# Patient Record
Sex: FEMALE | Race: BLACK OR AFRICAN AMERICAN | NOT HISPANIC OR LATINO | Employment: FULL TIME | URBAN - METROPOLITAN AREA
[De-identification: names, ages, dates, MRNs, and addresses within clinical notes are randomized per-mention and may not be internally consistent; named-entity substitution may affect disease eponyms.]

---

## 2014-04-07 LAB — HEP C VIRUS AB: NON REACTIVE

## 2017-12-18 ENCOUNTER — HOSPITAL ENCOUNTER (EMERGENCY)
Facility: HOSPITAL | Age: 52
Discharge: HOME | End: 2017-12-18
Attending: EMERGENCY MEDICINE

## 2017-12-18 VITALS
HEIGHT: 62 IN | OXYGEN SATURATION: 99 % | SYSTOLIC BLOOD PRESSURE: 130 MMHG | HEART RATE: 89 BPM | TEMPERATURE: 99 F | DIASTOLIC BLOOD PRESSURE: 79 MMHG | BODY MASS INDEX: 28.52 KG/M2 | WEIGHT: 155 LBS | RESPIRATION RATE: 18 BRPM

## 2017-12-18 DIAGNOSIS — M25.511 ACUTE PAIN OF BOTH SHOULDERS: ICD-10-CM
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
## 2017-12-18 DIAGNOSIS — M25.512 ACUTE PAIN OF BOTH SHOULDERS: ICD-10-CM

## 2017-12-18 DIAGNOSIS — T14.8XXA MUSCLE STRAIN: ICD-10-CM

## 2017-12-18 DIAGNOSIS — S40.021A CONTUSION OF RIGHT UPPER ARM, INITIAL ENCOUNTER: ICD-10-CM

## 2017-12-18 DIAGNOSIS — G44.309 POST-TRAUMATIC HEADACHE, NOT INTRACTABLE, UNSPECIFIED CHRONICITY PATTERN: Primary | ICD-10-CM

## 2017-12-18 PROCEDURE — 99284 EMERGENCY DEPT VISIT MOD MDM: CPT

## 2017-12-18 RX ORDER — NAPROXEN 500 MG/1
500 TABLET ORAL 2 TIMES DAILY WITH MEALS
Qty: 10 TABLET | Refills: 0 | Status: SHIPPED | OUTPATIENT
Start: 2017-12-18 | End: 2020-03-16

## 2017-12-18 RX ORDER — METAXALONE 800 MG/1
800 TABLET ORAL 3 TIMES DAILY
Qty: 15 TABLET | Refills: 0 | Status: SHIPPED | OUTPATIENT
Start: 2017-12-18 | End: 2017-12-23

## 2017-12-18 NOTE — ED NOTES
Bed: 21  Expected date:   Expected time:   Means of arrival:   Comments:  Open at 10     Talisha Elias RN  12/18/17 0697
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
## 2019-10-01 LAB
CHOLESTEROL, TOTAL: 155
HDLC SERPL-MCNC: 43 MG/DL
LDLC SERPL CALC-MCNC: 89 MG/DL (ref 0–160)
TRIGL SERPL-MCNC: 114 MG/DL

## 2020-03-12 ENCOUNTER — HOSPITAL ENCOUNTER (EMERGENCY)
Facility: HOSPITAL | Age: 55
Discharge: HOME | End: 2020-03-13
Attending: EMERGENCY MEDICINE
Payer: COMMERCIAL

## 2020-03-12 DIAGNOSIS — R60.0 PEDAL EDEMA: Primary | ICD-10-CM

## 2020-03-12 DIAGNOSIS — N28.9 RENAL INSUFFICIENCY: ICD-10-CM

## 2020-03-12 DIAGNOSIS — R06.02 SHORTNESS OF BREATH: ICD-10-CM

## 2020-03-12 PROCEDURE — 99285 EMERGENCY DEPT VISIT HI MDM: CPT | Mod: 25

## 2020-03-12 PROCEDURE — 96374 THER/PROPH/DIAG INJ IV PUSH: CPT

## 2020-03-12 RX ORDER — FUROSEMIDE 10 MG/ML
60 INJECTION INTRAMUSCULAR; INTRAVENOUS
Status: COMPLETED | OUTPATIENT
Start: 2020-03-13 | End: 2020-03-13

## 2020-03-13 ENCOUNTER — TELEPHONE (OUTPATIENT)
Dept: NEPHROLOGY | Facility: CLINIC | Age: 55
End: 2020-03-13

## 2020-03-13 VITALS
TEMPERATURE: 98 F | WEIGHT: 156.5 LBS | SYSTOLIC BLOOD PRESSURE: 112 MMHG | HEIGHT: 62 IN | RESPIRATION RATE: 18 BRPM | HEART RATE: 84 BPM | OXYGEN SATURATION: 99 % | DIASTOLIC BLOOD PRESSURE: 63 MMHG | BODY MASS INDEX: 28.8 KG/M2

## 2020-03-13 LAB
ALBUMIN SERPL BCP-MCNC: 4.5 G/DL (ref 3.5–5.2)
ALP SERPL-CCNC: 99 U/L (ref 55–135)
ALT SERPL W/O P-5'-P-CCNC: 21 U/L (ref 10–44)
ANION GAP SERPL CALC-SCNC: 12 MMOL/L (ref 8–16)
APTT BLDCRRT: 26.8 SEC (ref 21–32)
AST SERPL-CCNC: 19 U/L (ref 10–40)
BASOPHILS # BLD AUTO: 0.05 K/UL (ref 0–0.2)
BASOPHILS NFR BLD: 0.6 % (ref 0–1.9)
BILIRUB SERPL-MCNC: 0.5 MG/DL (ref 0.1–1)
BNP SERPL-MCNC: 16 PG/ML (ref 0–99)
BUN SERPL-MCNC: 22 MG/DL (ref 6–20)
CALCIUM SERPL-MCNC: 10.9 MG/DL (ref 8.7–10.5)
CHLORIDE SERPL-SCNC: 106 MMOL/L (ref 95–110)
CO2 SERPL-SCNC: 22 MMOL/L (ref 23–29)
CREAT SERPL-MCNC: 1.4 MG/DL (ref 0.5–1.4)
DIFFERENTIAL METHOD: NORMAL
EOSINOPHIL # BLD AUTO: 0.1 K/UL (ref 0–0.5)
EOSINOPHIL NFR BLD: 1 % (ref 0–8)
ERYTHROCYTE [DISTWIDTH] IN BLOOD BY AUTOMATED COUNT: 12.9 % (ref 11.5–14.5)
EST. GFR  (AFRICAN AMERICAN): 49 ML/MIN/1.73 M^2
EST. GFR  (NON AFRICAN AMERICAN): 43 ML/MIN/1.73 M^2
GLUCOSE SERPL-MCNC: 111 MG/DL (ref 70–110)
HCT VFR BLD AUTO: 39.7 % (ref 37–48.5)
HGB BLD-MCNC: 13.2 G/DL (ref 12–16)
IMM GRANULOCYTES # BLD AUTO: 0.02 K/UL (ref 0–0.04)
IMM GRANULOCYTES NFR BLD AUTO: 0.3 % (ref 0–0.5)
INR PPP: 1 (ref 0.8–1.2)
LYMPHOCYTES # BLD AUTO: 2.6 K/UL (ref 1–4.8)
LYMPHOCYTES NFR BLD: 32.4 % (ref 18–48)
MCH RBC QN AUTO: 30.8 PG (ref 27–31)
MCHC RBC AUTO-ENTMCNC: 33.2 G/DL (ref 32–36)
MCV RBC AUTO: 93 FL (ref 82–98)
MONOCYTES # BLD AUTO: 0.4 K/UL (ref 0.3–1)
MONOCYTES NFR BLD: 4.8 % (ref 4–15)
NEUTROPHILS # BLD AUTO: 4.8 K/UL (ref 1.8–7.7)
NEUTROPHILS NFR BLD: 60.9 % (ref 38–73)
NRBC BLD-RTO: 0 /100 WBC
PLATELET # BLD AUTO: 313 K/UL (ref 150–350)
PMV BLD AUTO: 9.8 FL (ref 9.2–12.9)
POTASSIUM SERPL-SCNC: 4.1 MMOL/L (ref 3.5–5.1)
PROT SERPL-MCNC: 8.4 G/DL (ref 6–8.4)
PROTHROMBIN TIME: 10.3 SEC (ref 9–12.5)
RBC # BLD AUTO: 4.29 M/UL (ref 4–5.4)
SODIUM SERPL-SCNC: 140 MMOL/L (ref 136–145)
TROPONIN I SERPL DL<=0.01 NG/ML-MCNC: 0.02 NG/ML (ref 0–0.03)
WBC # BLD AUTO: 7.88 K/UL (ref 3.9–12.7)

## 2020-03-13 PROCEDURE — 85730 THROMBOPLASTIN TIME PARTIAL: CPT

## 2020-03-13 PROCEDURE — 84484 ASSAY OF TROPONIN QUANT: CPT

## 2020-03-13 PROCEDURE — 63600175 PHARM REV CODE 636 W HCPCS: Performed by: EMERGENCY MEDICINE

## 2020-03-13 PROCEDURE — 93005 ELECTROCARDIOGRAM TRACING: CPT

## 2020-03-13 PROCEDURE — 83880 ASSAY OF NATRIURETIC PEPTIDE: CPT

## 2020-03-13 PROCEDURE — 25000003 PHARM REV CODE 250: Performed by: EMERGENCY MEDICINE

## 2020-03-13 PROCEDURE — 85025 COMPLETE CBC W/AUTO DIFF WBC: CPT

## 2020-03-13 PROCEDURE — 80053 COMPREHEN METABOLIC PANEL: CPT

## 2020-03-13 PROCEDURE — 93010 ELECTROCARDIOGRAM REPORT: CPT | Mod: ,,, | Performed by: INTERNAL MEDICINE

## 2020-03-13 PROCEDURE — 93010 EKG 12-LEAD: ICD-10-PCS | Mod: ,,, | Performed by: INTERNAL MEDICINE

## 2020-03-13 PROCEDURE — 85610 PROTHROMBIN TIME: CPT

## 2020-03-13 RX ORDER — AMLODIPINE BESYLATE 10 MG/1
10 TABLET ORAL
COMMUNITY
Start: 2020-02-27 | End: 2020-03-16 | Stop reason: DRUGHIGH

## 2020-03-13 RX ORDER — ESCITALOPRAM OXALATE 10 MG/1
10 TABLET ORAL
COMMUNITY
Start: 2020-01-30 | End: 2020-07-21

## 2020-03-13 RX ORDER — FUROSEMIDE 20 MG/1
20 TABLET ORAL DAILY
Qty: 2 TABLET | Refills: 0 | Status: SHIPPED | OUTPATIENT
Start: 2020-03-13 | End: 2020-03-17

## 2020-03-13 RX ORDER — ALPRAZOLAM 0.5 MG/1
0.5 TABLET ORAL DAILY PRN
COMMUNITY
Start: 2020-01-30 | End: 2020-05-15 | Stop reason: SDUPTHER

## 2020-03-13 RX ORDER — METOPROLOL TARTRATE 25 MG/1
25 TABLET, FILM COATED ORAL
COMMUNITY
End: 2020-03-16

## 2020-03-13 RX ADMIN — FUROSEMIDE 60 MG: 10 INJECTION, SOLUTION INTRAMUSCULAR; INTRAVENOUS at 12:03

## 2020-03-13 RX ADMIN — NITROGLYCERIN 1 INCH: 20 OINTMENT TOPICAL at 12:03

## 2020-03-13 NOTE — DISCHARGE INSTRUCTIONS
In mild renal insufficiency with creatinine 1.4 as well as pedal edema.  Please follow up with Dr. Wade with General nephrology for re-evaluation.  Call in the morning for exam.  Elevate her extremities at rest.  Use Lasix as prescribed.  Follow up as directed.  Return as needed for any worsening symptoms, problems, questions or concerns.

## 2020-03-13 NOTE — ED PROVIDER NOTES
SCRIBE #1 NOTE: I, Anton Mills, am scribing for, and in the presence of, Jayce Rosenthal Jr., MD. I have scribed the entire note.       History     Chief Complaint   Patient presents with    Leg Swelling     swelling to BLE; SOB     Review of patient's allergies indicates:   Allergen Reactions    Codeine Anaphylaxis    Penicillins          History of Present Illness     HPI    3/12/2020, 11:48 PM  History obtained from the patient      History of Present Illness: Modesta Camacho is a 54 y.o. female patient with a history of hypertension who presents to the Emergency Department for evaluation of bilateral leg swelling which onset gradually yesterday. Symptoms are constant and moderate in severity. No mitigating or exacerbating factors reported. Associated sxs include SOB. Patient denies any fever, chills, n/v, CP, cough, and all other sxs at this time. Prior Tx includes none. No further complaints or concerns at this time. Patient reports recent changes to daily medications in mid February.       Arrival mode: Personal transportation     PCP: Primary Doctor No      Past Medical History:  Past Medical History:   Diagnosis Date    Anxiety     Hypertension        Past Surgical History:  Past Surgical History:   Procedure Laterality Date    gastric sleeve      UTERINE FIBROID EMBOLIZATION           Family History:  History reviewed. No pertinent family history.       Social History:   Social History     Tobacco Use    Smoking status: Never Smoker   Substance and Sexual Activity    Alcohol use: No    Drug use: No    Sexual activity: Unknown         Review of Systems     Review of Systems   Constitutional: Negative for chills and fever.   HENT: Negative for sore throat.    Respiratory: Positive for shortness of breath. Negative for cough.    Cardiovascular: Positive for leg swelling. Negative for chest pain.   Gastrointestinal: Negative for nausea and vomiting.   Genitourinary: Negative for dysuria.  "  Musculoskeletal: Negative for back pain.   Skin: Negative for rash.   Neurological: Negative for weakness.   Hematological: Does not bruise/bleed easily.   All other systems reviewed and are negative.       Physical Exam     Initial Vitals [03/12/20 2244]   BP Pulse Resp Temp SpO2   139/82 91 20 97.9 °F (36.6 °C) 98 %      MAP       --          Physical Exam  Nursing Notes and Vital Signs Reviewed.  Constitutional: Well-developed and well-nourished.   Head: Atraumatic. Normocephalic.  Eyes: PERRL. EOM intact. Conjunctivae are not pale. No scleral icterus.  ENT: Mucous membranes are moist. Oropharynx is clear and symmetric.    Neck: Supple. Full ROM. No lymphadenopathy.  Cardiovascular: Regular rate. Regular rhythm. No murmurs, rubs, or gallops. Distal pulses are 2+ and symmetric.  Pulmonary/Chest: No respiratory distress. Clear to auscultation bilaterally. No wheezing or rales.  Abdominal: Soft and non-distended.  There is no tenderness.  No rebound, guarding, or rigidity. Good bowel sounds.  Genitourinary: No CVA tenderness  Musculoskeletal: Moves all extremities. No obvious deformities. No calf tenderness.  Skin: Warm and dry.  Neurological:  Alert, awake, and appropriate.  Normal speech.  No acute focal neurological deficits are appreciated.  Psychiatric: Normal affect. Good eye contact. Appropriate in content.     ED Course   Procedures  ED Vital Signs:  Vitals:    03/12/20 2244 03/13/20 0000 03/13/20 0027 03/13/20 0059   BP: 139/82  115/64 (!) 105/59   Pulse: 91 86 81 85   Resp: 20  12    Temp: 97.9 °F (36.6 °C)      TempSrc: Oral      SpO2: 98%  100% 99%   Weight: 71 kg (156 lb 8.4 oz)      Height: 5' 2" (1.575 m)          Abnormal Lab Results:  Labs Reviewed   COMPREHENSIVE METABOLIC PANEL - Abnormal; Notable for the following components:       Result Value    CO2 22 (*)     Glucose 111 (*)     BUN, Bld 22 (*)     Calcium 10.9 (*)     eGFR if  49 (*)     eGFR if non  43 (*)  "    All other components within normal limits   CBC W/ AUTO DIFFERENTIAL   TROPONIN I   B-TYPE NATRIURETIC PEPTIDE   APTT   PROTIME-INR        All Lab Results:  Results for orders placed or performed during the hospital encounter of 03/12/20   CBC auto differential   Result Value Ref Range    WBC 7.88 3.90 - 12.70 K/uL    RBC 4.29 4.00 - 5.40 M/uL    Hemoglobin 13.2 12.0 - 16.0 g/dL    Hematocrit 39.7 37.0 - 48.5 %    Mean Corpuscular Volume 93 82 - 98 fL    Mean Corpuscular Hemoglobin 30.8 27.0 - 31.0 pg    Mean Corpuscular Hemoglobin Conc 33.2 32.0 - 36.0 g/dL    RDW 12.9 11.5 - 14.5 %    Platelets 313 150 - 350 K/uL    MPV 9.8 9.2 - 12.9 fL    Immature Granulocytes 0.3 0.0 - 0.5 %    Gran # (ANC) 4.8 1.8 - 7.7 K/uL    Immature Grans (Abs) 0.02 0.00 - 0.04 K/uL    Lymph # 2.6 1.0 - 4.8 K/uL    Mono # 0.4 0.3 - 1.0 K/uL    Eos # 0.1 0.0 - 0.5 K/uL    Baso # 0.05 0.00 - 0.20 K/uL    nRBC 0 0 /100 WBC    Gran% 60.9 38.0 - 73.0 %    Lymph% 32.4 18.0 - 48.0 %    Mono% 4.8 4.0 - 15.0 %    Eosinophil% 1.0 0.0 - 8.0 %    Basophil% 0.6 0.0 - 1.9 %    Differential Method Automated    Comprehensive metabolic panel   Result Value Ref Range    Sodium 140 136 - 145 mmol/L    Potassium 4.1 3.5 - 5.1 mmol/L    Chloride 106 95 - 110 mmol/L    CO2 22 (L) 23 - 29 mmol/L    Glucose 111 (H) 70 - 110 mg/dL    BUN, Bld 22 (H) 6 - 20 mg/dL    Creatinine 1.4 0.5 - 1.4 mg/dL    Calcium 10.9 (H) 8.7 - 10.5 mg/dL    Total Protein 8.4 6.0 - 8.4 g/dL    Albumin 4.5 3.5 - 5.2 g/dL    Total Bilirubin 0.5 0.1 - 1.0 mg/dL    Alkaline Phosphatase 99 55 - 135 U/L    AST 19 10 - 40 U/L    ALT 21 10 - 44 U/L    Anion Gap 12 8 - 16 mmol/L    eGFR if African American 49 (A) >60 mL/min/1.73 m^2    eGFR if non African American 43 (A) >60 mL/min/1.73 m^2   Troponin I   Result Value Ref Range    Troponin I 0.016 0.000 - 0.026 ng/mL   Brain natriuretic peptide   Result Value Ref Range    BNP 16 0 - 99 pg/mL   APTT   Result Value Ref Range    aPTT 26.8 21.0 -  32.0 sec   Protime-INR   Result Value Ref Range    Prothrombin Time 10.3 9.0 - 12.5 sec    INR 1.0 0.8 - 1.2         Imaging Results          X-Ray Chest AP Portable (In process)                1:50 AM: Per ED physician, pt's CXR results: no acute pathology.      The EKG was ordered, reviewed, and independently interpreted by the ED provider.  Interpretation time: 0015  Rate: 79 BPM  Rhythm: NSR  Interpretation: low voltage QRS. T wave abnormality, consider inferior ischemia or anterolateral ischemia. No STEMI.      The Emergency Provider reviewed the vital signs and test results, which are outlined above.     ED Discussion       1:49 AM: Reassessed pt at this time.  Pt states her condition has improved at this time. Discussed with pt all pertinent ED information and results. Discussed pt dx and plan of tx. Gave pt all f/u and return to the ED instructions. All questions and concerns were addressed at this time. Pt expresses understanding of information and instructions, and is comfortable with plan to discharge. Pt is stable for discharge.    I discussed with patient and/or family/caretaker that evaluation in the ED does not suggest any emergent or life threatening medical conditions requiring immediate intervention beyond what was provided in the ED, and I believe patient is safe for discharge.  Regardless, an unremarkable evaluation in the ED does not preclude the development or presence of a serious of life threatening condition. As such, patient was instructed to return immediately for any worsening or change in current symptoms.    1:54 AM  Patient is stable nontoxic.  Patient has mild renal insufficiency likely causing her edema as well as her prolonged stasis while at work.  Patient does have evidence of DVT however.  Will treat with Lasix and referred her Nephrology for re-evaluation.  Discussed this with the patient at length who verbalized understanding agreement with all seems reliable.   MDM         Medical Decision Making:   Clinical Tests:   Lab Tests: Ordered and Reviewed  Radiological Study: Ordered and Reviewed  Medical Tests: Ordered and Reviewed           ED Medication(s):  Medications   nitroGLYCERIN 2% TD oint ointment 1 inch (1 inch Topical (Top) Given 3/13/20 0010)   furosemide injection 60 mg (60 mg Intravenous Given 3/13/20 0026)       New Prescriptions    FUROSEMIDE (LASIX) 20 MG TABLET    Take 1 tablet (20 mg total) by mouth once daily. for 2 days               Scribe Attestation:   Scribe #1: I performed the above scribed service and the documentation accurately describes the services I performed. I attest to the accuracy of the note.     Attending:   Physician Attestation Statement for Scribe #1: I, Jayce Rosenthal Jr., MD, personally performed the services described in this documentation, as scribed by Anton Mills, in my presence, and it is both accurate and complete.           Clinical Impression       ICD-10-CM ICD-9-CM   1. Pedal edema R60.0 782.3   2. Shortness of breath R06.02 786.05   3. Renal insufficiency N28.9 593.9       Disposition:   Disposition: Discharged  Condition: Stable         Jayce Rosenthal Jr., MD  03/13/20 0154

## 2020-03-13 NOTE — TELEPHONE ENCOUNTER
Patient called in stating that she was told by you that she need to be seen today. There are no openings. Please advise on what to do.

## 2020-03-16 ENCOUNTER — OFFICE VISIT (OUTPATIENT)
Dept: FAMILY MEDICINE | Facility: CLINIC | Age: 55
End: 2020-03-16
Payer: COMMERCIAL

## 2020-03-16 ENCOUNTER — TELEPHONE (OUTPATIENT)
Dept: RHEUMATOLOGY | Facility: CLINIC | Age: 55
End: 2020-03-16

## 2020-03-16 VITALS
OXYGEN SATURATION: 96 % | SYSTOLIC BLOOD PRESSURE: 116 MMHG | TEMPERATURE: 98 F | BODY MASS INDEX: 28.51 KG/M2 | WEIGHT: 155.88 LBS | DIASTOLIC BLOOD PRESSURE: 76 MMHG | HEART RATE: 93 BPM

## 2020-03-16 DIAGNOSIS — N18.30 CKD (CHRONIC KIDNEY DISEASE) STAGE 3, GFR 30-59 ML/MIN: ICD-10-CM

## 2020-03-16 DIAGNOSIS — M15.9 OSTEOARTHRITIS OF MULTIPLE JOINTS, UNSPECIFIED OSTEOARTHRITIS TYPE: ICD-10-CM

## 2020-03-16 DIAGNOSIS — R06.09 DYSPNEA ON EXERTION: Primary | ICD-10-CM

## 2020-03-16 DIAGNOSIS — I10 ESSENTIAL HYPERTENSION: ICD-10-CM

## 2020-03-16 PROCEDURE — 3078F PR MOST RECENT DIASTOLIC BLOOD PRESSURE < 80 MM HG: ICD-10-PCS | Mod: CPTII,S$GLB,, | Performed by: PHYSICIAN ASSISTANT

## 2020-03-16 PROCEDURE — 99204 PR OFFICE/OUTPT VISIT, NEW, LEVL IV, 45-59 MIN: ICD-10-PCS | Mod: S$GLB,,, | Performed by: PHYSICIAN ASSISTANT

## 2020-03-16 PROCEDURE — 99204 OFFICE O/P NEW MOD 45 MIN: CPT | Mod: S$GLB,,, | Performed by: PHYSICIAN ASSISTANT

## 2020-03-16 PROCEDURE — 3078F DIAST BP <80 MM HG: CPT | Mod: CPTII,S$GLB,, | Performed by: PHYSICIAN ASSISTANT

## 2020-03-16 PROCEDURE — 99999 PR PBB SHADOW E&M-EST. PATIENT-LVL IV: CPT | Mod: PBBFAC,,, | Performed by: PHYSICIAN ASSISTANT

## 2020-03-16 PROCEDURE — 3074F PR MOST RECENT SYSTOLIC BLOOD PRESSURE < 130 MM HG: ICD-10-PCS | Mod: CPTII,S$GLB,, | Performed by: PHYSICIAN ASSISTANT

## 2020-03-16 PROCEDURE — 3008F BODY MASS INDEX DOCD: CPT | Mod: CPTII,S$GLB,, | Performed by: PHYSICIAN ASSISTANT

## 2020-03-16 PROCEDURE — 99999 PR PBB SHADOW E&M-EST. PATIENT-LVL IV: ICD-10-PCS | Mod: PBBFAC,,, | Performed by: PHYSICIAN ASSISTANT

## 2020-03-16 PROCEDURE — 3074F SYST BP LT 130 MM HG: CPT | Mod: CPTII,S$GLB,, | Performed by: PHYSICIAN ASSISTANT

## 2020-03-16 PROCEDURE — 3008F PR BODY MASS INDEX (BMI) DOCUMENTED: ICD-10-PCS | Mod: CPTII,S$GLB,, | Performed by: PHYSICIAN ASSISTANT

## 2020-03-16 RX ORDER — TRAMADOL HYDROCHLORIDE 50 MG/1
TABLET ORAL
COMMUNITY
Start: 2020-02-25 | End: 2020-07-21 | Stop reason: SDUPTHER

## 2020-03-16 RX ORDER — LISINOPRIL 20 MG/1
20 TABLET ORAL DAILY
Qty: 90 TABLET | Refills: 3 | Status: SHIPPED | OUTPATIENT
Start: 2020-03-16 | End: 2020-03-18 | Stop reason: ALTCHOICE

## 2020-03-16 NOTE — PATIENT INSTRUCTIONS
Start lisinopril 20mg daily.  Continue to monitor once daily and record.    Minimize salt in diet.    Thanks for seeing me,  Radha Whitaker PA-C

## 2020-03-16 NOTE — TELEPHONE ENCOUNTER
----- Message from Nelida Delacruz MA sent at 3/16/2020 10:10 AM CDT -----  Contact: patient  Patient need to be schedule for new patient appt       Patient has referral in [facility]       Patient contact [phone] (home)         Thank you

## 2020-03-16 NOTE — PROGRESS NOTES
Subjective:      Patient ID: Modesta Camacho is a 54 y.o. female.    Chief Complaint: Leg Swelling and Shortness of Breath  Patient is new to me and clinic.    HPI   Patient saw [facility] 3/12/2020 with pedal edema, shortness of breath, and renal insufficiency and treated with Lasix 20mg x 2 days.  Patient states she has had worsening dyspnea on exertion for the last month.    Taking 100 mg TID tramadol daily for left knee pain.  Dr. Bal, rheumatologist, has told patient that she cannot take Tylenol or Naproxen for osteoarthritis.    She has gone to Dr. Miranda, cardiology, in September 2019.  Her insurance has just changed to [facility], so her specialists and providers need to be transferred to [facility].    Review of Systems   Constitutional: Positive for appetite change and chills (for a month). Negative for fever.   HENT: Negative for ear pain, sinus pressure and sinus pain.    Eyes: Negative for pain.   Respiratory: Positive for shortness of breath (dyspnea on exertion). Negative for cough.    Cardiovascular: Positive for leg swelling. Negative for chest pain.   Gastrointestinal: Positive for abdominal pain, constipation (last BM Saturday-hard) and nausea. Negative for blood in stool, diarrhea and vomiting.   Endocrine: Negative for polydipsia and polyuria.   Genitourinary: Negative for dysuria, frequency and hematuria.   Musculoskeletal: Positive for arthralgias. Negative for myalgias.   Skin: Negative for rash.   Allergic/Immunologic: Positive for environmental allergies.   Neurological: Positive for headaches (sometimes). Negative for dizziness and light-headedness.   Hematological: Bruises/bleeds easily.   Psychiatric/Behavioral: Positive for sleep disturbance. Negative for suicidal ideas. The patient is nervous/anxious.        Objective:   /76 (BP Location: Left arm, Patient Position: Sitting)   Pulse 93   Temp 98.3 °F (36.8 °C)   Wt 70.7 kg (155 lb 13.8 oz)   SpO2 96%   BMI 28.51 kg/m²       Physical Exam   Constitutional: She is oriented to person, place, and time. Vital signs are normal. She appears well-developed and well-nourished. She is active and cooperative. No distress.   HENT:   Head: Normocephalic and atraumatic.   Right Ear: Hearing, tympanic membrane, external ear and ear canal normal.   Left Ear: Hearing, tympanic membrane, external ear and ear canal normal.   Mouth/Throat: Oropharynx is clear and moist. No oropharyngeal exudate.   Eyes: Conjunctivae and lids are normal.   Neck: Normal range of motion and phonation normal. Neck supple.   Cardiovascular: Normal rate, regular rhythm and normal heart sounds. Exam reveals no gallop and no friction rub.   No murmur heard.  Pulmonary/Chest: Effort normal and breath sounds normal. No stridor. No respiratory distress. She has no decreased breath sounds. She has no wheezes. She has no rhonchi. She has no rales.   Abdominal: Soft. Bowel sounds are normal. There is no tenderness.   Musculoskeletal: Normal range of motion.        Right ankle: She exhibits no swelling.        Left ankle: She exhibits no swelling.   Neurological: She is alert and oriented to person, place, and time.   Skin: Skin is warm, dry and intact. No rash noted.   Psychiatric: She has a normal mood and affect. Her speech is normal and behavior is normal. Judgment and thought content normal.   Vitals reviewed.     Assessment:      1. Dyspnea on exertion    2. Essential hypertension    3. CKD (chronic kidney disease) stage 3, GFR 30-59 ml/min    4. Osteoarthritis of multiple joints, unspecified osteoarthritis type       Plan:   1. Dyspnea on exertion  - Ambulatory referral/consult to Cardiology; Future    2. Essential hypertension  My plan was to decrease Amlodipine 5 mg daily because of her pedal edema.  Patient refused to take amlodipine at all, so prescribing lisinopril even though she is having worsening renal function.  Explained to patient that lisinopril might not be the  best choice, and she understands.  Discussed minimizing salt in diet as well to decrease edema.  - lisinopriL (PRINIVIL,ZESTRIL) 20 MG tablet; Take 1 tablet (20 mg total) by mouth once daily.  Dispense: 90 tablet; Refill: 3    3. CKD (chronic kidney disease) stage 3, GFR 30-59 ml/min  - Ambulatory referral/consult to Nephrology; Future    4. Osteoarthritis of multiple joints, unspecified osteoarthritis type  - Ambulatory referral/consult to Rheumatology; Future    Follow up in two months with new PCP.  Patient agreed with plan and expressed understanding.    Thank you for allowing me to serve you,

## 2020-03-17 ENCOUNTER — LAB VISIT (OUTPATIENT)
Dept: LAB | Facility: HOSPITAL | Age: 55
End: 2020-03-17
Attending: FAMILY MEDICINE
Payer: COMMERCIAL

## 2020-03-17 ENCOUNTER — OFFICE VISIT (OUTPATIENT)
Dept: FAMILY MEDICINE | Facility: CLINIC | Age: 55
End: 2020-03-17
Payer: COMMERCIAL

## 2020-03-17 VITALS
WEIGHT: 156.5 LBS | HEART RATE: 82 BPM | OXYGEN SATURATION: 99 % | SYSTOLIC BLOOD PRESSURE: 118 MMHG | BODY MASS INDEX: 28.63 KG/M2 | DIASTOLIC BLOOD PRESSURE: 74 MMHG | TEMPERATURE: 98 F

## 2020-03-17 DIAGNOSIS — E83.52 HYPERCALCEMIA: ICD-10-CM

## 2020-03-17 DIAGNOSIS — R60.0 LOCALIZED EDEMA: ICD-10-CM

## 2020-03-17 DIAGNOSIS — M17.0 PRIMARY OSTEOARTHRITIS OF BOTH KNEES: ICD-10-CM

## 2020-03-17 DIAGNOSIS — I10 ESSENTIAL HYPERTENSION: Primary | ICD-10-CM

## 2020-03-17 DIAGNOSIS — Z12.39 SCREENING FOR BREAST CANCER: ICD-10-CM

## 2020-03-17 DIAGNOSIS — I10 ESSENTIAL HYPERTENSION: ICD-10-CM

## 2020-03-17 LAB
ALBUMIN/CREAT UR: 3 UG/MG (ref 0–30)
ANION GAP SERPL CALC-SCNC: 11 MMOL/L (ref 8–16)
BUN SERPL-MCNC: 21 MG/DL (ref 6–20)
CALCIUM SERPL-MCNC: 10.7 MG/DL (ref 8.7–10.5)
CHLORIDE SERPL-SCNC: 102 MMOL/L (ref 95–110)
CO2 SERPL-SCNC: 27 MMOL/L (ref 23–29)
CREAT SERPL-MCNC: 1.5 MG/DL (ref 0.5–1.4)
CREAT UR-MCNC: 132 MG/DL (ref 15–325)
EST. GFR  (AFRICAN AMERICAN): 45.2 ML/MIN/1.73 M^2
EST. GFR  (NON AFRICAN AMERICAN): 39.2 ML/MIN/1.73 M^2
GLUCOSE SERPL-MCNC: 103 MG/DL (ref 70–110)
MICROALBUMIN UR DL<=1MG/L-MCNC: 4 UG/ML
POTASSIUM SERPL-SCNC: 4.2 MMOL/L (ref 3.5–5.1)
SODIUM SERPL-SCNC: 140 MMOL/L (ref 136–145)

## 2020-03-17 PROCEDURE — 99999 PR PBB SHADOW E&M-EST. PATIENT-LVL IV: CPT | Mod: PBBFAC,,, | Performed by: FAMILY MEDICINE

## 2020-03-17 PROCEDURE — 3008F PR BODY MASS INDEX (BMI) DOCUMENTED: ICD-10-PCS | Mod: CPTII,S$GLB,, | Performed by: FAMILY MEDICINE

## 2020-03-17 PROCEDURE — 99214 OFFICE O/P EST MOD 30 MIN: CPT | Mod: S$GLB,,, | Performed by: FAMILY MEDICINE

## 2020-03-17 PROCEDURE — 82043 UR ALBUMIN QUANTITATIVE: CPT

## 2020-03-17 PROCEDURE — 3078F DIAST BP <80 MM HG: CPT | Mod: CPTII,S$GLB,, | Performed by: FAMILY MEDICINE

## 2020-03-17 PROCEDURE — 3074F SYST BP LT 130 MM HG: CPT | Mod: CPTII,S$GLB,, | Performed by: FAMILY MEDICINE

## 2020-03-17 PROCEDURE — 36415 COLL VENOUS BLD VENIPUNCTURE: CPT | Mod: PO

## 2020-03-17 PROCEDURE — 3074F PR MOST RECENT SYSTOLIC BLOOD PRESSURE < 130 MM HG: ICD-10-PCS | Mod: CPTII,S$GLB,, | Performed by: FAMILY MEDICINE

## 2020-03-17 PROCEDURE — 99999 PR PBB SHADOW E&M-EST. PATIENT-LVL IV: ICD-10-PCS | Mod: PBBFAC,,, | Performed by: FAMILY MEDICINE

## 2020-03-17 PROCEDURE — 99214 PR OFFICE/OUTPT VISIT, EST, LEVL IV, 30-39 MIN: ICD-10-PCS | Mod: S$GLB,,, | Performed by: FAMILY MEDICINE

## 2020-03-17 PROCEDURE — 80048 BASIC METABOLIC PNL TOTAL CA: CPT

## 2020-03-17 PROCEDURE — 3008F BODY MASS INDEX DOCD: CPT | Mod: CPTII,S$GLB,, | Performed by: FAMILY MEDICINE

## 2020-03-17 PROCEDURE — 3078F PR MOST RECENT DIASTOLIC BLOOD PRESSURE < 80 MM HG: ICD-10-PCS | Mod: CPTII,S$GLB,, | Performed by: FAMILY MEDICINE

## 2020-03-17 NOTE — PROGRESS NOTES
Subjective:       Patient ID: Modesta Camacho is a 54 y.o. female.    Chief Complaint: Establish Care and Follow-up (ER)    HPI     The patient is coming here today to establish new primary Care physician.     Hypertension:  The patient is taking lisinopril 20 mg daily, she was taking Norvasc 10 mg, went to the emergency room secondary to edema on the lower extremities, she received IV Lasix, the patient stated the symptoms improved considerably after that.  She was told in the emergency room that her kidney function was abnormal.  Upon review of the lab work from the emergency room, the patient kidney function was in the stage III and also had presence of hypercalcemia.    Edema:  The patient is still complaining of swelling on the lower extremities.  The symptoms are improved, the patient was given prescription for Lasix once daily for 2 days.  The patient is asking if she can continue taking fluid pills daily.    Knee pain:  The patient had any replacement on the right lower extremity and complains of pain on the left knee, the symptoms are getting worse.  The patient was seen orthopedic specialist, she was prescribed meloxicam but because of the stomach problems she stop taking the medication and she is currently taking tramadol.  The patient stated that has difficulty to walk because of the pain.    The patient is not up-to-date on mammograms, she needs schedule her mammogram.      Past medical history, past social history was reviewed and discussed with the patient.    Review of Systems   Constitutional: Negative for activity change and appetite change.   HENT: Negative for congestion and ear discharge.    Eyes: Negative for discharge and itching.   Respiratory: Negative for choking and chest tightness.    Cardiovascular: Positive for leg swelling. Negative for chest pain.   Gastrointestinal: Negative for abdominal distention, abdominal pain and constipation.   Endocrine: Negative for cold intolerance and heat  intolerance.   Genitourinary: Negative for dysuria and flank pain.   Musculoskeletal: Positive for arthralgias (Bilateral knee pain, hip pain). Negative for back pain.   Skin: Negative for pallor and rash.   Allergic/Immunologic: Negative for environmental allergies and food allergies.   Neurological: Negative for dizziness and facial asymmetry.   Hematological: Negative for adenopathy. Does not bruise/bleed easily.   Psychiatric/Behavioral: Negative for agitation, confusion and decreased concentration.       Objective:      Physical Exam   Constitutional: She appears well-developed and well-nourished. No distress.   HENT:   Head: Normocephalic and atraumatic.   Right Ear: External ear normal.   Left Ear: External ear normal.   Mouth/Throat: Oropharynx is clear and moist.   Eyes: Conjunctivae are normal. Right eye exhibits no discharge. Left eye exhibits no discharge. No scleral icterus.   Neck: Neck supple. No tracheal deviation present.   Cardiovascular: Normal rate, regular rhythm and normal heart sounds.   No murmur heard.  Pulmonary/Chest: Effort normal and breath sounds normal. No respiratory distress. She has no wheezes.   Musculoskeletal: She exhibits tenderness (Left knee with decreased range of motion and tenderness to palpation). She exhibits no deformity.   Neurological: No cranial nerve deficit. Coordination normal.   Skin: No rash noted. She is not diaphoretic. No erythema. No pallor.   Psychiatric: She has a normal mood and affect. Her behavior is normal. Judgment and thought content normal.   Nursing note and vitals reviewed.      Assessment:       1. Essential hypertension    2. Localized edema    3. Primary osteoarthritis of both knees    4. Hypercalcemia    5. Screening for breast cancer        Plan:       Essential hypertension:  Controlled  -     Microalbumin/creatinine urine ratio; Future; Expected date: 03/17/2020  -     Basic metabolic panel; Future; Expected date: 03/17/2020    Localized  edema:  Improving    Primary osteoarthritis of both knees:  Worsening  -     X-Ray Knee 1 or 2 View Bilateral; Future; Expected date: 03/17/2020  -     Ambulatory referral/consult to Physical Medicine Rehab; Future; Expected date: 03/24/2020    Hypercalcemia:  New problem, work-up needed    Screening for breast cancer  -     Mammo Digital Screening Bilateral With CAD; Future; Expected date: 03/17/2020      Willing recheck kidneys function in calciums levels, also will order microalbumin, after blood work, we decide if the patient needs to hydrochlorothiazide also for swelling on the lower extremities.  The patient's BMI has been recorded in the chart. The patient has been provided educational materials regarding the benefits of attaining and maintaining a normal weight. We will continue to address and follow this issue during follow up visits.   Patient agreed with assessment and plan. Patient verbalized understanding.

## 2020-03-17 NOTE — PATIENT INSTRUCTIONS
Eating Heart-Healthy Food: Using the DASH Plan    Eating for your heart doesnt have to be hard or boring. You just need to know how to make healthier choices. The DASH eating plan has been developed to help you do just that. DASH stands for Dietary Approaches to Stop Hypertension. It is a plan that has been proven to be healthier for your heart and to lower your risk for high blood pressure. It can also help lower your risk for cancer, heart disease, osteoporosis, and diabetes.  Choosing from each food group  Choose foods from each of the food groups below each day. Try to get the recommended number of servings for each food group. The serving numbers are based on a diet of 2,000 calories a day. Talk to your doctor if youre unsure about your calorie needs. Along with getting the correct servings, the DASH plan also recommends a sodium intake less than 2,300 mg per day.        Grains  Servings: 6 to 8 a day  A serving is:  · 1 slice bread  · 1 ounce dry cereal  · Half a cup cooked rice, pasta or cereal  Best choices: Whole grains and any grains high in fiber. Vegetables  Servings: 4 to 5 a day  A serving is:  · 1 cup raw leafy vegetable  · Half a cup cut-up raw or cooked vegetable  · Half a cup vegetable juice  Best choices: Fresh or frozen vegetables prepared without added salt or fat.   Fruits  Servings: 4 to 5 a day  A serving is:  · 1 medium fruit  · One-quarter cup dried fruit  · Half a cup fresh, frozen, or canned fruit  · Half a cup of 100% fruit juices  Best choices: A variety of fresh fruits of different colors. Whole fruits are a better choice than fruit juices. Low-fat or fat-free dairy  Servings: 2 to 3 a day  A serving is:  · 1 cup milk  · 1 cup yogurt  · One and a half ounces cheese  Best choices: Skim or 1% milk, low-fat or fat-free yogurt or buttermilk, and low-fat cheeses.         Lean meats, poultry, fish  Servings: 6 or fewer a day  A serving is:  · 1 ounce cooked meats, poultry, or fish  · 1  egg  Best choices: Lean poultry and fish. Trim away visible fat. Broil, grill, roast, or boil instead of frying. Remove skin from poultry before eating. Limit how much red meat you eat.  Nuts, seeds, beans  Servings: 4 to 5 a week  A serving is:  · One-third cup nuts (one and a half ounces)  · 2 tablespoons nut butter or seeds  · Half a cup cooked dry beans or legumes  Best choices: Dry roasted nuts with no salt added, lentils, kidney beans, garbanzo beans, and whole brody beans.   Fats and oils  Servings: 2 to 3 a day  A serving is:  · 1 teaspoon vegetable oil  · 1 teaspoon soft margarine  · 1 tablespoon mayonnaise  · 2 tablespoons salad dressing  Best choices: Nut and vegetable oils (nontropical vegetable oils), such as olive and canola oil. Sweets  Servings: 5 a week or fewer  A serving is:  · 1 tablespoon sugar, maple syrup, or honey  · 1 tablespoon jam or jelly  · 1 half-ounce jelly beans (about 15)  · 1 cup lemonade  Best choices: Dried fruit can be a satisfying sweet. Choose low-fat sweets. And watch your serving sizes!      For more on the DASH eating plan, visit:  www.nhlbi.nih.gov/health/health-topics/topics/dash   Date Last Reviewed: 6/1/2016  © 6302-9779 [employer]. [address] [place] [address], [place] [address]. All rights reserved. This information is not intended as a substitute for professional medical care. Always follow your healthcare professional's instructions.

## 2020-03-18 DIAGNOSIS — N18.30 CHRONIC KIDNEY DISEASE, STAGE 3: Primary | ICD-10-CM

## 2020-03-18 RX ORDER — TELMISARTAN 40 MG/1
40 TABLET ORAL DAILY
Qty: 90 TABLET | Refills: 3 | Status: SHIPPED | OUTPATIENT
Start: 2020-03-18 | End: 2020-05-15

## 2020-03-19 ENCOUNTER — TELEPHONE (OUTPATIENT)
Dept: FAMILY MEDICINE | Facility: CLINIC | Age: 55
End: 2020-03-19

## 2020-03-19 RX ORDER — FUROSEMIDE 20 MG/1
20 TABLET ORAL DAILY PRN
Qty: 30 TABLET | Refills: 0 | Status: SHIPPED | OUTPATIENT
Start: 2020-03-19 | End: 2020-04-16

## 2020-03-19 NOTE — TELEPHONE ENCOUNTER
----- Message from Jeanne Reyna sent at 3/19/2020  7:51 AM CDT -----  Contact: Modesta pt  Type:  Test Results    Who Called:  Modesta  Name of Test (Lab/Mammo/Etc):  labs  Date of Test:  Last week  Ordering Provider:  Svetlana  Where the test was performed:  n/a  Best Call Back Number:  [phone]  Additional Information:  Pls call pt back regarding her results

## 2020-03-19 NOTE — TELEPHONE ENCOUNTER
Hey Doctor Svetlana Byers seen you on Tuesday and today 03/19/22 she is stating she has fluid collecting around my ankles and now my feet hurt she is a bit concern and in pain.

## 2020-03-19 NOTE — PROGRESS NOTES
Patient call, is having worsening symptoms of swelling on the lower extremities, Lasix was prescribed for the patient.  Will recheck kidney function in 2 weeks

## 2020-03-23 DIAGNOSIS — M25.562 PAIN IN BOTH KNEES, UNSPECIFIED CHRONICITY: Primary | ICD-10-CM

## 2020-03-23 DIAGNOSIS — M25.561 PAIN IN BOTH KNEES, UNSPECIFIED CHRONICITY: Primary | ICD-10-CM

## 2020-03-25 ENCOUNTER — TELEPHONE (OUTPATIENT)
Dept: PHYSICAL MEDICINE AND REHAB | Facility: CLINIC | Age: 55
End: 2020-03-25

## 2020-03-25 NOTE — TELEPHONE ENCOUNTER
----- Message from Mary Geronimo sent at 3/25/2020  3:43 PM CDT -----  Contact: Patient  Type: Needs Medical Advice    Who Called:  Patient  Best Call Back Number: [phone] (home)   Additional Information: the pt would like a nurse to call her back in regards to changing the appt dates from 03/30  to a later month please call her to advise asap

## 2020-03-27 DIAGNOSIS — Z12.11 COLON CANCER SCREENING: ICD-10-CM

## 2020-04-15 ENCOUNTER — PATIENT OUTREACH (OUTPATIENT)
Dept: ADMINISTRATIVE | Facility: HOSPITAL | Age: 55
End: 2020-04-15

## 2020-04-15 NOTE — PROGRESS NOTES
Chart review completed 04/15/2020.  Care Everywhere updates requested and reviewed.  Immunizations reconciled. Media reviewed.        updated with external Hep C and Lipid panel report.   No pap in [facility] or [facility].    Health Maintenance Due   Topic Date Due    HIV Screening  10/25/1980    Pneumococcal Vaccine (Highest Risk) (1 of 3 - PCV13) 10/25/1984    Pap Smear with HPV Cotest  10/25/1986    Mammogram  10/25/2005    Shingles Vaccine (1 of 2) 10/25/2015    Colonoscopy  10/25/2015

## 2020-04-15 NOTE — PROGRESS NOTES
Refill Authorization Note     is requesting a refill authorization.    Brief assessment and rationale for refill: REVIEW: Abnormal Labs/New Start          Medication Therapy Plan: SCr elevated-PCP stated that after reviewing labs from recent ED visit for bubba cordero, kidney function was in stage 3 and also had presence of hypercalcemia; PCP stated to recheck kidney function in 2 weeks from last prescribed furosemide; labs not ordered or scheduled; pt referred to nephrology-OV scheduled; new start; please review    Medication reconciliation completed: No   Pharmacist Review Requested: Yes                     Comments:   Refill Center Care Gap Closure protocols temporarily suspended.   Requested Prescriptions   Pending Prescriptions Disp Refills    furosemide (LASIX) 20 MG tablet [Pharmacy Med Name: FUROSEMIDE 20 MG TABLET] 30 tablet 0     Sig: TAKE 1 TABLET BY MOUTH EVERY DAY AS NEEDED       Cardiovascular:  Diuretics - Loop Failed - 4/11/2020  9:14 AM        Failed - Cr is 1.3 or below and within 180 days     Creatinine   Date Value Ref Range Status   03/17/2020 1.5 (H) 0.5 - 1.4 mg/dL Final   03/13/2020 1.4 0.5 - 1.4 mg/dL Final   06/27/2015 1.0 0.5 - 1.4 mg/dL Final              Failed - eGFR in normal range and within 180 days     eGFR if non    Date Value Ref Range Status   03/17/2020 39.2 (A) >60 mL/min/1.73 m^2 Final     Comment:     Calculation used to obtain the estimated glomerular filtration  rate (eGFR) is the CKD-EPI equation.      03/13/2020 43 (A) >60 mL/min/1.73 m^2 Final     Comment:     Calculation used to obtain the estimated glomerular filtration  rate (eGFR) is the CKD-EPI equation.      06/27/2015 >60 >60 mL/min/1.73 m^2 Final     Comment:     Calculation used to obtain the estimated glomerular filtration  rate (eGFR) is the CKD-EPI equation. Since race is unknown   in our information system, the eGFR values for   -American and Non--American patients are  given   for each creatinine result.       eGFR if    Date Value Ref Range Status   03/17/2020 45.2 (A) >60 mL/min/1.73 m^2 Final   03/13/2020 49 (A) >60 mL/min/1.73 m^2 Final   06/27/2015 >60 >60 mL/min/1.73 m^2 Final              Passed - Patient is at least 18 years old        Passed - Last BP in normal range within 360 days.     BP Readings from Last 3 Encounters:   03/17/20 118/74   03/16/20 116/76   03/13/20 112/63              Passed - Office visit in past 12 months or future 90 days.     Recent Outpatient Visits            4 weeks ago Essential hypertension    [place] [facility] Mary Mota MD    1 month ago Dyspnea on exertion    [facility] MINAL SahniC          Future Appointments              In 2 weeks [facility] [place]    In 2 weeks MD Sarahy Sauceda - Physical Med/Rehab, [place]    In 3 weeks Blaine Bustos MD [place] - Nephrology, [place]    In 1 month MD Sarahy Beltran - Cardiology, [place]    In 1 month [facility]    In 1 month Mary Mota MD [place] [facility], [place]    In 3 months Kylie Armenta DO [place] - Rheumatology, [place]                Passed - K in normal range and within 180 days     Potassium   Date Value Ref Range Status   03/17/2020 4.2 3.5 - 5.1 mmol/L Final   03/13/2020 4.1 3.5 - 5.1 mmol/L Final   06/27/2015 3.9 3.5 - 5.1 mmol/L Final              Passed - Na is between 130 and 148 and within 180 days     Sodium   Date Value Ref Range Status   03/17/2020 140 136 - 145 mmol/L Final   03/13/2020 140 136 - 145 mmol/L Final   06/27/2015 141 136 - 145 mmol/L Final               Appointments  past 12m or future 3m with PCP    Date Provider   Last Visit   3/17/2020 Mary Mota MD   Next Visit   6/5/2020 Mary Mota MD        Note composed:5:05 PM 04/15/2020

## 2020-04-16 RX ORDER — FUROSEMIDE 20 MG/1
TABLET ORAL
Qty: 30 TABLET | Refills: 0 | Status: SHIPPED | OUTPATIENT
Start: 2020-04-16 | End: 2020-05-04 | Stop reason: SDUPTHER

## 2020-04-16 NOTE — TELEPHONE ENCOUNTER
Please see the following assessment. This refill request still requires some action on your part. Lasix recently started in 3/20 for edema. Pt has FOV with Nephrology in 5/20 and FOV with you in 6/20. Last commented by you 3/19/20 that follow up labs were needed but none ordered/scheduled at this time. Pended 90 day supply. Defer to you. Thank you.

## 2020-04-21 DIAGNOSIS — Z01.84 ANTIBODY RESPONSE EXAMINATION: ICD-10-CM

## 2020-04-30 ENCOUNTER — PATIENT OUTREACH (OUTPATIENT)
Dept: ADMINISTRATIVE | Facility: OTHER | Age: 55
End: 2020-04-30

## 2020-05-06 ENCOUNTER — TELEPHONE (OUTPATIENT)
Dept: RADIOLOGY | Facility: HOSPITAL | Age: 55
End: 2020-05-06

## 2020-05-06 ENCOUNTER — PATIENT MESSAGE (OUTPATIENT)
Dept: RADIOLOGY | Facility: HOSPITAL | Age: 55
End: 2020-05-06

## 2020-05-06 ENCOUNTER — PATIENT MESSAGE (OUTPATIENT)
Dept: ADMINISTRATIVE | Facility: HOSPITAL | Age: 55
End: 2020-05-06

## 2020-05-06 RX ORDER — FUROSEMIDE 20 MG/1
20 TABLET ORAL DAILY PRN
Qty: 90 TABLET | Refills: 0 | Status: SHIPPED | OUTPATIENT
Start: 2020-05-06 | End: 2020-07-28

## 2020-05-06 NOTE — TELEPHONE ENCOUNTER
Refill Routing Note    Medication(s) are not appropriate for processing by [facility]:       Medication is a new start (<3 months)        Medication Therapy Plan: Lasix 20 mg recently started 3/19/20 < 90 days ago; outside of [facility] to approve refills if med recently started; Also, stated in BMP comments (3/18/20) that pt should hold fluid pill 2/2 to decreased renal function; Defer to you  Medication reconciliation completed: No      Automatic Epic Protocol Generated Data:    Requested Prescriptions   Pending Prescriptions Disp Refills    furosemide (LASIX) 20 MG tablet 30 tablet 0     Sig: Take 1 tablet (20 mg total) by mouth daily as needed.       Cardiovascular:  Diuretics - Loop Failed - 5/4/2020  2:30 PM        Failed - Cr is 1.3 or below and within 180 days     Creatinine   Date Value Ref Range Status   03/17/2020 1.5 (H) 0.5 - 1.4 mg/dL Final   03/13/2020 1.4 0.5 - 1.4 mg/dL Final   06/27/2015 1.0 0.5 - 1.4 mg/dL Final              Failed - eGFR in normal range and within 180 days     eGFR if non    Date Value Ref Range Status   03/17/2020 39.2 (A) >60 mL/min/1.73 m^2 Final     Comment:     Calculation used to obtain the estimated glomerular filtration  rate (eGFR) is the CKD-EPI equation.      03/13/2020 43 (A) >60 mL/min/1.73 m^2 Final     Comment:     Calculation used to obtain the estimated glomerular filtration  rate (eGFR) is the CKD-EPI equation.      06/27/2015 >60 >60 mL/min/1.73 m^2 Final     Comment:     Calculation used to obtain the estimated glomerular filtration  rate (eGFR) is the CKD-EPI equation. Since race is unknown   in our information system, the eGFR values for   -American and Non--American patients are given   for each creatinine result.       eGFR if    Date Value Ref Range Status   03/17/2020 45.2 (A) >60 mL/min/1.73 m^2 Final   03/13/2020 49 (A) >60 mL/min/1.73 m^2 Final   06/27/2015 >60 >60 mL/min/1.73 m^2 Final               Passed - Patient is at least 18 years old        Passed - Last BP in normal range within 360 days.     BP Readings from Last 3 Encounters:   03/17/20 118/74   03/16/20 116/76   03/13/20 112/63              Passed - Office visit in past 12 months or future 90 days.     Recent Outpatient Visits            1 month ago Essential hypertension    [facility] Mary Mota MD    1 month ago Dyspnea on exertion    [facility] Medicine MINAL SahniC          Future Appointments              In 1 week MD Sarahy Lara - Nephrology, [place]    In 2 weeks MD Sarahy Sauceda - Physical Med/Rehab, [place]    In 3 weeks [facility] [place]    In 1 month Mary Mota MD Sarahy - Family Medicine, [place]    In 1 month MD Sarahy Beltran - Cardiology, [place]    In 2 months Kylie Armenta DO [place] - Rheumatology, [place]                Passed - K in normal range and within 180 days     Potassium   Date Value Ref Range Status   03/17/2020 4.2 3.5 - 5.1 mmol/L Final   03/13/2020 4.1 3.5 - 5.1 mmol/L Final   06/27/2015 3.9 3.5 - 5.1 mmol/L Final              Passed - Na is between 130 and 148 and within 180 days     Sodium   Date Value Ref Range Status   03/17/2020 140 136 - 145 mmol/L Final   03/13/2020 140 136 - 145 mmol/L Final   06/27/2015 141 136 - 145 mmol/L Final                 Appointments  past 12m or future 3m with PCP    Date Provider   Last Visit   3/17/2020 Mary Mota MD   Next Visit   6/5/2020 Mray Mota MD   ED visits in past 90 days: [unfilled]     Note composed:10:23 AM 05/06/2020

## 2020-05-08 ENCOUNTER — TELEPHONE (OUTPATIENT)
Dept: FAMILY MEDICINE | Facility: CLINIC | Age: 55
End: 2020-05-08

## 2020-05-08 NOTE — TELEPHONE ENCOUNTER
Ultrasound appt rescheduled for 5/20/20--asking to reschedule nephrology appt.  Please call with appt after ultrasound

## 2020-05-08 NOTE — TELEPHONE ENCOUNTER
----- Message from Krista Meier sent at 5/8/2020 12:44 PM CDT -----  Contact: patient  Patient called to reschedule an ultrasound appointment specifically to 6.10.20  And wishes to speak with a nurse regarding this matter.       she  can be reached at [phone]    Thanks  KB

## 2020-05-15 ENCOUNTER — OFFICE VISIT (OUTPATIENT)
Dept: FAMILY MEDICINE | Facility: CLINIC | Age: 55
End: 2020-05-15
Payer: COMMERCIAL

## 2020-05-15 ENCOUNTER — TELEPHONE (OUTPATIENT)
Dept: FAMILY MEDICINE | Facility: CLINIC | Age: 55
End: 2020-05-15

## 2020-05-15 DIAGNOSIS — F41.9 ANXIETY: ICD-10-CM

## 2020-05-15 DIAGNOSIS — F33.1 MODERATE EPISODE OF RECURRENT MAJOR DEPRESSIVE DISORDER: ICD-10-CM

## 2020-05-15 DIAGNOSIS — I10 ESSENTIAL HYPERTENSION: ICD-10-CM

## 2020-05-15 DIAGNOSIS — F41.0 PANIC ATTACK: Primary | ICD-10-CM

## 2020-05-15 PROCEDURE — 99214 PR OFFICE/OUTPT VISIT, EST, LEVL IV, 30-39 MIN: ICD-10-PCS | Mod: 95,,, | Performed by: FAMILY MEDICINE

## 2020-05-15 PROCEDURE — 99214 OFFICE O/P EST MOD 30 MIN: CPT | Mod: 95,,, | Performed by: FAMILY MEDICINE

## 2020-05-15 RX ORDER — TELMISARTAN 80 MG/1
40 TABLET ORAL DAILY
COMMUNITY
End: 2020-06-02 | Stop reason: SDUPTHER

## 2020-05-15 RX ORDER — ALPRAZOLAM 0.5 MG/1
0.5 TABLET ORAL NIGHTLY PRN
Qty: 30 TABLET | Refills: 0 | Status: SHIPPED | OUTPATIENT
Start: 2020-05-15 | End: 2020-07-22 | Stop reason: SDUPTHER

## 2020-05-15 RX ORDER — TELMISARTAN 40 MG/1
40 TABLET ORAL DAILY
COMMUNITY
End: 2020-05-15

## 2020-05-15 NOTE — TELEPHONE ENCOUNTER
Pt states that her blood pressure this morning and her rheumatology appt was 180/98 and was advised to contact Dr. Mota about it. Pt states she has been feeling a little depressed about her health lately and is not able to sleep, but other than that she feels ok. Please advise.

## 2020-05-15 NOTE — PATIENT INSTRUCTIONS
Eating Heart-Healthy Food: Using the DASH Plan    Eating for your heart doesnt have to be hard or boring. You just need to know how to make healthier choices. The DASH eating plan has been developed to help you do just that. DASH stands for Dietary Approaches to Stop Hypertension. It is a plan that has been proven to be healthier for your heart and to lower your risk for high blood pressure. It can also help lower your risk for cancer, heart disease, osteoporosis, and diabetes.  Choosing from each food group  Choose foods from each of the food groups below each day. Try to get the recommended number of servings for each food group. The serving numbers are based on a diet of 2,000 calories a day. Talk to your doctor if youre unsure about your calorie needs. Along with getting the correct servings, the DASH plan also recommends a sodium intake less than 2,300 mg per day.        Grains  Servings: 6 to 8 a day  A serving is:  · 1 slice bread  · 1 ounce dry cereal  · Half a cup cooked rice, pasta or cereal  Best choices: Whole grains and any grains high in fiber. Vegetables  Servings: 4 to 5 a day  A serving is:  · 1 cup raw leafy vegetable  · Half a cup cut-up raw or cooked vegetable  · Half a cup vegetable juice  Best choices: Fresh or frozen vegetables prepared without added salt or fat.   Fruits  Servings: 4 to 5 a day  A serving is:  · 1 medium fruit  · One-quarter cup dried fruit  · Half a cup fresh, frozen, or canned fruit  · Half a cup of 100% fruit juices  Best choices: A variety of fresh fruits of different colors. Whole fruits are a better choice than fruit juices. Low-fat or fat-free dairy  Servings: 2 to 3 a day  A serving is:  · 1 cup milk  · 1 cup yogurt  · One and a half ounces cheese  Best choices: Skim or 1% milk, low-fat or fat-free yogurt or buttermilk, and low-fat cheeses.         Lean meats, poultry, fish  Servings: 6 or fewer a day  A serving is:  · 1 ounce cooked meats, poultry, or fish  · 1  egg  Best choices: Lean poultry and fish. Trim away visible fat. Broil, grill, roast, or boil instead of frying. Remove skin from poultry before eating. Limit how much red meat you eat.  Nuts, seeds, beans  Servings: 4 to 5 a week  A serving is:  · One-third cup nuts (one and a half ounces)  · 2 tablespoons nut butter or seeds  · Half a cup cooked dry beans or legumes  Best choices: Dry roasted nuts with no salt added, lentils, kidney beans, garbanzo beans, and whole brody beans.   Fats and oils  Servings: 2 to 3 a day  A serving is:  · 1 teaspoon vegetable oil  · 1 teaspoon soft margarine  · 1 tablespoon mayonnaise  · 2 tablespoons salad dressing  Best choices: Nut and vegetable oils (nontropical vegetable oils), such as olive and canola oil. Sweets  Servings: 5 a week or fewer  A serving is:  · 1 tablespoon sugar, maple syrup, or honey  · 1 tablespoon jam or jelly  · 1 half-ounce jelly beans (about 15)  · 1 cup lemonade  Best choices: Dried fruit can be a satisfying sweet. Choose low-fat sweets. And watch your serving sizes!      For more on the DASH eating plan, visit:  www.nhlbi.nih.gov/health/health-topics/topics/dash   Date Last Reviewed: 6/1/2016  © 1785-3646 [employer]. [address] [place] [address], [place] [address]. All rights reserved. This information is not intended as a substitute for professional medical care. Always follow your healthcare professional's instructions.

## 2020-05-15 NOTE — TELEPHONE ENCOUNTER
Can you given appointment to see me today at 3:00 p.m.., still available in the system.  Thank you

## 2020-05-15 NOTE — TELEPHONE ENCOUNTER
----- Message from Erika Burkett sent at 5/15/2020 11:06 AM CDT -----  Contact: pt  Pt stated that she went to rheum doctor today and they advised her to reach out due to her blood pressure being 180/98. Pt can be reached at [phone]      Thanks,  Erika Burkett

## 2020-05-18 ENCOUNTER — PATIENT OUTREACH (OUTPATIENT)
Dept: ADMINISTRATIVE | Facility: OTHER | Age: 55
End: 2020-05-18

## 2020-05-19 ENCOUNTER — TELEPHONE (OUTPATIENT)
Dept: PSYCHIATRY | Facility: CLINIC | Age: 55
End: 2020-05-19

## 2020-05-19 NOTE — TELEPHONE ENCOUNTER
Patient has been scheduled with Dr. Yepez per referral from Dr. Mota for 6/10/2020, appointment time/date/location provided patient verbalizes understanding with no further questions.

## 2020-05-20 ENCOUNTER — OFFICE VISIT (OUTPATIENT)
Dept: PHYSICAL MEDICINE AND REHAB | Facility: CLINIC | Age: 55
End: 2020-05-20
Payer: COMMERCIAL

## 2020-05-20 ENCOUNTER — HOSPITAL ENCOUNTER (OUTPATIENT)
Dept: RADIOLOGY | Facility: HOSPITAL | Age: 55
Discharge: HOME | End: 2020-05-20
Attending: FAMILY MEDICINE
Payer: COMMERCIAL

## 2020-05-20 ENCOUNTER — CLINICAL SUPPORT (OUTPATIENT)
Dept: FAMILY MEDICINE | Facility: CLINIC | Age: 55
End: 2020-05-20
Payer: COMMERCIAL

## 2020-05-20 ENCOUNTER — HOSPITAL ENCOUNTER (OUTPATIENT)
Dept: RADIOLOGY | Facility: HOSPITAL | Age: 55
Discharge: HOME | End: 2020-05-20
Attending: PHYSICAL MEDICINE & REHABILITATION
Payer: COMMERCIAL

## 2020-05-20 VITALS — DIASTOLIC BLOOD PRESSURE: 82 MMHG | SYSTOLIC BLOOD PRESSURE: 132 MMHG

## 2020-05-20 VITALS — BODY MASS INDEX: 28.71 KG/M2 | WEIGHT: 156 LBS | HEIGHT: 62 IN

## 2020-05-20 DIAGNOSIS — M16.12 PRIMARY OSTEOARTHRITIS OF LEFT HIP: ICD-10-CM

## 2020-05-20 DIAGNOSIS — N18.30 CHRONIC KIDNEY DISEASE, STAGE 3: ICD-10-CM

## 2020-05-20 DIAGNOSIS — Z01.30 BP CHECK: Primary | ICD-10-CM

## 2020-05-20 DIAGNOSIS — M17.12 PRIMARY OSTEOARTHRITIS OF LEFT KNEE: ICD-10-CM

## 2020-05-20 DIAGNOSIS — M16.12 PRIMARY OSTEOARTHRITIS OF LEFT HIP: Primary | ICD-10-CM

## 2020-05-20 PROCEDURE — 99244 OFF/OP CNSLTJ NEW/EST MOD 40: CPT | Mod: 25,S$GLB,, | Performed by: PHYSICAL MEDICINE & REHABILITATION

## 2020-05-20 PROCEDURE — 99999 PR PBB SHADOW E&M-EST. PATIENT-LVL III: ICD-10-PCS | Mod: PBBFAC,,, | Performed by: PHYSICAL MEDICINE & REHABILITATION

## 2020-05-20 PROCEDURE — 99999 PR PBB SHADOW E&M-EST. PATIENT-LVL II: ICD-10-PCS | Mod: PBBFAC,,,

## 2020-05-20 PROCEDURE — 99999 PR PBB SHADOW E&M-EST. PATIENT-LVL II: CPT | Mod: PBBFAC,,,

## 2020-05-20 PROCEDURE — 73502 X-RAY EXAM HIP UNI 2-3 VIEWS: CPT | Mod: 26,LT,, | Performed by: RADIOLOGY

## 2020-05-20 PROCEDURE — 73502 X-RAY EXAM HIP UNI 2-3 VIEWS: CPT | Mod: TC,PO,LT

## 2020-05-20 PROCEDURE — 99999 PR PBB SHADOW E&M-EST. PATIENT-LVL III: CPT | Mod: PBBFAC,,, | Performed by: PHYSICAL MEDICINE & REHABILITATION

## 2020-05-20 PROCEDURE — 99499 UNLISTED E&M SERVICE: CPT | Mod: S$GLB,,, | Performed by: FAMILY MEDICINE

## 2020-05-20 PROCEDURE — 20611 DRAIN/INJ JOINT/BURSA W/US: CPT | Mod: 50,S$GLB,, | Performed by: PHYSICAL MEDICINE & REHABILITATION

## 2020-05-20 PROCEDURE — 99244 PR OFFICE CONSULTATION,LEVEL IV: ICD-10-PCS | Mod: 25,S$GLB,, | Performed by: PHYSICAL MEDICINE & REHABILITATION

## 2020-05-20 PROCEDURE — 99499 NO LOS: ICD-10-PCS | Mod: S$GLB,,, | Performed by: FAMILY MEDICINE

## 2020-05-20 PROCEDURE — 76770 US KIDNEY: ICD-10-PCS | Mod: 26,,, | Performed by: RADIOLOGY

## 2020-05-20 PROCEDURE — 20611 LARGE JOINT ASPIRATION/INJECTION: L HIP JOINT: ICD-10-PCS | Mod: 50,S$GLB,, | Performed by: PHYSICAL MEDICINE & REHABILITATION

## 2020-05-20 PROCEDURE — 76770 US EXAM ABDO BACK WALL COMP: CPT | Mod: TC,PO

## 2020-05-20 PROCEDURE — 73502 XR HIP 2 VIEW LEFT: ICD-10-PCS | Mod: 26,LT,, | Performed by: RADIOLOGY

## 2020-05-20 PROCEDURE — 76770 US EXAM ABDO BACK WALL COMP: CPT | Mod: 26,,, | Performed by: RADIOLOGY

## 2020-05-20 RX ORDER — TRIAMCINOLONE ACETONIDE 40 MG/ML
40 INJECTION, SUSPENSION INTRA-ARTICULAR; INTRAMUSCULAR
Status: DISCONTINUED | OUTPATIENT
Start: 2020-05-20 | End: 2020-05-20 | Stop reason: HOSPADM

## 2020-05-20 RX ADMIN — TRIAMCINOLONE ACETONIDE 40 MG: 40 INJECTION, SUSPENSION INTRA-ARTICULAR; INTRAMUSCULAR at 08:05

## 2020-05-20 NOTE — PROGRESS NOTES
.  Modesta Camacho 54 y.o. female is here today for Blood Pressure check.   History of HTN yes.    Review of patient's allergies indicates:   Allergen Reactions    Codeine Anaphylaxis and Swelling     Takes Tramadol and has never had an issue with SOB/swelling      Penicillins      Creatinine   Date Value Ref Range Status   03/17/2020 1.5 (H) 0.5 - 1.4 mg/dL Final     Sodium   Date Value Ref Range Status   03/17/2020 140 136 - 145 mmol/L Final     Potassium   Date Value Ref Range Status   03/17/2020 4.2 3.5 - 5.1 mmol/L Final   ]  Patient verifies taking blood pressure medications on a regular basis at the same time of the day.     Current Outpatient Medications:     ALPRAZolam (XANAX) 0.5 MG tablet, Take 1 tablet (0.5 mg total) by mouth nightly as needed., Disp: 30 tablet, Rfl: 0    escitalopram oxalate (LEXAPRO) 10 MG tablet, Take 10 mg by mouth., Disp: , Rfl:     furosemide (LASIX) 20 MG tablet, Take 1 tablet (20 mg total) by mouth daily as needed., Disp: 90 tablet, Rfl: 0    metoprolol succinate (TOPROL-XL) 50 MG 24 hr tablet, Take 50 mg by mouth once daily., Disp: , Rfl:     pantoprazole (PROTONIX) 40 MG tablet, Take 40 mg by mouth once daily., Disp: , Rfl:     telmisartan (MICARDIS) 80 MG Tab, Take 40 mg by mouth once daily., Disp: , Rfl:     traMADoL (ULTRAM) 50 mg tablet, , Disp: , Rfl:   Does patient have record of home blood pressure readings no. Readings have been averaging N/A.   Last dose of blood pressure medication was taken at 7:30.  Patient is asymptomatic.   Complains of N/A.     132/82 ,   .    Dr. Mota notified.

## 2020-05-20 NOTE — PROGRESS NOTES
[facility]    Consulting Provider: Dr. Mary Mota    CHIEF COMPLAINT:   Chief Complaint   Patient presents with    Knee Pain      L Knee    Hip Pain     L Hip     HISTORY OF PRESENT ILLNESS: Modesta Camacho is a 54 y.o. female with a history of right TKA in 6/16 with Dr. Tinajero who presents to me as a new patient for evaluation of her left knee and left hip. 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 She has chronic L knee pain 2/2 severe osteoarthritis and plans for total knee replacement towards the end of this year. She elected for a corticosteroid injection at the time which provided her for good relief for a few months. She notes intermittent swelling and states the pain is disruptive of her sleep at night. Today she is more concerned by her L hip pain which started about 6-7 months without inciting trauma. She reports that this pain is deep within the joint and is worse with prolonged walking and sitting. Pain is disruptive to her sleep on a nightly basis. Denies low back pain or numbness/tingling complaints. She has not undergone previous evaluation of the hip nor undergone any PT/interventions for it.    Review of Systems   Constitutional: Negative for fever.   HENT: Negative for drooling.    Eyes: Negative for discharge.   Respiratory: Negative for choking.    Cardiovascular: Negative for chest pain.   Genitourinary: Negative for flank pain.   Skin: Negative for wound.   Allergic/Immunologic: Negative for immunocompromised state.   Neurological: Negative for tremors and syncope.   Psychiatric/Behavioral: Negative for behavioral problems.     Past Medical History:   Past Medical History:   Diagnosis Date    Anxiety     Hypertension     Osteoarthritis        Past Surgical History:   Past Surgical History:   Procedure Laterality Date    gastric sleeve      TOTAL KNEE ARTHROPLASTY Right 06/14/2016    TUBAL LIGATION      UTERINE FIBROID EMBOLIZATION         Family History:    Family History   Problem Relation Age of Onset    Hypertension Mother     Arthritis Mother     Diabetes Father     Heart disease Father     Depression Sister     Hypertension Sister     Arthritis Brother     Thyroid disease Son     Hypertension Son     Arthritis Maternal Grandmother     Heart disease Maternal Grandmother     Kidney disease Maternal Grandmother     Heart attack Maternal Grandfather     Stroke Paternal Grandmother     No Known Problems Paternal Grandfather     Eczema Son        Medications:   Current Outpatient Medications on File Prior to Visit   Medication Sig Dispense Refill    ALPRAZolam (XANAX) 0.5 MG tablet Take 1 tablet (0.5 mg total) by mouth nightly as needed. 30 tablet 0    escitalopram oxalate (LEXAPRO) 10 MG tablet Take 10 mg by mouth.      furosemide (LASIX) 20 MG tablet Take 1 tablet (20 mg total) by mouth daily as needed. 90 tablet 0    metoprolol succinate (TOPROL-XL) 50 MG 24 hr tablet Take 50 mg by mouth once daily.      pantoprazole (PROTONIX) 40 MG tablet Take 40 mg by mouth once daily.      telmisartan (MICARDIS) 80 MG Tab Take 40 mg by mouth once daily.      traMADoL (ULTRAM) 50 mg tablet       [DISCONTINUED] amLODIPine (NORVASC) 10 MG tablet Take 10 mg by mouth.       No current facility-administered medications on file prior to visit.        Allergies:   Review of patient's allergies indicates:   Allergen Reactions    Codeine Anaphylaxis and Swelling     Takes Tramadol and has never had an issue with SOB/swelling      Penicillins        Social History:   Social History     Socioeconomic History    Marital status:      Spouse name: Pop Land    Number of children: 3    Years of education: Not on file    Highest education level: Not on file   Occupational History    Occupation: patient access   Social Needs    Financial resource strain: Not on file    Food insecurity:     Worry: Not on file     Inability: Not on file    Transportation  "needs:     Medical: Not on file     Non-medical: Not on file   Tobacco Use    Smoking status: Never Smoker    Smokeless tobacco: Never Used   Substance and Sexual Activity    Alcohol use: Yes     Comment: occasional    Drug use: No    Sexual activity: Yes     Partners: Male   Lifestyle    Physical activity:     Days per week: Not on file     Minutes per session: Not on file    Stress: Rather much   Relationships    Social connections:     Talks on phone: Not on file     Gets together: Not on file     Attends Muslim service: Not on file     Active member of club or organization: Not on file     Attends meetings of clubs or organizations: Not on file     Relationship status: Not on file   Other Topics Concern    Not on file   Social History Narrative    Not on file     PHYSICAL EXAMINATION:   General    Vitals:    05/20/20 0810   Weight: 70.8 kg (156 lb)   Height: 5' 2" (1.575 m)     Constitutional: Oriented to person, place, and time. No apparent distress. Pleasant.  HENT:   Head: Normocephalic and atraumatic.   Eyes: Right eye exhibits no discharge. Left eye exhibits no discharge. No scleral icterus.   Pulmonary/Chest: Effort normal. No respiratory distress.   Abdominal: There is no guarding.   Neurological: Alert and oriented to person, place, and time.   Psychiatric: Behavior is normal.   Right Knee Exam     Range of Motion   Extension: 0   Flexion: 120       Left Knee Exam     Tenderness   The patient is experiencing tenderness in the medial joint line.    Range of Motion   Extension: 0   Flexion: 110     Tests   Dylan:  Medial - negative   Varus: negative Valgus: negative  Lachman:  Anterior - negative      Drawer:  Posterior - negative  Pivot shift: negative  Patellar apprehension: negative    Other   Erythema: absent  Scars: absent  Sensation: normal  Pulse: present  Swelling: none  Effusion: no effusion present    Comments:  Crepitus with ROM      Left Hip Exam     Tenderness   The patient is " experiencing tenderness in the greater trochanter.    Range of Motion   Flexion: 90   External rotation: 30   Internal rotation: 0     Tests   GALLO: negative    Other   Erythema: absent  Scars: absent  Sensation: normal  Pulse: present    Comments:  Positive FADIR  Positive Log Roll        INSPECTION: There is no swelling, ecchymoses, erythema or gross deformity of the left LE.  GAIT/DYNAMIC: Antalgic     Imaging:  L Knee Xrays from 8/19/2019 (external films that she brought on a disc today that we reviewed):   1. L Knee - bone on bone changes in the medial compartment with varus alignment. Patellar osteophytes as well as large osteophytes of the anterior and posterior femur. Mild narrowing of the PF joint with associated osteophytes.     Data Reviewed: X-ray    Supportive Actions: Independent visualization of images or test specimens    ASSESSMENT:   1. Primary osteoarthritis of left hip    2. Primary osteoarthritis of both knees      PLAN:     1. Time was spent reviewing the above diagnosis in depth with Modesta today, including acute management and rehabilitation.     2. For her left knee, she plans for replacement this year and would prefer to focus on the hip today, as this is the more painful joint currently. We did discuss the risks and benefits of various treatment options for the hip.    3. She elected for a L Hip intraarticular corticosteroid injection under ultrasound guidance today. See separate procedure note. She would like to hold off on PT for now in an effort to continue social distancing.    4. X-ray L Hip today    5. RTC in 3 months or earlier if pain persists or worsens.  We discussed that alternate nonsurgical options for left hip include injection of PRP or articular sensory branch radiofrequency ablation.  We discussed that neither of these would likely be covered by her insurance.  She will speak with her  about these options and we will assess her candidacy pending her hip  "x-rays.    This is a consult from Dr. Mary Mota. Please see the "Communications" section of Epic to see how the consulting physician received the report of today's findings and recommendations. If it's an [facility] physician, it will be forwarded to his/her "in basket".    The above note was completed, in part, with the aid of Dragon dictation software/hardware. Translation errors may be present.    "

## 2020-05-20 NOTE — Clinical Note
Pt. Stated she will need medication since she is taking two tablets instead of one and she will be out of medication in 15 days.

## 2020-05-20 NOTE — PROCEDURES
Large Joint Aspiration/Injection: L hip joint  Date/Time: 5/20/2020 8:00 AM  Performed by: Vaibhav Mata MD  Authorized by: Vaibhav Mata MD     Consent Done?:  Yes (Verbal)  Indications:  Pain  Site marked: the procedure site was marked    Timeout: prior to procedure the correct patient, procedure, and site was verified    Prep: patient was prepped and draped in usual sterile fashion    Local anesthesia used?: No      Details:  Needle Size:  22 G  Ultrasonic Guidance for needle placement?: Yes    Images are saved and documented.  Approach: Needle in plane, lateral to medial.  Location:  Hip  Site:  L hip joint  Medications:  40 mg triamcinolone acetonide 40 mg/mL  Patient tolerance:  Patient tolerated the procedure well with no immediate complications     Ultrasound guidance was used for correct needle placement, the images were saved will be uploaded to EMR.

## 2020-05-20 NOTE — LETTER
May 20, 2020      Mary Mota MD  [address] [facility] [address] [place] [address]           [facility]  [address] [facility] [address] [place] [address]  Phone: [phone]  Fax: [phone]          Patient: Modesta Camacho   MR Number: [id number]   YOB: 1965   Date of Visit: 5/20/2020       Dear Dr. Mary Mota:    Thank you for referring Modesta Camacho to me for evaluation. Attached you will find relevant portions of my assessment and plan of care.    If you have questions, please do not hesitate to call me. I look forward to following Modesta Camacho along with you.    Sincerely,    Vaibhav Mata MD    Enclosure  CC:  No Recipients    If you would like to receive this communication electronically, please contact [email or web address] or [phone] to request more information on [facility] Link access.    For providers and/or their staff who would like to refer a patient to [facility], please contact us through our one-stop-shop provider referral line, [facility], at [phone].    If you feel you have received this communication in error or would no longer like to receive these types of communications, please e-mail [email or web address]

## 2020-05-21 DIAGNOSIS — Z01.84 ANTIBODY RESPONSE EXAMINATION: ICD-10-CM

## 2020-05-21 NOTE — PROGRESS NOTES
Subjective:       Patient ID: Modesta Camacho is a 54 y.o. female.    Chief Complaint: Hypertension    HPI     The patient location is:  [place]  The chief complaint leading to consultation is:  Hypertension and depression    Visit type: audiovisual    Face to Face time with patient:  20 min  25  minutes of total time spent on the encounter, which includes face to face time and non-face to face time preparing to see the patient (eg, review of tests), Obtaining and/or reviewing separately obtained history, Documenting clinical information in the electronic or other health record, Independently interpreting results (not separately reported) and communicating results to the patient/family/caregiver, or Care coordination (not separately reported).         Each patient to whom he or she provides medical services by telemedicine is:  (1) informed of the relationship between the physician and patient and the respective role of any other health care provider with respect to management of the patient; and (2) notified that he or she may decline to receive medical services by telemedicine and may withdraw from such care at any time.    Notes:     Anxiety:  The patient stated the symptoms of anxiety are doing worse, she is not able to sleep at night, she has been having panic attacks, she was taking Xanax with moderate relief of the symptoms but she ran out of the medication.  She is currently taking Lexapro 10 mg at bedtime.  She denies any side effects of the medication.    Depression:  The patient complains of worsening depression, the patient is currently taking Lexapro, she denies any side effects of the medication.  The patient is not currently seeing a therapist.  She also takes tramadol daily for pain.    Hypertension:  The patient stated the blood pressure is been elevated, went to see the specialist and her blood pressure was in the 2nd stage, the patient stated that he has been taking her blood pressure  medications as directed.  She also been taking Lasix for swelling on the lower extremities.    Past medical history, past social history was reviewed and discussed with the patient.    Review of Systems   Constitutional: Positive for fatigue. Negative for activity change and appetite change.   HENT: Negative for congestion and ear discharge.    Eyes: Negative for discharge and itching.   Respiratory: Negative for choking and chest tightness.    Cardiovascular: Negative for chest pain and leg swelling.   Gastrointestinal: Negative for abdominal distention and abdominal pain.   Endocrine: Negative for cold intolerance and heat intolerance.   Genitourinary: Negative for dysuria and flank pain.   Musculoskeletal: Positive for arthralgias. Negative for back pain.   Skin: Negative for pallor and rash.   Allergic/Immunologic: Negative for environmental allergies and food allergies.   Neurological: Negative for dizziness and facial asymmetry.   Hematological: Negative for adenopathy. Does not bruise/bleed easily.   Psychiatric/Behavioral: Positive for dysphoric mood and sleep disturbance. Negative for agitation, confusion and decreased concentration. The patient is nervous/anxious.        Objective:      Physical Exam   Constitutional: She is oriented to person, place, and time. She appears well-developed and well-nourished. No distress.   HENT:   Head: Normocephalic and atraumatic.   Right Ear: External ear normal.   Left Ear: External ear normal.   Neurological: She is alert and oriented to person, place, and time.   Skin: She is not diaphoretic.   Psychiatric: She has a normal mood and affect. Her behavior is normal. Judgment and thought content normal.       Assessment:       1. Panic attack    2. Anxiety    3. Essential hypertension    4. Moderate episode of recurrent major depressive disorder        Plan:       Panic attack:  Worsening  -     Ambulatory referral/consult to Psychology; Future; Expected date:  05/22/2020  -     ALPRAZolam (XANAX) 0.5 MG tablet; Take 1 tablet (0.5 mg total) by mouth nightly as needed.  Dispense: 30 tablet; Refill: 0    Anxiety:  Worsening  -     Ambulatory referral/consult to Psychology; Future; Expected date: 05/22/2020  -     ALPRAZolam (XANAX) 0.5 MG tablet; Take 1 tablet (0.5 mg total) by mouth nightly as needed.  Dispense: 30 tablet; Refill: 0    Essential hypertension:  Uncontrolled    Moderate episode of recurrent major depressive disorder:  Worsening  -     Ambulatory referral/consult to Psychology; Future; Expected date: 05/22/2020    For anxiety and depression, will refer the patient to a psychologist for further assessment, or high blood pressure, will increase the dosage of the telmisartan to take 80 mg daily.  As the blood pressure is running high.  Healthy habits, avoid fried foods, red meat and processed starches.  Will continue with Lexapro, will give her a new prescription for Xanax.  Will continue with furosemide, will see the patient back in the clinic in 4 weeks.  The patient also will return for a blood pressure check in a week in the office.   Patient agreed with assessment and plan. Patient verbalized understanding.  [place] prescription monitoring program was checked and okay.

## 2020-05-24 ENCOUNTER — PATIENT OUTREACH (OUTPATIENT)
Dept: ADMINISTRATIVE | Facility: OTHER | Age: 55
End: 2020-05-24

## 2020-05-27 ENCOUNTER — TELEPHONE (OUTPATIENT)
Dept: NEPHROLOGY | Facility: CLINIC | Age: 55
End: 2020-05-27

## 2020-05-27 ENCOUNTER — PATIENT OUTREACH (OUTPATIENT)
Dept: ADMINISTRATIVE | Facility: HOSPITAL | Age: 55
End: 2020-05-27

## 2020-05-27 ENCOUNTER — OFFICE VISIT (OUTPATIENT)
Dept: NEPHROLOGY | Facility: CLINIC | Age: 55
End: 2020-05-27
Payer: COMMERCIAL

## 2020-05-27 DIAGNOSIS — I10 HYPERTENSION, UNSPECIFIED TYPE: ICD-10-CM

## 2020-05-27 DIAGNOSIS — N18.30 CKD (CHRONIC KIDNEY DISEASE) STAGE 3, GFR 30-59 ML/MIN: ICD-10-CM

## 2020-05-27 DIAGNOSIS — R73.9 HYPERGLYCEMIA: ICD-10-CM

## 2020-05-27 DIAGNOSIS — E55.9 VITAMIN D DEFICIENCY: ICD-10-CM

## 2020-05-27 DIAGNOSIS — N18.30 CKD (CHRONIC KIDNEY DISEASE) STAGE 3, GFR 30-59 ML/MIN: Primary | ICD-10-CM

## 2020-05-27 DIAGNOSIS — F41.9 ANXIETY: ICD-10-CM

## 2020-05-27 PROCEDURE — 99204 PR OFFICE/OUTPT VISIT, NEW, LEVL IV, 45-59 MIN: ICD-10-PCS | Mod: 95,,, | Performed by: INTERNAL MEDICINE

## 2020-05-27 PROCEDURE — 99204 OFFICE O/P NEW MOD 45 MIN: CPT | Mod: 95,,, | Performed by: INTERNAL MEDICINE

## 2020-05-27 NOTE — LETTER
May 27, 2020      Radha Whitaker PA-C  [address] [facility] [address] [place] [address]           [facility]  [address] [place] [address]  Phone: [phone]          Patient: Modesta Camacho   MR Number: [id number]   YOB: 1965   Date of Visit: 5/27/2020       Dear Radha Whitaker:    Thank you for referring Modesta Camacho to me for evaluation. Attached you will find relevant portions of my assessment and plan of care.    If you have questions, please do not hesitate to call me. I look forward to following Modesta Camacho along with you.    Sincerely,    Blaine Bustos MD    Enclosure  CC:  No Recipients    If you would like to receive this communication electronically, please contact [email or web address] or [phone] to request more information on [employer] Link access.    For providers and/or their staff who would like to refer a patient to [facility], please contact us through our one-stop-shop provider referral line, [facility], at [phone].    If you feel you have received this communication in error or would no longer like to receive these types of communications, please e-mail [email or web address]

## 2020-05-27 NOTE — PROGRESS NOTES
The patient location is: Home  The chief complaint leading to consultation is: GREYSON    Visit type: audio only    Face to Face time with patient: zero minutes  65 minutes of total time spent on the encounter, which includes face to face time and non-face to face time preparing to see the patient (eg, review of tests), Obtaining and/or reviewing separately obtained history, Documenting clinical information in the electronic or other health record, Independently interpreting results (not separately reported) and communicating results to the patient/family/caregiver, or Care coordination (not separately reported).   Each patient to whom he or she provides medical services by telemedicine is:  (1) informed of the relationship between the physician and patient and the respective role of any other health care provider with respect to management of the patient; and (2) notified that he or she may decline to receive medical services by telemedicine and may withdraw from such care at any time.    Notes: She is referred by her PCP for increased serum creatinine.     She is off Lasix and Mobic, but no chemistries since. Kidney disease runs in her family as does DM. No further LE edema since off Norvasc. Her bP is now controlled on current regimen. She denies foamy urine, no hematuria and no flank pain. She follows a moderately low sodium diet and is only now pushing water. No LE edema and no SOB. No current NSAID use and no herbal medications.         Plan:  Will repeat chemistries with urine studies. If kidney function does not improve will require further work up

## 2020-05-29 ENCOUNTER — LAB VISIT (OUTPATIENT)
Dept: LAB | Facility: HOSPITAL | Age: 55
End: 2020-05-29
Attending: INTERNAL MEDICINE
Payer: COMMERCIAL

## 2020-05-29 DIAGNOSIS — N18.30 CKD (CHRONIC KIDNEY DISEASE) STAGE 3, GFR 30-59 ML/MIN: ICD-10-CM

## 2020-05-29 DIAGNOSIS — E55.9 VITAMIN D DEFICIENCY: ICD-10-CM

## 2020-05-29 DIAGNOSIS — R73.9 HYPERGLYCEMIA: ICD-10-CM

## 2020-05-29 LAB
25(OH)D3+25(OH)D2 SERPL-MCNC: 26 NG/ML (ref 30–96)
ALBUMIN SERPL BCP-MCNC: 4 G/DL (ref 3.5–5.2)
ANION GAP SERPL CALC-SCNC: 7 MMOL/L (ref 8–16)
BASOPHILS # BLD AUTO: 0.04 K/UL (ref 0–0.2)
BASOPHILS NFR BLD: 0.5 % (ref 0–1.9)
BUN SERPL-MCNC: 17 MG/DL (ref 6–20)
CALCIUM SERPL-MCNC: 9.5 MG/DL (ref 8.7–10.5)
CHLORIDE SERPL-SCNC: 103 MMOL/L (ref 95–110)
CO2 SERPL-SCNC: 29 MMOL/L (ref 23–29)
CREAT SERPL-MCNC: 1.1 MG/DL (ref 0.5–1.4)
DIFFERENTIAL METHOD: ABNORMAL
EOSINOPHIL # BLD AUTO: 0.1 K/UL (ref 0–0.5)
EOSINOPHIL NFR BLD: 1.4 % (ref 0–8)
ERYTHROCYTE [DISTWIDTH] IN BLOOD BY AUTOMATED COUNT: 13.4 % (ref 11.5–14.5)
EST. GFR  (AFRICAN AMERICAN): >60 ML/MIN/1.73 M^2
EST. GFR  (NON AFRICAN AMERICAN): 57.1 ML/MIN/1.73 M^2
GLUCOSE SERPL-MCNC: 70 MG/DL (ref 70–110)
HCT VFR BLD AUTO: 41.6 % (ref 37–48.5)
HGB BLD-MCNC: 13 G/DL (ref 12–16)
IMM GRANULOCYTES # BLD AUTO: 0.01 K/UL (ref 0–0.04)
IMM GRANULOCYTES NFR BLD AUTO: 0.1 % (ref 0–0.5)
LYMPHOCYTES # BLD AUTO: 4.2 K/UL (ref 1–4.8)
LYMPHOCYTES NFR BLD: 57.2 % (ref 18–48)
MCH RBC QN AUTO: 30.2 PG (ref 27–31)
MCHC RBC AUTO-ENTMCNC: 31.3 G/DL (ref 32–36)
MCV RBC AUTO: 97 FL (ref 82–98)
MONOCYTES # BLD AUTO: 0.5 K/UL (ref 0.3–1)
MONOCYTES NFR BLD: 7 % (ref 4–15)
NEUTROPHILS # BLD AUTO: 2.5 K/UL (ref 1.8–7.7)
NEUTROPHILS NFR BLD: 33.8 % (ref 38–73)
NRBC BLD-RTO: 0 /100 WBC
PHOSPHATE SERPL-MCNC: 3.7 MG/DL (ref 2.7–4.5)
PLATELET # BLD AUTO: 317 K/UL (ref 150–350)
PMV BLD AUTO: 10.7 FL (ref 9.2–12.9)
POTASSIUM SERPL-SCNC: 4.4 MMOL/L (ref 3.5–5.1)
PTH-INTACT SERPL-MCNC: 118 PG/ML (ref 9–77)
RBC # BLD AUTO: 4.3 M/UL (ref 4–5.4)
SODIUM SERPL-SCNC: 139 MMOL/L (ref 136–145)
URATE SERPL-MCNC: 7.1 MG/DL (ref 2.4–5.7)
WBC # BLD AUTO: 7.29 K/UL (ref 3.9–12.7)

## 2020-05-29 PROCEDURE — 83970 ASSAY OF PARATHORMONE: CPT

## 2020-05-29 PROCEDURE — 84550 ASSAY OF BLOOD/URIC ACID: CPT

## 2020-05-29 PROCEDURE — 83036 HEMOGLOBIN GLYCOSYLATED A1C: CPT

## 2020-05-29 PROCEDURE — 84165 PROTEIN E-PHORESIS SERUM: CPT

## 2020-05-29 PROCEDURE — 36415 COLL VENOUS BLD VENIPUNCTURE: CPT | Mod: PO

## 2020-05-29 PROCEDURE — 85025 COMPLETE CBC W/AUTO DIFF WBC: CPT

## 2020-05-29 PROCEDURE — 80069 RENAL FUNCTION PANEL: CPT

## 2020-05-29 PROCEDURE — 84165 PROTEIN E-PHORESIS SERUM: CPT | Mod: 26,,, | Performed by: PATHOLOGY

## 2020-05-29 PROCEDURE — 84165 PATHOLOGIST INTERPRETATION SPE: ICD-10-PCS | Mod: 26,,, | Performed by: PATHOLOGY

## 2020-05-29 PROCEDURE — 82306 VITAMIN D 25 HYDROXY: CPT

## 2020-05-30 LAB
ESTIMATED AVG GLUCOSE: 105 MG/DL (ref 68–131)
HBA1C MFR BLD HPLC: 5.3 % (ref 4–5.6)

## 2020-06-01 LAB
ALBUMIN SERPL ELPH-MCNC: 4.34 G/DL (ref 3.35–5.55)
ALPHA1 GLOB SERPL ELPH-MCNC: 0.27 G/DL (ref 0.17–0.41)
ALPHA2 GLOB SERPL ELPH-MCNC: 0.67 G/DL (ref 0.43–0.99)
B-GLOBULIN SERPL ELPH-MCNC: 0.94 G/DL (ref 0.5–1.1)
GAMMA GLOB SERPL ELPH-MCNC: 1.28 G/DL (ref 0.67–1.58)
PATHOLOGIST INTERPRETATION SPE: NORMAL
PROT SERPL-MCNC: 7.5 G/DL (ref 6–8.4)

## 2020-06-02 DIAGNOSIS — I10 ESSENTIAL HYPERTENSION: ICD-10-CM

## 2020-06-02 RX ORDER — TELMISARTAN 80 MG/1
80 TABLET ORAL DAILY
Status: CANCELLED | OUTPATIENT
Start: 2020-06-02

## 2020-06-02 RX ORDER — TELMISARTAN 80 MG/1
80 TABLET ORAL DAILY
Qty: 90 TABLET | Refills: 0 | Status: SHIPPED | OUTPATIENT
Start: 2020-06-02 | End: 2020-08-25

## 2020-06-02 NOTE — PROGRESS NOTES
Refill Routing Note    Medication(s) are not appropriate for processing by [facility]:       Medication is a new start (<3 months)        Medication Therapy Plan: New start(5/15/20)  Medication reconciliation completed: No      Automatic Epic Protocol Generated Data:    Requested Prescriptions   Pending Prescriptions Disp Refills    telmisartan (MICARDIS) 80 MG Tab 90 tablet 0     Sig: Take 1 tablet (80 mg total) by mouth once daily.       Cardiovascular:  Angiotensin Receptor Blockers Passed - 6/2/2020  1:13 PM        Passed - Patient is at least 18 years old        Passed - Last BP in normal range within 360 days.     BP Readings from Last 3 Encounters:   05/20/20 132/82   03/17/20 118/74   03/16/20 116/76              Passed - Office visit in past 12 months or future 90 days.     Recent Outpatient Visits            6 days ago CKD (chronic kidney disease) stage 3, GFR 30-59 ml/min    [facility] Nephrology Blaine Bustos MD    1 week ago Primary osteoarthritis of left hip    [facility] Vaibhav Mata MD    2 weeks ago Panic attack    [facility] Medicine Mary Mota MD    2 months ago Essential hypertension    [facility] Mary Mota MD    2 months ago Dyspnea on exertion    [facility]          Future Appointments              In 1 month Mary Mota MD [facility] Medicine, [place]    In 1 month Kylie Armenta DO [facility] Rheumatology, [place]    In 2 months [facility]oper [facility]                Passed - Cr is 1.3 or below and within 360 days     Creatinine   Date Value Ref Range Status   05/29/2020 1.1 0.5 - 1.4 mg/dL Final   03/17/2020 1.5 (H) 0.5 - 1.4 mg/dL Final   03/13/2020 1.4 0.5 - 1.4 mg/dL Final              Passed - K in normal range and within 360 days     Potassium   Date Value Ref Range Status   05/29/2020 4.4 3.5 - 5.1 mmol/L Final    03/17/2020 4.2 3.5 - 5.1 mmol/L Final   03/13/2020 4.1 3.5 - 5.1 mmol/L Final              Passed - eGFR within 360 days     eGFR if non    Date Value Ref Range Status   05/29/2020 57.1 (A) >60 mL/min/1.73 m^2 Final     Comment:     Calculation used to obtain the estimated glomerular filtration  rate (eGFR) is the CKD-EPI equation.      03/17/2020 39.2 (A) >60 mL/min/1.73 m^2 Final     Comment:     Calculation used to obtain the estimated glomerular filtration  rate (eGFR) is the CKD-EPI equation.      03/13/2020 43 (A) >60 mL/min/1.73 m^2 Final     Comment:     Calculation used to obtain the estimated glomerular filtration  rate (eGFR) is the CKD-EPI equation.        eGFR if    Date Value Ref Range Status   05/29/2020 >60.0 >60 mL/min/1.73 m^2 Final   03/17/2020 45.2 (A) >60 mL/min/1.73 m^2 Final   03/13/2020 49 (A) >60 mL/min/1.73 m^2 Final              Powered by e|tab - 6/2/2020  1:14 PM        The requested medication is on the active medication list without a start date.           Appointments  past 12m or future 3m with PCP    Date Provider   Last Visit   5/15/2020 Mary Mota MD   Next Visit   7/8/2020 Mary Mota MD   ED visits in past 90 days: 1     Note composed:1:29 PM 06/02/2020

## 2020-06-02 NOTE — TELEPHONE ENCOUNTER
----- Message from Mirian Ricardo sent at 6/2/2020 12:12 PM CDT -----  Contact: self   [phone]    Pt needs a refill on  telmisartan (MICARDIS) 80 MG Tab  called into pharmacy at [facility] pharmacy in [place].    Pt send request for refill        Thank you!

## 2020-06-03 ENCOUNTER — TELEPHONE (OUTPATIENT)
Dept: NEPHROLOGY | Facility: CLINIC | Age: 55
End: 2020-06-03

## 2020-06-04 ENCOUNTER — TELEPHONE (OUTPATIENT)
Dept: FAMILY MEDICINE | Facility: CLINIC | Age: 55
End: 2020-06-04

## 2020-06-04 DIAGNOSIS — E55.9 VITAMIN D DEFICIENCY: ICD-10-CM

## 2020-06-04 DIAGNOSIS — N18.30 CHRONIC KIDNEY DISEASE, STAGE 3: Primary | ICD-10-CM

## 2020-06-04 NOTE — TELEPHONE ENCOUNTER
"Spoke with pt informed her on message pt states " I read the message seeing that I'm in stage 3 renal failure that stunned me. I need her to call me back we nee to talk about this some more. If Im in stage 3 that means stage 4 is next. I have more question I need to prepare my kids for this." Pt didn't want to hear what I had to say specifically asked to speak to the doctor. Please advise   "

## 2020-06-04 NOTE — TELEPHONE ENCOUNTER
----- Message from Hakeem Rubio sent at 6/3/2020  4:08 PM CDT -----  Contact: [phone] / self   Patient states she is returning a call from your office. Please advise.

## 2020-06-06 NOTE — TELEPHONE ENCOUNTER
[facility] staff: please read note recorded by JERRY Sims. By timeline it seems this was before the pt read my message to her.     (See telephone note dated same day where pt was directed to read my message by JERRY Chakraborty)    Pt to RTC 6 months in [place] with labs prior

## 2020-06-08 PROBLEM — F41.9 ANXIETY: Status: ACTIVE | Noted: 2020-06-08

## 2020-06-08 PROBLEM — I10 HTN (HYPERTENSION): Status: ACTIVE | Noted: 2020-06-08

## 2020-06-10 ENCOUNTER — PATIENT OUTREACH (OUTPATIENT)
Dept: ADMINISTRATIVE | Facility: HOSPITAL | Age: 55
End: 2020-06-10

## 2020-06-10 NOTE — PROGRESS NOTES
Non-compliant report chart audits. Chart review completed for HM test overdue (mammograms, Colonoscopies, pap smears, DM labs, and/or EYE EXAMs)    Care Everywhere and media, updates requested and reviewed.    Message sent to patient's portal.    Colon cancer screening.

## 2020-06-17 ENCOUNTER — TELEPHONE (OUTPATIENT)
Dept: NEPHROLOGY | Facility: CLINIC | Age: 55
End: 2020-06-17

## 2020-06-17 NOTE — TELEPHONE ENCOUNTER
[employer] is for disability--could you gather some more information please? We did not talk about my completing any forms for her. She does not have kidney disease that would place her at high risk of complications, as outlined in my message to her 6/1. Please let her know    Thank you

## 2020-06-20 DIAGNOSIS — Z01.84 ANTIBODY RESPONSE EXAMINATION: ICD-10-CM

## 2020-06-25 ENCOUNTER — TELEPHONE (OUTPATIENT)
Dept: FAMILY MEDICINE | Facility: CLINIC | Age: 55
End: 2020-06-25

## 2020-06-25 NOTE — TELEPHONE ENCOUNTER
----- Message from Fawn Burkett sent at 6/25/2020 11:54 AM CDT -----  Pt is returning a call to the office from yesterday , she can be reached at [phone] Pt is requesting call before 1245 , she is on her lunch break     Thanks

## 2020-06-26 ENCOUNTER — TELEPHONE (OUTPATIENT)
Dept: FAMILY MEDICINE | Facility: CLINIC | Age: 55
End: 2020-06-26

## 2020-06-26 NOTE — TELEPHONE ENCOUNTER
Spoke w/ pt this morning to advise her paperwork is in Dr. Mota's box for review and she will return on Monday. Voices understanding.

## 2020-07-07 ENCOUNTER — PATIENT OUTREACH (OUTPATIENT)
Dept: ADMINISTRATIVE | Facility: HOSPITAL | Age: 55
End: 2020-07-07

## 2020-07-19 ENCOUNTER — PATIENT OUTREACH (OUTPATIENT)
Dept: ADMINISTRATIVE | Facility: OTHER | Age: 55
End: 2020-07-19

## 2020-07-19 NOTE — PROGRESS NOTES
Requested updates within Care Everywhere.  Patient's chart was reviewed for overdue ELISEO topics.  Immunizations reconciled.    Mammogram tasked to patient.

## 2020-07-20 DIAGNOSIS — Z01.84 ANTIBODY RESPONSE EXAMINATION: ICD-10-CM

## 2020-07-21 ENCOUNTER — TELEPHONE (OUTPATIENT)
Dept: RHEUMATOLOGY | Facility: CLINIC | Age: 55
End: 2020-07-21

## 2020-07-21 ENCOUNTER — OFFICE VISIT (OUTPATIENT)
Dept: RHEUMATOLOGY | Facility: CLINIC | Age: 55
End: 2020-07-21
Payer: COMMERCIAL

## 2020-07-21 ENCOUNTER — HOSPITAL ENCOUNTER (OUTPATIENT)
Dept: RADIOLOGY | Facility: HOSPITAL | Age: 55
Discharge: HOME | End: 2020-07-21
Attending: INTERNAL MEDICINE
Payer: COMMERCIAL

## 2020-07-21 VITALS
BODY MASS INDEX: 28.11 KG/M2 | SYSTOLIC BLOOD PRESSURE: 154 MMHG | HEIGHT: 62 IN | HEART RATE: 74 BPM | DIASTOLIC BLOOD PRESSURE: 96 MMHG | WEIGHT: 152.75 LBS

## 2020-07-21 DIAGNOSIS — M75.51 BILATERAL SHOULDER BURSITIS: ICD-10-CM

## 2020-07-21 DIAGNOSIS — M75.52 BILATERAL SHOULDER BURSITIS: ICD-10-CM

## 2020-07-21 DIAGNOSIS — M15.9 OSTEOARTHRITIS OF MULTIPLE JOINTS, UNSPECIFIED OSTEOARTHRITIS TYPE: Primary | ICD-10-CM

## 2020-07-21 DIAGNOSIS — M13.0 POLYARTHRITIS: ICD-10-CM

## 2020-07-21 DIAGNOSIS — N17.9 AKI (ACUTE KIDNEY INJURY): ICD-10-CM

## 2020-07-21 PROCEDURE — 73030 X-RAY EXAM OF SHOULDER: CPT | Mod: TC,50,PO

## 2020-07-21 PROCEDURE — 99999 PR PBB SHADOW E&M-EST. PATIENT-LVL IV: CPT | Mod: PBBFAC,,, | Performed by: INTERNAL MEDICINE

## 2020-07-21 PROCEDURE — 3077F PR MOST RECENT SYSTOLIC BLOOD PRESSURE >= 140 MM HG: ICD-10-PCS | Mod: CPTII,S$GLB,, | Performed by: INTERNAL MEDICINE

## 2020-07-21 PROCEDURE — 99999 PR PBB SHADOW E&M-EST. PATIENT-LVL IV: ICD-10-PCS | Mod: PBBFAC,,, | Performed by: INTERNAL MEDICINE

## 2020-07-21 PROCEDURE — 3080F DIAST BP >= 90 MM HG: CPT | Mod: CPTII,S$GLB,, | Performed by: INTERNAL MEDICINE

## 2020-07-21 PROCEDURE — 99205 OFFICE O/P NEW HI 60 MIN: CPT | Mod: S$GLB,,, | Performed by: INTERNAL MEDICINE

## 2020-07-21 PROCEDURE — 3077F SYST BP >= 140 MM HG: CPT | Mod: CPTII,S$GLB,, | Performed by: INTERNAL MEDICINE

## 2020-07-21 PROCEDURE — 3080F PR MOST RECENT DIASTOLIC BLOOD PRESSURE >= 90 MM HG: ICD-10-PCS | Mod: CPTII,S$GLB,, | Performed by: INTERNAL MEDICINE

## 2020-07-21 PROCEDURE — 73030 XR SHOULDER COMPLETE 2 OR MORE VIEWS BILATERAL: ICD-10-PCS | Mod: 26,50,, | Performed by: RADIOLOGY

## 2020-07-21 PROCEDURE — 73030 X-RAY EXAM OF SHOULDER: CPT | Mod: 26,50,, | Performed by: RADIOLOGY

## 2020-07-21 PROCEDURE — 99205 PR OFFICE/OUTPT VISIT, NEW, LEVL V, 60-74 MIN: ICD-10-PCS | Mod: S$GLB,,, | Performed by: INTERNAL MEDICINE

## 2020-07-21 PROCEDURE — 3008F BODY MASS INDEX DOCD: CPT | Mod: CPTII,S$GLB,, | Performed by: INTERNAL MEDICINE

## 2020-07-21 PROCEDURE — 3008F PR BODY MASS INDEX (BMI) DOCUMENTED: ICD-10-PCS | Mod: CPTII,S$GLB,, | Performed by: INTERNAL MEDICINE

## 2020-07-21 RX ORDER — TRAMADOL HYDROCHLORIDE 50 MG/1
50 TABLET ORAL EVERY 8 HOURS PRN
Qty: 90 TABLET | Refills: 2 | Status: SHIPPED | OUTPATIENT
Start: 2020-07-21 | End: 2020-07-21 | Stop reason: SDUPTHER

## 2020-07-21 RX ORDER — DULOXETIN HYDROCHLORIDE 30 MG/1
30 CAPSULE, DELAYED RELEASE ORAL DAILY
Qty: 30 CAPSULE | Refills: 5 | Status: SHIPPED | OUTPATIENT
Start: 2020-07-21 | End: 2021-01-07 | Stop reason: ALTCHOICE

## 2020-07-21 RX ORDER — TRAMADOL HYDROCHLORIDE 50 MG/1
100 TABLET ORAL EVERY 8 HOURS PRN
Qty: 180 TABLET | Refills: 2 | Status: ON HOLD | OUTPATIENT
Start: 2020-07-21 | End: 2020-08-01 | Stop reason: HOSPADM

## 2020-07-21 ASSESSMENT — ROUTINE ASSESSMENT OF PATIENT INDEX DATA (RAPID3)
PATIENT GLOBAL ASSESSMENT SCORE: 6.5
FATIGUE SCORE: 3.3
PSYCHOLOGICAL DISTRESS SCORE: 6.6
TOTAL RAPID3 SCORE: 6.28
MDHAQ FUNCTION SCORE: 1.9
PAIN SCORE: 6

## 2020-07-21 NOTE — TELEPHONE ENCOUNTER
----- Message from Sara Avendaño sent at 7/21/2020 12:25 PM CDT -----  Type: Needs Medical Advice  Who Called:  new patient  Best Call Back Number: [phone]  Additional Information: patient had OV with Dr. Armenta today but as a NP but was unable to see provider due to wait time. Patient is unhappy with service. She would like to schedule with [facility] because it is closer to her home. Please give call back

## 2020-07-21 NOTE — LETTER
July 21, 2020      Radha Whitaker PA-C  [address] [facility] [address] [place] [address]           [facility]  [address] [place] [address]  Phone: [phone]  Fax: [phone]          Patient: Modesta Camacho   MR Number: [id number]   YOB: 1965   Date of Visit: 7/21/2020       Dear Radha Whitaker:    Thank you for referring Modesta Camacho to me for evaluation. Attached you will find relevant portions of my assessment and plan of care.    If you have questions, please do not hesitate to call me. I look forward to following Modesta Camacho along with you.    Sincerely,    Kylie Armenta, DO    Enclosure  CC:  No Recipients    If you would like to receive this communication electronically, please contact [email or web address] or [phone] to request more information on [employer] access.    For providers and/or their staff who would like to refer a patient to [facility], please contact us through our one-stop-shop provider referral line, [facility] , at [phone].    If you feel you have received this communication in error or would no longer like to receive these types of communications, please e-mail [email or web address]

## 2020-07-21 NOTE — LETTER
2020                                 NAME:Modesta Camacho  : 1965   MRN: [id number]     [facility]  [address] [place] [address]  Phone: [phone]  Fax: [phone]      [facility]  PAIN MANAGEMENT    PAIN MANAGEMENT CONTRACT      My doctor and I have decided that as part of my treatment for chronic pain, I will receive prescriptions for controlled substances. As a patient, I will agree to the following terms in order for my provider to effectively treat my pain and also comply with the rules set forth by [facility] Board of Medical Examiners and Drug Enforcement Agency.     1. A single physician shall be responsible for prescribing my pain medication.    2. I understand that in order for me to receive the best possible care, my prescribing doctor needs me to provide a copy of any of my previous copy of any previous medical records,including MRI, office notes, lab results, etc.    3. I will also provide a full list of ALL of my medications, current dose, and how often I take my medication. I must inform my doctor if I am taking any other medications, particularly sedating medications, such as Valium, Ativan, seizure medications, or psychiatric medications.    4. I will inform my physician of any current or prior history of abuse or misuse of prescription medication, illegal drugs, or alcohol.    5. I must not obtain controlled substances (including but not limited to, Xanax, Vicodin,Percocet, Tylenol #3,  ) from any other doctor without first telling my physician at this clinic.    6. I understand that my physician will assess the efficacy of my treatment with controlled substances and monitor my progress every twelve weeks, unless my physician, in his orher clinical judgment, determines otherwise.    7. It is my responsibility to keep my appointments. If I miss my scheduled appointment, I will be rescheduled to the first available time slot. I  "understand that pain medications may not be refilled until seen during a scheduled appointment.    8. I will take my medications only in the directed doses and manner and at the directed time. I will not deviate from my prescribed usage.    9. I will not combine these prescribed medications with alcohol or recreational medications,including, but not limited to, marijuana.    10. I will not drive or operate heavy machinery while on this medication.    11. I will not cut or chew long-acting pain medication.    12. I must inform my physician of any side effects that I may be experiencing.    13. If severe sedation (sleepiness) or any other medical emergency relating to my pain medication occurs, I will make contact with my doctors office or seek ER attention immediately.    14. If my doctor agrees to refill my pain medication by telephone, I will call the office at least 5 business days in advance to request a refill prescription.    15. Refill prescriptions will not be written in the evenings, weekends, or on holidays.    16. Each prescription is expected to last at least one month. Refills will not be given early if I"run out early", "lose a prescription", "spill" or "misplace" my medication. I will report stolen medications to the police.    17. No prescription refills will be given if I have not been seen by my physician in the clinic every 3 months.    18. It may be necessary for prescriptions to be picked up in person and proof of identification may be required.    19. I will have my pain medication filled at only one pharmacy.         20. A baseline medication screen may be completed on my first visit and or randomly at other routine clinic visits.    21. These medications may be harmful to an unborn child. I have been advised to use 2 forms of birth control (at least one barrier, such as condoms) while using these medications. I will inform my doctor immediately if I become pregnant while on this " medication.    22. If I test positive for medications that my doctor has not prescribed, and/or if I refuse a random medication test, my physician has the right to stop my controlled substance, end his/her relationship with me, and I may be terminated from the clinic.    23. If at any time I become violent or abusive, verbally or physically, my actions will be considered cause to terminate care from the clinic and discontinuation of pain medications.    I have read and understand the above information. I will, to the best of my ability, adhere to these policies and commitments. I further understand that noncompliance with these policies or demonstration of signs suspicious of medication overuse or abuse, may result in my being discharged as the patient.     I DO HEREBY AGREE AND UNDERSTAND ALL OF THE ABOVE. I HAVE RECEIVED A COPY OF THIS CONTROLLED SUBSTANCE TREATMENT ORIENTATION AND BEHAVIOR AGREEMENT.       Patient Signature:______________________________ Date/Time:________________    Patient Printed Name: Modesta Camacho     Physician Signature:____________________________ Date/Time:________________    Physician Printed Name: Kylie Armenta DO    Witness Signature:_____________________________ Date/Time:________________    Witness Printed Name

## 2020-07-21 NOTE — PROGRESS NOTES
Subjective:          Chief Complaint: Modesta Camacho is a 54 y.o. female who had concerns including Osteoarthritis.    HPI:  Patient is a 54-year-old female referred for osteoarthritis by her primary care team unfortunately she has CKD has been seen with nephrology as of May 2020 for an acute kidney injury with combination of Lasix and nonsteroidal anti-inflammatories this has improved with discontinuation of both.  She also discontinued her Lexapro this was actually it started during the time of her GREYSON the patient was too afraid this try that until her labs normalized      Patient was following with Rheumatology at [facility].  She did have a weakly positive CRISELDA 1:1 60-CRISELDA profile and no evidence of connective tissue disease to date.  Her last visit then was May of 2020.  Her Lexapro was titrated to help with both depression, and arthritis  Another recommendation was a consideration for Lyrica.    Patient was offered Cymbalta in the past unfortunately was not well covered with her previous insurance  She also uses tramadol 100 mg 3 times daily  She is pending a likely left hip arthroplasty I have reviewed these images myself she has severe has advanced at degenerative arthritis in the left hip.    Patient denies weight loss, rashes, dry eye, dry mouth, nasal or palatal ulcerations,  lymphadenopathy, Raynaud's, hx of DVT/miscarriages, psoriasis or family hx of psoriasis, rashes, serositis, anemia or other constitutional symptoms.  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       REVIEW OF SYSTEMS:    Review of Systems   Constitutional: Negative for fever, malaise/fatigue and weight loss.   HENT: Negative for sore throat.    Eyes: Negative for double vision, photophobia and redness.   Respiratory: Negative for cough, shortness of breath and wheezing.    Cardiovascular: Negative for chest pain, palpitations and orthopnea.   Gastrointestinal: Negative for abdominal pain, constipation and diarrhea.   Genitourinary:  Negative for dysuria, hematuria and urgency.   Musculoskeletal: Positive for joint pain. Negative for back pain and myalgias.   Skin: Negative for rash.   Neurological: Negative for dizziness, tingling, focal weakness and headaches.   Endo/Heme/Allergies: Does not bruise/bleed easily.   Psychiatric/Behavioral: Negative for depression, hallucinations and suicidal ideas. The patient is nervous/anxious.                Objective:            Past Medical History:   Diagnosis Date    Anxiety     Hypertension     Osteoarthritis      Family History   Problem Relation Age of Onset    Hypertension Mother     Arthritis Mother     Diabetes Father     Heart disease Father     Depression Sister     Hypertension Sister     Arthritis Brother     Thyroid disease Son     Hypertension Son     Arthritis Maternal Grandmother     Heart disease Maternal Grandmother     Kidney disease Maternal Grandmother     Heart attack Maternal Grandfather     Stroke Paternal Grandmother     No Known Problems Paternal Grandfather     Eczema Son      Social History     Tobacco Use    Smoking status: Never Smoker    Smokeless tobacco: Never Used   Substance Use Topics    Alcohol use: Yes     Comment: occasional    Drug use: No         Current Outpatient Medications on File Prior to Visit   Medication Sig Dispense Refill    metoprolol succinate (TOPROL-XL) 50 MG 24 hr tablet Take 50 mg by mouth once daily.      telmisartan (MICARDIS) 80 MG Tab Take 1 tablet (80 mg total) by mouth once daily. 90 tablet 0    traMADoL (ULTRAM) 50 mg tablet       ALPRAZolam (XANAX) 0.5 MG tablet Take 1 tablet (0.5 mg total) by mouth nightly as needed. (Patient not taking: Reported on 7/21/2020) 30 tablet 0    escitalopram oxalate (LEXAPRO) 10 MG tablet Take 10 mg by mouth.      furosemide (LASIX) 20 MG tablet Take 1 tablet (20 mg total) by mouth daily as needed. 90 tablet 0    pantoprazole (PROTONIX) 40 MG tablet Take 40 mg by mouth once daily.       [DISCONTINUED] amLODIPine (NORVASC) 10 MG tablet Take 10 mg by mouth.       No current facility-administered medications on file prior to visit.        Vitals:    07/21/20 1128   BP: (!) 154/96   Pulse: 74       Physical Exam:    Physical Exam  Constitutional:       Appearance: She is well-developed.   HENT:      Head: Normocephalic and atraumatic.   Eyes:      Pupils: Pupils are equal, round, and reactive to light.   Neck:      Musculoskeletal: Normal range of motion.   Cardiovascular:      Rate and Rhythm: Normal rate and regular rhythm.      Heart sounds: Normal heart sounds.   Pulmonary:      Effort: Pulmonary effort is normal.      Breath sounds: Normal breath sounds.   Musculoskeletal:      Right shoulder: She exhibits tenderness. She exhibits normal range of motion and no swelling.      Left shoulder: She exhibits tenderness. She exhibits normal range of motion and no swelling.      Right elbow: She exhibits normal range of motion and no swelling. No tenderness found.      Left elbow: She exhibits normal range of motion and no swelling. No tenderness found.      Right wrist: She exhibits normal range of motion, no tenderness and no swelling.      Left wrist: She exhibits normal range of motion, no tenderness and no swelling.      Left hip: She exhibits decreased range of motion and tenderness.      Right knee: She exhibits normal range of motion and no swelling. No tenderness found.      Left knee: She exhibits normal range of motion and no swelling. No tenderness found.      Right hand: She exhibits normal range of motion, no tenderness and no swelling.      Left hand: She exhibits normal range of motion, no tenderness and no swelling.      Right foot: Normal range of motion. No tenderness or swelling.      Left foot: Normal range of motion. No tenderness or swelling.      Comments: Patient has full range of motion in the shoulders she does have some tenderness with palpation on the right bicipital  groove anteriorly as some elicited pain with resistance at the biceps tendon as well as resisted abduction.  Negative for any crepitation both shoulders    Little to no motion in the right hip    No other synovitis swelling restriction in range of motion of the joints on a 28 joint exam   Skin:     General: Skin is warm and dry.   Neurological:      Mental Status: She is alert and oriented to person, place, and time.   Psychiatric:         Behavior: Behavior normal.               Assessment:       Encounter Diagnoses   Name Primary?    Osteoarthritis of multiple joints, unspecified osteoarthritis type     Polyarthritis Yes    Bilateral shoulder bursitis           Plan:        Osteoarthritis of multiple joints, unspecified osteoarthritis type  -     Ambulatory referral/consult to Rheumatology  -     DULoxetine (CYMBALTA) 30 MG capsule; Take 1 capsule (30 mg total) by mouth once daily.  Dispense: 30 capsule; Refill: 5  -     Sedimentation rate; Future; Expected date: 07/21/2020  -     C-Reactive Protein; Standing; Expected date: 07/21/2020  -     CRISELDA Screen w/Reflex; Future; Expected date: 07/21/2020  -     Rheumatoid factor; Future; Expected date: 07/21/2020  -     HLA B27 Antigen; Future; Expected date: 07/21/2020    Polyarthritis  -     Sedimentation rate; Future; Expected date: 07/21/2020  -     C-Reactive Protein; Standing; Expected date: 07/21/2020  -     CRISELDA Screen w/Reflex; Future; Expected date: 07/21/2020  -     Rheumatoid factor; Future; Expected date: 07/21/2020  -     HLA B27 Antigen; Future; Expected date: 07/21/2020    Bilateral shoulder bursitis  -     X-Ray Shoulder Complete Bilateral; Future; Expected date: 07/21/2020    GREYSON (acute kidney injury)    Other orders  -     Discontinue: traMADoL (ULTRAM) 50 mg tablet; Take 1 tablet (50 mg total) by mouth every 8 (eight) hours as needed for Pain.  Dispense: 90 tablet; Refill: 2  -     traMADoL (ULTRAM) 50 mg tablet; Take 2 tablets (100 mg total) by  mouth every 8 (eight) hours as needed for Pain.  Dispense: 180 tablet; Refill: 2     Patient is a very pleasant 54-year-old female she is transferring here due to coverage by her insurance not allowing her to follow-up with her regular rheumatologist.  There has been no evidence of an inflammatory or other autoimmune disease to date but she has significant osteoarthritis most pressing at this time is the left hip which she is considering total hip arthroplasty July 31 of this year  Discussed with the patient and likely get some labs although she has been seronegative repeatedly on to check these again adding an HLA B27 of reviewed her labs from her previous rheumatologist as well as  Notes       -ok for tramadol 100 mg 3 times daily   -pain contract signed today   -Labs    -xrays   -Cymbalta trial 30 mg daily she will check with her pharmacy to see if this cost is better covered on her current insurance if not we can consider Effexor 37.5 another consideration is even Savella   -if not tolerating then I would consider gabapentin or Lyrica for her      No follow-ups on file.          60min consultation with greater than 50% spent in counseling, chart review and coordination of care. All questions answered.  Thank you for allowing me to participate in the care of this very pleasant patient.

## 2020-07-21 NOTE — PATIENT INSTRUCTIONS
You are ok to take Tylenol 500-650mg up to 3 times daily as needed.     I want to check the price on Cymbalta.

## 2020-07-22 ENCOUNTER — LAB VISIT (OUTPATIENT)
Dept: LAB | Facility: HOSPITAL | Age: 55
End: 2020-07-22
Attending: INTERNAL MEDICINE
Payer: COMMERCIAL

## 2020-07-22 ENCOUNTER — OFFICE VISIT (OUTPATIENT)
Dept: FAMILY MEDICINE | Facility: CLINIC | Age: 55
End: 2020-07-22
Payer: COMMERCIAL

## 2020-07-22 VITALS
SYSTOLIC BLOOD PRESSURE: 144 MMHG | DIASTOLIC BLOOD PRESSURE: 80 MMHG | BODY MASS INDEX: 28.51 KG/M2 | WEIGHT: 155.88 LBS | TEMPERATURE: 98 F | OXYGEN SATURATION: 99 % | HEART RATE: 89 BPM

## 2020-07-22 DIAGNOSIS — M25.552 PAIN OF LEFT HIP JOINT: Primary | ICD-10-CM

## 2020-07-22 DIAGNOSIS — Z01.84 ANTIBODY RESPONSE EXAMINATION: ICD-10-CM

## 2020-07-22 DIAGNOSIS — M13.0 POLYARTHROPATHY OR POLYARTHRITIS OF MULTIPLE SITES: ICD-10-CM

## 2020-07-22 DIAGNOSIS — F41.0 PANIC ATTACK: ICD-10-CM

## 2020-07-22 DIAGNOSIS — M13.0 POLYARTHRITIS: ICD-10-CM

## 2020-07-22 DIAGNOSIS — Z01.818 PRE-OPERATIVE CLEARANCE: ICD-10-CM

## 2020-07-22 DIAGNOSIS — I10 ESSENTIAL HYPERTENSION: ICD-10-CM

## 2020-07-22 DIAGNOSIS — M15.9 OSTEOARTHRITIS OF MULTIPLE JOINTS, UNSPECIFIED OSTEOARTHRITIS TYPE: ICD-10-CM

## 2020-07-22 DIAGNOSIS — F41.9 ANXIETY: ICD-10-CM

## 2020-07-22 LAB — ERYTHROCYTE [SEDIMENTATION RATE] IN BLOOD BY WESTERGREN METHOD: 19 MM/HR (ref 0–20)

## 2020-07-22 PROCEDURE — 86235 NUCLEAR ANTIGEN ANTIBODY: CPT | Mod: 59

## 2020-07-22 PROCEDURE — 3077F PR MOST RECENT SYSTOLIC BLOOD PRESSURE >= 140 MM HG: ICD-10-PCS | Mod: CPTII,S$GLB,, | Performed by: FAMILY MEDICINE

## 2020-07-22 PROCEDURE — 99999 PR PBB SHADOW E&M-EST. PATIENT-LVL IV: ICD-10-PCS | Mod: PBBFAC,,, | Performed by: FAMILY MEDICINE

## 2020-07-22 PROCEDURE — 85651 RBC SED RATE NONAUTOMATED: CPT | Mod: PO

## 2020-07-22 PROCEDURE — 3079F DIAST BP 80-89 MM HG: CPT | Mod: CPTII,S$GLB,, | Performed by: FAMILY MEDICINE

## 2020-07-22 PROCEDURE — 86038 ANTINUCLEAR ANTIBODIES: CPT

## 2020-07-22 PROCEDURE — 99214 OFFICE O/P EST MOD 30 MIN: CPT | Mod: S$GLB,,, | Performed by: FAMILY MEDICINE

## 2020-07-22 PROCEDURE — 86140 C-REACTIVE PROTEIN: CPT

## 2020-07-22 PROCEDURE — 99999 PR PBB SHADOW E&M-EST. PATIENT-LVL IV: CPT | Mod: PBBFAC,,, | Performed by: FAMILY MEDICINE

## 2020-07-22 PROCEDURE — 99214 PR OFFICE/OUTPT VISIT, EST, LEVL IV, 30-39 MIN: ICD-10-PCS | Mod: S$GLB,,, | Performed by: FAMILY MEDICINE

## 2020-07-22 PROCEDURE — 86769 SARS-COV-2 COVID-19 ANTIBODY: CPT

## 2020-07-22 PROCEDURE — 3079F PR MOST RECENT DIASTOLIC BLOOD PRESSURE 80-89 MM HG: ICD-10-PCS | Mod: CPTII,S$GLB,, | Performed by: FAMILY MEDICINE

## 2020-07-22 PROCEDURE — 86039 ANTINUCLEAR ANTIBODIES (ANA): CPT

## 2020-07-22 PROCEDURE — 81374 HLA I TYPING 1 ANTIGEN LR: CPT | Mod: PO

## 2020-07-22 PROCEDURE — 86431 RHEUMATOID FACTOR QUANT: CPT

## 2020-07-22 PROCEDURE — 36415 COLL VENOUS BLD VENIPUNCTURE: CPT | Mod: PO

## 2020-07-22 PROCEDURE — 3008F BODY MASS INDEX DOCD: CPT | Mod: CPTII,S$GLB,, | Performed by: FAMILY MEDICINE

## 2020-07-22 PROCEDURE — 3008F PR BODY MASS INDEX (BMI) DOCUMENTED: ICD-10-PCS | Mod: CPTII,S$GLB,, | Performed by: FAMILY MEDICINE

## 2020-07-22 PROCEDURE — 3077F SYST BP >= 140 MM HG: CPT | Mod: CPTII,S$GLB,, | Performed by: FAMILY MEDICINE

## 2020-07-22 RX ORDER — ALPRAZOLAM 0.5 MG/1
0.5 TABLET ORAL NIGHTLY PRN
Qty: 30 TABLET | Refills: 0 | Status: SHIPPED | OUTPATIENT
Start: 2020-07-22 | End: 2021-02-11 | Stop reason: SDUPTHER

## 2020-07-22 NOTE — H&P
Subjective:       Patient ID: Modesta Camacho is a 54 y.o. female.    Chief Complaint: Pre-op Exam    HPI     Left hip:  The patient is complaining of pain on the left hip, the symptoms are getting worse.  The patient is scheduled to have surgery and she is here today for a surgical clearance.  The patient has is scheduled preoperative blood work and EKG, no results are available yet.    Hypertension:  The patient is been monitoring her blood pressure home, she did not bring the log of the blood pressure readings.  She is taking telmisartan metoprolol.    Anxiety:  The patient is taking Xanax for anxiety, stated that he is doing okay with the medication and she needs a new prescription, she only takes the medication as needed and she ran out the medicine.        Past medical history, past social history was reviewed and discussed with the patient.    Review of Systems   Constitutional: Negative for activity change and appetite change.   HENT: Negative for congestion and ear discharge.    Eyes: Negative for discharge and itching.   Respiratory: Negative for choking and chest tightness.    Cardiovascular: Negative for chest pain and leg swelling.   Gastrointestinal: Negative for abdominal distention, abdominal pain and constipation.   Endocrine: Negative for cold intolerance and heat intolerance.   Genitourinary: Negative for dysuria and flank pain.   Musculoskeletal: Positive for arthralgias. Negative for back pain.   Skin: Negative for pallor and rash.   Allergic/Immunologic: Negative for environmental allergies and food allergies.   Neurological: Negative for dizziness and facial asymmetry.   Hematological: Negative for adenopathy. Does not bruise/bleed easily.   Psychiatric/Behavioral: Positive for sleep disturbance. Negative for agitation and confusion. The patient is nervous/anxious.        Objective:      Physical Exam  Vitals signs and nursing note reviewed.   Constitutional:       General: She is not in acute  distress.     Appearance: She is well-developed. She is not diaphoretic.      Comments: Limited ambulation   HENT:      Head: Normocephalic and atraumatic.      Right Ear: External ear normal.      Left Ear: External ear normal.      Nose: Nose normal.   Eyes:      General:         Right eye: No discharge.         Left eye: No discharge.      Conjunctiva/sclera: Conjunctivae normal.      Pupils: Pupils are equal, round, and reactive to light.   Neck:      Musculoskeletal: Neck supple.   Cardiovascular:      Rate and Rhythm: Normal rate and regular rhythm.      Heart sounds: Normal heart sounds. No murmur.   Pulmonary:      Effort: Pulmonary effort is normal. No respiratory distress.      Breath sounds: Normal breath sounds. No wheezing.   Abdominal:      General: There is no distension.      Palpations: There is no mass.      Tenderness: There is no abdominal tenderness.   Musculoskeletal:         General: Tenderness (Left hip) present. No deformity.   Skin:     Coloration: Skin is not pale.      Findings: No erythema.   Neurological:      Cranial Nerves: No cranial nerve deficit.      Coordination: Coordination normal.      Deep Tendon Reflexes: Reflexes normal.   Psychiatric:         Behavior: Behavior normal.         Thought Content: Thought content normal.         Judgment: Judgment normal.         Assessment:       1. Pain of left hip joint    2. Pre-operative clearance    3. Essential hypertension    4. Panic attack    5. Anxiety        Plan:       Pain of left hip joint:  Worsening    Pre-operative clearance:  New problem, workup needed    Essential hypertension:  Uncontrolled    Panic attack:  Worsening  -     ALPRAZolam (XANAX) 0.5 MG tablet; Take 1 tablet (0.5 mg total) by mouth nightly as needed.  Dispense: 30 tablet; Refill: 0    Anxiety:  Worsening  -     ALPRAZolam (XANAX) 0.5 MG tablet; Take 1 tablet (0.5 mg total) by mouth nightly as needed.  Dispense: 30 tablet; Refill: 0      Will continue with  alprazolam to take as needed.  Will wait for the results of the blood work in the EKG so we can clear the patient for her surgery.  The patient was advised to take blood pressure medication as directed, healthy eating habits, avoid processed starches, processed food.  [place] prescription monitoring program was checked and okay.  The patient's BMI has been recorded in the chart. The patient has been provided educational materials regarding the benefits of attaining and maintaining a normal weight. We will continue to address and follow this issue during follow up visits.   Patient agreed with assessment and plan. Patient verbalized understanding.  Addendum:  Upon review of the last blood work in July 2020, the patient is clear for surgery.  The patient also was cleared from her cardiologist.

## 2020-07-22 NOTE — H&P
This note has been moved to another encounter. If you have any questions, please contact HIM Chart Correction at [phone].

## 2020-07-23 ENCOUNTER — LAB VISIT (OUTPATIENT)
Dept: LAB | Facility: HOSPITAL | Age: 55
End: 2020-07-23
Attending: FAMILY MEDICINE
Payer: COMMERCIAL

## 2020-07-23 DIAGNOSIS — Z12.11 COLON CANCER SCREENING: ICD-10-CM

## 2020-07-23 LAB
CRP SERPL-MCNC: 0.8 MG/L (ref 0–8.2)
RHEUMATOID FACT SERPL-ACNC: 13 IU/ML (ref 0–15)
SARS-COV-2 IGG SERPLBLD QL IA.RAPID: NEGATIVE

## 2020-07-23 PROCEDURE — 82274 ASSAY TEST FOR BLOOD FECAL: CPT

## 2020-07-24 DIAGNOSIS — Z01.818 PREOP TESTING: ICD-10-CM

## 2020-07-27 DIAGNOSIS — Z01.818 PREOP TESTING: Primary | ICD-10-CM

## 2020-07-27 DIAGNOSIS — Z03.818 ENCOUNTER FOR OBSERVATION FOR SUSPECTED EXPOSURE TO OTHER BIOLOGICAL AGENTS RULED OUT: ICD-10-CM

## 2020-07-28 ENCOUNTER — ANESTHESIA EVENT (OUTPATIENT)
Dept: SURGERY | Facility: HOSPITAL | Age: 55
End: 2020-07-28
Payer: COMMERCIAL

## 2020-07-28 ENCOUNTER — LAB VISIT (OUTPATIENT)
Dept: URGENT CARE | Facility: CLINIC | Age: 55
End: 2020-07-28
Payer: COMMERCIAL

## 2020-07-28 VITALS — HEART RATE: 88 BPM | TEMPERATURE: 98 F | OXYGEN SATURATION: 97 %

## 2020-07-28 DIAGNOSIS — Z03.818 ENCOUNTER FOR OBSERVATION FOR SUSPECTED EXPOSURE TO OTHER BIOLOGICAL AGENTS RULED OUT: ICD-10-CM

## 2020-07-28 LAB
ANA PATTERN 1: NORMAL
ANA SER QL IF: POSITIVE
ANA TITR SER IF: NORMAL {TITER}

## 2020-07-28 PROCEDURE — U0003 INFECTIOUS AGENT DETECTION BY NUCLEIC ACID (DNA OR RNA); SEVERE ACUTE RESPIRATORY SYNDROME CORONAVIRUS 2 (SARS-COV-2) (CORONAVIRUS DISEASE [COVID-19]), AMPLIFIED PROBE TECHNIQUE, MAKING USE OF HIGH THROUGHPUT TECHNOLOGIES AS DESCRIBED BY CMS-2020-01-R: HCPCS

## 2020-07-28 NOTE — PRE ADMISSION SCREENING
Pre op instructions reviewed with patient per phone:    To confirm, Your surgeon has instructed you:  Surgery is scheduled 07/31/20 at 0700.      Please report to [facility] 1st floor main lobby by 0530.  Pre admit office to call afternoon prior to surgery with final arrival time.  If surgery is on Monday, Pre admit office to call Friday afternoon with with final arrival time.    07/30/20 @ 0700 Go to [facility] registration, then to lab for T&S  Do not remove blood armband prior to surgery    Covid 19 testing is scheduled for 07/28/20 at 1140 @ [facility]  Please self quarantine after Covid testing, prior to surgery    INSTRUCTIONS IMPORTANT!!!  ¨ No smoking after 12 midnight, the night before surgery.  ¨ No solid food after 12 midnight, but you may have clear liquids up until 3 hours prior to surgery.  This includes: grape, cranberry, and apple juice (not orange, and no coffee.)   ¨ OK to brush teeth, but no gum, candy or mints!    ¨ Take only these medicines with a small swallow of water-morning of surgery.  Cymbalta         ____   Due to COVID 19 concerns, 1 visitor will be allowed in the pre operative area, and must adhere to social distancing guidelines.  One visitor/family member is currently allowed to visit in-patient rooms from 08:00 a.m to 6:00 p.m  ____   Family/caregivers will be updated re pt status via text/cell phone      ____  Do not wear makeup, including mascara.  ____  No powder, lotions or creams to surgical area.  ____  Please remove all jewelry, including piercings and leave at home.  ____  No money or valuables needed. Please leave at home.  ____  Please bring identification and insurance information to hospital.  ____  If going home the same day, arrange for a ride home. You will not be able to   drive if Anesthesia was used.  ____  Children, under 12 years old, must remain in the waiting room with an adult.  They are not allowed in patient areas.  ____  Wear loose fitting  clothing. Allow for dressings, bandages.  ____  Stop Aspirin, Ibuprofen, Motrin and Aleve at least 5-7 days before surgery, unless otherwise instructed by your doctor, or the nurse.   You MAY use Tylenol/acetaminophen until day of surgery.  ____  If you take diabetic medication, do not take am of surgery unless instructed by   Doctor.  ____ Stop taking any Fish Oil supplement or any Vitamins that contain Vitamin E at least 5 days prior to surgery.          Bathing Instructions-- The night before surgery and the morning prior to coming to the hospital:   -Do not shave the surgical area.   -Shower and wash your hair and body as usual with your regular soap and shampoo.   -Rinse your hair and body completely.   -Use one packet of hibiclens to wash the surgical site (using your hand) gently for 5 minutes.  Do not scrub you skin too hard.   -Do not use hibiclens on your head, face, or genitals.   -Do not wash with regular soap after you use the hibiclens.   -Rinse your body thoroughly.   -Dry with clean, soft towel.  Do not use lotion, cream, deodorant, or powders on   the surgical site.    Use antibacterial soap in place of hibiclens if your surgery is on the head, face or genitals.         Surgical Site Infection    Prevention of surgical site infections:     -Keep incisions clean and dry.   -Do not soak/submerge incisions in water until completely healed.   -Do not apply lotions, powders, creams, or deodorants to site.   -Always make sure hands are cleaned with antibacterial soap/ alcohol-based   prior to touching the surgical site.  (This includes doctors, nurses, staff, and yourself.)    Signs and symptoms:   -Redness and pain around the area where you had surgery   -Drainage of cloudy fluid from your surgical wound   -Fever over 100.4  I have read or had read and explained to me, and understand the above information.

## 2020-07-29 LAB — SARS-COV-2 RNA RESP QL NAA+PROBE: NOT DETECTED

## 2020-07-30 ENCOUNTER — LAB VISIT (OUTPATIENT)
Dept: LAB | Facility: HOSPITAL | Age: 55
End: 2020-07-30
Attending: ORTHOPAEDIC SURGERY
Payer: COMMERCIAL

## 2020-07-30 ENCOUNTER — TELEPHONE (OUTPATIENT)
Dept: URGENT CARE | Facility: CLINIC | Age: 55
End: 2020-07-30

## 2020-07-30 DIAGNOSIS — Z01.818 PREOP TESTING: ICD-10-CM

## 2020-07-30 LAB
ABO + RH BLD: NORMAL
BLD GP AB SCN CELLS X3 SERPL QL: NORMAL
HLA B27 INTERPRETATION: NORMAL
HLA-B27 RELATED AG QL: NEGATIVE
HLA-B27 RELATED AG QL: NORMAL

## 2020-07-30 PROCEDURE — 36415 COLL VENOUS BLD VENIPUNCTURE: CPT

## 2020-07-30 PROCEDURE — 86850 RBC ANTIBODY SCREEN: CPT

## 2020-07-31 ENCOUNTER — ANESTHESIA (OUTPATIENT)
Dept: SURGERY | Facility: HOSPITAL | Age: 55
End: 2020-07-31
Payer: COMMERCIAL

## 2020-07-31 ENCOUNTER — HOSPITAL ENCOUNTER (OUTPATIENT)
Facility: HOSPITAL | Age: 55
Discharge: HOME | End: 2020-08-01
Attending: ORTHOPAEDIC SURGERY | Admitting: ORTHOPAEDIC SURGERY
Payer: COMMERCIAL

## 2020-07-31 DIAGNOSIS — M87.052 AVASCULAR NECROSIS OF HIP, LEFT: Primary | ICD-10-CM

## 2020-07-31 LAB
ANTI SM ANTIBODY: 0.08 RATIO (ref 0–0.99)
ANTI SM/RNP ANTIBODY: 0.13 RATIO (ref 0–0.99)
ANTI-SM INTERPRETATION: NEGATIVE
ANTI-SM/RNP INTERPRETATION: NEGATIVE
ANTI-SSA ANTIBODY: 0.11 RATIO (ref 0–0.99)
ANTI-SSA INTERPRETATION: NEGATIVE
ANTI-SSB ANTIBODY: 0.07 RATIO (ref 0–0.99)
ANTI-SSB INTERPRETATION: NEGATIVE
DSDNA AB SER-ACNC: NORMAL [IU]/ML

## 2020-07-31 PROCEDURE — 71000033 HC RECOVERY, INTIAL HOUR: Performed by: ORTHOPAEDIC SURGERY

## 2020-07-31 PROCEDURE — 25000003 PHARM REV CODE 250: Performed by: ORTHOPAEDIC SURGERY

## 2020-07-31 PROCEDURE — 71000039 HC RECOVERY, EACH ADD'L HOUR: Performed by: ORTHOPAEDIC SURGERY

## 2020-07-31 PROCEDURE — 97165 OT EVAL LOW COMPLEX 30 MIN: CPT

## 2020-07-31 PROCEDURE — 36000710: Performed by: ORTHOPAEDIC SURGERY

## 2020-07-31 PROCEDURE — 25000003 PHARM REV CODE 250: Performed by: NURSE ANESTHETIST, CERTIFIED REGISTERED

## 2020-07-31 PROCEDURE — 63600175 PHARM REV CODE 636 W HCPCS: Performed by: ORTHOPAEDIC SURGERY

## 2020-07-31 PROCEDURE — 97535 SELF CARE MNGMENT TRAINING: CPT

## 2020-07-31 PROCEDURE — 97116 GAIT TRAINING THERAPY: CPT

## 2020-07-31 PROCEDURE — 63600175 PHARM REV CODE 636 W HCPCS: Performed by: NURSE ANESTHETIST, CERTIFIED REGISTERED

## 2020-07-31 PROCEDURE — 36000711: Performed by: ORTHOPAEDIC SURGERY

## 2020-07-31 PROCEDURE — 27201423 OPTIME MED/SURG SUP & DEVICES STERILE SUPPLY: Performed by: ORTHOPAEDIC SURGERY

## 2020-07-31 PROCEDURE — 97530 THERAPEUTIC ACTIVITIES: CPT

## 2020-07-31 PROCEDURE — 97161 PT EVAL LOW COMPLEX 20 MIN: CPT

## 2020-07-31 PROCEDURE — 37000008 HC ANESTHESIA 1ST 15 MINUTES: Performed by: ORTHOPAEDIC SURGERY

## 2020-07-31 PROCEDURE — C1713 ANCHOR/SCREW BN/BN,TIS/BN: HCPCS | Performed by: ORTHOPAEDIC SURGERY

## 2020-07-31 PROCEDURE — 63600175 PHARM REV CODE 636 W HCPCS: Performed by: ANESTHESIOLOGY

## 2020-07-31 PROCEDURE — C1776 JOINT DEVICE (IMPLANTABLE): HCPCS | Performed by: ORTHOPAEDIC SURGERY

## 2020-07-31 PROCEDURE — 37000009 HC ANESTHESIA EA ADD 15 MINS: Performed by: ORTHOPAEDIC SURGERY

## 2020-07-31 DEVICE — IMPLANTABLE DEVICE: Type: IMPLANTABLE DEVICE | Site: HIP | Status: FUNCTIONAL

## 2020-07-31 RX ORDER — VANCOMYCIN HYDROCHLORIDE 1 G/20ML
INJECTION, POWDER, LYOPHILIZED, FOR SOLUTION INTRAVENOUS
Status: DISCONTINUED | OUTPATIENT
Start: 2020-07-31 | End: 2020-07-31 | Stop reason: ALTCHOICE

## 2020-07-31 RX ORDER — HYDROMORPHONE HYDROCHLORIDE 2 MG/ML
0.2 INJECTION, SOLUTION INTRAMUSCULAR; INTRAVENOUS; SUBCUTANEOUS EVERY 5 MIN PRN
Status: COMPLETED | OUTPATIENT
Start: 2020-07-31 | End: 2020-07-31

## 2020-07-31 RX ORDER — TRANEXAMIC ACID 100 MG/ML
1000 INJECTION, SOLUTION INTRAVENOUS ONCE
Status: COMPLETED | OUTPATIENT
Start: 2020-07-31 | End: 2020-07-31

## 2020-07-31 RX ORDER — DULOXETIN HYDROCHLORIDE 30 MG/1
30 CAPSULE, DELAYED RELEASE ORAL DAILY
Status: DISCONTINUED | OUTPATIENT
Start: 2020-08-01 | End: 2020-08-01 | Stop reason: HOSPADM

## 2020-07-31 RX ORDER — BUPIVACAINE HYDROCHLORIDE AND EPINEPHRINE 2.5; 5 MG/ML; UG/ML
INJECTION, SOLUTION EPIDURAL; INFILTRATION; INTRACAUDAL; PERINEURAL
Status: DISCONTINUED | OUTPATIENT
Start: 2020-07-31 | End: 2020-07-31 | Stop reason: HOSPADM

## 2020-07-31 RX ORDER — SUCRALFATE 1 G/10ML
1 SUSPENSION ORAL EVERY 6 HOURS
Status: DISCONTINUED | OUTPATIENT
Start: 2020-07-31 | End: 2020-08-01 | Stop reason: HOSPADM

## 2020-07-31 RX ORDER — FENTANYL CITRATE 50 UG/ML
INJECTION, SOLUTION INTRAMUSCULAR; INTRAVENOUS
Status: DISCONTINUED | OUTPATIENT
Start: 2020-07-31 | End: 2020-07-31

## 2020-07-31 RX ORDER — CANDESARTAN 16 MG/1
32 TABLET ORAL DAILY
Status: DISCONTINUED | OUTPATIENT
Start: 2020-08-01 | End: 2020-08-01 | Stop reason: HOSPADM

## 2020-07-31 RX ORDER — NAPROXEN SODIUM 220 MG/1
81 TABLET, FILM COATED ORAL 2 TIMES DAILY
Status: DISCONTINUED | OUTPATIENT
Start: 2020-08-01 | End: 2020-08-01 | Stop reason: HOSPADM

## 2020-07-31 RX ORDER — ACETAMINOPHEN 10 MG/ML
1000 INJECTION, SOLUTION INTRAVENOUS ONCE
Status: COMPLETED | OUTPATIENT
Start: 2020-07-31 | End: 2020-07-31

## 2020-07-31 RX ORDER — TRAMADOL HYDROCHLORIDE 50 MG/1
50 TABLET ORAL EVERY 4 HOURS PRN
Status: DISCONTINUED | OUTPATIENT
Start: 2020-07-31 | End: 2020-08-01 | Stop reason: HOSPADM

## 2020-07-31 RX ORDER — CYCLOBENZAPRINE HCL 5 MG
5 TABLET ORAL 3 TIMES DAILY PRN
Status: DISCONTINUED | OUTPATIENT
Start: 2020-07-31 | End: 2020-08-01 | Stop reason: HOSPADM

## 2020-07-31 RX ORDER — ONDANSETRON 8 MG/1
8 TABLET, ORALLY DISINTEGRATING ORAL EVERY 8 HOURS PRN
Status: DISCONTINUED | OUTPATIENT
Start: 2020-07-31 | End: 2020-08-01 | Stop reason: HOSPADM

## 2020-07-31 RX ORDER — TRANEXAMIC ACID 100 MG/ML
1000 INJECTION, SOLUTION INTRAVENOUS
Status: DISCONTINUED | OUTPATIENT
Start: 2020-07-31 | End: 2020-07-31 | Stop reason: HOSPADM

## 2020-07-31 RX ORDER — ONDANSETRON 2 MG/ML
4 INJECTION INTRAMUSCULAR; INTRAVENOUS DAILY PRN
Status: DISCONTINUED | OUTPATIENT
Start: 2020-07-31 | End: 2020-07-31 | Stop reason: HOSPADM

## 2020-07-31 RX ORDER — METOPROLOL SUCCINATE 50 MG/1
50 TABLET, EXTENDED RELEASE ORAL NIGHTLY
Status: DISCONTINUED | OUTPATIENT
Start: 2020-07-31 | End: 2020-08-01 | Stop reason: HOSPADM

## 2020-07-31 RX ORDER — NEOSTIGMINE METHYLSULFATE 1 MG/ML
INJECTION, SOLUTION INTRAVENOUS
Status: DISCONTINUED | OUTPATIENT
Start: 2020-07-31 | End: 2020-07-31

## 2020-07-31 RX ORDER — SODIUM CHLORIDE, SODIUM LACTATE, POTASSIUM CHLORIDE, CALCIUM CHLORIDE 600; 310; 30; 20 MG/100ML; MG/100ML; MG/100ML; MG/100ML
INJECTION, SOLUTION INTRAVENOUS CONTINUOUS PRN
Status: DISCONTINUED | OUTPATIENT
Start: 2020-07-31 | End: 2020-07-31

## 2020-07-31 RX ORDER — MAG HYDROX/ALUMINUM HYD/SIMETH 200-200-20
30 SUSPENSION, ORAL (FINAL DOSE FORM) ORAL
Status: DISCONTINUED | OUTPATIENT
Start: 2020-07-31 | End: 2020-08-01 | Stop reason: HOSPADM

## 2020-07-31 RX ORDER — PANTOPRAZOLE SODIUM 40 MG/1
40 TABLET, DELAYED RELEASE ORAL DAILY
Status: DISCONTINUED | OUTPATIENT
Start: 2020-08-01 | End: 2020-08-01 | Stop reason: HOSPADM

## 2020-07-31 RX ORDER — CEFAZOLIN SODIUM 1 G/50ML
1 SOLUTION INTRAVENOUS
Status: COMPLETED | OUTPATIENT
Start: 2020-07-31 | End: 2020-08-01

## 2020-07-31 RX ORDER — MIDAZOLAM HYDROCHLORIDE 1 MG/ML
INJECTION, SOLUTION INTRAMUSCULAR; INTRAVENOUS
Status: DISCONTINUED | OUTPATIENT
Start: 2020-07-31 | End: 2020-07-31

## 2020-07-31 RX ORDER — MUPIROCIN 20 MG/G
1 OINTMENT TOPICAL 2 TIMES DAILY
Status: DISCONTINUED | OUTPATIENT
Start: 2020-07-31 | End: 2020-07-31 | Stop reason: SDUPTHER

## 2020-07-31 RX ORDER — ROCURONIUM BROMIDE 10 MG/ML
INJECTION, SOLUTION INTRAVENOUS
Status: DISCONTINUED | OUTPATIENT
Start: 2020-07-31 | End: 2020-07-31

## 2020-07-31 RX ORDER — HYDROMORPHONE HYDROCHLORIDE 2 MG/ML
0.5 INJECTION, SOLUTION INTRAMUSCULAR; INTRAVENOUS; SUBCUTANEOUS EVERY 10 MIN PRN
Status: DISCONTINUED | OUTPATIENT
Start: 2020-07-31 | End: 2020-07-31 | Stop reason: HOSPADM

## 2020-07-31 RX ORDER — SODIUM CHLORIDE, SODIUM LACTATE, POTASSIUM CHLORIDE, CALCIUM CHLORIDE 600; 310; 30; 20 MG/100ML; MG/100ML; MG/100ML; MG/100ML
INJECTION, SOLUTION INTRAVENOUS CONTINUOUS
Status: ACTIVE | OUTPATIENT
Start: 2020-07-31 | End: 2020-07-31

## 2020-07-31 RX ORDER — AMOXICILLIN 250 MG
1 CAPSULE ORAL 2 TIMES DAILY
Status: DISCONTINUED | OUTPATIENT
Start: 2020-07-31 | End: 2020-08-01 | Stop reason: HOSPADM

## 2020-07-31 RX ORDER — CEFAZOLIN SODIUM 1 G/50ML
1 SOLUTION INTRAVENOUS
Status: DISCONTINUED | OUTPATIENT
Start: 2020-07-31 | End: 2020-07-31

## 2020-07-31 RX ORDER — SUCCINYLCHOLINE CHLORIDE 20 MG/ML
INJECTION INTRAMUSCULAR; INTRAVENOUS
Status: DISCONTINUED | OUTPATIENT
Start: 2020-07-31 | End: 2020-07-31

## 2020-07-31 RX ORDER — GLYCOPYRROLATE 0.2 MG/ML
INJECTION INTRAMUSCULAR; INTRAVENOUS
Status: DISCONTINUED | OUTPATIENT
Start: 2020-07-31 | End: 2020-07-31

## 2020-07-31 RX ORDER — ACETAMINOPHEN 500 MG
1000 TABLET ORAL EVERY 8 HOURS
Status: DISCONTINUED | OUTPATIENT
Start: 2020-07-31 | End: 2020-08-01 | Stop reason: HOSPADM

## 2020-07-31 RX ORDER — PROPOFOL 10 MG/ML
VIAL (ML) INTRAVENOUS
Status: DISCONTINUED | OUTPATIENT
Start: 2020-07-31 | End: 2020-07-31

## 2020-07-31 RX ORDER — PREGABALIN 75 MG/1
75 CAPSULE ORAL 2 TIMES DAILY
Status: DISCONTINUED | OUTPATIENT
Start: 2020-07-31 | End: 2020-08-01 | Stop reason: HOSPADM

## 2020-07-31 RX ORDER — POLYETHYLENE GLYCOL 3350 17 G/17G
17 POWDER, FOR SOLUTION ORAL DAILY
Status: DISCONTINUED | OUTPATIENT
Start: 2020-07-31 | End: 2020-08-01 | Stop reason: HOSPADM

## 2020-07-31 RX ORDER — KETOROLAC TROMETHAMINE 30 MG/ML
15 INJECTION, SOLUTION INTRAMUSCULAR; INTRAVENOUS EVERY 8 HOURS PRN
Status: DISCONTINUED | OUTPATIENT
Start: 2020-07-31 | End: 2020-07-31 | Stop reason: HOSPADM

## 2020-07-31 RX ORDER — ALPRAZOLAM 0.5 MG/1
0.5 TABLET ORAL NIGHTLY PRN
Status: DISCONTINUED | OUTPATIENT
Start: 2020-07-31 | End: 2020-08-01 | Stop reason: HOSPADM

## 2020-07-31 RX ORDER — TELMISARTAN 20 MG/1
80 TABLET ORAL DAILY
Status: DISCONTINUED | OUTPATIENT
Start: 2020-08-01 | End: 2020-07-31 | Stop reason: CLARIF

## 2020-07-31 RX ORDER — CELECOXIB 100 MG/1
200 CAPSULE ORAL 2 TIMES DAILY
Status: DISCONTINUED | OUTPATIENT
Start: 2020-07-31 | End: 2020-08-01 | Stop reason: HOSPADM

## 2020-07-31 RX ORDER — DEXAMETHASONE SODIUM PHOSPHATE 4 MG/ML
INJECTION, SOLUTION INTRA-ARTICULAR; INTRALESIONAL; INTRAMUSCULAR; INTRAVENOUS; SOFT TISSUE
Status: DISCONTINUED | OUTPATIENT
Start: 2020-07-31 | End: 2020-07-31

## 2020-07-31 RX ORDER — LIDOCAINE HYDROCHLORIDE 10 MG/ML
INJECTION, SOLUTION EPIDURAL; INFILTRATION; INTRACAUDAL; PERINEURAL
Status: DISCONTINUED | OUTPATIENT
Start: 2020-07-31 | End: 2020-07-31

## 2020-07-31 RX ORDER — HYDROMORPHONE HYDROCHLORIDE 1 MG/ML
1 INJECTION, SOLUTION INTRAMUSCULAR; INTRAVENOUS; SUBCUTANEOUS EVERY 4 HOURS PRN
Status: DISCONTINUED | OUTPATIENT
Start: 2020-07-31 | End: 2020-08-01 | Stop reason: HOSPADM

## 2020-07-31 RX ORDER — ONDANSETRON 2 MG/ML
INJECTION INTRAMUSCULAR; INTRAVENOUS
Status: DISCONTINUED | OUTPATIENT
Start: 2020-07-31 | End: 2020-07-31

## 2020-07-31 RX ORDER — HYDROMORPHONE HYDROCHLORIDE 2 MG/ML
INJECTION, SOLUTION INTRAMUSCULAR; INTRAVENOUS; SUBCUTANEOUS
Status: DISCONTINUED | OUTPATIENT
Start: 2020-07-31 | End: 2020-07-31

## 2020-07-31 RX ORDER — SODIUM CHLORIDE 0.9 % (FLUSH) 0.9 %
10 SYRINGE (ML) INJECTION
Status: DISCONTINUED | OUTPATIENT
Start: 2020-07-31 | End: 2020-07-31 | Stop reason: HOSPADM

## 2020-07-31 RX ADMIN — HYDROMORPHONE HYDROCHLORIDE 0.5 MG: 2 INJECTION, SOLUTION INTRAMUSCULAR; INTRAVENOUS; SUBCUTANEOUS at 11:07

## 2020-07-31 RX ADMIN — HYDROMORPHONE HYDROCHLORIDE 0.2 MG: 2 INJECTION, SOLUTION INTRAMUSCULAR; INTRAVENOUS; SUBCUTANEOUS at 10:07

## 2020-07-31 RX ADMIN — SUCRALFATE 1 G: 1 SUSPENSION ORAL at 06:07

## 2020-07-31 RX ADMIN — TRANEXAMIC ACID 1000 MG: 100 INJECTION, SOLUTION INTRAVENOUS at 07:07

## 2020-07-31 RX ADMIN — LIDOCAINE HYDROCHLORIDE 50 MG: 10 INJECTION, SOLUTION EPIDURAL; INFILTRATION; INTRACAUDAL; PERINEURAL at 07:07

## 2020-07-31 RX ADMIN — ROBINUL 0.6 MG: 0.2 INJECTION INTRAMUSCULAR; INTRAVENOUS at 10:07

## 2020-07-31 RX ADMIN — PREGABALIN 75 MG: 75 CAPSULE ORAL at 09:07

## 2020-07-31 RX ADMIN — STANDARDIZED SENNA CONCENTRATE AND DOCUSATE SODIUM 1 TABLET: 8.6; 5 TABLET ORAL at 09:07

## 2020-07-31 RX ADMIN — HYDROMORPHONE HYDROCHLORIDE 0.2 MG: 2 INJECTION, SOLUTION INTRAMUSCULAR; INTRAVENOUS; SUBCUTANEOUS at 11:07

## 2020-07-31 RX ADMIN — CEFAZOLIN SODIUM 1 G: 1 SOLUTION INTRAVENOUS at 03:07

## 2020-07-31 RX ADMIN — CEFAZOLIN SODIUM 2 G: 1 SOLUTION INTRAVENOUS at 07:07

## 2020-07-31 RX ADMIN — SUCCINYLCHOLINE CHLORIDE 100 MG: 20 INJECTION, SOLUTION INTRAMUSCULAR; INTRAVENOUS at 07:07

## 2020-07-31 RX ADMIN — ACETAMINOPHEN 1000 MG: 10 INJECTION, SOLUTION INTRAVENOUS at 10:07

## 2020-07-31 RX ADMIN — ROCURONIUM BROMIDE 30 MG: 10 INJECTION, SOLUTION INTRAVENOUS at 07:07

## 2020-07-31 RX ADMIN — DEXAMETHASONE SODIUM PHOSPHATE 4 MG: 4 INJECTION, SOLUTION INTRA-ARTICULAR; INTRALESIONAL; INTRAMUSCULAR; INTRAVENOUS; SOFT TISSUE at 08:07

## 2020-07-31 RX ADMIN — SODIUM CHLORIDE, SODIUM LACTATE, POTASSIUM CHLORIDE, AND CALCIUM CHLORIDE: 600; 310; 30; 20 INJECTION, SOLUTION INTRAVENOUS at 07:07

## 2020-07-31 RX ADMIN — POLYETHYLENE GLYCOL 3350 17 G: 17 POWDER, FOR SOLUTION ORAL at 03:07

## 2020-07-31 RX ADMIN — MIDAZOLAM 2 MG: 1 INJECTION INTRAMUSCULAR; INTRAVENOUS at 07:07

## 2020-07-31 RX ADMIN — ACETAMINOPHEN 1000 MG: 500 TABLET ORAL at 10:07

## 2020-07-31 RX ADMIN — ALUMINUM HYDROXIDE, MAGNESIUM HYDROXIDE, AND SIMETHICONE 30 ML: 200; 200; 20 SUSPENSION ORAL at 11:07

## 2020-07-31 RX ADMIN — TRAMADOL HYDROCHLORIDE 50 MG: 50 TABLET, FILM COATED ORAL at 09:07

## 2020-07-31 RX ADMIN — NEOSTIGMINE METHYLSULFATE 4 MG: 1 INJECTION INTRAVENOUS at 10:07

## 2020-07-31 RX ADMIN — TRANEXAMIC ACID 1000 MG: 100 INJECTION, SOLUTION INTRAVENOUS at 10:07

## 2020-07-31 RX ADMIN — TRAMADOL HYDROCHLORIDE 50 MG: 50 TABLET, FILM COATED ORAL at 03:07

## 2020-07-31 RX ADMIN — ONDANSETRON 4 MG: 2 INJECTION, SOLUTION INTRAMUSCULAR; INTRAVENOUS at 08:07

## 2020-07-31 RX ADMIN — CELECOXIB 200 MG: 100 CAPSULE ORAL at 09:07

## 2020-07-31 RX ADMIN — ROCURONIUM BROMIDE 5 MG: 10 INJECTION, SOLUTION INTRAVENOUS at 07:07

## 2020-07-31 RX ADMIN — SUCRALFATE 1 G: 1 SUSPENSION ORAL at 11:07

## 2020-07-31 RX ADMIN — PROPOFOL 130 MG: 10 INJECTION, EMULSION INTRAVENOUS at 07:07

## 2020-07-31 RX ADMIN — METOPROLOL SUCCINATE 50 MG: 50 TABLET, EXTENDED RELEASE ORAL at 09:07

## 2020-07-31 RX ADMIN — FENTANYL CITRATE 100 MCG: 50 INJECTION, SOLUTION INTRAMUSCULAR; INTRAVENOUS at 07:07

## 2020-07-31 RX ADMIN — CEFAZOLIN SODIUM 1 G: 1 SOLUTION INTRAVENOUS at 11:07

## 2020-07-31 RX ADMIN — CYCLOBENZAPRINE HYDROCHLORIDE 5 MG: 5 TABLET, FILM COATED ORAL at 06:07

## 2020-07-31 RX ADMIN — KETOROLAC TROMETHAMINE 15 MG: 30 INJECTION, SOLUTION INTRAMUSCULAR; INTRAVENOUS at 10:07

## 2020-07-31 RX ADMIN — HYDROMORPHONE HYDROCHLORIDE 0.2 MG: 2 INJECTION INTRAMUSCULAR; INTRAVENOUS; SUBCUTANEOUS at 07:07

## 2020-07-31 NOTE — ANESTHESIA RELEASE NOTE
"Anesthesia Release from PACU Note    Patient: Modesta Camacho    Procedure(s) Performed: Procedure(s) (LRB):  ARTHROPLASTY, HIP, ANTERIOR APPROACH (Left)    Anesthesia type: general    Post pain: Adequate analgesia    Post assessment: no apparent anesthetic complications, tolerated procedure well and no evidence of recall       Last Vitals:   Visit Vitals  BP (!) 190/98 (BP Location: Right arm, Patient Position: Sitting)   Pulse 72   Temp 36.7 °C (98.1 °F) (Temporal)   Resp 20   Ht 5' 2" (1.575 m)   Wt 69.9 kg (154 lb 1.6 oz)   SpO2 99%   Breastfeeding No   BMI 28.19 kg/m²       Post vital signs: stable    Level of consciousness: responds to stimulation    Nausea/Vomiting: no nausea/no vomiting    Complications: none    Airway Patency: patent    Respiratory: unassisted    Cardiovascular: stable and blood pressure at baseline    Hydration: euvolemic  "

## 2020-07-31 NOTE — TRANSFER OF CARE
"Anesthesia Transfer of Care Note    Patient: Modesta Camacho    Procedure(s) Performed: Procedure(s) (LRB):  ARTHROPLASTY, HIP, ANTERIOR APPROACH (Left)    Patient location: PACU    Anesthesia Type: general    Transport from OR: Transported from OR on room air with adequate spontaneous ventilation    Post pain: adequate analgesia    Post assessment: no apparent anesthetic complications    Post vital signs: stable    Level of consciousness: responds to stimulation    Nausea/Vomiting: no nausea/vomiting    Complications: none    Transfer of care protocol was followed      Last vitals:   Visit Vitals  BP (!) 190/98 (BP Location: Right arm, Patient Position: Sitting)   Pulse 72   Temp 36.7 °C (98.1 °F) (Temporal)   Resp 20   Ht 5' 2" (1.575 m)   Wt 69.9 kg (154 lb 1.6 oz)   SpO2 99%   Breastfeeding No   BMI 28.19 kg/m²     "

## 2020-07-31 NOTE — ANESTHESIA PROCEDURE NOTES
Intubation  Performed by: Dale Torres CRNA  Authorized by: David Osborne II, MD     Intubation:     Induction:  Inhalational - ETT/trach    Intubated:  Postinduction    Mask Ventilation:  Easy mask    Attempts:  1    Attempted By:  CRNA    Method of Intubation:  Direct    Blade:  Avendaño 2    Laryngeal View Grade: Grade I - full view of chords      Difficult Airway Encountered?: No      Complications:  None    Airway Device:  Oral endotracheal tube    Airway Device Size:  7.0    Style/Cuff Inflation:  Cuffed    Inflation Amount (mL):  7    Tube secured:  21    Secured at:  The lips    Placement Verified By:  Capnometry    Complicating Factors:  None    Findings Post-Intubation:  BS equal bilateral

## 2020-07-31 NOTE — PLAN OF CARE
Pt resting on stretcher s/p DAA Lt Hip Arthroplasty performed under general anesthesia by Dr. Gandara. Respirations even and unlabored on room air and tolerating well. VSS. Will cont to monitor. See flow sheet for detailed assessment.

## 2020-07-31 NOTE — OP NOTE
Operative Report  Surgery: 7/31/2020    Surgeon: Xavi Gandara M.D.    Assistants:  None    Preoperative Diagnosis:  Left hip avascular necrosis    Postoperative Diagnosis:  same    Procedure:  Left total hip arthroplasty, direct anterior approach    EBL:  300cc    Drains: none    Implants:  DJO FMP 50mm cup, 15mm screw, vit E neutral liner, size 5 taperfill lateral offset stem, 32mm ceramic head with -4 neck    Indications:  Patient has end-stage avascular necrosis that has failed conservative, nonopeartive management.  Risks, benefits, and alternatives total hip arthroplasty were discussed in great detail.  Specific risks including pain, bleeding, infection, nerve/vessel damage, stiffness, thigh numbness, loosening, leg length difference, dislocation, and need for additional procedures were all discussed in great detail. They expressed understanding and elected to proceed.  Informed consent was obtained and I marked the operative limb prior to transfer to the OR.      Procedure in detail:  After induction of anesthesia the patient was positioned on the Dover table with traction boots applied to the bilateral lower extremities.  Dedicated imaging of each hip and the pelvis were obtained for templating purposes.  The operative thigh was prepped and draped in a standard fashion starting with a chlorhexidine and alcohol pre-scrub, chloraprep was then used on the skin.  All skin was covered with drape or ioban.  Appropriate time-out was performed, confirming correct patient, side, site and procedure to be performed.   IV antibiotics were administered prior to start.  IV TXA was administered.     I began by performing a standard direct anterior approach to the hip.  Skin was incised distal and lateral to the ASIS directed toward Gerdy's tubercle.  Subcutaneous tissue and fascia over the TFL was incised in line with the incision.  TFL was swept posteriorly.  The floor of the sheath was opened, crossing ascending vessels  were ligated and cut, and precapsular fat was excised.  Standard anterior capsulotomy and capsulectomy was performed.  Retractors were placed around the neck.  Neck cut was made and head was extracted uneventfully.  Retractors were placed around the acetabulum.  Labrum and pulvinar was excised.  Acetabulum was reamed up to 1mm under the final implanted cup size.  Cup was impacted with appropriate anteversion and abduction, confirmed fluoroscopically.  Screw(s) were placed in the posterior superior quadrant and final liner was placed and impacted.  Retractors were then placed around the femur and the femur was delivered with external rotation, extension, and adduction.  Release was performed to allow adequate delivery of the femur.  The femur was quite tight distally, and I used flexible reamers to open the canal distally before preparing the femur kel  Standard fashion up to the final implant size, which was trialed and noted to have appropriate fit, fill, and rotational stability.  Hip was reduced, trialed and found to be stable and restore lengths acceptably.  Hip was dislocated and final stem and head impacted.  Stem was stable in the femur.  Hip was verified to be stable and leg lengths restored acceptably, confirmed fluoroscopically.      Wound was irrigated. Good hemostasis was obtained.   Exparel cocktail was injected into the periosteum, capsule and surrounding musculature.  1 gram of vancomycin was placed in the wound. Drain was not placed. Fascia was closed and subcutaneous tissues closed with interrupted sutures.  Subcuticular quill stitch was used in the skin.  Dermabond and sterile dressings applied over this.  Patient tolerated the procedure well.   All counts were correct at the end of the case.  No complications were noted.  I was present an performed all portions of the procedure.    Postoperative Plan:  Weightbearing:  As tolerated  Precautions:  Anterior hip precautions  DVT prophylaxis: ASA 81mg  PO BID  Antibiotics: perioperative only  Follow-up: 2 weeks.      Xavi Gandara M.D.  Orthopaedic Surgery  [facility]  [phone]

## 2020-07-31 NOTE — PLAN OF CARE
Received to room 508 from PACU via bed w/ trapeze. S/P left hip arthroplasty. DSG CDI. Ambulated with PT, tolerated well. Pain managed with oral medication. Tolerating clear liquid diet . Will monitor

## 2020-07-31 NOTE — PT/OT/SLP EVAL
Physical Therapy Evaluation    Patient Name:  Modesta Camacho   MRN:  [id number]    Recommendations:     Discharge Recommendations:  outpatient PT   Discharge Equipment Recommendations: none   Barriers to discharge: None    Assessment:     Modesta Camacho is a 54 y.o. female admitted with a medical diagnosis of Avascular necrosis of hip, left.  She presents with the following impairments/functional limitations:  weakness, impaired endurance, impaired functional mobilty, gait instability, impaired balance, decreased ROM, decreased lower extremity function, pain .    Rehab Prognosis: Good; patient would benefit from acute skilled PT services to address these deficits and reach maximum level of function.    Recent Surgery: Procedure(s) (LRB):  ARTHROPLASTY, HIP, ANTERIOR APPROACH (Left) Day of Surgery    Plan:     During this hospitalization, patient to be seen   to address the identified rehab impairments via gait training, therapeutic activities, therapeutic exercises and progress toward the following goals:    · Plan of Care Expires:  08/07/20    Subjective     Chief Complaint: L HIP PAIN  Patient/Family Comments/goals: INC MOBILITY   Pain/Comfort:  · Pain Rating 1: 4/10  · Location - Side 1: Left  · Location 1: hip  · Pain Addressed 1: Reposition  · Pain Rating Post-Intervention 1: 4/10    Patients cultural, spiritual, Uatsdin conflicts given the current situation:      Living Environment:  PT LIVES AT HOME WITH  AND HAS NO STEPS TO ENTER ONE STORY HOME   Prior to admission, patients level of function was IND AND DRIVES.  Equipment used at home: walker, rolling, bedside commode.  DME owned (not currently used): rolling walker and bedside commode.  Upon discharge, patient will have assistance from  .    Objective:     Communicated with NURSE SCOTT AND Epic CHART REVIEW  prior to session.  Patient found supine with peripheral IV, SCD  upon PT entry to room.    General Precautions: Standard, fall    Orthopedic Precautions:LLE anterior precautions, LLE weight bearing as tolerated(NO ACTIVE HIP FLEX TE)   Braces: N/A     Exams:  · RLE ROM: WNL  · RLE Strength: WNL  · LLE ROM: LIMITED  · LLE Strength: NT    Functional Mobility:  PT MET IN RM SUP.SIT EOB WITH SBA. PT SCOOTED TO EOB AND STOOD WITH RW AND SBA. PT GT TRAINED X 300' WITH STEP TO >THRU GT WITH SBA. PT RETURNED TO RM SEATED EOB WITH SBA. PT STOOD FROM BED WITH SBA FOR GT TO BATHROOM FOR TOILETING. PT SEATED ON COMMODE AND LEFT SEATED WITH ALL NEEDS MET AND OT PRESENT. PT EDUCATED ON ROLE OF P.T. AND HIP PRECAUTIONS. PT LEFT WITH ALL NEEDS MET AND CALL BELL IN REACH.     AM-PAC 6 CLICK MOBILITY  Total Score:21     Patient left ON COMMODE with call button in reach and OT present.    GOALS:   Multidisciplinary Problems     Physical Therapy Goals        Problem: Physical Therapy Goal    Goal Priority Disciplines Outcome Goal Variances Interventions   Physical Therapy Goal     PT, PT/OT      Description: PT WILL BE SEEN FOR P.T. FOR A MIN OF 5 OUT OF 7 DAYS A WEEK  LT20  1. PT WILL COMPLETE BED MOBILITY IND  2. PT WILL T/F TO CHAIR WITH RW MOD I  3. PT WILL GT TRAIN X 500' WITH RW MOD I  4. PT WILL COMPLETE TE X 20 REPS WITH NO AROM HIP FLEX                   History:     Past Medical History:   Diagnosis Date    Anxiety     Hypertension     Osteoarthritis        Past Surgical History:   Procedure Laterality Date    gastric sleeve      JOINT REPLACEMENT Right 2016    knee    TUBAL LIGATION      UTERINE FIBROID EMBOLIZATION         Time Tracking:     PT Received On: 20  PT Start Time: 1420     PT Stop Time: 1450  PT Total Time (min): 30 min     Billable Minutes: Evaluation 15 and Gait Training 15      Dawn Zamorano, PT  2020

## 2020-07-31 NOTE — ANESTHESIA PREPROCEDURE EVALUATION
07/30/2020  Modesta Camacho is a 54 y.o., female.    Anesthesia Evaluation    I have reviewed the Patient Summary Reports.    I have reviewed the Nursing Notes. I have reviewed the NPO Status.   I have reviewed the Medications.     Review of Systems  Anesthesia Hx:  No problems with previous Anesthesia  Denies Family Hx of Anesthesia complications.    Social:  Non-Smoker    Hematology/Oncology:  Hematology Normal   Oncology Normal     EENT/Dental:EENT/Dental Normal   Cardiovascular:  Cardiovascular Normal Hypertension     Pulmonary:  Pulmonary Normal    Renal/:  Renal/ Normal     Hepatic/GI:  Hepatic/GI Normal    Musculoskeletal:   Arthritis  Osteoarthritis   Neurological:  Neurology Normal    Endocrine:  Endocrine Normal    Dermatological:  Skin Normal    Psych:   anxiety        Patient Active Problem List   Diagnosis    HTN (hypertension)    Anxiety     No current facility-administered medications on file prior to encounter.      Current Outpatient Medications on File Prior to Encounter   Medication Sig Dispense Refill    metoprolol succinate (TOPROL-XL) 50 MG 24 hr tablet Take 50 mg by mouth nightly.       telmisartan (MICARDIS) 80 MG Tab Take 1 tablet (80 mg total) by mouth once daily. 90 tablet 0    pantoprazole (PROTONIX) 40 MG tablet Take 40 mg by mouth daily as needed.       [DISCONTINUED] amLODIPine (NORVASC) 10 MG tablet Take 10 mg by mouth.       Past Surgical History:   Procedure Laterality Date    gastric sleeve      JOINT REPLACEMENT Right 06/14/2016    knee    TUBAL LIGATION      UTERINE FIBROID EMBOLIZATION           Physical Exam  General:  Well nourished    Airway/Jaw/Neck:  Airway Findings: Mouth Opening: Normal Tongue: Normal  General Airway Assessment: Adult, Average  Mallampati: II  TM Distance: 4 - 6 cm  Jaw/Neck Findings:  Neck ROM: Normal ROM      Dental:  Dental  Findings: In tact   Chest/Lungs:  Chest/Lungs Findings: Clear to auscultation, Normal Respiratory Rate     Heart/Vascular:  Heart Findings: Rate: Normal  Rhythm: Regular Rhythm  Sounds: Normal        Mental Status:  Mental Status Findings:  Cooperative, Alert and Oriented         Anesthesia Plan  Type of Anesthesia, risks & benefits discussed:  Anesthesia Type:  general  Patient's Preference:   Intra-op Monitoring Plan: standard ASA monitors  Intra-op Monitoring Plan Comments:   Post Op Pain Control Plan: multimodal analgesia and per primary service following discharge from PACU  Post Op Pain Control Plan Comments:   Induction:   IV  Beta Blocker:  Patient is on a Beta-Blocker and has received one dose within the past 24 hours (No further documentation required).       Informed Consent: Patient understands risks and agrees with Anesthesia plan.  Questions answered. Anesthesia consent signed with patient.  ASA Score: 2     Day of Surgery Review of History & Physical:  There are no significant changes.          Ready For Surgery From Anesthesia Perspective.       Chemistry        Component Value Date/Time     05/29/2020 1155    K 4.4 05/29/2020 1155     05/29/2020 1155    CO2 29 05/29/2020 1155    BUN 17 05/29/2020 1155    CREATININE 1.1 05/29/2020 1155    GLU 70 05/29/2020 1155        Component Value Date/Time    CALCIUM 9.5 05/29/2020 1155    ALKPHOS 99 03/13/2020 0057    AST 19 03/13/2020 0057    ALT 21 03/13/2020 0057    BILITOT 0.5 03/13/2020 0057    ESTGFRAFRICA >60.0 05/29/2020 1155    EGFRNONAA 57.1 (A) 05/29/2020 1155        Lab Results   Component Value Date    WBC 7.29 05/29/2020    HGB 13.0 05/29/2020    HCT 41.6 05/29/2020    MCV 97 05/29/2020     05/29/2020       Normal sinus rhythm   Low voltage QRS   T wave abnormality, consider inferior ischemia   T wave abnormality, consider anterolateral ischemia   Abnormal ECG   When compared with ECG of 27-JUN-2015 15:07,   Inverted T waves have  replaced nonspecific T wave abnormality in Anterior   leads   Confirmed by JORGE MARY MD ([id number]) on 3/15/2020 10:36:45 AM

## 2020-07-31 NOTE — PT/OT/SLP EVAL
Occupational Therapy   Evaluation    Name: Modesta Camacho  MRN: [id number]  Admitting Diagnosis:  Avascular necrosis of hip, left Day of Surgery    Recommendations:     Discharge Recommendations: home health OT  Discharge Equipment Recommendations:  none  Barriers to discharge:       Assessment:     Modesta Camacho is a 54 y.o. female with a medical diagnosis of Avascular necrosis of hip, left.  She presents with debility and impaired le dressing, decrease functional t/f's and b ue strengthing. Performance deficits affecting function: weakness, gait instability, impaired endurance, impaired balance, impaired self care skills, impaired functional mobilty.      Rehab Prognosis: Good; patient would benefit from acute skilled OT services to address these deficits and reach maximum level of function.       Plan:     Patient to be seen 3 x/week to address the above listed problems via self-care/home management, therapeutic activities, therapeutic exercises  · Plan of Care Expires: 08/07/20  · Plan of Care Reviewed with: patient    Subjective     Chief Complaint: debiity and impaired le dressing, decrease functional t/f's and b ue strengthing.  Occupational Profile:  Living Environment: lives with spouse 1 story no steps   Previous level of function: (I) WITH ADL'S AND FUNCTIONAL MOBILITY, PT DRIVES AND WORKS  Roles and Routines: occupational therapy  Equipment Used at Home:  bedside commode, walker, rolling, grab bar  Assistance upon Discharge:     Pain/Comfort:  · Pain Rating 1: 5/10  · Location - Side 1: Left  · Location - Orientation 1: generalized  · Location 1: hip    Patients cultural, spiritual, Amish conflicts given the current situation:      Objective:     Communicated with: NURSE AND Epic CHART REVIEW prior to session.  Patient found HOB elevated with   upon OT entry to room.    General Precautions: Standard, fall   Orthopedic Precautions:LLE weight bearing as tolerated, LLE anterior precautions   Braces:  N/A     Occupational Performance:    Bed Mobility:    · Patient completed Rolling/Turning to Left with  stand by assistance  · Patient completed Rolling/Turning to Right with stand by assistance  · Patient completed Scooting/Bridging with stand by assistance  · Patient completed Supine to Sit with stand by assistance  · Patient completed Sit to Supine with stand by assistance    Functional Mobility/Transfers:  · Patient completed Sit <> Stand Transfer with stand by assistance  with  rolling walker   · Patient completed Bed <> Chair Transfer using Step Transfer technique with stand by assistance with rolling walker  · Patient completed Toilet Transfer Step Transfer technique with contact guard assistance with  rolling walker and grab bars  · Functional Mobility: PT AMBULATED 250 FEET WITH SBA WITH VERBAL CUES WITH HAND PLACEMENR AND INSTRUCTION ON ROLLING WALKER    Activities of Daily Living:  · Upper Body Dressing: contact guard assistance [facility]  · Lower Body Dressing: total assistance NEED AE     Cognitive/Visual Perceptual:  Cognitive/Psychosocial Skills:     -       Oriented to: Person, Place, Time and Situation   -       Follows Commands/attention:Follows multistep  commands  -       Communication: clear/fluent  -       Memory: No Deficits noted  -       Safety awareness/insight to disability: impaired   Visual/Perceptual:      -Intact .    Physical Exam:  Upper Extremity Range of Motion:     -       Right Upper Extremity: WFL  -       Left Upper Extremity: WFL  Upper Extremity Strength:    -       Right Upper Extremity: WFL  -       Left Upper Extremity: WFL   Strength:    -       Right Upper Extremity: WFL  -       Left Upper Extremity: WFL    AMPAC 6 Click ADL:  AMPAC Total Score: 19    Treatment & Education:  Education:  PT EDUCATED ON PROPER BED MOBILITY AND TRANSFERS. PT CGA WITH UE DRESSING AND TOTAL A WITHOUT AE . PT AMBULATED 250 FEET  WITH SBA AND ROLLING WALKER. PT REQUESTED  TOILETING.  PT REQ CGA WITH TOILET T/F'S.. PT REQ SBA WITH HAND HYGIENE. PT RETURNED TO BED WITH SBA VIA USING R LE TO ASSIST L LE. SBA WITH SCOOTING SELF HIGHER IN BED.   Patient left HOB elevated with all lines intact, call button in reach, bed alarm on, NURSE Anuja notified and SPOUSE present    GOALS:   Multidisciplinary Problems     Occupational Therapy Goals        Problem: Occupational Therapy Goal    Goal Priority Disciplines Outcome Interventions   Occupational Therapy Goal     OT, PT/OT     Description: OT goals to be met by 8-7-20    Pt will tolerate 1 set x 20 reps b ue rom exercise with min reisitance  Pt will req mod (i) with toilet t/f's  Pt will req mod (I) with le dressing                   History:     Past Medical History:   Diagnosis Date    Anxiety     Hypertension     Osteoarthritis        Past Surgical History:   Procedure Laterality Date    gastric sleeve      JOINT REPLACEMENT Right 06/14/2016    knee    TUBAL LIGATION      UTERINE FIBROID EMBOLIZATION         Time Tracking:     OT Date of Treatment: 07/31/20  OT Start Time: 1422  OT Stop Time: 1500  OT Total Time (min): 38 min    Billable Minutes:Evaluation 8 MINUTES  Self Care/Home Management 15 MINUTES  Therapeutic Activity 15 MINUTES    Faye Stone OT  7/31/2020

## 2020-08-01 VITALS
BODY MASS INDEX: 28.36 KG/M2 | DIASTOLIC BLOOD PRESSURE: 70 MMHG | SYSTOLIC BLOOD PRESSURE: 135 MMHG | WEIGHT: 154.13 LBS | RESPIRATION RATE: 18 BRPM | OXYGEN SATURATION: 98 % | HEIGHT: 62 IN | TEMPERATURE: 98 F | HEART RATE: 81 BPM

## 2020-08-01 LAB
ANION GAP SERPL CALC-SCNC: 10 MMOL/L (ref 8–16)
BASOPHILS # BLD AUTO: 0.01 K/UL (ref 0–0.2)
BASOPHILS NFR BLD: 0.2 % (ref 0–1.9)
BUN SERPL-MCNC: 14 MG/DL (ref 6–20)
CALCIUM SERPL-MCNC: 9 MG/DL (ref 8.7–10.5)
CHLORIDE SERPL-SCNC: 105 MMOL/L (ref 95–110)
CO2 SERPL-SCNC: 22 MMOL/L (ref 23–29)
CREAT SERPL-MCNC: 0.9 MG/DL (ref 0.5–1.4)
DIFFERENTIAL METHOD: ABNORMAL
EOSINOPHIL # BLD AUTO: 0 K/UL (ref 0–0.5)
EOSINOPHIL NFR BLD: 0.2 % (ref 0–8)
ERYTHROCYTE [DISTWIDTH] IN BLOOD BY AUTOMATED COUNT: 12.4 % (ref 11.5–14.5)
EST. GFR  (AFRICAN AMERICAN): >60 ML/MIN/1.73 M^2
EST. GFR  (NON AFRICAN AMERICAN): >60 ML/MIN/1.73 M^2
GLUCOSE SERPL-MCNC: 97 MG/DL (ref 70–110)
HCT VFR BLD AUTO: 30.1 % (ref 37–48.5)
HGB BLD-MCNC: 9.5 G/DL (ref 12–16)
IMM GRANULOCYTES # BLD AUTO: 0.02 K/UL (ref 0–0.04)
IMM GRANULOCYTES NFR BLD AUTO: 0.3 % (ref 0–0.5)
LYMPHOCYTES # BLD AUTO: 2.3 K/UL (ref 1–4.8)
LYMPHOCYTES NFR BLD: 35 % (ref 18–48)
MCH RBC QN AUTO: 29.8 PG (ref 27–31)
MCHC RBC AUTO-ENTMCNC: 31.6 G/DL (ref 32–36)
MCV RBC AUTO: 94 FL (ref 82–98)
MONOCYTES # BLD AUTO: 0.4 K/UL (ref 0.3–1)
MONOCYTES NFR BLD: 6.7 % (ref 4–15)
NEUTROPHILS # BLD AUTO: 3.7 K/UL (ref 1.8–7.7)
NEUTROPHILS NFR BLD: 57.6 % (ref 38–73)
NRBC BLD-RTO: 0 /100 WBC
PLATELET # BLD AUTO: 218 K/UL (ref 150–350)
PMV BLD AUTO: 10.1 FL (ref 9.2–12.9)
POTASSIUM SERPL-SCNC: 4.4 MMOL/L (ref 3.5–5.1)
RBC # BLD AUTO: 3.19 M/UL (ref 4–5.4)
SODIUM SERPL-SCNC: 137 MMOL/L (ref 136–145)
WBC # BLD AUTO: 6.43 K/UL (ref 3.9–12.7)

## 2020-08-01 PROCEDURE — 85025 COMPLETE CBC W/AUTO DIFF WBC: CPT

## 2020-08-01 PROCEDURE — 97116 GAIT TRAINING THERAPY: CPT | Mod: CQ

## 2020-08-01 PROCEDURE — 80048 BASIC METABOLIC PNL TOTAL CA: CPT

## 2020-08-01 PROCEDURE — 36415 COLL VENOUS BLD VENIPUNCTURE: CPT

## 2020-08-01 PROCEDURE — 25000003 PHARM REV CODE 250: Performed by: ORTHOPAEDIC SURGERY

## 2020-08-01 PROCEDURE — 94760 N-INVAS EAR/PLS OXIMETRY 1: CPT

## 2020-08-01 RX ORDER — CELECOXIB 200 MG/1
200 CAPSULE ORAL 2 TIMES DAILY
Qty: 28 CAPSULE | Refills: 0 | Status: SHIPPED | OUTPATIENT
Start: 2020-08-01 | End: 2020-08-15

## 2020-08-01 RX ORDER — TRAMADOL HYDROCHLORIDE 50 MG/1
50 TABLET ORAL EVERY 4 HOURS PRN
Qty: 40 TABLET | Refills: 0 | Status: SHIPPED | OUTPATIENT
Start: 2020-08-01 | End: 2021-01-22 | Stop reason: SDUPTHER

## 2020-08-01 RX ORDER — AMOXICILLIN 250 MG
1 CAPSULE ORAL 2 TIMES DAILY
Qty: 28 TABLET | Refills: 0 | Status: SHIPPED | OUTPATIENT
Start: 2020-08-01 | End: 2020-08-15

## 2020-08-01 RX ORDER — NAPROXEN SODIUM 220 MG/1
81 TABLET, FILM COATED ORAL 2 TIMES DAILY
Qty: 60 TABLET | Refills: 0 | Status: SHIPPED | OUTPATIENT
Start: 2020-08-01 | End: 2020-10-29

## 2020-08-01 RX ORDER — ONDANSETRON 4 MG/1
4 TABLET, ORALLY DISINTEGRATING ORAL EVERY 8 HOURS PRN
Qty: 20 TABLET | Refills: 0 | Status: SHIPPED | OUTPATIENT
Start: 2020-08-01 | End: 2023-05-25

## 2020-08-01 RX ADMIN — CYCLOBENZAPRINE HYDROCHLORIDE 5 MG: 5 TABLET, FILM COATED ORAL at 05:08

## 2020-08-01 RX ADMIN — ALUMINUM HYDROXIDE, MAGNESIUM HYDROXIDE, AND SIMETHICONE 30 ML: 200; 200; 20 SUSPENSION ORAL at 05:08

## 2020-08-01 RX ADMIN — CANDESARTAN CILEXETIL 32 MG: 16 TABLET ORAL at 08:08

## 2020-08-01 RX ADMIN — DULOXETINE 30 MG: 30 CAPSULE, DELAYED RELEASE ORAL at 08:08

## 2020-08-01 RX ADMIN — ACETAMINOPHEN 1000 MG: 500 TABLET ORAL at 05:08

## 2020-08-01 RX ADMIN — ASPIRIN 81 MG CHEWABLE TABLET 81 MG: 81 TABLET CHEWABLE at 08:08

## 2020-08-01 RX ADMIN — CELECOXIB 200 MG: 100 CAPSULE ORAL at 08:08

## 2020-08-01 RX ADMIN — SUCRALFATE 1 G: 1 SUSPENSION ORAL at 05:08

## 2020-08-01 RX ADMIN — TRAMADOL HYDROCHLORIDE 50 MG: 50 TABLET, FILM COATED ORAL at 08:08

## 2020-08-01 RX ADMIN — STANDARDIZED SENNA CONCENTRATE AND DOCUSATE SODIUM 1 TABLET: 8.6; 5 TABLET ORAL at 08:08

## 2020-08-01 RX ADMIN — POLYETHYLENE GLYCOL 3350 17 G: 17 POWDER, FOR SOLUTION ORAL at 08:08

## 2020-08-01 RX ADMIN — PANTOPRAZOLE SODIUM 40 MG: 40 TABLET, DELAYED RELEASE ORAL at 08:08

## 2020-08-01 RX ADMIN — TRAMADOL HYDROCHLORIDE 50 MG: 50 TABLET, FILM COATED ORAL at 01:08

## 2020-08-01 NOTE — PLAN OF CARE
Patient is in stable condition, no acute distress, receiving antibiotics, pain adequately controlled, left hip dressing CDI, weight bearing as tolerated to LLE,  and will continue to monitor. 24 hr chart check performed.

## 2020-08-01 NOTE — PLAN OF CARE
Patient received on shift laying in bed awake, x4, in no acute distress or discomfort. Vitals present within stable limits. Medications tolerated well. Dressing to LLE remains clean and intact; pending initial MD dressing change. PT done on shift and tolerated well. Safety precautions maintained; call light within reach. Will cont. To monitor progress.

## 2020-08-01 NOTE — PT/OT/SLP PROGRESS
Physical Therapy  Treatment    Modesta Camacho   MRN: [id number]   Admitting Diagnosis: Avascular necrosis of hip, left       PT Start Time: 0910     PT Stop Time: 0925    PT Total Time (min): 15 min       Billable Minutes:  Gait Training 15    Treatment Type: Treatment  PT/PTA: PTA     PTA Visit Number: 1       General Precautions: Standard, fall  Orthopedic Precautions: LLE anterior precautions, LLE weight bearing as tolerated(NO ACTIVE HIP FLEX TE)   Braces:           Subjective:  Communicated with NSG prior to session.      Pain/Comfort  Pain Rating 1: 0/10  Pain Rating Post-Intervention 1: 0/10    Objective:        Functional Mobility:  Bed Mobility:        Transfers:       Gait:        Stairs:          Balance:   Static Sit:   Dynamic Sit:   Static Stand:   Dynamic stand:        Therapeutic Activities and Exercises:  AMBULATING IN ROOM UPON ARRIVAL.  AMBULATED IN LAMAR WITH SBA WHILE MANAGING IV POLE WITHOUT ASSISTANCE AND WITH CONVERSATION. RETURNED TO ROOM     AM-PAC 6 CLICK MOBILITY  How much help from another person does this patient currently need?   1 = Unable, Total/Dependent Assistance  2 = A lot, Maximum/Moderate Assistance  3 = A little, Minimum/Contact Guard/Supervision  4 = None, Modified [place]/Independent         AM-PAC Raw Score CMS G-Code Modifier Level of Impairment Assistance   6 % Total / Unable   7 - 9 CM 80 - 100% Maximal Assist   10 - 14 CL 60 - 80% Moderate Assist   15 - 19 CK 40 - 60% Moderate Assist   20 - 22 CJ 20 - 40% Minimal Assist   23 CI 1-20% SBA / CGA   24 CH 0% Independent/ Mod I     Patient left SITTING EOB with all lines intact, call button in reach and bed alarm off.    Assessment:  Modesta Camacho is a 54 y.o. female with a medical diagnosis of Avascular necrosis of hip, left and presents with .    Rehab identified problem list/impairments:      Rehab potential is good.    Activity tolerance: Good    Discharge recommendations: Discharge Facility/Level of Care  Needs: outpatient PT     Barriers to discharge:      Equipment recommendations: Equipment Needed After Discharge: walker, rolling     GOALS:   Multidisciplinary Problems     Physical Therapy Goals        Problem: Physical Therapy Goal    Goal Priority Disciplines Outcome Goal Variances Interventions   Physical Therapy Goal     PT, PT/OT      Description: PT WILL BE SEEN FOR P.T. FOR A MIN OF 5 OUT OF 7 DAYS A WEEK  LT20  1. PT WILL COMPLETE BED MOBILITY IND  2. PT WILL T/F TO CHAIR WITH RW MOD I  3. PT WILL GT TRAIN X 500' WITH RW MOD I  4. PT WILL COMPLETE TE X 20 REPS WITH NO AROM HIP FLEX                   PLAN:    Patient to be seen    to address the above listed problems via gait training, therapeutic activities, therapeutic exercises  Plan of Care expires: 20  Plan of Care reviewed with: patient, spouse         David Abreu, PTA  2020

## 2020-08-05 LAB — HEMOCCULT STL QL IA: NEGATIVE

## 2020-08-24 DIAGNOSIS — I10 ESSENTIAL HYPERTENSION: ICD-10-CM

## 2020-08-24 NOTE — TELEPHONE ENCOUNTER
No new care gaps identified.  Powered by [employer]. Reference number: [id number]. 8/24/2020 7:56:31 AM CECELIAT

## 2020-08-25 RX ORDER — TELMISARTAN 80 MG/1
TABLET ORAL
Qty: 90 TABLET | Refills: 0 | Status: SHIPPED | OUTPATIENT
Start: 2020-08-25 | End: 2020-11-16

## 2020-08-25 NOTE — PROGRESS NOTES
Refill Routing Note   Medication(s) are not appropriate for processing by [facility]:       - Patient has been hospitalized since the last PCP visit  - Medication is a new start (<3 months)        Medication Therapy Plan: CDMR; MEDICATION IS A NEW START; HOSPITALIZATION(7/31/20-Avascular necrosis of hip, left), DEFER TO YOU  Medication reconciliation completed: No      Automatic Epic Generated Protocol Data:        Requested Prescriptions   Pending Prescriptions Disp Refills    telmisartan (MICARDIS) 80 MG Tab [Pharmacy Med Name: TELMISARTAN 80 MG TABLET] 90 tablet 0     Sig: TAKE 1 TABLET BY MOUTH EVERY DAY       Cardiovascular:  Angiotensin Receptor Blockers Passed - 8/24/2020  7:56 AM        Passed - Patient is at least 18 years old        Passed - Last BP in normal range within 360 days.     BP Readings from Last 3 Encounters:   08/01/20 135/70   07/22/20 (!) 144/80   07/21/20 (!) 154/96              Passed - Office visit in past 12 months or future 90 days.     Recent Outpatient Visits            1 month ago Pain of left hip joint    [place] - Family Medicine Mary Mota MD    1 month ago Osteoarthritis of multiple joints, unspecified osteoarthritis type    [place] - Rheumatology Kylie Armenta DO    3 months ago CKD (chronic kidney disease) stage 3, GFR 30-59 ml/min    [place] - Nephrology Blaine Bustos MD    3 months ago Primary osteoarthritis of left hip    [place] - Physical Med/Rehab Vaibhav Mata MD    3 months ago Panic attack    [facility] Family Medicine Mary Mota MD          Future Appointments              In 1 week [facility]    In 1 month [facility] [place] [facility]    In 2 months LABORATORY, [place] [facility]    In 2 months SPECIMEN, [place] [facility]    In 3 months Blaine Bustos MD [place]  [facility]                Passed - Cr is 1.3 or below and within 360 days     Creatinine   Date Value Ref Range Status   08/01/2020 0.9 0.5 - 1.4 mg/dL Final   05/29/2020 1.1 0.5 - 1.4 mg/dL Final   03/17/2020 1.5 (H) 0.5 - 1.4 mg/dL Final              Passed - K in normal range and within 360 days     Potassium   Date Value Ref Range Status   08/01/2020 4.4 3.5 - 5.1 mmol/L Final   05/29/2020 4.4 3.5 - 5.1 mmol/L Final   03/17/2020 4.2 3.5 - 5.1 mmol/L Final              Passed - eGFR within 360 days     eGFR if non    Date Value Ref Range Status   08/01/2020 >60 >60 mL/min/1.73 m^2 Final     Comment:     Calculation used to obtain the estimated glomerular filtration  rate (eGFR) is the CKD-EPI equation.      05/29/2020 57.1 (A) >60 mL/min/1.73 m^2 Final     Comment:     Calculation used to obtain the estimated glomerular filtration  rate (eGFR) is the CKD-EPI equation.      03/17/2020 39.2 (A) >60 mL/min/1.73 m^2 Final     Comment:     Calculation used to obtain the estimated glomerular filtration  rate (eGFR) is the CKD-EPI equation.        eGFR if    Date Value Ref Range Status   08/01/2020 >60 >60 mL/min/1.73 m^2 Final   05/29/2020 >60.0 >60 mL/min/1.73 m^2 Final   03/17/2020 45.2 (A) >60 mL/min/1.73 m^2 Final                    Appointments  past 12m or future 3m with PCP    Date Provider   Last Visit   7/22/2020 Mary Mota MD   Next Visit   Visit date not found Mary Mota MD   ED visits in past 90 days: 0     Note composed:11:02 AM 08/25/2020

## 2020-08-26 ENCOUNTER — TELEPHONE (OUTPATIENT)
Dept: FAMILY MEDICINE | Facility: CLINIC | Age: 55
End: 2020-08-26

## 2020-08-26 NOTE — TELEPHONE ENCOUNTER
Refill confirmation was sent to pharmacy, and [employer] message communicated to patient.     This medication was filled on 8/25

## 2020-08-26 NOTE — TELEPHONE ENCOUNTER
----- Message from Renetta Jenkins sent at 8/26/2020  7:56 AM CDT -----  Regarding: Refill  Pt is calling to speak with staff regarding a refill of telmisartan (MICARDIS) 80 MG Tab.  Pt says the Pharmacy says her prescription was denied and she doesn't know why.  In addition, she says she is nearly out and does not want to run out.    She is requesting a returned call to [phone].    Thank you.

## 2020-09-02 ENCOUNTER — PATIENT OUTREACH (OUTPATIENT)
Dept: ADMINISTRATIVE | Facility: OTHER | Age: 55
End: 2020-09-02

## 2020-09-02 ENCOUNTER — TELEPHONE (OUTPATIENT)
Dept: RHEUMATOLOGY | Facility: CLINIC | Age: 55
End: 2020-09-02

## 2020-09-08 ENCOUNTER — TELEPHONE (OUTPATIENT)
Dept: RHEUMATOLOGY | Facility: CLINIC | Age: 55
End: 2020-09-08

## 2020-09-10 NOTE — PROGRESS NOTES
Subjective:       Patient ID: Modesta Camacho is a 54 y.o. female.    Chief Complaint: Osteoarthritis      Modesta is a 55 y/o new patient to our dept, self referred.  She was following with Rheumatology at [facility] in the past then saw Dr. Armenta in  in July this year. She wanted to establish care with us as it's close to her home..  She has a h/o weakly positive CRISELDA 1:80 w neg profile without evidence of CTD.  She has generalized OA.  She recently had L KRZYSZTOF for severe deg arthritis done by Dr. Gandara. Healing well. Prev R TKA.  And planning a tKA on the left very soon.        Lab work up in the past included: sed rate crp,  RF, HLA B27 which were all normal.  She uses tramadol for chronic pain takes tid usually, unable to use nsaids due to GREYSON earlier this year on mobic.     Right now she takes cymbalta 30mg day which does help her pain.  Didn't like tumeric.  Her main complaint today is L knee.  No hand/wrist pain or swelling.  No fevers, rashes, cp, sob, numbness, tingling weakness. No raynuads.      Past Medical History:   Diagnosis Date    Anxiety     Hypertension     Osteoarthritis      Past Surgical History:   Procedure Laterality Date    ARTHROPLASTY OF HIP BY ANTERIOR APPROACH Left 7/31/2020    Procedure: ARTHROPLASTY, HIP, ANTERIOR APPROACH;  Surgeon: Xavi Gandara MD;  Location: [facility];  Service: Orthopedics;  Laterality: Left;    gastric sleeve      JOINT REPLACEMENT Right 06/14/2016    knee    TUBAL LIGATION      UTERINE FIBROID EMBOLIZATION       Family History   Problem Relation Age of Onset    Hypertension Mother     Arthritis Mother     Diabetes Father     Heart disease Father     Depression Sister     Hypertension Sister     Arthritis Brother     Thyroid disease Son     Hypertension Son     Arthritis Maternal Grandmother     Heart disease Maternal Grandmother     Kidney disease Maternal Grandmother     Heart attack Maternal Grandfather     Stroke Paternal  Grandmother     No Known Problems Paternal Grandfather     Eczema Son      Social History     Socioeconomic History    Marital status:      Spouse name: Pop [place]    Number of children: 3    Years of education: Not on file    Highest education level: Not on file   Occupational History    Occupation: patient access   Social Needs    Financial resource strain: Not on file    Food insecurity     Worry: Not on file     Inability: Not on file    Transportation needs     Medical: Not on file     Non-medical: Not on file   Tobacco Use    Smoking status: Never Smoker    Smokeless tobacco: Never Used   Substance and Sexual Activity    Alcohol use: Yes     Comment: occasional  No alcohol 72h prior to sx    Drug use: No    Sexual activity: Yes     Partners: Male   Lifestyle    Physical activity     Days per week: Not on file     Minutes per session: Not on file    Stress: Rather much   Relationships    Social connections     Talks on phone: Not on file     Gets together: Not on file     Attends Jain service: Not on file     Active member of club or organization: Not on file     Attends meetings of clubs or organizations: Not on file     Relationship status: Not on file   Other Topics Concern    Not on file   Social History Narrative    Not on file     Review of patient's allergies indicates:   Allergen Reactions    Codeine Hives and Rash     Takes Tramadol and has never had an issue with SOB/swelling  Also tolerated hydromorphone 7/2020      Penicillins Hives     Pt tolerated cefazolin 7/2020     Review of Systems   Constitutional: Negative for chills, fatigue and fever.   HENT: Negative for mouth sores, rhinorrhea and sore throat.    Eyes: Negative for pain and redness.   Respiratory: Negative for cough and shortness of breath.    Cardiovascular: Negative for chest pain.   Gastrointestinal: Negative for abdominal pain, constipation, diarrhea, nausea and vomiting.   Genitourinary: Negative  "for dysuria and hematuria.   Musculoskeletal: Positive for arthralgias. Negative for joint swelling and myalgias.   Skin: Negative for rash.   Neurological: Negative for weakness, numbness and headaches.   Psychiatric/Behavioral: The patient is not nervous/anxious.          Objective:   Ht 5' 2" (1.575 m)   Wt 67.9 kg (149 lb 11.1 oz)   BMI 27.38 kg/m²   Immunization History   Administered Date(s) Administered    Influenza - Quadrivalent - PF *Preferred* (6 months and older) 12/02/2019    Influenza - Trivalent - PF (ADULT) 02/16/2017    MMR 10/10/2017, 11/14/2017    Tdap 10/10/2017              Physical Exam   Constitutional: She is oriented to person, place, and time and well-developed, well-nourished, and in no distress.   HENT:   Head: Normocephalic and atraumatic.   Eyes: Pupils are equal, round, and reactive to light. Right eye exhibits no discharge.   Neck: Normal range of motion.   Cardiovascular: Normal rate, regular rhythm and normal heart sounds.  Exam reveals no friction rub.    Pulmonary/Chest: Effort normal and breath sounds normal. No respiratory distress.   Abdominal: Soft. She exhibits no distension. There is no abdominal tenderness.   Lymphadenopathy:     She has no cervical adenopathy.   Neurological: She is alert and oriented to person, place, and time.   Skin: No rash noted. No erythema.     Psychiatric: Mood normal.   Musculoskeletal: Normal range of motion. No deformity or edema.      Comments: kristen wrists, mcps, pips no synvoitis, no tenderness, no warmth, good rom  kristen elbows shoulders no swelling or tenderness,full rom  R knee replaced   L knee OA enlargment  L hip replaced              No results found for this or any previous visit (from the past 336 hour(s)).     No results found for: TBGOLDPLUS   No results found for: HAV, HEPAIGM, HEPBIGM, HEPBCAB, HBEAG, HEPCAB     Assessment:       1. Osteoarthritis of multiple joints, unspecified osteoarthritis type    2. GREYSON (acute kidney " injury)    3. Chronic pain of left knee    4. Positive CRISELDA (antinuclear antibody)          Impression:   Weak +CRISELDA neg profile 1:80 no evidence of CTD    Generalized OA:  L knee, shoulders, stable on cymbalta 30mg/d--planning L TKA soon     GREYSON on nsaids: NO NSAID    S/p L hip replacement 7.31.20 w Dr. Xavi Gandara  S/p R knee replacement: doing well  Plan:     cymbalta 30mg/day cont can increase if needed in future  Gabapentin vs lyrica if needed in future, but not needed yet   rec voltaren gel for knee, other problem joints  Avoid nsaid  Ok with tramadol 2-3 x day prn as she can't use nsaid   Cont f/u with ortho     rtc in 6 mos or sooner if needed, I can send in her refills on cymbalta in future.

## 2020-09-11 ENCOUNTER — OFFICE VISIT (OUTPATIENT)
Dept: RHEUMATOLOGY | Facility: CLINIC | Age: 55
End: 2020-09-11
Payer: COMMERCIAL

## 2020-09-11 VITALS — HEIGHT: 62 IN | BODY MASS INDEX: 27.55 KG/M2 | WEIGHT: 149.69 LBS

## 2020-09-11 DIAGNOSIS — G89.29 CHRONIC PAIN OF LEFT KNEE: ICD-10-CM

## 2020-09-11 DIAGNOSIS — M25.562 CHRONIC PAIN OF LEFT KNEE: ICD-10-CM

## 2020-09-11 DIAGNOSIS — R76.8 POSITIVE ANA (ANTINUCLEAR ANTIBODY): ICD-10-CM

## 2020-09-11 DIAGNOSIS — M15.9 OSTEOARTHRITIS OF MULTIPLE JOINTS, UNSPECIFIED OSTEOARTHRITIS TYPE: Primary | ICD-10-CM

## 2020-09-11 DIAGNOSIS — N17.9 AKI (ACUTE KIDNEY INJURY): ICD-10-CM

## 2020-09-11 PROCEDURE — 3008F BODY MASS INDEX DOCD: CPT | Mod: CPTII,S$GLB,, | Performed by: PHYSICIAN ASSISTANT

## 2020-09-11 PROCEDURE — 3008F PR BODY MASS INDEX (BMI) DOCUMENTED: ICD-10-PCS | Mod: CPTII,S$GLB,, | Performed by: PHYSICIAN ASSISTANT

## 2020-09-11 PROCEDURE — 99999 PR PBB SHADOW E&M-EST. PATIENT-LVL III: ICD-10-PCS | Mod: PBBFAC,,, | Performed by: PHYSICIAN ASSISTANT

## 2020-09-11 PROCEDURE — 99214 PR OFFICE/OUTPT VISIT, EST, LEVL IV, 30-39 MIN: ICD-10-PCS | Mod: S$GLB,,, | Performed by: PHYSICIAN ASSISTANT

## 2020-09-11 PROCEDURE — 99214 OFFICE O/P EST MOD 30 MIN: CPT | Mod: S$GLB,,, | Performed by: PHYSICIAN ASSISTANT

## 2020-09-11 PROCEDURE — 99999 PR PBB SHADOW E&M-EST. PATIENT-LVL III: CPT | Mod: PBBFAC,,, | Performed by: PHYSICIAN ASSISTANT

## 2020-09-11 NOTE — PATIENT INSTRUCTIONS
Voltaren gel over the counter topical nsaid     Cont with cymbalta, consider increasing if we want to twice a day or just 60mg/day     Tramadol ok     Avoid aleve, ibuprofen, motrin, mobic

## 2020-09-18 ENCOUNTER — TELEPHONE (OUTPATIENT)
Dept: FAMILY MEDICINE | Facility: CLINIC | Age: 55
End: 2020-09-18

## 2020-09-18 NOTE — TELEPHONE ENCOUNTER
Outreach to pt for hypertension management. Spoke to pt if she checks her bp daily, she does. Pt says bp has been elevated lately and would like to schedule an appt with pcp. Pt is not available to see Dr. Mota with appt times given.

## 2020-09-29 ENCOUNTER — PATIENT MESSAGE (OUTPATIENT)
Dept: OTHER | Facility: OTHER | Age: 55
End: 2020-09-29

## 2020-10-05 ENCOUNTER — PATIENT MESSAGE (OUTPATIENT)
Dept: ADMINISTRATIVE | Facility: HOSPITAL | Age: 55
End: 2020-10-05

## 2020-10-15 ENCOUNTER — PATIENT OUTREACH (OUTPATIENT)
Dept: ADMINISTRATIVE | Facility: OTHER | Age: 55
End: 2020-10-15

## 2020-10-15 NOTE — PROGRESS NOTES
LINKS immunization registry updated  Care Everywhere updated  Health Maintenance updated  DIS/Chart reviewed for overdue Proactive [facility] ([facility]) health maintenance testing (CRS, Breast Ca, Diabetic Eye Exam)   Orders entered:N/A  Portal message sent to patient by PCP staff regarding overdue mammogram on 10/5/20

## 2020-10-29 ENCOUNTER — LAB VISIT (OUTPATIENT)
Dept: INTERNAL MEDICINE | Facility: CLINIC | Age: 55
End: 2020-10-29
Payer: COMMERCIAL

## 2020-10-29 ENCOUNTER — OFFICE VISIT (OUTPATIENT)
Dept: INTERNAL MEDICINE | Facility: CLINIC | Age: 55
End: 2020-10-29
Payer: COMMERCIAL

## 2020-10-29 VITALS
HEART RATE: 80 BPM | OXYGEN SATURATION: 98 % | TEMPERATURE: 97 F | BODY MASS INDEX: 28.39 KG/M2 | WEIGHT: 155.19 LBS | SYSTOLIC BLOOD PRESSURE: 140 MMHG | DIASTOLIC BLOOD PRESSURE: 90 MMHG

## 2020-10-29 DIAGNOSIS — R09.81 SINUS CONGESTION: Primary | ICD-10-CM

## 2020-10-29 DIAGNOSIS — R09.81 SINUS CONGESTION: ICD-10-CM

## 2020-10-29 DIAGNOSIS — H92.03 OTALGIA OF BOTH EARS: ICD-10-CM

## 2020-10-29 DIAGNOSIS — J30.1 SEASONAL ALLERGIC RHINITIS DUE TO POLLEN: ICD-10-CM

## 2020-10-29 DIAGNOSIS — I10 ESSENTIAL HYPERTENSION: ICD-10-CM

## 2020-10-29 PROCEDURE — 3080F DIAST BP >= 90 MM HG: CPT | Mod: CPTII,S$GLB,, | Performed by: FAMILY MEDICINE

## 2020-10-29 PROCEDURE — 3077F SYST BP >= 140 MM HG: CPT | Mod: CPTII,S$GLB,, | Performed by: FAMILY MEDICINE

## 2020-10-29 PROCEDURE — 99214 OFFICE O/P EST MOD 30 MIN: CPT | Mod: S$GLB,,, | Performed by: FAMILY MEDICINE

## 2020-10-29 PROCEDURE — 99214 PR OFFICE/OUTPT VISIT, EST, LEVL IV, 30-39 MIN: ICD-10-PCS | Mod: S$GLB,,, | Performed by: FAMILY MEDICINE

## 2020-10-29 PROCEDURE — 3008F PR BODY MASS INDEX (BMI) DOCUMENTED: ICD-10-PCS | Mod: CPTII,S$GLB,, | Performed by: FAMILY MEDICINE

## 2020-10-29 PROCEDURE — 3008F BODY MASS INDEX DOCD: CPT | Mod: CPTII,S$GLB,, | Performed by: FAMILY MEDICINE

## 2020-10-29 PROCEDURE — 99999 PR PBB SHADOW E&M-EST. PATIENT-LVL III: ICD-10-PCS | Mod: PBBFAC,,, | Performed by: FAMILY MEDICINE

## 2020-10-29 PROCEDURE — U0003 INFECTIOUS AGENT DETECTION BY NUCLEIC ACID (DNA OR RNA); SEVERE ACUTE RESPIRATORY SYNDROME CORONAVIRUS 2 (SARS-COV-2) (CORONAVIRUS DISEASE [COVID-19]), AMPLIFIED PROBE TECHNIQUE, MAKING USE OF HIGH THROUGHPUT TECHNOLOGIES AS DESCRIBED BY CMS-2020-01-R: HCPCS

## 2020-10-29 PROCEDURE — 3080F PR MOST RECENT DIASTOLIC BLOOD PRESSURE >= 90 MM HG: ICD-10-PCS | Mod: CPTII,S$GLB,, | Performed by: FAMILY MEDICINE

## 2020-10-29 PROCEDURE — 99999 PR PBB SHADOW E&M-EST. PATIENT-LVL III: CPT | Mod: PBBFAC,,, | Performed by: FAMILY MEDICINE

## 2020-10-29 PROCEDURE — 3077F PR MOST RECENT SYSTOLIC BLOOD PRESSURE >= 140 MM HG: ICD-10-PCS | Mod: CPTII,S$GLB,, | Performed by: FAMILY MEDICINE

## 2020-10-29 RX ORDER — FLUTICASONE PROPIONATE 50 MCG
1 SPRAY, SUSPENSION (ML) NASAL DAILY
Qty: 16 G | Refills: 0 | Status: SHIPPED | OUTPATIENT
Start: 2020-10-29 | End: 2020-12-13

## 2020-10-29 NOTE — PROGRESS NOTES
Subjective:       Patient ID: Modesta Camacho is a 55 y.o. female.    Chief Complaint: Otalgia (bilateral) and Sinus Problem    55-year-old  female patient new to my clinic with Patient Active Problem List:     Essential hypertension     Anxiety     Avascular necrosis of hip, left  Here reports that she has been having sinus congestion, postnasal drip and bilateral earache with fluid draining in the ears for the past few days, denies any fever with chills nausea vomiting, cough loss of taste or smell.  Has been taking her blood pressure medication regularly and reports that she has been followed by PCP.  Denies any chest pain or difficulty breathing or palpitations  Was sick few days ago and would like to get checked for COVID  Started taking Benadryl over-the-counter with some relief    Review of Systems   Constitutional: Negative for chills, fatigue and fever.   HENT: Positive for congestion, ear discharge, ear pain and sinus pressure. Negative for sneezing and sore throat.    Eyes: Negative for visual disturbance.   Respiratory: Negative for cough and shortness of breath.    Cardiovascular: Negative for chest pain and leg swelling.   Gastrointestinal: Negative for abdominal pain, nausea and vomiting.   Musculoskeletal: Negative for myalgias.   Skin: Negative for rash.   Neurological: Positive for headaches. Negative for light-headedness.   Psychiatric/Behavioral: Negative for sleep disturbance.         BP (!) 140/90 (BP Location: Right arm, Patient Position: Sitting, BP Method: Medium (Manual))   Pulse 80   Temp 97.3 °F (36.3 °C) (Temporal)   Wt 70.4 kg (155 lb 3.3 oz)   SpO2 98%   BMI 28.39 kg/m²   Objective:      Physical Exam  Constitutional:       Appearance: She is well-developed.   HENT:      Head: Normocephalic and atraumatic.      Right Ear: Tympanic membrane normal. There is no impacted cerumen.      Left Ear: Tympanic membrane normal. There is no impacted cerumen.      Nose:  Congestion present.      Mouth/Throat:      Mouth: Mucous membranes are moist.      Pharynx: No oropharyngeal exudate or posterior oropharyngeal erythema.   Cardiovascular:      Rate and Rhythm: Normal rate and regular rhythm.      Heart sounds: Normal heart sounds. No murmur.   Pulmonary:      Effort: Pulmonary effort is normal.      Breath sounds: Normal breath sounds. No wheezing.   Abdominal:      General: Bowel sounds are normal.      Palpations: Abdomen is soft.      Tenderness: There is no abdominal tenderness.   Lymphadenopathy:      Cervical: No cervical adenopathy.   Skin:     General: Skin is warm and dry.      Findings: No rash.   Neurological:      Mental Status: She is alert and oriented to person, place, and time.   Psychiatric:         Mood and Affect: Mood normal.           Assessment/Plan:   1. Sinus congestion  - fluticasone propionate (FLONASE) 50 mcg/actuation nasal spray; 1 spray (50 mcg total) by Each Nostril route once daily.  Dispense: 16 g; Refill: 0  - COVID-19 Routine Screening; Future  2. Seasonal allergic rhinitis due to pollen  3. Otalgia of both ears    Could be secondary to sinus congestion/allergy rhinitis  Flonase prescribed today for symptomatic relief and advised to take over-the-counter Zyrtec/Claritin and drink adequate fluids  Avoid decongestants secondary to elevated blood pressure  COVID testing will be done today    4. Essential hypertension  Blood pressure mildly elevated currently taking metoprolol 50 mg in the evening and Micardis 80 mg in the morning  Encouraged to monitor blood pressure trends and restrict salt intake and follow-up with PCP

## 2020-10-30 LAB — SARS-COV-2 RNA RESP QL NAA+PROBE: NOT DETECTED

## 2020-11-01 ENCOUNTER — PATIENT MESSAGE (OUTPATIENT)
Dept: ADMINISTRATIVE | Facility: HOSPITAL | Age: 55
End: 2020-11-01

## 2020-11-13 ENCOUNTER — PATIENT OUTREACH (OUTPATIENT)
Dept: ADMINISTRATIVE | Facility: HOSPITAL | Age: 55
End: 2020-11-13

## 2020-11-16 DIAGNOSIS — I10 ESSENTIAL HYPERTENSION: ICD-10-CM

## 2020-11-16 NOTE — TELEPHONE ENCOUNTER
No new care gaps identified.  Powered by [employer]. Reference number: [id number]. 11/16/2020 10:31:12 AM   CST

## 2020-11-17 ENCOUNTER — LAB VISIT (OUTPATIENT)
Dept: LAB | Facility: HOSPITAL | Age: 55
End: 2020-11-17
Attending: INTERNAL MEDICINE
Payer: COMMERCIAL

## 2020-11-17 DIAGNOSIS — N18.30 CHRONIC KIDNEY DISEASE, STAGE 3: ICD-10-CM

## 2020-11-17 LAB
BILIRUB UR QL STRIP: NEGATIVE
CLARITY UR REFRACT.AUTO: ABNORMAL
COLOR UR AUTO: YELLOW
GLUCOSE UR QL STRIP: NEGATIVE
HGB UR QL STRIP: NEGATIVE
KETONES UR QL STRIP: NEGATIVE
LEUKOCYTE ESTERASE UR QL STRIP: NEGATIVE
MICROSCOPIC COMMENT: NORMAL
NITRITE UR QL STRIP: NEGATIVE
PH UR STRIP: 5 [PH] (ref 5–8)
PROT UR QL STRIP: NEGATIVE
SP GR UR STRIP: 1.02 (ref 1–1.03)
URN SPEC COLLECT METH UR: ABNORMAL

## 2020-11-17 PROCEDURE — 81001 URINALYSIS AUTO W/SCOPE: CPT

## 2020-11-17 PROCEDURE — 84156 ASSAY OF PROTEIN URINE: CPT

## 2020-11-17 RX ORDER — TELMISARTAN 80 MG/1
TABLET ORAL
Qty: 90 TABLET | Refills: 0 | Status: SHIPPED | OUTPATIENT
Start: 2020-11-17 | End: 2021-01-07 | Stop reason: SDUPTHER

## 2020-11-17 NOTE — PATIENT INSTRUCTIONS
Eating Heart-Healthy Food: Using the DASH Plan    Eating for your heart doesnt have to be hard or boring. You just need to know how to make healthier choices. The DASH eating plan has been developed to help you do just that. DASH stands for Dietary Approaches to Stop Hypertension. It is a plan that has been proven to be healthier for your heart and to lower your risk for high blood pressure. It can also help lower your risk for cancer, heart disease, osteoporosis, and diabetes.  Choosing from each food group  Choose foods from each of the food groups below each day. Try to get the recommended number of servings for each food group. The serving numbers are based on a diet of 2,000 calories a day. Talk to your doctor if youre unsure about your calorie needs. Along with getting the correct servings, the DASH plan also recommends a sodium intake less than 2,300 mg per day.        Grains  Servings: 6 to 8 a day  A serving is:  · 1 slice bread  · 1 ounce dry cereal  · Half a cup cooked rice, pasta or cereal  Best choices: Whole grains and any grains high in fiber. Vegetables  Servings: 4 to 5 a day  A serving is:  · 1 cup raw leafy vegetable  · Half a cup cut-up raw or cooked vegetable  · Half a cup vegetable juice  Best choices: Fresh or frozen vegetables prepared without added salt or fat.   Fruits  Servings: 4 to 5 a day  A serving is:  · 1 medium fruit  · One-quarter cup dried fruit  · Half a cup fresh, frozen, or canned fruit  · Half a cup of 100% fruit juices  Best choices: A variety of fresh fruits of different colors. Whole fruits are a better choice than fruit juices. Low-fat or fat-free dairy  Servings: 2 to 3 a day  A serving is:  · 1 cup milk  · 1 cup yogurt  · One and a half ounces cheese  Best choices: Skim or 1% milk, low-fat or fat-free yogurt or buttermilk, and low-fat cheeses.         Lean meats, poultry, fish  Servings: 6 or fewer a day  A serving is:  · 1 ounce cooked meats, poultry, or fish  · 1  egg  Best choices: Lean poultry and fish. Trim away visible fat. Broil, grill, roast, or boil instead of frying. Remove skin from poultry before eating. Limit how much red meat you eat.  Nuts, seeds, beans  Servings: 4 to 5 a week  A serving is:  · One-third cup nuts (one and a half ounces)  · 2 tablespoons nut butter or seeds  · Half a cup cooked dry beans or legumes  Best choices: Dry roasted nuts with no salt added, lentils, kidney beans, garbanzo beans, and whole brody beans.   Fats and oils  Servings: 2 to 3 a day  A serving is:  · 1 teaspoon vegetable oil  · 1 teaspoon soft margarine  · 1 tablespoon mayonnaise  · 2 tablespoons salad dressing  Best choices: Nut and vegetable oils (nontropical vegetable oils), such as olive and canola oil. Sweets  Servings: 5 a week or fewer  A serving is:  · 1 tablespoon sugar, maple syrup, or honey  · 1 tablespoon jam or jelly  · 1 half-ounce jelly beans (about 15)  · 1 cup lemonade  Best choices: Dried fruit can be a satisfying sweet. Choose low-fat sweets. And watch your serving sizes!      For more on the DASH eating plan, visit:  www.nhlbi.nih.gov/health/health-topics/topics/dash   Date Last Reviewed: 6/1/2016  © 3838-5437 [employer]. [address] [place] [address], [place] [address]. All rights reserved. This information is not intended as a substitute for professional medical care. Always follow your healthcare professional's instructions.         Methotrexate Pregnancy And Lactation Text: This medication is Pregnancy Category X and is known to cause fetal harm. This medication is excreted in breast milk.

## 2020-11-17 NOTE — PROGRESS NOTES
Refill Routing Note   Medication(s) are not appropriate for processing by [facility] for the following reason(s):     - Required vitals are abnormal    [facility] actions taken in this encounter: Defer       Medication Therapy Plan: bp elevated at lov; please advise; defer   Medication reconciliation completed: No   Automatic Epic Generated Protocol Data:        Requested Prescriptions   Pending Prescriptions Disp Refills    telmisartan (MICARDIS) 80 MG Tab [Pharmacy Med Name: TELMISARTAN 80 MG TABLET] 90 tablet 0     Sig: TAKE 1 TABLET BY MOUTH EVERY DAY       Cardiovascular:  Angiotensin Receptor Blockers Failed - 11/16/2020 10:32 PM        Failed - Last BP in normal range within 360 days.     BP Readings from Last 3 Encounters:   10/29/20 (!) 140/90   08/01/20 135/70   07/22/20 (!) 144/80              Passed - Patient is at least 18 years old        Passed - Office visit in past 12 months or future 90 days     Recent Outpatient Visits            2 weeks ago Sinus congestion    [facility] Internal Medicine Zoie Whitaker MD    2 months ago Osteoarthritis of multiple joints, unspecified osteoarthritis type    [facility] Rheumatology Erika Earl PA-C    3 months ago Pain of left hip joint    [facility] Mary Mota MD    3 months ago Osteoarthritis of multiple joints, unspecified osteoarthritis type    [facility] Rheumatology Kylie Armenta DO    5 months ago CKD (chronic kidney disease) stage 3, GFR 30-59 ml/min    [facility] - Nephrology Blaine Bustos MD          Future Appointments              Tomorrow SPECIMEN, [facility]    Tomorrow LABORATORY, [facility]    In 1 week MD [facility] [place]    In 1 week [facility] [place]    In 4 weeks MD [facility] - Nephrology, [place]    In 3 months [facility] -  Rheumatology, [facility]                Passed - Cr is 1.4 or below and within 360 days     Creatinine   Date Value Ref Range Status   08/01/2020 0.9 0.5 - 1.4 mg/dL Final   05/29/2020 1.1 0.5 - 1.4 mg/dL Final   03/17/2020 1.5 (H) 0.5 - 1.4 mg/dL Final              Passed - K in normal range and within 360 days     Potassium   Date Value Ref Range Status   08/01/2020 4.4 3.5 - 5.1 mmol/L Final   05/29/2020 4.4 3.5 - 5.1 mmol/L Final   03/17/2020 4.2 3.5 - 5.1 mmol/L Final              Passed - eGFR within 360 days     eGFR if non    Date Value Ref Range Status   08/01/2020 >60 >60 mL/min/1.73 m^2 Final     Comment:     Calculation used to obtain the estimated glomerular filtration  rate (eGFR) is the CKD-EPI equation.      05/29/2020 57.1 (A) >60 mL/min/1.73 m^2 Final     Comment:     Calculation used to obtain the estimated glomerular filtration  rate (eGFR) is the CKD-EPI equation.      03/17/2020 39.2 (A) >60 mL/min/1.73 m^2 Final     Comment:     Calculation used to obtain the estimated glomerular filtration  rate (eGFR) is the CKD-EPI equation.        eGFR if    Date Value Ref Range Status   08/01/2020 >60 >60 mL/min/1.73 m^2 Final   05/29/2020 >60.0 >60 mL/min/1.73 m^2 Final   03/17/2020 45.2 (A) >60 mL/min/1.73 m^2 Final                    Appointments  past 12m or future 3m with PCP    Date Provider   Last Visit   7/22/2020 Mary Mota MD   Next Visit   Visit date not found Mary Mota MD   ED visits in past 90 days: 0        Note composed:10:33 PM 11/16/2020

## 2020-11-18 ENCOUNTER — PATIENT MESSAGE (OUTPATIENT)
Dept: NEPHROLOGY | Facility: CLINIC | Age: 55
End: 2020-11-18

## 2020-11-18 LAB
CREAT UR-MCNC: 150 MG/DL (ref 15–325)
PROT UR-MCNC: 15 MG/DL (ref 0–15)
PROT/CREAT UR: 0.1 MG/G{CREAT} (ref 0–0.2)

## 2020-11-19 RX ORDER — ERGOCALCIFEROL 1.25 MG/1
50000 CAPSULE ORAL
Qty: 8 CAPSULE | Refills: 0 | Status: SHIPPED | OUTPATIENT
Start: 2020-11-19 | End: 2021-02-04

## 2020-11-21 ENCOUNTER — PATIENT MESSAGE (OUTPATIENT)
Dept: FAMILY MEDICINE | Facility: CLINIC | Age: 55
End: 2020-11-21

## 2020-11-23 ENCOUNTER — PATIENT MESSAGE (OUTPATIENT)
Dept: NEPHROLOGY | Facility: CLINIC | Age: 55
End: 2020-11-23

## 2020-11-24 ENCOUNTER — PATIENT OUTREACH (OUTPATIENT)
Dept: ADMINISTRATIVE | Facility: OTHER | Age: 55
End: 2020-11-24

## 2020-11-24 ENCOUNTER — TELEPHONE (OUTPATIENT)
Dept: FAMILY MEDICINE | Facility: CLINIC | Age: 55
End: 2020-11-24

## 2020-11-24 NOTE — TELEPHONE ENCOUNTER
----- Message from Adia Westfall sent at 11/24/2020  7:47 AM CST -----  Type: Needs Medical Advice  Who Called:  Modesta  Symptoms (please be specific):  High blood pressure, anxiety and headaches  How long has patient had these symptoms:  2 weeks  Pharmacy name and phone #:    [facility]/pharmacy #[id number] - [place], [place] - [address] [address]  [address] [address] [address]  Phone: [phone] Fax: [phone]      Best Call Back Number:   Additional Information: patient said she sent 2 [facility] messages regarding her blood pressure and has not had a response.  thank you!     Type:  RX Refill Request    Who Called:  Modesta  Refill or New Rx:  refill  RX Name and Strength:  ALPRAZolam (XANAX) 0.5 MG tablet  How is the patient currently taking it? (ex. 1XDay):  one nightly as needed  Is this a 30 day or 90 day RX:  30  Preferred Pharmacy with phone number:    [facility]/pharmacy #[id number] - [place], [place] - [address] [address]  [address] [address] [address]  Phone: [phone] Fax: [phone]      Local or Mail Order:  local  Ordering Provider:  Dr Mota    Additional Information:  She said she is very stressed and not sleeping well. Thank you!

## 2020-11-24 NOTE — TELEPHONE ENCOUNTER
----- Message from Adia Westfall sent at 11/24/2020  7:47 AM CST -----  Type: Needs Medical Advice  Who Called:  Modesta  Symptoms (please be specific):  High blood pressure, anxiety and headaches  How long has patient had these symptoms:  2 weeks  Pharmacy name and phone #:    [facility]/pharmacy #[id number] - [place], [place] - [address] [address]  [address] [address] [address]  Phone: [phone] Fax: [phone]      Best Call Back Number:   Additional Information: patient said she sent 2 [facility] messages regarding her blood pressure and has not had a response.  thank you!     Type:  RX Refill Request    Who Called:  Modesta  Refill or New Rx:  refill  RX Name and Strength:  ALPRAZolam (XANAX) 0.5 MG tablet  How is the patient currently taking it? (ex. 1XDay):  one nightly as needed  Is this a 30 day or 90 day RX:  30  Preferred Pharmacy with phone number:    [facility]/pharmacy #[id number] - [place], [place] - [address] [address]  [address] [address] [address]  Phone: [phone] Fax: [phone]      Local or Mail Order:  local  Ordering Provider:  Dr Mota    Additional Information:  She said she is very stressed and not sleeping well. Thank you!

## 2020-11-24 NOTE — PROGRESS NOTES
Health Maintenance Due   Topic Date Due    HIV Screening  10/25/1980    Cervical Cancer Screening  10/25/1986    Shingles Vaccine (1 of 2) 10/25/2015     Updates were requested from care everywhere.  Chart was reviewed for overdue [facility] ([facility]) topics (CRS, Breast Cancer Screening, Eye exam)  Health Maintenance has been updated.  LINKS immunization registry triggered.  LINKS not responding.

## 2020-11-24 NOTE — TELEPHONE ENCOUNTER
Called patient, she stated she just needed her medication refilled. Last office visit 7/22/20. Sent my chart message of Dr. Mandel next available appointment.

## 2020-11-24 NOTE — TELEPHONE ENCOUNTER
Called patient, she stated he needs refill for medications. Sent My chart message to schedule appointment.Last office visit 07/22/20.

## 2020-11-24 NOTE — TELEPHONE ENCOUNTER
----- Message from Adia Westfall sent at 11/24/2020  7:47 AM CST -----  Type: Needs Medical Advice  Who Called:  Modesta  Symptoms (please be specific):  High blood pressure, anxiety and headaches  How long has patient had these symptoms:  2 weeks  Pharmacy name and phone #:    [facility]/pharmacy #[id number] - [place] - [address] [address]  [address] [address] [address]  Phone: [phone] Fax: [phone]      Best Call Back Number:   Additional Information: patient said she sent 2 [facility] messages regarding her blood pressure and has not had a response.  thank you!     Type:  RX Refill Request    Who Called:  Modesta  Refill or New Rx:  refill  RX Name and Strength:  ALPRAZolam (XANAX) 0.5 MG tablet  How is the patient currently taking it? (ex. 1XDay):  one nightly as needed  Is this a 30 day or 90 day RX:  30  Preferred Pharmacy with phone number:    [facility]/pharmacy #[id number] - [place], [place] - [address] [address]  [address] [address] [address]  Phone: [phone] Fax: [phone]      Local or Mail Order:  local  Ordering Provider:  Dr Mota    Additional Information:  She said she is very stressed and not sleeping well. Thank you!

## 2020-11-27 ENCOUNTER — OFFICE VISIT (OUTPATIENT)
Dept: OBSTETRICS AND GYNECOLOGY | Facility: CLINIC | Age: 55
End: 2020-11-27
Payer: COMMERCIAL

## 2020-11-27 ENCOUNTER — HOSPITAL ENCOUNTER (OUTPATIENT)
Dept: RADIOLOGY | Facility: HOSPITAL | Age: 55
Discharge: HOME | End: 2020-11-27
Attending: FAMILY MEDICINE
Payer: COMMERCIAL

## 2020-11-27 VITALS
WEIGHT: 157.44 LBS | DIASTOLIC BLOOD PRESSURE: 76 MMHG | SYSTOLIC BLOOD PRESSURE: 122 MMHG | BODY MASS INDEX: 28.79 KG/M2

## 2020-11-27 DIAGNOSIS — Z12.31 SCREENING MAMMOGRAM, ENCOUNTER FOR: ICD-10-CM

## 2020-11-27 DIAGNOSIS — Z12.4 PAP SMEAR FOR CERVICAL CANCER SCREENING: Primary | ICD-10-CM

## 2020-11-27 DIAGNOSIS — N95.2 VAGINAL ATROPHY: ICD-10-CM

## 2020-11-27 PROCEDURE — 3078F PR MOST RECENT DIASTOLIC BLOOD PRESSURE < 80 MM HG: ICD-10-PCS | Mod: CPTII,S$GLB,, | Performed by: OBSTETRICS & GYNECOLOGY

## 2020-11-27 PROCEDURE — 88175 CYTOPATH C/V AUTO FLUID REDO: CPT

## 2020-11-27 PROCEDURE — 1126F PR PAIN SEVERITY QUANTIFIED, NO PAIN PRESENT: ICD-10-PCS | Mod: S$GLB,,, | Performed by: OBSTETRICS & GYNECOLOGY

## 2020-11-27 PROCEDURE — 3078F DIAST BP <80 MM HG: CPT | Mod: CPTII,S$GLB,, | Performed by: OBSTETRICS & GYNECOLOGY

## 2020-11-27 PROCEDURE — 99386 PREV VISIT NEW AGE 40-64: CPT | Mod: S$GLB,,, | Performed by: OBSTETRICS & GYNECOLOGY

## 2020-11-27 PROCEDURE — 3074F PR MOST RECENT SYSTOLIC BLOOD PRESSURE < 130 MM HG: ICD-10-PCS | Mod: CPTII,S$GLB,, | Performed by: OBSTETRICS & GYNECOLOGY

## 2020-11-27 PROCEDURE — 77063 MAMMO DIGITAL SCREENING BILAT WITH TOMO: ICD-10-PCS | Mod: 26,,, | Performed by: RADIOLOGY

## 2020-11-27 PROCEDURE — 77067 SCR MAMMO BI INCL CAD: CPT | Mod: TC,PN

## 2020-11-27 PROCEDURE — 77067 MAMMO DIGITAL SCREENING BILAT WITH TOMO: ICD-10-PCS | Mod: 26,,, | Performed by: RADIOLOGY

## 2020-11-27 PROCEDURE — 1126F AMNT PAIN NOTED NONE PRSNT: CPT | Mod: S$GLB,,, | Performed by: OBSTETRICS & GYNECOLOGY

## 2020-11-27 PROCEDURE — 77067 SCR MAMMO BI INCL CAD: CPT | Mod: 26,,, | Performed by: RADIOLOGY

## 2020-11-27 PROCEDURE — 77063 BREAST TOMOSYNTHESIS BI: CPT | Mod: 26,,, | Performed by: RADIOLOGY

## 2020-11-27 PROCEDURE — 3074F SYST BP LT 130 MM HG: CPT | Mod: CPTII,S$GLB,, | Performed by: OBSTETRICS & GYNECOLOGY

## 2020-11-27 PROCEDURE — 3008F BODY MASS INDEX DOCD: CPT | Mod: CPTII,S$GLB,, | Performed by: OBSTETRICS & GYNECOLOGY

## 2020-11-27 PROCEDURE — 3008F PR BODY MASS INDEX (BMI) DOCUMENTED: ICD-10-PCS | Mod: CPTII,S$GLB,, | Performed by: OBSTETRICS & GYNECOLOGY

## 2020-11-27 PROCEDURE — 99999 PR PBB SHADOW E&M-EST. PATIENT-LVL III: CPT | Mod: PBBFAC,,, | Performed by: OBSTETRICS & GYNECOLOGY

## 2020-11-27 PROCEDURE — 99386 PR PREVENTIVE VISIT,NEW,40-64: ICD-10-PCS | Mod: S$GLB,,, | Performed by: OBSTETRICS & GYNECOLOGY

## 2020-11-27 PROCEDURE — 99999 PR PBB SHADOW E&M-EST. PATIENT-LVL III: ICD-10-PCS | Mod: PBBFAC,,, | Performed by: OBSTETRICS & GYNECOLOGY

## 2020-11-27 RX ORDER — ESTRADIOL 0.1 MG/G
2 CREAM VAGINAL
Qty: 42.5 G | Refills: 5 | Status: SHIPPED | OUTPATIENT
Start: 2020-11-30 | End: 2021-02-04

## 2020-11-27 NOTE — PROGRESS NOTES
Chief Complaint   Patient presents with    New GYN       History and Physical:  No LMP recorded. Patient is postmenopausal.       Modesta Camacho is a 55 y.o. -American Female who presents today for her routine annual GYN exam. The patient has no Gynecology complaints today. Long h.o vaginal dryness, worse since menopause.       Allergies:   Review of patient's allergies indicates:   Allergen Reactions    Codeine Hives and Rash     Takes Tramadol and has never had an issue with SOB/swelling  Also tolerated hydromorphone 7/2020      Penicillins Hives     Pt tolerated cefazolin 7/2020       Past Medical History:   Diagnosis Date    Anxiety     Hypertension     Osteoarthritis        Past Surgical History:   Procedure Laterality Date    ARTHROPLASTY OF HIP BY ANTERIOR APPROACH Left 7/31/2020    Procedure: ARTHROPLASTY, HIP, ANTERIOR APPROACH;  Surgeon: Xavi Gandara MD;  Location: [facility];  Service: Orthopedics;  Laterality: Left;    gastric sleeve      JOINT REPLACEMENT Right 06/14/2016    knee    TUBAL LIGATION      UTERINE FIBROID EMBOLIZATION         MEDS:   Current Outpatient Medications on File Prior to Visit   Medication Sig Dispense Refill    ALPRAZolam (XANAX) 0.5 MG tablet Take 1 tablet (0.5 mg total) by mouth nightly as needed. 30 tablet 0    DULoxetine (CYMBALTA) 30 MG capsule Take 1 capsule (30 mg total) by mouth once daily. 30 capsule 5    ergocalciferol (ERGOCALCIFEROL) 50,000 unit Cap Take 1 capsule (50,000 Units total) by mouth every 7 days. 8 capsule 0    fluticasone propionate (FLONASE) 50 mcg/actuation nasal spray 1 spray (50 mcg total) by Each Nostril route once daily. 16 g 0    metoprolol succinate (TOPROL-XL) 50 MG 24 hr tablet Take 50 mg by mouth nightly.       telmisartan (MICARDIS) 80 MG Tab TAKE 1 TABLET BY MOUTH EVERY DAY 90 tablet 0    ondansetron (ZOFRAN-ODT) 4 MG TbDL Take 1 tablet (4 mg total) by mouth every 8 (eight) hours as needed (nausea). (Patient not  taking: Reported on 10/29/2020) 20 tablet 0    traMADoL (ULTRAM) 50 mg tablet Take 1 tablet (50 mg total) by mouth every 4 (four) hours as needed for Pain. (Patient not taking: Reported on 2020) 40 tablet 0    [DISCONTINUED] amLODIPine (NORVASC) 10 MG tablet Take 10 mg by mouth.       No current facility-administered medications on file prior to visit.        OB History        3    Para        Term                AB        Living   3       SAB        TAB        Ectopic        Multiple        Live Births                     Social History     Socioeconomic History    Marital status:      Spouse name: Pop Land    Number of children: 3    Years of education: Not on file    Highest education level: Not on file   Occupational History    Occupation: patient access   Social Needs    Financial resource strain: Not on file    Food insecurity     Worry: Not on file     Inability: Not on file    Transportation needs     Medical: Not on file     Non-medical: Not on file   Tobacco Use    Smoking status: Never Smoker    Smokeless tobacco: Never Used   Substance and Sexual Activity    Alcohol use: Yes     Comment: occasional  No alcohol 72h prior to sx    Drug use: No    Sexual activity: Yes     Partners: Male     Birth control/protection: See Surgical Hx   Lifestyle    Physical activity     Days per week: Not on file     Minutes per session: Not on file    Stress: Rather much   Relationships    Social connections     Talks on phone: Not on file     Gets together: Not on file     Attends Taoism service: Not on file     Active member of club or organization: Not on file     Attends meetings of clubs or organizations: Not on file     Relationship status: Not on file   Other Topics Concern    Not on file   Social History Narrative    Not on file       Family History   Problem Relation Age of Onset    Hypertension Mother     Arthritis Mother     Diabetes Father     Heart disease  Father     Depression Sister     Hypertension Sister     Arthritis Brother     Thyroid disease Son     Hypertension Son     Arthritis Maternal Grandmother     Heart disease Maternal Grandmother     Kidney disease Maternal Grandmother     Heart attack Maternal Grandfather     Stroke Paternal Grandmother     No Known Problems Paternal Grandfather     Eczema Son          Past medical and surgical history reviewed.   I have reviewed the patient's medical history in detail and updated the computerized patient record.        Review of System:   General: no chills, fever, night sweats, weight gain or weight loss  Psychological: no depression or suicidal ideation  Breasts: no new or changing breast lumps, nipple discharge or masses.  Respiratory: no cough, shortness of breath, or wheezing  Cardiovascular: no chest pain or dyspnea on exertion  Gastrointestinal: no abdominal pain, change in bowel habits, or black or bloody stools  Genito-Urinary: no incontinence, urinary frequency/urgency or vulvar/vaginal symptoms, pelvic pain or abnormal vaginal bleeding.  Musculoskeletal: no gait disturbance or muscular weakness      Physical Exam:   /76   Wt 71.4 kg (157 lb 6.5 oz)   BMI 28.79 kg/m²   Constitutional: She appears alert and responsive. She appears well-developed, well-groomed, and well-nourished. No distress. OverWeight   HENT:   Head: Normocephalic and atraumatic.   Eyes: Conjunctivae and EOM are normal. No scleral icterus.   Neck: Symmetrical. Normal range of motion. Neck supple. No tracheal deviation present. THYROID: without masses or tenderness.  Cardiovascular: Normal rate, no rhythm abnormality noted. Extremities without swelling or edema, warm.    Pulmonary/Chest: Normal respiratory Effort. No distress or retractions. She exhibits no tenderness.  Breasts: are symmetrical.   Right breast exhibits no inverted nipple, no mass, no nipple discharge, no skin change and no tenderness.   Left breast  exhibits no inverted nipple, no mass, no nipple discharge, no skin change and no tenderness.  Abdominal: Soft. She exhibits no distension, hernias or masses. There is no tenderness. No enlargement of liver edge or spleen.  There is no rebound and no guarding.   Genitourinary:    External rectal exam shows no thrombosed external hemorrhoids, no lesions.     Pelvic exam was performed with patient supine.   No labial fusion, and symmetrical.    There is no rash, lesion or injury on the right labia.   There is no rash, lesion or injury on the left labia.   No bleeding and no signs of injury around the vaginal introitus, urethral meatus is normal size and without prolapse or lesions, urethra well supported. The cervix is visualized with no discharge, lesions or friability.   No vaginal discharge found.   No significant Cystocele, Enterocele or rectocele, and cervix and uterus well supported.   Bimanual exam:   The urethra is normal to palpation and there are no palpable vaginal wall masses.   Uterus is not deviated, not enlarged, not fixed, normal shape and not tender.   Cervix exhibits no motion tenderness.    Right adnexum displays no mass or nodularity and no tenderness.   Left adnexum displays no mass or nodularity and no tenderness.  Musculoskeletal: Normal range of motion.   Lymphadenopathy: No inguinal adenopathy present.   Neurological: She is alert and oriented to person, place, and time. Coordination normal.   Skin: Skin is warm and dry. She is not diaphoretic. No rashes, lesions or ulcers.   Psychiatric: She has a normal mood and affect, oriented to person, place, and time.      Assessment:   Normal annual GYN exam  1. Pap smear for cervical cancer screening  Liquid-Based Pap Smear, Screening   vaginal dryness    Plan:   PAP  Estrace / hyaloGYN as needed for dryness- counseled   Mammogram  Follow up in 1 year.  Patient informed will be contacted with results within 2 weeks. Encouraged to please call back or  email if she has not heard from us by then.

## 2020-12-04 LAB
FINAL PATHOLOGIC DIAGNOSIS: NORMAL
Lab: NORMAL

## 2020-12-11 ENCOUNTER — PATIENT MESSAGE (OUTPATIENT)
Dept: OTHER | Facility: OTHER | Age: 55
End: 2020-12-11

## 2020-12-22 ENCOUNTER — PATIENT OUTREACH (OUTPATIENT)
Dept: ADMINISTRATIVE | Facility: HOSPITAL | Age: 55
End: 2020-12-22

## 2020-12-22 NOTE — PROGRESS NOTES
Care Everywhere updates requested and reviewed.  Immunizations reconciled. Media reports reviewed.  Duplicate HM overrides and  orders removed.  Overdue HM topic chart audit and/or requested.  Overdue lab testing linked to upcoming lab appointments if applies.    Links down 2020

## 2021-01-02 ENCOUNTER — PATIENT MESSAGE (OUTPATIENT)
Dept: FAMILY MEDICINE | Facility: CLINIC | Age: 56
End: 2021-01-02

## 2021-01-07 ENCOUNTER — PATIENT MESSAGE (OUTPATIENT)
Dept: ADMINISTRATIVE | Facility: OTHER | Age: 56
End: 2021-01-07

## 2021-01-07 ENCOUNTER — IMMUNIZATION (OUTPATIENT)
Dept: INTERNAL MEDICINE | Facility: CLINIC | Age: 56
End: 2021-01-07
Payer: COMMERCIAL

## 2021-01-07 ENCOUNTER — OFFICE VISIT (OUTPATIENT)
Dept: FAMILY MEDICINE | Facility: CLINIC | Age: 56
End: 2021-01-07
Payer: COMMERCIAL

## 2021-01-07 VITALS
HEART RATE: 77 BPM | OXYGEN SATURATION: 100 % | SYSTOLIC BLOOD PRESSURE: 136 MMHG | WEIGHT: 163.56 LBS | DIASTOLIC BLOOD PRESSURE: 82 MMHG | BODY MASS INDEX: 30.1 KG/M2 | HEIGHT: 62 IN

## 2021-01-07 DIAGNOSIS — Z23 NEED FOR VACCINATION: ICD-10-CM

## 2021-01-07 DIAGNOSIS — G47.09 OTHER INSOMNIA: ICD-10-CM

## 2021-01-07 DIAGNOSIS — F33.1 MODERATE EPISODE OF RECURRENT MAJOR DEPRESSIVE DISORDER: ICD-10-CM

## 2021-01-07 DIAGNOSIS — I10 ESSENTIAL HYPERTENSION: Primary | ICD-10-CM

## 2021-01-07 DIAGNOSIS — F41.9 ANXIETY: ICD-10-CM

## 2021-01-07 DIAGNOSIS — M15.9 OSTEOARTHRITIS OF MULTIPLE JOINTS, UNSPECIFIED OSTEOARTHRITIS TYPE: ICD-10-CM

## 2021-01-07 PROCEDURE — 3079F DIAST BP 80-89 MM HG: CPT | Mod: CPTII,S$GLB,, | Performed by: FAMILY MEDICINE

## 2021-01-07 PROCEDURE — 99999 PR PBB SHADOW E&M-EST. PATIENT-LVL III: CPT | Mod: PBBFAC,,, | Performed by: FAMILY MEDICINE

## 2021-01-07 PROCEDURE — 3075F PR MOST RECENT SYSTOLIC BLOOD PRESS GE 130-139MM HG: ICD-10-PCS | Mod: CPTII,S$GLB,, | Performed by: FAMILY MEDICINE

## 2021-01-07 PROCEDURE — 99214 PR OFFICE/OUTPT VISIT, EST, LEVL IV, 30-39 MIN: ICD-10-PCS | Mod: S$GLB,,, | Performed by: FAMILY MEDICINE

## 2021-01-07 PROCEDURE — 1125F AMNT PAIN NOTED PAIN PRSNT: CPT | Mod: S$GLB,,, | Performed by: FAMILY MEDICINE

## 2021-01-07 PROCEDURE — 3079F PR MOST RECENT DIASTOLIC BLOOD PRESSURE 80-89 MM HG: ICD-10-PCS | Mod: CPTII,S$GLB,, | Performed by: FAMILY MEDICINE

## 2021-01-07 PROCEDURE — 1125F PR PAIN SEVERITY QUANTIFIED, PAIN PRESENT: ICD-10-PCS | Mod: S$GLB,,, | Performed by: FAMILY MEDICINE

## 2021-01-07 PROCEDURE — 99999 PR PBB SHADOW E&M-EST. PATIENT-LVL III: ICD-10-PCS | Mod: PBBFAC,,, | Performed by: FAMILY MEDICINE

## 2021-01-07 PROCEDURE — 3008F BODY MASS INDEX DOCD: CPT | Mod: CPTII,S$GLB,, | Performed by: FAMILY MEDICINE

## 2021-01-07 PROCEDURE — 3075F SYST BP GE 130 - 139MM HG: CPT | Mod: CPTII,S$GLB,, | Performed by: FAMILY MEDICINE

## 2021-01-07 PROCEDURE — 91300 COVID-19, MRNA, LNP-S, PF, 30 MCG/0.3 ML DOSE VACCINE: CPT | Mod: PBBFAC | Performed by: FAMILY MEDICINE

## 2021-01-07 PROCEDURE — 3008F PR BODY MASS INDEX (BMI) DOCUMENTED: ICD-10-PCS | Mod: CPTII,S$GLB,, | Performed by: FAMILY MEDICINE

## 2021-01-07 PROCEDURE — 99214 OFFICE O/P EST MOD 30 MIN: CPT | Mod: S$GLB,,, | Performed by: FAMILY MEDICINE

## 2021-01-07 RX ORDER — ESZOPICLONE 2 MG/1
2 TABLET, FILM COATED ORAL NIGHTLY PRN
Qty: 30 TABLET | Refills: 0 | Status: SHIPPED | OUTPATIENT
Start: 2021-01-07 | End: 2021-02-04

## 2021-01-07 RX ORDER — DULOXETIN HYDROCHLORIDE 60 MG/1
60 CAPSULE, DELAYED RELEASE ORAL DAILY
COMMUNITY
End: 2021-03-02

## 2021-01-07 RX ORDER — METOPROLOL SUCCINATE 50 MG/1
75 TABLET, EXTENDED RELEASE ORAL NIGHTLY
Qty: 135 TABLET | Refills: 3 | Status: SHIPPED | OUTPATIENT
Start: 2021-01-07 | End: 2021-02-22

## 2021-01-07 RX ORDER — TELMISARTAN 80 MG/1
80 TABLET ORAL DAILY
Qty: 90 TABLET | Refills: 3 | Status: SHIPPED | OUTPATIENT
Start: 2021-01-07 | End: 2021-03-22 | Stop reason: SDUPTHER

## 2021-01-13 ENCOUNTER — TELEPHONE (OUTPATIENT)
Dept: NEPHROLOGY | Facility: CLINIC | Age: 56
End: 2021-01-13

## 2021-01-13 DIAGNOSIS — N18.30 STAGE 3 CHRONIC KIDNEY DISEASE, UNSPECIFIED WHETHER STAGE 3A OR 3B CKD: Primary | ICD-10-CM

## 2021-01-14 ENCOUNTER — PATIENT OUTREACH (OUTPATIENT)
Dept: OTHER | Facility: OTHER | Age: 56
End: 2021-01-14

## 2021-01-25 RX ORDER — TRAMADOL HYDROCHLORIDE 50 MG/1
50 TABLET ORAL EVERY 12 HOURS PRN
Qty: 60 TABLET | Refills: 1 | Status: SHIPPED | OUTPATIENT
Start: 2021-01-25 | End: 2021-04-16 | Stop reason: SDUPTHER

## 2021-01-26 ENCOUNTER — TELEPHONE (OUTPATIENT)
Dept: RHEUMATOLOGY | Facility: CLINIC | Age: 56
End: 2021-01-26

## 2021-01-26 ENCOUNTER — TELEPHONE (OUTPATIENT)
Dept: RADIOLOGY | Facility: HOSPITAL | Age: 56
End: 2021-01-26

## 2021-01-28 ENCOUNTER — IMMUNIZATION (OUTPATIENT)
Dept: INTERNAL MEDICINE | Facility: CLINIC | Age: 56
End: 2021-01-28
Payer: COMMERCIAL

## 2021-01-28 DIAGNOSIS — Z23 NEED FOR VACCINATION: Primary | ICD-10-CM

## 2021-01-28 PROCEDURE — 91300 COVID-19, MRNA, LNP-S, PF, 30 MCG/0.3 ML DOSE VACCINE: CPT | Mod: PBBFAC | Performed by: FAMILY MEDICINE

## 2021-01-28 PROCEDURE — 0002A COVID-19, MRNA, LNP-S, PF, 30 MCG/0.3 ML DOSE VACCINE: CPT | Mod: PBBFAC | Performed by: FAMILY MEDICINE

## 2021-02-04 ENCOUNTER — HOSPITAL ENCOUNTER (EMERGENCY)
Facility: HOSPITAL | Age: 56
Discharge: HOME | End: 2021-02-04
Attending: EMERGENCY MEDICINE
Payer: COMMERCIAL

## 2021-02-04 ENCOUNTER — OFFICE VISIT (OUTPATIENT)
Dept: URGENT CARE | Facility: CLINIC | Age: 56
End: 2021-02-04
Payer: COMMERCIAL

## 2021-02-04 VITALS
SYSTOLIC BLOOD PRESSURE: 182 MMHG | HEART RATE: 86 BPM | WEIGHT: 160.69 LBS | DIASTOLIC BLOOD PRESSURE: 102 MMHG | TEMPERATURE: 97 F | OXYGEN SATURATION: 100 % | BODY MASS INDEX: 29.57 KG/M2 | HEIGHT: 62 IN | RESPIRATION RATE: 16 BRPM

## 2021-02-04 VITALS
OXYGEN SATURATION: 99 % | HEIGHT: 62 IN | BODY MASS INDEX: 29.53 KG/M2 | WEIGHT: 160.5 LBS | HEART RATE: 70 BPM | SYSTOLIC BLOOD PRESSURE: 137 MMHG | RESPIRATION RATE: 12 BRPM | TEMPERATURE: 99 F | DIASTOLIC BLOOD PRESSURE: 88 MMHG

## 2021-02-04 DIAGNOSIS — R51.9 ACUTE NONINTRACTABLE HEADACHE, UNSPECIFIED HEADACHE TYPE: ICD-10-CM

## 2021-02-04 DIAGNOSIS — R06.02 SOB (SHORTNESS OF BREATH): ICD-10-CM

## 2021-02-04 DIAGNOSIS — R07.89 CHEST DISCOMFORT: ICD-10-CM

## 2021-02-04 DIAGNOSIS — R07.9 CHEST PAIN: Primary | ICD-10-CM

## 2021-02-04 DIAGNOSIS — I10 ESSENTIAL HYPERTENSION: Primary | ICD-10-CM

## 2021-02-04 LAB
ALBUMIN SERPL BCP-MCNC: 4.2 G/DL (ref 3.5–5.2)
ALP SERPL-CCNC: 91 U/L (ref 55–135)
ALT SERPL W/O P-5'-P-CCNC: 24 U/L (ref 10–44)
ANION GAP SERPL CALC-SCNC: 11 MMOL/L (ref 8–16)
AST SERPL-CCNC: 30 U/L (ref 10–40)
BASOPHILS # BLD AUTO: 0.04 K/UL (ref 0–0.2)
BASOPHILS NFR BLD: 0.9 % (ref 0–1.9)
BILIRUB SERPL-MCNC: 1.3 MG/DL (ref 0.1–1)
BNP SERPL-MCNC: 34 PG/ML (ref 0–99)
BUN SERPL-MCNC: 14 MG/DL (ref 6–20)
CALCIUM SERPL-MCNC: 9.5 MG/DL (ref 8.7–10.5)
CHLORIDE SERPL-SCNC: 105 MMOL/L (ref 95–110)
CO2 SERPL-SCNC: 24 MMOL/L (ref 23–29)
CREAT SERPL-MCNC: 0.9 MG/DL (ref 0.5–1.4)
CTP QC/QA: YES
DIFFERENTIAL METHOD: ABNORMAL
EOSINOPHIL # BLD AUTO: 0.2 K/UL (ref 0–0.5)
EOSINOPHIL NFR BLD: 5.1 % (ref 0–8)
ERYTHROCYTE [DISTWIDTH] IN BLOOD BY AUTOMATED COUNT: 12.8 % (ref 11.5–14.5)
EST. GFR  (AFRICAN AMERICAN): >60 ML/MIN/1.73 M^2
EST. GFR  (NON AFRICAN AMERICAN): >60 ML/MIN/1.73 M^2
GLUCOSE SERPL-MCNC: 82 MG/DL (ref 70–110)
HCT VFR BLD AUTO: 40.9 % (ref 37–48.5)
HCV AB SERPL QL IA: NEGATIVE
HGB BLD-MCNC: 13.4 G/DL (ref 12–16)
HIV 1+2 AB+HIV1 P24 AG SERPL QL IA: NEGATIVE
IMM GRANULOCYTES # BLD AUTO: 0.01 K/UL (ref 0–0.04)
IMM GRANULOCYTES NFR BLD AUTO: 0.2 % (ref 0–0.5)
LYMPHOCYTES # BLD AUTO: 2.6 K/UL (ref 1–4.8)
LYMPHOCYTES NFR BLD: 56.2 % (ref 18–48)
MCH RBC QN AUTO: 31.4 PG (ref 27–31)
MCHC RBC AUTO-ENTMCNC: 32.8 G/DL (ref 32–36)
MCV RBC AUTO: 96 FL (ref 82–98)
MONOCYTES # BLD AUTO: 0.3 K/UL (ref 0.3–1)
MONOCYTES NFR BLD: 6 % (ref 4–15)
NEUTROPHILS # BLD AUTO: 1.5 K/UL (ref 1.8–7.7)
NEUTROPHILS NFR BLD: 31.6 % (ref 38–73)
NRBC BLD-RTO: 0 /100 WBC
PLATELET # BLD AUTO: 236 K/UL (ref 150–350)
PMV BLD AUTO: 9.6 FL (ref 9.2–12.9)
POTASSIUM SERPL-SCNC: 5 MMOL/L (ref 3.5–5.1)
PROT SERPL-MCNC: 8 G/DL (ref 6–8.4)
RBC # BLD AUTO: 4.27 M/UL (ref 4–5.4)
SARS-COV-2 RDRP RESP QL NAA+PROBE: NEGATIVE
SODIUM SERPL-SCNC: 140 MMOL/L (ref 136–145)
TROPONIN I SERPL DL<=0.01 NG/ML-MCNC: 0.01 NG/ML (ref 0–0.03)
TROPONIN I SERPL DL<=0.01 NG/ML-MCNC: <0.006 NG/ML (ref 0–0.03)
WBC # BLD AUTO: 4.7 K/UL (ref 3.9–12.7)

## 2021-02-04 PROCEDURE — 80053 COMPREHEN METABOLIC PANEL: CPT

## 2021-02-04 PROCEDURE — 93010 EKG 12-LEAD: ICD-10-PCS | Mod: S$GLB,,, | Performed by: INTERNAL MEDICINE

## 2021-02-04 PROCEDURE — 93005 ELECTROCARDIOGRAM TRACING: CPT | Mod: S$GLB,,, | Performed by: PHYSICIAN ASSISTANT

## 2021-02-04 PROCEDURE — 3080F DIAST BP >= 90 MM HG: CPT | Mod: CPTII,S$GLB,, | Performed by: PHYSICIAN ASSISTANT

## 2021-02-04 PROCEDURE — 93010 ELECTROCARDIOGRAM REPORT: CPT | Mod: ,,, | Performed by: INTERNAL MEDICINE

## 2021-02-04 PROCEDURE — 99214 PR OFFICE/OUTPT VISIT, EST, LEVL IV, 30-39 MIN: ICD-10-PCS | Mod: S$GLB,,, | Performed by: PHYSICIAN ASSISTANT

## 2021-02-04 PROCEDURE — 3080F PR MOST RECENT DIASTOLIC BLOOD PRESSURE >= 90 MM HG: ICD-10-PCS | Mod: CPTII,S$GLB,, | Performed by: PHYSICIAN ASSISTANT

## 2021-02-04 PROCEDURE — 93005 EKG 12-LEAD: ICD-10-PCS | Mod: S$GLB,,, | Performed by: PHYSICIAN ASSISTANT

## 2021-02-04 PROCEDURE — 99999 PR PBB SHADOW E&M-EST. PATIENT-LVL IV: ICD-10-PCS | Mod: PBBFAC,,, | Performed by: PHYSICIAN ASSISTANT

## 2021-02-04 PROCEDURE — 3008F BODY MASS INDEX DOCD: CPT | Mod: CPTII,S$GLB,, | Performed by: PHYSICIAN ASSISTANT

## 2021-02-04 PROCEDURE — 86703 HIV-1/HIV-2 1 RESULT ANTBDY: CPT

## 2021-02-04 PROCEDURE — 25000003 PHARM REV CODE 250: Performed by: EMERGENCY MEDICINE

## 2021-02-04 PROCEDURE — 83880 ASSAY OF NATRIURETIC PEPTIDE: CPT

## 2021-02-04 PROCEDURE — 93005 ELECTROCARDIOGRAM TRACING: CPT

## 2021-02-04 PROCEDURE — 99214 OFFICE O/P EST MOD 30 MIN: CPT | Mod: S$GLB,,, | Performed by: PHYSICIAN ASSISTANT

## 2021-02-04 PROCEDURE — U0002: ICD-10-PCS | Mod: QW,S$GLB,, | Performed by: PHYSICIAN ASSISTANT

## 2021-02-04 PROCEDURE — 3008F PR BODY MASS INDEX (BMI) DOCUMENTED: ICD-10-PCS | Mod: CPTII,S$GLB,, | Performed by: PHYSICIAN ASSISTANT

## 2021-02-04 PROCEDURE — 93010 ELECTROCARDIOGRAM REPORT: CPT | Mod: S$GLB,,, | Performed by: INTERNAL MEDICINE

## 2021-02-04 PROCEDURE — 1125F PR PAIN SEVERITY QUANTIFIED, PAIN PRESENT: ICD-10-PCS | Mod: S$GLB,,, | Performed by: PHYSICIAN ASSISTANT

## 2021-02-04 PROCEDURE — 84484 ASSAY OF TROPONIN QUANT: CPT | Mod: 91

## 2021-02-04 PROCEDURE — 36415 COLL VENOUS BLD VENIPUNCTURE: CPT

## 2021-02-04 PROCEDURE — 99285 EMERGENCY DEPT VISIT HI MDM: CPT | Mod: 25

## 2021-02-04 PROCEDURE — 93010 EKG 12-LEAD: ICD-10-PCS | Mod: ,,, | Performed by: INTERNAL MEDICINE

## 2021-02-04 PROCEDURE — 1125F AMNT PAIN NOTED PAIN PRSNT: CPT | Mod: S$GLB,,, | Performed by: PHYSICIAN ASSISTANT

## 2021-02-04 PROCEDURE — U0002 COVID-19 LAB TEST NON-CDC: HCPCS | Mod: QW,S$GLB,, | Performed by: PHYSICIAN ASSISTANT

## 2021-02-04 PROCEDURE — 99999 PR PBB SHADOW E&M-EST. PATIENT-LVL IV: CPT | Mod: PBBFAC,,, | Performed by: PHYSICIAN ASSISTANT

## 2021-02-04 PROCEDURE — 3077F PR MOST RECENT SYSTOLIC BLOOD PRESSURE >= 140 MM HG: ICD-10-PCS | Mod: CPTII,S$GLB,, | Performed by: PHYSICIAN ASSISTANT

## 2021-02-04 PROCEDURE — 86803 HEPATITIS C AB TEST: CPT

## 2021-02-04 PROCEDURE — 85025 COMPLETE CBC W/AUTO DIFF WBC: CPT

## 2021-02-04 PROCEDURE — 3077F SYST BP >= 140 MM HG: CPT | Mod: CPTII,S$GLB,, | Performed by: PHYSICIAN ASSISTANT

## 2021-02-04 RX ORDER — ASPIRIN 325 MG
325 TABLET ORAL
Status: COMPLETED | OUTPATIENT
Start: 2021-02-04 | End: 2021-02-04

## 2021-02-04 RX ORDER — METOPROLOL TARTRATE 25 MG/1
25 TABLET, FILM COATED ORAL
Status: COMPLETED | OUTPATIENT
Start: 2021-02-04 | End: 2021-02-04

## 2021-02-04 RX ADMIN — NITROGLYCERIN 1 INCH: 20 OINTMENT TOPICAL at 11:02

## 2021-02-04 RX ADMIN — METOPROLOL TARTRATE 25 MG: 25 TABLET, FILM COATED ORAL at 11:02

## 2021-02-04 RX ADMIN — ASPIRIN 325 MG ORAL TABLET 325 MG: 325 PILL ORAL at 11:02

## 2021-02-05 ENCOUNTER — OFFICE VISIT (OUTPATIENT)
Dept: CARDIOLOGY | Facility: CLINIC | Age: 56
End: 2021-02-05
Payer: COMMERCIAL

## 2021-02-05 ENCOUNTER — DOCUMENTATION ONLY (OUTPATIENT)
Dept: CARDIOLOGY | Facility: CLINIC | Age: 56
End: 2021-02-05

## 2021-02-05 VITALS
HEART RATE: 80 BPM | DIASTOLIC BLOOD PRESSURE: 102 MMHG | OXYGEN SATURATION: 97 % | WEIGHT: 162.25 LBS | BODY MASS INDEX: 29.86 KG/M2 | SYSTOLIC BLOOD PRESSURE: 150 MMHG | HEIGHT: 62 IN

## 2021-02-05 DIAGNOSIS — R07.9 CHEST PAIN SYNDROME: ICD-10-CM

## 2021-02-05 DIAGNOSIS — N18.2 STAGE 2 CHRONIC KIDNEY DISEASE: ICD-10-CM

## 2021-02-05 DIAGNOSIS — I10 ESSENTIAL HYPERTENSION: Primary | ICD-10-CM

## 2021-02-05 DIAGNOSIS — F41.9 ANXIETY: ICD-10-CM

## 2021-02-05 DIAGNOSIS — R94.31 ABNORMAL EKG: ICD-10-CM

## 2021-02-05 PROCEDURE — 1126F AMNT PAIN NOTED NONE PRSNT: CPT | Mod: S$GLB,,, | Performed by: INTERNAL MEDICINE

## 2021-02-05 PROCEDURE — 3077F PR MOST RECENT SYSTOLIC BLOOD PRESSURE >= 140 MM HG: ICD-10-PCS | Mod: CPTII,S$GLB,, | Performed by: INTERNAL MEDICINE

## 2021-02-05 PROCEDURE — 3080F DIAST BP >= 90 MM HG: CPT | Mod: CPTII,S$GLB,, | Performed by: INTERNAL MEDICINE

## 2021-02-05 PROCEDURE — 3080F PR MOST RECENT DIASTOLIC BLOOD PRESSURE >= 90 MM HG: ICD-10-PCS | Mod: CPTII,S$GLB,, | Performed by: INTERNAL MEDICINE

## 2021-02-05 PROCEDURE — 3077F SYST BP >= 140 MM HG: CPT | Mod: CPTII,S$GLB,, | Performed by: INTERNAL MEDICINE

## 2021-02-05 PROCEDURE — 99204 OFFICE O/P NEW MOD 45 MIN: CPT | Mod: S$GLB,,, | Performed by: INTERNAL MEDICINE

## 2021-02-05 PROCEDURE — 1126F PR PAIN SEVERITY QUANTIFIED, NO PAIN PRESENT: ICD-10-PCS | Mod: S$GLB,,, | Performed by: INTERNAL MEDICINE

## 2021-02-05 PROCEDURE — 99999 PR PBB SHADOW E&M-EST. PATIENT-LVL III: ICD-10-PCS | Mod: PBBFAC,,, | Performed by: INTERNAL MEDICINE

## 2021-02-05 PROCEDURE — 99999 PR PBB SHADOW E&M-EST. PATIENT-LVL III: CPT | Mod: PBBFAC,,, | Performed by: INTERNAL MEDICINE

## 2021-02-05 PROCEDURE — 3008F PR BODY MASS INDEX (BMI) DOCUMENTED: ICD-10-PCS | Mod: CPTII,S$GLB,, | Performed by: INTERNAL MEDICINE

## 2021-02-05 PROCEDURE — 3008F BODY MASS INDEX DOCD: CPT | Mod: CPTII,S$GLB,, | Performed by: INTERNAL MEDICINE

## 2021-02-05 PROCEDURE — 99204 PR OFFICE/OUTPT VISIT, NEW, LEVL IV, 45-59 MIN: ICD-10-PCS | Mod: S$GLB,,, | Performed by: INTERNAL MEDICINE

## 2021-02-05 RX ORDER — CLONIDINE HYDROCHLORIDE 0.1 MG/1
0.1 TABLET ORAL DAILY
Qty: 30 TABLET | Refills: 1 | Status: SHIPPED | OUTPATIENT
Start: 2021-02-05 | End: 2021-05-29

## 2021-02-05 RX ORDER — AMLODIPINE BESYLATE 5 MG/1
5 TABLET ORAL DAILY
Qty: 30 TABLET | Refills: 6 | Status: SHIPPED | OUTPATIENT
Start: 2021-02-05 | End: 2021-08-01

## 2021-02-10 ENCOUNTER — TELEPHONE (OUTPATIENT)
Dept: CARDIOLOGY | Facility: CLINIC | Age: 56
End: 2021-02-10

## 2021-02-11 ENCOUNTER — HOSPITAL ENCOUNTER (OUTPATIENT)
Dept: RADIOLOGY | Facility: HOSPITAL | Age: 56
Discharge: HOME | End: 2021-02-11
Attending: FAMILY MEDICINE
Payer: COMMERCIAL

## 2021-02-11 ENCOUNTER — OFFICE VISIT (OUTPATIENT)
Dept: INTERNAL MEDICINE | Facility: CLINIC | Age: 56
End: 2021-02-11
Payer: COMMERCIAL

## 2021-02-11 VITALS
BODY MASS INDEX: 29.94 KG/M2 | HEART RATE: 70 BPM | WEIGHT: 162.69 LBS | RESPIRATION RATE: 16 BRPM | DIASTOLIC BLOOD PRESSURE: 92 MMHG | SYSTOLIC BLOOD PRESSURE: 144 MMHG | HEIGHT: 62 IN | TEMPERATURE: 98 F | OXYGEN SATURATION: 98 %

## 2021-02-11 DIAGNOSIS — M25.562 LEFT ANTERIOR KNEE PAIN: ICD-10-CM

## 2021-02-11 DIAGNOSIS — E55.9 VITAMIN D DEFICIENCY: ICD-10-CM

## 2021-02-11 DIAGNOSIS — M25.551 RIGHT HIP PAIN: ICD-10-CM

## 2021-02-11 DIAGNOSIS — F41.9 ANXIETY: ICD-10-CM

## 2021-02-11 DIAGNOSIS — F41.1 GAD (GENERALIZED ANXIETY DISORDER): Primary | ICD-10-CM

## 2021-02-11 DIAGNOSIS — Z96.651 STATUS POST TOTAL RIGHT KNEE REPLACEMENT: ICD-10-CM

## 2021-02-11 DIAGNOSIS — I10 ESSENTIAL HYPERTENSION: ICD-10-CM

## 2021-02-11 DIAGNOSIS — Z98.84 HISTORY OF GASTRIC RESTRICTIVE SURGERY: ICD-10-CM

## 2021-02-11 PROBLEM — M87.052 AVASCULAR NECROSIS OF HIP, LEFT: Status: RESOLVED | Noted: 2020-07-31 | Resolved: 2021-02-11

## 2021-02-11 PROBLEM — N18.2 STAGE 2 CHRONIC KIDNEY DISEASE: Status: RESOLVED | Noted: 2021-02-05 | Resolved: 2021-02-11

## 2021-02-11 PROCEDURE — 3077F PR MOST RECENT SYSTOLIC BLOOD PRESSURE >= 140 MM HG: ICD-10-PCS | Mod: CPTII,S$GLB,, | Performed by: FAMILY MEDICINE

## 2021-02-11 PROCEDURE — 73562 XR KNEE ORTHO LEFT: ICD-10-PCS | Mod: 26,LT,, | Performed by: RADIOLOGY

## 2021-02-11 PROCEDURE — 73562 X-RAY EXAM OF KNEE 3: CPT | Mod: 26,LT,, | Performed by: RADIOLOGY

## 2021-02-11 PROCEDURE — 73502 X-RAY EXAM HIP UNI 2-3 VIEWS: CPT | Mod: TC,RT

## 2021-02-11 PROCEDURE — 73560 X-RAY EXAM OF KNEE 1 OR 2: CPT | Mod: 26,RT,, | Performed by: RADIOLOGY

## 2021-02-11 PROCEDURE — 1125F AMNT PAIN NOTED PAIN PRSNT: CPT | Mod: S$GLB,,, | Performed by: FAMILY MEDICINE

## 2021-02-11 PROCEDURE — 3077F SYST BP >= 140 MM HG: CPT | Mod: CPTII,S$GLB,, | Performed by: FAMILY MEDICINE

## 2021-02-11 PROCEDURE — 73502 XR HIP 2 VIEW RIGHT: ICD-10-PCS | Mod: 26,RT,, | Performed by: RADIOLOGY

## 2021-02-11 PROCEDURE — 73560 X-RAY EXAM OF KNEE 1 OR 2: CPT | Mod: TC,RT

## 2021-02-11 PROCEDURE — 3008F BODY MASS INDEX DOCD: CPT | Mod: CPTII,S$GLB,, | Performed by: FAMILY MEDICINE

## 2021-02-11 PROCEDURE — 3008F PR BODY MASS INDEX (BMI) DOCUMENTED: ICD-10-PCS | Mod: CPTII,S$GLB,, | Performed by: FAMILY MEDICINE

## 2021-02-11 PROCEDURE — 99214 PR OFFICE/OUTPT VISIT, EST, LEVL IV, 30-39 MIN: ICD-10-PCS | Mod: S$GLB,,, | Performed by: FAMILY MEDICINE

## 2021-02-11 PROCEDURE — 3080F DIAST BP >= 90 MM HG: CPT | Mod: CPTII,S$GLB,, | Performed by: FAMILY MEDICINE

## 2021-02-11 PROCEDURE — 99999 PR PBB SHADOW E&M-EST. PATIENT-LVL V: CPT | Mod: PBBFAC,,, | Performed by: FAMILY MEDICINE

## 2021-02-11 PROCEDURE — 99999 PR PBB SHADOW E&M-EST. PATIENT-LVL V: ICD-10-PCS | Mod: PBBFAC,,, | Performed by: FAMILY MEDICINE

## 2021-02-11 PROCEDURE — 73560 XR KNEE ORTHO LEFT: ICD-10-PCS | Mod: 26,RT,, | Performed by: RADIOLOGY

## 2021-02-11 PROCEDURE — 99214 OFFICE O/P EST MOD 30 MIN: CPT | Mod: S$GLB,,, | Performed by: FAMILY MEDICINE

## 2021-02-11 PROCEDURE — 3080F PR MOST RECENT DIASTOLIC BLOOD PRESSURE >= 90 MM HG: ICD-10-PCS | Mod: CPTII,S$GLB,, | Performed by: FAMILY MEDICINE

## 2021-02-11 PROCEDURE — 73502 X-RAY EXAM HIP UNI 2-3 VIEWS: CPT | Mod: 26,RT,, | Performed by: RADIOLOGY

## 2021-02-11 PROCEDURE — 1125F PR PAIN SEVERITY QUANTIFIED, PAIN PRESENT: ICD-10-PCS | Mod: S$GLB,,, | Performed by: FAMILY MEDICINE

## 2021-02-11 RX ORDER — ALPRAZOLAM 0.5 MG/1
0.5 TABLET ORAL NIGHTLY PRN
Qty: 30 TABLET | Refills: 0 | Status: SHIPPED | OUTPATIENT
Start: 2021-02-11 | End: 2021-03-02 | Stop reason: SDUPTHER

## 2021-02-13 ENCOUNTER — PATIENT MESSAGE (OUTPATIENT)
Dept: CARDIOLOGY | Facility: CLINIC | Age: 56
End: 2021-02-13

## 2021-02-22 ENCOUNTER — HOSPITAL ENCOUNTER (OUTPATIENT)
Dept: RADIOLOGY | Facility: HOSPITAL | Age: 56
Discharge: HOME | End: 2021-02-22
Attending: INTERNAL MEDICINE
Payer: COMMERCIAL

## 2021-02-22 ENCOUNTER — HOSPITAL ENCOUNTER (OUTPATIENT)
Dept: CARDIOLOGY | Facility: HOSPITAL | Age: 56
Discharge: HOME | End: 2021-02-22
Attending: INTERNAL MEDICINE
Payer: COMMERCIAL

## 2021-02-22 ENCOUNTER — TELEPHONE (OUTPATIENT)
Dept: CARDIOLOGY | Facility: CLINIC | Age: 56
End: 2021-02-22

## 2021-02-22 VITALS — BODY MASS INDEX: 29.81 KG/M2 | WEIGHT: 162 LBS | HEIGHT: 62 IN

## 2021-02-22 DIAGNOSIS — R07.9 CHEST PAIN SYNDROME: ICD-10-CM

## 2021-02-22 DIAGNOSIS — R94.31 ABNORMAL EKG: ICD-10-CM

## 2021-02-22 LAB
AORTIC ROOT ANNULUS: 2.67 CM
AV INDEX (PROSTH): 0.68
AV MEAN GRADIENT: 4 MMHG
AV PEAK GRADIENT: 7 MMHG
AV VALVE AREA: 2.08 CM2
AV VELOCITY RATIO: 0.72
BSA FOR ECHO PROCEDURE: 1.79 M2
CV ECHO LV RWT: 0.5 CM
DOP CALC AO PEAK VEL: 1.33 M/S
DOP CALC AO VTI: 31.05 CM
DOP CALC LVOT AREA: 3 CM2
DOP CALC LVOT DIAMETER: 1.97 CM
DOP CALC LVOT PEAK VEL: 0.96 M/S
DOP CALC LVOT STROKE VOLUME: 64.49 CM3
DOP CALC RVOT PEAK VEL: 0.73 M/S
DOP CALC RVOT VTI: 20.24 CM
DOP CALCLVOT PEAK VEL VTI: 21.17 CM
E WAVE DECELERATION TIME: 174.28 MSEC
E/A RATIO: 1.14
E/E' RATIO: 7.25 M/S
ECHO LV POSTERIOR WALL: 1.04 CM (ref 0.6–1.1)
FRACTIONAL SHORTENING: 38 % (ref 28–44)
INTERVENTRICULAR SEPTUM: 1.07 CM (ref 0.6–1.1)
IVRT: 88.49 MSEC
LA MAJOR: 3.98 CM
LA MINOR: 3.98 CM
LA WIDTH: 2.82 CM
LEFT ATRIUM SIZE: 3.13 CM
LEFT ATRIUM VOLUME INDEX: 17.1 ML/M2
LEFT ATRIUM VOLUME: 29.86 CM3
LEFT INTERNAL DIMENSION IN SYSTOLE: 2.59 CM (ref 2.1–4)
LEFT VENTRICLE DIASTOLIC VOLUME INDEX: 44.43 ML/M2
LEFT VENTRICLE DIASTOLIC VOLUME: 77.76 ML
LEFT VENTRICLE MASS INDEX: 84 G/M2
LEFT VENTRICLE SYSTOLIC VOLUME INDEX: 14 ML/M2
LEFT VENTRICLE SYSTOLIC VOLUME: 24.43 ML
LEFT VENTRICULAR INTERNAL DIMENSION IN DIASTOLE: 4.18 CM (ref 3.5–6)
LEFT VENTRICULAR MASS: 146.89 G
LV LATERAL E/E' RATIO: 6.69 M/S
LV SEPTAL E/E' RATIO: 7.91 M/S
MV A" WAVE DURATION": 13.13 MSEC
MV PEAK A VEL: 0.76 M/S
MV PEAK E VEL: 0.87 M/S
PISA TR MAX VEL: 3.07 M/S
PULM VEIN S/D RATIO: 1.76
PV MEAN GRADIENT: 1 MMHG
PV PEAK D VEL: 0.49 M/S
PV PEAK S VEL: 0.86 M/S
PV PEAK VELOCITY: 1.1 CM/S
RA MAJOR: 3.94 CM
RA PRESSURE: 3 MMHG
RA WIDTH: 2.52 CM
RIGHT VENTRICULAR END-DIASTOLIC DIMENSION: 2.13 CM
SINUS: 2.18 CM
STJ: 2.09 CM
TDI LATERAL: 0.13 M/S
TDI SEPTAL: 0.11 M/S
TDI: 0.12 M/S
TR MAX PG: 38 MMHG
TV REST PULMONARY ARTERY PRESSURE: 41 MMHG

## 2021-02-22 PROCEDURE — A9502 TC99M TETROFOSMIN: HCPCS

## 2021-02-22 PROCEDURE — 93306 ECHO (CUPID ONLY): ICD-10-PCS | Mod: 26,,, | Performed by: INTERNAL MEDICINE

## 2021-02-22 PROCEDURE — 78452 HT MUSCLE IMAGE SPECT MULT: CPT

## 2021-02-22 PROCEDURE — 93016 CV STRESS TEST SUPVJ ONLY: CPT | Mod: ,,, | Performed by: INTERNAL MEDICINE

## 2021-02-22 PROCEDURE — 78452 STRESS TEST WITH MYOCARDIAL PERFUSION (CUPID ONLY): ICD-10-PCS | Mod: 26,,, | Performed by: INTERNAL MEDICINE

## 2021-02-22 PROCEDURE — 93306 TTE W/DOPPLER COMPLETE: CPT | Mod: 26,,, | Performed by: INTERNAL MEDICINE

## 2021-02-22 PROCEDURE — 93306 TTE W/DOPPLER COMPLETE: CPT

## 2021-02-22 PROCEDURE — 63600175 PHARM REV CODE 636 W HCPCS: Performed by: INTERNAL MEDICINE

## 2021-02-22 PROCEDURE — 93017 CV STRESS TEST TRACING ONLY: CPT

## 2021-02-22 PROCEDURE — 93018 STRESS TEST WITH MYOCARDIAL PERFUSION (CUPID ONLY): ICD-10-PCS | Mod: ,,, | Performed by: INTERNAL MEDICINE

## 2021-02-22 PROCEDURE — 93018 CV STRESS TEST I&R ONLY: CPT | Mod: ,,, | Performed by: INTERNAL MEDICINE

## 2021-02-22 PROCEDURE — 78452 HT MUSCLE IMAGE SPECT MULT: CPT | Mod: 26,,, | Performed by: INTERNAL MEDICINE

## 2021-02-22 PROCEDURE — 93016 STRESS TEST WITH MYOCARDIAL PERFUSION (CUPID ONLY): ICD-10-PCS | Mod: ,,, | Performed by: INTERNAL MEDICINE

## 2021-02-22 RX ORDER — REGADENOSON 0.08 MG/ML
0.4 INJECTION, SOLUTION INTRAVENOUS ONCE
Status: COMPLETED | OUTPATIENT
Start: 2021-02-22 | End: 2021-02-22

## 2021-02-22 RX ADMIN — REGADENOSON 0.4 MG: 0.08 INJECTION, SOLUTION INTRAVENOUS at 12:02

## 2021-02-23 LAB
CV STRESS BASE HR: 68 BPM
DIASTOLIC BLOOD PRESSURE: 74 MMHG
NUC REST EJECTION FRACTION: 70
NUC STRESS EJECTION FRACTION: 70 %
OHS CV CPX 85 PERCENT MAX PREDICTED HEART RATE MALE: 134
OHS CV CPX MAX PREDICTED HEART RATE: 158
OHS CV CPX PATIENT IS FEMALE: 1
OHS CV CPX PATIENT IS MALE: 0
OHS CV CPX PEAK DIASTOLIC BLOOD PRESSURE: 88 MMHG
OHS CV CPX PEAK HEAR RATE: 118 BPM
OHS CV CPX PEAK RATE PRESSURE PRODUCT: NORMAL
OHS CV CPX PEAK SYSTOLIC BLOOD PRESSURE: 139 MMHG
OHS CV CPX PERCENT MAX PREDICTED HEART RATE ACHIEVED: 75
OHS CV CPX RATE PRESSURE PRODUCT PRESENTING: 8500
STRESS ECHO POST EXERCISE DUR MIN: 1 MINUTES
STRESS ECHO POST EXERCISE DUR SEC: 6 SECONDS
SYSTOLIC BLOOD PRESSURE: 125 MMHG

## 2021-02-24 ENCOUNTER — TELEPHONE (OUTPATIENT)
Dept: CARDIOLOGY | Facility: CLINIC | Age: 56
End: 2021-02-24

## 2021-02-24 ENCOUNTER — OFFICE VISIT (OUTPATIENT)
Dept: CARDIOLOGY | Facility: CLINIC | Age: 56
End: 2021-02-24
Payer: COMMERCIAL

## 2021-02-24 VITALS
WEIGHT: 163.56 LBS | SYSTOLIC BLOOD PRESSURE: 123 MMHG | BODY MASS INDEX: 29.92 KG/M2 | DIASTOLIC BLOOD PRESSURE: 75 MMHG | OXYGEN SATURATION: 100 % | HEART RATE: 65 BPM

## 2021-02-24 DIAGNOSIS — I27.20 PULMONARY HTN: ICD-10-CM

## 2021-02-24 DIAGNOSIS — F41.9 ANXIETY: ICD-10-CM

## 2021-02-24 DIAGNOSIS — R06.83 SNORING: ICD-10-CM

## 2021-02-24 DIAGNOSIS — R07.9 CHEST PAIN SYNDROME: ICD-10-CM

## 2021-02-24 DIAGNOSIS — Z96.651 STATUS POST TOTAL RIGHT KNEE REPLACEMENT: ICD-10-CM

## 2021-02-24 DIAGNOSIS — Z98.84 HISTORY OF GASTRIC RESTRICTIVE SURGERY: ICD-10-CM

## 2021-02-24 DIAGNOSIS — R94.31 ABNORMAL EKG: ICD-10-CM

## 2021-02-24 DIAGNOSIS — I10 ESSENTIAL HYPERTENSION: Primary | ICD-10-CM

## 2021-02-24 PROCEDURE — 3008F PR BODY MASS INDEX (BMI) DOCUMENTED: ICD-10-PCS | Mod: CPTII,S$GLB,, | Performed by: INTERNAL MEDICINE

## 2021-02-24 PROCEDURE — 3078F PR MOST RECENT DIASTOLIC BLOOD PRESSURE < 80 MM HG: ICD-10-PCS | Mod: CPTII,S$GLB,, | Performed by: INTERNAL MEDICINE

## 2021-02-24 PROCEDURE — 99214 PR OFFICE/OUTPT VISIT, EST, LEVL IV, 30-39 MIN: ICD-10-PCS | Mod: S$GLB,,, | Performed by: INTERNAL MEDICINE

## 2021-02-24 PROCEDURE — 3074F PR MOST RECENT SYSTOLIC BLOOD PRESSURE < 130 MM HG: ICD-10-PCS | Mod: CPTII,S$GLB,, | Performed by: INTERNAL MEDICINE

## 2021-02-24 PROCEDURE — 3074F SYST BP LT 130 MM HG: CPT | Mod: CPTII,S$GLB,, | Performed by: INTERNAL MEDICINE

## 2021-02-24 PROCEDURE — 99999 PR PBB SHADOW E&M-EST. PATIENT-LVL IV: ICD-10-PCS | Mod: PBBFAC,,, | Performed by: INTERNAL MEDICINE

## 2021-02-24 PROCEDURE — 99214 OFFICE O/P EST MOD 30 MIN: CPT | Mod: S$GLB,,, | Performed by: INTERNAL MEDICINE

## 2021-02-24 PROCEDURE — 99999 PR PBB SHADOW E&M-EST. PATIENT-LVL IV: CPT | Mod: PBBFAC,,, | Performed by: INTERNAL MEDICINE

## 2021-02-24 PROCEDURE — 3008F BODY MASS INDEX DOCD: CPT | Mod: CPTII,S$GLB,, | Performed by: INTERNAL MEDICINE

## 2021-02-24 PROCEDURE — 3078F DIAST BP <80 MM HG: CPT | Mod: CPTII,S$GLB,, | Performed by: INTERNAL MEDICINE

## 2021-03-02 ENCOUNTER — OFFICE VISIT (OUTPATIENT)
Dept: PSYCHIATRY | Facility: CLINIC | Age: 56
End: 2021-03-02
Payer: COMMERCIAL

## 2021-03-02 VITALS
SYSTOLIC BLOOD PRESSURE: 150 MMHG | HEIGHT: 62 IN | BODY MASS INDEX: 30.34 KG/M2 | HEART RATE: 69 BPM | WEIGHT: 164.88 LBS | DIASTOLIC BLOOD PRESSURE: 96 MMHG

## 2021-03-02 DIAGNOSIS — F32.5 MAJOR DEPRESSIVE DISORDER IN REMISSION, UNSPECIFIED WHETHER RECURRENT: ICD-10-CM

## 2021-03-02 DIAGNOSIS — F41.1 GAD (GENERALIZED ANXIETY DISORDER): Primary | ICD-10-CM

## 2021-03-02 PROCEDURE — 3008F BODY MASS INDEX DOCD: CPT | Mod: CPTII,S$GLB,, | Performed by: PSYCHOLOGIST

## 2021-03-02 PROCEDURE — 90792 PR PSYCHIATRIC DIAGNOSTIC EVALUATION W/MEDICAL SERVICES: ICD-10-PCS | Mod: S$GLB,,, | Performed by: PSYCHOLOGIST

## 2021-03-02 PROCEDURE — 99999 PR PBB SHADOW E&M-EST. PATIENT-LVL III: ICD-10-PCS | Mod: PBBFAC,,, | Performed by: PSYCHOLOGIST

## 2021-03-02 PROCEDURE — 3008F PR BODY MASS INDEX (BMI) DOCUMENTED: ICD-10-PCS | Mod: CPTII,S$GLB,, | Performed by: PSYCHOLOGIST

## 2021-03-02 PROCEDURE — 90792 PSYCH DIAG EVAL W/MED SRVCS: CPT | Mod: S$GLB,,, | Performed by: PSYCHOLOGIST

## 2021-03-02 PROCEDURE — 99999 PR PBB SHADOW E&M-EST. PATIENT-LVL III: CPT | Mod: PBBFAC,,, | Performed by: PSYCHOLOGIST

## 2021-03-02 RX ORDER — ALPRAZOLAM 0.5 MG/1
0.5 TABLET ORAL NIGHTLY PRN
Qty: 30 TABLET | Refills: 0 | Status: SHIPPED | OUTPATIENT
Start: 2021-03-02 | End: 2022-02-01 | Stop reason: SDUPTHER

## 2021-03-02 RX ORDER — DULOXETIN HYDROCHLORIDE 60 MG/1
60 CAPSULE, DELAYED RELEASE ORAL DAILY
Qty: 30 CAPSULE | Refills: 1 | Status: SHIPPED | OUTPATIENT
Start: 2021-03-02 | End: 2021-04-16

## 2021-03-04 ENCOUNTER — PATIENT OUTREACH (OUTPATIENT)
Dept: OTHER | Facility: OTHER | Age: 56
End: 2021-03-04

## 2021-03-10 ENCOUNTER — TELEPHONE (OUTPATIENT)
Dept: PSYCHIATRY | Facility: CLINIC | Age: 56
End: 2021-03-10

## 2021-03-22 DIAGNOSIS — I10 ESSENTIAL HYPERTENSION: ICD-10-CM

## 2021-03-22 RX ORDER — TELMISARTAN 80 MG/1
80 TABLET ORAL DAILY
Qty: 90 TABLET | Refills: 3 | Status: SHIPPED | OUTPATIENT
Start: 2021-03-22 | End: 2021-05-12 | Stop reason: SDUPTHER

## 2021-04-01 ENCOUNTER — PATIENT OUTREACH (OUTPATIENT)
Dept: OTHER | Facility: OTHER | Age: 56
End: 2021-04-01

## 2021-04-07 ENCOUNTER — OFFICE VISIT (OUTPATIENT)
Dept: PSYCHIATRY | Facility: CLINIC | Age: 56
End: 2021-04-07
Payer: COMMERCIAL

## 2021-04-07 ENCOUNTER — PATIENT MESSAGE (OUTPATIENT)
Dept: PULMONOLOGY | Facility: CLINIC | Age: 56
End: 2021-04-07

## 2021-04-07 DIAGNOSIS — R69 DIAGNOSIS DEFERRED: Primary | ICD-10-CM

## 2021-04-07 PROCEDURE — 90834 PSYTX W PT 45 MINUTES: CPT | Mod: S$GLB,,, | Performed by: SOCIAL WORKER

## 2021-04-07 PROCEDURE — 90834 PR PSYCHOTHERAPY W/PATIENT, 45 MIN: ICD-10-PCS | Mod: S$GLB,,, | Performed by: SOCIAL WORKER

## 2021-04-08 ENCOUNTER — PATIENT MESSAGE (OUTPATIENT)
Dept: PSYCHIATRY | Facility: CLINIC | Age: 56
End: 2021-04-08

## 2021-04-13 ENCOUNTER — TELEPHONE (OUTPATIENT)
Dept: RHEUMATOLOGY | Facility: CLINIC | Age: 56
End: 2021-04-13

## 2021-04-15 ENCOUNTER — TELEPHONE (OUTPATIENT)
Dept: RHEUMATOLOGY | Facility: CLINIC | Age: 56
End: 2021-04-15

## 2021-04-16 ENCOUNTER — OFFICE VISIT (OUTPATIENT)
Dept: PSYCHIATRY | Facility: CLINIC | Age: 56
End: 2021-04-16
Payer: COMMERCIAL

## 2021-04-16 ENCOUNTER — PATIENT MESSAGE (OUTPATIENT)
Dept: PSYCHIATRY | Facility: CLINIC | Age: 56
End: 2021-04-16

## 2021-04-16 ENCOUNTER — OFFICE VISIT (OUTPATIENT)
Dept: RHEUMATOLOGY | Facility: CLINIC | Age: 56
End: 2021-04-16
Payer: COMMERCIAL

## 2021-04-16 VITALS
BODY MASS INDEX: 28.71 KG/M2 | HEART RATE: 63 BPM | WEIGHT: 156.94 LBS | SYSTOLIC BLOOD PRESSURE: 141 MMHG | DIASTOLIC BLOOD PRESSURE: 95 MMHG

## 2021-04-16 DIAGNOSIS — R69 DIAGNOSIS DEFERRED: Primary | ICD-10-CM

## 2021-04-16 DIAGNOSIS — R76.8 POSITIVE ANA (ANTINUCLEAR ANTIBODY): ICD-10-CM

## 2021-04-16 DIAGNOSIS — G89.29 CHRONIC PAIN OF LEFT KNEE: ICD-10-CM

## 2021-04-16 DIAGNOSIS — F41.1 GAD (GENERALIZED ANXIETY DISORDER): ICD-10-CM

## 2021-04-16 DIAGNOSIS — M15.9 OSTEOARTHRITIS OF MULTIPLE JOINTS, UNSPECIFIED OSTEOARTHRITIS TYPE: ICD-10-CM

## 2021-04-16 DIAGNOSIS — M25.562 CHRONIC PAIN OF LEFT KNEE: ICD-10-CM

## 2021-04-16 DIAGNOSIS — N17.9 AKI (ACUTE KIDNEY INJURY): Primary | ICD-10-CM

## 2021-04-16 PROCEDURE — 1125F AMNT PAIN NOTED PAIN PRSNT: CPT | Mod: S$GLB,,, | Performed by: PHYSICIAN ASSISTANT

## 2021-04-16 PROCEDURE — 3008F PR BODY MASS INDEX (BMI) DOCUMENTED: ICD-10-PCS | Mod: CPTII,S$GLB,, | Performed by: PHYSICIAN ASSISTANT

## 2021-04-16 PROCEDURE — 99999 PR PBB SHADOW E&M-EST. PATIENT-LVL III: ICD-10-PCS | Mod: PBBFAC,,, | Performed by: PHYSICIAN ASSISTANT

## 2021-04-16 PROCEDURE — 99214 PR OFFICE/OUTPT VISIT, EST, LEVL IV, 30-39 MIN: ICD-10-PCS | Mod: S$GLB,,, | Performed by: PHYSICIAN ASSISTANT

## 2021-04-16 PROCEDURE — 90834 PSYTX W PT 45 MINUTES: CPT | Mod: 95,,, | Performed by: SOCIAL WORKER

## 2021-04-16 PROCEDURE — 1125F PR PAIN SEVERITY QUANTIFIED, PAIN PRESENT: ICD-10-PCS | Mod: S$GLB,,, | Performed by: PHYSICIAN ASSISTANT

## 2021-04-16 PROCEDURE — 90834 PR PSYCHOTHERAPY W/PATIENT, 45 MIN: ICD-10-PCS | Mod: 95,,, | Performed by: SOCIAL WORKER

## 2021-04-16 PROCEDURE — 99214 OFFICE O/P EST MOD 30 MIN: CPT | Mod: S$GLB,,, | Performed by: PHYSICIAN ASSISTANT

## 2021-04-16 PROCEDURE — 99999 PR PBB SHADOW E&M-EST. PATIENT-LVL III: CPT | Mod: PBBFAC,,, | Performed by: PHYSICIAN ASSISTANT

## 2021-04-16 PROCEDURE — 3008F BODY MASS INDEX DOCD: CPT | Mod: CPTII,S$GLB,, | Performed by: PHYSICIAN ASSISTANT

## 2021-04-16 RX ORDER — TRAMADOL HYDROCHLORIDE 50 MG/1
50 TABLET ORAL EVERY 12 HOURS PRN
Qty: 60 TABLET | Refills: 1 | Status: SHIPPED | OUTPATIENT
Start: 2021-04-16 | End: 2021-05-12 | Stop reason: SDUPTHER

## 2021-04-16 RX ORDER — DULOXETIN HYDROCHLORIDE 60 MG/1
60 CAPSULE, DELAYED RELEASE ORAL 2 TIMES DAILY
Qty: 60 CAPSULE | Refills: 2 | Status: SHIPPED | OUTPATIENT
Start: 2021-04-16 | End: 2022-11-04 | Stop reason: SDUPTHER

## 2021-04-20 ENCOUNTER — TELEPHONE (OUTPATIENT)
Dept: INTERNAL MEDICINE | Facility: CLINIC | Age: 56
End: 2021-04-20

## 2021-04-20 ENCOUNTER — TELEPHONE (OUTPATIENT)
Dept: CARDIOLOGY | Facility: CLINIC | Age: 56
End: 2021-04-20

## 2021-04-22 ENCOUNTER — OFFICE VISIT (OUTPATIENT)
Dept: PSYCHIATRY | Facility: CLINIC | Age: 56
End: 2021-04-22
Payer: COMMERCIAL

## 2021-04-22 VITALS
SYSTOLIC BLOOD PRESSURE: 127 MMHG | DIASTOLIC BLOOD PRESSURE: 89 MMHG | HEART RATE: 77 BPM | WEIGHT: 156.5 LBS | BODY MASS INDEX: 28.63 KG/M2

## 2021-04-22 DIAGNOSIS — F41.1 GENERALIZED ANXIETY DISORDER: Primary | ICD-10-CM

## 2021-04-22 DIAGNOSIS — F32.5 MAJOR DEPRESSIVE DISORDER IN REMISSION, UNSPECIFIED WHETHER RECURRENT: ICD-10-CM

## 2021-04-22 PROCEDURE — 99214 OFFICE O/P EST MOD 30 MIN: CPT | Mod: S$GLB,,, | Performed by: PSYCHOLOGIST

## 2021-04-22 PROCEDURE — 99214 PR OFFICE/OUTPT VISIT, EST, LEVL IV, 30-39 MIN: ICD-10-PCS | Mod: S$GLB,,, | Performed by: PSYCHOLOGIST

## 2021-04-27 ENCOUNTER — LAB VISIT (OUTPATIENT)
Dept: LAB | Facility: HOSPITAL | Age: 56
End: 2021-04-27
Attending: FAMILY MEDICINE
Payer: COMMERCIAL

## 2021-04-27 ENCOUNTER — LAB VISIT (OUTPATIENT)
Dept: LAB | Facility: HOSPITAL | Age: 56
End: 2021-04-27
Payer: COMMERCIAL

## 2021-04-27 ENCOUNTER — OFFICE VISIT (OUTPATIENT)
Dept: INTERNAL MEDICINE | Facility: CLINIC | Age: 56
End: 2021-04-27
Payer: COMMERCIAL

## 2021-04-27 VITALS
TEMPERATURE: 98 F | HEART RATE: 70 BPM | OXYGEN SATURATION: 96 % | SYSTOLIC BLOOD PRESSURE: 130 MMHG | DIASTOLIC BLOOD PRESSURE: 76 MMHG | BODY MASS INDEX: 28.79 KG/M2 | WEIGHT: 157.44 LBS

## 2021-04-27 DIAGNOSIS — G89.29 CHRONIC PAIN OF LEFT KNEE: Primary | ICD-10-CM

## 2021-04-27 DIAGNOSIS — Z96.651 STATUS POST TOTAL RIGHT KNEE REPLACEMENT: ICD-10-CM

## 2021-04-27 DIAGNOSIS — I10 ESSENTIAL HYPERTENSION: ICD-10-CM

## 2021-04-27 DIAGNOSIS — F41.1 GAD (GENERALIZED ANXIETY DISORDER): ICD-10-CM

## 2021-04-27 DIAGNOSIS — F32.9 MAJOR DEPRESSIVE DISORDER, REMISSION STATUS UNSPECIFIED, UNSPECIFIED WHETHER RECURRENT: ICD-10-CM

## 2021-04-27 DIAGNOSIS — M25.562 CHRONIC PAIN OF LEFT KNEE: Primary | ICD-10-CM

## 2021-04-27 DIAGNOSIS — Z01.818 PRE-OPERATIVE CLEARANCE: ICD-10-CM

## 2021-04-27 PROBLEM — R07.9 CHEST PAIN SYNDROME: Status: RESOLVED | Noted: 2021-02-05 | Resolved: 2021-04-27

## 2021-04-27 LAB
APTT BLDCRRT: 25.1 SEC (ref 21–32)
BASOPHILS # BLD AUTO: 0.03 K/UL (ref 0–0.2)
BASOPHILS NFR BLD: 0.6 % (ref 0–1.9)
BILIRUB UR QL STRIP: NEGATIVE
CLARITY UR: CLEAR
COLOR UR: YELLOW
DIFFERENTIAL METHOD: ABNORMAL
EOSINOPHIL # BLD AUTO: 0.2 K/UL (ref 0–0.5)
EOSINOPHIL NFR BLD: 3 % (ref 0–8)
ERYTHROCYTE [DISTWIDTH] IN BLOOD BY AUTOMATED COUNT: 13.2 % (ref 11.5–14.5)
GLUCOSE UR QL STRIP: NEGATIVE
HCT VFR BLD AUTO: 42.9 % (ref 37–48.5)
HGB BLD-MCNC: 14.1 G/DL (ref 12–16)
HGB UR QL STRIP: NEGATIVE
IMM GRANULOCYTES # BLD AUTO: 0.01 K/UL (ref 0–0.04)
IMM GRANULOCYTES NFR BLD AUTO: 0.2 % (ref 0–0.5)
INR PPP: 1 (ref 0.8–1.2)
KETONES UR QL STRIP: NEGATIVE
LEUKOCYTE ESTERASE UR QL STRIP: NEGATIVE
LYMPHOCYTES # BLD AUTO: 2.1 K/UL (ref 1–4.8)
LYMPHOCYTES NFR BLD: 41.5 % (ref 18–48)
MCH RBC QN AUTO: 31.3 PG (ref 27–31)
MCHC RBC AUTO-ENTMCNC: 32.9 G/DL (ref 32–36)
MCV RBC AUTO: 95 FL (ref 82–98)
MONOCYTES # BLD AUTO: 0.3 K/UL (ref 0.3–1)
MONOCYTES NFR BLD: 6.5 % (ref 4–15)
NEUTROPHILS # BLD AUTO: 2.5 K/UL (ref 1.8–7.7)
NEUTROPHILS NFR BLD: 48.2 % (ref 38–73)
NITRITE UR QL STRIP: NEGATIVE
NRBC BLD-RTO: 0 /100 WBC
PH UR STRIP: 6 [PH] (ref 5–8)
PLATELET # BLD AUTO: 229 K/UL (ref 150–450)
PMV BLD AUTO: 10.7 FL (ref 9.2–12.9)
PROT UR QL STRIP: NEGATIVE
PROTHROMBIN TIME: 10.3 SEC (ref 9–12.5)
RBC # BLD AUTO: 4.51 M/UL (ref 4–5.4)
SP GR UR STRIP: 1.02 (ref 1–1.03)
URN SPEC COLLECT METH UR: NORMAL
WBC # BLD AUTO: 5.08 K/UL (ref 3.9–12.7)

## 2021-04-27 PROCEDURE — 99214 PR OFFICE/OUTPT VISIT, EST, LEVL IV, 30-39 MIN: ICD-10-PCS | Mod: S$GLB,,, | Performed by: FAMILY MEDICINE

## 2021-04-27 PROCEDURE — 85730 THROMBOPLASTIN TIME PARTIAL: CPT | Performed by: FAMILY MEDICINE

## 2021-04-27 PROCEDURE — 85610 PROTHROMBIN TIME: CPT | Performed by: FAMILY MEDICINE

## 2021-04-27 PROCEDURE — 99999 PR PBB SHADOW E&M-EST. PATIENT-LVL IV: CPT | Mod: PBBFAC,,, | Performed by: FAMILY MEDICINE

## 2021-04-27 PROCEDURE — 3008F BODY MASS INDEX DOCD: CPT | Mod: CPTII,S$GLB,, | Performed by: FAMILY MEDICINE

## 2021-04-27 PROCEDURE — 3008F PR BODY MASS INDEX (BMI) DOCUMENTED: ICD-10-PCS | Mod: CPTII,S$GLB,, | Performed by: FAMILY MEDICINE

## 2021-04-27 PROCEDURE — 36415 COLL VENOUS BLD VENIPUNCTURE: CPT | Performed by: FAMILY MEDICINE

## 2021-04-27 PROCEDURE — 99214 OFFICE O/P EST MOD 30 MIN: CPT | Mod: S$GLB,,, | Performed by: FAMILY MEDICINE

## 2021-04-27 PROCEDURE — 1126F AMNT PAIN NOTED NONE PRSNT: CPT | Mod: S$GLB,,, | Performed by: FAMILY MEDICINE

## 2021-04-27 PROCEDURE — 99999 PR PBB SHADOW E&M-EST. PATIENT-LVL IV: ICD-10-PCS | Mod: PBBFAC,,, | Performed by: FAMILY MEDICINE

## 2021-04-27 PROCEDURE — 81003 URINALYSIS AUTO W/O SCOPE: CPT | Performed by: FAMILY MEDICINE

## 2021-04-27 PROCEDURE — 80048 BASIC METABOLIC PNL TOTAL CA: CPT | Performed by: FAMILY MEDICINE

## 2021-04-27 PROCEDURE — 85025 COMPLETE CBC W/AUTO DIFF WBC: CPT | Performed by: FAMILY MEDICINE

## 2021-04-27 PROCEDURE — 1126F PR PAIN SEVERITY QUANTIFIED, NO PAIN PRESENT: ICD-10-PCS | Mod: S$GLB,,, | Performed by: FAMILY MEDICINE

## 2021-04-28 LAB
ANION GAP SERPL CALC-SCNC: 8 MMOL/L (ref 8–16)
BUN SERPL-MCNC: 18 MG/DL (ref 6–20)
CALCIUM SERPL-MCNC: 9.7 MG/DL (ref 8.7–10.5)
CHLORIDE SERPL-SCNC: 108 MMOL/L (ref 95–110)
CO2 SERPL-SCNC: 25 MMOL/L (ref 23–29)
CREAT SERPL-MCNC: 1.2 MG/DL (ref 0.5–1.4)
EST. GFR  (AFRICAN AMERICAN): 58.8 ML/MIN/1.73 M^2
EST. GFR  (NON AFRICAN AMERICAN): 51 ML/MIN/1.73 M^2
GLUCOSE SERPL-MCNC: 78 MG/DL (ref 70–110)
POTASSIUM SERPL-SCNC: 4.5 MMOL/L (ref 3.5–5.1)
SODIUM SERPL-SCNC: 141 MMOL/L (ref 136–145)

## 2021-04-29 ENCOUNTER — TELEPHONE (OUTPATIENT)
Dept: INTERNAL MEDICINE | Facility: CLINIC | Age: 56
End: 2021-04-29

## 2021-05-03 ENCOUNTER — TELEPHONE (OUTPATIENT)
Dept: INTERNAL MEDICINE | Facility: CLINIC | Age: 56
End: 2021-05-03

## 2021-05-03 ENCOUNTER — ANESTHESIA EVENT (OUTPATIENT)
Dept: SURGERY | Facility: HOSPITAL | Age: 56
End: 2021-05-03
Payer: COMMERCIAL

## 2021-05-04 ENCOUNTER — LAB VISIT (OUTPATIENT)
Dept: OTOLARYNGOLOGY | Facility: CLINIC | Age: 56
End: 2021-05-04
Payer: COMMERCIAL

## 2021-05-04 DIAGNOSIS — Z01.818 PREOP TESTING: ICD-10-CM

## 2021-05-04 PROCEDURE — U0003 INFECTIOUS AGENT DETECTION BY NUCLEIC ACID (DNA OR RNA); SEVERE ACUTE RESPIRATORY SYNDROME CORONAVIRUS 2 (SARS-COV-2) (CORONAVIRUS DISEASE [COVID-19]), AMPLIFIED PROBE TECHNIQUE, MAKING USE OF HIGH THROUGHPUT TECHNOLOGIES AS DESCRIBED BY CMS-2020-01-R: HCPCS | Performed by: ORTHOPAEDIC SURGERY

## 2021-05-04 PROCEDURE — U0005 INFEC AGEN DETEC AMPLI PROBE: HCPCS | Performed by: ORTHOPAEDIC SURGERY

## 2021-05-05 LAB — SARS-COV-2 RNA RESP QL NAA+PROBE: NOT DETECTED

## 2021-05-06 ENCOUNTER — HOSPITAL ENCOUNTER (OUTPATIENT)
Facility: HOSPITAL | Age: 56
Discharge: HOME HEALTH | End: 2021-05-07
Attending: ORTHOPAEDIC SURGERY | Admitting: ORTHOPAEDIC SURGERY
Payer: COMMERCIAL

## 2021-05-06 ENCOUNTER — ANESTHESIA (OUTPATIENT)
Dept: SURGERY | Facility: HOSPITAL | Age: 56
End: 2021-05-06
Payer: COMMERCIAL

## 2021-05-06 DIAGNOSIS — M17.12 PRIMARY OSTEOARTHRITIS OF LEFT KNEE: Primary | ICD-10-CM

## 2021-05-06 LAB
ABO + RH BLD: NORMAL
BLD GP AB SCN CELLS X3 SERPL QL: NORMAL

## 2021-05-06 PROCEDURE — C1713 ANCHOR/SCREW BN/BN,TIS/BN: HCPCS | Performed by: ORTHOPAEDIC SURGERY

## 2021-05-06 PROCEDURE — 37000008 HC ANESTHESIA 1ST 15 MINUTES: Performed by: ORTHOPAEDIC SURGERY

## 2021-05-06 PROCEDURE — 63600175 PHARM REV CODE 636 W HCPCS: Performed by: ANESTHESIOLOGY

## 2021-05-06 PROCEDURE — 63600175 PHARM REV CODE 636 W HCPCS: Performed by: ORTHOPAEDIC SURGERY

## 2021-05-06 PROCEDURE — 63600175 PHARM REV CODE 636 W HCPCS: Performed by: NURSE ANESTHETIST, CERTIFIED REGISTERED

## 2021-05-06 PROCEDURE — 71000033 HC RECOVERY, INTIAL HOUR: Performed by: ORTHOPAEDIC SURGERY

## 2021-05-06 PROCEDURE — 25000003 PHARM REV CODE 250: Performed by: ANESTHESIOLOGY

## 2021-05-06 PROCEDURE — C1776 JOINT DEVICE (IMPLANTABLE): HCPCS | Performed by: ORTHOPAEDIC SURGERY

## 2021-05-06 PROCEDURE — 37000009 HC ANESTHESIA EA ADD 15 MINS: Performed by: ORTHOPAEDIC SURGERY

## 2021-05-06 PROCEDURE — 25000003 PHARM REV CODE 250: Performed by: ORTHOPAEDIC SURGERY

## 2021-05-06 PROCEDURE — S0020 INJECTION, BUPIVICAINE HYDRO: HCPCS | Performed by: ANESTHESIOLOGY

## 2021-05-06 PROCEDURE — C9290 INJ, BUPIVACAINE LIPOSOME: HCPCS | Performed by: ORTHOPAEDIC SURGERY

## 2021-05-06 PROCEDURE — 86900 BLOOD TYPING SEROLOGIC ABO: CPT | Performed by: ORTHOPAEDIC SURGERY

## 2021-05-06 PROCEDURE — 71000039 HC RECOVERY, EACH ADD'L HOUR: Performed by: ORTHOPAEDIC SURGERY

## 2021-05-06 PROCEDURE — 63600175 PHARM REV CODE 636 W HCPCS: Performed by: STUDENT IN AN ORGANIZED HEALTH CARE EDUCATION/TRAINING PROGRAM

## 2021-05-06 PROCEDURE — 36000711: Performed by: ORTHOPAEDIC SURGERY

## 2021-05-06 PROCEDURE — 25000003 PHARM REV CODE 250: Performed by: STUDENT IN AN ORGANIZED HEALTH CARE EDUCATION/TRAINING PROGRAM

## 2021-05-06 PROCEDURE — 36000710: Performed by: ORTHOPAEDIC SURGERY

## 2021-05-06 PROCEDURE — 27201423 OPTIME MED/SURG SUP & DEVICES STERILE SUPPLY: Performed by: ORTHOPAEDIC SURGERY

## 2021-05-06 DEVICE — PATELLA POLY DOMED SZ E 8X29MM: Type: IMPLANTABLE DEVICE | Site: KNEE | Status: FUNCTIONAL

## 2021-05-06 DEVICE — IMPLANTABLE DEVICE: Type: IMPLANTABLE DEVICE | Site: KNEE | Status: FUNCTIONAL

## 2021-05-06 RX ORDER — DULOXETIN HYDROCHLORIDE 30 MG/1
60 CAPSULE, DELAYED RELEASE ORAL 2 TIMES DAILY
Status: DISCONTINUED | OUTPATIENT
Start: 2021-05-06 | End: 2021-05-07 | Stop reason: HOSPADM

## 2021-05-06 RX ORDER — OXYCODONE AND ACETAMINOPHEN 5; 325 MG/1; MG/1
1 TABLET ORAL EVERY 4 HOURS PRN
Status: DISCONTINUED | OUTPATIENT
Start: 2021-05-06 | End: 2021-05-07 | Stop reason: HOSPADM

## 2021-05-06 RX ORDER — OXYCODONE HYDROCHLORIDE 5 MG/1
10 TABLET ORAL EVERY 4 HOURS PRN
Status: DISCONTINUED | OUTPATIENT
Start: 2021-05-06 | End: 2021-05-07 | Stop reason: HOSPADM

## 2021-05-06 RX ORDER — ALPRAZOLAM 0.5 MG/1
0.5 TABLET ORAL NIGHTLY PRN
Status: DISCONTINUED | OUTPATIENT
Start: 2021-05-06 | End: 2021-05-07 | Stop reason: HOSPADM

## 2021-05-06 RX ORDER — PROPOFOL 10 MG/ML
VIAL (ML) INTRAVENOUS CONTINUOUS PRN
Status: DISCONTINUED | OUTPATIENT
Start: 2021-05-06 | End: 2021-05-06

## 2021-05-06 RX ORDER — METOPROLOL SUCCINATE 50 MG/1
100 TABLET, EXTENDED RELEASE ORAL NIGHTLY
Status: DISCONTINUED | OUTPATIENT
Start: 2021-05-06 | End: 2021-05-07 | Stop reason: HOSPADM

## 2021-05-06 RX ORDER — FENTANYL CITRATE 50 UG/ML
INJECTION, SOLUTION INTRAMUSCULAR; INTRAVENOUS
Status: DISCONTINUED | OUTPATIENT
Start: 2021-05-06 | End: 2021-05-06

## 2021-05-06 RX ORDER — TRANEXAMIC ACID 100 MG/ML
1000 INJECTION, SOLUTION INTRAVENOUS ONCE
Status: COMPLETED | OUTPATIENT
Start: 2021-05-06 | End: 2021-05-06

## 2021-05-06 RX ORDER — NEOMYCIN AND POLYMYXIN B SULFATES 40; 200000 MG/ML; [USP'U]/ML
SOLUTION IRRIGATION
Status: DISCONTINUED | OUTPATIENT
Start: 2021-05-06 | End: 2021-05-06 | Stop reason: HOSPADM

## 2021-05-06 RX ORDER — NAPROXEN SODIUM 220 MG/1
81 TABLET, FILM COATED ORAL 2 TIMES DAILY
Qty: 60 TABLET | Refills: 0 | OUTPATIENT
Start: 2021-05-07 | End: 2021-05-07

## 2021-05-06 RX ORDER — BUPIVACAINE HYDROCHLORIDE 2.5 MG/ML
INJECTION, SOLUTION EPIDURAL; INFILTRATION; INTRACAUDAL
Status: DISCONTINUED | OUTPATIENT
Start: 2021-05-06 | End: 2021-05-06 | Stop reason: HOSPADM

## 2021-05-06 RX ORDER — OXYCODONE AND ACETAMINOPHEN 5; 325 MG/1; MG/1
1 TABLET ORAL EVERY 4 HOURS PRN
Qty: 42 TABLET | Refills: 0 | Status: SHIPPED | OUTPATIENT
Start: 2021-05-06 | End: 2021-05-07 | Stop reason: HOSPADM

## 2021-05-06 RX ORDER — LIDOCAINE HYDROCHLORIDE 10 MG/ML
INJECTION, SOLUTION EPIDURAL; INFILTRATION; INTRACAUDAL; PERINEURAL
Status: COMPLETED | OUTPATIENT
Start: 2021-05-06 | End: 2021-05-06

## 2021-05-06 RX ORDER — MIDAZOLAM HYDROCHLORIDE 1 MG/ML
INJECTION, SOLUTION INTRAMUSCULAR; INTRAVENOUS
Status: DISCONTINUED | OUTPATIENT
Start: 2021-05-06 | End: 2021-05-06

## 2021-05-06 RX ORDER — DEXAMETHASONE SODIUM PHOSPHATE 4 MG/ML
INJECTION, SOLUTION INTRA-ARTICULAR; INTRALESIONAL; INTRAMUSCULAR; INTRAVENOUS; SOFT TISSUE
Status: DISCONTINUED | OUTPATIENT
Start: 2021-05-06 | End: 2021-05-06

## 2021-05-06 RX ORDER — BUPIVACAINE HYDROCHLORIDE 7.5 MG/ML
INJECTION, SOLUTION EPIDURAL; RETROBULBAR
Status: COMPLETED | OUTPATIENT
Start: 2021-05-06 | End: 2021-05-06

## 2021-05-06 RX ORDER — NAPROXEN SODIUM 220 MG/1
81 TABLET, FILM COATED ORAL 2 TIMES DAILY
Status: DISCONTINUED | OUTPATIENT
Start: 2021-05-07 | End: 2021-05-07 | Stop reason: HOSPADM

## 2021-05-06 RX ORDER — AMOXICILLIN 250 MG
1 CAPSULE ORAL 2 TIMES DAILY
Qty: 20 TABLET | Refills: 0 | OUTPATIENT
Start: 2021-05-07 | End: 2021-05-07

## 2021-05-06 RX ORDER — HYDROMORPHONE HYDROCHLORIDE 2 MG/ML
0.2 INJECTION, SOLUTION INTRAMUSCULAR; INTRAVENOUS; SUBCUTANEOUS EVERY 5 MIN PRN
Status: DISCONTINUED | OUTPATIENT
Start: 2021-05-06 | End: 2021-05-06 | Stop reason: HOSPADM

## 2021-05-06 RX ORDER — SODIUM CHLORIDE 0.9 % (FLUSH) 0.9 %
10 SYRINGE (ML) INJECTION
Status: DISCONTINUED | OUTPATIENT
Start: 2021-05-06 | End: 2021-05-07 | Stop reason: HOSPADM

## 2021-05-06 RX ORDER — FAMOTIDINE 20 MG/1
20 TABLET, FILM COATED ORAL 2 TIMES DAILY
Status: DISCONTINUED | OUTPATIENT
Start: 2021-05-06 | End: 2021-05-07 | Stop reason: HOSPADM

## 2021-05-06 RX ORDER — MUPIROCIN 20 MG/G
1 OINTMENT TOPICAL 2 TIMES DAILY
Status: DISCONTINUED | OUTPATIENT
Start: 2021-05-06 | End: 2021-05-07 | Stop reason: HOSPADM

## 2021-05-06 RX ORDER — ONDANSETRON 8 MG/1
8 TABLET, ORALLY DISINTEGRATING ORAL EVERY 8 HOURS PRN
Status: DISCONTINUED | OUTPATIENT
Start: 2021-05-06 | End: 2021-05-07 | Stop reason: HOSPADM

## 2021-05-06 RX ORDER — OXYCODONE AND ACETAMINOPHEN 5; 325 MG/1; MG/1
1 TABLET ORAL EVERY 4 HOURS PRN
Status: DISCONTINUED | OUTPATIENT
Start: 2021-05-06 | End: 2021-05-06

## 2021-05-06 RX ORDER — AMOXICILLIN 250 MG
1 CAPSULE ORAL 2 TIMES DAILY
Status: DISCONTINUED | OUTPATIENT
Start: 2021-05-06 | End: 2021-05-07 | Stop reason: HOSPADM

## 2021-05-06 RX ORDER — CLONIDINE HYDROCHLORIDE 0.1 MG/1
0.1 TABLET ORAL DAILY
Status: DISCONTINUED | OUTPATIENT
Start: 2021-05-07 | End: 2021-05-07 | Stop reason: HOSPADM

## 2021-05-06 RX ORDER — CEFAZOLIN SODIUM 2 G/50ML
2 SOLUTION INTRAVENOUS
Status: COMPLETED | OUTPATIENT
Start: 2021-05-06 | End: 2021-05-07

## 2021-05-06 RX ORDER — VANCOMYCIN HYDROCHLORIDE 1 G/20ML
INJECTION, POWDER, LYOPHILIZED, FOR SOLUTION INTRAVENOUS
Status: DISCONTINUED | OUTPATIENT
Start: 2021-05-06 | End: 2021-05-06 | Stop reason: HOSPADM

## 2021-05-06 RX ORDER — SODIUM CHLORIDE, SODIUM LACTATE, POTASSIUM CHLORIDE, CALCIUM CHLORIDE 600; 310; 30; 20 MG/100ML; MG/100ML; MG/100ML; MG/100ML
INJECTION, SOLUTION INTRAVENOUS CONTINUOUS
Status: ACTIVE | OUTPATIENT
Start: 2021-05-06 | End: 2021-05-06

## 2021-05-06 RX ORDER — ONDANSETRON 2 MG/ML
4 INJECTION INTRAMUSCULAR; INTRAVENOUS DAILY PRN
Status: DISCONTINUED | OUTPATIENT
Start: 2021-05-06 | End: 2021-05-06 | Stop reason: HOSPADM

## 2021-05-06 RX ORDER — ONDANSETRON 2 MG/ML
INJECTION INTRAMUSCULAR; INTRAVENOUS
Status: DISCONTINUED | OUTPATIENT
Start: 2021-05-06 | End: 2021-05-06

## 2021-05-06 RX ADMIN — SODIUM CHLORIDE, SODIUM LACTATE, POTASSIUM CHLORIDE, AND CALCIUM CHLORIDE: 600; 310; 30; 20 INJECTION, SOLUTION INTRAVENOUS at 01:05

## 2021-05-06 RX ADMIN — OXYCODONE 10 MG: 5 TABLET ORAL at 08:05

## 2021-05-06 RX ADMIN — HYDROMORPHONE HYDROCHLORIDE 0.2 MG: 2 INJECTION INTRAMUSCULAR; INTRAVENOUS; SUBCUTANEOUS at 05:05

## 2021-05-06 RX ADMIN — MUPIROCIN 1 G: 20 OINTMENT TOPICAL at 08:05

## 2021-05-06 RX ADMIN — HYDROMORPHONE HYDROCHLORIDE 0.2 MG: 2 INJECTION INTRAMUSCULAR; INTRAVENOUS; SUBCUTANEOUS at 06:05

## 2021-05-06 RX ADMIN — ONDANSETRON 4 MG: 2 INJECTION, SOLUTION INTRAMUSCULAR; INTRAVENOUS at 04:05

## 2021-05-06 RX ADMIN — MIDAZOLAM HYDROCHLORIDE 2 MG: 1 INJECTION, SOLUTION INTRAMUSCULAR; INTRAVENOUS at 01:05

## 2021-05-06 RX ADMIN — FENTANYL CITRATE 15 MCG: 50 INJECTION, SOLUTION INTRAMUSCULAR; INTRAVENOUS at 01:05

## 2021-05-06 RX ADMIN — DEXAMETHASONE SODIUM PHOSPHATE 4 MG: 4 INJECTION, SOLUTION INTRAMUSCULAR; INTRAVENOUS at 01:05

## 2021-05-06 RX ADMIN — BUPIVACAINE HYDROCHLORIDE 1.5 ML: 7.5 INJECTION, SOLUTION EPIDURAL; RETROBULBAR at 01:05

## 2021-05-06 RX ADMIN — CEFAZOLIN 2 G: 1 INJECTION, POWDER, FOR SOLUTION INTRAMUSCULAR; INTRAVENOUS at 02:05

## 2021-05-06 RX ADMIN — DULOXETINE HYDROCHLORIDE 60 MG: 30 CAPSULE, DELAYED RELEASE ORAL at 08:05

## 2021-05-06 RX ADMIN — SODIUM CHLORIDE, SODIUM LACTATE, POTASSIUM CHLORIDE, AND CALCIUM CHLORIDE: 600; 310; 30; 20 INJECTION, SOLUTION INTRAVENOUS at 03:05

## 2021-05-06 RX ADMIN — CEFAZOLIN SODIUM 2 G: 2 SOLUTION INTRAVENOUS at 10:05

## 2021-05-06 RX ADMIN — OXYCODONE HYDROCHLORIDE AND ACETAMINOPHEN 1 TABLET: 5; 325 TABLET ORAL at 11:05

## 2021-05-06 RX ADMIN — FAMOTIDINE 20 MG: 20 TABLET ORAL at 08:05

## 2021-05-06 RX ADMIN — TRANEXAMIC ACID 1000 MG: 100 INJECTION, SOLUTION INTRAVENOUS at 05:05

## 2021-05-06 RX ADMIN — STANDARDIZED SENNA CONCENTRATE AND DOCUSATE SODIUM 1 TABLET: 8.6; 5 TABLET ORAL at 08:05

## 2021-05-06 RX ADMIN — LIDOCAINE HYDROCHLORIDE 20 MG: 10 INJECTION, SOLUTION EPIDURAL; INFILTRATION; INTRACAUDAL; PERINEURAL at 01:05

## 2021-05-06 RX ADMIN — TRANEXAMIC ACID 1000 MG: 100 INJECTION, SOLUTION INTRAVENOUS at 02:05

## 2021-05-06 RX ADMIN — PROPOFOL 125 MCG/KG/MIN: 10 INJECTION, EMULSION INTRAVENOUS at 01:05

## 2021-05-07 ENCOUNTER — TELEPHONE (OUTPATIENT)
Dept: INTERNAL MEDICINE | Facility: CLINIC | Age: 56
End: 2021-05-07

## 2021-05-07 VITALS
OXYGEN SATURATION: 97 % | DIASTOLIC BLOOD PRESSURE: 85 MMHG | HEIGHT: 62 IN | SYSTOLIC BLOOD PRESSURE: 165 MMHG | BODY MASS INDEX: 29.01 KG/M2 | TEMPERATURE: 98 F | WEIGHT: 157.63 LBS | HEART RATE: 83 BPM | RESPIRATION RATE: 18 BRPM

## 2021-05-07 LAB
ANION GAP SERPL CALC-SCNC: 11 MMOL/L (ref 8–16)
BUN SERPL-MCNC: 12 MG/DL (ref 6–20)
CALCIUM SERPL-MCNC: 9.3 MG/DL (ref 8.7–10.5)
CHLORIDE SERPL-SCNC: 104 MMOL/L (ref 95–110)
CO2 SERPL-SCNC: 22 MMOL/L (ref 23–29)
CREAT SERPL-MCNC: 1 MG/DL (ref 0.5–1.4)
EST. GFR  (AFRICAN AMERICAN): >60 ML/MIN/1.73 M^2
EST. GFR  (NON AFRICAN AMERICAN): >60 ML/MIN/1.73 M^2
GLUCOSE SERPL-MCNC: 115 MG/DL (ref 70–110)
POTASSIUM SERPL-SCNC: 4.5 MMOL/L (ref 3.5–5.1)
SODIUM SERPL-SCNC: 137 MMOL/L (ref 136–145)

## 2021-05-07 PROCEDURE — 36415 COLL VENOUS BLD VENIPUNCTURE: CPT | Performed by: ORTHOPAEDIC SURGERY

## 2021-05-07 PROCEDURE — 97161 PT EVAL LOW COMPLEX 20 MIN: CPT

## 2021-05-07 PROCEDURE — 97110 THERAPEUTIC EXERCISES: CPT

## 2021-05-07 PROCEDURE — 94760 N-INVAS EAR/PLS OXIMETRY 1: CPT

## 2021-05-07 PROCEDURE — 80048 BASIC METABOLIC PNL TOTAL CA: CPT | Performed by: ORTHOPAEDIC SURGERY

## 2021-05-07 PROCEDURE — 63600175 PHARM REV CODE 636 W HCPCS: Performed by: ORTHOPAEDIC SURGERY

## 2021-05-07 PROCEDURE — 99900038 HC OT GENERIC THERAPY SCREENING (STAT)

## 2021-05-07 PROCEDURE — 25000003 PHARM REV CODE 250: Performed by: ORTHOPAEDIC SURGERY

## 2021-05-07 RX ORDER — METHOCARBAMOL 500 MG/1
500 TABLET, FILM COATED ORAL ONCE
Status: DISCONTINUED | OUTPATIENT
Start: 2021-05-07 | End: 2021-05-07 | Stop reason: HOSPADM

## 2021-05-07 RX ORDER — NAPROXEN SODIUM 220 MG/1
81 TABLET, FILM COATED ORAL 2 TIMES DAILY
Qty: 62 TABLET | Refills: 0 | Status: SHIPPED | OUTPATIENT
Start: 2021-05-07 | End: 2021-11-09 | Stop reason: SDUPTHER

## 2021-05-07 RX ORDER — OXYCODONE AND ACETAMINOPHEN 10; 325 MG/1; MG/1
1 TABLET ORAL EVERY 4 HOURS PRN
Qty: 41 TABLET | Refills: 0 | Status: SHIPPED | OUTPATIENT
Start: 2021-05-07 | End: 2021-05-12 | Stop reason: SINTOL

## 2021-05-07 RX ORDER — MORPHINE SULFATE 4 MG/ML
4 INJECTION, SOLUTION INTRAMUSCULAR; INTRAVENOUS EVERY 4 HOURS PRN
Status: DISCONTINUED | OUTPATIENT
Start: 2021-05-07 | End: 2021-05-07 | Stop reason: HOSPADM

## 2021-05-07 RX ORDER — AMOXICILLIN 250 MG
1 CAPSULE ORAL 2 TIMES DAILY
Qty: 28 TABLET | Refills: 0 | Status: SHIPPED | OUTPATIENT
Start: 2021-05-07 | End: 2021-05-21

## 2021-05-07 RX ADMIN — CEFAZOLIN SODIUM 2 G: 2 SOLUTION INTRAVENOUS at 05:05

## 2021-05-07 RX ADMIN — OXYCODONE 10 MG: 5 TABLET ORAL at 01:05

## 2021-05-07 RX ADMIN — MORPHINE SULFATE 4 MG: 4 INJECTION, SOLUTION INTRAMUSCULAR; INTRAVENOUS at 07:05

## 2021-05-10 ENCOUNTER — TELEPHONE (OUTPATIENT)
Dept: RHEUMATOLOGY | Facility: CLINIC | Age: 56
End: 2021-05-10

## 2021-05-12 ENCOUNTER — OFFICE VISIT (OUTPATIENT)
Dept: INTERNAL MEDICINE | Facility: CLINIC | Age: 56
End: 2021-05-12
Payer: COMMERCIAL

## 2021-05-12 VITALS
SYSTOLIC BLOOD PRESSURE: 150 MMHG | BODY MASS INDEX: 29.01 KG/M2 | HEIGHT: 62 IN | OXYGEN SATURATION: 98 % | WEIGHT: 157.63 LBS | TEMPERATURE: 97 F | DIASTOLIC BLOOD PRESSURE: 88 MMHG | HEART RATE: 95 BPM

## 2021-05-12 DIAGNOSIS — I10 ESSENTIAL HYPERTENSION: ICD-10-CM

## 2021-05-12 DIAGNOSIS — Z09 HOSPITAL DISCHARGE FOLLOW-UP: Primary | ICD-10-CM

## 2021-05-12 DIAGNOSIS — Z96.651 STATUS POST TOTAL RIGHT KNEE REPLACEMENT: ICD-10-CM

## 2021-05-12 PROCEDURE — 99214 OFFICE O/P EST MOD 30 MIN: CPT | Mod: S$GLB,,, | Performed by: NURSE PRACTITIONER

## 2021-05-12 PROCEDURE — 99214 PR OFFICE/OUTPT VISIT, EST, LEVL IV, 30-39 MIN: ICD-10-PCS | Mod: S$GLB,,, | Performed by: NURSE PRACTITIONER

## 2021-05-12 PROCEDURE — 1125F PR PAIN SEVERITY QUANTIFIED, PAIN PRESENT: ICD-10-PCS | Mod: S$GLB,,, | Performed by: NURSE PRACTITIONER

## 2021-05-12 PROCEDURE — 1125F AMNT PAIN NOTED PAIN PRSNT: CPT | Mod: S$GLB,,, | Performed by: NURSE PRACTITIONER

## 2021-05-12 PROCEDURE — 99999 PR PBB SHADOW E&M-EST. PATIENT-LVL IV: ICD-10-PCS | Mod: PBBFAC,,, | Performed by: NURSE PRACTITIONER

## 2021-05-12 PROCEDURE — 3008F BODY MASS INDEX DOCD: CPT | Mod: CPTII,S$GLB,, | Performed by: NURSE PRACTITIONER

## 2021-05-12 PROCEDURE — 99999 PR PBB SHADOW E&M-EST. PATIENT-LVL IV: CPT | Mod: PBBFAC,,, | Performed by: NURSE PRACTITIONER

## 2021-05-12 PROCEDURE — 3008F PR BODY MASS INDEX (BMI) DOCUMENTED: ICD-10-PCS | Mod: CPTII,S$GLB,, | Performed by: NURSE PRACTITIONER

## 2021-05-12 RX ORDER — MELOXICAM 7.5 MG
7.5 TABLET ORAL 2 TIMES DAILY
COMMUNITY
Start: 2021-05-07 | End: 2021-05-12

## 2021-05-12 RX ORDER — TRAMADOL HYDROCHLORIDE 50 MG/1
50 TABLET ORAL EVERY 8 HOURS PRN
Qty: 21 TABLET | Refills: 0 | Status: SHIPPED | OUTPATIENT
Start: 2021-05-12 | End: 2021-06-14 | Stop reason: SDUPTHER

## 2021-05-12 RX ORDER — TELMISARTAN 80 MG/1
80 TABLET ORAL DAILY
Qty: 90 TABLET | Refills: 3 | Status: SHIPPED | OUTPATIENT
Start: 2021-05-12 | End: 2022-05-23 | Stop reason: SDUPTHER

## 2021-05-12 RX ORDER — GABAPENTIN 100 MG/1
CAPSULE ORAL
COMMUNITY
Start: 2021-05-07 | End: 2022-01-05 | Stop reason: SINTOL

## 2021-05-12 RX ORDER — METHOCARBAMOL 500 MG/1
500 TABLET, FILM COATED ORAL 2 TIMES DAILY
COMMUNITY
Start: 2021-05-07 | End: 2021-11-09 | Stop reason: SDUPTHER

## 2021-05-14 ENCOUNTER — TELEPHONE (OUTPATIENT)
Dept: NEPHROLOGY | Facility: CLINIC | Age: 56
End: 2021-05-14

## 2021-05-14 ENCOUNTER — TELEPHONE (OUTPATIENT)
Dept: PEDIATRICS | Facility: CLINIC | Age: 56
End: 2021-05-14

## 2021-05-17 ENCOUNTER — TELEPHONE (OUTPATIENT)
Dept: RHEUMATOLOGY | Facility: CLINIC | Age: 56
End: 2021-05-17

## 2021-05-31 ENCOUNTER — OFFICE VISIT (OUTPATIENT)
Dept: PSYCHIATRY | Facility: CLINIC | Age: 56
End: 2021-05-31
Payer: COMMERCIAL

## 2021-05-31 DIAGNOSIS — F32.9 MAJOR DEPRESSIVE DISORDER, REMISSION STATUS UNSPECIFIED, UNSPECIFIED WHETHER RECURRENT: ICD-10-CM

## 2021-05-31 DIAGNOSIS — F41.1 GENERALIZED ANXIETY DISORDER: Primary | ICD-10-CM

## 2021-05-31 PROCEDURE — 99214 PR OFFICE/OUTPT VISIT, EST, LEVL IV, 30-39 MIN: ICD-10-PCS | Mod: 95,,, | Performed by: PSYCHOLOGIST

## 2021-05-31 PROCEDURE — 99214 OFFICE O/P EST MOD 30 MIN: CPT | Mod: 95,,, | Performed by: PSYCHOLOGIST

## 2021-05-31 RX ORDER — BUSPIRONE HYDROCHLORIDE 10 MG/1
10 TABLET ORAL 2 TIMES DAILY
Qty: 60 TABLET | Refills: 2 | Status: SHIPPED | OUTPATIENT
Start: 2021-05-31 | End: 2022-01-05

## 2021-06-01 ENCOUNTER — TELEPHONE (OUTPATIENT)
Dept: PULMONOLOGY | Facility: CLINIC | Age: 56
End: 2021-06-01

## 2021-06-02 ENCOUNTER — PATIENT MESSAGE (OUTPATIENT)
Dept: ADMINISTRATIVE | Facility: HOSPITAL | Age: 56
End: 2021-06-02

## 2021-06-07 DIAGNOSIS — Z96.652 S/P TKR (TOTAL KNEE REPLACEMENT), LEFT: Primary | ICD-10-CM

## 2021-06-09 ENCOUNTER — TELEPHONE (OUTPATIENT)
Dept: PSYCHIATRY | Facility: CLINIC | Age: 56
End: 2021-06-09

## 2021-06-10 ENCOUNTER — TELEPHONE (OUTPATIENT)
Dept: PSYCHIATRY | Facility: CLINIC | Age: 56
End: 2021-06-10

## 2021-06-11 ENCOUNTER — CLINICAL SUPPORT (OUTPATIENT)
Dept: REHABILITATION | Facility: HOSPITAL | Age: 56
End: 2021-06-11
Attending: ORTHOPAEDIC SURGERY
Payer: COMMERCIAL

## 2021-06-11 DIAGNOSIS — Z96.652 S/P TKR (TOTAL KNEE REPLACEMENT), LEFT: ICD-10-CM

## 2021-06-11 DIAGNOSIS — R29.898 LEFT LEG WEAKNESS: ICD-10-CM

## 2021-06-11 PROCEDURE — 97140 MANUAL THERAPY 1/> REGIONS: CPT

## 2021-06-11 PROCEDURE — 97110 THERAPEUTIC EXERCISES: CPT

## 2021-06-11 PROCEDURE — 97161 PT EVAL LOW COMPLEX 20 MIN: CPT

## 2021-06-14 DIAGNOSIS — Z96.651 STATUS POST TOTAL RIGHT KNEE REPLACEMENT: ICD-10-CM

## 2021-06-14 RX ORDER — TRAMADOL HYDROCHLORIDE 50 MG/1
50 TABLET ORAL EVERY 12 HOURS PRN
Qty: 60 TABLET | Refills: 1 | Status: SHIPPED | OUTPATIENT
Start: 2021-06-14 | End: 2021-08-17 | Stop reason: SDUPTHER

## 2021-06-18 ENCOUNTER — TELEPHONE (OUTPATIENT)
Dept: REHABILITATION | Facility: HOSPITAL | Age: 56
End: 2021-06-18

## 2021-06-18 ENCOUNTER — CLINICAL SUPPORT (OUTPATIENT)
Dept: REHABILITATION | Facility: HOSPITAL | Age: 56
End: 2021-06-18
Payer: COMMERCIAL

## 2021-06-18 DIAGNOSIS — R29.898 LEFT LEG WEAKNESS: ICD-10-CM

## 2021-06-18 DIAGNOSIS — Z96.652 S/P TKR (TOTAL KNEE REPLACEMENT), LEFT: Primary | ICD-10-CM

## 2021-06-18 PROCEDURE — 97110 THERAPEUTIC EXERCISES: CPT

## 2021-06-18 PROCEDURE — 97140 MANUAL THERAPY 1/> REGIONS: CPT

## 2021-06-25 ENCOUNTER — CLINICAL SUPPORT (OUTPATIENT)
Dept: REHABILITATION | Facility: HOSPITAL | Age: 56
End: 2021-06-25
Payer: COMMERCIAL

## 2021-06-25 DIAGNOSIS — R29.898 LEFT LEG WEAKNESS: ICD-10-CM

## 2021-06-25 DIAGNOSIS — Z96.652 S/P TKR (TOTAL KNEE REPLACEMENT), LEFT: Primary | ICD-10-CM

## 2021-06-25 PROCEDURE — 97110 THERAPEUTIC EXERCISES: CPT

## 2021-07-01 ENCOUNTER — CLINICAL SUPPORT (OUTPATIENT)
Dept: REHABILITATION | Facility: HOSPITAL | Age: 56
End: 2021-07-01
Payer: COMMERCIAL

## 2021-07-01 DIAGNOSIS — R29.898 LEFT LEG WEAKNESS: ICD-10-CM

## 2021-07-01 DIAGNOSIS — Z96.652 S/P TKR (TOTAL KNEE REPLACEMENT), LEFT: Primary | ICD-10-CM

## 2021-07-01 PROCEDURE — 97110 THERAPEUTIC EXERCISES: CPT

## 2021-07-06 ENCOUNTER — TELEPHONE (OUTPATIENT)
Dept: NEPHROLOGY | Facility: CLINIC | Age: 56
End: 2021-07-06

## 2021-07-13 ENCOUNTER — CLINICAL SUPPORT (OUTPATIENT)
Dept: REHABILITATION | Facility: HOSPITAL | Age: 56
End: 2021-07-13
Payer: COMMERCIAL

## 2021-07-13 DIAGNOSIS — R29.898 LEFT LEG WEAKNESS: ICD-10-CM

## 2021-07-13 PROCEDURE — 97110 THERAPEUTIC EXERCISES: CPT | Mod: CQ

## 2021-07-13 PROCEDURE — 97140 MANUAL THERAPY 1/> REGIONS: CPT | Mod: CQ

## 2021-07-16 ENCOUNTER — TELEPHONE (OUTPATIENT)
Dept: REHABILITATION | Facility: HOSPITAL | Age: 56
End: 2021-07-16

## 2021-07-16 ENCOUNTER — DOCUMENTATION ONLY (OUTPATIENT)
Dept: REHABILITATION | Facility: HOSPITAL | Age: 56
End: 2021-07-16

## 2021-07-20 ENCOUNTER — CLINICAL SUPPORT (OUTPATIENT)
Dept: REHABILITATION | Facility: HOSPITAL | Age: 56
End: 2021-07-20
Payer: COMMERCIAL

## 2021-07-20 DIAGNOSIS — R29.898 LEFT LEG WEAKNESS: Primary | ICD-10-CM

## 2021-07-20 DIAGNOSIS — Z96.652 S/P TKR (TOTAL KNEE REPLACEMENT), LEFT: ICD-10-CM

## 2021-07-20 PROCEDURE — 97110 THERAPEUTIC EXERCISES: CPT

## 2021-07-20 PROCEDURE — 97140 MANUAL THERAPY 1/> REGIONS: CPT

## 2021-07-30 ENCOUNTER — CLINICAL SUPPORT (OUTPATIENT)
Dept: REHABILITATION | Facility: HOSPITAL | Age: 56
End: 2021-07-30
Payer: COMMERCIAL

## 2021-07-30 DIAGNOSIS — R29.898 LEFT LEG WEAKNESS: ICD-10-CM

## 2021-07-30 PROCEDURE — 97110 THERAPEUTIC EXERCISES: CPT | Mod: CQ

## 2021-07-30 PROCEDURE — 97140 MANUAL THERAPY 1/> REGIONS: CPT | Mod: CQ

## 2021-08-02 ENCOUNTER — CLINICAL SUPPORT (OUTPATIENT)
Dept: REHABILITATION | Facility: HOSPITAL | Age: 56
End: 2021-08-02
Payer: COMMERCIAL

## 2021-08-02 DIAGNOSIS — R29.898 LEFT LEG WEAKNESS: ICD-10-CM

## 2021-08-02 PROCEDURE — 97140 MANUAL THERAPY 1/> REGIONS: CPT | Mod: CQ

## 2021-08-02 PROCEDURE — 97110 THERAPEUTIC EXERCISES: CPT | Mod: CQ

## 2021-08-11 ENCOUNTER — CLINICAL SUPPORT (OUTPATIENT)
Dept: REHABILITATION | Facility: HOSPITAL | Age: 56
End: 2021-08-11
Payer: COMMERCIAL

## 2021-08-11 DIAGNOSIS — R29.898 LEFT LEG WEAKNESS: ICD-10-CM

## 2021-08-11 PROCEDURE — 97110 THERAPEUTIC EXERCISES: CPT

## 2021-08-11 PROCEDURE — 97140 MANUAL THERAPY 1/> REGIONS: CPT

## 2021-08-17 DIAGNOSIS — Z96.651 STATUS POST TOTAL RIGHT KNEE REPLACEMENT: ICD-10-CM

## 2021-08-17 RX ORDER — TRAMADOL HYDROCHLORIDE 50 MG/1
50 TABLET ORAL EVERY 12 HOURS PRN
Qty: 60 TABLET | Refills: 1 | Status: SHIPPED | OUTPATIENT
Start: 2021-08-17 | End: 2021-10-15 | Stop reason: SDUPTHER

## 2021-08-25 DIAGNOSIS — I10 ESSENTIAL HYPERTENSION: Primary | ICD-10-CM

## 2021-08-25 DIAGNOSIS — R94.31 ABNORMAL EKG: ICD-10-CM

## 2021-08-25 DIAGNOSIS — R07.9 CHEST PAIN SYNDROME: ICD-10-CM

## 2021-09-29 ENCOUNTER — TELEPHONE (OUTPATIENT)
Dept: PSYCHIATRY | Facility: CLINIC | Age: 56
End: 2021-09-29

## 2021-10-04 ENCOUNTER — PATIENT OUTREACH (OUTPATIENT)
Dept: ADMINISTRATIVE | Facility: OTHER | Age: 56
End: 2021-10-04

## 2021-10-04 DIAGNOSIS — Z12.31 ENCOUNTER FOR SCREENING MAMMOGRAM FOR MALIGNANT NEOPLASM OF BREAST: Primary | ICD-10-CM

## 2021-10-04 DIAGNOSIS — Z12.11 ENCOUNTER FOR FIT (FECAL IMMUNOCHEMICAL TEST) SCREENING: ICD-10-CM

## 2021-10-07 ENCOUNTER — HOSPITAL ENCOUNTER (OUTPATIENT)
Dept: CARDIOLOGY | Facility: HOSPITAL | Age: 56
Discharge: HOME | End: 2021-10-07
Attending: INTERNAL MEDICINE
Payer: COMMERCIAL

## 2021-10-07 DIAGNOSIS — R94.31 ABNORMAL EKG: ICD-10-CM

## 2021-10-07 DIAGNOSIS — I10 ESSENTIAL HYPERTENSION: ICD-10-CM

## 2021-10-07 DIAGNOSIS — R07.9 CHEST PAIN SYNDROME: ICD-10-CM

## 2021-10-07 PROCEDURE — 93005 ELECTROCARDIOGRAM TRACING: CPT

## 2021-10-07 PROCEDURE — 93010 ELECTROCARDIOGRAM REPORT: CPT | Mod: ,,, | Performed by: INTERNAL MEDICINE

## 2021-10-07 PROCEDURE — 93010 EKG 12-LEAD: ICD-10-PCS | Mod: ,,, | Performed by: INTERNAL MEDICINE

## 2021-10-08 ENCOUNTER — TELEPHONE (OUTPATIENT)
Dept: PAIN MEDICINE | Facility: CLINIC | Age: 56
End: 2021-10-08

## 2021-10-08 ENCOUNTER — OFFICE VISIT (OUTPATIENT)
Dept: CARDIOLOGY | Facility: CLINIC | Age: 56
End: 2021-10-08
Payer: COMMERCIAL

## 2021-10-08 VITALS
SYSTOLIC BLOOD PRESSURE: 140 MMHG | WEIGHT: 155.63 LBS | OXYGEN SATURATION: 99 % | DIASTOLIC BLOOD PRESSURE: 89 MMHG | BODY MASS INDEX: 28.47 KG/M2 | HEART RATE: 77 BPM

## 2021-10-08 DIAGNOSIS — R06.83 SNORING: ICD-10-CM

## 2021-10-08 DIAGNOSIS — R29.898 LEFT LEG WEAKNESS: ICD-10-CM

## 2021-10-08 DIAGNOSIS — M50.30 DDD (DEGENERATIVE DISC DISEASE), CERVICAL: ICD-10-CM

## 2021-10-08 DIAGNOSIS — F41.9 ANXIETY: ICD-10-CM

## 2021-10-08 DIAGNOSIS — E79.0 HYPERURICEMIA: ICD-10-CM

## 2021-10-08 DIAGNOSIS — Z96.651 STATUS POST TOTAL RIGHT KNEE REPLACEMENT: ICD-10-CM

## 2021-10-08 DIAGNOSIS — Z98.84 HISTORY OF GASTRIC RESTRICTIVE SURGERY: ICD-10-CM

## 2021-10-08 DIAGNOSIS — R94.31 ABNORMAL EKG: ICD-10-CM

## 2021-10-08 DIAGNOSIS — I10 ESSENTIAL HYPERTENSION: Primary | ICD-10-CM

## 2021-10-08 PROCEDURE — 4010F PR ACE/ARB THEARPY RXD/TAKEN: ICD-10-PCS | Mod: CPTII,S$GLB,, | Performed by: INTERNAL MEDICINE

## 2021-10-08 PROCEDURE — 1160F PR REVIEW ALL MEDS BY PRESCRIBER/CLIN PHARMACIST DOCUMENTED: ICD-10-PCS | Mod: CPTII,S$GLB,, | Performed by: INTERNAL MEDICINE

## 2021-10-08 PROCEDURE — 99213 PR OFFICE/OUTPT VISIT, EST, LEVL III, 20-29 MIN: ICD-10-PCS | Mod: S$GLB,,, | Performed by: INTERNAL MEDICINE

## 2021-10-08 PROCEDURE — 4010F ACE/ARB THERAPY RXD/TAKEN: CPT | Mod: CPTII,S$GLB,, | Performed by: INTERNAL MEDICINE

## 2021-10-08 PROCEDURE — 3077F PR MOST RECENT SYSTOLIC BLOOD PRESSURE >= 140 MM HG: ICD-10-PCS | Mod: CPTII,S$GLB,, | Performed by: INTERNAL MEDICINE

## 2021-10-08 PROCEDURE — 99213 OFFICE O/P EST LOW 20 MIN: CPT | Mod: S$GLB,,, | Performed by: INTERNAL MEDICINE

## 2021-10-08 PROCEDURE — 3008F BODY MASS INDEX DOCD: CPT | Mod: CPTII,S$GLB,, | Performed by: INTERNAL MEDICINE

## 2021-10-08 PROCEDURE — 1160F RVW MEDS BY RX/DR IN RCRD: CPT | Mod: CPTII,S$GLB,, | Performed by: INTERNAL MEDICINE

## 2021-10-08 PROCEDURE — 1159F PR MEDICATION LIST DOCUMENTED IN MEDICAL RECORD: ICD-10-PCS | Mod: CPTII,S$GLB,, | Performed by: INTERNAL MEDICINE

## 2021-10-08 PROCEDURE — 3079F DIAST BP 80-89 MM HG: CPT | Mod: CPTII,S$GLB,, | Performed by: INTERNAL MEDICINE

## 2021-10-08 PROCEDURE — 99999 PR PBB SHADOW E&M-EST. PATIENT-LVL III: CPT | Mod: PBBFAC,,, | Performed by: INTERNAL MEDICINE

## 2021-10-08 PROCEDURE — 3008F PR BODY MASS INDEX (BMI) DOCUMENTED: ICD-10-PCS | Mod: CPTII,S$GLB,, | Performed by: INTERNAL MEDICINE

## 2021-10-08 PROCEDURE — 3077F SYST BP >= 140 MM HG: CPT | Mod: CPTII,S$GLB,, | Performed by: INTERNAL MEDICINE

## 2021-10-08 PROCEDURE — 99999 PR PBB SHADOW E&M-EST. PATIENT-LVL III: ICD-10-PCS | Mod: PBBFAC,,, | Performed by: INTERNAL MEDICINE

## 2021-10-08 PROCEDURE — 1159F MED LIST DOCD IN RCRD: CPT | Mod: CPTII,S$GLB,, | Performed by: INTERNAL MEDICINE

## 2021-10-08 PROCEDURE — 3079F PR MOST RECENT DIASTOLIC BLOOD PRESSURE 80-89 MM HG: ICD-10-PCS | Mod: CPTII,S$GLB,, | Performed by: INTERNAL MEDICINE

## 2021-10-08 RX ORDER — HYDROCHLOROTHIAZIDE 25 MG/1
25 TABLET ORAL DAILY
Qty: 30 TABLET | Refills: 11 | Status: SHIPPED | OUTPATIENT
Start: 2021-10-08 | End: 2022-01-18 | Stop reason: SINTOL

## 2021-10-09 ENCOUNTER — IMMUNIZATION (OUTPATIENT)
Dept: INTERNAL MEDICINE | Facility: CLINIC | Age: 56
End: 2021-10-09
Payer: COMMERCIAL

## 2021-10-09 PROCEDURE — 90471 FLU VACCINE (QUAD) GREATER THAN OR EQUAL TO 3YO PRESERVATIVE FREE IM: ICD-10-PCS | Mod: S$GLB,,, | Performed by: FAMILY MEDICINE

## 2021-10-09 PROCEDURE — 90471 IMMUNIZATION ADMIN: CPT | Mod: S$GLB,,, | Performed by: FAMILY MEDICINE

## 2021-10-09 PROCEDURE — 90686 IIV4 VACC NO PRSV 0.5 ML IM: CPT | Mod: S$GLB,,, | Performed by: FAMILY MEDICINE

## 2021-10-09 PROCEDURE — 90686 FLU VACCINE (QUAD) GREATER THAN OR EQUAL TO 3YO PRESERVATIVE FREE IM: ICD-10-PCS | Mod: S$GLB,,, | Performed by: FAMILY MEDICINE

## 2021-10-14 ENCOUNTER — TELEPHONE (OUTPATIENT)
Dept: RHEUMATOLOGY | Facility: CLINIC | Age: 56
End: 2021-10-14

## 2021-10-15 ENCOUNTER — OFFICE VISIT (OUTPATIENT)
Dept: RHEUMATOLOGY | Facility: CLINIC | Age: 56
End: 2021-10-15
Payer: COMMERCIAL

## 2021-10-15 ENCOUNTER — TELEPHONE (OUTPATIENT)
Dept: RADIOLOGY | Facility: HOSPITAL | Age: 56
End: 2021-10-15

## 2021-10-15 VITALS
DIASTOLIC BLOOD PRESSURE: 91 MMHG | BODY MASS INDEX: 28.43 KG/M2 | WEIGHT: 155.44 LBS | HEART RATE: 71 BPM | SYSTOLIC BLOOD PRESSURE: 144 MMHG

## 2021-10-15 DIAGNOSIS — Z96.651 STATUS POST TOTAL RIGHT KNEE REPLACEMENT: ICD-10-CM

## 2021-10-15 DIAGNOSIS — G89.18 POST-OPERATIVE PAIN: ICD-10-CM

## 2021-10-15 DIAGNOSIS — M15.9 OSTEOARTHRITIS OF MULTIPLE JOINTS, UNSPECIFIED OSTEOARTHRITIS TYPE: ICD-10-CM

## 2021-10-15 DIAGNOSIS — R76.8 POSITIVE ANA (ANTINUCLEAR ANTIBODY): Primary | ICD-10-CM

## 2021-10-15 DIAGNOSIS — R52 GENERALIZED PAIN: ICD-10-CM

## 2021-10-15 PROCEDURE — 1159F MED LIST DOCD IN RCRD: CPT | Mod: CPTII,S$GLB,, | Performed by: PHYSICIAN ASSISTANT

## 2021-10-15 PROCEDURE — 3080F PR MOST RECENT DIASTOLIC BLOOD PRESSURE >= 90 MM HG: ICD-10-PCS | Mod: CPTII,S$GLB,, | Performed by: PHYSICIAN ASSISTANT

## 2021-10-15 PROCEDURE — 99999 PR PBB SHADOW E&M-EST. PATIENT-LVL III: ICD-10-PCS | Mod: PBBFAC,,, | Performed by: PHYSICIAN ASSISTANT

## 2021-10-15 PROCEDURE — 3077F SYST BP >= 140 MM HG: CPT | Mod: CPTII,S$GLB,, | Performed by: PHYSICIAN ASSISTANT

## 2021-10-15 PROCEDURE — 3008F BODY MASS INDEX DOCD: CPT | Mod: CPTII,S$GLB,, | Performed by: PHYSICIAN ASSISTANT

## 2021-10-15 PROCEDURE — 3077F PR MOST RECENT SYSTOLIC BLOOD PRESSURE >= 140 MM HG: ICD-10-PCS | Mod: CPTII,S$GLB,, | Performed by: PHYSICIAN ASSISTANT

## 2021-10-15 PROCEDURE — 99214 OFFICE O/P EST MOD 30 MIN: CPT | Mod: S$GLB,,, | Performed by: PHYSICIAN ASSISTANT

## 2021-10-15 PROCEDURE — 3080F DIAST BP >= 90 MM HG: CPT | Mod: CPTII,S$GLB,, | Performed by: PHYSICIAN ASSISTANT

## 2021-10-15 PROCEDURE — 3008F PR BODY MASS INDEX (BMI) DOCUMENTED: ICD-10-PCS | Mod: CPTII,S$GLB,, | Performed by: PHYSICIAN ASSISTANT

## 2021-10-15 PROCEDURE — 4010F PR ACE/ARB THEARPY RXD/TAKEN: ICD-10-PCS | Mod: CPTII,S$GLB,, | Performed by: PHYSICIAN ASSISTANT

## 2021-10-15 PROCEDURE — 99999 PR PBB SHADOW E&M-EST. PATIENT-LVL III: CPT | Mod: PBBFAC,,, | Performed by: PHYSICIAN ASSISTANT

## 2021-10-15 PROCEDURE — 99214 PR OFFICE/OUTPT VISIT, EST, LEVL IV, 30-39 MIN: ICD-10-PCS | Mod: S$GLB,,, | Performed by: PHYSICIAN ASSISTANT

## 2021-10-15 PROCEDURE — 1159F PR MEDICATION LIST DOCUMENTED IN MEDICAL RECORD: ICD-10-PCS | Mod: CPTII,S$GLB,, | Performed by: PHYSICIAN ASSISTANT

## 2021-10-15 PROCEDURE — 4010F ACE/ARB THERAPY RXD/TAKEN: CPT | Mod: CPTII,S$GLB,, | Performed by: PHYSICIAN ASSISTANT

## 2021-10-15 RX ORDER — TRAMADOL HYDROCHLORIDE 50 MG/1
50 TABLET ORAL EVERY 12 HOURS PRN
Qty: 60 TABLET | Refills: 2 | Status: SHIPPED | OUTPATIENT
Start: 2021-10-15 | End: 2022-01-18 | Stop reason: SDUPTHER

## 2021-10-15 RX ORDER — DOCUSATE SODIUM 100 MG/1
100 CAPSULE, LIQUID FILLED ORAL 2 TIMES DAILY PRN
COMMUNITY
Start: 2021-05-07

## 2021-10-19 ENCOUNTER — TELEPHONE (OUTPATIENT)
Dept: SLEEP MEDICINE | Facility: CLINIC | Age: 56
End: 2021-10-19

## 2021-10-19 DIAGNOSIS — R06.83 SNORING: Primary | ICD-10-CM

## 2021-10-19 DIAGNOSIS — I27.20 PULMONARY HTN: ICD-10-CM

## 2021-10-21 ENCOUNTER — TELEPHONE (OUTPATIENT)
Dept: RHEUMATOLOGY | Facility: CLINIC | Age: 56
End: 2021-10-21

## 2021-10-21 NOTE — TELEPHONE ENCOUNTER
----- Message from Kathy Perez sent at 10/21/2021  8:10 AM CDT -----  Regarding: Questions  Contact: [phone]  Documents Needed    Caller: Modesta   Type of Documents:Pt calling to see if the documents completed and sent to [employer]   Contact #[phone]

## 2021-10-22 ENCOUNTER — PATIENT MESSAGE (OUTPATIENT)
Dept: SLEEP MEDICINE | Facility: HOSPITAL | Age: 56
End: 2021-10-22
Payer: COMMERCIAL

## 2021-11-07 ENCOUNTER — IMMUNIZATION (OUTPATIENT)
Dept: PRIMARY CARE CLINIC | Facility: CLINIC | Age: 56
End: 2021-11-07
Payer: COMMERCIAL

## 2021-11-07 ENCOUNTER — PATIENT OUTREACH (OUTPATIENT)
Dept: ADMINISTRATIVE | Facility: OTHER | Age: 56
End: 2021-11-07
Payer: COMMERCIAL

## 2021-11-07 DIAGNOSIS — Z23 NEED FOR VACCINATION: Primary | ICD-10-CM

## 2021-11-07 PROCEDURE — 0004A COVID-19, MRNA, LNP-S, PF, 30 MCG/0.3 ML DOSE VACCINE: CPT | Mod: CV19,PBBFAC | Performed by: FAMILY MEDICINE

## 2021-11-08 ENCOUNTER — TELEPHONE (OUTPATIENT)
Dept: PAIN MEDICINE | Facility: CLINIC | Age: 56
End: 2021-11-08
Payer: COMMERCIAL

## 2021-11-09 ENCOUNTER — OFFICE VISIT (OUTPATIENT)
Dept: PAIN MEDICINE | Facility: CLINIC | Age: 56
End: 2021-11-09
Payer: COMMERCIAL

## 2021-11-09 ENCOUNTER — PATIENT MESSAGE (OUTPATIENT)
Dept: PAIN MEDICINE | Facility: CLINIC | Age: 56
End: 2021-11-09

## 2021-11-09 ENCOUNTER — TELEPHONE (OUTPATIENT)
Dept: CARDIOLOGY | Facility: CLINIC | Age: 56
End: 2021-11-09
Payer: COMMERCIAL

## 2021-11-09 VITALS
DIASTOLIC BLOOD PRESSURE: 83 MMHG | HEART RATE: 102 BPM | BODY MASS INDEX: 29.21 KG/M2 | SYSTOLIC BLOOD PRESSURE: 151 MMHG | WEIGHT: 158.75 LBS | RESPIRATION RATE: 16 BRPM | HEIGHT: 62 IN

## 2021-11-09 DIAGNOSIS — M54.16 LUMBAR RADICULOPATHY: ICD-10-CM

## 2021-11-09 DIAGNOSIS — M70.60 GREATER TROCHANTERIC BURSITIS, UNSPECIFIED LATERALITY: ICD-10-CM

## 2021-11-09 DIAGNOSIS — M47.816 LUMBAR SPONDYLOSIS: Primary | ICD-10-CM

## 2021-11-09 DIAGNOSIS — M54.9 DORSALGIA, UNSPECIFIED: ICD-10-CM

## 2021-11-09 DIAGNOSIS — M53.3 SACROILIAC JOINT PAIN: ICD-10-CM

## 2021-11-09 PROCEDURE — 3008F PR BODY MASS INDEX (BMI) DOCUMENTED: ICD-10-PCS | Mod: CPTII,S$GLB,, | Performed by: ANESTHESIOLOGY

## 2021-11-09 PROCEDURE — 3077F SYST BP >= 140 MM HG: CPT | Mod: CPTII,S$GLB,, | Performed by: ANESTHESIOLOGY

## 2021-11-09 PROCEDURE — 3079F DIAST BP 80-89 MM HG: CPT | Mod: CPTII,S$GLB,, | Performed by: ANESTHESIOLOGY

## 2021-11-09 PROCEDURE — 3008F BODY MASS INDEX DOCD: CPT | Mod: CPTII,S$GLB,, | Performed by: ANESTHESIOLOGY

## 2021-11-09 PROCEDURE — 4010F PR ACE/ARB THEARPY RXD/TAKEN: ICD-10-PCS | Mod: CPTII,S$GLB,, | Performed by: ANESTHESIOLOGY

## 2021-11-09 PROCEDURE — 1159F MED LIST DOCD IN RCRD: CPT | Mod: CPTII,S$GLB,, | Performed by: ANESTHESIOLOGY

## 2021-11-09 PROCEDURE — 1160F RVW MEDS BY RX/DR IN RCRD: CPT | Mod: CPTII,S$GLB,, | Performed by: ANESTHESIOLOGY

## 2021-11-09 PROCEDURE — 3079F PR MOST RECENT DIASTOLIC BLOOD PRESSURE 80-89 MM HG: ICD-10-PCS | Mod: CPTII,S$GLB,, | Performed by: ANESTHESIOLOGY

## 2021-11-09 PROCEDURE — 99204 OFFICE O/P NEW MOD 45 MIN: CPT | Mod: S$GLB,,, | Performed by: ANESTHESIOLOGY

## 2021-11-09 PROCEDURE — 99204 PR OFFICE/OUTPT VISIT, NEW, LEVL IV, 45-59 MIN: ICD-10-PCS | Mod: S$GLB,,, | Performed by: ANESTHESIOLOGY

## 2021-11-09 PROCEDURE — 4010F ACE/ARB THERAPY RXD/TAKEN: CPT | Mod: CPTII,S$GLB,, | Performed by: ANESTHESIOLOGY

## 2021-11-09 PROCEDURE — 99999 PR PBB SHADOW E&M-EST. PATIENT-LVL III: ICD-10-PCS | Mod: PBBFAC,,, | Performed by: ANESTHESIOLOGY

## 2021-11-09 PROCEDURE — 1160F PR REVIEW ALL MEDS BY PRESCRIBER/CLIN PHARMACIST DOCUMENTED: ICD-10-PCS | Mod: CPTII,S$GLB,, | Performed by: ANESTHESIOLOGY

## 2021-11-09 PROCEDURE — 99999 PR PBB SHADOW E&M-EST. PATIENT-LVL III: CPT | Mod: PBBFAC,,, | Performed by: ANESTHESIOLOGY

## 2021-11-09 PROCEDURE — 1159F PR MEDICATION LIST DOCUMENTED IN MEDICAL RECORD: ICD-10-PCS | Mod: CPTII,S$GLB,, | Performed by: ANESTHESIOLOGY

## 2021-11-09 PROCEDURE — 3077F PR MOST RECENT SYSTOLIC BLOOD PRESSURE >= 140 MM HG: ICD-10-PCS | Mod: CPTII,S$GLB,, | Performed by: ANESTHESIOLOGY

## 2021-11-09 RX ORDER — TIZANIDINE 4 MG/1
4 TABLET ORAL 2 TIMES DAILY PRN
Qty: 60 TABLET | Refills: 0 | Status: SHIPPED | OUTPATIENT
Start: 2021-11-09 | End: 2022-02-17

## 2021-11-15 ENCOUNTER — TELEPHONE (OUTPATIENT)
Dept: RADIOLOGY | Facility: HOSPITAL | Age: 56
End: 2021-11-15
Payer: COMMERCIAL

## 2021-11-16 ENCOUNTER — TELEPHONE (OUTPATIENT)
Dept: PAIN MEDICINE | Facility: CLINIC | Age: 56
End: 2021-11-16
Payer: COMMERCIAL

## 2021-11-16 ENCOUNTER — HOSPITAL ENCOUNTER (OUTPATIENT)
Dept: RADIOLOGY | Facility: HOSPITAL | Age: 56
Discharge: HOME | End: 2021-11-16
Attending: PHYSICAL MEDICINE & REHABILITATION
Payer: COMMERCIAL

## 2021-11-16 DIAGNOSIS — M54.50 LOW BACK PAIN: ICD-10-CM

## 2021-11-16 PROCEDURE — 72148 MRI LUMBAR SPINE W/O DYE: CPT | Mod: 26,,, | Performed by: RADIOLOGY

## 2021-11-16 PROCEDURE — 72148 MRI LUMBAR SPINE WITHOUT CONTRAST: ICD-10-PCS | Mod: 26,,, | Performed by: RADIOLOGY

## 2021-11-16 PROCEDURE — 72148 MRI LUMBAR SPINE W/O DYE: CPT | Mod: TC,PO

## 2021-11-19 DIAGNOSIS — M25.551 RIGHT HIP PAIN: Primary | ICD-10-CM

## 2021-11-23 ENCOUNTER — TELEPHONE (OUTPATIENT)
Dept: PAIN MEDICINE | Facility: CLINIC | Age: 56
End: 2021-11-23
Payer: COMMERCIAL

## 2021-11-24 ENCOUNTER — HOSPITAL ENCOUNTER (OUTPATIENT)
Dept: RADIOLOGY | Facility: HOSPITAL | Age: 56
Discharge: HOME | End: 2021-11-24
Attending: ORTHOPAEDIC SURGERY
Payer: COMMERCIAL

## 2021-11-24 DIAGNOSIS — M25.551 RIGHT HIP PAIN: ICD-10-CM

## 2021-11-24 PROCEDURE — 73502 XR HIP WITH PELVIS WHEN PERFORMED, 2 OR 3  VIEWS RIGHT: ICD-10-PCS | Mod: 26,RT,, | Performed by: RADIOLOGY

## 2021-11-24 PROCEDURE — 73502 X-RAY EXAM HIP UNI 2-3 VIEWS: CPT | Mod: 26,RT,, | Performed by: RADIOLOGY

## 2021-11-24 PROCEDURE — 73502 X-RAY EXAM HIP UNI 2-3 VIEWS: CPT | Mod: TC,RT

## 2021-12-01 ENCOUNTER — OFFICE VISIT (OUTPATIENT)
Dept: ORTHOPEDICS | Facility: CLINIC | Age: 56
End: 2021-12-01
Payer: COMMERCIAL

## 2021-12-01 VITALS — BODY MASS INDEX: 29.08 KG/M2 | HEIGHT: 62 IN | WEIGHT: 158 LBS

## 2021-12-01 DIAGNOSIS — M16.11 PRIMARY OSTEOARTHRITIS OF RIGHT HIP: Primary | ICD-10-CM

## 2021-12-01 DIAGNOSIS — Z96.652 HISTORY OF TOTAL LEFT KNEE REPLACEMENT: ICD-10-CM

## 2021-12-01 PROCEDURE — 99999 PR PBB SHADOW E&M-EST. PATIENT-LVL IV: ICD-10-PCS | Mod: PBBFAC,,, | Performed by: ORTHOPAEDIC SURGERY

## 2021-12-01 PROCEDURE — 4010F PR ACE/ARB THEARPY RXD/TAKEN: ICD-10-PCS | Mod: CPTII,S$GLB,, | Performed by: ORTHOPAEDIC SURGERY

## 2021-12-01 PROCEDURE — 4010F ACE/ARB THERAPY RXD/TAKEN: CPT | Mod: CPTII,S$GLB,, | Performed by: ORTHOPAEDIC SURGERY

## 2021-12-01 PROCEDURE — 99203 OFFICE O/P NEW LOW 30 MIN: CPT | Mod: S$GLB,,, | Performed by: ORTHOPAEDIC SURGERY

## 2021-12-01 PROCEDURE — 99999 PR PBB SHADOW E&M-EST. PATIENT-LVL IV: CPT | Mod: PBBFAC,,, | Performed by: ORTHOPAEDIC SURGERY

## 2021-12-01 PROCEDURE — 99203 PR OFFICE/OUTPT VISIT, NEW, LEVL III, 30-44 MIN: ICD-10-PCS | Mod: S$GLB,,, | Performed by: ORTHOPAEDIC SURGERY

## 2021-12-06 ENCOUNTER — CLINICAL SUPPORT (OUTPATIENT)
Dept: REHABILITATION | Facility: HOSPITAL | Age: 56
End: 2021-12-06
Attending: ORTHOPAEDIC SURGERY
Payer: COMMERCIAL

## 2021-12-06 DIAGNOSIS — M25.552 HIP PAIN, BILATERAL: ICD-10-CM

## 2021-12-06 DIAGNOSIS — M25.551 HIP PAIN, BILATERAL: ICD-10-CM

## 2021-12-06 DIAGNOSIS — M25.672 DECREASED RANGE OF MOTION OF LEFT ANKLE: ICD-10-CM

## 2021-12-06 DIAGNOSIS — M25.562 CHRONIC PAIN OF LEFT KNEE: ICD-10-CM

## 2021-12-06 DIAGNOSIS — G89.29 CHRONIC PAIN OF LEFT KNEE: ICD-10-CM

## 2021-12-06 DIAGNOSIS — M25.662 DECREASED ROM OF LEFT KNEE: ICD-10-CM

## 2021-12-06 DIAGNOSIS — M16.11 PRIMARY OSTEOARTHRITIS OF RIGHT HIP: ICD-10-CM

## 2021-12-06 DIAGNOSIS — Z96.652 HISTORY OF TOTAL LEFT KNEE REPLACEMENT: ICD-10-CM

## 2021-12-06 PROBLEM — M25.673 DECREASED ROM OF ANKLE: Status: ACTIVE | Noted: 2021-12-06

## 2021-12-06 PROBLEM — R29.898 LEFT LEG WEAKNESS: Status: RESOLVED | Noted: 2021-06-11 | Resolved: 2021-12-06

## 2021-12-06 PROCEDURE — 97110 THERAPEUTIC EXERCISES: CPT

## 2021-12-06 PROCEDURE — 97161 PT EVAL LOW COMPLEX 20 MIN: CPT

## 2021-12-08 ENCOUNTER — CLINICAL SUPPORT (OUTPATIENT)
Dept: REHABILITATION | Facility: HOSPITAL | Age: 56
End: 2021-12-08
Payer: COMMERCIAL

## 2021-12-08 DIAGNOSIS — M25.552 HIP PAIN, BILATERAL: ICD-10-CM

## 2021-12-08 DIAGNOSIS — M25.662 DECREASED ROM OF LEFT KNEE: ICD-10-CM

## 2021-12-08 DIAGNOSIS — M25.551 HIP PAIN, BILATERAL: ICD-10-CM

## 2021-12-08 DIAGNOSIS — G89.29 CHRONIC PAIN OF LEFT KNEE: ICD-10-CM

## 2021-12-08 DIAGNOSIS — M25.672 DECREASED RANGE OF MOTION OF LEFT ANKLE: ICD-10-CM

## 2021-12-08 DIAGNOSIS — M25.562 CHRONIC PAIN OF LEFT KNEE: ICD-10-CM

## 2021-12-08 PROCEDURE — 97110 THERAPEUTIC EXERCISES: CPT

## 2021-12-09 ENCOUNTER — PATIENT MESSAGE (OUTPATIENT)
Dept: ORTHOPEDICS | Facility: CLINIC | Age: 56
End: 2021-12-09
Payer: COMMERCIAL

## 2021-12-16 ENCOUNTER — CLINICAL SUPPORT (OUTPATIENT)
Dept: REHABILITATION | Facility: HOSPITAL | Age: 56
End: 2021-12-16
Payer: COMMERCIAL

## 2021-12-16 DIAGNOSIS — G89.29 CHRONIC PAIN OF LEFT KNEE: ICD-10-CM

## 2021-12-16 DIAGNOSIS — M25.672 DECREASED RANGE OF MOTION OF LEFT ANKLE: ICD-10-CM

## 2021-12-16 DIAGNOSIS — M25.662 DECREASED ROM OF LEFT KNEE: ICD-10-CM

## 2021-12-16 DIAGNOSIS — M25.562 CHRONIC PAIN OF LEFT KNEE: ICD-10-CM

## 2021-12-16 DIAGNOSIS — M25.552 HIP PAIN, BILATERAL: ICD-10-CM

## 2021-12-16 DIAGNOSIS — M25.551 HIP PAIN, BILATERAL: ICD-10-CM

## 2021-12-16 PROCEDURE — 97110 THERAPEUTIC EXERCISES: CPT

## 2021-12-20 ENCOUNTER — CLINICAL SUPPORT (OUTPATIENT)
Dept: REHABILITATION | Facility: HOSPITAL | Age: 56
End: 2021-12-20
Payer: COMMERCIAL

## 2021-12-20 DIAGNOSIS — M25.662 DECREASED ROM OF LEFT KNEE: ICD-10-CM

## 2021-12-20 DIAGNOSIS — G89.29 CHRONIC PAIN OF LEFT KNEE: ICD-10-CM

## 2021-12-20 DIAGNOSIS — M25.552 HIP PAIN, BILATERAL: ICD-10-CM

## 2021-12-20 DIAGNOSIS — M25.562 CHRONIC PAIN OF LEFT KNEE: ICD-10-CM

## 2021-12-20 DIAGNOSIS — M25.551 HIP PAIN, BILATERAL: ICD-10-CM

## 2021-12-20 DIAGNOSIS — M25.672 DECREASED RANGE OF MOTION OF LEFT ANKLE: ICD-10-CM

## 2021-12-20 PROCEDURE — 97110 THERAPEUTIC EXERCISES: CPT

## 2021-12-21 ENCOUNTER — OFFICE VISIT (OUTPATIENT)
Dept: SPORTS MEDICINE | Facility: CLINIC | Age: 56
End: 2021-12-21
Payer: COMMERCIAL

## 2021-12-21 VITALS — BODY MASS INDEX: 28.48 KG/M2 | RESPIRATION RATE: 20 BRPM | WEIGHT: 154.75 LBS | HEIGHT: 62 IN

## 2021-12-21 DIAGNOSIS — M16.31 OSTEOARTHRITIS RESULTING FROM RIGHT HIP DYSPLASIA: Primary | ICD-10-CM

## 2021-12-21 PROCEDURE — 4010F ACE/ARB THERAPY RXD/TAKEN: CPT | Mod: CPTII,S$GLB,, | Performed by: STUDENT IN AN ORGANIZED HEALTH CARE EDUCATION/TRAINING PROGRAM

## 2021-12-21 PROCEDURE — 99499 NO LOS: ICD-10-PCS | Mod: S$GLB,,, | Performed by: STUDENT IN AN ORGANIZED HEALTH CARE EDUCATION/TRAINING PROGRAM

## 2021-12-21 PROCEDURE — 99999 PR PBB SHADOW E&M-EST. PATIENT-LVL III: CPT | Mod: PBBFAC,,, | Performed by: STUDENT IN AN ORGANIZED HEALTH CARE EDUCATION/TRAINING PROGRAM

## 2021-12-21 PROCEDURE — 4010F PR ACE/ARB THEARPY RXD/TAKEN: ICD-10-PCS | Mod: CPTII,S$GLB,, | Performed by: STUDENT IN AN ORGANIZED HEALTH CARE EDUCATION/TRAINING PROGRAM

## 2021-12-21 PROCEDURE — 20611 LARGE JOINT ASPIRATION/INJECTION: R HIP JOINT: ICD-10-PCS | Mod: RT,S$GLB,, | Performed by: STUDENT IN AN ORGANIZED HEALTH CARE EDUCATION/TRAINING PROGRAM

## 2021-12-21 PROCEDURE — 99999 PR PBB SHADOW E&M-EST. PATIENT-LVL III: ICD-10-PCS | Mod: PBBFAC,,, | Performed by: STUDENT IN AN ORGANIZED HEALTH CARE EDUCATION/TRAINING PROGRAM

## 2021-12-21 PROCEDURE — 99499 UNLISTED E&M SERVICE: CPT | Mod: S$GLB,,, | Performed by: STUDENT IN AN ORGANIZED HEALTH CARE EDUCATION/TRAINING PROGRAM

## 2021-12-21 PROCEDURE — 20611 DRAIN/INJ JOINT/BURSA W/US: CPT | Mod: RT,S$GLB,, | Performed by: STUDENT IN AN ORGANIZED HEALTH CARE EDUCATION/TRAINING PROGRAM

## 2021-12-21 RX ORDER — BUPIVACAINE HYDROCHLORIDE 5 MG/ML
2 INJECTION, SOLUTION PERINEURAL
Status: DISCONTINUED | OUTPATIENT
Start: 2021-12-21 | End: 2021-12-21 | Stop reason: HOSPADM

## 2021-12-21 RX ORDER — TRIAMCINOLONE ACETONIDE 40 MG/ML
40 INJECTION, SUSPENSION INTRA-ARTICULAR; INTRAMUSCULAR
Status: DISCONTINUED | OUTPATIENT
Start: 2021-12-21 | End: 2021-12-21 | Stop reason: HOSPADM

## 2021-12-21 RX ORDER — LIDOCAINE HYDROCHLORIDE 10 MG/ML
1 INJECTION INFILTRATION; PERINEURAL
Status: DISCONTINUED | OUTPATIENT
Start: 2021-12-21 | End: 2021-12-21 | Stop reason: HOSPADM

## 2021-12-21 RX ADMIN — BUPIVACAINE HYDROCHLORIDE 2 ML: 5 INJECTION, SOLUTION PERINEURAL at 09:12

## 2021-12-21 RX ADMIN — TRIAMCINOLONE ACETONIDE 40 MG: 40 INJECTION, SUSPENSION INTRA-ARTICULAR; INTRAMUSCULAR at 09:12

## 2021-12-21 RX ADMIN — LIDOCAINE HYDROCHLORIDE 1 ML: 10 INJECTION INFILTRATION; PERINEURAL at 09:12

## 2021-12-23 ENCOUNTER — PROCEDURE VISIT (OUTPATIENT)
Dept: SLEEP MEDICINE | Facility: CLINIC | Age: 56
End: 2021-12-23
Payer: COMMERCIAL

## 2021-12-23 DIAGNOSIS — R06.83 SNORING: ICD-10-CM

## 2021-12-23 DIAGNOSIS — I27.20 PULMONARY HTN: ICD-10-CM

## 2021-12-26 PROCEDURE — 95806 PR SLEEP STUDY, UNATTENDED, SIMUL RECORD HR/O2 SAT/RESP FLOW/RESP EFFT: ICD-10-PCS | Mod: 26,S$GLB,, | Performed by: INTERNAL MEDICINE

## 2021-12-26 PROCEDURE — 95806 SLEEP STUDY UNATT&RESP EFFT: CPT | Mod: 26,S$GLB,, | Performed by: INTERNAL MEDICINE

## 2021-12-27 ENCOUNTER — PATIENT MESSAGE (OUTPATIENT)
Dept: ORTHOPEDICS | Facility: CLINIC | Age: 56
End: 2021-12-27
Payer: COMMERCIAL

## 2021-12-29 ENCOUNTER — LAB VISIT (OUTPATIENT)
Dept: LAB | Facility: HOSPITAL | Age: 56
End: 2021-12-29
Attending: INTERNAL MEDICINE
Payer: COMMERCIAL

## 2021-12-29 ENCOUNTER — PATIENT MESSAGE (OUTPATIENT)
Dept: ADMINISTRATIVE | Facility: OTHER | Age: 56
End: 2021-12-29
Payer: COMMERCIAL

## 2021-12-29 ENCOUNTER — OFFICE VISIT (OUTPATIENT)
Dept: CARDIOLOGY | Facility: CLINIC | Age: 56
End: 2021-12-29
Payer: COMMERCIAL

## 2021-12-29 VITALS
DIASTOLIC BLOOD PRESSURE: 64 MMHG | OXYGEN SATURATION: 97 % | BODY MASS INDEX: 28.63 KG/M2 | HEART RATE: 61 BPM | WEIGHT: 156.5 LBS | SYSTOLIC BLOOD PRESSURE: 106 MMHG

## 2021-12-29 DIAGNOSIS — Z96.651 STATUS POST TOTAL RIGHT KNEE REPLACEMENT: ICD-10-CM

## 2021-12-29 DIAGNOSIS — I10 ESSENTIAL HYPERTENSION: ICD-10-CM

## 2021-12-29 DIAGNOSIS — R94.31 ABNORMAL EKG: ICD-10-CM

## 2021-12-29 DIAGNOSIS — I10 ESSENTIAL HYPERTENSION: Primary | ICD-10-CM

## 2021-12-29 DIAGNOSIS — E79.0 HYPERURICEMIA: ICD-10-CM

## 2021-12-29 LAB
ANION GAP SERPL CALC-SCNC: 8 MMOL/L (ref 8–16)
BUN SERPL-MCNC: 28 MG/DL (ref 6–20)
CALCIUM SERPL-MCNC: 10 MG/DL (ref 8.7–10.5)
CHLORIDE SERPL-SCNC: 106 MMOL/L (ref 95–110)
CO2 SERPL-SCNC: 26 MMOL/L (ref 23–29)
CREAT SERPL-MCNC: 1 MG/DL (ref 0.5–1.4)
EST. GFR  (AFRICAN AMERICAN): >60 ML/MIN/1.73 M^2
EST. GFR  (NON AFRICAN AMERICAN): >60 ML/MIN/1.73 M^2
GLUCOSE SERPL-MCNC: 90 MG/DL (ref 70–110)
MAGNESIUM SERPL-MCNC: 2.3 MG/DL (ref 1.6–2.6)
POTASSIUM SERPL-SCNC: 4.2 MMOL/L (ref 3.5–5.1)
SODIUM SERPL-SCNC: 140 MMOL/L (ref 136–145)

## 2021-12-29 PROCEDURE — 83735 ASSAY OF MAGNESIUM: CPT | Performed by: INTERNAL MEDICINE

## 2021-12-29 PROCEDURE — 3074F SYST BP LT 130 MM HG: CPT | Mod: CPTII,S$GLB,, | Performed by: INTERNAL MEDICINE

## 2021-12-29 PROCEDURE — 4010F PR ACE/ARB THEARPY RXD/TAKEN: ICD-10-PCS | Mod: CPTII,S$GLB,, | Performed by: INTERNAL MEDICINE

## 2021-12-29 PROCEDURE — 99213 OFFICE O/P EST LOW 20 MIN: CPT | Mod: S$GLB,,, | Performed by: INTERNAL MEDICINE

## 2021-12-29 PROCEDURE — 3074F PR MOST RECENT SYSTOLIC BLOOD PRESSURE < 130 MM HG: ICD-10-PCS | Mod: CPTII,S$GLB,, | Performed by: INTERNAL MEDICINE

## 2021-12-29 PROCEDURE — 80048 BASIC METABOLIC PNL TOTAL CA: CPT | Performed by: INTERNAL MEDICINE

## 2021-12-29 PROCEDURE — 99999 PR PBB SHADOW E&M-EST. PATIENT-LVL IV: ICD-10-PCS | Mod: PBBFAC,,, | Performed by: INTERNAL MEDICINE

## 2021-12-29 PROCEDURE — 1159F MED LIST DOCD IN RCRD: CPT | Mod: CPTII,S$GLB,, | Performed by: INTERNAL MEDICINE

## 2021-12-29 PROCEDURE — 1160F PR REVIEW ALL MEDS BY PRESCRIBER/CLIN PHARMACIST DOCUMENTED: ICD-10-PCS | Mod: CPTII,S$GLB,, | Performed by: INTERNAL MEDICINE

## 2021-12-29 PROCEDURE — 36415 COLL VENOUS BLD VENIPUNCTURE: CPT | Performed by: INTERNAL MEDICINE

## 2021-12-29 PROCEDURE — 3008F BODY MASS INDEX DOCD: CPT | Mod: CPTII,S$GLB,, | Performed by: INTERNAL MEDICINE

## 2021-12-29 PROCEDURE — 99999 PR PBB SHADOW E&M-EST. PATIENT-LVL IV: CPT | Mod: PBBFAC,,, | Performed by: INTERNAL MEDICINE

## 2021-12-29 PROCEDURE — 99213 PR OFFICE/OUTPT VISIT, EST, LEVL III, 20-29 MIN: ICD-10-PCS | Mod: S$GLB,,, | Performed by: INTERNAL MEDICINE

## 2021-12-29 PROCEDURE — 3078F DIAST BP <80 MM HG: CPT | Mod: CPTII,S$GLB,, | Performed by: INTERNAL MEDICINE

## 2021-12-29 PROCEDURE — 1159F PR MEDICATION LIST DOCUMENTED IN MEDICAL RECORD: ICD-10-PCS | Mod: CPTII,S$GLB,, | Performed by: INTERNAL MEDICINE

## 2021-12-29 PROCEDURE — 3008F PR BODY MASS INDEX (BMI) DOCUMENTED: ICD-10-PCS | Mod: CPTII,S$GLB,, | Performed by: INTERNAL MEDICINE

## 2021-12-29 PROCEDURE — 4010F ACE/ARB THERAPY RXD/TAKEN: CPT | Mod: CPTII,S$GLB,, | Performed by: INTERNAL MEDICINE

## 2021-12-29 PROCEDURE — 3078F PR MOST RECENT DIASTOLIC BLOOD PRESSURE < 80 MM HG: ICD-10-PCS | Mod: CPTII,S$GLB,, | Performed by: INTERNAL MEDICINE

## 2021-12-29 PROCEDURE — 1160F RVW MEDS BY RX/DR IN RCRD: CPT | Mod: CPTII,S$GLB,, | Performed by: INTERNAL MEDICINE

## 2021-12-30 ENCOUNTER — TELEPHONE (OUTPATIENT)
Dept: CARDIOLOGY | Facility: CLINIC | Age: 56
End: 2021-12-30
Payer: COMMERCIAL

## 2021-12-30 ENCOUNTER — CLINICAL SUPPORT (OUTPATIENT)
Dept: REHABILITATION | Facility: HOSPITAL | Age: 56
End: 2021-12-30
Payer: COMMERCIAL

## 2021-12-30 DIAGNOSIS — M25.552 HIP PAIN, BILATERAL: ICD-10-CM

## 2021-12-30 DIAGNOSIS — G89.29 CHRONIC PAIN OF LEFT KNEE: ICD-10-CM

## 2021-12-30 DIAGNOSIS — M25.551 HIP PAIN, BILATERAL: ICD-10-CM

## 2021-12-30 DIAGNOSIS — M25.662 DECREASED ROM OF LEFT KNEE: ICD-10-CM

## 2021-12-30 DIAGNOSIS — M25.672 DECREASED RANGE OF MOTION OF LEFT ANKLE: ICD-10-CM

## 2021-12-30 DIAGNOSIS — M25.562 CHRONIC PAIN OF LEFT KNEE: ICD-10-CM

## 2021-12-30 PROCEDURE — 97110 THERAPEUTIC EXERCISES: CPT

## 2021-12-30 NOTE — PROGRESS NOTES
[facility]   Physical Therapy Treatment Note     Name: Modesta Camacho  Clinic Number: [id number]    Therapy Diagnosis:   No diagnosis found.  Physician: Tanvir Rodriguez MD    Visit Date: 12/30/2021    Physician Orders: PT Eval and Treat   Medical Diagnosis from Referral: Primary OA of the left hip; hx of left TKA  Evaluation Date: 12/6/2021  Authorization Period Expiration: 12/31/2021  Plan of Care Expiration: 3/5/2022  Visit # / Visits authorized: 1/1; 3/20    PTA Visit #: 0/5     Time In: 6:30  Time Out: 7:15  Total Billable Time: 45 minutes     Precautions: Standard    SUBJECTIVE     Pt reports: She is having pain  She was compliant with home exercise program.  Response to previous treatment: Performance of HEP  Functional change: N/A    Pain: 2/10 after tramadol  Location: right hip, left knee and ankle    OBJECTIVE     Objective Measures updated at progress report unless specified.     Treatment     Modesta received the treatments listed below:      therapeutic exercises to develop strength for 45 minutes including:  [x] NuStep 5 min  [x] Shuttle 6 bands 2 min x 2  [x] Heel slides assisted with strap 2x10  [x] Bridges 2x15  [x] SL Clamshells 2x15  [x] Prone lying with right foot off mat for stretch  [] Prone quad stretch  [] Supine hip fallout  [x] Standing hip extension over mat  [] Quad set  [x] Passive right knee extension stretch  [x]  Prone hip extension (active) 2x10    manual therapy techniques: Joint mobilizations and Manual traction were applied to the: right hip for 0 minutes, including:  [x] Right hip distratction  [] Right hip glides  [] STM    Patient Education and Home Exercises     Home Exercises Provided and Patient Education Provided     Education provided:   - Home exercise review    Written Home Exercises Provided: Patient instructed to cont prior HEP. Exercises were reviewed and Modesta was able to demonstrate them prior to the end of the session.  Modesta demonstrated  good  understanding of the education provided. See EMR under Patient Instructions for exercises provided during therapy sessions    ASSESSMENT     The patient has left knee and right hip pain.  She is motivated and performs well.  She was instructed in home stretch to increase right knee extension and (B) LE and hip strengthening.  Passive mob to left hip followed with STM with roller posterolateral hip.    Modesta is progressing well towards her goals.   Pt prognosis is Good.     Pt will continue to benefit from skilled outpatient physical therapy to address the deficits listed in the problem list box on initial evaluation, provide pt/family education and to maximize pt's level of independence in the home and community environment.     Pt's spiritual, cultural and educational needs considered and pt agreeable to plan of care and goals.     Anticipated barriers to physical therapy: none    Goals: Short Term Goals: In 4 weeks:  1.I with HEP  2.Patient to demo increased AROM /PROM from -25 to -20 degrees left knee extension  3.Patient to have decreased pain to 3/10 or less at all times  4.Patient to demo increased AROM /PROM left hip to 90 degrees    Long Term Goals: In 10 weeks  1. Patient to perform daily activities including walking and standing without painful limitation.  2. Patient to demonstrate increased knee AROM/PROM to 110 degrees flexion and -15 degrees knee extension, left.  3. Patient to demonstrate increased LE strength to 5/5.  4. Patient to have decreased pain to less than 3/10 at all times.  5.Patient to demo increased AROM /PROM (B) hips to 95 degrees    PLAN     Plan of care Certification: 12/6/2021 to 3/5/2022.     Outpatient Physical Therapy 2 times weekly for 10 weeks to include the following interventions: Electrical Stimulation to the right hip or left knee, Gait Training, Manual Therapy, Moist Heat/ Ice, Neuromuscular Re-ed, Patient Education, Therapeutic Activites, Therapeutic Exercise and DN.      Titus Rosario, PT

## 2022-01-02 ENCOUNTER — PATIENT OUTREACH (OUTPATIENT)
Dept: ADMINISTRATIVE | Facility: OTHER | Age: 57
End: 2022-01-02
Payer: COMMERCIAL

## 2022-01-03 ENCOUNTER — CLINICAL SUPPORT (OUTPATIENT)
Dept: REHABILITATION | Facility: HOSPITAL | Age: 57
End: 2022-01-03
Payer: COMMERCIAL

## 2022-01-03 DIAGNOSIS — G89.29 CHRONIC PAIN OF LEFT KNEE: ICD-10-CM

## 2022-01-03 DIAGNOSIS — M25.551 HIP PAIN, BILATERAL: ICD-10-CM

## 2022-01-03 DIAGNOSIS — M25.662 DECREASED ROM OF LEFT KNEE: ICD-10-CM

## 2022-01-03 DIAGNOSIS — M25.672 DECREASED RANGE OF MOTION OF LEFT ANKLE: ICD-10-CM

## 2022-01-03 DIAGNOSIS — M25.552 HIP PAIN, BILATERAL: ICD-10-CM

## 2022-01-03 DIAGNOSIS — M25.562 CHRONIC PAIN OF LEFT KNEE: ICD-10-CM

## 2022-01-03 PROCEDURE — 97110 THERAPEUTIC EXERCISES: CPT

## 2022-01-03 NOTE — PROGRESS NOTES
Health Maintenance Due   Topic Date Due    Colorectal Cancer Screening  08/05/2021    Mammogram  11/27/2021    Shingles Vaccine (2 of 2) 12/19/2021     Updates were requested from care everywhere.  Chart was reviewed for overdue [facility] ([facility]) topics (CRS, Breast Cancer Screening, Eye exam)  Health Maintenance has been updated.  LINKS immunization registry triggered.  Immunizations were reconciled.

## 2022-01-03 NOTE — PROGRESS NOTES
[facility]   Physical Therapy Treatment Note     Name: Modesta Camacho  Clinic Number: [id number]    Therapy Diagnosis:   No diagnosis found.  Physician: Tanvir Rodriguez MD    Visit Date: 1/3/2022    Physician Orders: PT Eval and Treat   Medical Diagnosis from Referral: Primary OA of the left hip; hx of left TKA  Evaluation Date: 12/6/2021  Authorization Period Expiration: 12/31/2021  Plan of Care Expiration: 3/5/2022  Visit # / Visits authorized: 1/1; 3/20    PTA Visit #: 0/5     Time In: 6:40  Time Out: 7:20  Total Billable Time: 40 minutes     Precautions: Standard    SUBJECTIVE     Pt reports: having hip pain which she thinks may be due to cold weather  She was compliant with home exercise program.  Response to previous treatment: Performance of HEP  Functional change: N/A    Pain: 5/10  Location: right hip, left knee and ankle    OBJECTIVE     Objective Measures updated at progress report unless specified.     Treatment     Modesta received the treatments listed below:      therapeutic exercises to develop strength for 45 minutes including:  [x] NuStep 5 min  [x] Shuttle 6 bands 2 min x 2  [x] Heel slides assisted with strap 2x10  [x] Bridges 2x15  [x] SL Clamshells 2x15  [x] Prone lying with right foot off mat for stretch  [] Prone quad stretch  [x] Supine hip fallout  [x] Standing hip extension over mat  [] Quad set  [x]  Prone hip extension (active) 2x10  [x]  Matrix hip adduction 20# 2x10[]     manual therapy techniques: Joint mobilizations and Manual traction were applied to the: right hip for 0 minutes, including:  [] Right hip distratction  [] Right hip glides  [] STM    Patient Education and Home Exercises     Home Exercises Provided and Patient Education Provided     Education provided:   - Home exercise review    Written Home Exercises Provided: Patient instructed to cont prior HEP. Exercises were reviewed and Modesta was able to demonstrate them prior to the end of the  session.  Modesta demonstrated good  understanding of the education provided. See EMR under Patient Instructions for exercises provided during therapy sessions    ASSESSMENT     The patient complains of right hip pain increase which she attributes to the weather.  She cannot extend the left knee in stance and has weakness in (B) impairing gait .  She is performing passive stretches to improve motion  Modesta is progressing well towards her goals.   Pt prognosis is Good.     Pt will continue to benefit from skilled outpatient physical therapy to address the deficits listed in the problem list box on initial evaluation, provide pt/family education and to maximize pt's level of independence in the home and community environment.     Pt's spiritual, cultural and educational needs considered and pt agreeable to plan of care and goals.     Anticipated barriers to physical therapy: none    Goals: Short Term Goals: In 4 weeks:  1.I with HEP  2.Patient to demo increased AROM /PROM from -25 to -20 degrees left knee extension  3.Patient to have decreased pain to 3/10 or less at all times  4.Patient to demo increased AROM /PROM left hip to 90 degrees    Long Term Goals: In 10 weeks  1. Patient to perform daily activities including walking and standing without painful limitation.  2. Patient to demonstrate increased knee AROM/PROM to 110 degrees flexion and -15 degrees knee extension, left.  3. Patient to demonstrate increased LE strength to 5/5.  4. Patient to have decreased pain to less than 3/10 at all times.  5.Patient to demo increased AROM /PROM (B) hips to 95 degrees    PLAN     Plan of care Certification: 12/6/2021 to 3/5/2022.     Outpatient Physical Therapy 2 times weekly for 10 weeks to include the following interventions: Electrical Stimulation to the right hip or left knee, Gait Training, Manual Therapy, Moist Heat/ Ice, Neuromuscular Re-ed, Patient Education, Therapeutic Activites, Therapeutic Exercise and BRIE Qureshi  Abraham, PT

## 2022-01-04 ENCOUNTER — TELEPHONE (OUTPATIENT)
Dept: PAIN MEDICINE | Facility: CLINIC | Age: 57
End: 2022-01-04
Payer: COMMERCIAL

## 2022-01-05 ENCOUNTER — PATIENT MESSAGE (OUTPATIENT)
Dept: PAIN MEDICINE | Facility: CLINIC | Age: 57
End: 2022-01-05

## 2022-01-05 ENCOUNTER — OFFICE VISIT (OUTPATIENT)
Dept: PAIN MEDICINE | Facility: CLINIC | Age: 57
End: 2022-01-05
Payer: COMMERCIAL

## 2022-01-05 VITALS — WEIGHT: 156.5 LBS | BODY MASS INDEX: 28.8 KG/M2 | RESPIRATION RATE: 17 BRPM | HEIGHT: 62 IN

## 2022-01-05 DIAGNOSIS — M51.36 DDD (DEGENERATIVE DISC DISEASE), LUMBAR: ICD-10-CM

## 2022-01-05 DIAGNOSIS — M16.11 PRIMARY OSTEOARTHRITIS OF RIGHT HIP: ICD-10-CM

## 2022-01-05 DIAGNOSIS — M43.16 SPONDYLOLISTHESIS AT L4-L5 LEVEL: ICD-10-CM

## 2022-01-05 DIAGNOSIS — M54.16 RIGHT LUMBAR RADICULOPATHY: Primary | ICD-10-CM

## 2022-01-05 DIAGNOSIS — Z96.642 HISTORY OF LEFT HIP REPLACEMENT: ICD-10-CM

## 2022-01-05 PROCEDURE — 1159F PR MEDICATION LIST DOCUMENTED IN MEDICAL RECORD: ICD-10-PCS | Mod: CPTII,95,, | Performed by: PHYSICIAN ASSISTANT

## 2022-01-05 PROCEDURE — 3008F PR BODY MASS INDEX (BMI) DOCUMENTED: ICD-10-PCS | Mod: CPTII,95,, | Performed by: PHYSICIAN ASSISTANT

## 2022-01-05 PROCEDURE — 99214 OFFICE O/P EST MOD 30 MIN: CPT | Mod: 95,,, | Performed by: PHYSICIAN ASSISTANT

## 2022-01-05 PROCEDURE — 3008F BODY MASS INDEX DOCD: CPT | Mod: CPTII,95,, | Performed by: PHYSICIAN ASSISTANT

## 2022-01-05 PROCEDURE — 1159F MED LIST DOCD IN RCRD: CPT | Mod: CPTII,95,, | Performed by: PHYSICIAN ASSISTANT

## 2022-01-05 PROCEDURE — 99214 PR OFFICE/OUTPT VISIT, EST, LEVL IV, 30-39 MIN: ICD-10-PCS | Mod: 95,,, | Performed by: PHYSICIAN ASSISTANT

## 2022-01-05 PROCEDURE — 1160F RVW MEDS BY RX/DR IN RCRD: CPT | Mod: CPTII,95,, | Performed by: PHYSICIAN ASSISTANT

## 2022-01-05 PROCEDURE — 1160F PR REVIEW ALL MEDS BY PRESCRIBER/CLIN PHARMACIST DOCUMENTED: ICD-10-PCS | Mod: CPTII,95,, | Performed by: PHYSICIAN ASSISTANT

## 2022-01-05 RX ORDER — TOPIRAMATE 25 MG/1
25 TABLET ORAL 2 TIMES DAILY
Qty: 60 TABLET | Refills: 0 | Status: SHIPPED | OUTPATIENT
Start: 2022-01-05 | End: 2022-01-18 | Stop reason: SINTOL

## 2022-01-05 NOTE — PROGRESS NOTES
The patient location is: home  The chief complaint leading to consultation is: low back pain, leg pain    Visit type: audiovisual    Face to Face time with patient: 10-15 minutes  20 minutes of total time spent on the encounter, which includes face to face time and non-face to face time preparing to see the patient (eg, review of tests), Obtaining and/or reviewing separately obtained history, Documenting clinical information in the electronic or other health record, Independently interpreting results (not separately reported) and communicating results to the patient/family/caregiver, or Care coordination (not separately reported).     Each patient to whom he or she provides medical services by telemedicine is:  (1) informed of the relationship between the physician and patient and the respective role of any other health care provider with respect to management of the patient; and (2) notified that he or she may decline to receive medical services by telemedicine and may withdraw from such care at any time.        Chief Pain Complaint:  Low back pain + RLE radiculopathy     Interval History (1/5/2022):  Modesta Camacho presents today for follow-up visit.  She is here today to review lumbar MRI.  She continues to have RLE pain.  She saw Dr. Rodriguez on 12/21/2021, who referred her to have an ultrasound-guided right hip injection by Dr. Acevedo.  She recently had this procedure with short-term pain relief, but it did not help the sciatica pain into the foot.  She was also referred to Dr. Clifford (Orthopedics) for evaluation for right hip replacement.  To note, patient reports history of left hip replacement and bilateral knee replacement in the past.  She has been doing physical therapy twice a week, but she does not feel this has been overly helpful.      Initial HPI (11/09/2021):  Modesta Camacho is a 56 y.o. female  who is presenting with a chief complaint of low back pain. The patient began experiencing this  problem insidiously, and the pain has been gradually worsening over the past 6 month(s). The pain is described as throbbing, shooting, burning and electrical and is located in the right lumbar spine. Pain is intermittent and lasts hours. The pain radiates to right leg L4 distribution. The patient rates her pain a 7 out of ten and interferes with activities of daily living a 8 out of ten. Pain is exacerbated by flexion of the lumbar spine, and is improved by rest. Patient reports no prior trauma, prior hip surgery Left Hip replacement 6/2020 [facility]. Right Knee Replacement 5/2021.     - pertinent negatives: No fever, No chills, No weight loss, No bladder dysfunction, No bowel dysfunction, No saddle anesthesia  - pertinent positives: right leg weakness    - medications, other therapies tried (physical therapy, injections):     >> NSAIDs, Tylenol, Tramadol, gabapentin, zanaflex and robaxin    >> Has previously undergone Physical Therapy    >> Injections:    - ultrasound-guided right hip injection by Dr. Acevedo on 12/21/2021 with short-term pain relief      Imaging / Labs / Studies (reviewed on 1/5/2022):    11/16/2021 MRI Lumbar Spine Without Contrast  FINDINGS:  Image quality is degraded by motion, lowering exam sensitivity and specificity.  Interpretation is offered within these confines.    Alignment: There is mild grade 1 anterolisthesis of L4 on L5.  There is slight leftward convexity of the lumbar spine.    Vertebrae: Diffuse loss normal T1 hyperintense marrow signal may be related to chronic anemia or other causes of red marrow hyperplasia, correlate clinically.  No evidence of fracture.    Discs: Normal height and signal.    Cord: Normal.  Conus terminates at T12-L1    Degenerative findings:    T12-L1:  No spinal canal or neuroforaminal stenosis.    L1-L2:  No spinal canal or neuroforaminal stenosis.    L2-L3: Mild generalized disc bulge. No spinal canal or neuroforaminal stenosis.    L3-L4: Mild  generalized disc bulge.  Mild bilateral facet arthropathy. No spinal canal or neuroforaminal stenosis.    L4-L5: Severe bilateral facet arthropathy with some uncovering of the intervertebral disc and thickening of the ligamentum flavum.  Moderate bilateral neural foraminal narrowing.  No spinal canal stenosis.    L5-S1: Moderate bilateral facet arthropathy. No spinal canal or neuroforaminal stenosis..    Paraspinal muscles & soft tissues: Unremarkable.    Impression  1. Grade 1 anterolisthesis of L4 on L5 secondary to facet arthropathy with associated moderate bilateral neural foraminal narrowing at this level.  2. Other multilevel degenerative findings as above      7/31/20 X-Ray Hip 2 or 3 views Left  COMPARISON:  Prior radiograph from May 20, 2020.  FINDINGS:  Interval total left hip arthroplasty with soft tissue air in the area.  There is normal alignment of the joint.  Densely calcified uterine leiomyomata are unchanged.  There also calcified phleboliths within the pelvis.  Impression  Normal alignment of the total left hip arthroplasty that has been placed in the interval.      11/24/2021 X-Ray Hip 2 or 3 views Right (with Pelvis when performed)  COMPARISON:  02/11/2021  FINDINGS:  There are multiple calcified fibroid seen projecting over the uterus.  There also multiple calcified pelvic phleboliths.  There is a single surgical clips seen to the right of the midline.  A left total hip arthroplasty is noted.  There is moderate to severe superolateral joint space narrowing involving the right hip with osteophyte formation noted.  Minimal subchondral cystic changes seen on either side of the right hip joint.      5/01/15 X-Ray Cervical Spine AP And Lateral  Findings:  The reversal of the lordotic curvature of the cervical spine secondary to moderate degenerative disk disease at C4-5 and C5-6.  Chronic anterior wedging of the C4 and C5 vertebral bodies.  Associated endplate sclerosis and uncovertebral  "joint  hypertrophy. The posterior elements are intact.  IMPRESSION:  No radiographic evidence of fracture or subluxation.  Moderate degenerative disk disease at C4-5 and C5-6.        Review of Systems:  CONSTITUTIONAL: patient denies any fever, chills, or weight loss  SKIN: patient denies any rash or itching  RESPIRATORY: patient denies having any shortness of breath  GASTROINTESTINAL: patient denies having any diarrhea, constipation, or bowel incontinence  GENITOURINARY: patient denies having any abnormal bladder function    MUSCULOSKELETAL:  - patient complains of the above noted pain/s (see chief pain complaint)    NEUROLOGICAL:   - pain as above  - strength in Lower extremities is intact, BILATERALLY  - sensation in Lower extremities is intact, BILATERALLY  - patient denies any loss of bowel or bladder control      PSYCHIATRIC: patient denies any change in mood    Other:  All other systems reviewed and are negative      Physical Exam:  Telemedicine Exam  Vitals:    01/05/22 0747   Resp: 17   Weight: 71 kg (156 lb 8.4 oz)  Comment: Patient reported   Height: 5' 2" (1.575 m)  Comment: Patient reported     Body mass index is 28.63 kg/m².   (reviewed on 1/5/2022)     GENERAL: Well appearing, in no acute distress, alert and oriented x3.  Cooperative.  PSYCH:  Mood and affect appropriate.  SKIN: Skin color & texture with no obvious abnormalities.    HEAD/FACE:  Normocephalic, atraumatic.    PULM:  No difficulty breathing. No nasal flaring. No obvious wheezing.  NECK: ROM fairly preserved.  Supple.   BACK: Palpation over the lumbar paraspinous muscles causes pain. Some pain with flexion. Some pain with extension.  Limited ROM secondary to pain reproduction. Patient performed Facet loading. Patient performed SLR + on right. Pain with ROM of right hip.   EXTREMITIES: No obvious deformities. Moving all extremities well, appears to have symmetric strength throughout.  MUSCULOSKELETAL: No obvious atrophy abnormalities are " noted.   NEURO: No obvious neurologic deficit.   GAIT: sitting.     Physical Exam: last in clinic visit:  General: Alert and oriented, in no apparent distress.  Gait: normal gait.  Skin: No rashes, No discoloration, No obvious lesions  HEENT: Normocephalic, atraumatic. Pupils equal and round.  Cardiovascular: Regular rate and rhythm , no significant peripheral edema present  Respiratory: Without audible wheezing, without use of accessory muscles of respiration.    Musculoskeletal:    Cervical Spine    - Pain on flexion of cervical spine Absent  - Spurling's Test:  Absent    - Pain on extension of cervical spine Absent  - TTP over the cervical facet joints Absent  - Cervical facet loading Absent      Lumbar Spine    - Pain on flexion of lumbar spine Absent  - Straight Leg Raise:  Absent    - Pain on extension of lumbar spine Absent  - TTP over the lumbar facet joints Absent  - Lumbar facet loading Absent    -Pain on palpation over the SI joint  Present  - GALLO: Present  -TTP over right GTB       Neuro:    Strength:  UE R/L: D: 5/5; B: 5/5; T: 5/5; WF: 5/5; WE: 5/5; IO: 5/5;  LE R/L: HF: 5/5, HE: 5/5, KF: 5/5; KE: 5/5; FE: 5/5; FF: 5/5    Extremity Reflexes: Brisk and symmetric throughout.      Extremity Sensory: Sensation to pinprick and temperature symmetric. Proprioception intact.      Psych:  Mood and affect is appropriate      Assessment:    Modesta Camacho is a 56 y.o. year old female who is presenting with       ICD-10-CM ICD-9-CM    1. Right lumbar radiculopathy  M54.16 724.4 IR Epidural Transforaminal Inj 1st Vert Lumbar Uni      IR Epidural Transfor Inj Ea Add Lumb Uni      Case Request-RAD/Other Procedure Area: Right L4/5 +/- L3/4 vs. L5/S1 TF CELE   2. DDD (degenerative disc disease), lumbar  M51.36 722.52 topiramate (TOPAMAX) 25 MG tablet   3. Spondylolisthesis at L4-L5 level  M43.16 756.12    4. Primary osteoarthritis of right hip  M16.11 715.15    5. History of left hip replacement  Z96.642 V43.64         Plan:  1. Interventional:   - S/p ultrasound-guided right hip injection by Dr. Acevedo on 12/21/2021 with short-term pain relief.  - Schedule Right L4/5 +/- L3/4 vs. L5/S1 TF CELE - since conservative measures are not providing adequate pain relief.   - Consider Right SI and Right GTB after.    2. Pharmacologic:   - Start Topamax 25mg BID.  Patient informed not to stop medication if having inadequate pain relief; consider increase if not helping after 2-3 weeks. Will plan to increase to 50mg BID at that time if appropriate. D/c gabapentin - couldn't tolerate.  - Continue Tramadol 50 mg Po BID PRN (60 tabs) - from Rheumatology.   - Continue Cymbalta 60 mg PO BID.     3. Rehabilitative: Encouraged ambulation. Continue physical therapy to help with strengthening, although patient reports not helping with pain. Patient informed that we will fill out [facility] paperwork for physical therapy and procedures, but we will not fill out paperwork for disability.  Our goal is to improve pain to continue job responsibility.     4. Diagnostic: Lumbar MRI reviewed with patient.    5. Follow up: 4 weeks post-procedure  - will send online ticket scheduling on [employer]     - This condition does not require this patient to take time off of work, and the primary goal of our Pain Management services is to improve the patient's functional capacity.   - I discussed the risks, benefits, and alternatives to potential treatment options. All questions and concerns were fully addressed today in clinic.           Disclaimer:  This note was prepared using voice recognition system and is likely to have sound alike errors that may have been overlooked even after proof reading.  Please call me with any questions.

## 2022-01-06 ENCOUNTER — TELEPHONE (OUTPATIENT)
Dept: PAIN MEDICINE | Facility: CLINIC | Age: 57
End: 2022-01-06
Payer: COMMERCIAL

## 2022-01-06 NOTE — TELEPHONE ENCOUNTER
Left message regarding scheduling procedure. Per our previous agreement called patient between 1:30 and 2:30. Patient did not answer. Offered her to call me when she is ready.

## 2022-01-07 ENCOUNTER — TELEPHONE (OUTPATIENT)
Dept: PAIN MEDICINE | Facility: CLINIC | Age: 57
End: 2022-01-07
Payer: COMMERCIAL

## 2022-01-07 ENCOUNTER — CLINICAL SUPPORT (OUTPATIENT)
Dept: REHABILITATION | Facility: HOSPITAL | Age: 57
End: 2022-01-07
Payer: COMMERCIAL

## 2022-01-07 DIAGNOSIS — M25.672 DECREASED RANGE OF MOTION OF LEFT ANKLE: ICD-10-CM

## 2022-01-07 DIAGNOSIS — M25.552 HIP PAIN, BILATERAL: ICD-10-CM

## 2022-01-07 DIAGNOSIS — M25.662 DECREASED ROM OF LEFT KNEE: ICD-10-CM

## 2022-01-07 DIAGNOSIS — G89.29 CHRONIC PAIN OF LEFT KNEE: ICD-10-CM

## 2022-01-07 DIAGNOSIS — M25.562 CHRONIC PAIN OF LEFT KNEE: ICD-10-CM

## 2022-01-07 DIAGNOSIS — M25.551 HIP PAIN, BILATERAL: ICD-10-CM

## 2022-01-07 PROCEDURE — 97110 THERAPEUTIC EXERCISES: CPT

## 2022-01-10 NOTE — PROGRESS NOTES
[facility]   Physical Therapy Treatment Note     Name: Modesta Camacho  Clinic Number: [id number]    Therapy Diagnosis:   Encounter Diagnoses   Name Primary?    Hip pain, bilateral     Chronic pain of left knee     Decreased ROM of left knee     Decreased range of motion of left ankle      Physician: Tanvir Rodriguez MD    Visit Date: 1/7/2022    Physician Orders: PT Eval and Treat   Medical Diagnosis from Referral: Primary OA of the left hip; hx of left TKA  Evaluation Date: 12/6/2021  Authorization Period Expiration: 12/31/2021  Plan of Care Expiration: 3/5/2022  Visit # / Visits authorized: 1/1; 3/20; 2/20    PTA Visit #: 0/5     Time In: 6:35  Time Out: 7:15  Total Billable Time: 40 minutes     Precautions: Standard    SUBJECTIVE     Pt reports: having hip pain   She was compliant with home exercise program.  Response to previous treatment: Performance of HEP  Functional change: N/A    Pain: 5/10  Location: right hip, left knee and ankle    OBJECTIVE     Objective Measures updated at progress report unless specified.     Treatment     Modesta received the treatments listed below:      therapeutic exercises to develop strength for 40 minutes including:  [x] NuStep 5 min  [x] Shuttle 6 bands 2 min x 2  [x] Heel slides assisted with strap 2x10  [x] Bridges 2x15  [x] SL Clamshells 2x15  [x] Prone lying with right foot off mat for stretch  [] Prone quad stretch  [x] Supine hip fallout  [x] Standing hip extension over mat  [] Quad set  [x]  Prone hip extension (active) 2x10  [x]  Matrix hip adduction 20# 2x10[]     manual therapy techniques: Joint mobilizations and Manual traction were applied to the: right hip for 0 minutes, including:  [] Right hip distratction  [] Right hip glides  [] STM    Patient Education and Home Exercises     Home Exercises Provided and Patient Education Provided     Education provided:   - Home exercise review    Written Home Exercises Provided: Patient  instructed to cont prior HEP. Exercises were reviewed and Modesta was able to demonstrate them prior to the end of the session.  Modesta demonstrated good  understanding of the education provided. See EMR under Patient Instructions for exercises provided during therapy sessions    ASSESSMENT     The patient has right hip pain and feels better after stretching but pain returns.  She has left knee flexion restriction and cannot fully straighten the knee  Modesta is progressing well towards her goals.   Pt prognosis is Good.     Pt will continue to benefit from skilled outpatient physical therapy to address the deficits listed in the problem list box on initial evaluation, provide pt/family education and to maximize pt's level of independence in the home and community environment.     Pt's spiritual, cultural and educational needs considered and pt agreeable to plan of care and goals.     Anticipated barriers to physical therapy: none    Goals: Short Term Goals: In 4 weeks:  1.I with HEP  2.Patient to demo increased AROM /PROM from -25 to -20 degrees left knee extension  3.Patient to have decreased pain to 3/10 or less at all times  4.Patient to demo increased AROM /PROM left hip to 90 degrees    Long Term Goals: In 10 weeks  1. Patient to perform daily activities including walking and standing without painful limitation.  2. Patient to demonstrate increased knee AROM/PROM to 110 degrees flexion and -15 degrees knee extension, left.  3. Patient to demonstrate increased LE strength to 5/5.  4. Patient to have decreased pain to less than 3/10 at all times.  5.Patient to demo increased AROM /PROM (B) hips to 95 degrees    PLAN     Plan of care Certification: 12/6/2021 to 3/5/2022.     Outpatient Physical Therapy 2 times weekly for 10 weeks to include the following interventions: Electrical Stimulation to the right hip or left knee, Gait Training, Manual Therapy, Moist Heat/ Ice, Neuromuscular Re-ed, Patient Education, Therapeutic  Activites, Therapeutic Exercise and DN.     Titus Rosario, PT

## 2022-01-12 ENCOUNTER — PATIENT MESSAGE (OUTPATIENT)
Dept: ORTHOPEDICS | Facility: CLINIC | Age: 57
End: 2022-01-12
Payer: COMMERCIAL

## 2022-01-13 ENCOUNTER — CLINICAL SUPPORT (OUTPATIENT)
Dept: REHABILITATION | Facility: HOSPITAL | Age: 57
End: 2022-01-13
Payer: COMMERCIAL

## 2022-01-13 ENCOUNTER — PATIENT MESSAGE (OUTPATIENT)
Dept: PAIN MEDICINE | Facility: CLINIC | Age: 57
End: 2022-01-13
Payer: COMMERCIAL

## 2022-01-13 DIAGNOSIS — M25.562 CHRONIC PAIN OF LEFT KNEE: ICD-10-CM

## 2022-01-13 DIAGNOSIS — M25.551 HIP PAIN, BILATERAL: ICD-10-CM

## 2022-01-13 DIAGNOSIS — M25.552 HIP PAIN, BILATERAL: ICD-10-CM

## 2022-01-13 DIAGNOSIS — M25.662 DECREASED ROM OF LEFT KNEE: ICD-10-CM

## 2022-01-13 DIAGNOSIS — M25.672 DECREASED RANGE OF MOTION OF LEFT ANKLE: ICD-10-CM

## 2022-01-13 DIAGNOSIS — G89.29 CHRONIC PAIN OF LEFT KNEE: ICD-10-CM

## 2022-01-13 PROCEDURE — 97110 THERAPEUTIC EXERCISES: CPT

## 2022-01-13 NOTE — PROGRESS NOTES
[facility]   Physical Therapy Treatment Note     Name: Modesta Camacho  Clinic Number: [id number]    Therapy Diagnosis:   No diagnosis found.  Physician: Tanvir Rodriguez MD    Visit Date: 1/13/2022    Physician Orders: PT Eval and Treat   Medical Diagnosis from Referral: Primary OA of the left hip; hx of left TKA  Evaluation Date: 12/6/2021  Authorization Period Expiration: 12/31/2021  Plan of Care Expiration: 3/5/2022  Visit # / Visits authorized: 1/1; 3/20; 3/20    PTA Visit #: 0/5     Time In: 6:30  Time Out: 7:00  Total Billable Time: 30 minutes     Precautions: Standard    SUBJECTIVE     Pt reports: left knee swelling and pain.  States the only thing new was Topamax.  She reports difficulty sleeping.  She was compliant with home exercise program.  Response to previous treatment: Performance of HEP  Functional change: N/A    Pain: 6/10  Location: right hip, left knee and ankle    OBJECTIVE     Objective Measures updated at progress report unless specified.     Treatment     Modesta received the treatments listed below:      therapeutic exercises to develop strength for 40 minutes including:  [x] NuStep 5 min  [x] Shuttle 6 bands 2 min x 2  [x] Heel slides assisted with strap 2x10  [] Bridges 2x15  [x] SL Clamshells 2x15  [x] Prone lying with right foot off mat for stretch  [] Prone quad stretch  [x] Supine hip fallout  [x] Standing hip extension over mat  [] Quad set  []  Prone hip extension (active) 2x10  [x]  Matrix hip adduction 20# 2x10[]     manual therapy techniques: Joint mobilizations and Manual traction were applied to the: right hip for 0 minutes, including:  [] Right hip distratction  [] Right hip glides  [] STM    Patient Education and Home Exercises     Home Exercises Provided and Patient Education Provided     Education provided:   - Home exercise review    Written Home Exercises Provided: Patient instructed to cont prior HEP. Exercises were reviewed and Modesta was able to  demonstrate them prior to the end of the session.  Modesta demonstrated good  understanding of the education provided. See EMR under Patient Instructions for exercises provided during therapy sessions    ASSESSMENT     The patient has increased pain in left knee and body pain with chills and difficulty sleeping.  The patient became tearful and not feeling well.  Session ended  Modesta is progressing well towards her goals.   Pt prognosis is Good.     Pt will continue to benefit from skilled outpatient physical therapy to address the deficits listed in the problem list box on initial evaluation, provide pt/family education and to maximize pt's level of independence in the home and community environment.     Pt's spiritual, cultural and educational needs considered and pt agreeable to plan of care and goals.     Anticipated barriers to physical therapy: none    Goals: Short Term Goals: In 4 weeks:  1.I with HEP  2.Patient to demo increased AROM /PROM from -25 to -20 degrees left knee extension  3.Patient to have decreased pain to 3/10 or less at all times  4.Patient to demo increased AROM /PROM left hip to 90 degrees    Long Term Goals: In 10 weeks  1. Patient to perform daily activities including walking and standing without painful limitation.  2. Patient to demonstrate increased knee AROM/PROM to 110 degrees flexion and -15 degrees knee extension, left.  3. Patient to demonstrate increased LE strength to 5/5.  4. Patient to have decreased pain to less than 3/10 at all times.  5.Patient to demo increased AROM /PROM (B) hips to 95 degrees    PLAN     Plan of care Certification: 12/6/2021 to 3/5/2022.     Outpatient Physical Therapy 2 times weekly for 10 weeks to include the following interventions: Electrical Stimulation to the right hip or left knee, Gait Training, Manual Therapy, Moist Heat/ Ice, Neuromuscular Re-ed, Patient Education, Therapeutic Activites, Therapeutic Exercise and DN.     Titus Rosario, PT

## 2022-01-14 ENCOUNTER — LAB VISIT (OUTPATIENT)
Dept: LAB | Facility: HOSPITAL | Age: 57
End: 2022-01-14
Attending: INTERNAL MEDICINE
Payer: COMMERCIAL

## 2022-01-14 DIAGNOSIS — M53.3 SACROILIAC JOINT PAIN: ICD-10-CM

## 2022-01-14 DIAGNOSIS — N18.30 STAGE 3 CHRONIC KIDNEY DISEASE, UNSPECIFIED WHETHER STAGE 3A OR 3B CKD: ICD-10-CM

## 2022-01-14 DIAGNOSIS — M54.16 RIGHT LUMBAR RADICULOPATHY: Primary | ICD-10-CM

## 2022-01-14 DIAGNOSIS — M47.816 LUMBAR SPONDYLOSIS: ICD-10-CM

## 2022-01-14 LAB
BACTERIA #/AREA URNS HPF: NORMAL /HPF
BILIRUB UR QL STRIP: NEGATIVE
CLARITY UR: CLEAR
COLOR UR: YELLOW
CREAT UR-MCNC: 207.6 MG/DL (ref 15–325)
GLUCOSE UR QL STRIP: NEGATIVE
HGB UR QL STRIP: NEGATIVE
KETONES UR QL STRIP: NEGATIVE
LEUKOCYTE ESTERASE UR QL STRIP: NEGATIVE
MICROSCOPIC COMMENT: NORMAL
NITRITE UR QL STRIP: NEGATIVE
PH UR STRIP: 6 [PH] (ref 5–8)
PROT UR QL STRIP: NEGATIVE
PROT UR-MCNC: 9 MG/DL (ref 0–15)
PROT/CREAT UR: 0.04 MG/G{CREAT} (ref 0–0.2)
RBC #/AREA URNS HPF: 0 /HPF (ref 0–4)
SP GR UR STRIP: >=1.03 (ref 1–1.03)
URN SPEC COLLECT METH UR: ABNORMAL
UROBILINOGEN UR STRIP-ACNC: 1 EU/DL
WBC #/AREA URNS HPF: 0 /HPF (ref 0–5)
YEAST URNS QL MICRO: NORMAL

## 2022-01-14 PROCEDURE — 82570 ASSAY OF URINE CREATININE: CPT | Performed by: INTERNAL MEDICINE

## 2022-01-14 PROCEDURE — 81000 URINALYSIS NONAUTO W/SCOPE: CPT | Performed by: INTERNAL MEDICINE

## 2022-01-14 RX ORDER — TIZANIDINE 4 MG/1
4 TABLET ORAL 2 TIMES DAILY PRN
Qty: 60 TABLET | Refills: 2 | Status: SHIPPED | OUTPATIENT
Start: 2022-01-14 | End: 2022-01-18

## 2022-01-14 RX ORDER — TIZANIDINE 4 MG/1
4 TABLET ORAL 2 TIMES DAILY PRN
COMMUNITY
End: 2022-01-14 | Stop reason: SDUPTHER

## 2022-01-14 NOTE — TELEPHONE ENCOUNTER
----- Message from Ingrid Bess sent at 1/14/2022  7:09 AM CST -----  Contact: Pt  Type:  RX Refill Request    Who Called: Pt    RX Name and Strength:tizanidine 4mg     How is the patient currently taking it? (ex. 1XDay): 2xday    Is this a 30 day or 90 day RX: 30 day    Preferred Pharmacy with phone number: [facility] in [place]    Local or Mail Order: local    Ordering Provider:     Would the patient rather a call back or a response via [facility]? Call back    Best Call Back Number: [phone]    Additional Information: Pt asked for a refill because this medication worked and asked for a call back once called in or if medication can't be called in

## 2022-01-18 ENCOUNTER — OFFICE VISIT (OUTPATIENT)
Dept: INTERNAL MEDICINE | Facility: CLINIC | Age: 57
End: 2022-01-18
Payer: COMMERCIAL

## 2022-01-18 DIAGNOSIS — R68.83 CHILLS: ICD-10-CM

## 2022-01-18 DIAGNOSIS — Z96.651 STATUS POST TOTAL RIGHT KNEE REPLACEMENT: ICD-10-CM

## 2022-01-18 DIAGNOSIS — R39.9 UTI SYMPTOMS: Primary | ICD-10-CM

## 2022-01-18 PROCEDURE — 1159F MED LIST DOCD IN RCRD: CPT | Mod: CPTII,95,, | Performed by: NURSE PRACTITIONER

## 2022-01-18 PROCEDURE — 1160F RVW MEDS BY RX/DR IN RCRD: CPT | Mod: CPTII,95,, | Performed by: NURSE PRACTITIONER

## 2022-01-18 PROCEDURE — 1159F PR MEDICATION LIST DOCUMENTED IN MEDICAL RECORD: ICD-10-PCS | Mod: CPTII,95,, | Performed by: NURSE PRACTITIONER

## 2022-01-18 PROCEDURE — 99213 OFFICE O/P EST LOW 20 MIN: CPT | Mod: 95,,, | Performed by: NURSE PRACTITIONER

## 2022-01-18 PROCEDURE — 1160F PR REVIEW ALL MEDS BY PRESCRIBER/CLIN PHARMACIST DOCUMENTED: ICD-10-PCS | Mod: CPTII,95,, | Performed by: NURSE PRACTITIONER

## 2022-01-18 PROCEDURE — 99213 PR OFFICE/OUTPT VISIT, EST, LEVL III, 20-29 MIN: ICD-10-PCS | Mod: 95,,, | Performed by: NURSE PRACTITIONER

## 2022-01-18 RX ORDER — TRAMADOL HYDROCHLORIDE 50 MG/1
50 TABLET ORAL EVERY 12 HOURS PRN
Qty: 30 TABLET | Refills: 0 | Status: SHIPPED | OUTPATIENT
Start: 2022-01-18 | End: 2022-09-23 | Stop reason: SDUPTHER

## 2022-01-18 NOTE — PROGRESS NOTES
Subjective:       Patient ID: Modesta Camacho is a 56 y.o. female.    Chief Complaint: Urinary Tract Infection    The patient location is: [place]  The chief complaint leading to consultation is:  UTI symptoms     Visit type: audiovisual    Face to Face time with patient:  minutes of total time spent on the encounter, which includes face to face time and non-face to face time preparing to see the patient (eg, review of tests), Obtaining and/or reviewing separately obtained history, Documenting clinical information in the electronic or other health record, Independently interpreting results (not separately reported) and communicating results to the patient/family/caregiver, or Care coordination (not separately reported).         Each patient to whom he or she provides medical services by telemedicine is:  (1) informed of the relationship between the physician and patient and the respective role of any other health care provider with respect to management of the patient; and (2) notified that he or she may decline to receive medical services by telemedicine and may withdraw from such care at any time.    Notes:     Patient presents with concerns of UTI.  Dysuria and vaginal discomfort.  Urgency.   Reports some vaginal discharge.  Not sure of color.      Noticed after starting the Topamax.       Urinary Tract Infection   This is a new problem. There has been no fever. Associated symptoms include a discharge, frequency and urgency. Pertinent negatives include no hematuria, vomiting or constipation.     Review of Systems   Constitutional: Positive for activity change. Negative for unexpected weight change.   HENT: Negative for hearing loss, rhinorrhea and trouble swallowing.    Eyes: Negative for discharge and visual disturbance.   Respiratory: Negative for chest tightness and wheezing.    Cardiovascular: Negative for chest pain and palpitations.   Gastrointestinal: Negative for blood in stool, constipation, diarrhea and  vomiting.   Endocrine: Negative for polydipsia and polyuria.   Genitourinary: Positive for dysuria, frequency and urgency. Negative for difficulty urinating, hematuria and menstrual problem.   Musculoskeletal: Positive for arthralgias and joint swelling. Negative for neck pain.   Neurological: Positive for weakness. Negative for headaches.   Psychiatric/Behavioral: Positive for dysphoric mood. Negative for confusion.         Objective:      Physical Exam  Constitutional:       Appearance: Normal appearance.   Pulmonary:      Effort: No respiratory distress.   Neurological:      General: No focal deficit present.      Mental Status: She is alert.   Psychiatric:         Mood and Affect: Mood normal.         Assessment:       Problem List Items Addressed This Visit     Status post total right knee replacement    Relevant Medications    traMADoL (ULTRAM) 50 mg tablet      Other Visit Diagnoses     UTI symptoms    -  Primary    Relevant Orders    Urine culture    Chills        Relevant Orders    POCT COVID-19 Rapid Screening          Plan:           UTI symptoms  -     Urine culture    Chills  -     POCT COVID-19 Rapid Screening    Status post total right knee replacement  -     traMADoL (ULTRAM) 50 mg tablet; Take 1 tablet (50 mg total) by mouth every 12 (twelve) hours as needed for Pain.  Dispense: 30 tablet; Refill: 0        Recent u/a is normal.     Recommend hydrating with water.      Recommend urine culture and Covid screening.      If culture is negative, she will need visit with GYN or in office visit.

## 2022-01-19 ENCOUNTER — LAB VISIT (OUTPATIENT)
Dept: PRIMARY CARE CLINIC | Facility: CLINIC | Age: 57
End: 2022-01-19

## 2022-01-19 DIAGNOSIS — Z20.822 ENCOUNTER FOR LABORATORY TESTING FOR COVID-19 VIRUS: Primary | ICD-10-CM

## 2022-01-19 LAB
CTP QC/QA: YES
SARS-COV-2 AG RESP QL IA.RAPID: NEGATIVE

## 2022-01-19 PROCEDURE — 87811 SARS CORONAVIRUS 2 ANTIGEN POCT, MANUAL READ: ICD-10-PCS | Mod: S$GLB,,, | Performed by: PREVENTIVE MEDICINE

## 2022-01-19 PROCEDURE — 87811 SARS-COV-2 COVID19 W/OPTIC: CPT | Mod: S$GLB,,, | Performed by: PREVENTIVE MEDICINE

## 2022-02-01 ENCOUNTER — OFFICE VISIT (OUTPATIENT)
Dept: PSYCHIATRY | Facility: CLINIC | Age: 57
End: 2022-02-01
Payer: COMMERCIAL

## 2022-02-01 DIAGNOSIS — F33.1 MODERATE EPISODE OF RECURRENT MAJOR DEPRESSIVE DISORDER: ICD-10-CM

## 2022-02-01 DIAGNOSIS — F41.1 GAD (GENERALIZED ANXIETY DISORDER): Primary | ICD-10-CM

## 2022-02-01 PROCEDURE — 99214 OFFICE O/P EST MOD 30 MIN: CPT | Mod: 95,,, | Performed by: PSYCHOLOGIST

## 2022-02-01 PROCEDURE — 99214 PR OFFICE/OUTPT VISIT, EST, LEVL IV, 30-39 MIN: ICD-10-PCS | Mod: 95,,, | Performed by: PSYCHOLOGIST

## 2022-02-01 RX ORDER — ALPRAZOLAM 0.5 MG/1
0.5 TABLET ORAL NIGHTLY PRN
Qty: 30 TABLET | Refills: 1 | Status: SHIPPED | OUTPATIENT
Start: 2022-02-01 | End: 2024-03-14

## 2022-02-01 NOTE — PATIENT INSTRUCTIONS
"[facility] OF [facility]IAT[facility]   PATIENT INFORMATION    We appreciate the opportunity to participate in your medical care and hope the following protocols will make it easier for you to receive quality treatment in our department.    PUNCTUALITY: Your appointment is scheduled for a fixed amount of time, reserved especially for you.  To get the benefit of your appointment, please arrive at least 15 minutes early to allow time for traffic, parking and registration.  Should you arrive more than 15 minutes late to your appointment, you will be rescheduled in order to assure your clinician has adequate time to assess you and provide helpful care.      APPOINTMENTS: Appointments are made by the nursing/front office staff or through the patient portal. Providers do not have access  to schedule appointments. Walk in appointments are not available. FOR EMERGENCIES, PLEASE GO THE CLOSEST EMERGENCY ROOM.    CANCELLATION/MISSED APPOINTMENTS:   In order to receive quality care, all appointments must be kept.  If you are unable to keep an appointment, please reschedule at least 3 days prior if possible. Late cancellations (within 24 hours of the appointment) and repeated no-show appointments may result in dismissal from the clinic. After two no show/late cancellation visits, you will receive a notice letter, alerting you to keep visits to prevent department dismissal. If another visit is missed after receipt of the notice, you will be discharged from the clinic. This policy is in effect to allow for other individuals on a long waiting list to be seen as soon as possible. Unlike other branches of medicine where several individuals can be scheduled in a 30 minute time slot, only one individual can be scheduled in any time slot in Psychiatry.     MESSAGES: For simple questions/concerns, you may contact your individual providers electronically through the "[facility]" portal or by calling [phone] " with messages relayed via office staff. If relevant, include pharmacy name and phone number, date of last visit and next scheduled visit, phone number where you can be reached throughout the day, and whether leaving a voicemail or message on an answering machine is acceptable. Messages will be returned by the Medical Assistant or Office Staff after your provider has reviewed the message.  Please allow 24 hours for a returned message before leaving another message. Messages will be checked each workday (Monday through Friday) during office hours (8:00 a.m. and 5:00 p.m.) and returned at most within one business day.  You may leave a non-urgent message after hours. Note that psychotherapy and medication management are not appropriate by telephone or the patient portal.    PRESCRIPTION REFILLS:  Please communicate with your prescriber about any refills you need during your appointment. You may also request refills through the [facility] portal (preferred) or by calling the clinic. Prescriptions will be filled during office hours.      Please do not wait until you are completely out of medication to request refills. Same day refills are not always possible. Patients may experience symptoms of withdrawal if they run out of medications. The patient assumes all responsibility when there is an issue with non-compliance with follow-up appointments and medications.   Some medications are controlled and regulated by the FDA and [place]. Some of these medications can not be refilled before 30 days and require a face to face appointment.     PAPERWORK REQUESTS: If you have any forms or letters that need to be completed by your doctor, please present these at the beginning of the appointment to ensure that information needed to complete them is obtained during the office visit. Paperwork will be returned within 7-10 business days. Staff will call you to  the paperwork when completed.    SPECIAL EVALUATIONS: Please note that  "our department is treatment-focused. As such, we focus on treatment-oriented evaluations and do not perform specialty or "forensic" evaluations. Examples are listed below.     Disability: We do not do disability evaluations.  Please contact Social Security Administration for evaluations and determinations. You will then sign releases allowing for records from your treatment providers to be forwarded to Social Security Administration to use in their evaluation.   Gun Permit: We do not offer Sound Judgment Evaluations or assessments leading to gun ownership, nor do we fill out or file paperwork relevant to owning, concealing or purchasing a firearm.   Emotional Support      Animals (ELAINE): We do not provide documentation, including letters, to aid in the acclamation that an Emotional Support Animal is required. Note that ESAs are not trained to perform tasks or recognize particular signs or symptoms. Rather, they are distinguished by the close, emotional, and supportive bond between the animal and the owner.       SAMPLES: We do not provide samples of any medications. If you have financial difficulties and are on a limited income, you may qualify for Patient Assistance Programs from various pharmaceutical companies. This will require that you complete paperwork with your financial information, but this does not guarantee that the company will approve the application. Alternative medication options can be discussed.    REFERRALS/COORDINATION: You will be referred to other providers if we feel unable to adequately diagnose or treat your particular condition, or if collaboration with another provider would allow for better management of your condition.    This document is for information purposes only. Please refer to the full disclaimer and copyright statement available at http://www.[facility].health.[place].gov.au regarding the information from this website before making use of such information.  See website " www.[facility].health.wa.gov.au for more handouts and resources.    What is Sleep Hygiene?  Sleep hygiene is the term used to describe good sleep habits. Considerable research has gone into developing a set of guidelines and tips which are designed to enhance good sleeping, and there is much evidence to suggest that these strategies can provide long-term solutions to sleep difficulties. There are many medications which are used to treat insomnia,  but these tend to be only effective in the short-term. Ongoing use of sleeping pills may lead to dependence and interfere with developing good sleep habits independent of medication,  thereby prolonging sleep difficulties. Talk to your health professional about what is right for you, but we recommend good sleep hygiene as an important part of treating insomnia,  either with other strategies such as medication or cognitive therapy or alone.    Sleep Hygiene Tips  1) Get regular. One of the best ways to train your body to sleep well is to go to bed and get up at more or less the same time every day, even on weekends and days off! This regular rhythm will make you feel better and will give your body something to work from.  2) Sleep when sleepy. Only try to sleep when you actually feel tired or sleepy, rather than spending too much time awake in bed.  3) Get up & try again. If you havent been able to get to sleep after about 20 minutes or more, get up and do something calming or boring until you feel sleepy, then return to bed and try again. Sit quietly on the couch with the lights off (bright light will tell your brain that it is time to wake up), or read something boring like the phone book. Avoid doing anything that is too stimulating or interesting, as this will wake you up even more.  4) Avoid caffeine & nicotine. It is best to avoid consuming any caffeine (in coffee, tea, cola drinks, chocolate, and some medications) or nicotine (cigarettes) for at least 4-6 hours before  going to bed. These substances act as stimulants and interfere with the ability to fall asleep   5) Avoid alcohol. It is also best to avoid alcohol for at least 4-6 hours before going to bed. Many people believe that alcohol is relaxing and helps them to get to sleep at first, but it actually interrupts the quality of sleep.  6) Bed is for sleeping. Try not to use your bed for anything other than sleeping and sex, so that your body comes to associate bed with sleep. If you use bed as a place to watch TV, eat, read, work on your laptop, pay bills, and other things, your body will not learn this connection.  7) No naps. It is best to avoid taking naps during the day, to make sure that you are tired at bedtime. If you cant make it through the day without a nap, make sure it is for less than an hour and before 3pm.  8) Sleep rituals. You can develop your own rituals of things to remind your body that it is time to sleep - some people find it useful to do relaxing stretches or breathing exercises for 15 minutes before bed each night, or sit calmly with a cup of caffeine-free tea.  9) Bathtime. Having a hot bath 1-2 hours before bedtime can be useful, as it will raise your body temperature, causing you to feel sleepy as your body temperature drops again. Research shows that sleepiness is associated with a drop in body temperature.  10) No clock-watching. Many people who struggle with sleep tend to watch the clock too much. Frequently checking the clock during the night can wake you up (especially if you turn  on the light to read the time) and reinforces negative thoughts such as Oh no, look how late it is, Ill never get to sleep or its so early, I have only slept for 5 hours, this is  terrible.  11) Use a sleep diary. This worksheet can be a useful way of making sure you have the right facts about your sleep, rather than making assumptions. Because a diary involves watching  the clock (see point 10) it is a good  idea to only use it for two weeks to get an idea of what is going and then perhaps two months down the track to see how you are progressing.  12) Exercise. Regular exercise is a good idea to help with good sleep, but try not to do strenuous exercise in the 4 hours before bedtime. Morning walks are a great way to start the day feeling refreshed!  13) Eat right. A healthy, balanced diet will help you to sleep well, but timing is important. Some people find that a very empty stomach at bedtime is distracting, so it can be useful  to have a light snack, but a heavy meal soon before bed can also interrupt sleep. Some people recommend a warm glass of milk, which contains tryptophan, which acts as a natural  sleep inducer.  14) The right space. It is very important that your bed and bedroom are quiet and comfortable for sleeping. A cooler room with enough blankets to stay warm is best, and make sure you have curtains or an eyemask to block out early morning light and earplugs if there is noise outside your room.  15) Keep daytime routine the same. Even if you have a bad night sleep and are tired it is important that you try to keep your daytime activities the same as you had planned. That is,  dont avoid activities because you feel tired. This can reinforce the insomnia.    Call In if problems  Call Report Side Effects   Encouraged to follow up with primary care / Gen Med MD for continued monitoring of general health and wellness  Call 911 Or go to ER if Acute Concerns (especially if any thoughts of harm to self or other)  · EAP for therapy  · If paperwork needed, bring to next visit for completion during the visit         Krista Sr, PhD, MP  Advanced Practice Medical Psychologist  [facility]  [id number] [place] [address].  NENO Hernandez [id number] [phone]   [phone] fax
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
## 2022-02-14 ENCOUNTER — PATIENT MESSAGE (OUTPATIENT)
Dept: ORTHOPEDICS | Facility: CLINIC | Age: 57
End: 2022-02-14
Payer: COMMERCIAL

## 2022-02-15 ENCOUNTER — TELEPHONE (OUTPATIENT)
Dept: ORTHOPEDICS | Facility: CLINIC | Age: 57
End: 2022-02-15
Payer: COMMERCIAL

## 2022-02-15 ENCOUNTER — PATIENT MESSAGE (OUTPATIENT)
Dept: ORTHOPEDICS | Facility: CLINIC | Age: 57
End: 2022-02-15
Payer: COMMERCIAL

## 2022-02-15 NOTE — TELEPHONE ENCOUNTER
Left 3 messages since last week attempting to r/s 2/16 due to Dr. Rodriguez being out. Also stated that Dr. Rodriguez referred her to other ortho providers. Left call-back number and My 1%t message sent.

## 2022-02-18 ENCOUNTER — TELEPHONE (OUTPATIENT)
Dept: PSYCHIATRY | Facility: CLINIC | Age: 57
End: 2022-02-18
Payer: COMMERCIAL

## 2022-02-21 ENCOUNTER — PATIENT OUTREACH (OUTPATIENT)
Dept: ADMINISTRATIVE | Facility: OTHER | Age: 57
End: 2022-02-21
Payer: COMMERCIAL

## 2022-02-22 ENCOUNTER — TELEPHONE (OUTPATIENT)
Dept: INTERNAL MEDICINE | Facility: CLINIC | Age: 57
End: 2022-02-22

## 2022-03-11 DIAGNOSIS — Z96.652 HISTORY OF TOTAL LEFT KNEE REPLACEMENT: Primary | ICD-10-CM

## 2022-03-21 ENCOUNTER — TELEPHONE (OUTPATIENT)
Dept: PAIN MEDICINE | Facility: CLINIC | Age: 57
End: 2022-03-21

## 2022-03-23 ENCOUNTER — TELEPHONE (OUTPATIENT)
Dept: PAIN MEDICINE | Facility: CLINIC | Age: 57
End: 2022-03-23

## 2022-03-24 ENCOUNTER — TELEPHONE (OUTPATIENT)
Dept: PAIN MEDICINE | Facility: CLINIC | Age: 57
End: 2022-03-24

## 2022-03-28 ENCOUNTER — TELEPHONE (OUTPATIENT)
Dept: PAIN MEDICINE | Facility: CLINIC | Age: 57
End: 2022-03-28

## 2022-04-26 ENCOUNTER — PATIENT MESSAGE (OUTPATIENT)
Dept: ADMINISTRATIVE | Facility: HOSPITAL | Age: 57
End: 2022-04-26
Payer: COMMERCIAL

## 2022-05-16 DIAGNOSIS — I10 ESSENTIAL HYPERTENSION: ICD-10-CM

## 2022-05-16 NOTE — TELEPHONE ENCOUNTER
lv 22    ----- Message from Demi Josiah sent at 2022 12:28 PM CDT -----  Type:  RX Refill Request    Who Called:  Pt  Refill or New Rx: Refill  RX Name and Strength: metoprolol succinate (TOPROL-XL) 50 MG 24 hr tablet ()      How is the patient currently taking it? (ex. 1XDay): 2XDay  Is this a 30 day or 90 day RX: 180 tablet  Preferred Pharmacy with phone number: [id number] [place] [address]   Phone: [phone]  Fax:  [phone]    Local or Mail Order: Local  Ordering Provider: Yandy Lin, AlonzoD  Would the patient rather a call back or a response via [facility]? Call  Best Call Back Number: [phone]  Additional Information: Please assist, thank you!

## 2022-05-18 RX ORDER — METOPROLOL SUCCINATE 50 MG/1
100 TABLET, EXTENDED RELEASE ORAL NIGHTLY
Qty: 180 TABLET | Refills: 0
Start: 2022-05-18 | End: 2022-05-23 | Stop reason: SDUPTHER

## 2022-05-19 ENCOUNTER — PATIENT OUTREACH (OUTPATIENT)
Dept: ADMINISTRATIVE | Facility: OTHER | Age: 57
End: 2022-05-19
Payer: COMMERCIAL

## 2022-05-23 ENCOUNTER — TELEPHONE (OUTPATIENT)
Dept: INTERNAL MEDICINE | Facility: CLINIC | Age: 57
End: 2022-05-23
Payer: COMMERCIAL

## 2022-05-23 DIAGNOSIS — I10 ESSENTIAL HYPERTENSION: ICD-10-CM

## 2022-05-23 RX ORDER — TELMISARTAN 80 MG/1
80 TABLET ORAL DAILY
Qty: 90 TABLET | Refills: 1 | Status: SHIPPED | OUTPATIENT
Start: 2022-05-23 | End: 2022-12-04 | Stop reason: SDUPTHER

## 2022-05-23 RX ORDER — TELMISARTAN 80 MG/1
80 TABLET ORAL DAILY
Qty: 90 TABLET | Refills: 3 | Status: CANCELLED | OUTPATIENT
Start: 2022-05-23

## 2022-05-23 RX ORDER — METOPROLOL SUCCINATE 50 MG/1
100 TABLET, EXTENDED RELEASE ORAL NIGHTLY
Qty: 180 TABLET | Refills: 0 | Status: CANCELLED
Start: 2022-05-23

## 2022-05-23 RX ORDER — METOPROLOL SUCCINATE 50 MG/1
100 TABLET, EXTENDED RELEASE ORAL NIGHTLY
Qty: 180 TABLET | Refills: 1 | Status: SHIPPED | OUTPATIENT
Start: 2022-05-23 | End: 2023-08-26 | Stop reason: SDUPTHER

## 2022-05-23 NOTE — TELEPHONE ENCOUNTER
S/w pt, states pharmacy does not have script. Would like metoprolol and telmisartan sent to [facility] in [place].

## 2022-05-23 NOTE — TELEPHONE ENCOUNTER
Attempted to contact pt to clarify, left vm.     Please resend Metoprolol 90 day supply (no receipt confirmation listed) -- also lists request of 90 day supply of telmisartan. Please advise.     WM leona dunne. LV 1/2022    ----- Message from Tony Chen sent at 5/23/2022  7:18 AM CDT -----  Contact: Patient  The pt called and said that her pharmacy hasn't received the prescription for metoprolol succinate (TOPROL-XL) 50 MG 24 hr tablet 180 tablet     The pt said that she is not feeling well without her medication    Please resend it    She also needs a refill of telmisartan (MICARDIS) 80 MG Tab 90 tablet     The pt can be reached at [phone]

## 2022-05-23 NOTE — TELEPHONE ENCOUNTER
----- Message from Janet Garcia sent at 5/23/2022  2:55 PM CDT -----  Regarding: Medication  Contact: Patient  Patient would like a call back concerning her metoprolol succinate (TOPROL-XL) 50 MG 24 hr tablet, at Ph .[phone] (home)  she is having heart palpatation.

## 2022-05-26 ENCOUNTER — NURSE TRIAGE (OUTPATIENT)
Dept: ADMINISTRATIVE | Facility: CLINIC | Age: 57
End: 2022-05-26
Payer: COMMERCIAL

## 2022-05-26 DIAGNOSIS — R07.9 CHEST PAIN, UNSPECIFIED TYPE: ICD-10-CM

## 2022-05-26 DIAGNOSIS — R94.31 ABNORMAL EKG: Primary | ICD-10-CM

## 2022-05-26 NOTE — TELEPHONE ENCOUNTER
Patient reports she's been having elevated blood pressures over the last month, bp reading this am was 160's/90's.  She states she just took her last clonidine, and is now out of medication, she just switched jobs and does not have insurance.  She also reports that she's been having intermittent chest pain throughout the night, episodes last more than 5 minutes, and she also had some left sided tingling this am, but it subsided after she took the clonidine.  I advised she call 911 to bring her to the ED, she refused and stated she just wanted to see her cardiologist.  I informed the patient that I would message Dr. Rosario/staff to contact the patient when they arrive in clinic this am.  Reason for Disposition   [1] Chest pain lasts > 5 minutes AND [2] history of heart disease  (i.e., heart attack, bypass surgery, angina, angioplasty, CHF)    Additional Information   Negative: Difficult to awaken or acting confused (e.g., disoriented, slurred speech)   Negative: SEVERE difficulty breathing (e.g., struggling for each breath, speaks in single words)   Negative: [1] Weakness of the face, arm or leg on one side of the body AND [2] new-onset   Negative: [1] Numbness (i.e., loss of sensation) of the face, arm or leg on one side of the body AND [2] new-onset    Protocols used: BLOOD PRESSURE - HIGH-A-

## 2022-05-31 ENCOUNTER — PATIENT MESSAGE (OUTPATIENT)
Dept: PSYCHIATRY | Facility: CLINIC | Age: 57
End: 2022-05-31
Payer: COMMERCIAL

## 2022-06-27 ENCOUNTER — OFFICE VISIT (OUTPATIENT)
Dept: ORTHOPEDICS | Facility: CLINIC | Age: 57
End: 2022-06-27
Payer: COMMERCIAL

## 2022-06-27 ENCOUNTER — HOSPITAL ENCOUNTER (OUTPATIENT)
Dept: RADIOLOGY | Facility: HOSPITAL | Age: 57
Discharge: HOME | End: 2022-06-27
Attending: ORTHOPAEDIC SURGERY
Payer: COMMERCIAL

## 2022-06-27 ENCOUNTER — TELEPHONE (OUTPATIENT)
Dept: CARDIOLOGY | Facility: CLINIC | Age: 57
End: 2022-06-27
Payer: COMMERCIAL

## 2022-06-27 VITALS — HEIGHT: 62 IN | WEIGHT: 156.5 LBS | BODY MASS INDEX: 28.8 KG/M2

## 2022-06-27 DIAGNOSIS — Z96.652 HISTORY OF TOTAL LEFT KNEE REPLACEMENT: ICD-10-CM

## 2022-06-27 DIAGNOSIS — M16.11 ARTHRITIS OF RIGHT HIP: ICD-10-CM

## 2022-06-27 DIAGNOSIS — I10 ESSENTIAL HYPERTENSION: ICD-10-CM

## 2022-06-27 DIAGNOSIS — Z96.642 HX OF TOTAL HIP ARTHROPLASTY, LEFT: Primary | ICD-10-CM

## 2022-06-27 DIAGNOSIS — T84.022A INSTABILITY OF INTERNAL RIGHT KNEE PROSTHESIS, INITIAL ENCOUNTER: Primary | ICD-10-CM

## 2022-06-27 DIAGNOSIS — M54.9 BACK PAIN, UNSPECIFIED BACK LOCATION, UNSPECIFIED BACK PAIN LATERALITY, UNSPECIFIED CHRONICITY: Primary | ICD-10-CM

## 2022-06-27 DIAGNOSIS — R94.31 ABNORMAL EKG: Primary | ICD-10-CM

## 2022-06-27 DIAGNOSIS — M47.816 FACET ARTHROPATHY, LUMBAR: ICD-10-CM

## 2022-06-27 DIAGNOSIS — Z96.642 HX OF TOTAL HIP ARTHROPLASTY, LEFT: ICD-10-CM

## 2022-06-27 DIAGNOSIS — Z01.818 PRE-OP EXAM: ICD-10-CM

## 2022-06-27 DIAGNOSIS — M54.9 BACK PAIN, UNSPECIFIED BACK LOCATION, UNSPECIFIED BACK PAIN LATERALITY, UNSPECIFIED CHRONICITY: ICD-10-CM

## 2022-06-27 DIAGNOSIS — M43.16 SPONDYLOLISTHESIS, LUMBAR REGION: ICD-10-CM

## 2022-06-27 DIAGNOSIS — M16.11 PRIMARY OSTEOARTHRITIS OF RIGHT HIP: ICD-10-CM

## 2022-06-27 PROCEDURE — 72100 XR LUMBAR SPINE AP AND LATERAL: ICD-10-PCS | Mod: 26,,, | Performed by: RADIOLOGY

## 2022-06-27 PROCEDURE — 73564 XR KNEE ORTHO BILAT WITH FLEXION: ICD-10-PCS | Mod: 26,,, | Performed by: RADIOLOGY

## 2022-06-27 PROCEDURE — 73521 X-RAY EXAM HIPS BI 2 VIEWS: CPT | Mod: 26,,, | Performed by: RADIOLOGY

## 2022-06-27 PROCEDURE — 73521 X-RAY EXAM HIPS BI 2 VIEWS: CPT | Mod: TC

## 2022-06-27 PROCEDURE — 72100 X-RAY EXAM L-S SPINE 2/3 VWS: CPT | Mod: 26,,, | Performed by: RADIOLOGY

## 2022-06-27 PROCEDURE — 73521 XR HIPS BILATERAL 2 VIEW INCL AP PELVIS: ICD-10-PCS | Mod: 26,,, | Performed by: RADIOLOGY

## 2022-06-27 PROCEDURE — 73564 X-RAY EXAM KNEE 4 OR MORE: CPT | Mod: 26,,, | Performed by: RADIOLOGY

## 2022-06-27 PROCEDURE — 99999 PR PBB SHADOW E&M-EST. PATIENT-LVL IV: CPT | Mod: PBBFAC,,, | Performed by: ORTHOPAEDIC SURGERY

## 2022-06-27 PROCEDURE — 3008F BODY MASS INDEX DOCD: CPT | Mod: CPTII,S$GLB,, | Performed by: ORTHOPAEDIC SURGERY

## 2022-06-27 PROCEDURE — 99214 OFFICE O/P EST MOD 30 MIN: CPT | Mod: S$GLB,,, | Performed by: ORTHOPAEDIC SURGERY

## 2022-06-27 PROCEDURE — 3008F PR BODY MASS INDEX (BMI) DOCUMENTED: ICD-10-PCS | Mod: CPTII,S$GLB,, | Performed by: ORTHOPAEDIC SURGERY

## 2022-06-27 PROCEDURE — 4010F PR ACE/ARB THEARPY RXD/TAKEN: ICD-10-PCS | Mod: CPTII,S$GLB,, | Performed by: ORTHOPAEDIC SURGERY

## 2022-06-27 PROCEDURE — 4010F ACE/ARB THERAPY RXD/TAKEN: CPT | Mod: CPTII,S$GLB,, | Performed by: ORTHOPAEDIC SURGERY

## 2022-06-27 PROCEDURE — 99214 PR OFFICE/OUTPT VISIT, EST, LEVL IV, 30-39 MIN: ICD-10-PCS | Mod: S$GLB,,, | Performed by: ORTHOPAEDIC SURGERY

## 2022-06-27 PROCEDURE — 1159F MED LIST DOCD IN RCRD: CPT | Mod: CPTII,S$GLB,, | Performed by: ORTHOPAEDIC SURGERY

## 2022-06-27 PROCEDURE — 72100 X-RAY EXAM L-S SPINE 2/3 VWS: CPT | Mod: TC

## 2022-06-27 PROCEDURE — 99999 PR PBB SHADOW E&M-EST. PATIENT-LVL IV: ICD-10-PCS | Mod: PBBFAC,,, | Performed by: ORTHOPAEDIC SURGERY

## 2022-06-27 PROCEDURE — 1159F PR MEDICATION LIST DOCUMENTED IN MEDICAL RECORD: ICD-10-PCS | Mod: CPTII,S$GLB,, | Performed by: ORTHOPAEDIC SURGERY

## 2022-06-27 PROCEDURE — 73564 X-RAY EXAM KNEE 4 OR MORE: CPT | Mod: TC,50

## 2022-06-27 NOTE — PATIENT INSTRUCTIONS
X-RAY X-RAYS 06/27/2022 LEFT TOTAL KNEE REPLACEMENT IN EXCELLENT ALIGNMENT AS WELL AS THE RIGHT TOTAL KNEE  YOUR EXAM SHOWS THE RIGHT KNEE EXTREMELY LOOSE MEDIAL COLLATERAL LIGAMENT AS WELL AS IN FLEXION AT 45° WITH ANTERIOR DRAW  X-RAYS OF HER HIP SHOWING SEVERE ARTHRITIS OF THE RIGHT HIP WITH BONE ON BONE HOWEVER THE LEFT TOTAL HIP REPLACEMENT STILL IN EXCELLENT ALIGNMENT  I WOULD LIKE FOR YOU TO BRING THE OPERATIVE REPORT ON YOUR RIGHT KNEE FROM THE [facility] WHICH WAS PERFORMED BY DR. DELATORRE IN ORDER TO SEE THE BRAND NAME WHICH MOST LIKELY IT IS [employer] HOWEVER WE NEED TO SEE IF THEY HAVE THE PLASTIC INSERTS AVAILABLE FOR IT AND HOW THICK THEY ARE BEFORE PROCEEDING WITH ANY REVISION.  IF THEY DO THEN MOST LIKELY WOULD NEED A PLASTIC EXCHANGE YET  YOU WOULD NEED EVENTUALLY THE RIGHT HIP REPLACED  AS FAR AS THE LEFT KNEE YOU ONLY HAVE AROUND 2-3 DEGREES OF CONTRACTURE HOWEVER THAT MAKES YOU KNEE MUCH MORE STABLE TO DO STAIRS.  YOU CAN ARE YOU IN THE FUTURE IF YOU WANT MAYBE CHANGING THE PLASTIC ON THIS 1 TO A LITTLE BIT OF A SMALLER PLASTIC WHICH WILL GIVE YOU A LITTLE BIT MORE STRAIGHTENING  MY CONCERN RIGHT NOW THAT THE RIGHT TOTAL KNEE IS LIGAMENTOUSLY QUITE LOOSE AND THAT NEED TO BE FIXED  I WILL GIVE YOU A BRACE AND THE MEANTIME TO WEAR ON THE RIGHT KNEE  I WILL SEE YOU AS SOON AS HE CAN GET ME THE OPERATIVE REPORT

## 2022-06-27 NOTE — TELEPHONE ENCOUNTER
LVM for patient and informed her that EKG order has been placed----- Message from Whitney Lebron sent at 6/27/2022  9:51 AM CDT -----  Pt have an appt 6/29/2022 and would like to know if she need an EKG. Please put in an order and call the pt to confirm. Call back number is .[phone]. Thx. EL      
no

## 2022-06-27 NOTE — PROGRESS NOTES
Subjective:     Patient ID: Modesta Camacho is a 56 y.o. female.    Chief Complaint: Pain of the Left Knee and Pain of the Right Hip  06/27/2022  HPI:  Left TKA 05/07/2021 by Dr. Gandara, left KRZYSZTOF a by Dr. Gandara  Right TKA by Dr. Lan 2016  Patient is complaining that the left knee is tight unable to fully extend compared to the right side.  She is not having any pain with the left hip.  She has very mild discomfort in the left knee.  As far as the right hip is concerned she is having severe pain in the groin and she knows she has arthritis.  She stated the right TKA done by Dr. Lan is not painful but it gives out with difficulty doing stairs.  Overall pain is 4/10.  She still working at the [employer].  She ambulates without any assistive devices.  She feels her left knee is not doing well and cannot straighten it out as much as she does in the right knee.  For some reason she was upset with Dr. Atkinson.  No fever no chills no shortness of breath or difficulty with chewing or swallowing.  She does have low back pain and she sees Dr. Weaver  Past Medical History:   Diagnosis Date    Abnormal EKG 2/5/2021    Anxiety     Hypertension     Osteoarthritis     Stage 2 chronic kidney disease 2/5/2021     Past Surgical History:   Procedure Laterality Date    ARTHROPLASTY OF HIP BY ANTERIOR APPROACH Left 7/31/2020    Procedure: ARTHROPLASTY, HIP, ANTERIOR APPROACH;  Surgeon: Xavi Gandara MD;  Location: [facility] OR;  Service: Orthopedics;  Laterality: Left;    gastric sleeve      JOINT REPLACEMENT Right 06/14/2016    knee    JOINT REPLACEMENT Left 05/20/2021    KNEE    JOINT REPLACEMENT Left 07/30/2020    HIP    KNEE ARTHROPLASTY Left 5/6/2021    Procedure: ARTHROPLASTY, KNEE;  Surgeon: Xavi Gandara MD;  Location: [facility] OR;  Service: Orthopedics;  Laterality: Left;    TUBAL LIGATION      UTERINE FIBROID EMBOLIZATION       Family History   Problem Relation Age of Onset    Hypertension Mother      Arthritis Mother     Diabetes Father     Heart disease Father     Depression Sister     Hypertension Sister     Arthritis Brother     Thyroid disease Son     Hypertension Son     Arthritis Maternal Grandmother     Heart disease Maternal Grandmother     Kidney disease Maternal Grandmother     Heart attack Maternal Grandfather     Stroke Paternal Grandmother     No Known Problems Paternal Grandfather     Eczema Son     Breast cancer Sister 47     Social History     Socioeconomic History    Marital status:      Spouse name: Pop Land    Number of children: 3   Occupational History    Occupation: patient access   Tobacco Use    Smoking status: Never Smoker    Smokeless tobacco: Never Used   Substance and Sexual Activity    Alcohol use: Yes     Comment: occasional: hold 72hrs. prior to surgery    Drug use: No    Sexual activity: Yes     Partners: Male     Birth control/protection: See Surgical Hx     Medication List with Changes/Refills   Current Medications    ALPRAZOLAM (XANAX) 0.5 MG TABLET    Take 1 tablet (0.5 mg total) by mouth nightly as needed for Anxiety.    AMLODIPINE (NORVASC) 5 MG TABLET    TAKE 1 TABLET BY MOUTH EVERY DAY    CLONIDINE (CATAPRES) 0.1 MG TABLET    TAKE 1 TABLET BY MOUTH ONCE DAILY USE FOR SBP GREATER THAN 160 MMHG.    DOCUSATE SODIUM (COLACE) 100 MG CAPSULE    Take 100 mg by mouth 2 (two) times daily as needed.    DULOXETINE (CYMBALTA) 60 MG CAPSULE    Take 1 capsule (60 mg total) by mouth 2 (two) times daily.    FLUTICASONE PROPIONATE (FLONASE) 50 MCG/ACTUATION NASAL SPRAY    USE 1 SPRAY IN EACH NOSTRIL ONCE DAILY    METOPROLOL SUCCINATE (TOPROL-XL) 50 MG 24 HR TABLET    Take 2 tablets (100 mg total) by mouth nightly.    ONDANSETRON (ZOFRAN-ODT) 4 MG TBDL    Take 1 tablet (4 mg total) by mouth every 8 (eight) hours as needed (nausea).    TELMISARTAN (MICARDIS) 80 MG TAB    Take 1 tablet (80 mg total) by mouth once daily.    TIZANIDINE (ZANAFLEX) 4 MG TABLET     Take 1 tablet by mouth twice daily as needed    TRAMADOL (ULTRAM) 50 MG TABLET    Take 1 tablet (50 mg total) by mouth every 12 (twelve) hours as needed for Pain.     Review of patient's allergies indicates:   Allergen Reactions    Codeine Hives and Rash     Takes Tramadol and has never had an issue with SOB/swelling  Also tolerated hydromorphone 7/2020      Penicillin     Penicillins Hives     Pt tolerated cefazolin 7/2020     Review of Systems   Constitutional: Negative for decreased appetite.   HENT: Negative for tinnitus.    Eyes: Negative for double vision.   Cardiovascular: Negative for chest pain.   Respiratory: Negative for wheezing.    Hematologic/Lymphatic: Negative for bleeding problem.   Skin: Negative for dry skin.   Musculoskeletal: Positive for arthritis, back pain, joint pain and stiffness. Negative for gout and neck pain.   Gastrointestinal: Negative for abdominal pain.   Genitourinary: Negative for bladder incontinence.   Neurological: Negative for numbness, paresthesias and sensory change.   Psychiatric/Behavioral: Negative for altered mental status.       Objective:   Body mass index is 28.63 kg/m².  There were no vitals filed for this visit.       General    Constitutional: She is oriented to person, place, and time. She appears well-developed.   HENT:   Head: Atraumatic.   Eyes: EOM are normal.   Pulmonary/Chest: Effort normal.   Neurological: She is alert and oriented to person, place, and time.   Psychiatric: Judgment normal.           Ambulating without any assistive devices  Right hip pain to internal external rotation in the groin.  She has around 15° internal rotation around 25° external rotation with around may be 5° of contractures  The left total hip journal external rotation without pain in the groin.  Excellent range of motion  Pelvis is level  The sitting position  Hip flexors, abductors adductors quads and hamstrings ankle extensors and flexors were 5/5  Left TKA has around 3°  of contracture and she flexes to 120°.  Slightly tight.  No defect in the patella or quadriceps tendon.  No erythema, not warm to touch.  Collaterals stable to varus and valgus stressing in extension and in flexion at 90°  Right knee hyperextends around 7°.  Surgical scar from TKA healed well.  Valgus stressing the medial joint opens up at least 7 mm with a large sharp.  At 45° there is around 2+ Lachman as well as 90° 1+ Lachman.  There is quite a bit of clicking but there is no swelling no effusion.  Calves are soft he had  Ankle motion intact  Skin is warm to touch    Relevant imaging results reviewed and interpreted by me, discussed with the patient and / or family today     X-ray bilateral knees 06/27/2022 showing left TKA with very thick poly insert and the patella was not resurfaced with excellent alignment/posterior sacrificing knee system..  Right TKA/looks like Biomet with slightly oversized base plate on the tibia but overall alignment is intact and looks like it is ingrowth prosthesis with patella not resurfaced and posterior cruciate sparing knee  X-ray 11/24/2021 and 06/27/2022 left KRZYSZTOF in excellent alignment.  Right hip complete loss of joint space with marginal osteophyte and cystic changes consistent with severe arthritis of the right hip    X-ray 06/27/2022 of the lumbar spine showing spondylolisthesis L4 on 5 grade 1 and facet arthropathy.  No fracture seen  Assessment:     Encounter Diagnoses   Name Primary?    History of total left knee replacement     Hx of total hip arthroplasty, left     Arthritis of right hip     Primary osteoarthritis of right hip     Instability of internal right knee prosthesis, initial encounter Yes        Plan:   Instability of internal right knee prosthesis, initial encounter    History of total left knee replacement  -     Ambulatory referral/consult to Orthopedics    Hx of total hip arthroplasty, left    Arthritis of right hip    Primary osteoarthritis of right  hip  -     Ambulatory referral/consult to Orthopedics         Patient Instructions   X-RAY X-RAYS 06/27/2022 LEFT TOTAL KNEE REPLACEMENT IN EXCELLENT ALIGNMENT AS WELL AS THE RIGHT TOTAL KNEE  YOUR EXAM SHOWS THE RIGHT KNEE EXTREMELY LOOSE MEDIAL COLLATERAL LIGAMENT AS WELL AS IN FLEXION AT 45° WITH ANTERIOR DRAW  X-RAYS OF HER HIP SHOWING SEVERE ARTHRITIS OF THE RIGHT HIP WITH BONE ON BONE HOWEVER THE LEFT TOTAL HIP REPLACEMENT STILL IN EXCELLENT ALIGNMENT  I WOULD LIKE FOR YOU TO BRING THE OPERATIVE REPORT ON YOUR RIGHT KNEE FROM THE [facility] WHICH WAS PERFORMED BY DR. DELATORRE IN ORDER TO SEE THE BRAND NAME WHICH MOST LIKELY IT IS BIOMET HOWEVER WE NEED TO SEE IF THEY HAVE THE PLASTIC INSERTS AVAILABLE FOR IT AND HOW THICK THEY ARE BEFORE PROCEEDING WITH ANY REVISION.  IF THEY DO THEN MOST LIKELY WOULD NEED A PLASTIC EXCHANGE YET  YOU WOULD NEED EVENTUALLY THE RIGHT HIP REPLACED  AS FAR AS THE LEFT KNEE YOU ONLY HAVE AROUND 2-3 DEGREES OF CONTRACTURE HOWEVER THAT MAKES YOU KNEE MUCH MORE STABLE TO DO STAIRS.  YOU CAN ARE YOU IN THE FUTURE IF YOU WANT MAYBE CHANGING THE PLASTIC ON THIS 1 TO A LITTLE BIT OF A SMALLER PLASTIC WHICH WILL GIVE YOU A LITTLE BIT MORE STRAIGHTENING  MY CONCERN RIGHT NOW THAT THE RIGHT TOTAL KNEE IS LIGAMENTOUSLY QUITE LOOSE AND THAT NEED TO BE FIXED  I WILL GIVE YOU A BRACE AND THE MEANTIME TO WEAR ON THE RIGHT KNEE  I WILL SEE YOU AS SOON AS HE CAN GET ME THE OPERATIVE REPORT      Hyper extension in the right TKA with instability to varus valgus stressing as well to anterior drawer and that making the left TKA which is stiff a little bit uncomfortable to ambulate.  I did tell her you can make the left TKA looser by changing the poly insert into smaller 1 but that might keep her and get her into instability during stairs.  The right knee is quite ligamentously unstable and might need to make it tighter by going up on the poly insert to make it not as loose and allows doing  stairs in a better situation and like there is you wound feel the instability and the difference as much.  Total knee replacements are not perfect they are 80% successful in decreasing pain increasing function but not perfect.  I think the right knee is the problem rather than the left which has mild tightness.  We can tighten the right knee if we know the brand and the sizes and we can discuss with the company if they have multiple in higher sizes to make it tighter knee.  In the future you would need also the right hip replaced    Disclaimer: This note was prepared using a voice recognition system and is likely to have sound alike errors within the text.

## 2022-06-28 ENCOUNTER — TELEPHONE (OUTPATIENT)
Dept: PAIN MEDICINE | Facility: CLINIC | Age: 57
End: 2022-06-28
Payer: COMMERCIAL

## 2022-06-29 ENCOUNTER — OFFICE VISIT (OUTPATIENT)
Dept: CARDIOLOGY | Facility: CLINIC | Age: 57
End: 2022-06-29
Payer: COMMERCIAL

## 2022-06-29 ENCOUNTER — HOSPITAL ENCOUNTER (OUTPATIENT)
Dept: CARDIOLOGY | Facility: HOSPITAL | Age: 57
Discharge: HOME | End: 2022-06-29
Attending: INTERNAL MEDICINE
Payer: COMMERCIAL

## 2022-06-29 ENCOUNTER — TELEPHONE (OUTPATIENT)
Dept: INTERNAL MEDICINE | Facility: CLINIC | Age: 57
End: 2022-06-29
Payer: COMMERCIAL

## 2022-06-29 VITALS
RESPIRATION RATE: 16 BRPM | BODY MASS INDEX: 29.82 KG/M2 | HEIGHT: 62 IN | DIASTOLIC BLOOD PRESSURE: 96 MMHG | HEART RATE: 115 BPM | OXYGEN SATURATION: 97 % | SYSTOLIC BLOOD PRESSURE: 164 MMHG | WEIGHT: 162.06 LBS

## 2022-06-29 DIAGNOSIS — E79.0 HYPERURICEMIA: ICD-10-CM

## 2022-06-29 DIAGNOSIS — R94.31 ABNORMAL EKG: ICD-10-CM

## 2022-06-29 DIAGNOSIS — M50.30 DDD (DEGENERATIVE DISC DISEASE), CERVICAL: ICD-10-CM

## 2022-06-29 DIAGNOSIS — F41.9 ANXIETY: ICD-10-CM

## 2022-06-29 DIAGNOSIS — I10 ESSENTIAL HYPERTENSION: Primary | ICD-10-CM

## 2022-06-29 DIAGNOSIS — I27.20 PULMONARY HTN: ICD-10-CM

## 2022-06-29 DIAGNOSIS — Z01.818 PRE-OP EXAM: ICD-10-CM

## 2022-06-29 DIAGNOSIS — I10 ESSENTIAL HYPERTENSION: ICD-10-CM

## 2022-06-29 PROCEDURE — 3077F PR MOST RECENT SYSTOLIC BLOOD PRESSURE >= 140 MM HG: ICD-10-PCS | Mod: CPTII,S$GLB,, | Performed by: INTERNAL MEDICINE

## 2022-06-29 PROCEDURE — 93005 ELECTROCARDIOGRAM TRACING: CPT

## 2022-06-29 PROCEDURE — 3080F DIAST BP >= 90 MM HG: CPT | Mod: CPTII,S$GLB,, | Performed by: INTERNAL MEDICINE

## 2022-06-29 PROCEDURE — 99214 PR OFFICE/OUTPT VISIT, EST, LEVL IV, 30-39 MIN: ICD-10-PCS | Mod: S$GLB,,, | Performed by: INTERNAL MEDICINE

## 2022-06-29 PROCEDURE — 4010F PR ACE/ARB THEARPY RXD/TAKEN: ICD-10-PCS | Mod: CPTII,S$GLB,, | Performed by: INTERNAL MEDICINE

## 2022-06-29 PROCEDURE — 93010 ELECTROCARDIOGRAM REPORT: CPT | Mod: ,,, | Performed by: INTERNAL MEDICINE

## 2022-06-29 PROCEDURE — 3080F PR MOST RECENT DIASTOLIC BLOOD PRESSURE >= 90 MM HG: ICD-10-PCS | Mod: CPTII,S$GLB,, | Performed by: INTERNAL MEDICINE

## 2022-06-29 PROCEDURE — 1159F MED LIST DOCD IN RCRD: CPT | Mod: CPTII,S$GLB,, | Performed by: INTERNAL MEDICINE

## 2022-06-29 PROCEDURE — 3008F PR BODY MASS INDEX (BMI) DOCUMENTED: ICD-10-PCS | Mod: CPTII,S$GLB,, | Performed by: INTERNAL MEDICINE

## 2022-06-29 PROCEDURE — 1160F PR REVIEW ALL MEDS BY PRESCRIBER/CLIN PHARMACIST DOCUMENTED: ICD-10-PCS | Mod: CPTII,S$GLB,, | Performed by: INTERNAL MEDICINE

## 2022-06-29 PROCEDURE — 93010 EKG 12-LEAD: ICD-10-PCS | Mod: ,,, | Performed by: INTERNAL MEDICINE

## 2022-06-29 PROCEDURE — 3008F BODY MASS INDEX DOCD: CPT | Mod: CPTII,S$GLB,, | Performed by: INTERNAL MEDICINE

## 2022-06-29 PROCEDURE — 4010F ACE/ARB THERAPY RXD/TAKEN: CPT | Mod: CPTII,S$GLB,, | Performed by: INTERNAL MEDICINE

## 2022-06-29 PROCEDURE — 3077F SYST BP >= 140 MM HG: CPT | Mod: CPTII,S$GLB,, | Performed by: INTERNAL MEDICINE

## 2022-06-29 PROCEDURE — 99214 OFFICE O/P EST MOD 30 MIN: CPT | Mod: S$GLB,,, | Performed by: INTERNAL MEDICINE

## 2022-06-29 PROCEDURE — 1160F RVW MEDS BY RX/DR IN RCRD: CPT | Mod: CPTII,S$GLB,, | Performed by: INTERNAL MEDICINE

## 2022-06-29 PROCEDURE — 1159F PR MEDICATION LIST DOCUMENTED IN MEDICAL RECORD: ICD-10-PCS | Mod: CPTII,S$GLB,, | Performed by: INTERNAL MEDICINE

## 2022-06-29 PROCEDURE — 99999 PR PBB SHADOW E&M-EST. PATIENT-LVL IV: CPT | Mod: PBBFAC,,, | Performed by: INTERNAL MEDICINE

## 2022-06-29 PROCEDURE — 99999 PR PBB SHADOW E&M-EST. PATIENT-LVL IV: ICD-10-PCS | Mod: PBBFAC,,, | Performed by: INTERNAL MEDICINE

## 2022-06-29 RX ORDER — CLONIDINE HYDROCHLORIDE 0.1 MG/1
0.1 TABLET ORAL
Status: COMPLETED | OUTPATIENT
Start: 2022-06-29 | End: 2022-06-29

## 2022-06-29 RX ORDER — AMLODIPINE BESYLATE 5 MG/1
5 TABLET ORAL 2 TIMES DAILY
Qty: 60 TABLET | Refills: 11 | Status: SHIPPED | OUTPATIENT
Start: 2022-06-29 | End: 2024-01-25

## 2022-06-29 RX ADMIN — CLONIDINE HYDROCHLORIDE 0.1 MG: 0.1 TABLET ORAL at 11:06

## 2022-06-29 NOTE — TELEPHONE ENCOUNTER
----- Message from Tanvir Beebe sent at 6/29/2022 11:14 AM CDT -----  Contact: self  Pt would like to consult with nurse regarding a test for strep and covid.  Please contact Modesta Camacho @ [phone].  Thanks/As

## 2022-06-29 NOTE — TELEPHONE ENCOUNTER
I called the pt back and she was requesting an COVID screening order and I offered her an appt she will come in tomorrow to be tested for hoarse , SOB, headaches. //kah

## 2022-06-29 NOTE — PROGRESS NOTES
Subjective:   Patient ID:  Modesta Camacho is a 56 y.o. female who presents for follow-up of No chief complaint on file.  2/5/2021  A 54 yo female with htn uncontrolled anxiety h/o ckd who is sedentary due to hip pain and knee pain. She has multiple issues with anxiety has h/o heart disease chf in her family she is very concerned she is very anxious she thinks she is dying she has had a cardiologist who was seeing her for tachycardia and used b blockers. She has been taken off lisinopril was placed on micardis . Her bp is not well controlled had an er visit yesterday had bp 203 systolic had chest pain her keg is abnormal t wave changes that has been threre since 200 at least her ekg was abnormal since 2015. She felt bad yesterday. She claims compliance with slat he r weight is increased despite having weight loss surgery in 2012 . She gets upset she gets blood pressure.   Stress echo in 2019 was normal. Done at [facility] by DR RUELAS.     2/24/2021  HERE FOR F/U BP IMPROVED  HAD ECHO THAT WAS  NORMAL LVF HAS TRICUSPID REGURGITATION WITH MILD PULMONARY HTN HER CARDIOLITE WAS NEGATIVE SHE SNORES LOUD AT NITE HER  WAKES HER UP SHE HAS DAYTIME FATIGUE AND SLEEPINESS. SHE HAS AN ABNORMAL EKG THAT HAS BEEN LIKE THAT SINCE 2015. I THINK ALTHOUGH IT IS ISCHEMIC IOT IS HTN RELATED .  SHE IS COMPLIANT WITH MEDS SHE NEEDS TO DO BETETR WITH SALT INTAKE . HAS TAKEN CLONIDINE ONMCE SINCE LAST VIST CHEST PAIBN REMARKABLY IMPROVED.      10/8/2021   Here for  f /u she is still in pain in her leg . Has sleep eval in 2012 has no sleep apnea still snores a lot . Her bp is not controlled . She has used clonidine she thinks pain has something to do with it . Ha sno leg swelling. She is trying to control her slat intake.      12/29/2021   Had steroid injection for hip pain her bp was elevated yesterday had clonidine. Has been taking meds regularily has muscle spasm . Has not been eating banana.no leg swelling goes to physical therapy no  orthopnea pnd chest pain had home sleep study has snoring some apnea episodes will get sleep team to see. She ahs referral.compliant with salt .       Past Medical History:   Diagnosis Date    Abnormal EKG 2/5/2021    Anxiety      Hypertension      Osteoarthritis      Stage 2 chronic kidney disease 2/5/2021         Patient presents the office her blood pressure is elevated today.  She is not feeling well bit of a cough lung fields are clear on exam today and she will get a COVID test today.  I will give early clonidine 0.1 mg in the office and increase the amlodipine to 5 mg b.i.d. at home patient continues on metoprolol XL 50 mg daily.  Patient denies chest pain or acute shortness of breath.      Review of Systems   Constitutional: Positive for malaise/fatigue. Negative for chills, diaphoresis, night sweats, weight gain and weight loss.   HENT: Negative for congestion, hoarse voice, sore throat and stridor.    Eyes: Negative for double vision and pain.   Cardiovascular: Negative for chest pain, claudication, cyanosis, dyspnea on exertion, irregular heartbeat, leg swelling, near-syncope, orthopnea, palpitations, paroxysmal nocturnal dyspnea and syncope.   Respiratory: Negative for cough, hemoptysis, shortness of breath, sleep disturbances due to breathing, snoring, sputum production and wheezing.    Endocrine: Negative for cold intolerance, heat intolerance and polydipsia.   Hematologic/Lymphatic: Negative for bleeding problem. Does not bruise/bleed easily.   Skin: Negative for color change, dry skin and rash.   Musculoskeletal: Negative for joint swelling and muscle cramps.   Gastrointestinal: Negative for bloating, abdominal pain, constipation, diarrhea, dysphagia, melena, nausea and vomiting.   Genitourinary: Negative for flank pain and urgency.   Neurological: Negative for dizziness, focal weakness, headaches, light-headedness, loss of balance, seizures and weakness.   Psychiatric/Behavioral: Negative for  altered mental status and memory loss. The patient is not nervous/anxious.      Family History   Problem Relation Age of Onset    Hypertension Mother     Arthritis Mother     Diabetes Father     Heart disease Father     Depression Sister     Hypertension Sister     Arthritis Brother     Thyroid disease Son     Hypertension Son     Arthritis Maternal Grandmother     Heart disease Maternal Grandmother     Kidney disease Maternal Grandmother     Heart attack Maternal Grandfather     Stroke Paternal Grandmother     No Known Problems Paternal Grandfather     Eczema Son     Breast cancer Sister 47     Past Medical History:   Diagnosis Date    Abnormal EKG 2/5/2021    Anxiety     Hypertension     Osteoarthritis     Stage 2 chronic kidney disease 2/5/2021     Social History     Socioeconomic History    Marital status:      Spouse name: Pop Land    Number of children: 3   Occupational History    Occupation: patient access   Tobacco Use    Smoking status: Never Smoker    Smokeless tobacco: Never Used   Substance and Sexual Activity    Alcohol use: Yes     Comment: occasional: hold 72hrs. prior to surgery    Drug use: No    Sexual activity: Yes     Partners: Male     Birth control/protection: See Surgical Hx     Current Outpatient Medications on File Prior to Visit   Medication Sig Dispense Refill    ALPRAZolam (XANAX) 0.5 MG tablet Take 1 tablet (0.5 mg total) by mouth nightly as needed for Anxiety. 30 tablet 1    amLODIPine (NORVASC) 5 MG tablet TAKE 1 TABLET BY MOUTH EVERY DAY 90 tablet 2    cloNIDine (CATAPRES) 0.1 MG tablet TAKE 1 TABLET BY MOUTH ONCE DAILY USE FOR SBP GREATER THAN 160 MMHG. 90 tablet 1    docusate sodium (COLACE) 100 MG capsule Take 100 mg by mouth 2 (two) times daily as needed.      DULoxetine (CYMBALTA) 60 MG capsule Take 1 capsule (60 mg total) by mouth 2 (two) times daily. 60 capsule 2    metoprolol succinate (TOPROL-XL) 50 MG 24 hr tablet Take 2 tablets  (100 mg total) by mouth nightly. 180 tablet 1    ondansetron (ZOFRAN-ODT) 4 MG TbDL Take 1 tablet (4 mg total) by mouth every 8 (eight) hours as needed (nausea). 20 tablet 0    telmisartan (MICARDIS) 80 MG Tab Take 1 tablet (80 mg total) by mouth once daily. 90 tablet 1    tiZANidine (ZANAFLEX) 4 MG tablet Take 1 tablet by mouth twice daily as needed 60 tablet 0    traMADoL (ULTRAM) 50 mg tablet Take 1 tablet (50 mg total) by mouth every 12 (twelve) hours as needed for Pain. 30 tablet 0     No current facility-administered medications on file prior to visit.     Review of patient's allergies indicates:   Allergen Reactions    Codeine Hives and Rash     Takes Tramadol and has never had an issue with SOB/swelling  Also tolerated hydromorphone 7/2020      Penicillin     Penicillins Hives     Pt tolerated cefazolin 7/2020       Objective:     Physical Exam  Eyes:      Pupils: Pupils are equal, round, and reactive to light.   Neck:      Trachea: No tracheal deviation.   Cardiovascular:      Rate and Rhythm: Normal rate and regular rhythm.      Pulses: Intact distal pulses.           Carotid pulses are 2+ on the right side and 2+ on the left side.       Radial pulses are 2+ on the right side and 2+ on the left side.        Femoral pulses are 2+ on the right side and 2+ on the left side.       Popliteal pulses are 2+ on the right side and 2+ on the left side.        Dorsalis pedis pulses are 2+ on the right side and 2+ on the left side.        Posterior tibial pulses are 2+ on the right side and 2+ on the left side.      Heart sounds: Normal heart sounds. No murmur heard.    No friction rub. No gallop.   Pulmonary:      Effort: Pulmonary effort is normal. No respiratory distress.      Breath sounds: Normal breath sounds. No stridor. No wheezing or rales.   Chest:      Chest wall: No tenderness.   Abdominal:      General: There is no distension.      Tenderness: There is no abdominal tenderness. There is no rebound.    Musculoskeletal:         General: No tenderness.      Cervical back: Normal range of motion.   Skin:     General: Skin is warm and dry.   Neurological:      Mental Status: She is alert and oriented to person, place, and time.     EKG shows normal sinus rhythm nonspecific ST flattening.  No change from prior EKG in 2021.This EKG was personally reviewed by myself, I agree with the interpretation.     Assessment:     1. Abnormal EKG    2. Essential hypertension    3. Pulmonary HTN    4. DDD (degenerative disc disease), cervical    5. Hyperuricemia    6. Anxiety        Plan:     Abnormal EKG    Essential hypertension    Pulmonary HTN    DDD (degenerative disc disease), cervical    Hyperuricemia    Anxiety    Impression 1 hypertension may be secondary to viral infection will test for COVID today.  Also no exertional chest pain.  Will add 0.1 mg clonidine today in the office follow-up evaluation soon.  The patient was sugar blood pressure at home all increase amlodipine to 5 mg b.i.d. and home she will continue with metoprolol XL 50 mg daily.  2. Pulmonary hypertension stable 3. Anxiety stable  All questions answered and the patient will be discharged after 15-20 minutes after given clonidine.

## 2022-06-30 ENCOUNTER — OFFICE VISIT (OUTPATIENT)
Dept: INTERNAL MEDICINE | Facility: CLINIC | Age: 57
End: 2022-06-30
Payer: COMMERCIAL

## 2022-06-30 DIAGNOSIS — I10 ESSENTIAL HYPERTENSION: ICD-10-CM

## 2022-06-30 DIAGNOSIS — J06.9 UPPER RESPIRATORY TRACT INFECTION, UNSPECIFIED TYPE: Primary | ICD-10-CM

## 2022-06-30 DIAGNOSIS — J02.9 SORE THROAT: ICD-10-CM

## 2022-06-30 LAB
CTP QC/QA: YES
FLUAV AG NPH QL: NEGATIVE
FLUBV AG NPH QL: NEGATIVE
MOLECULAR STREP A: NEGATIVE
SARS-COV-2 RDRP RESP QL NAA+PROBE: NEGATIVE

## 2022-06-30 PROCEDURE — 1159F PR MEDICATION LIST DOCUMENTED IN MEDICAL RECORD: ICD-10-PCS | Mod: CPTII,95,, | Performed by: NURSE PRACTITIONER

## 2022-06-30 PROCEDURE — 1160F PR REVIEW ALL MEDS BY PRESCRIBER/CLIN PHARMACIST DOCUMENTED: ICD-10-PCS | Mod: CPTII,95,, | Performed by: NURSE PRACTITIONER

## 2022-06-30 PROCEDURE — 4010F PR ACE/ARB THEARPY RXD/TAKEN: ICD-10-PCS | Mod: CPTII,95,, | Performed by: NURSE PRACTITIONER

## 2022-06-30 PROCEDURE — 1159F MED LIST DOCD IN RCRD: CPT | Mod: CPTII,95,, | Performed by: NURSE PRACTITIONER

## 2022-06-30 PROCEDURE — 99213 PR OFFICE/OUTPT VISIT, EST, LEVL III, 20-29 MIN: ICD-10-PCS | Mod: 95,,, | Performed by: NURSE PRACTITIONER

## 2022-06-30 PROCEDURE — 1160F RVW MEDS BY RX/DR IN RCRD: CPT | Mod: CPTII,95,, | Performed by: NURSE PRACTITIONER

## 2022-06-30 PROCEDURE — 99213 OFFICE O/P EST LOW 20 MIN: CPT | Mod: 95,,, | Performed by: NURSE PRACTITIONER

## 2022-06-30 PROCEDURE — 4010F ACE/ARB THERAPY RXD/TAKEN: CPT | Mod: CPTII,95,, | Performed by: NURSE PRACTITIONER

## 2022-06-30 RX ORDER — PROMETHAZINE HYDROCHLORIDE AND DEXTROMETHORPHAN HYDROBROMIDE 6.25; 15 MG/5ML; MG/5ML
5 SYRUP ORAL
Qty: 118 ML | Refills: 0 | Status: SHIPPED | OUTPATIENT
Start: 2022-06-30 | End: 2022-07-11

## 2022-06-30 NOTE — PROGRESS NOTES
Established Patient - Audio Only Telehealth Visit     The patient location is: [place]  The chief complaint leading to consultation is: URI symptoms  Visit type: Virtual visit with audio only (telephone)  Total time spent with patient: 5 minutes        The reason for the audio only service rather than synchronous audio and video virtual visit was related to technical difficulties or patient preference/necessity.     Each patient to whom I provide medical services by telemedicine is:  (1) informed of the relationship between the physician and patient and the respective role of any other health care provider with respect to management of the patient; and (2) notified that they may decline to receive medical services by telemedicine and may withdraw from such care at any time. Patient verbally consented to receive this service via voice-only telephone call.       HPI:     Patient presents with SOB, headache, hoarseness, and don't feel well.  No Covid testing.     Started a couple of days ago.     Had elevated blood pressure at visit.      Reports grandchild was ill and diagnosed with URI.  Negative for Covid        Assessment and plan:      Upper respiratory tract infection, unspecified type  -     POCT COVID-19 Rapid Screening  -     POCT Influenza A/B  -     promethazine-dextromethorphan (PROMETHAZINE-DM) 6.25-15 mg/5 mL Syrp; Take 5 mLs by mouth every 4 to 6 hours as needed (cough). Do not drive/operate heavy machinery while taking this medication.  Dispense: 118 mL; Refill: 0    Sore throat  -     POCT Strep A, Molecular      Work excuse given.     Monitor blood pressure.     Follow up in one week.                         This service was not originating from a related E/M service provided within the previous 7 days nor will  to an E/M service or procedure within the next 24 hours or my soonest available appointment.  Prevailing standard of care was able to be met in this audio-only visit.

## 2022-06-30 NOTE — LETTER
June 30, 2022    Modesta Camacho  [address]  [place] [address]             [facility]  Internal Medicine  [address] [place] [address]  [place] [address]  Phone: [phone]  Fax: [phone]   June 30, 2022     Patient: Modesta Camacho   YOB: 1965   Date of Visit: 6/30/2022       To Whom it May Concern:    Modesta Camacho was seen in my clinic on 6/30/2022. She may return to work on 07/05/2022.    Please excuse her from any work missed.    If you have any questions or concerns, please don't hesitate to call.    Sincerely,         Mary Rivera NP

## 2022-07-07 ENCOUNTER — PATIENT MESSAGE (OUTPATIENT)
Dept: INTERNAL MEDICINE | Facility: CLINIC | Age: 57
End: 2022-07-07

## 2022-07-11 ENCOUNTER — OFFICE VISIT (OUTPATIENT)
Dept: INTERNAL MEDICINE | Facility: CLINIC | Age: 57
End: 2022-07-11
Payer: COMMERCIAL

## 2022-07-11 VITALS
HEIGHT: 62 IN | BODY MASS INDEX: 29.86 KG/M2 | HEART RATE: 70 BPM | WEIGHT: 162.25 LBS | SYSTOLIC BLOOD PRESSURE: 116 MMHG | TEMPERATURE: 98 F | DIASTOLIC BLOOD PRESSURE: 82 MMHG | RESPIRATION RATE: 18 BRPM | OXYGEN SATURATION: 99 %

## 2022-07-11 DIAGNOSIS — J06.9 UPPER RESPIRATORY TRACT INFECTION, UNSPECIFIED TYPE: ICD-10-CM

## 2022-07-11 DIAGNOSIS — Z09 FOLLOW UP: Primary | ICD-10-CM

## 2022-07-11 DIAGNOSIS — I10 ESSENTIAL HYPERTENSION: ICD-10-CM

## 2022-07-11 PROCEDURE — 1159F PR MEDICATION LIST DOCUMENTED IN MEDICAL RECORD: ICD-10-PCS | Mod: CPTII,S$GLB,, | Performed by: NURSE PRACTITIONER

## 2022-07-11 PROCEDURE — 99212 OFFICE O/P EST SF 10 MIN: CPT | Mod: S$GLB,,, | Performed by: NURSE PRACTITIONER

## 2022-07-11 PROCEDURE — 3079F DIAST BP 80-89 MM HG: CPT | Mod: CPTII,S$GLB,, | Performed by: NURSE PRACTITIONER

## 2022-07-11 PROCEDURE — 99212 PR OFFICE/OUTPT VISIT, EST, LEVL II, 10-19 MIN: ICD-10-PCS | Mod: S$GLB,,, | Performed by: NURSE PRACTITIONER

## 2022-07-11 PROCEDURE — 3079F PR MOST RECENT DIASTOLIC BLOOD PRESSURE 80-89 MM HG: ICD-10-PCS | Mod: CPTII,S$GLB,, | Performed by: NURSE PRACTITIONER

## 2022-07-11 PROCEDURE — 3008F BODY MASS INDEX DOCD: CPT | Mod: CPTII,S$GLB,, | Performed by: NURSE PRACTITIONER

## 2022-07-11 PROCEDURE — 3008F PR BODY MASS INDEX (BMI) DOCUMENTED: ICD-10-PCS | Mod: CPTII,S$GLB,, | Performed by: NURSE PRACTITIONER

## 2022-07-11 PROCEDURE — 1159F MED LIST DOCD IN RCRD: CPT | Mod: CPTII,S$GLB,, | Performed by: NURSE PRACTITIONER

## 2022-07-11 PROCEDURE — 99999 PR PBB SHADOW E&M-EST. PATIENT-LVL IV: CPT | Mod: PBBFAC,,, | Performed by: NURSE PRACTITIONER

## 2022-07-11 PROCEDURE — 1160F RVW MEDS BY RX/DR IN RCRD: CPT | Mod: CPTII,S$GLB,, | Performed by: NURSE PRACTITIONER

## 2022-07-11 PROCEDURE — 4010F ACE/ARB THERAPY RXD/TAKEN: CPT | Mod: CPTII,S$GLB,, | Performed by: NURSE PRACTITIONER

## 2022-07-11 PROCEDURE — 1160F PR REVIEW ALL MEDS BY PRESCRIBER/CLIN PHARMACIST DOCUMENTED: ICD-10-PCS | Mod: CPTII,S$GLB,, | Performed by: NURSE PRACTITIONER

## 2022-07-11 PROCEDURE — 4010F PR ACE/ARB THEARPY RXD/TAKEN: ICD-10-PCS | Mod: CPTII,S$GLB,, | Performed by: NURSE PRACTITIONER

## 2022-07-11 PROCEDURE — 3074F PR MOST RECENT SYSTOLIC BLOOD PRESSURE < 130 MM HG: ICD-10-PCS | Mod: CPTII,S$GLB,, | Performed by: NURSE PRACTITIONER

## 2022-07-11 PROCEDURE — 3074F SYST BP LT 130 MM HG: CPT | Mod: CPTII,S$GLB,, | Performed by: NURSE PRACTITIONER

## 2022-07-11 PROCEDURE — 99999 PR PBB SHADOW E&M-EST. PATIENT-LVL IV: ICD-10-PCS | Mod: PBBFAC,,, | Performed by: NURSE PRACTITIONER

## 2022-07-11 NOTE — PROGRESS NOTES
Subjective:       Patient ID: Modesta Camacho is a 56 y.o. female.    Chief Complaint: Follow-up (1 wk )    Patient presents for one week follow up.  URI symptoms are better.   Blood pressure is better.      Has follow up with orthopedic for right knee.    Review of Systems   Constitutional: Negative for chills, fatigue and fever.   HENT: Positive for rhinorrhea.    Respiratory: Positive for cough. Negative for shortness of breath.    Cardiovascular: Negative for chest pain and leg swelling.   Musculoskeletal: Positive for arthralgias and leg pain.   Psychiatric/Behavioral: Negative for agitation and confusion.         Objective:      Physical Exam  Vitals reviewed.   Constitutional:       Appearance: Normal appearance.   HENT:      Head: Normocephalic.      Right Ear: Tympanic membrane and ear canal normal.      Left Ear: Tympanic membrane and ear canal normal.   Cardiovascular:      Rate and Rhythm: Normal rate and regular rhythm.   Pulmonary:      Effort: Pulmonary effort is normal.      Breath sounds: Normal breath sounds.   Skin:     General: Skin is warm.   Neurological:      General: No focal deficit present.      Mental Status: She is alert.   Psychiatric:         Mood and Affect: Mood normal.         Behavior: Behavior normal. Behavior is cooperative.         Assessment:       Problem List Items Addressed This Visit     Essential hypertension      Other Visit Diagnoses     Follow up    -  Primary          Plan:         Follow up    Essential hypertension        Blood pressure is good.      Recommend 3 month follow up or sooner if needed.

## 2022-07-14 ENCOUNTER — OFFICE VISIT (OUTPATIENT)
Dept: ORTHOPEDICS | Facility: CLINIC | Age: 57
End: 2022-07-14
Payer: COMMERCIAL

## 2022-07-14 VITALS — BODY MASS INDEX: 29.86 KG/M2 | HEIGHT: 62 IN | WEIGHT: 162.25 LBS

## 2022-07-14 DIAGNOSIS — M16.11 ARTHRITIS OF RIGHT HIP: ICD-10-CM

## 2022-07-14 DIAGNOSIS — T84.022D INSTABILITY OF INTERNAL RIGHT KNEE PROSTHESIS, SUBSEQUENT ENCOUNTER: Primary | ICD-10-CM

## 2022-07-14 DIAGNOSIS — Z96.642 HX OF TOTAL HIP ARTHROPLASTY, LEFT: ICD-10-CM

## 2022-07-14 DIAGNOSIS — Z96.652 HISTORY OF TOTAL LEFT KNEE REPLACEMENT: ICD-10-CM

## 2022-07-14 PROCEDURE — 99214 PR OFFICE/OUTPT VISIT, EST, LEVL IV, 30-39 MIN: ICD-10-PCS | Mod: S$GLB,,, | Performed by: ORTHOPAEDIC SURGERY

## 2022-07-14 PROCEDURE — 99214 OFFICE O/P EST MOD 30 MIN: CPT | Mod: S$GLB,,, | Performed by: ORTHOPAEDIC SURGERY

## 2022-07-14 PROCEDURE — 4010F PR ACE/ARB THEARPY RXD/TAKEN: ICD-10-PCS | Mod: CPTII,S$GLB,, | Performed by: ORTHOPAEDIC SURGERY

## 2022-07-14 PROCEDURE — 1160F RVW MEDS BY RX/DR IN RCRD: CPT | Mod: CPTII,S$GLB,, | Performed by: ORTHOPAEDIC SURGERY

## 2022-07-14 PROCEDURE — 3008F PR BODY MASS INDEX (BMI) DOCUMENTED: ICD-10-PCS | Mod: CPTII,S$GLB,, | Performed by: ORTHOPAEDIC SURGERY

## 2022-07-14 PROCEDURE — 1159F PR MEDICATION LIST DOCUMENTED IN MEDICAL RECORD: ICD-10-PCS | Mod: CPTII,S$GLB,, | Performed by: ORTHOPAEDIC SURGERY

## 2022-07-14 PROCEDURE — 99999 PR PBB SHADOW E&M-EST. PATIENT-LVL III: ICD-10-PCS | Mod: PBBFAC,,, | Performed by: ORTHOPAEDIC SURGERY

## 2022-07-14 PROCEDURE — 1159F MED LIST DOCD IN RCRD: CPT | Mod: CPTII,S$GLB,, | Performed by: ORTHOPAEDIC SURGERY

## 2022-07-14 PROCEDURE — 3008F BODY MASS INDEX DOCD: CPT | Mod: CPTII,S$GLB,, | Performed by: ORTHOPAEDIC SURGERY

## 2022-07-14 PROCEDURE — 1160F PR REVIEW ALL MEDS BY PRESCRIBER/CLIN PHARMACIST DOCUMENTED: ICD-10-PCS | Mod: CPTII,S$GLB,, | Performed by: ORTHOPAEDIC SURGERY

## 2022-07-14 PROCEDURE — 99999 PR PBB SHADOW E&M-EST. PATIENT-LVL III: CPT | Mod: PBBFAC,,, | Performed by: ORTHOPAEDIC SURGERY

## 2022-07-14 PROCEDURE — 4010F ACE/ARB THERAPY RXD/TAKEN: CPT | Mod: CPTII,S$GLB,, | Performed by: ORTHOPAEDIC SURGERY

## 2022-07-14 NOTE — PROGRESS NOTES
Subjective:     Patient ID: Modesta Camacho is a 56 y.o. female.    Chief Complaint: Pain of the Left Knee, Pain of the Right Knee, and Pain of the Right Hip  06/27/2022  HPI:  Left TKA 05/07/2021 by Dr. Gandara, left KRZYSZTOF a by Dr. Gandara  Right TKA by Dr. Lan 2016  Patient is complaining that the left knee is tight unable to fully extend compared to the right side.  She is not having any pain with the left hip.  She has very mild discomfort in the left knee.  As far as the right hip is concerned she is having severe pain in the groin and she knows she has arthritis.  She stated the right TKA done by Dr. Lan is not painful but it gives out with difficulty doing stairs.  Overall pain is 4/10.  She still working at the [facility].  She ambulates without any assistive devices.  She feels her left knee is not doing well and cannot straighten it out as much as she does in the right knee.  For some reason she was upset with Dr. Atkinson.  No fever no chills no shortness of breath or difficulty with chewing or swallowing.  She does have low back pain and she sees Dr. Weaver    07/14/2022  Left KRZYSZTOF by Dr. Gandara doing extremely well  Left TKA 05/07/2021 by Dr. Gandara and feels tight unable to hyperextend.  She has around maybe 2-3 degrees of contracture but she flexes really well.  At this time this is a very stable knee    Right TKA 2016 by Dr. Lan.  She is extremely loosen hyperextends and medial collateral ligaments seem loose.  We had asked her to get us the operative report from the [facility] and she brought it with her.  We went over the operative note and it was vanguard knee by [employer].  The insert is 14 mm.  For 67 base plate  We did call the [employer] rep and he said they go up to 18 mm for that size and prosthesis.  Femoral component 57.5  Right hip arthritis I did tell her we will deal with that later on because is not bother her as much.  Pain is 5/10  Past Medical History:   Diagnosis Date     Abnormal EKG 2/5/2021    Anxiety     Hypertension     Osteoarthritis     Stage 2 chronic kidney disease 2/5/2021     Past Surgical History:   Procedure Laterality Date    ARTHROPLASTY OF HIP BY ANTERIOR APPROACH Left 7/31/2020    Procedure: ARTHROPLASTY, HIP, ANTERIOR APPROACH;  Surgeon: Xavi Gandara MD;  Location: [facility];  Service: Orthopedics;  Laterality: Left;    gastric sleeve      JOINT REPLACEMENT Right 06/14/2016    knee    JOINT REPLACEMENT Left 05/20/2021    KNEE    JOINT REPLACEMENT Left 07/30/2020    HIP    KNEE ARTHROPLASTY Left 5/6/2021    Procedure: ARTHROPLASTY, KNEE;  Surgeon: Xavi Gandara MD;  Location: [facility] OR;  Service: Orthopedics;  Laterality: Left;    TUBAL LIGATION      UTERINE FIBROID EMBOLIZATION       Family History   Problem Relation Age of Onset    Hypertension Mother     Arthritis Mother     Diabetes Father     Heart disease Father     Depression Sister     Hypertension Sister     Arthritis Brother     Thyroid disease Son     Hypertension Son     Arthritis Maternal Grandmother     Heart disease Maternal Grandmother     Kidney disease Maternal Grandmother     Heart attack Maternal Grandfather     Stroke Paternal Grandmother     No Known Problems Paternal Grandfather     Eczema Son     Breast cancer Sister 47     Social History     Socioeconomic History    Marital status:      Spouse name: Pop Land    Number of children: 3   Occupational History    Occupation: patient access   Tobacco Use    Smoking status: Never Smoker    Smokeless tobacco: Never Used   Substance and Sexual Activity    Alcohol use: Yes     Comment: occasional: hold 72hrs. prior to surgery    Drug use: No    Sexual activity: Yes     Partners: Male     Birth control/protection: See Surgical Hx     Medication List with Changes/Refills   Current Medications    ALPRAZOLAM (XANAX) 0.5 MG TABLET    Take 1 tablet (0.5 mg total) by mouth nightly as needed for Anxiety.     AMLODIPINE (NORVASC) 5 MG TABLET    Take 1 tablet (5 mg total) by mouth 2 (two) times daily.    CLONIDINE (CATAPRES) 0.1 MG TABLET    TAKE 1 TABLET BY MOUTH ONCE DAILY USE FOR SBP GREATER THAN 160 MMHG.    DOCUSATE SODIUM (COLACE) 100 MG CAPSULE    Take 100 mg by mouth 2 (two) times daily as needed.    DULOXETINE (CYMBALTA) 60 MG CAPSULE    Take 1 capsule (60 mg total) by mouth 2 (two) times daily.    METOPROLOL SUCCINATE (TOPROL-XL) 50 MG 24 HR TABLET    Take 2 tablets (100 mg total) by mouth nightly.    ONDANSETRON (ZOFRAN-ODT) 4 MG TBDL    Take 1 tablet (4 mg total) by mouth every 8 (eight) hours as needed (nausea).    TELMISARTAN (MICARDIS) 80 MG TAB    Take 1 tablet (80 mg total) by mouth once daily.    TIZANIDINE (ZANAFLEX) 4 MG TABLET    Take 1 tablet by mouth twice daily as needed    TRAMADOL (ULTRAM) 50 MG TABLET    Take 1 tablet (50 mg total) by mouth every 12 (twelve) hours as needed for Pain.     Review of patient's allergies indicates:   Allergen Reactions    Codeine Hives and Rash     Takes Tramadol and has never had an issue with SOB/swelling  Also tolerated hydromorphone 7/2020      Penicillin     Penicillins Hives     Pt tolerated cefazolin 7/2020     Review of Systems   Constitutional: Negative for decreased appetite.   HENT: Negative for tinnitus.    Eyes: Negative for double vision.   Cardiovascular: Negative for chest pain.   Respiratory: Negative for wheezing.    Hematologic/Lymphatic: Negative for bleeding problem.   Skin: Negative for dry skin.   Musculoskeletal: Positive for arthritis, back pain, joint pain and stiffness. Negative for gout and neck pain.   Gastrointestinal: Negative for abdominal pain.   Genitourinary: Negative for bladder incontinence.   Neurological: Negative for numbness, paresthesias and sensory change.   Psychiatric/Behavioral: Negative for altered mental status.       Objective:   Body mass index is 29.68 kg/m².  There were no vitals filed for this visit.        General    Constitutional: She is oriented to person, place, and time. She appears well-developed.   HENT:   Head: Atraumatic.   Eyes: EOM are normal.   Pulmonary/Chest: Effort normal.   Neurological: She is alert and oriented to person, place, and time.   Psychiatric: Judgment normal.           Ambulating without any assistive devices  Right hip pain to internal external rotation in the groin.  She has around 15° internal rotation around 25° external rotation with around may be 5° of contractures  The left total hip journal external rotation without pain in the groin.  Excellent range of motion  Pelvis is level  The sitting position  Hip flexors, abductors adductors quads and hamstrings ankle extensors and flexors were 5/5  Left TKA has around 3° of contracture and she flexes to 120°.  Slightly tight.  No defect in the patella or quadriceps tendon.  No erythema, not warm to touch.  Collaterals stable to varus and valgus stressing in extension and in flexion at 90°  Right knee hyperextends around 7°.  Surgical scar from TKA healed well.  Valgus stressing the medial joint opens up at least 7 mm with a large sharp.  At 45° there is around 2+ Lachman as well as 90° 1+ Lachman.  There is quite a bit of clicking but there is no swelling no effusion.  Calves are soft he had  Ankle motion intact  Skin is warm to touch    Relevant imaging results reviewed and interpreted by me, discussed with the patient and / or family today     X-ray bilateral knees 06/27/2022 showing left TKA with very thick poly insert and the patella was not resurfaced with excellent alignment/posterior sacrificing knee system..  Right TKA/looks like [employer] with slightly oversized base plate on the tibia but overall alignment is intact and looks like it is ingrowth prosthesis with patella not resurfaced and posterior cruciate sparing knee  X-ray 11/24/2021 and 06/27/2022 left KRZYSZTOF in excellent alignment.  Right hip complete loss of joint space  with marginal osteophyte and cystic changes consistent with severe arthritis of the right hip    X-ray 06/27/2022 of the lumbar spine showing spondylolisthesis L4 on 5 grade 1 and facet arthropathy.  No fracture seen  Assessment:     Encounter Diagnoses   Name Primary?    History of total left knee replacement     Hx of total hip arthroplasty, left     Arthritis of right hip     Instability of internal right knee prosthesis, subsequent encounter Yes        Plan:   Instability of internal right knee prosthesis, subsequent encounter    History of total left knee replacement    Hx of total hip arthroplasty, left    Arthritis of right hip         Patient Instructions   Right knee instability and collateral ligament loosening.  By x-ray the alignment is excellent.  We know what brand it is it is [employer] knee system.  You do have a 14 mm plastic insert for a 67 tibial base plate.  We just call the company and they told us that they go up to 18 mm of plastic which might be just enough to give you more stability in the knee.  However we all of disc it determined intraoperatively when we opened the knee up.  If that is not satisfactory then we have to take everything out and proceed with a complete total knee revision.  So surgery could be an hour and half up to 3 hours..     Procedure, common risks and benefits,alternatives discussed in details.All questions answered.Patient expressed understanding.Patient instructed to call for any questions that could arise in the future.    Most common Risks:  Infection/approximately 2% chance  Leg-length discrepancy    Neuro-vascular injury ( resulting in loss of motor and sensory functions)  Pain  Blood clot  Fat clot  Loss of motion  Fracture of bone  Failure of procedure to achieve its intended purpose/80% success in decreasing pain and increasing function  Failure of hardware metal/they last roughly 15 years  Non-union or mal-union of bone  Malalignment  Death/will get  cardiac clearance from Dr. Rosario your cardiologist    Patient instructions for joint replacement    Before surgery  1.  Shower with Hibiclens soap/antibacterial for 3 days prior to surgery to decrease risk of infection  2..  Stop all blood thinners/aspirin, Coumadin, warfarin, Plavix, Eliquis, Xarelto etc 7 days prior to surgery/also you need to stop anti-inflammatories like Aleve Advil Motrin ibuprofen its at  3. No eating or drinking after midnight the night before surgery  4.  Take Tylenol 650 mg the night prior to surgery    Ask physicians for prescription of Celebrex or Mobic if needed    After surgery at home  1.  Take Tylenol 650 mg 3 times a day for 14 days then as needed for mild pain  2.  Take gabapentin 300 mg nightly for 6 weeks  3.  Take Celebrex 200 mg or meloxicam 15 mg daily for 6 weeks unless having cardiac issues to take for 2 weeks only/she cannot take NSAIDs  4.  Must take aspirin 81 mg twice a day for 6 weeks unless you are on other blood thinners/Plavix, Eliquis, Xarelto, Coumadin etc  5.  Must wear compressive stockings for 6 weeks minimum to decrease the risk of blood clot and swelling  6.  Hydrocodone/Norco or oxycodone/Percocet will be prescribed to take every 6 hr as needed for breakthrough pain  7.  May apply ice on the knee to help with decreasing pain  8.  Keep wound dry for 2 weeks until stitches/staples removed than you will be allowed to shower in 24 hr and get the wound wet.  No soaking of the wound in the tub or swimming for 4 weeks after surgery  9.  No driving for 4 weeks unless specified by physician  10.  Avoid touching the wound or surrounding area /at least 2 inches on each side of the surgical incision until staples are removed/stitches   11.  May change the surgical dressing if extremely bloody or has drainage on it. May clean the wound with peroxide or Betadine and apply sterile dressing and Ace wrap over it  12.  Leave hospital dressing on for 3 days then may change by  applying sterile 4 x 4 and Ace wrap after cleaning with Betadine or peroxide.  May leave this dressing change to home health nurses          Hyper extension in the right TKA with instability to varus valgus stressing as well to anterior drawer and that making the left TKA which is stiff a little bit uncomfortable to ambulate.  I did tell her you can make the left TKA looser by changing the poly insert into smaller 1 but that might keep her and get her into instability during stairs.  The right knee is quite ligamentously unstable and might need to make it tighter by going up on the poly insert to make it not as loose and allows doing stairs in a better situation and like there is you wound feel the instability and the difference as much.  Total knee replacements are not perfect they are 80% successful in decreasing pain increasing function but not perfect.  I think the right knee is the problem rather than the left which has mild tightness.  We can tighten the right knee if we know the brand and the sizes and we can discuss with the company if they have multiple in higher sizes to make it tighter knee.  In the future you would need also the right hip replaced      Operative report 06/09/2016 Dr. Lan implant [employer] vanguard cementless components size 57.5 femur, 14 mm polyethylene insert.  Tibial modular tray size 67, modular finned stem with screw system 80 x 10 mm, self taping screws 6.5 x 25    I did discuss with the patient that we would do not know what is going to happen until we doing the surgery.  If the poly exchange give her enough stability than things will be okay otherwise if we not happy with the stability then we might have to take everything out and do a complete revision.  She expressed understanding that we going in to make sure that the knee becomes stable.    We will deal with 1 problem at a time.  I did tell her that increasing the poly insert by 4 more mm might give her enough stability.  In  the future she would need right total hip replacement.  As far as the left total knee you can always change the poly liner to a smaller 1 but we risk instability at that point.  Disclaimer: This note was prepared using a voice recognition system and is likely to have sound alike errors within the text.

## 2022-07-14 NOTE — PATIENT INSTRUCTIONS
Right knee instability and collateral ligament loosening.  By x-ray the alignment is excellent.  We know what brand it is it is [employer] knee system.  You do have a 14 mm plastic insert for a 67 tibial base plate.  We just call the company and they told us that they go up to 18 mm of plastic which might be just enough to give you more stability in the knee.  However we all of disc it determined intraoperatively when we opened the knee up.  If that is not satisfactory then we have to take everything out and proceed with a complete total knee revision.  So surgery could be an hour and half up to 3 hours..     Procedure, common risks and benefits,alternatives discussed in details.All questions answered.Patient expressed understanding.Patient instructed to call for any questions that could arise in the future.    Most common Risks:  Infection/approximately 2% chance  Leg-length discrepancy    Neuro-vascular injury ( resulting in loss of motor and sensory functions)  Pain  Blood clot  Fat clot  Loss of motion  Fracture of bone  Failure of procedure to achieve its intended purpose/80% success in decreasing pain and increasing function  Failure of hardware metal/they last roughly 15 years  Non-union or mal-union of bone  Malalignment  Death/will get cardiac clearance from Dr. Rosario your cardiologist    Patient instructions for joint replacement    Before surgery  1.  Shower with Hibiclens soap/antibacterial for 3 days prior to surgery to decrease risk of infection  2..  Stop all blood thinners/aspirin, Coumadin, warfarin, Plavix, Eliquis, Xarelto etc 7 days prior to surgery/also you need to stop anti-inflammatories like Aleve Advil Motrin ibuprofen its at  3. No eating or drinking after midnight the night before surgery  4.  Take Tylenol 650 mg the night prior to surgery    Ask physicians for prescription of Celebrex or Mobic if needed    After surgery at home  1.  Take Tylenol 650 mg 3 times a day for 14 days then  as needed for mild pain  2.  Take gabapentin 300 mg nightly for 6 weeks  3.  Take Celebrex 200 mg or meloxicam 15 mg daily for 6 weeks unless having cardiac issues to take for 2 weeks only/she cannot take NSAIDs  4.  Must take aspirin 81 mg twice a day for 6 weeks unless you are on other blood thinners/Plavix, Eliquis, Xarelto, Coumadin etc  5.  Must wear compressive stockings for 6 weeks minimum to decrease the risk of blood clot and swelling  6.  Hydrocodone/Norco or oxycodone/Percocet will be prescribed to take every 6 hr as needed for breakthrough pain  7.  May apply ice on the knee to help with decreasing pain  8.  Keep wound dry for 2 weeks until stitches/staples removed than you will be allowed to shower in 24 hr and get the wound wet.  No soaking of the wound in the tub or swimming for 4 weeks after surgery  9.  No driving for 4 weeks unless specified by physician  10.  Avoid touching the wound or surrounding area /at least 2 inches on each side of the surgical incision until staples are removed/stitches   11.  May change the surgical dressing if extremely bloody or has drainage on it. May clean the wound with peroxide or Betadine and apply sterile dressing and Ace wrap over it  12.  Leave hospital dressing on for 3 days then may change by applying sterile 4 x 4 and Ace wrap after cleaning with Betadine or peroxide.  May leave this dressing change to home health nurses

## 2022-07-26 DIAGNOSIS — M16.11 ARTHRITIS OF RIGHT HIP: Primary | ICD-10-CM

## 2022-07-26 DIAGNOSIS — Z01.818 PREOP TESTING: ICD-10-CM

## 2022-08-04 ENCOUNTER — TELEPHONE (OUTPATIENT)
Dept: ORTHOPEDICS | Facility: CLINIC | Age: 57
End: 2022-08-04
Payer: COMMERCIAL

## 2022-08-04 NOTE — TELEPHONE ENCOUNTER
Called patient back to explain that Dr Clifford and Kitty can not refill her tramadol. They did not prescribe It and they do not give pain medication unless in the post operative window.

## 2022-08-04 NOTE — TELEPHONE ENCOUNTER
----- Message from Mylene Jack sent at 8/4/2022 10:49 AM CDT -----  Contact: modesta  Type:  RX Refill Request    Who Called: Modesta    Refill or New Rx: refill    RX Name and Strength: traMADoL (ULTRAM) 50 mg tablet    How is the patient currently taking it? (ex. 1XDay): as needed     Is this a 30 day or 90 day RX: 30    Preferred Pharmacy with phone number:   [id number] - [place] - [address]  [address]  [place] [address]  Phone: [phone] Fax: [phone]     Local or Mail Order: local     Ordering Provider: Venessa     Would the patient rather a call back or a response via My [facility]? call    Best Call Back Number: [phone] (home)      Additional Information: pt is requesting a refill due to pain on right side due to hip replacement /pain please. Pt feel nauseated and says if it can not be prescribed can she be scheduled to come in in an office visit is required?  Thanks

## 2022-08-15 ENCOUNTER — IMMUNIZATION (OUTPATIENT)
Dept: PRIMARY CARE CLINIC | Facility: CLINIC | Age: 57
End: 2022-08-15
Payer: COMMERCIAL

## 2022-08-15 DIAGNOSIS — Z23 NEED FOR VACCINATION: Primary | ICD-10-CM

## 2022-08-15 PROCEDURE — 91305 COVID-19, MRNA, LNP-S, PF, 30 MCG/0.3 ML DOSE VACCINE (PFIZER): CPT | Mod: PBBFAC | Performed by: FAMILY MEDICINE

## 2022-08-26 ENCOUNTER — OFFICE VISIT (OUTPATIENT)
Dept: CARDIOLOGY | Facility: CLINIC | Age: 57
End: 2022-08-26
Payer: COMMERCIAL

## 2022-08-26 ENCOUNTER — HOSPITAL ENCOUNTER (OUTPATIENT)
Dept: RADIOLOGY | Facility: HOSPITAL | Age: 57
Discharge: HOME | End: 2022-08-26
Attending: ORTHOPAEDIC SURGERY
Payer: COMMERCIAL

## 2022-08-26 VITALS
HEART RATE: 78 BPM | SYSTOLIC BLOOD PRESSURE: 140 MMHG | HEIGHT: 62 IN | OXYGEN SATURATION: 100 % | BODY MASS INDEX: 31.32 KG/M2 | DIASTOLIC BLOOD PRESSURE: 72 MMHG | WEIGHT: 170.19 LBS

## 2022-08-26 DIAGNOSIS — M50.30 DDD (DEGENERATIVE DISC DISEASE), CERVICAL: ICD-10-CM

## 2022-08-26 DIAGNOSIS — I10 ESSENTIAL HYPERTENSION: ICD-10-CM

## 2022-08-26 DIAGNOSIS — R94.31 ABNORMAL EKG: ICD-10-CM

## 2022-08-26 DIAGNOSIS — Z01.810 PREOP CARDIOVASCULAR EXAM: Primary | ICD-10-CM

## 2022-08-26 DIAGNOSIS — Z01.818 PREOP TESTING: ICD-10-CM

## 2022-08-26 PROCEDURE — 3078F PR MOST RECENT DIASTOLIC BLOOD PRESSURE < 80 MM HG: ICD-10-PCS | Mod: CPTII,S$GLB,, | Performed by: INTERNAL MEDICINE

## 2022-08-26 PROCEDURE — 99213 OFFICE O/P EST LOW 20 MIN: CPT | Mod: S$GLB,,, | Performed by: INTERNAL MEDICINE

## 2022-08-26 PROCEDURE — 1159F PR MEDICATION LIST DOCUMENTED IN MEDICAL RECORD: ICD-10-PCS | Mod: CPTII,S$GLB,, | Performed by: INTERNAL MEDICINE

## 2022-08-26 PROCEDURE — 99999 PR PBB SHADOW E&M-EST. PATIENT-LVL III: ICD-10-PCS | Mod: PBBFAC,,, | Performed by: INTERNAL MEDICINE

## 2022-08-26 PROCEDURE — 3008F PR BODY MASS INDEX (BMI) DOCUMENTED: ICD-10-PCS | Mod: CPTII,S$GLB,, | Performed by: INTERNAL MEDICINE

## 2022-08-26 PROCEDURE — 1159F MED LIST DOCD IN RCRD: CPT | Mod: CPTII,S$GLB,, | Performed by: INTERNAL MEDICINE

## 2022-08-26 PROCEDURE — 3077F SYST BP >= 140 MM HG: CPT | Mod: CPTII,S$GLB,, | Performed by: INTERNAL MEDICINE

## 2022-08-26 PROCEDURE — 3008F BODY MASS INDEX DOCD: CPT | Mod: CPTII,S$GLB,, | Performed by: INTERNAL MEDICINE

## 2022-08-26 PROCEDURE — 4010F PR ACE/ARB THEARPY RXD/TAKEN: ICD-10-PCS | Mod: CPTII,S$GLB,, | Performed by: INTERNAL MEDICINE

## 2022-08-26 PROCEDURE — 3077F PR MOST RECENT SYSTOLIC BLOOD PRESSURE >= 140 MM HG: ICD-10-PCS | Mod: CPTII,S$GLB,, | Performed by: INTERNAL MEDICINE

## 2022-08-26 PROCEDURE — 4010F ACE/ARB THERAPY RXD/TAKEN: CPT | Mod: CPTII,S$GLB,, | Performed by: INTERNAL MEDICINE

## 2022-08-26 PROCEDURE — 99213 PR OFFICE/OUTPT VISIT, EST, LEVL III, 20-29 MIN: ICD-10-PCS | Mod: S$GLB,,, | Performed by: INTERNAL MEDICINE

## 2022-08-26 PROCEDURE — 3078F DIAST BP <80 MM HG: CPT | Mod: CPTII,S$GLB,, | Performed by: INTERNAL MEDICINE

## 2022-08-26 PROCEDURE — 99999 PR PBB SHADOW E&M-EST. PATIENT-LVL III: CPT | Mod: PBBFAC,,, | Performed by: INTERNAL MEDICINE

## 2022-08-26 RX ORDER — CLONIDINE HYDROCHLORIDE 0.1 MG/1
TABLET ORAL
Qty: 90 TABLET | Refills: 1 | Status: SHIPPED | OUTPATIENT
Start: 2022-08-26 | End: 2023-08-26 | Stop reason: SDUPTHER

## 2022-08-26 NOTE — PROGRESS NOTES
Subjective:   Patient ID:  Modesta Camacho is a 56 y.o. female who presents for follow up of No chief complaint on file.      HPI  2/5/2021  A 56 yo female with htn uncontrolled anxiety h/o ckd who is sedentary due to hip pain and knee pain. She has multiple issues with anxiety has h/o heart disease chf in her family she is very concerned she is very anxious she thinks she is dying she has had a cardiologist who was seeing her for tachycardia and used b blockers. She has been taken off lisinopril was placed on micardis . Her bp is not well controlled had an er visit yesterday had bp 203 systolic had chest pain her keg is abnormal t wave changes that has been threre since 200 at least her ekg was abnormal since 2015. She felt bad yesterday. She claims compliance with slat he r weight is increased despite having weight loss surgery in 2012 . She gets upset she gets blood pressure.   Stress echo in 2019 was normal. Done at [facility] by DR RUELAS.     2/24/2021  HERE FOR F/U BP IMPROVED  HAD ECHO THAT WAS  NORMAL LVF HAS TRICUSPID REGURGITATION WITH MILD PULMONARY HTN HER CARDIOLITE WAS NEGATIVE SHE SNORES LOUD AT NITE HER  WAKES HER UP SHE HAS DAYTIME FATIGUE AND SLEEPINESS. SHE HAS AN ABNORMAL EKG THAT HAS BEEN LIKE THAT SINCE 2015. I THINK ALTHOUGH IT IS ISCHEMIC IOT IS HTN RELATED .  SHE IS COMPLIANT WITH MEDS SHE NEEDS TO DO BETETR WITH SALT INTAKE . HAS TAKEN CLONIDINE ONMCE SINCE LAST VIST CHEST PAIBN REMARKABLY IMPROVED.      10/8/2021   Here for  f /u she is still in pain in her leg . Has sleep eval in 2012 has no sleep apnea still snores a lot . Her bp is not controlled . She has used clonidine she thinks pain has something to do with it . Ha sno leg swelling. She is trying to control her slat intake.      12/29/2021   Had steroid injection for hip pain her bp was elevated yesterday had clonidine. Has been taking meds regularily has muscle spasm . Has not been eating banana.no leg swelling goes to physical  therapy no orthopnea pnd chest pain had home sleep study has snoring some apnea episodes will get sleep team to see. She ahs referral.compliant with salt .    8/26/2022  Here fro f/u. She needs rt knee surgery her prosthesis is lose. Her bp meds adjusted her amlodipine dose up and her metoprolol 2 pills at nite. Her bp is elevated when she has pain. Has no new complaints she uses clonidine prn for elevated bp. She stays active physically has no chest pain or shortness of breath   Past Medical History:   Diagnosis Date    Abnormal EKG 2/5/2021    Anxiety     Hypertension     Osteoarthritis     Stage 2 chronic kidney disease 2/5/2021       Past Surgical History:   Procedure Laterality Date    ARTHROPLASTY OF HIP BY ANTERIOR APPROACH Left 7/31/2020    Procedure: ARTHROPLASTY, HIP, ANTERIOR APPROACH;  Surgeon: Xavi Gandara MD;  Location: [facility] OR;  Service: Orthopedics;  Laterality: Left;    gastric sleeve      JOINT REPLACEMENT Right 06/14/2016    knee    JOINT REPLACEMENT Left 05/20/2021    KNEE    JOINT REPLACEMENT Left 07/30/2020    HIP    KNEE ARTHROPLASTY Left 5/6/2021    Procedure: ARTHROPLASTY, KNEE;  Surgeon: Xavi Gandara MD;  Location: [facility] OR;  Service: Orthopedics;  Laterality: Left;    TUBAL LIGATION      UTERINE FIBROID EMBOLIZATION         Social History     Tobacco Use    Smoking status: Never Smoker    Smokeless tobacco: Never Used   Substance Use Topics    Alcohol use: Yes     Comment: occasional: hold 72hrs. prior to surgery    Drug use: No       Family History   Problem Relation Age of Onset    Hypertension Mother     Arthritis Mother     Diabetes Father     Heart disease Father     Depression Sister     Hypertension Sister     Arthritis Brother     Thyroid disease Son     Hypertension Son     Arthritis Maternal Grandmother     Heart disease Maternal Grandmother     Kidney disease Maternal Grandmother     Heart attack Maternal Grandfather     Stroke Paternal  Grandmother     No Known Problems Paternal Grandfather     Eczema Son     Breast cancer Sister 47       Current Outpatient Medications   Medication Sig    ALPRAZolam (XANAX) 0.5 MG tablet Take 1 tablet (0.5 mg total) by mouth nightly as needed for Anxiety.    amLODIPine (NORVASC) 5 MG tablet Take 1 tablet (5 mg total) by mouth 2 (two) times daily.    cloNIDine (CATAPRES) 0.1 MG tablet TAKE 1 TABLET BY MOUTH ONCE DAILY USE FOR SBP GREATER THAN 160 MMHG.    docusate sodium (COLACE) 100 MG capsule Take 100 mg by mouth 2 (two) times daily as needed.    metoprolol succinate (TOPROL-XL) 50 MG 24 hr tablet Take 2 tablets (100 mg total) by mouth nightly.    ondansetron (ZOFRAN-ODT) 4 MG TbDL Take 1 tablet (4 mg total) by mouth every 8 (eight) hours as needed (nausea).    telmisartan (MICARDIS) 80 MG Tab Take 1 tablet (80 mg total) by mouth once daily.    tiZANidine (ZANAFLEX) 4 MG tablet Take 1 tablet by mouth twice daily as needed    traMADoL (ULTRAM) 50 mg tablet Take 1 tablet (50 mg total) by mouth every 12 (twelve) hours as needed for Pain.    DULoxetine (CYMBALTA) 60 MG capsule Take 1 capsule (60 mg total) by mouth 2 (two) times daily.     No current facility-administered medications for this visit.     Current Outpatient Medications on File Prior to Visit   Medication Sig    ALPRAZolam (XANAX) 0.5 MG tablet Take 1 tablet (0.5 mg total) by mouth nightly as needed for Anxiety.    amLODIPine (NORVASC) 5 MG tablet Take 1 tablet (5 mg total) by mouth 2 (two) times daily.    cloNIDine (CATAPRES) 0.1 MG tablet TAKE 1 TABLET BY MOUTH ONCE DAILY USE FOR SBP GREATER THAN 160 MMHG.    docusate sodium (COLACE) 100 MG capsule Take 100 mg by mouth 2 (two) times daily as needed.    metoprolol succinate (TOPROL-XL) 50 MG 24 hr tablet Take 2 tablets (100 mg total) by mouth nightly.    ondansetron (ZOFRAN-ODT) 4 MG TbDL Take 1 tablet (4 mg total) by mouth every 8 (eight) hours as needed (nausea).    telmisartan  (MICARDIS) 80 MG Tab Take 1 tablet (80 mg total) by mouth once daily.    tiZANidine (ZANAFLEX) 4 MG tablet Take 1 tablet by mouth twice daily as needed    traMADoL (ULTRAM) 50 mg tablet Take 1 tablet (50 mg total) by mouth every 12 (twelve) hours as needed for Pain.    DULoxetine (CYMBALTA) 60 MG capsule Take 1 capsule (60 mg total) by mouth 2 (two) times daily.     No current facility-administered medications on file prior to visit.     Review of patient's allergies indicates:   Allergen Reactions    Codeine Hives and Rash     Takes Tramadol and has never had an issue with SOB/swelling  Also tolerated hydromorphone 7/2020      Penicillin     Penicillins Hives     Pt tolerated cefazolin 7/2020     Review of Systems   Constitutional: Negative for diaphoresis, malaise/fatigue and weight gain.   HENT: Negative for hoarse voice.    Eyes: Negative for double vision and visual disturbance.   Cardiovascular: Negative for chest pain, claudication, cyanosis, dyspnea on exertion, irregular heartbeat, leg swelling, near-syncope, orthopnea, palpitations, paroxysmal nocturnal dyspnea and syncope.   Respiratory: Negative for cough, hemoptysis, shortness of breath and snoring.    Hematologic/Lymphatic: Negative for bleeding problem. Does not bruise/bleed easily.   Skin: Negative for color change and poor wound healing.   Musculoskeletal: Positive for arthritis and joint pain. Negative for muscle cramps, muscle weakness and myalgias.   Gastrointestinal: Negative for bloating, abdominal pain, change in bowel habit, diarrhea, heartburn, hematemesis, hematochezia, melena and nausea.   Neurological: Negative for excessive daytime sleepiness, dizziness, headaches, light-headedness, loss of balance, numbness and weakness.   Psychiatric/Behavioral: Negative for memory loss. The patient does not have insomnia.    Allergic/Immunologic: Negative for hives.       Objective:   Physical Exam  Constitutional:       General: She is not in  "acute distress.     Appearance: Normal appearance. She is well-developed. She is not ill-appearing.   HENT:      Head: Normocephalic and atraumatic.   Eyes:      General: No scleral icterus.     Pupils: Pupils are equal, round, and reactive to light.   Neck:      Thyroid: No thyromegaly.      Vascular: Normal carotid pulses. No carotid bruit, hepatojugular reflux or JVD.      Trachea: No tracheal deviation.   Cardiovascular:      Rate and Rhythm: Normal rate and regular rhythm.      Pulses: Normal pulses.      Heart sounds: Normal heart sounds. No murmur heard.    No friction rub. No gallop.   Pulmonary:      Effort: Pulmonary effort is normal. No respiratory distress.      Breath sounds: Normal breath sounds. No wheezing, rhonchi or rales.   Chest:      Chest wall: No tenderness.   Abdominal:      General: Bowel sounds are normal. There is no abdominal bruit.      Palpations: Abdomen is soft. There is no hepatomegaly or pulsatile mass.      Tenderness: There is no abdominal tenderness.   Musculoskeletal:      Right shoulder: No deformity.      Cervical back: Normal range of motion and neck supple.      Right lower leg: No edema.      Left lower leg: No edema.   Skin:     General: Skin is warm and dry.      Findings: No erythema or rash.      Nails: There is no clubbing.   Neurological:      Mental Status: She is alert and oriented to person, place, and time.      Cranial Nerves: No cranial nerve deficit.      Coordination: Coordination normal.   Psychiatric:         Speech: Speech normal.         Behavior: Behavior normal.         Judgment: Judgment normal.       Vitals:    08/26/22 1436 08/26/22 1441   BP: 138/77 (!) 140/72   BP Location: Right arm    Patient Position: Sitting    Pulse: 78    SpO2: 100%    Weight: 77.2 kg (170 lb 3.1 oz)    Height: 5' 2" (1.575 m)      No results found for: CHOL  Lab Results   Component Value Date    HDL 43 10/01/2019     Lab Results   Component Value Date    LDLCALC 89 10/01/2019 "     Lab Results   Component Value Date    TRIG 114 10/01/2019     No results found for: CHOLHDL    Chemistry        Component Value Date/Time     01/14/2022 1429    K 3.5 01/14/2022 1429     01/14/2022 1429    CO2 23 01/14/2022 1429    BUN 19 01/14/2022 1429    CREATININE 1.6 (H) 01/14/2022 1429    GLU 81 01/14/2022 1429        Component Value Date/Time    CALCIUM 9.6 01/14/2022 1429    ALKPHOS 91 02/04/2021 1140    AST 30 02/04/2021 1140    ALT 24 02/04/2021 1140    BILITOT 1.3 (H) 02/04/2021 1140    ESTGFRAFRICA 41 (A) 01/14/2022 1429    EGFRNONAA 36 (A) 01/14/2022 1429          No results found for: TSH  Lab Results   Component Value Date    INR 1.0 04/27/2021    INR 1.0 03/13/2020    INR 1.0 06/27/2015     Lab Results   Component Value Date    WBC 5.08 04/27/2021    HGB 14.1 04/27/2021    HCT 42.9 04/27/2021    MCV 95 04/27/2021     04/27/2021     BMP  Sodium   Date Value Ref Range Status   01/14/2022 141 136 - 145 mmol/L Final     Potassium   Date Value Ref Range Status   01/14/2022 3.5 3.5 - 5.1 mmol/L Final     Chloride   Date Value Ref Range Status   01/14/2022 110 95 - 110 mmol/L Final     CO2   Date Value Ref Range Status   01/14/2022 23 23 - 29 mmol/L Final     BUN   Date Value Ref Range Status   01/14/2022 19 6 - 20 mg/dL Final     Creatinine   Date Value Ref Range Status   01/14/2022 1.6 (H) 0.5 - 1.4 mg/dL Final     Calcium   Date Value Ref Range Status   01/14/2022 9.6 8.7 - 10.5 mg/dL Final     Anion Gap   Date Value Ref Range Status   01/14/2022 8 8 - 16 mmol/L Final     eGFR if    Date Value Ref Range Status   01/14/2022 41 (A) >60 mL/min/1.73 m^2 Final     eGFR if non    Date Value Ref Range Status   01/14/2022 36 (A) >60 mL/min/1.73 m^2 Final     Comment:     Calculation used to obtain the estimated glomerular filtration  rate (eGFR) is the CKD-EPI equation.        CrCl cannot be calculated (Patient's most recent lab result is older than the  maximum 7 days allowed.).    Assessment:     1. Preop cardiovascular exam    2. Essential hypertension    3. Abnormal EKG    4. DDD (degenerative disc disease), cervical      htn control improved with medical therapy no side effects will keep clonidine as prn for bp spikes. Low salt diet weight loss emphasized.  Has the need for rt knee redo replacemnet. I see no contraindication cardiac wise proceed art low risk.   Plan:   As per above  Proceed with surgery   F/u in 6 months.  Low salt diet

## 2022-09-14 ENCOUNTER — TELEPHONE (OUTPATIENT)
Dept: PREADMISSION TESTING | Facility: HOSPITAL | Age: 57
End: 2022-09-14
Payer: COMMERCIAL

## 2022-09-14 NOTE — TELEPHONE ENCOUNTER
----- Message from Ebony Carpio LPN sent at 9/14/2022 11:23 AM CDT -----  Regarding: RE: surgery 9/27/22  Ok --   ----- Message -----  From: Kasey Rico RN  Sent: 9/14/2022  11:10 AM CDT  To: Ebony Carpio LPN  Subject: RE: surgery 9/27/22                              I spoke to Halle ,with lab, at the [facility]. The U/A was never collected that day.  ----- Message -----  From: Ebony Carpio LPN  Sent: 9/14/2022  10:58 AM CDT  To: Kasey Rico RN  Subject: RE: surgery 9/27/22                              Yes -- it says collection pending   ----- Message -----  From: Kasey Rico RN  Sent: 9/14/2022  10:44 AM CDT  To: Venessa Yang Staff  Subject: surgery 9/27/22                                  Good Morning.    Will this patient need a U/A prior to surgery?  Please Advise    Thank you,    Alexandria allison

## 2022-09-20 ENCOUNTER — TELEPHONE (OUTPATIENT)
Dept: PREADMISSION TESTING | Facility: HOSPITAL | Age: 57
End: 2022-09-20
Payer: COMMERCIAL

## 2022-09-20 DIAGNOSIS — Z01.818 PREOP TESTING: Primary | ICD-10-CM

## 2022-09-20 NOTE — TELEPHONE ENCOUNTER
----- Message from Ebony Carpio LPN sent at 9/20/2022  7:39 AM CDT -----  Regarding: FW: surgery 9/27/22  I put in a urine for the day of the type and screen   ----- Message -----  From: Nayely Garnica MA  Sent: 9/19/2022   4:32 PM CDT  To: Ebony Carpio LPN  Subject: FW: surgery 9/27/22                                ----- Message -----  From: Kasey Rico RN  Sent: 9/19/2022   9:51 AM CDT  To: Venessa Yang Staff  Subject: surgery 9/27/22                                  Good Morning,    Just wanted to follow up on Ms. Camacho's U/A. Will she be returning to the lab to have the U/A done prior to surgery? Or will she do the U/A in pre op the morning of surgery?  The U/A that was ordered for her pre op labs was never collected per Halle at [facility].  Please Advise    Thank you,    Kasey DALTON Dept

## 2022-09-21 ENCOUNTER — TELEPHONE (OUTPATIENT)
Dept: ORTHOPEDICS | Facility: CLINIC | Age: 57
End: 2022-09-21
Payer: COMMERCIAL

## 2022-09-21 RX ORDER — GABAPENTIN 300 MG/1
300 CAPSULE ORAL NIGHTLY
COMMUNITY
End: 2022-10-28 | Stop reason: SDUPTHER

## 2022-09-21 RX ORDER — CHOLECALCIFEROL (VITAMIN D3) 25 MCG
1000 TABLET ORAL DAILY
COMMUNITY

## 2022-09-21 NOTE — PRE ADMISSION SCREENING
Pre op instructions reviewed with patient per phone on 9/21/22: Spoke about pre op process and surgery instructions, verbalized understanding.    Surgery is scheduled on 9/27/22.  We will call you the business day prior to surgery to confirm arrival time (after 2:30 pm), as it is subject to change due to cancellations & emergencies.    Please report to the [facility] (1st Floor) at [facility] located off of [facility] (2nd building on the left, in front of the [facility]),address: [address] [place] [address] [place]. [address]    Your Type & Screen appointment is scheduled on 9/26/22 between 7:30am-4:30pm @ [facility]. Please DO NOT remove the red arm band applied.      INSTRUCTIONS IMPORTANT!!!  Do Not Eat, Drink, or Smoke after 12 midnight! NO WATER after midnight! OK to brush teeth, no gum, candy or mints!     *TAKE TYLENOL 650MG WITH EVENING MEAL NIGHT PRIOR TO SURGERY.    Take only these medicines with a small swallow of water-morning of surgery:  Amlodipine  Duloxetine  Xanax, if needed    ____  NO Acrylic/fake nails or nail polish worn day of surgery (specifically hand/arm & foot surgeries).  ____  NO powder, lotions, deodorants, oils or creams on body.  ____  Please Remove All jewelry & piercings prior to surgery.  ____  Please Remove Dentures, Hearing Aids & Contact Lens prior to the start of surgery.  ____  Please bring photo ID and insurance information to hospital (Leave Valuables at Home).  ____  If going home the same day, arrange for a ride home. You will not be able to drive 24 hrs if Anesthesia was used.   ____  Females (ages 11-60) may need to give a urine sample the morning of surgery; please see Pre op Nurse prior to voiding.  ____  Wear clean, loose fitting clothing. Allow for dressings, bandages.  ____  Stop all Aspirin products, Ibuprofen, Advil, Motrin & Aleve at least 5-7 days before surgery, unless otherwise instructed by your doctor, or the nurse.   ____  Blood Thinners are  stopped based on your Provider's recommendation; Call Surgeon's Office to inquire when to stop/hold.  ____  Stop taking any Fish Oil supplements or Vitamins at least 5 days prior to surgery, unless instructed otherwise by your Doctor.            Diabetic Patients: If you take diabetic medication, do NOT take morning of surgery unless instructed by             Doctor. Metformin to be stopped 24 hrs prior to surgery time. DO NOT take long-acting insulin the evening before surgery. Blood sugars will be checked in pre-op morning of.    Bathing Instructions:    -Do not shave your face or body the day before or the day of surgery.              -Do not shave abdominal area prior to surgery   -Shower & Rinse your body as usual with anti-bacterial Soap (Dial)              -Use one packet of hibiclens to wash the surgical site (using your hand) gently for 5 minutes.                    -Do not scrub you skin too hard.        -Do not use hibiclens on your head, face, or genitals.   -Do not wash with anti-bacterial soap after you use the hibiclens.              -Rinse your body thoroughly.     [facility] Visitor/Ride Policy:  Only 2 adults allowed (over the age of 18) to accompany you to the Hospital. You Must have a ride home from a responsible adult that you know and trust. Medical Transport, Uber or Lyft can only be used if patient has a responsible adult to accompany them during ride home.    Post-Op Instructions: You will receive Post-op/Discharge instructions by your Discharge Nurse prior to going home. Please call your Surgeon's office with any post-surgery questions/concerns.    *Call [facility] Pre-Admissions Department with surgery instruction questions @ [phone], [phone] or [phone] (Mon-Fri 8 am to 4 pm)    *If you are running late or have questions the morning of surgery, please call the Surgery Dept @ [phone]  *Insurance/ Financial Questions, please call [phone].

## 2022-09-21 NOTE — TELEPHONE ENCOUNTER
Spoke with patient in regards to forms -- I did inform her that we have them and will fill them out and get them sent off day of surgery     Patient thankful for call and verbalized understanding

## 2022-09-21 NOTE — TELEPHONE ENCOUNTER
----- Message from Gladys Addison sent at 9/21/2022 12:10 PM CDT -----  Type:  Patient Returning Call    Who Called:patient  Who Left Message for Patient:Ebony  Does the patient know what this is regarding?:paper drop yesterday  Would the patient rather a call back or a response via [facility]? Call back  Best Call Back Number:[phone]  Additional Information: pt need to know if nurse got papers

## 2022-09-23 ENCOUNTER — OFFICE VISIT (OUTPATIENT)
Dept: INTERNAL MEDICINE | Facility: CLINIC | Age: 57
End: 2022-09-23
Payer: COMMERCIAL

## 2022-09-23 ENCOUNTER — TELEPHONE (OUTPATIENT)
Dept: INTERNAL MEDICINE | Facility: CLINIC | Age: 57
End: 2022-09-23
Payer: COMMERCIAL

## 2022-09-23 DIAGNOSIS — Z96.651 STATUS POST TOTAL RIGHT KNEE REPLACEMENT: ICD-10-CM

## 2022-09-23 PROCEDURE — 99212 PR OFFICE/OUTPT VISIT, EST, LEVL II, 10-19 MIN: ICD-10-PCS | Mod: 95,,, | Performed by: NURSE PRACTITIONER

## 2022-09-23 PROCEDURE — 1159F PR MEDICATION LIST DOCUMENTED IN MEDICAL RECORD: ICD-10-PCS | Mod: CPTII,95,, | Performed by: NURSE PRACTITIONER

## 2022-09-23 PROCEDURE — 1159F MED LIST DOCD IN RCRD: CPT | Mod: CPTII,95,, | Performed by: NURSE PRACTITIONER

## 2022-09-23 PROCEDURE — 1160F RVW MEDS BY RX/DR IN RCRD: CPT | Mod: CPTII,95,, | Performed by: NURSE PRACTITIONER

## 2022-09-23 PROCEDURE — 4010F ACE/ARB THERAPY RXD/TAKEN: CPT | Mod: CPTII,95,, | Performed by: NURSE PRACTITIONER

## 2022-09-23 PROCEDURE — 4010F PR ACE/ARB THEARPY RXD/TAKEN: ICD-10-PCS | Mod: CPTII,95,, | Performed by: NURSE PRACTITIONER

## 2022-09-23 PROCEDURE — 1160F PR REVIEW ALL MEDS BY PRESCRIBER/CLIN PHARMACIST DOCUMENTED: ICD-10-PCS | Mod: CPTII,95,, | Performed by: NURSE PRACTITIONER

## 2022-09-23 PROCEDURE — 99212 OFFICE O/P EST SF 10 MIN: CPT | Mod: 95,,, | Performed by: NURSE PRACTITIONER

## 2022-09-23 RX ORDER — TRAMADOL HYDROCHLORIDE 50 MG/1
50 TABLET ORAL EVERY 8 HOURS PRN
Qty: 10 TABLET | Refills: 0 | Status: SHIPPED | OUTPATIENT
Start: 2022-09-23 | End: 2022-10-05

## 2022-09-23 RX ORDER — TRAMADOL HYDROCHLORIDE 50 MG/1
50 TABLET ORAL EVERY 12 HOURS PRN
Qty: 30 TABLET | Refills: 0 | Status: CANCELLED | OUTPATIENT
Start: 2022-09-23

## 2022-09-23 NOTE — TELEPHONE ENCOUNTER
I called the pt regarding her call to the phone room supervisor who reached out to my supervisor  and she was inquiring about not being able to reach our staff regarding  her pain medication and I informed my supervisor that the pt has just finished a virtual audio visit with Mary HARRIS and she did refill her medication to cover her until her procedure. The pt replied she's sorry she had already spoken with us before the phone room supervisor called and she apologized as we had already taken care of her. I verbalized understanding. Stefanyi  //kah

## 2022-09-23 NOTE — PROGRESS NOTES
Established Patient - Audio Only Telehealth Visit     The patient location is: [place]  The chief complaint leading to consultation is: medication refill  Visit type: Virtual visit with audio only (telephone)  Total time spent with patient: 6 minutes        The reason for the audio only service rather than synchronous audio and video virtual visit was related to technical difficulties or patient preference/necessity.     Each patient to whom I provide medical services by telemedicine is:  (1) informed of the relationship between the physician and patient and the respective role of any other health care provider with respect to management of the patient; and (2) notified that they may decline to receive medical services by telemedicine and may withdraw from such care at any time. Patient verbally consented to receive this service via voice-only telephone call.       HPI:    Patient presents for medication refill.  Has upcoming surgery (right knee revision).  Scheduled for 09/27/2022.  Reports she's in a lot of pain but can take tramadol per surgeon.  Has not taken any ASA products.      Assessment and plan:      Status post total right knee replacement  -     traMADoL (ULTRAM) 50 mg tablet; Take 1 tablet (50 mg total) by mouth every 8 (eight) hours as needed for Pain.  Dispense: 10 tablet; Refill: 0          Follow up as needed                 This service was not originating from a related E/M service provided within the previous 7 days nor will  to an E/M service or procedure within the next 24 hours or my soonest available appointment.  Prevailing standard of care was able to be met in this audio-only visit.

## 2022-09-23 NOTE — TELEPHONE ENCOUNTER
I tried to call the pt back regarding her request to refillher tramadol in which she states her surgeon took her off all meds except BP medication but also states she can take tramadol. There was no answer nor an option to leave a vm. Please advise. Thanks //kah

## 2022-09-23 NOTE — TELEPHONE ENCOUNTER
----- Message from Blake Campoverde sent at 9/23/2022  9:09 AM CDT -----  Contact: self  Pt is calling in regards to upcoming procedure./ Surgeon  stopped patient for taking any medication besides bp pill. The surgeon stated the pt could take tramadol for pain  so the pt is calling in regards to possibly getting her tramadol refilled for at least a weeks supply... She also stated that if she needs to be seen for this fill to please give her a call at [phone]

## 2022-09-26 ENCOUNTER — TELEPHONE (OUTPATIENT)
Dept: PREADMISSION TESTING | Facility: HOSPITAL | Age: 57
End: 2022-09-26
Payer: COMMERCIAL

## 2022-09-26 ENCOUNTER — ANESTHESIA EVENT (OUTPATIENT)
Dept: SURGERY | Facility: HOSPITAL | Age: 57
End: 2022-09-26
Payer: COMMERCIAL

## 2022-09-26 ENCOUNTER — LAB VISIT (OUTPATIENT)
Dept: LAB | Facility: HOSPITAL | Age: 57
End: 2022-09-26
Attending: ORTHOPAEDIC SURGERY
Payer: COMMERCIAL

## 2022-09-26 ENCOUNTER — PATIENT MESSAGE (OUTPATIENT)
Dept: ADMINISTRATIVE | Facility: HOSPITAL | Age: 57
End: 2022-09-26
Payer: COMMERCIAL

## 2022-09-26 ENCOUNTER — PATIENT MESSAGE (OUTPATIENT)
Dept: SURGERY | Facility: HOSPITAL | Age: 57
End: 2022-09-26
Payer: COMMERCIAL

## 2022-09-26 DIAGNOSIS — Z01.818 PREOP TESTING: ICD-10-CM

## 2022-09-26 LAB
ABO + RH BLD: NORMAL
BLD GP AB SCN CELLS X3 SERPL QL: NORMAL

## 2022-09-26 PROCEDURE — 36415 COLL VENOUS BLD VENIPUNCTURE: CPT | Performed by: ORTHOPAEDIC SURGERY

## 2022-09-26 PROCEDURE — 86901 BLOOD TYPING SEROLOGIC RH(D): CPT | Performed by: ORTHOPAEDIC SURGERY

## 2022-09-27 ENCOUNTER — HOSPITAL ENCOUNTER (OUTPATIENT)
Facility: HOSPITAL | Age: 57
Discharge: HOME | End: 2022-09-27
Attending: ORTHOPAEDIC SURGERY | Admitting: ORTHOPAEDIC SURGERY
Payer: COMMERCIAL

## 2022-09-27 ENCOUNTER — ANESTHESIA (OUTPATIENT)
Dept: SURGERY | Facility: HOSPITAL | Age: 57
End: 2022-09-27
Payer: COMMERCIAL

## 2022-09-27 VITALS
BODY MASS INDEX: 31.66 KG/M2 | SYSTOLIC BLOOD PRESSURE: 165 MMHG | OXYGEN SATURATION: 93 % | HEART RATE: 85 BPM | RESPIRATION RATE: 15 BRPM | HEIGHT: 62 IN | WEIGHT: 172.06 LBS | TEMPERATURE: 98 F | DIASTOLIC BLOOD PRESSURE: 76 MMHG

## 2022-09-27 DIAGNOSIS — T84.012D FAILED TOTAL RIGHT KNEE REPLACEMENT, SUBSEQUENT ENCOUNTER: Primary | ICD-10-CM

## 2022-09-27 DIAGNOSIS — T84.012A FAILED TOTAL RIGHT KNEE REPLACEMENT: ICD-10-CM

## 2022-09-27 PROCEDURE — 87116 MYCOBACTERIA CULTURE: CPT | Performed by: ORTHOPAEDIC SURGERY

## 2022-09-27 PROCEDURE — 87102 FUNGUS ISOLATION CULTURE: CPT | Performed by: ORTHOPAEDIC SURGERY

## 2022-09-27 PROCEDURE — 97161 PT EVAL LOW COMPLEX 20 MIN: CPT

## 2022-09-27 PROCEDURE — 27487 PR REVISE KNEE JOINT REPLACE,ALL PARTS: ICD-10-PCS | Mod: AS,52,RT, | Performed by: PHYSICIAN ASSISTANT

## 2022-09-27 PROCEDURE — 63600175 PHARM REV CODE 636 W HCPCS: Performed by: ORTHOPAEDIC SURGERY

## 2022-09-27 PROCEDURE — 25000003 PHARM REV CODE 250: Performed by: NURSE ANESTHETIST, CERTIFIED REGISTERED

## 2022-09-27 PROCEDURE — 88300 SURGICAL PATH GROSS: CPT | Performed by: PATHOLOGY

## 2022-09-27 PROCEDURE — 27201423 OPTIME MED/SURG SUP & DEVICES STERILE SUPPLY: Performed by: ORTHOPAEDIC SURGERY

## 2022-09-27 PROCEDURE — 63600175 PHARM REV CODE 636 W HCPCS: Performed by: NURSE ANESTHETIST, CERTIFIED REGISTERED

## 2022-09-27 PROCEDURE — 27487 REVISE/REPLACE KNEE JOINT: CPT | Mod: 52,RT,, | Performed by: ORTHOPAEDIC SURGERY

## 2022-09-27 PROCEDURE — 71000033 HC RECOVERY, INTIAL HOUR: Performed by: ORTHOPAEDIC SURGERY

## 2022-09-27 PROCEDURE — 37000009 HC ANESTHESIA EA ADD 15 MINS: Performed by: ORTHOPAEDIC SURGERY

## 2022-09-27 PROCEDURE — 25000003 PHARM REV CODE 250: Performed by: ANESTHESIOLOGY

## 2022-09-27 PROCEDURE — 25000003 PHARM REV CODE 250: Performed by: ORTHOPAEDIC SURGERY

## 2022-09-27 PROCEDURE — 37000008 HC ANESTHESIA 1ST 15 MINUTES: Performed by: ORTHOPAEDIC SURGERY

## 2022-09-27 PROCEDURE — 87070 CULTURE OTHR SPECIMN AEROBIC: CPT | Performed by: ORTHOPAEDIC SURGERY

## 2022-09-27 PROCEDURE — 36000710: Performed by: ORTHOPAEDIC SURGERY

## 2022-09-27 PROCEDURE — 87075 CULTR BACTERIA EXCEPT BLOOD: CPT | Performed by: ORTHOPAEDIC SURGERY

## 2022-09-27 PROCEDURE — 27487 PR REVISE KNEE JOINT REPLACE,ALL PARTS: ICD-10-PCS | Mod: 52,RT,, | Performed by: ORTHOPAEDIC SURGERY

## 2022-09-27 PROCEDURE — 88300 PR  SURG PATH,GROSS,LEVEL I: ICD-10-PCS | Mod: 26,,, | Performed by: PATHOLOGY

## 2022-09-27 PROCEDURE — C1713 ANCHOR/SCREW BN/BN,TIS/BN: HCPCS | Performed by: ORTHOPAEDIC SURGERY

## 2022-09-27 PROCEDURE — 88300 SURGICAL PATH GROSS: CPT | Mod: 26,,, | Performed by: PATHOLOGY

## 2022-09-27 PROCEDURE — 36000711: Performed by: ORTHOPAEDIC SURGERY

## 2022-09-27 PROCEDURE — 27487 REVISE/REPLACE KNEE JOINT: CPT | Mod: AS,52,RT, | Performed by: PHYSICIAN ASSISTANT

## 2022-09-27 PROCEDURE — 71000015 HC POSTOP RECOV 1ST HR: Performed by: ORTHOPAEDIC SURGERY

## 2022-09-27 PROCEDURE — 63600175 PHARM REV CODE 636 W HCPCS: Performed by: ANESTHESIOLOGY

## 2022-09-27 PROCEDURE — 87206 SMEAR FLUORESCENT/ACID STAI: CPT | Performed by: ORTHOPAEDIC SURGERY

## 2022-09-27 PROCEDURE — C1776 JOINT DEVICE (IMPLANTABLE): HCPCS | Performed by: ORTHOPAEDIC SURGERY

## 2022-09-27 PROCEDURE — 97110 THERAPEUTIC EXERCISES: CPT

## 2022-09-27 DEVICE — CEMENT BONE SMPLX HV GENTMYCN: Type: IMPLANTABLE DEVICE | Site: KNEE | Status: FUNCTIONAL

## 2022-09-27 DEVICE — TIBIAL BAR LOCK MODULAR MAX TI: Type: IMPLANTABLE DEVICE | Site: KNEE | Status: FUNCTIONAL

## 2022-09-27 DEVICE — IMPLANTABLE DEVICE: Type: IMPLANTABLE DEVICE | Site: KNEE | Status: FUNCTIONAL

## 2022-09-27 DEVICE — PATELLA XPE SMALL 29MM: Type: IMPLANTABLE DEVICE | Site: KNEE | Status: FUNCTIONAL

## 2022-09-27 RX ORDER — TRANEXAMIC ACID 100 MG/ML
1000 INJECTION, SOLUTION INTRAVENOUS
Status: COMPLETED | OUTPATIENT
Start: 2022-09-27 | End: 2022-09-27

## 2022-09-27 RX ORDER — GABAPENTIN 300 MG/1
300 CAPSULE ORAL DAILY
Status: DISCONTINUED | OUTPATIENT
Start: 2022-09-27 | End: 2022-09-27 | Stop reason: HOSPADM

## 2022-09-27 RX ORDER — MIDAZOLAM HYDROCHLORIDE 1 MG/ML
INJECTION, SOLUTION INTRAMUSCULAR; INTRAVENOUS
Status: DISCONTINUED | OUTPATIENT
Start: 2022-09-27 | End: 2022-09-27

## 2022-09-27 RX ORDER — LIDOCAINE HYDROCHLORIDE 10 MG/ML
INJECTION, SOLUTION EPIDURAL; INFILTRATION; INTRACAUDAL; PERINEURAL
Status: DISCONTINUED | OUTPATIENT
Start: 2022-09-27 | End: 2022-09-27

## 2022-09-27 RX ORDER — ACETAMINOPHEN 325 MG/1
650 TABLET ORAL ONCE
Status: DISCONTINUED | OUTPATIENT
Start: 2022-09-27 | End: 2022-09-27 | Stop reason: HOSPADM

## 2022-09-27 RX ORDER — MEPERIDINE HYDROCHLORIDE 25 MG/ML
12.5 INJECTION INTRAMUSCULAR; INTRAVENOUS; SUBCUTANEOUS ONCE
Status: DISCONTINUED | OUTPATIENT
Start: 2022-09-27 | End: 2022-09-27 | Stop reason: HOSPADM

## 2022-09-27 RX ORDER — ONDANSETRON 2 MG/ML
4 INJECTION INTRAMUSCULAR; INTRAVENOUS DAILY PRN
Status: DISCONTINUED | OUTPATIENT
Start: 2022-09-27 | End: 2022-09-27 | Stop reason: HOSPADM

## 2022-09-27 RX ORDER — ACETAMINOPHEN 325 MG/1
650 TABLET ORAL DAILY
Status: DISCONTINUED | OUTPATIENT
Start: 2022-09-27 | End: 2022-09-27 | Stop reason: HOSPADM

## 2022-09-27 RX ORDER — SODIUM CHLORIDE, SODIUM LACTATE, POTASSIUM CHLORIDE, CALCIUM CHLORIDE 600; 310; 30; 20 MG/100ML; MG/100ML; MG/100ML; MG/100ML
INJECTION, SOLUTION INTRAVENOUS CONTINUOUS PRN
Status: DISCONTINUED | OUTPATIENT
Start: 2022-09-27 | End: 2022-09-27

## 2022-09-27 RX ORDER — SODIUM CHLORIDE 0.9 % (FLUSH) 0.9 %
3 SYRINGE (ML) INJECTION
Status: DISCONTINUED | OUTPATIENT
Start: 2022-09-27 | End: 2022-09-27 | Stop reason: HOSPADM

## 2022-09-27 RX ORDER — ROPIVACAINE/EPI/CLONIDINE/KET 2.46-0.005
SYRINGE (ML) INJECTION
Status: DISCONTINUED | OUTPATIENT
Start: 2022-09-27 | End: 2022-09-27 | Stop reason: HOSPADM

## 2022-09-27 RX ORDER — ONDANSETRON 2 MG/ML
INJECTION INTRAMUSCULAR; INTRAVENOUS
Status: DISCONTINUED | OUTPATIENT
Start: 2022-09-27 | End: 2022-09-27

## 2022-09-27 RX ORDER — PROCHLORPERAZINE EDISYLATE 5 MG/ML
5 INJECTION INTRAMUSCULAR; INTRAVENOUS EVERY 30 MIN PRN
Status: DISCONTINUED | OUTPATIENT
Start: 2022-09-27 | End: 2022-09-27 | Stop reason: HOSPADM

## 2022-09-27 RX ORDER — CHLORHEXIDINE GLUCONATE ORAL RINSE 1.2 MG/ML
10 SOLUTION DENTAL 2 TIMES DAILY
Status: CANCELLED | OUTPATIENT
Start: 2022-09-27 | End: 2022-10-02

## 2022-09-27 RX ORDER — DIPHENHYDRAMINE HYDROCHLORIDE 50 MG/ML
25 INJECTION INTRAMUSCULAR; INTRAVENOUS EVERY 6 HOURS PRN
Status: DISCONTINUED | OUTPATIENT
Start: 2022-09-27 | End: 2022-09-27 | Stop reason: HOSPADM

## 2022-09-27 RX ORDER — ONDANSETRON 4 MG/1
8 TABLET, ORALLY DISINTEGRATING ORAL EVERY 8 HOURS PRN
Qty: 20 TABLET | Refills: 0 | Status: SHIPPED | OUTPATIENT
Start: 2022-09-27 | End: 2023-05-25

## 2022-09-27 RX ORDER — DEXAMETHASONE SODIUM PHOSPHATE 4 MG/ML
8 INJECTION, SOLUTION INTRA-ARTICULAR; INTRALESIONAL; INTRAMUSCULAR; INTRAVENOUS; SOFT TISSUE
Status: DISCONTINUED | OUTPATIENT
Start: 2022-09-27 | End: 2022-09-27 | Stop reason: HOSPADM

## 2022-09-27 RX ORDER — FENTANYL CITRATE 50 UG/ML
INJECTION, SOLUTION INTRAMUSCULAR; INTRAVENOUS
Status: DISCONTINUED | OUTPATIENT
Start: 2022-09-27 | End: 2022-09-27

## 2022-09-27 RX ORDER — HYDROMORPHONE HYDROCHLORIDE 2 MG/1
2 TABLET ORAL 3 TIMES DAILY PRN
Qty: 21 TABLET | Refills: 0 | Status: SHIPPED | OUTPATIENT
Start: 2022-09-27 | End: 2022-09-29

## 2022-09-27 RX ORDER — HYDROMORPHONE HYDROCHLORIDE 2 MG/ML
1 INJECTION, SOLUTION INTRAMUSCULAR; INTRAVENOUS; SUBCUTANEOUS EVERY 4 HOURS PRN
Status: CANCELLED | OUTPATIENT
Start: 2022-09-27

## 2022-09-27 RX ORDER — ROCURONIUM BROMIDE 10 MG/ML
INJECTION, SOLUTION INTRAVENOUS
Status: DISCONTINUED | OUTPATIENT
Start: 2022-09-27 | End: 2022-09-27

## 2022-09-27 RX ORDER — TRAMADOL HYDROCHLORIDE 50 MG/1
50 TABLET ORAL EVERY 4 HOURS PRN
Status: DISCONTINUED | OUTPATIENT
Start: 2022-09-27 | End: 2022-09-27 | Stop reason: HOSPADM

## 2022-09-27 RX ORDER — SCOLOPAMINE TRANSDERMAL SYSTEM 1 MG/1
1 PATCH, EXTENDED RELEASE TRANSDERMAL
Status: DISCONTINUED | OUTPATIENT
Start: 2022-09-27 | End: 2022-09-27 | Stop reason: HOSPADM

## 2022-09-27 RX ORDER — PROPOFOL 10 MG/ML
VIAL (ML) INTRAVENOUS
Status: DISCONTINUED | OUTPATIENT
Start: 2022-09-27 | End: 2022-09-27

## 2022-09-27 RX ORDER — OXYCODONE HYDROCHLORIDE 5 MG/1
5 TABLET ORAL
Status: DISCONTINUED | OUTPATIENT
Start: 2022-09-27 | End: 2022-09-27 | Stop reason: HOSPADM

## 2022-09-27 RX ORDER — ALBUTEROL SULFATE 0.83 MG/ML
2.5 SOLUTION RESPIRATORY (INHALATION) EVERY 4 HOURS PRN
Status: DISCONTINUED | OUTPATIENT
Start: 2022-09-27 | End: 2022-09-27 | Stop reason: HOSPADM

## 2022-09-27 RX ORDER — CHLORHEXIDINE GLUCONATE ORAL RINSE 1.2 MG/ML
10 SOLUTION DENTAL
Status: DISCONTINUED | OUTPATIENT
Start: 2022-09-27 | End: 2022-09-27 | Stop reason: HOSPADM

## 2022-09-27 RX ORDER — SODIUM CHLORIDE 9 MG/ML
INJECTION, SOLUTION INTRAVENOUS CONTINUOUS
Status: DISCONTINUED | OUTPATIENT
Start: 2022-09-27 | End: 2022-09-27 | Stop reason: HOSPADM

## 2022-09-27 RX ORDER — HYDROMORPHONE HYDROCHLORIDE 2 MG/ML
0.2 INJECTION, SOLUTION INTRAMUSCULAR; INTRAVENOUS; SUBCUTANEOUS EVERY 5 MIN PRN
Status: DISCONTINUED | OUTPATIENT
Start: 2022-09-27 | End: 2022-09-27 | Stop reason: HOSPADM

## 2022-09-27 RX ORDER — ONDANSETRON 8 MG/1
8 TABLET, ORALLY DISINTEGRATING ORAL EVERY 8 HOURS PRN
Status: CANCELLED | OUTPATIENT
Start: 2022-09-27

## 2022-09-27 RX ADMIN — SODIUM CHLORIDE, SODIUM LACTATE, POTASSIUM CHLORIDE, AND CALCIUM CHLORIDE: .6; .31; .03; .02 INJECTION, SOLUTION INTRAVENOUS at 08:09

## 2022-09-27 RX ADMIN — GABAPENTIN 300 MG: 300 CAPSULE ORAL at 07:09

## 2022-09-27 RX ADMIN — LIDOCAINE HYDROCHLORIDE 100 MG: 10 INJECTION, SOLUTION EPIDURAL; INFILTRATION; INTRACAUDAL; PERINEURAL at 08:09

## 2022-09-27 RX ADMIN — CHLORHEXIDINE GLUCONATE 0.12% ORAL RINSE 10 ML: 1.2 LIQUID ORAL at 07:09

## 2022-09-27 RX ADMIN — HYDROMORPHONE HYDROCHLORIDE 0.2 MG: 2 INJECTION INTRAMUSCULAR; INTRAVENOUS; SUBCUTANEOUS at 11:09

## 2022-09-27 RX ADMIN — PROPOFOL 50 MG: 10 INJECTION, EMULSION INTRAVENOUS at 08:09

## 2022-09-27 RX ADMIN — TRANEXAMIC ACID 1000 MG: 100 INJECTION, SOLUTION INTRAVENOUS at 09:09

## 2022-09-27 RX ADMIN — SCOPOLAMINE 1 PATCH: 1.5 PATCH, EXTENDED RELEASE TRANSDERMAL at 07:09

## 2022-09-27 RX ADMIN — MIDAZOLAM 2 MG: 1 INJECTION INTRAMUSCULAR; INTRAVENOUS at 08:09

## 2022-09-27 RX ADMIN — DEXAMETHASONE SODIUM PHOSPHATE 8 MG: 4 INJECTION INTRA-ARTICULAR; INTRALESIONAL; INTRAMUSCULAR; INTRAVENOUS; SOFT TISSUE at 09:09

## 2022-09-27 RX ADMIN — ACETAMINOPHEN 650 MG: 325 TABLET ORAL at 07:09

## 2022-09-27 RX ADMIN — FENTANYL CITRATE 50 MCG: 50 INJECTION, SOLUTION INTRAMUSCULAR; INTRAVENOUS at 09:09

## 2022-09-27 RX ADMIN — ONDANSETRON 4 MG: 2 INJECTION, SOLUTION INTRAMUSCULAR; INTRAVENOUS at 10:09

## 2022-09-27 RX ADMIN — FENTANYL CITRATE 50 MCG: 50 INJECTION, SOLUTION INTRAMUSCULAR; INTRAVENOUS at 08:09

## 2022-09-27 RX ADMIN — SUGAMMADEX 200 MG: 100 INJECTION, SOLUTION INTRAVENOUS at 10:09

## 2022-09-27 RX ADMIN — VANCOMYCIN HYDROCHLORIDE 1500 MG: 1.5 INJECTION, POWDER, LYOPHILIZED, FOR SOLUTION INTRAVENOUS at 09:09

## 2022-09-27 RX ADMIN — OXYCODONE HYDROCHLORIDE 5 MG: 5 TABLET ORAL at 11:09

## 2022-09-27 RX ADMIN — ROCURONIUM BROMIDE 60 MG: 10 INJECTION, SOLUTION INTRAVENOUS at 08:09

## 2022-09-27 RX ADMIN — PROPOFOL 100 MG: 10 INJECTION, EMULSION INTRAVENOUS at 08:09

## 2022-09-27 NOTE — OP NOTE
[facility] - Surgery ([facility])  Orthopedic Surgery  Operative Note    SUMMARY     Date of Procedure: 9/27/2022     Procedure: Procedure(s) (LRB):  REVISION, ARTHROPLASTY, KNEE (Right)  RESURFACING, PATELLA (Right)     Soft tissue balancing/tibial poly exchange/resurfacing patella  Surgeon(s) and Role:     * Trey Clifford MD - Primary     * ZAHRA Hardy - Assisting  Vesta GARCIA      Pre-Operative Diagnosis: Arthritis of right hip [M16.11]    Post-Operative Diagnosis: Post-Op Diagnosis Codes:     * Arthritis of right hip [M16.11]  Ligamentous instability right TKA, arthritis of the patella  Anesthesia: General    Significant Surgical Tasks Conducted by the Assistant(s), if Applicable:  Needed assistance to hold multiple retractors and apply mobilization of the joint and protect neurovascular structures and hold the extremity and maneuver it for better visualization in order to perform surgery safely and efficiently    Complications: none     Estimated Blood Loss (EBL):  100 mL       Tourniquet not used    Implants: None    Specimens:  Biomet primary total knee poly insert for size 67 base plate 20 mm thick/AS, united dome patella size 29, Danna gentamicin cement           Condition: Good    Disposition: PACU - hemodynamically stable.    Attestation: I performed the procedure.  Injection  CHELO with 40 cc of injectable saline  Description:  Patient has bilateral total knee replacement as well as left total hip replacement presents to the office complaining of left total knee being extremely tight.  Her exam showed around 2-3 degrees of flexion contracture and excellent flexion.  However when I examined the right knee patient had around 10° of hyperextension extremely loose medially and also with flexion at 45° there was around 2+ Lachman as well at 90°.  I did tell her the right TKA which was performed few years back/ingrowth prosthesis cruciate sparing knee is ligamentously loose and that is why she  feels that the left knee is tight because the right side is quite loose at this time.  The provides instability.  We discussed trying to balance the soft tissue with a thicker poly and also to resurfaced the patella since it was not resurfaced if it is arthritic.  I did tell her if poly exchange did not work we are ready to remove older inserts and the total knee completely and revise it with a new prosthesis with more constraint.  The pros and cons discussed.    After administering general anesthetic patient had a Torrez catheter inserted which was removed at the end of the case we washed her right knee with soaking alcohol twice and ChloraPrep twice.  Proceeded with draping usual sterile fashion.  After time-out was done we proceeded with the surgery and we excised all of the scar from previous total knee full thickness down to fascia and extended it proximally approximately 2 cm and 1 cm distally.  Full-thickness skin flaps raised medially and laterally.  Medial parapatellar incision was made medial edge of the quadriceps down to the medial tibial tubercle.  We cultured the fluid inside the knee.  We subperiosteally elevate tissues of the medial tibial flare to the middle of the base plate on the medial side.  There is 1 area that looked like it was a little bit oversized on the tibia however most of it was covered with bone.  We took all the scar tissue from retropatellar area in order to visualize.  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  We pushed the patella laterally we flexed the knee.  The poly insert which was 14 was intact.  We extended the knee and tested it it opened up around 7 to 8 mm medially with the knee in extension with valgus stressing however 1 we varus tested opened up around 5 mm.  We move the patella laterally hyperflexed the knee and proceeded with removing the tibial insert measured to be 14 mm.  This is the [email or web address]  system ingrowth prosthesis.  We at that point cleaned up the capsule posteriorly on the medial side where she had pieces that were over growing and catching in between the poly liner and the femoral component.  We pulse lavaged copiously knee joint and we proceeded with trials we went up to 18 and then 20 mm poly insert with anterior lip.  The 20 mm gave us maybe 2 mm of deficit medially but very stable laterally.  We felt that that was excellent.  A got us out from hyper extension to 0° of extension and we had full flexion.  We liked what we so we proceeded with resurfacing the patella using freehand technique.  We will left with 13 mm of bone and the size was 29 dome patella.  We drilled appropriate holes for the patella we put a trial.  We proceeded with status thing the knee with the patella being resurfaced after we did this small lateral facetectomy on the patella.  The patella tracked well midline.  We had 0 extension and full flexion and markedly stable at 45° as well as and 90° compared to preop.  We felt at that point we do not need to take the total knee and revise the whole thing.  We took the trials out pulse lavaged copiously knee joint proceeded inserting our 20 mm thick poly insert with anterior lip for 67 base plate.  We went ahead cemented our patella but.  Once cement hardened all excess cement was removed.  We pulse lavaged copiously knee joint.  Proceeded injecting the posterior capsule as well as the soft tissue around the knee joint for postop pain control with RE CK.  We tested the knee we liked what we had we closed knee joint using 1. Vicryl figure of 8 the quadriceps mechanism was repaired all the way down to the medial tibial tubercle.  We use 1. Ethibond multiple suture for reinforcement in a figure of 8.  Subcutaneous tissues closed in 2 layers 1. Vicryl and subcuticular and the skin was glued per patient's request.  Sterile dressing applied

## 2022-09-27 NOTE — ANESTHESIA POSTPROCEDURE EVALUATION
Anesthesia Post Evaluation    Patient: Modesta Camacho    Procedure(s) Performed: Procedure(s) (LRB):  REVISION, ARTHROPLASTY, KNEE (Right)  RESURFACING, PATELLA (Right)    Final Anesthesia Type: general      Patient location during evaluation: PACU  Patient participation: Yes- Able to Participate  Level of consciousness: awake  Post-procedure vital signs: reviewed and stable  Pain management: adequate  Airway patency: patent    PONV status at discharge: No PONV  Anesthetic complications: no      Cardiovascular status: hemodynamically stable  Respiratory status: unassisted  Hydration status: euvolemic  Follow-up not needed.          Vitals Value Taken Time   /76 09/27/22 1056   Temp 36.7 °C (98 °F) 09/27/22 1055   Pulse 80 09/27/22 1057   Resp 17 09/27/22 1057   SpO2 100 % 09/27/22 1057   Vitals shown include unvalidated device data.      No case tracking events are documented in the log.      Pain/Juvenal Score: Pain Rating Prior to Med Admin: 2 (9/27/2022  7:11 AM)

## 2022-09-27 NOTE — H&P
Subjective:     Patient ID: Modesta Camacho is a 56 y.o. female.    Chief Complaint: Pain of the Left Knee, Pain of the Right Knee, and Pain of the Right Hip  06/27/2022  HPI:  Left TKA 05/07/2021 by Dr. Gandara, left KRZYSZTOF a by Dr. Gandara  Right TKA by Dr. Lan 2016  Patient is complaining that the left knee is tight unable to fully extend compared to the right side.  She is not having any pain with the left hip.  She has very mild discomfort in the left knee.  As far as the right hip is concerned she is having severe pain in the groin and she knows she has arthritis.  She stated the right TKA done by Dr. Lan is not painful but it gives out with difficulty doing stairs.  Overall pain is 4/10.  She still working at the [facility].  She ambulates without any assistive devices.  She feels her left knee is not doing well and cannot straighten it out as much as she does in the right knee.  For some reason she was upset with Dr. Atkinson.  No fever no chills no shortness of breath or difficulty with chewing or swallowing.  She does have low back pain and she sees Dr. Weaver    07/14/2022  Left KRZYSZTOF by Dr. Gandara doing extremely well  Left TKA 05/07/2021 by Dr. Gandara and feels tight unable to hyperextend.  She has around maybe 2-3 degrees of contracture but she flexes really well.  At this time this is a very stable knee    Right TKA 2016 by Dr. Lan.  She is extremely loosen hyperextends and medial collateral ligaments seem loose.  We had asked her to get us the operative report from the [facility] and she brought it with her.  We went over the operative note and it was vanguard knee by Kijubi.  The insert is 14 mm.  For 67 base plate  We did call the [employer] rep and he said they go up to 18 mm for that size and prosthesis.  Femoral component 57.5  Right hip arthritis I did tell her we will deal with that later on because is not bother her as much.  Pain is 5/10  Past Medical History:   Diagnosis Date     Abnormal EKG 2/5/2021    Anxiety     Hypertension     Osteoarthritis     Stage 2 chronic kidney disease 2/5/2021     Past Surgical History:   Procedure Laterality Date    ARTHROPLASTY OF HIP BY ANTERIOR APPROACH Left 7/31/2020    Procedure: ARTHROPLASTY, HIP, ANTERIOR APPROACH;  Surgeon: Xavi Gandara MD;  Location: [facility];  Service: Orthopedics;  Laterality: Left;    gastric sleeve      JOINT REPLACEMENT Right 06/14/2016    knee    JOINT REPLACEMENT Left 05/20/2021    KNEE    JOINT REPLACEMENT Left 07/30/2020    HIP    KNEE ARTHROPLASTY Left 5/6/2021    Procedure: ARTHROPLASTY, KNEE;  Surgeon: Xavi Gandara MD;  Location: [facility];  Service: Orthopedics;  Laterality: Left;    TUBAL LIGATION      UTERINE FIBROID EMBOLIZATION       Family History   Problem Relation Age of Onset    Hypertension Mother     Arthritis Mother     Diabetes Father     Heart disease Father     Depression Sister     Hypertension Sister     Arthritis Brother     Thyroid disease Son     Hypertension Son     Arthritis Maternal Grandmother     Heart disease Maternal Grandmother     Kidney disease Maternal Grandmother     Heart attack Maternal Grandfather     Stroke Paternal Grandmother     No Known Problems Paternal Grandfather     Eczema Son     Breast cancer Sister 47     Social History     Socioeconomic History    Marital status:      Spouse name: Pop Land    Number of children: 3   Occupational History    Occupation: patient access   Tobacco Use    Smoking status: Never Smoker    Smokeless tobacco: Never Used   Substance and Sexual Activity    Alcohol use: Yes     Comment: occasional: hold 72hrs. prior to surgery    Drug use: No    Sexual activity: Yes     Partners: Male     Birth control/protection: See Surgical Hx     Medication List with Changes/Refills   Current Medications    ALPRAZOLAM (XANAX) 0.5 MG TABLET    Take 1 tablet (0.5 mg total) by mouth nightly as needed for Anxiety.    AMLODIPINE (NORVASC) 5 MG TABLET    Take 1  tablet (5 mg total) by mouth 2 (two) times daily.    CLONIDINE (CATAPRES) 0.1 MG TABLET    TAKE 1 TABLET BY MOUTH ONCE DAILY USE FOR SBP GREATER THAN 160 MMHG.    DOCUSATE SODIUM (COLACE) 100 MG CAPSULE    Take 100 mg by mouth 2 (two) times daily as needed.    DULOXETINE (CYMBALTA) 60 MG CAPSULE    Take 1 capsule (60 mg total) by mouth 2 (two) times daily.    METOPROLOL SUCCINATE (TOPROL-XL) 50 MG 24 HR TABLET    Take 2 tablets (100 mg total) by mouth nightly.    ONDANSETRON (ZOFRAN-ODT) 4 MG TBDL    Take 1 tablet (4 mg total) by mouth every 8 (eight) hours as needed (nausea).    TELMISARTAN (MICARDIS) 80 MG TAB    Take 1 tablet (80 mg total) by mouth once daily.    TIZANIDINE (ZANAFLEX) 4 MG TABLET    Take 1 tablet by mouth twice daily as needed    TRAMADOL (ULTRAM) 50 MG TABLET    Take 1 tablet (50 mg total) by mouth every 12 (twelve) hours as needed for Pain.     Review of patient's allergies indicates:   Allergen Reactions    Codeine Hives and Rash     Takes Tramadol and has never had an issue with SOB/swelling  Also tolerated hydromorphone 7/2020      Penicillin     Penicillins Hives     Pt tolerated cefazolin 7/2020     Review of Systems   Constitutional: Negative for decreased appetite.   HENT:  Negative for tinnitus.    Eyes:  Negative for double vision.   Cardiovascular:  Negative for chest pain.   Respiratory:  Negative for wheezing.    Hematologic/Lymphatic: Negative for bleeding problem.   Skin:  Negative for dry skin.   Musculoskeletal:  Positive for arthritis, back pain, joint pain and stiffness. Negative for gout and neck pain.   Gastrointestinal:  Negative for abdominal pain.   Genitourinary:  Negative for bladder incontinence.   Neurological:  Negative for numbness, paresthesias and sensory change.   Psychiatric/Behavioral:  Negative for altered mental status.      Objective:   Body mass index is 29.68 kg/m².  There were no vitals filed for this visit.       General    Constitutional: She is  oriented to person, place, and time. She appears well-developed.   HENT:   Head: Atraumatic.   Eyes: EOM are normal.   Pulmonary/Chest: Effort normal.   Neurological: She is alert and oriented to person, place, and time.   Psychiatric: Judgment normal.         Ambulating without any assistive devices  Right hip pain to internal external rotation in the groin.  She has around 15° internal rotation around 25° external rotation with around may be 5° of contractures  The left total hip journal external rotation without pain in the groin.  Excellent range of motion  Pelvis is level  The sitting position  Hip flexors, abductors adductors quads and hamstrings ankle extensors and flexors were 5/5  Left TKA has around 3° of contracture and she flexes to 120°.  Slightly tight.  No defect in the patella or quadriceps tendon.  No erythema, not warm to touch.  Collaterals stable to varus and valgus stressing in extension and in flexion at 90°  Right knee hyperextends around 7°.  Surgical scar from TKA healed well.  Valgus stressing the medial joint opens up at least 7 mm with a large sharp.  At 45° there is around 2+ Lachman as well as 90° 1+ Lachman.  There is quite a bit of clicking but there is no swelling no effusion.  Calves are soft he had  Ankle motion intact  Skin is warm to touch    Relevant imaging results reviewed and interpreted by me, discussed with the patient and / or family today     X-ray bilateral knees 06/27/2022 showing left TKA with very thick poly insert and the patella was not resurfaced with excellent alignment/posterior sacrificing knee system..  Right TKA/looks like [employer] with slightly oversized base plate on the tibia but overall alignment is intact and looks like it is ingrowth prosthesis with patella not resurfaced and posterior cruciate sparing knee  X-ray 11/24/2021 and 06/27/2022 left KRZYSZTOF in excellent alignment.  Right hip complete loss of joint space with marginal osteophyte and cystic changes  consistent with severe arthritis of the right hip    X-ray 06/27/2022 of the lumbar spine showing spondylolisthesis L4 on 5 grade 1 and facet arthropathy.  No fracture seen  Assessment:     Encounter Diagnoses   Name Primary?    History of total left knee replacement     Hx of total hip arthroplasty, left     Arthritis of right hip     Instability of internal right knee prosthesis, subsequent encounter Yes        Plan:   Instability of internal right knee prosthesis, subsequent encounter    History of total left knee replacement    Hx of total hip arthroplasty, left    Arthritis of right hip         Patient Instructions   Right knee instability and collateral ligament loosening.  By x-ray the alignment is excellent.  We know what brand it is it is [employer] knee system.  You do have a 14 mm plastic insert for a 67 tibial base plate.  We just call the company and they told us that they go up to 18 mm of plastic which might be just enough to give you more stability in the knee.  However we all of disc it determined intraoperatively when we opened the knee up.  If that is not satisfactory then we have to take everything out and proceed with a complete total knee revision.  So surgery could be an hour and half up to 3 hours..     Procedure, common risks and benefits,alternatives discussed in details.All questions answered.Patient expressed understanding.Patient instructed to call for any questions that could arise in the future.    Most common Risks:  Infection/approximately 2% chance  Leg-length discrepancy    Neuro-vascular injury ( resulting in loss of motor and sensory functions)  Pain  Blood clot  Fat clot  Loss of motion  Fracture of bone  Failure of procedure to achieve its intended purpose/80% success in decreasing pain and increasing function  Failure of hardware metal/they last roughly 15 years  Non-union or mal-union of bone  Malalignment  Death/will get cardiac clearance from Dr. Marlene sotomayor  cardiologist    Patient instructions for joint replacement    Before surgery  1.  Shower with Hibiclens soap/antibacterial for 3 days prior to surgery to decrease risk of infection  2..  Stop all blood thinners/aspirin, Coumadin, warfarin, Plavix, Eliquis, Xarelto etc 7 days prior to surgery/also you need to stop anti-inflammatories like Aleve Advil Motrin ibuprofen its at  3. No eating or drinking after midnight the night before surgery  4.  Take Tylenol 650 mg the night prior to surgery    Ask physicians for prescription of Celebrex or Mobic if needed    After surgery at home  1.  Take Tylenol 650 mg 3 times a day for 14 days then as needed for mild pain  2.  Take gabapentin 300 mg nightly for 6 weeks  3.  Take Celebrex 200 mg or meloxicam 15 mg daily for 6 weeks unless having cardiac issues to take for 2 weeks only/she cannot take NSAIDs  4.  Must take aspirin 81 mg twice a day for 6 weeks unless you are on other blood thinners/Plavix, Eliquis, Xarelto, Coumadin etc  5.  Must wear compressive stockings for 6 weeks minimum to decrease the risk of blood clot and swelling  6.  Hydrocodone/Norco or oxycodone/Percocet will be prescribed to take every 6 hr as needed for breakthrough pain  7.  May apply ice on the knee to help with decreasing pain  8.  Keep wound dry for 2 weeks until stitches/staples removed than you will be allowed to shower in 24 hr and get the wound wet.  No soaking of the wound in the tub or swimming for 4 weeks after surgery  9.  No driving for 4 weeks unless specified by physician  10.  Avoid touching the wound or surrounding area /at least 2 inches on each side of the surgical incision until staples are removed/stitches   11.  May change the surgical dressing if extremely bloody or has drainage on it. May clean the wound with peroxide or Betadine and apply sterile dressing and Ace wrap over it  12.  Leave hospital dressing on for 3 days then may change by applying sterile 4 x 4 and Ace wrap  after cleaning with Betadine or peroxide.  May leave this dressing change to home health nurses          Hyper extension in the right TKA with instability to varus valgus stressing as well to anterior drawer and that making the left TKA which is stiff a little bit uncomfortable to ambulate.  I did tell her you can make the left TKA looser by changing the poly insert into smaller 1 but that might keep her and get her into instability during stairs.  The right knee is quite ligamentously unstable and might need to make it tighter by going up on the poly insert to make it not as loose and allows doing stairs in a better situation and like there is you wound feel the instability and the difference as much.  Total knee replacements are not perfect they are 80% successful in decreasing pain increasing function but not perfect.  I think the right knee is the problem rather than the left which has mild tightness.  We can tighten the right knee if we know the brand and the sizes and we can discuss with the company if they have multiple in higher sizes to make it tighter knee.  In the future you would need also the right hip replaced      Operative report 06/09/2016 Dr. Lan implant [facility] cement[facility] components size 57.5 femur, 14 mm polyethylene insert.  Tibial modular tray size 67, modular finned stem with screw system 80 x 10 mm, self taping screws 6.5 x 25    I did discuss with the patient that we would do not know what is going to happen until we doing the surgery.  If the poly exchange give her enough stability than things will be okay otherwise if we not happy with the stability then we might have to take everything out and do a complete revision.  She expressed understanding that we going in to make sure that the knee becomes stable.    We will deal with 1 problem at a time.  I did tell her that increasing the poly insert by 4 more mm might give her enough stability.  In the future she would need right  total hip replacement.  As far as the left total knee you can always change the poly liner to a smaller 1 but we risk instability at that point.  Disclaimer: This note was prepared using a voice recognition system and is likely to have sound alike errors within the text.

## 2022-09-27 NOTE — PATIENT INSTRUCTIONS
Patient instructions for joint replacement    Before surgery  1.  Shower with Hibiclens soap/antibacterial for 3 days prior to surgery to decrease risk of infection  2..  Stop all blood thinners/aspirin, Coumadin, warfarin, Plavix, Eliquis, Xarelto etc 5 days prior to surgery  3.  Take Celebrex 400 mg the night prior to surgery after dinner or meloxicam 15 mg  4.  Take Tylenol 650 mg the night prior to surgery    Ask physicians for prescription of Celebrex or Mobic if needed    After surgery at home  1.  Take Tylenol 650 mg 3 times a day for 14 days then as needed for mild pain  2.  Take gabapentin 300 mg nightly for 6 weeks  3.  Take Celebrex 200 mg or meloxicam 15 mg daily for 6 weeks unless having cardiac issues to take for 2 weeks only  4.  Must take aspirin 81 mg twice a day for 6 weeks unless you are on other blood thinners/Plavix, Eliquis, Xarelto, Coumadin etc  5.  Must wear compressive stockings for 6 weeks minimum to decrease the risk of blood clot and swelling. When your ace wrap comes off your foot at dressing changes please replace that with the Sony hose.   6.  Hydrocodone/Norco or oxycodone/Percocet will be prescribed to take every 6 hr as needed for breakthrough pain  7.  May apply ice on the knee to help with decreasing pain  8.  Keep wound dry for 2 weeks until stitches/staples removed than you will be allowed to shower in 24 hr and get the wound wet.  No soaking of the wound in the tub or swimming for 4 weeks after surgery  9.  No driving for 4 weeks unless specified by physician  10.  Avoid touching the wound or surrounding area /at least 2 inches on each side of the surgical incision until staples are removed/stitches   11.  May change the surgical dressing if extremely bloody or has drainage on it. May clean the wound with peroxide or Betadine and apply sterile dressing and Ace wrap over it  12.  Leave hospital dressing on for 3 days then may change by applying sterile 4 x 4 and Ace wrap after  cleaning with Betadine or peroxide.  May leave this dressing change to home health nurses

## 2022-09-27 NOTE — TRANSFER OF CARE
"Anesthesia Transfer of Care Note    Patient: Modesta Camacho    Procedure(s) Performed: Procedure(s) (LRB):  REVISION, ARTHROPLASTY, KNEE (Right)  RESURFACING, PATELLA (Right)    Patient location: PACU    Anesthesia Type: general    Transport from OR: Transported from OR on room air with adequate spontaneous ventilation    Post pain: adequate analgesia    Post assessment: no apparent anesthetic complications and tolerated procedure well    Post vital signs: stable    Level of consciousness: awake and alert    Nausea/Vomiting: no nausea/vomiting    Complications: none    Transfer of care protocol was followed      Last vitals:   Visit Vitals  /69 (BP Location: Right arm, Patient Position: Sitting)   Pulse 72   Temp 36.5 °C (97.7 °F) (Temporal)   Resp 18   Ht 5' 2" (1.575 m)   Wt 78 kg (172 lb 1.1 oz)   SpO2 100%   Breastfeeding No   BMI 31.47 kg/m²     "

## 2022-09-27 NOTE — DISCHARGE SUMMARY
[facility] - [facility] (Hospital)  Discharge Note  Short Stay    Procedure(s) (LRB):  REVISION, ARTHROPLASTY, KNEE (Right)  RESURFACING, PATELLA (Right)    OUTCOME: Patient tolerated treatment/procedure well without complication and is now ready for discharge.    [place] [facility]    FINAL DIAGNOSIS:  <principal problem not specified>    FOLLOWUP: In clinic    DISCHARGE INSTRUCTIONS:  No discharge procedures on file.     TIME SPENT ON DISCHARGE: 30 minutes

## 2022-09-27 NOTE — ANESTHESIA PREPROCEDURE EVALUATION
09/26/2022  Modesta Camacho is a 56 y.o., female.    Patient Active Problem List   Diagnosis    Essential hypertension    Anxiety    Abnormal EKG    History of gastric restrictive surgery    Status post total right knee replacement    Hyperuricemia    DDD (degenerative disc disease), cervical    Degenerative arthritis of left knee    Hip pain, bilateral    Knee pain, left    Decreased ROM of left knee    Decreased ROM of ankle    Preop cardiovascular exam     Pre-op Assessment    I have reviewed the Patient Summary Reports.    I have reviewed the NPO Status.   I have reviewed the Medications.     Review of Systems  Anesthesia Hx:  No problems with previous Anesthesia  Denies Family Hx of Anesthesia complications.   Denies Personal Hx of Anesthesia complications.   Social:  Non-Smoker    Hematology/Oncology:  Hematology Normal   Oncology Normal     EENT/Dental:EENT/Dental Normal   Cardiovascular:   Hypertension ECG has been reviewed. Cleared by cardiologist with negative NMST and ECHO as follows  ? The left ventricle is normal in size with concentric remodeling and normal systolic function. The estimated ejection fraction is 55%  ? Normal left ventricular diastolic function.  ? Normal right ventricular size with normal right ventricular systolic function.  ? Mild to moderate tricuspid regurgitation.  ? Normal central venous pressure (3 mmHg).  ? The estimated PA systolic pressure is 41 mmHg.  ? There is pulmonary hypertension.      Pulmonary:  Pulmonary Normal    Renal/:   Chronic Renal Disease, CKD    Hepatic/GI:  Hepatic/GI Normal    Musculoskeletal:   Arthritis     Neurological:  Neurology Normal    Endocrine:  Endocrine Normal    Dermatological:  Skin Normal    Psych:  Psychiatric Normal           Physical Exam  General: Alert and Oriented    Airway:  Mallampati: II   Mouth Opening:  Normal  TM Distance: Normal  Tongue: Normal  Neck ROM: Normal ROM        Anesthesia Plan  Type of Anesthesia, risks & benefits discussed:    Anesthesia Type: Gen ETT  Intra-op Monitoring Plan: Standard ASA Monitors  Post Op Pain Control Plan: multimodal analgesia  Induction:  IV  Informed Consent: Informed consent signed with the Patient and all parties understand the risks and agree with anesthesia plan.  All questions answered.   ASA Score: 3  Day of Surgery Review of History & Physical: I have interviewed and examined the patient. I have reviewed the patient's H&P dated:     Ready For Surgery From Anesthesia Perspective.     .

## 2022-09-27 NOTE — ANESTHESIA PROCEDURE NOTES
Intubation    Date/Time: 9/27/2022 8:45 AM  Performed by: Adama Gillespie CRNA  Authorized by: Boy Telles MD     Intubation:     Induction:  Intravenous    Intubated:  Postinduction    Mask Ventilation:  Easy mask    Attempts:  1    Attempted By:  CRNA    Method of Intubation:  Direct    Blade:  Avendaño 2    Laryngeal View Grade: Grade I - full view of cords      Difficult Airway Encountered?: No      Complications:  None    Airway Device:  Oral endotracheal tube    Airway Device Size:  7.0    Style/Cuff Inflation:  Cuffed    Tube secured:  22    Secured at:  The lips    Placement Verified By:  Capnometry    Complicating Factors:  None    Findings Post-Intubation:  BS equal bilateral

## 2022-09-27 NOTE — PT/OT/SLP EVAL
Physical Therapy Evaluation    Patient Name:  Modesta Camacho   MRN:  [id number]    Recommendations:     Discharge Recommendations:  home health PT   Discharge Equipment Recommendations: none   Barriers to discharge: None    Assessment:     Modesta Camacho is a 56 y.o. female admitted with a medical diagnosis of <principal problem not specified>.  She presents with the following impairments/functional limitations:  other (comment) (post op). Pt tolerated therapeutic exercises well and no post op complications were noted.     Rehab Prognosis: Good; patient would benefit from acute skilled PT services to address these deficits and reach maximum level of function.    Recent Surgery: Procedure(s) (LRB):  REVISION, ARTHROPLASTY, KNEE (Right)  RESURFACING, PATELLA (Right) Day of Surgery    Plan:     During this hospitalization, patient to be seen 3 x/week to address the identified rehab impairments via therapeutic exercises and progress toward the following goals:    Plan of Care Expires:  10/11/22    Subjective     Chief Complaint: return home  Patient/Family Comments/goals: take care of her grandbaby  Pain/Comfort:  Pain Rating 1: 4/10  Location - Side 1: Right  Location 1: knee    Patients cultural, spiritual, Gnosticist conflicts given the current situation: no    Living Environment:  Pt reports that she lives in a 1 story home /c her  and grand baby whom she raises. Pt reported that she does drive.   Prior to admission, patients level of function was Independent.  Equipment used at home: walker, rolling.  DME owned (not currently used): bedside commode.  Upon discharge, patient will have assistance from .    Objective:     Communicated with nurse and epic chart review prior to session.  Patient found up in chair with Other (comments) (R knee ace bandage)  upon PT entry to room.    General Precautions: Standard, fall   Orthopedic Precautions:RLE weight bearing as tolerated   Braces: N/A  Respiratory  Status: Room air      Functional Mobility:  Pt sit>stand /c RW /c CGA. Verbal cues to push up from chair.   Pt GT 80' /c RW /c CGA.   Pt stand>sit in w/c. Verbal cues to reach back for arm rests.   Pt completed 15x AP, TKE, and MIP seated in w/c.   Pt educated on the following:   Ambulate once every hour once home  No pillow under knee to prevent contractures; can put pillow under ankle to maintain TKE.   Continue TE performed during session throughout the day  RW safety.         AM-PAC 6 CLICK MOBILITY  Total Score:15     Patient left up in wheelchair with  nurse Anton present.    GOALS:   Multidisciplinary Problems       Physical Therapy Goals          Problem: Physical Therapy    Goal Priority Disciplines Outcome Goal Variances Interventions   Physical Therapy Goal     PT, PT/OT      Description: LTGs:   POC expires 10/11/22  Pt will complete stand<>floor transfer independently in order to play with her grandbaby.  2, Pt will perform 10x sit to stand /c RW for functional mobility.  Pt will ' modified independently /c RW in order to participate in household ambulation.                         History:     Past Medical History:   Diagnosis Date    Abnormal EKG 2/5/2021    Anxiety     Hypertension     Osteoarthritis     Stage 2 chronic kidney disease 2/5/2021       Past Surgical History:   Procedure Laterality Date    ARTHROPLASTY OF HIP BY ANTERIOR APPROACH Left 7/31/2020    Procedure: ARTHROPLASTY, HIP, ANTERIOR APPROACH;  Surgeon: Xavi Gandara MD;  Location: [facility] OR;  Service: Orthopedics;  Laterality: Left;    gastric sleeve      JOINT REPLACEMENT Right 06/14/2016    knee    JOINT REPLACEMENT Left 05/20/2021    KNEE    JOINT REPLACEMENT Left 07/30/2020    HIP    KNEE ARTHROPLASTY Left 5/6/2021    Procedure: ARTHROPLASTY, KNEE;  Surgeon: Xavi Gandara MD;  Location: [facility] OR;  Service: Orthopedics;  Laterality: Left;    TUBAL LIGATION      UTERINE FIBROID EMBOLIZATION         Time Tracking:     PT  Received On:    PT Start Time: 1226     PT Stop Time: 1250  PT Total Time (min): 24 min     Billable Minutes: Evaluation 10 and Therapeutic Exercise 14      09/27/2022

## 2022-09-27 NOTE — PLAN OF CARE
PT eval complete. Pt GT 80' with RW and tolerated therapeutic exercises well. No post-op complications noted.   PT recommends HH PT at D/C

## 2022-09-27 NOTE — PLAN OF CARE
[facility] - [facility] (Hospital)  Discharge Assessment    Primary Care Provider: Mary Rivera NP     Discharge Assessment (most recent)       BRIEF DISCHARGE ASSESSMENT - 09/27/22 1240          Discharge Planning    Assessment Type Discharge Planning Brief Assessment     Resource/Environmental Concerns none     Support Systems Spouse/significant other     Equipment Currently Used at Home walker, rolling     Current Living Arrangements home/apartment/condo     Patient/Family Anticipates Transition to home with help/services     Patient/Family Anticipated Services at Transition home health care     DME Needed Upon Discharge  none     Discharge Plan A Home Health                   Met with patient and spouse at bedside in recovery for brief assessment.   Lives with: spouse    Help at home: spouse  Transportation: personal vehicle   DME needed: none   Discharge plan:  Home Health - orders sent to [facility] home [facility] for PT. Patient received rolling walker previous to hospital admission.

## 2022-09-28 ENCOUNTER — TELEPHONE (OUTPATIENT)
Dept: ORTHOPEDICS | Facility: CLINIC | Age: 57
End: 2022-09-28
Payer: COMMERCIAL

## 2022-09-28 PROCEDURE — G0180 MD CERTIFICATION HHA PATIENT: HCPCS | Mod: ,,, | Performed by: ORTHOPAEDIC SURGERY

## 2022-09-28 PROCEDURE — G0180 PR HOME HEALTH MD CERTIFICATION: ICD-10-PCS | Mod: ,,, | Performed by: ORTHOPAEDIC SURGERY

## 2022-09-28 NOTE — TELEPHONE ENCOUNTER
----- Message from ZAHRA Hardy sent at 9/28/2022 11:19 AM CDT -----  Regarding: FW: pain meds not working  Contact: FELIX WILLIAMSON [[id number]] [phone]  Dilaudid is the strongest pain medicine available   Can she take ultram or percocet?     ----- Message -----  From: Ebony Carpio LPN  Sent: 9/28/2022   9:16 AM CDT  To: ZAHRA Hardy  Subject: pain meds not working                            Patient reports dilaudid is not working -- she is resting / ice / elevating --- are we able to send in something else for pain?   ----- Message -----  From: Elisabeth Kapadia  Sent: 9/28/2022   8:06 AM CDT  To: [facility]eness[facility] Yang Staff    Type:  Needs Medical Advice    Who Called: FELIX WILLIAMSON [[id number]]  Symptoms (please be specific): HYDROmorphone (DILAUDID) 2 MG tablet not working post surgery, patient was up in excruciating pain all night  How long has patient had these symptoms: Ever since last night  Pharmacy name and phone #: [id number] - [facility] [place], [place] - [address] [phone]  Would the patient rather a call back or a response via [facility]? Phone call  Best Call Back Number: [phone]  Additional Information:

## 2022-09-29 ENCOUNTER — TELEPHONE (OUTPATIENT)
Dept: ORTHOPEDICS | Facility: CLINIC | Age: 57
End: 2022-09-29
Payer: COMMERCIAL

## 2022-09-29 RX ORDER — OXYCODONE AND ACETAMINOPHEN 10; 325 MG/1; MG/1
1 TABLET ORAL EVERY 6 HOURS PRN
Qty: 28 TABLET | Refills: 0 | Status: SHIPPED | OUTPATIENT
Start: 2022-09-29 | End: 2022-10-05 | Stop reason: SDUPTHER

## 2022-10-01 LAB — BACTERIA SPEC AEROBE CULT: NO GROWTH

## 2022-10-03 ENCOUNTER — TELEPHONE (OUTPATIENT)
Dept: ORTHOPEDICS | Facility: CLINIC | Age: 57
End: 2022-10-03
Payer: COMMERCIAL

## 2022-10-03 NOTE — TELEPHONE ENCOUNTER
Spoke to patient to let her know the earliest we could refill her pain medication would be 10/06/22. Patient verbalized understanding.

## 2022-10-04 ENCOUNTER — PATIENT MESSAGE (OUTPATIENT)
Dept: ADMINISTRATIVE | Facility: HOSPITAL | Age: 57
End: 2022-10-04
Payer: COMMERCIAL

## 2022-10-05 DIAGNOSIS — Z96.651 STATUS POST TOTAL RIGHT KNEE REPLACEMENT: Primary | ICD-10-CM

## 2022-10-05 LAB — BACTERIA SPEC ANAEROBE CULT: NORMAL

## 2022-10-05 RX ORDER — OXYCODONE AND ACETAMINOPHEN 10; 325 MG/1; MG/1
1 TABLET ORAL EVERY 6 HOURS PRN
Qty: 28 TABLET | Refills: 0 | Status: SHIPPED | OUTPATIENT
Start: 2022-10-06 | End: 2022-10-11 | Stop reason: SDUPTHER

## 2022-10-10 ENCOUNTER — OFFICE VISIT (OUTPATIENT)
Dept: INTERNAL MEDICINE | Facility: CLINIC | Age: 57
End: 2022-10-10
Payer: COMMERCIAL

## 2022-10-10 ENCOUNTER — OFFICE VISIT (OUTPATIENT)
Dept: ORTHOPEDICS | Facility: CLINIC | Age: 57
End: 2022-10-10
Payer: COMMERCIAL

## 2022-10-10 VITALS — BODY MASS INDEX: 31.64 KG/M2 | WEIGHT: 171.94 LBS | HEIGHT: 62 IN

## 2022-10-10 DIAGNOSIS — M51.36 DDD (DEGENERATIVE DISC DISEASE), LUMBAR: ICD-10-CM

## 2022-10-10 DIAGNOSIS — Z96.651 S/P REVISION OF TOTAL KNEE, RIGHT: Primary | ICD-10-CM

## 2022-10-10 DIAGNOSIS — Z96.651 STATUS POST REVISION OF TOTAL REPLACEMENT OF RIGHT KNEE: Primary | ICD-10-CM

## 2022-10-10 PROCEDURE — 4010F PR ACE/ARB THEARPY RXD/TAKEN: ICD-10-PCS | Mod: CPTII,S$GLB,, | Performed by: PHYSICIAN ASSISTANT

## 2022-10-10 PROCEDURE — 1159F PR MEDICATION LIST DOCUMENTED IN MEDICAL RECORD: ICD-10-PCS | Mod: CPTII,95,, | Performed by: NURSE PRACTITIONER

## 2022-10-10 PROCEDURE — 99999 PR PBB SHADOW E&M-EST. PATIENT-LVL III: CPT | Mod: PBBFAC,,, | Performed by: PHYSICIAN ASSISTANT

## 2022-10-10 PROCEDURE — 99024 PR POST-OP FOLLOW-UP VISIT: ICD-10-PCS | Mod: S$GLB,,, | Performed by: PHYSICIAN ASSISTANT

## 2022-10-10 PROCEDURE — 1159F MED LIST DOCD IN RCRD: CPT | Mod: CPTII,95,, | Performed by: NURSE PRACTITIONER

## 2022-10-10 PROCEDURE — 1160F RVW MEDS BY RX/DR IN RCRD: CPT | Mod: CPTII,S$GLB,, | Performed by: PHYSICIAN ASSISTANT

## 2022-10-10 PROCEDURE — 4010F ACE/ARB THERAPY RXD/TAKEN: CPT | Mod: CPTII,S$GLB,, | Performed by: PHYSICIAN ASSISTANT

## 2022-10-10 PROCEDURE — 99213 OFFICE O/P EST LOW 20 MIN: CPT | Mod: 95,,, | Performed by: NURSE PRACTITIONER

## 2022-10-10 PROCEDURE — 4010F ACE/ARB THERAPY RXD/TAKEN: CPT | Mod: CPTII,95,, | Performed by: NURSE PRACTITIONER

## 2022-10-10 PROCEDURE — 1160F PR REVIEW ALL MEDS BY PRESCRIBER/CLIN PHARMACIST DOCUMENTED: ICD-10-PCS | Mod: CPTII,95,, | Performed by: NURSE PRACTITIONER

## 2022-10-10 PROCEDURE — 99024 POSTOP FOLLOW-UP VISIT: CPT | Mod: S$GLB,,, | Performed by: PHYSICIAN ASSISTANT

## 2022-10-10 PROCEDURE — 99213 PR OFFICE/OUTPT VISIT, EST, LEVL III, 20-29 MIN: ICD-10-PCS | Mod: 95,,, | Performed by: NURSE PRACTITIONER

## 2022-10-10 PROCEDURE — 3008F BODY MASS INDEX DOCD: CPT | Mod: CPTII,S$GLB,, | Performed by: PHYSICIAN ASSISTANT

## 2022-10-10 PROCEDURE — 99999 PR PBB SHADOW E&M-EST. PATIENT-LVL III: ICD-10-PCS | Mod: PBBFAC,,, | Performed by: PHYSICIAN ASSISTANT

## 2022-10-10 PROCEDURE — 1159F MED LIST DOCD IN RCRD: CPT | Mod: CPTII,S$GLB,, | Performed by: PHYSICIAN ASSISTANT

## 2022-10-10 PROCEDURE — 4010F PR ACE/ARB THEARPY RXD/TAKEN: ICD-10-PCS | Mod: CPTII,95,, | Performed by: NURSE PRACTITIONER

## 2022-10-10 PROCEDURE — 1160F RVW MEDS BY RX/DR IN RCRD: CPT | Mod: CPTII,95,, | Performed by: NURSE PRACTITIONER

## 2022-10-10 PROCEDURE — 1159F PR MEDICATION LIST DOCUMENTED IN MEDICAL RECORD: ICD-10-PCS | Mod: CPTII,S$GLB,, | Performed by: PHYSICIAN ASSISTANT

## 2022-10-10 PROCEDURE — 3008F PR BODY MASS INDEX (BMI) DOCUMENTED: ICD-10-PCS | Mod: CPTII,S$GLB,, | Performed by: PHYSICIAN ASSISTANT

## 2022-10-10 PROCEDURE — 1160F PR REVIEW ALL MEDS BY PRESCRIBER/CLIN PHARMACIST DOCUMENTED: ICD-10-PCS | Mod: CPTII,S$GLB,, | Performed by: PHYSICIAN ASSISTANT

## 2022-10-10 RX ORDER — SULFAMETHOXAZOLE AND TRIMETHOPRIM 800; 160 MG/1; MG/1
1 TABLET ORAL 2 TIMES DAILY
Qty: 20 TABLET | Refills: 0 | Status: ON HOLD | OUTPATIENT
Start: 2022-10-10 | End: 2023-06-07 | Stop reason: HOSPADM

## 2022-10-10 RX ORDER — FLUCONAZOLE 150 MG/1
150 TABLET ORAL DAILY
Qty: 1 TABLET | Refills: 0 | Status: SHIPPED | OUTPATIENT
Start: 2022-10-10 | End: 2022-10-11

## 2022-10-10 NOTE — PROGRESS NOTES
Patient ID: Modesta Camacho is a 56 y.o. female.    Chief Complaint: Post-op Evaluation of the Right Knee      HPI: Modesta Camacho  is a 56 y.o. female who c/o Post-op Evaluation of the Right Knee     Post op visit 1   Patient notes pain is 5/10  Patient is doing well since surgery and is pleased with the results she has seen thus far  She is using the Percocet medication as needed alternating this with Tylenol   We discussed breaking the medication in half as able  Pain is increased with use and activity of the course the day as well as keeping up with a little 1 at home  The patient is using a rolling walker to assist with ambulation today  She is not been using Sony hose but has been compliant with aspirin therapy   We discussed the importance these and she will add them to her regimen  She has been fully compliant with PT.  She is concerned about starting outpatient PT secondary to transportation issues.  We will extend home health for another 2 weeks until she is able to drive.    Patient is presently denying any shortness of breath, chest pain, fever/chills, nausea/vomiting, loss of taste or smell, numbness/tingling or sensation changes, loss of bladder or bowel function, loss of taste/smell.     Surgery: Right Total Knee revision    Surgery Date:  09/27/2022    Past Medical History:   Diagnosis Date    Abnormal EKG 2/5/2021    Anxiety     Hypertension     Osteoarthritis     Stage 2 chronic kidney disease 2/5/2021     Past Surgical History:   Procedure Laterality Date    ARTHROPLASTY OF HIP BY ANTERIOR APPROACH Left 7/31/2020    Procedure: ARTHROPLASTY, HIP, ANTERIOR APPROACH;  Surgeon: Xavi Gandara MD;  Location: [facility];  Service: Orthopedics;  Laterality: Left;    gastric sleeve      JOINT REPLACEMENT Right 06/14/2016    knee    JOINT REPLACEMENT Left 05/20/2021    KNEE    JOINT REPLACEMENT Left 07/30/2020    HIP    KNEE ARTHROPLASTY Left 5/6/2021    Procedure: ARTHROPLASTY, KNEE;  Surgeon: Xavi LAI  MD Nichol;  Location: [facility] OR;  Service: Orthopedics;  Laterality: Left;    RESURFACING OF PATELLA Right 9/27/2022    Procedure: RESURFACING, PATELLA;  Surgeon: Trey Clifford MD;  Location: [facility] OR;  Service: General;  Laterality: Right;    REVISION OF KNEE ARTHROPLASTY Right 9/27/2022    Procedure: REVISION, ARTHROPLASTY, KNEE;  Surgeon: Trey Clifford MD;  Location: [facility] OR;  Service: General;  Laterality: Right;    TUBAL LIGATION      UTERINE FIBROID EMBOLIZATION       Family History   Problem Relation Age of Onset    Hypertension Mother     Arthritis Mother     Diabetes Father     Heart disease Father     Depression Sister     Hypertension Sister     Arthritis Brother     Thyroid disease Son     Hypertension Son     Arthritis Maternal Grandmother     Heart disease Maternal Grandmother     Kidney disease Maternal Grandmother     Heart attack Maternal Grandfather     Stroke Paternal Grandmother     No Known Problems Paternal Grandfather     Eczema Son     Breast cancer Sister 47     Social History     Socioeconomic History    Marital status:      Spouse name: Ppo Land    Number of children: 3   Occupational History    Occupation: patient access   Tobacco Use    Smoking status: Never    Smokeless tobacco: Never   Substance and Sexual Activity    Alcohol use: Yes     Comment: occasional: hold 72hrs. prior to surgery    Drug use: No    Sexual activity: Yes     Partners: Male     Birth control/protection: See Surgical Hx     Medication List with Changes/Refills   New Medications    SULFAMETHOXAZOLE-TRIMETHOPRIM 800-160MG (BACTRIM DS) 800-160 MG TAB    Take 1 tablet by mouth 2 (two) times daily.   Current Medications    ALPRAZOLAM (XANAX) 0.5 MG TABLET    Take 1 tablet (0.5 mg total) by mouth nightly as needed for Anxiety.    AMLODIPINE (NORVASC) 5 MG TABLET    Take 1 tablet (5 mg total) by mouth 2 (two) times daily.    CLONIDINE (CATAPRES) 0.1 MG TABLET    TAKE 1 TABLET BY MOUTH ONCE DAILY USE FOR  SBP GREATER THAN 160 MMHG.    DOCUSATE SODIUM (COLACE) 100 MG CAPSULE    Take 100 mg by mouth 2 (two) times daily as needed.    DULOXETINE (CYMBALTA) 60 MG CAPSULE    Take 1 capsule (60 mg total) by mouth 2 (two) times daily.    GABAPENTIN (NEURONTIN) 300 MG CAPSULE    Take 300 mg by mouth every evening.    METOPROLOL SUCCINATE (TOPROL-XL) 50 MG 24 HR TABLET    Take 2 tablets (100 mg total) by mouth nightly.    MULTIVIT-MIN/FERROUS FUMARATE (MULTI VITAMIN ORAL)    Take 2 tablets by mouth Daily.    ONDANSETRON (ZOFRAN-ODT) 4 MG TBDL    Take 1 tablet (4 mg total) by mouth every 8 (eight) hours as needed (nausea).    ONDANSETRON (ZOFRAN-ODT) 4 MG TBDL    Take 2 tablets (8 mg total) by mouth every 8 (eight) hours as needed.    OXYCODONE-ACETAMINOPHEN (PERCOCET)  MG PER TABLET    Take 1 tablet by mouth every 6 (six) hours as needed for Pain.    TELMISARTAN (MICARDIS) 80 MG TAB    Take 1 tablet (80 mg total) by mouth once daily.    TIZANIDINE (ZANAFLEX) 4 MG TABLET    Take 1 tablet by mouth twice daily as needed    VITAMIN D (VITAMIN D3) 1000 UNITS TAB    Take 1,000 Units by mouth once daily.     Review of patient's allergies indicates:   Allergen Reactions    Codeine Hives and Rash     Takes Tramadol and has never had an issue with SOB/swelling  Also tolerated hydromorphone 7/2020      Penicillin     Penicillins Hives     Pt tolerated cefazolin 7/2020       Objective:     Right Lower Extremity  NVI  WWP foot  Comp soft  Cap refill < 2 sec  Calf NT, soft  (-) Gwen sign  LEY  ROM : Patient is able to easily exhibit full flexion and extension on passive range of motion.   Wiggles toes  DF/PF intact  Sensation intact ; subcu closure  Inc C/D/I; mild erythema noted around the whole incision patient questions if this was related to the Steri-Strips and glue that have fallen off.  Continue to monitor  No SOI there is no warmth or drainage noted    Assessment:       Encounter Diagnosis   Name Primary?    S/P revision of  total knee, left Yes          Plan:       Modesta was seen today for post-op evaluation.    Diagnoses and all orders for this visit:    S/P revision of total knee, left    Other orders  -     sulfamethoxazole-trimethoprim 800-160mg (BACTRIM DS) 800-160 mg Tab; Take 1 tablet by mouth 2 (two) times daily.        Modesta Camacho is an established pt here for postop follow-up after right total knee replacement by Dr. Clifford.  I will continue home health secondary to transportation issues as I do not want to hinder the patient's progress.  I would like to begin the patient on Bactrim out of precaution for some mild erythema seen around the incision site. Additionally she was instructed she may shower starting tomorrow.  The patient was instructed not to soak the incision in standing water but may clean the incision with clean running water and antibacterial soap.  Patient should notify the office of any signs or symptoms of infection including fevers, erythema, purulent drainage, increasing pain.  Patient will continue with DVT prophylaxis.  Follow up as scheduled.  Patient verbalized understanding of all instructions and agreed with the above plan.    No follow-ups on file.    The patient understands, chooses and consents to this plan and accepts all   the risks which include but are not limited to the risks mentioned above.     Disclaimer: This note was prepared using a voice recognition system and is likely to have sound alike errors within the text.

## 2022-10-10 NOTE — PROGRESS NOTES
Established Patient - Audio Only Telehealth Visit     The patient location is: [place]  The chief complaint leading to consultation is: 3 month follow up  Visit type: Virtual visit with audio only (telephone)  Total time spent with patient: 5 minutes        The reason for the audio only service rather than synchronous audio and video virtual visit was related to technical difficulties or patient preference/necessity.     Each patient to whom I provide medical services by telemedicine is:  (1) informed of the relationship between the physician and patient and the respective role of any other health care provider with respect to management of the patient; and (2) notified that they may decline to receive medical services by telemedicine and may withdraw from such care at any time. Patient verbally consented to receive this service via voice-only telephone call.       HPI:     Patient presents for 3 month follow up.  S/p TK Revision (right side).  Saw orthopedic this morning.   Was started on Bactrim due to some erythema noted at site (from note).      Active in PT.  Reports her blood pressure was good this morning for PT.     Wants to discuss pain management of lower back.   Was in pain management with Dr. Weaver.  Will resend referral     Assessment and plan:     Status post revision of total replacement of right knee  -     Ambulatory referral/consult to [facility]; Future; Expected date: 10/17/2022    DDD (degenerative disc disease), lumbar          Continue current treatment plan per orthopedic.    Referral back to bone and spine clinic.     Follow up in 3 months.                  This service was not originating from a related E/M service provided within the previous 7 days nor will  to an E/M service or procedure within the next 24 hours or my soonest available appointment.  Prevailing standard of care was able to be met in this audio-only visit.

## 2022-10-11 ENCOUNTER — PATIENT OUTREACH (OUTPATIENT)
Dept: ADMINISTRATIVE | Facility: HOSPITAL | Age: 57
End: 2022-10-11
Payer: COMMERCIAL

## 2022-10-11 DIAGNOSIS — Z96.651 STATUS POST TOTAL RIGHT KNEE REPLACEMENT: ICD-10-CM

## 2022-10-11 DIAGNOSIS — F41.1 GAD (GENERALIZED ANXIETY DISORDER): ICD-10-CM

## 2022-10-11 RX ORDER — OXYCODONE AND ACETAMINOPHEN 10; 325 MG/1; MG/1
1 TABLET ORAL EVERY 8 HOURS PRN
Qty: 21 TABLET | Refills: 0 | Status: SHIPPED | OUTPATIENT
Start: 2022-10-11 | End: 2022-10-19 | Stop reason: SDUPTHER

## 2022-10-11 NOTE — PROGRESS NOTES
HTN Report: Attempted to contact the patient to obtain an updated home BP reading, no answer, left voicemail.

## 2022-10-12 ENCOUNTER — EXTERNAL HOME HEALTH (OUTPATIENT)
Dept: HOME HEALTH SERVICES | Facility: HOSPITAL | Age: 57
End: 2022-10-12
Payer: COMMERCIAL

## 2022-10-13 LAB
FINAL PATHOLOGIC DIAGNOSIS: NORMAL
GROSS: NORMAL
Lab: NORMAL

## 2022-10-17 ENCOUNTER — TELEPHONE (OUTPATIENT)
Dept: PAIN MEDICINE | Facility: CLINIC | Age: 57
End: 2022-10-17
Payer: COMMERCIAL

## 2022-10-17 NOTE — TELEPHONE ENCOUNTER
Contacted patient regarding referral with Back [facility] Spine. Scheduled appointment.  Vanessa Charlton RN

## 2022-10-18 ENCOUNTER — DOCUMENT SCAN (OUTPATIENT)
Dept: HOME HEALTH SERVICES | Facility: HOSPITAL | Age: 57
End: 2022-10-18
Payer: COMMERCIAL

## 2022-10-19 DIAGNOSIS — Z96.651 STATUS POST TOTAL RIGHT KNEE REPLACEMENT: ICD-10-CM

## 2022-10-19 RX ORDER — OXYCODONE AND ACETAMINOPHEN 10; 325 MG/1; MG/1
1 TABLET ORAL EVERY 8 HOURS PRN
Qty: 21 TABLET | Refills: 0 | Status: CANCELLED | OUTPATIENT
Start: 2022-10-19

## 2022-10-20 RX ORDER — OXYCODONE AND ACETAMINOPHEN 10; 325 MG/1; MG/1
1 TABLET ORAL EVERY 8 HOURS PRN
Qty: 21 TABLET | Refills: 0 | Status: SHIPPED | OUTPATIENT
Start: 2022-10-20 | End: 2022-10-28 | Stop reason: SDUPTHER

## 2022-10-27 DIAGNOSIS — Z96.651 STATUS POST TOTAL RIGHT KNEE REPLACEMENT: ICD-10-CM

## 2022-10-27 DIAGNOSIS — T84.012D FAILED TOTAL RIGHT KNEE REPLACEMENT, SUBSEQUENT ENCOUNTER: ICD-10-CM

## 2022-10-27 DIAGNOSIS — Z96.651 STATUS POST TOTAL RIGHT KNEE REPLACEMENT: Primary | ICD-10-CM

## 2022-10-27 RX ORDER — OXYCODONE AND ACETAMINOPHEN 10; 325 MG/1; MG/1
1 TABLET ORAL EVERY 8 HOURS PRN
Qty: 21 TABLET | Refills: 0 | Status: CANCELLED | OUTPATIENT
Start: 2022-10-27

## 2022-10-28 ENCOUNTER — OFFICE VISIT (OUTPATIENT)
Dept: RHEUMATOLOGY | Facility: CLINIC | Age: 57
End: 2022-10-28
Payer: COMMERCIAL

## 2022-10-28 VITALS
HEART RATE: 89 BPM | WEIGHT: 168.44 LBS | SYSTOLIC BLOOD PRESSURE: 120 MMHG | DIASTOLIC BLOOD PRESSURE: 75 MMHG | HEIGHT: 62 IN | BODY MASS INDEX: 31 KG/M2

## 2022-10-28 DIAGNOSIS — F41.1 GAD (GENERALIZED ANXIETY DISORDER): ICD-10-CM

## 2022-10-28 DIAGNOSIS — G56.00 CARPAL TUNNEL SYNDROME, UNSPECIFIED LATERALITY: ICD-10-CM

## 2022-10-28 DIAGNOSIS — M70.62 TROCHANTERIC BURSITIS OF LEFT HIP: ICD-10-CM

## 2022-10-28 DIAGNOSIS — Z96.651 STATUS POST TOTAL RIGHT KNEE REPLACEMENT: ICD-10-CM

## 2022-10-28 DIAGNOSIS — R76.8 POSITIVE ANA (ANTINUCLEAR ANTIBODY): Primary | ICD-10-CM

## 2022-10-28 PROCEDURE — 99999 PR PBB SHADOW E&M-EST. PATIENT-LVL IV: ICD-10-PCS | Mod: PBBFAC,,, | Performed by: INTERNAL MEDICINE

## 2022-10-28 PROCEDURE — 3074F PR MOST RECENT SYSTOLIC BLOOD PRESSURE < 130 MM HG: ICD-10-PCS | Mod: CPTII,S$GLB,, | Performed by: INTERNAL MEDICINE

## 2022-10-28 PROCEDURE — 99999 PR PBB SHADOW E&M-EST. PATIENT-LVL IV: CPT | Mod: PBBFAC,,, | Performed by: INTERNAL MEDICINE

## 2022-10-28 PROCEDURE — 99214 OFFICE O/P EST MOD 30 MIN: CPT | Mod: S$GLB,,, | Performed by: INTERNAL MEDICINE

## 2022-10-28 PROCEDURE — 1159F MED LIST DOCD IN RCRD: CPT | Mod: CPTII,S$GLB,, | Performed by: INTERNAL MEDICINE

## 2022-10-28 PROCEDURE — 3078F PR MOST RECENT DIASTOLIC BLOOD PRESSURE < 80 MM HG: ICD-10-PCS | Mod: CPTII,S$GLB,, | Performed by: INTERNAL MEDICINE

## 2022-10-28 PROCEDURE — 1159F PR MEDICATION LIST DOCUMENTED IN MEDICAL RECORD: ICD-10-PCS | Mod: CPTII,S$GLB,, | Performed by: INTERNAL MEDICINE

## 2022-10-28 PROCEDURE — 4010F PR ACE/ARB THEARPY RXD/TAKEN: ICD-10-PCS | Mod: CPTII,S$GLB,, | Performed by: INTERNAL MEDICINE

## 2022-10-28 PROCEDURE — 3078F DIAST BP <80 MM HG: CPT | Mod: CPTII,S$GLB,, | Performed by: INTERNAL MEDICINE

## 2022-10-28 PROCEDURE — 3074F SYST BP LT 130 MM HG: CPT | Mod: CPTII,S$GLB,, | Performed by: INTERNAL MEDICINE

## 2022-10-28 PROCEDURE — 4010F ACE/ARB THERAPY RXD/TAKEN: CPT | Mod: CPTII,S$GLB,, | Performed by: INTERNAL MEDICINE

## 2022-10-28 PROCEDURE — 99214 PR OFFICE/OUTPT VISIT, EST, LEVL IV, 30-39 MIN: ICD-10-PCS | Mod: S$GLB,,, | Performed by: INTERNAL MEDICINE

## 2022-10-28 RX ORDER — GABAPENTIN 300 MG/1
300 CAPSULE ORAL NIGHTLY
Qty: 90 CAPSULE | Refills: 1 | Status: SHIPPED | OUTPATIENT
Start: 2022-10-28 | End: 2022-11-08 | Stop reason: DRUGHIGH

## 2022-10-28 NOTE — PROGRESS NOTES
RHEUMATOLOGY OUTPATIENT CLINIC NOTE    10/28/2022    Attending Rheumatologist: Bud Lockett  Primary Care Provider/Physician Requesting Consultation: Mary Rivera NP   Chief Complaint/Reason For Consultation:  Positive CRISELDA and Osteoarthritis      Subjective:     Modesta Camacho is a 57 y.o. Black or  female with positive CRISELDA for follow up encounter.    Main concern of chronic intermittent arthralgias, worse on right trochanteric bursa area.  Denies association with recent fall or fractures.    Review of Systems   Constitutional:  Negative for fever.   HENT:  Negative for nosebleeds.         Denies sicca symptoms   Eyes:  Negative for photophobia and pain.   Respiratory:  Negative for hemoptysis and shortness of breath (FERNÁNDEZ).    Gastrointestinal:  Negative for blood in stool.        Denies dysphagia   Genitourinary:  Negative for hematuria.   Musculoskeletal:  Positive for joint pain.   Skin:  Negative for rash.        Denies RP   Neurological:  Positive for tingling (Median nerve distribution paresthesias most notable on left hand). Negative for focal weakness.   Endo/Heme/Allergies:         Denies history of vascular thrombosis     Chronic comorbid conditions affecting medical decision making today:  Past Medical History:   Diagnosis Date    Abnormal EKG 2/5/2021    Anxiety     Hypertension     Osteoarthritis     Stage 2 chronic kidney disease 2/5/2021     Past Surgical History:   Procedure Laterality Date    ARTHROPLASTY OF HIP BY ANTERIOR APPROACH Left 7/31/2020    Procedure: ARTHROPLASTY, HIP, ANTERIOR APPROACH;  Surgeon: Xavi Gandara MD;  Location: [facility] OR;  Service: Orthopedics;  Laterality: Left;    gastric sleeve      JOINT REPLACEMENT Right 06/14/2016    knee    JOINT REPLACEMENT Left 05/20/2021    KNEE    JOINT REPLACEMENT Left 07/30/2020    HIP    KNEE ARTHROPLASTY Left 5/6/2021    Procedure: ARTHROPLASTY, KNEE;  Surgeon: Xavi Gandara MD;  Location: [facility] OR;  Service:  Orthopedics;  Laterality: Left;    RESURFACING OF PATELLA Right 9/27/2022    Procedure: RESURFACING, PATELLA;  Surgeon: Trey Clifford MD;  Location: [facility] OR;  Service: General;  Laterality: Right;    REVISION OF KNEE ARTHROPLASTY Right 9/27/2022    Procedure: REVISION, ARTHROPLASTY, KNEE;  Surgeon: Trey Clifford MD;  Location: [facility] OR;  Service: General;  Laterality: Right;    TUBAL LIGATION      UTERINE FIBROID EMBOLIZATION       Family History   Problem Relation Age of Onset    Hypertension Mother     Arthritis Mother     Diabetes Father     Heart disease Father     Depression Sister     Hypertension Sister     Arthritis Brother     Thyroid disease Son     Hypertension Son     Arthritis Maternal Grandmother     Heart disease Maternal Grandmother     Kidney disease Maternal Grandmother     Heart attack Maternal Grandfather     Stroke Paternal Grandmother     No Known Problems Paternal Grandfather     Eczema Son     Breast cancer Sister 47     Social History     Tobacco Use   Smoking Status Never   Smokeless Tobacco Never       Current Outpatient Medications:     amLODIPine (NORVASC) 5 MG tablet, Take 1 tablet (5 mg total) by mouth 2 (two) times daily., Disp: 60 tablet, Rfl: 11    cloNIDine (CATAPRES) 0.1 MG tablet, TAKE 1 TABLET BY MOUTH ONCE DAILY USE FOR SBP GREATER THAN 160 MMHG., Disp: 90 tablet, Rfl: 1    docusate sodium (COLACE) 100 MG capsule, Take 100 mg by mouth 2 (two) times daily as needed., Disp: , Rfl:     metoprolol succinate (TOPROL-XL) 50 MG 24 hr tablet, Take 2 tablets (100 mg total) by mouth nightly., Disp: 180 tablet, Rfl: 1    ondansetron (ZOFRAN-ODT) 4 MG TbDL, Take 1 tablet (4 mg total) by mouth every 8 (eight) hours as needed (nausea)., Disp: 20 tablet, Rfl: 0    oxyCODONE-acetaminophen (PERCOCET)  mg per tablet, Take 1 tablet by mouth every 8 (eight) hours as needed for Pain., Disp: 21 tablet, Rfl: 0    sulfamethoxazole-trimethoprim 800-160mg (BACTRIM DS) 800-160 mg Tab,  Take 1 tablet by mouth 2 (two) times daily., Disp: 20 tablet, Rfl: 0    telmisartan (MICARDIS) 80 MG Tab, Take 1 tablet (80 mg total) by mouth once daily., Disp: 90 tablet, Rfl: 1    ALPRAZolam (XANAX) 0.5 MG tablet, Take 1 tablet (0.5 mg total) by mouth nightly as needed for Anxiety., Disp: 30 tablet, Rfl: 1    DULoxetine (CYMBALTA) 60 MG capsule, Take 1 capsule (60 mg total) by mouth 2 (two) times daily. (Patient taking differently: Take 60 mg by mouth once daily.), Disp: 60 capsule, Rfl: 2    gabapentin (NEURONTIN) 300 MG capsule, Take 300 mg by mouth every evening., Disp: , Rfl:     multivit-min/ferrous fumarate (MULTI VITAMIN ORAL), Take 2 tablets by mouth Daily., Disp: , Rfl:     ondansetron (ZOFRAN-ODT) 4 MG TbDL, Take 2 tablets (8 mg total) by mouth every 8 (eight) hours as needed., Disp: 20 tablet, Rfl: 0    tiZANidine (ZANAFLEX) 4 MG tablet, Take 1 tablet by mouth twice daily as needed (Patient not taking: Reported on 10/28/2022), Disp: 60 tablet, Rfl: 0    vitamin D (VITAMIN D3) 1000 units Tab, Take 1,000 Units by mouth once daily., Disp: , Rfl:      Objective:     Vitals:    10/28/22 0716   BP: 120/75   Pulse: 89     Physical Exam   Constitutional: She appears obese.   Eyes: Conjunctivae are normal.   Pulmonary/Chest: Effort normal. No respiratory distress.   Musculoskeletal:         General: No swelling or tenderness. Normal range of motion.   Neurological: She displays no weakness.   Skin: No rash noted.     Reviewed available old and all outside pertinent medical records available.    All lab results personally reviewed and interpreted by me.       ASSESSMENT:     Positive CRISELDA    Osteoarthritis     carpal tunnel syndrome    PLAN:     Follow-up encounter for positive CRISELDA and osteoarthritis.  Main concern of left wrist paresthesias on median nerve distribution.  Review of systems negative for significant sicca symptoms, connective tissue disease appearing rashes, or Raynaud's phenomena.  Exam without  reproducible muscle weakness or obvious synovitis.  Squeeze tenderness suggestive of active subclinical synovitis absent.  Positive Tinel's at the wrist on left hand.  Labs without evidence of hemolysis or significant cytopenias.  No evidence of erosive kidney or liver dysfunction either.  Low titer CESILIA with negative CESILIA profile.  Rheumatoid factor negative.  Imaging on file without marginal erosive changes or ankylosis.  Impression of paresthesias on left hand from carpal tunnel syndrome.  Recommend night splint use and anticonvulsant as tolerated.  Consider resuming Cymbalta after mg per day or low-dose gabapentin if inadequate response after at least 2 months of gabapentin.  Will refer to Orthopedics if refractory symptoms.  Will recommend physical therapy for symptoms of trochanteric bursa syndrome.    Disclaimer: This note is prepared using voice recognition software and as such is likely to have errors and has not been proof read. Please contact me for questions.         Bud Lockett M.D.

## 2022-10-29 ENCOUNTER — IMMUNIZATION (OUTPATIENT)
Dept: INTERNAL MEDICINE | Facility: CLINIC | Age: 57
End: 2022-10-29
Payer: COMMERCIAL

## 2022-10-29 PROCEDURE — 90471 FLU VACCINE (QUAD) GREATER THAN OR EQUAL TO 3YO PRESERVATIVE FREE IM: ICD-10-PCS | Mod: S$GLB,,, | Performed by: FAMILY MEDICINE

## 2022-10-29 PROCEDURE — 90686 IIV4 VACC NO PRSV 0.5 ML IM: CPT | Mod: S$GLB,,, | Performed by: FAMILY MEDICINE

## 2022-10-29 PROCEDURE — 90686 FLU VACCINE (QUAD) GREATER THAN OR EQUAL TO 3YO PRESERVATIVE FREE IM: ICD-10-PCS | Mod: S$GLB,,, | Performed by: FAMILY MEDICINE

## 2022-10-29 PROCEDURE — 90471 IMMUNIZATION ADMIN: CPT | Mod: S$GLB,,, | Performed by: FAMILY MEDICINE

## 2022-10-31 ENCOUNTER — TELEPHONE (OUTPATIENT)
Dept: RHEUMATOLOGY | Facility: CLINIC | Age: 57
End: 2022-10-31
Payer: COMMERCIAL

## 2022-10-31 LAB — FUNGUS SPEC CULT: NORMAL

## 2022-10-31 RX ORDER — OXYCODONE AND ACETAMINOPHEN 10; 325 MG/1; MG/1
1 TABLET ORAL EVERY 8 HOURS PRN
Qty: 15 TABLET | Refills: 0 | Status: SHIPPED | OUTPATIENT
Start: 2022-10-31 | End: 2022-11-18 | Stop reason: SDUPTHER

## 2022-10-31 NOTE — TELEPHONE ENCOUNTER
----- Message from Britany Freeman sent at 10/31/2022  7:35 AM CDT -----  States after a couple of days, the gabapentin is not working. States she would like to go ahead and get the injection in both hands. States she does not want PT on her hip. States she would like to get the injection in her hip and she is going to wait on surgery for hip. She would like to get an order in the computer, so she can schedule her appt. Please call to advise. Please call pt [phone]. Thank you

## 2022-10-31 NOTE — TELEPHONE ENCOUNTER
"Returned patients phone call. Patient stated that at her last visit with Dr. Lockett they had decreased her Gabapentin. The decrease is not working and patient would like to know if she can get a referral for injections in her hands. Patient also would like Dr. Lockett to know that she does not want to proceed with physical therapy for her hip and that she is probably going to have hip surgery this summer. Advised patient that Dr. Lockett is out of the office on Mondays but that I am going to forward this message to Dr. Lockett for when he returns to the office tomorrow. Patient verbalized understanding. All questions answered.       Rozina Gould (Allye), [facility]  Rheumatology Department        Dr. Lockett, patient would like to have injections in her hands. Can you put in referral?      "

## 2022-11-01 DIAGNOSIS — G56.00 CARPAL TUNNEL SYNDROME, UNSPECIFIED LATERALITY: Primary | ICD-10-CM

## 2022-11-01 DIAGNOSIS — M15.9 OSTEOARTHRITIS OF MULTIPLE JOINTS, UNSPECIFIED OSTEOARTHRITIS TYPE: ICD-10-CM

## 2022-11-01 RX ORDER — GABAPENTIN 100 MG/1
100 CAPSULE ORAL 2 TIMES DAILY
Qty: 60 CAPSULE | Refills: 11 | Status: SHIPPED | OUTPATIENT
Start: 2022-11-01 | End: 2022-11-08 | Stop reason: DRUGHIGH

## 2022-11-01 NOTE — TELEPHONE ENCOUNTER
"MD Rozina Sibley [facility]  Caller: Unspecified (Yesterday, 10:23 AM)  Referral to orthopedics for management of carpal tunnel syndrome.  Script of additional gabapentin to take during daytime provided.  Order for physical therapy placed.       Attempted to call patient to discuss this with her. Left v/m for patient to call back at earliest convenience.    Rozina Gould (Allye) [facility]  Rheumatology Department    "

## 2022-11-02 ENCOUNTER — OFFICE VISIT (OUTPATIENT)
Dept: SPORTS MEDICINE | Facility: CLINIC | Age: 57
End: 2022-11-02
Payer: COMMERCIAL

## 2022-11-02 ENCOUNTER — HOSPITAL ENCOUNTER (OUTPATIENT)
Dept: RADIOLOGY | Facility: HOSPITAL | Age: 57
Discharge: HOME | End: 2022-11-02
Attending: STUDENT IN AN ORGANIZED HEALTH CARE EDUCATION/TRAINING PROGRAM
Payer: COMMERCIAL

## 2022-11-02 VITALS — BODY MASS INDEX: 30.95 KG/M2 | RESPIRATION RATE: 20 BRPM | HEIGHT: 62 IN | WEIGHT: 168.19 LBS

## 2022-11-02 DIAGNOSIS — M15.9 OSTEOARTHRITIS OF MULTIPLE JOINTS, UNSPECIFIED OSTEOARTHRITIS TYPE: ICD-10-CM

## 2022-11-02 DIAGNOSIS — M16.31 OSTEOARTHRITIS RESULTING FROM RIGHT HIP DYSPLASIA: Primary | ICD-10-CM

## 2022-11-02 DIAGNOSIS — M25.532 BILATERAL WRIST PAIN: ICD-10-CM

## 2022-11-02 DIAGNOSIS — G56.00 CARPAL TUNNEL SYNDROME, UNSPECIFIED LATERALITY: ICD-10-CM

## 2022-11-02 DIAGNOSIS — M25.531 BILATERAL WRIST PAIN: Primary | ICD-10-CM

## 2022-11-02 DIAGNOSIS — G56.03 BILATERAL CARPAL TUNNEL SYNDROME: ICD-10-CM

## 2022-11-02 DIAGNOSIS — M25.531 BILATERAL WRIST PAIN: ICD-10-CM

## 2022-11-02 DIAGNOSIS — M25.532 BILATERAL WRIST PAIN: Primary | ICD-10-CM

## 2022-11-02 PROCEDURE — 99024 POSTOP FOLLOW-UP VISIT: CPT | Mod: S$GLB,,, | Performed by: STUDENT IN AN ORGANIZED HEALTH CARE EDUCATION/TRAINING PROGRAM

## 2022-11-02 PROCEDURE — 73110 X-RAY EXAM OF WRIST: CPT | Mod: TC,50

## 2022-11-02 PROCEDURE — 3008F BODY MASS INDEX DOCD: CPT | Mod: CPTII,S$GLB,, | Performed by: STUDENT IN AN ORGANIZED HEALTH CARE EDUCATION/TRAINING PROGRAM

## 2022-11-02 PROCEDURE — 99214 OFFICE O/P EST MOD 30 MIN: CPT | Mod: 25,24,S$GLB, | Performed by: STUDENT IN AN ORGANIZED HEALTH CARE EDUCATION/TRAINING PROGRAM

## 2022-11-02 PROCEDURE — 99024 PR POST-OP FOLLOW-UP VISIT: ICD-10-PCS | Mod: S$GLB,,, | Performed by: STUDENT IN AN ORGANIZED HEALTH CARE EDUCATION/TRAINING PROGRAM

## 2022-11-02 PROCEDURE — 1159F PR MEDICATION LIST DOCUMENTED IN MEDICAL RECORD: ICD-10-PCS | Mod: CPTII,S$GLB,, | Performed by: STUDENT IN AN ORGANIZED HEALTH CARE EDUCATION/TRAINING PROGRAM

## 2022-11-02 PROCEDURE — 73110 XR WRIST COMPLETE 3 VIEWS BILATERAL: ICD-10-PCS | Mod: 26,,, | Performed by: RADIOLOGY

## 2022-11-02 PROCEDURE — 1159F MED LIST DOCD IN RCRD: CPT | Mod: CPTII,S$GLB,, | Performed by: STUDENT IN AN ORGANIZED HEALTH CARE EDUCATION/TRAINING PROGRAM

## 2022-11-02 PROCEDURE — 99214 PR OFFICE/OUTPT VISIT, EST, LEVL IV, 30-39 MIN: ICD-10-PCS | Mod: 25,24,S$GLB, | Performed by: STUDENT IN AN ORGANIZED HEALTH CARE EDUCATION/TRAINING PROGRAM

## 2022-11-02 PROCEDURE — 99999 PR PBB SHADOW E&M-EST. PATIENT-LVL IV: ICD-10-PCS | Mod: PBBFAC,,, | Performed by: STUDENT IN AN ORGANIZED HEALTH CARE EDUCATION/TRAINING PROGRAM

## 2022-11-02 PROCEDURE — 99999 PR PBB SHADOW E&M-EST. PATIENT-LVL IV: CPT | Mod: PBBFAC,,, | Performed by: STUDENT IN AN ORGANIZED HEALTH CARE EDUCATION/TRAINING PROGRAM

## 2022-11-02 PROCEDURE — 20611 LARGE JOINT ASPIRATION/INJECTION: R HIP JOINT: ICD-10-PCS | Mod: 79,RT,S$GLB, | Performed by: STUDENT IN AN ORGANIZED HEALTH CARE EDUCATION/TRAINING PROGRAM

## 2022-11-02 PROCEDURE — 73110 X-RAY EXAM OF WRIST: CPT | Mod: 26,,, | Performed by: RADIOLOGY

## 2022-11-02 PROCEDURE — 20611 DRAIN/INJ JOINT/BURSA W/US: CPT | Mod: 79,RT,S$GLB, | Performed by: STUDENT IN AN ORGANIZED HEALTH CARE EDUCATION/TRAINING PROGRAM

## 2022-11-02 PROCEDURE — 4010F PR ACE/ARB THEARPY RXD/TAKEN: ICD-10-PCS | Mod: CPTII,S$GLB,, | Performed by: STUDENT IN AN ORGANIZED HEALTH CARE EDUCATION/TRAINING PROGRAM

## 2022-11-02 PROCEDURE — 4010F ACE/ARB THERAPY RXD/TAKEN: CPT | Mod: CPTII,S$GLB,, | Performed by: STUDENT IN AN ORGANIZED HEALTH CARE EDUCATION/TRAINING PROGRAM

## 2022-11-02 PROCEDURE — 3008F PR BODY MASS INDEX (BMI) DOCUMENTED: ICD-10-PCS | Mod: CPTII,S$GLB,, | Performed by: STUDENT IN AN ORGANIZED HEALTH CARE EDUCATION/TRAINING PROGRAM

## 2022-11-02 RX ORDER — TRIAMCINOLONE ACETONIDE 40 MG/ML
40 INJECTION, SUSPENSION INTRA-ARTICULAR; INTRAMUSCULAR
Status: DISCONTINUED | OUTPATIENT
Start: 2022-11-02 | End: 2022-11-02 | Stop reason: HOSPADM

## 2022-11-02 RX ADMIN — TRIAMCINOLONE ACETONIDE 40 MG: 40 INJECTION, SUSPENSION INTRA-ARTICULAR; INTRAMUSCULAR at 08:11

## 2022-11-02 NOTE — PROCEDURES
Large Joint Aspiration/Injection: R hip joint    Date/Time: 11/2/2022 8:20 AM  Performed by: Reid Acevedo MD  Authorized by: Reid Acevedo MD     Consent Done?:  Yes (Verbal)  Indications:  Pain  Site marked: the procedure site was marked    Timeout: prior to procedure the correct patient, procedure, and site was verified    Prep: patient was prepped and draped in usual sterile fashion      Local anesthesia used?: Yes    Local anesthetic:  Topical anesthetic    Details:  Needle Size:  22 G  Ultrasonic Guidance for needle placement?: Yes    Images are saved and documented.  Approach:  Anterior  Location:  Hip  Site:  R hip joint  Medications:  40 mg triamcinolone acetonide 40 mg/mL  Patient tolerance:  Patient tolerated the procedure well with no immediate complications     Ultrasound guidance was used for needle localization. Images were saved and stored for documentation. The appropriate structures were visualized. Dynamic visualization of the needle was continuous throughout the procedures and maintained good position.     We discussed the proper protocols after the injection such as no submerging pools, baths tubs, or hot tubs for 24 hr.  Showering is okay today.  We also discussed that blood sugars can be elevated after an injection and asked patient to properly checked her sugars over the next few days and contact their PCP if there are any concerns.  We discussed red flags such as fevers, chills, red, warm, tender joint at the area of injection to please seek medical care immediately.

## 2022-11-02 NOTE — PROGRESS NOTES
Patient ID: Modesta Camacho  YOB: 1965  MRN: [id number]    Chief Complaint: Follow-up (Osteoarthritis resulting from right hip dysplasia )    History of Present Illness: Modesta Camacho is a right-hand dominant 57 y.o. female who presents today with 2/10 c/o  Follow-up Osteoarthritis resulting from right hip osteoarthritis.     The patient is active in none.  Occupation: Patient Care Specialist     57-year-old female presenting today for follow-up for right hip pain consistent with primary osteoarthritis of the hip.  Had intra-articular hip injection in December of last year with about 6-7 months of complete relief in slowly has had pain return back and is interested in repeat injection.  She just recently had a revision right total knee about 5 weeks ago and is doing well.  Continue with rehab for that at this time.  Most of her pain is in her groin on that right hip.  Decreased range of motion and pain with ambulating as well.      Bilateral carpal tunnel-like syndrome symptoms affecting mostly the palmar aspect of the hand thumb and index finger middle finger no hand is worse than the other.  She is right-hand dominant.    Past Medical History:   Past Medical History:   Diagnosis Date    Abnormal EKG 2/5/2021    Anxiety     Hypertension     Osteoarthritis     Stage 2 chronic kidney disease 2/5/2021     Past Surgical History:   Procedure Laterality Date    ARTHROPLASTY OF HIP BY ANTERIOR APPROACH Left 7/31/2020    Procedure: ARTHROPLASTY, HIP, ANTERIOR APPROACH;  Surgeon: Xavi Gandara MD;  Location: [facility] OR;  Service: Orthopedics;  Laterality: Left;    gastric sleeve      JOINT REPLACEMENT Right 06/14/2016    knee    JOINT REPLACEMENT Left 05/20/2021    KNEE    JOINT REPLACEMENT Left 07/30/2020    HIP    KNEE ARTHROPLASTY Left 5/6/2021    Procedure: ARTHROPLASTY, KNEE;  Surgeon: Xavi Gandara MD;  Location: [facility] OR;  Service: Orthopedics;  Laterality: Left;    RESURFACING OF PATELLA Right  9/27/2022    Procedure: RESURFACING, PATELLA;  Surgeon: Trey Clifford MD;  Location: [facility] OR;  Service: General;  Laterality: Right;    REVISION OF KNEE ARTHROPLASTY Right 9/27/2022    Procedure: REVISION, ARTHROPLASTY, KNEE;  Surgeon: Trey Clifford MD;  Location: [facility] OR;  Service: General;  Laterality: Right;    TUBAL LIGATION      UTERINE FIBROID EMBOLIZATION       Family History   Problem Relation Age of Onset    Hypertension Mother     Arthritis Mother     Diabetes Father     Heart disease Father     Depression Sister     Hypertension Sister     Arthritis Brother     Thyroid disease Son     Hypertension Son     Arthritis Maternal Grandmother     Heart disease Maternal Grandmother     Kidney disease Maternal Grandmother     Heart attack Maternal Grandfather     Stroke Paternal Grandmother     No Known Problems Paternal Grandfather     Eczema Son     Breast cancer Sister 47     Social History     Socioeconomic History    Marital status:      Spouse name: Pop Land    Number of children: 3   Occupational History    Occupation: patient access   Tobacco Use    Smoking status: Never     Passive exposure: Never    Smokeless tobacco: Never   Substance and Sexual Activity    Alcohol use: Yes     Comment: occasional: hold 72hrs. prior to surgery    Drug use: No    Sexual activity: Yes     Partners: Male     Birth control/protection: See Surgical Hx     Medication List with Changes/Refills   Current Medications    ALPRAZOLAM (XANAX) 0.5 MG TABLET    Take 1 tablet (0.5 mg total) by mouth nightly as needed for Anxiety.    AMLODIPINE (NORVASC) 5 MG TABLET    Take 1 tablet (5 mg total) by mouth 2 (two) times daily.    CLONIDINE (CATAPRES) 0.1 MG TABLET    TAKE 1 TABLET BY MOUTH ONCE DAILY USE FOR SBP GREATER THAN 160 MMHG.    DOCUSATE SODIUM (COLACE) 100 MG CAPSULE    Take 100 mg by mouth 2 (two) times daily as needed.    DULOXETINE (CYMBALTA) 60 MG CAPSULE    Take 1 capsule (60 mg total) by mouth 2  (two) times daily.    GABAPENTIN (NEURONTIN) 100 MG CAPSULE    Take 1 capsule (100 mg total) by mouth 2 (two) times daily. Additional gabapentin for daytime.  Continue 300mg qhs.    GABAPENTIN (NEURONTIN) 300 MG CAPSULE    Take 1 capsule (300 mg total) by mouth every evening.    METOPROLOL SUCCINATE (TOPROL-XL) 50 MG 24 HR TABLET    Take 2 tablets (100 mg total) by mouth nightly.    MULTIVIT-MIN/FERROUS FUMARATE (MULTI VITAMIN ORAL)    Take 2 tablets by mouth Daily.    ONDANSETRON (ZOFRAN-ODT) 4 MG TBDL    Take 1 tablet (4 mg total) by mouth every 8 (eight) hours as needed (nausea).    ONDANSETRON (ZOFRAN-ODT) 4 MG TBDL    Take 2 tablets (8 mg total) by mouth every 8 (eight) hours as needed.    OXYCODONE-ACETAMINOPHEN (PERCOCET)  MG PER TABLET    Take 1 tablet by mouth every 8 (eight) hours as needed for Pain.    SULFAMETHOXAZOLE-TRIMETHOPRIM 800-160MG (BACTRIM DS) 800-160 MG TAB    Take 1 tablet by mouth 2 (two) times daily.    TELMISARTAN (MICARDIS) 80 MG TAB    Take 1 tablet (80 mg total) by mouth once daily.    TIZANIDINE (ZANAFLEX) 4 MG TABLET    Take 1 tablet by mouth twice daily as needed    VITAMIN D (VITAMIN D3) 1000 UNITS TAB    Take 1,000 Units by mouth once daily.     Review of patient's allergies indicates:   Allergen Reactions    Codeine Hives and Rash     Takes Tramadol and has never had an issue with SOB/swelling  Also tolerated hydromorphone 7/2020      Penicillin     Penicillins Hives     Pt tolerated cefazolin 7/2020       Physical Exam:   Body mass index is 30.77 kg/m².    GENERAL: Well appearing, in no acute distress.  HEAD: Normocephalic and atraumatic.  ENT: External ears and nose grossly normal.  EYES: EOMI bilaterally  PULMONARY: Respirations are grossly even and non-labored.  NEURO: Awake, alert, and oriented x 3.  SKIN: No obvious rashes appreciated.  PSYCH: Mood & affect are appropriate.    Detailed MSK exam:     Positive Tinel's bilaterally full range of motion of the wrist is open  close wrist without any issues neurovascular intact distally motor function of median ulnar radial nerve all intact no thenar atrophy appreciated negative carpal compression     Right hip exam decreased range of motion well-healed recent surgical scar of the anterior right knee hip range of motion limited to 90/40/5 positive FADIR positive GALLO able to hold straight leg raise    Imaging:  X-Ray Knee 1 or 2 View Right  Narrative: EXAMINATION:  XR KNEE 1 OR 2 VIEW RIGHT    CLINICAL HISTORY:  Postop right total knee revision;  Broken internal right knee prosthesis, subsequent encounter    FINDINGS:  Status post right total knee arthroplasty without complicating process.  Impression: See above    Electronically signed by: Shaun Mayen MD  Date:    09/27/2022  Time:    11:20    Relevant imaging results were reviewed and interpreted by me and per my read as above.  This was discussed with the patient and / or family today.     Assessment:  Modesta Camacho is a 57 y.o. female presents today for signs and symptoms most consistent with early room bilateral carpal tunnel syndrome as well as persistent pain in her right anterior groin most consistent with primary osteoarthritis of the hip.  She had over 7 months of relief from her last corticosteroid injection interested in repeat injection at this time as she just recently had a revision of her right total knee and would like to push off as long as possible surgery on her right hip.  Please refer to procedure note for the details.  As for her bilateral carpal tunnel mildly symptomatic today only recently started a few weeks ago does have more significant daily symptoms will try wrist braces at night ergonomic changes at work when she is typing and follow-up in 2 months or sooner if needed.  Consider formal EMG or carpal tunnel injections at follow-up if still symptomatic.    At least 10 minutes were spent sizing, fitting, and educating for durable medical equipment  application today.  CPT 07974.    At least 10 minutes were spent teaching the patient a therapeutic home exercise program and they were provided this plan in writing.  CPT 31747      Osteoarthritis right hip  -     Large Joint Aspiration/Injection: R hip joint    Bilateral carpal tunnel syndrome         Reid Acevedo MD    Disclaimer: This note was prepared using a voice recognition system and is likely to have sound alike errors within the text.

## 2022-11-02 NOTE — PATIENT INSTRUCTIONS
Assessment:  Modesta Camacho is a 57 y.o. female   Chief Complaint   Patient presents with    Follow-up     Osteoarthritis resulting from right hip dysplasia        Encounter Diagnoses   Name Primary?    Bilateral carpal tunnel syndrome     Osteoarthritis resulting from right hip dysplasia Yes        Plan:  We discussed the proper protocols after the injection such as no submerging pools, baths tubs, or hot tubs for 24 hr.  Showering is okay today.  We also discussed that blood sugars can be elevated after an injection and asked patient to properly checked her sugars over the next few days and contact their PCP if there are any concerns.  We discussed red flags such as fevers, chills, red, warm, tender joint at the area of injection to please seek medical care immediately.    Provided night splints for both wrist for you to wear every night.   At least 15 minutes were spent sizing, fitting, and educating regarding durable medical equipment today and all questions answered. This service was performed by Tiffanie Burkett under direction of Reid Acevedo MD today.  CPT 66934.  At least 15 minutes were spent developing, teaching, and performing a home exercise program.  A written summary was provided and all questions were answered. This service was performed by Olivia More Sports Medicine Assistant under direction of Reid Acevedo MD. CPT 20383-PN  Please reach out to us through [employer] messages if you have any questions or concerns. We will gladly answer you in a timely manner.   Time was set aside to ask questions during the encounter. All questions were answered and a verbal understanding of your care plan was given.       CARPAL TUNNEL SYNDROME:    -wear wrist spint while sleeping and as much as possible during the day until pain improves    -evaluate work station for proper body mechanics--elevate computer screen to eye level, adjust seat height to allow wrist to rest comfortably without bending down or back  too much (ideal 30 degree extension)    -if no improvement with above changes, call for EMG (nerve conduction study) to further evaluate    -other treatment options include cortisone injection, activity modification (time off if necessary)    -Surgical release may be needed if symptoms do not improve with conservative treatment    Call for re-evaluation if you notice loss of hand strength, muscle atrophy, or significant worsening of symptoms         Follow-up: 2 months or sooner if there are any problems between now and then.    Thank you for choosing [facility] and Dr. Reid Acevedo for your orthopedic & sports medicine care. It is our goal to provide you with exceptional care that will help keep you healthy, active, and get you back in the game.    Please do not hesitate to reach out to us via email, phone, or MyChart with any questions, concerns, or feedback.    If you felt that you received exemplary care today, please consider leaving us feedback on Health[employer] at:  [email or web address]    If you are experiencing pain/discomfort ,or have questions after 5pm and would like to be connected to the [facility] on-call team, please call this number and specify which Sports Medicine provider is treating you: [phone]

## 2022-11-04 DIAGNOSIS — F41.1 GAD (GENERALIZED ANXIETY DISORDER): ICD-10-CM

## 2022-11-04 RX ORDER — DULOXETIN HYDROCHLORIDE 60 MG/1
60 CAPSULE, DELAYED RELEASE ORAL 2 TIMES DAILY
Qty: 60 CAPSULE | Refills: 2 | Status: SHIPPED | OUTPATIENT
Start: 2022-11-04 | End: 2023-05-24 | Stop reason: SDUPTHER

## 2022-11-04 NOTE — TELEPHONE ENCOUNTER
----- Message from Cristel Campoverde sent at 11/4/2022  1:04 PM CDT -----  Type:  Patient Returning Call    Who Called:PT  Who Left Message for Patient:pt is going through depression and want to see if she can get back on her  CYMABALTA 60 MG  TO GO TO [id number] - [facility] [place], [place] - [address]   Phone:  [phone]  Fax:  [phone]        Does the patient know what this is regarding?   Would the patient rather a call back or a response via [facility]?  call  Best Call Back Number:[phone]   Additional Information:   MEDS

## 2022-11-04 NOTE — TELEPHONE ENCOUNTER
Pt's lv was on 10/10/22. I left the pt a vm stating to send all prescription refills through the medication tab to ensure accuracy of medication being requested. Also I stated I will forward to Mary HARRIS to ask about her getting back on the medication due to her having some depression going on. Please advise. Thanks //kah

## 2022-11-07 ENCOUNTER — TELEPHONE (OUTPATIENT)
Dept: PAIN MEDICINE | Facility: CLINIC | Age: 57
End: 2022-11-07
Payer: COMMERCIAL

## 2022-11-08 ENCOUNTER — OFFICE VISIT (OUTPATIENT)
Dept: PAIN MEDICINE | Facility: CLINIC | Age: 57
End: 2022-11-08
Payer: COMMERCIAL

## 2022-11-08 VITALS
WEIGHT: 165.81 LBS | HEIGHT: 62 IN | BODY MASS INDEX: 30.51 KG/M2 | DIASTOLIC BLOOD PRESSURE: 84 MMHG | HEART RATE: 71 BPM | SYSTOLIC BLOOD PRESSURE: 145 MMHG

## 2022-11-08 DIAGNOSIS — M51.36 DDD (DEGENERATIVE DISC DISEASE), LUMBAR: ICD-10-CM

## 2022-11-08 PROCEDURE — 3079F PR MOST RECENT DIASTOLIC BLOOD PRESSURE 80-89 MM HG: ICD-10-PCS | Mod: CPTII,S$GLB,, | Performed by: PHYSICIAN ASSISTANT

## 2022-11-08 PROCEDURE — 99214 PR OFFICE/OUTPT VISIT, EST, LEVL IV, 30-39 MIN: ICD-10-PCS | Mod: S$GLB,,, | Performed by: PHYSICIAN ASSISTANT

## 2022-11-08 PROCEDURE — 3008F BODY MASS INDEX DOCD: CPT | Mod: CPTII,S$GLB,, | Performed by: PHYSICIAN ASSISTANT

## 2022-11-08 PROCEDURE — 3079F DIAST BP 80-89 MM HG: CPT | Mod: CPTII,S$GLB,, | Performed by: PHYSICIAN ASSISTANT

## 2022-11-08 PROCEDURE — 1160F PR REVIEW ALL MEDS BY PRESCRIBER/CLIN PHARMACIST DOCUMENTED: ICD-10-PCS | Mod: CPTII,S$GLB,, | Performed by: PHYSICIAN ASSISTANT

## 2022-11-08 PROCEDURE — 4010F ACE/ARB THERAPY RXD/TAKEN: CPT | Mod: CPTII,S$GLB,, | Performed by: PHYSICIAN ASSISTANT

## 2022-11-08 PROCEDURE — 4010F PR ACE/ARB THEARPY RXD/TAKEN: ICD-10-PCS | Mod: CPTII,S$GLB,, | Performed by: PHYSICIAN ASSISTANT

## 2022-11-08 PROCEDURE — 3077F PR MOST RECENT SYSTOLIC BLOOD PRESSURE >= 140 MM HG: ICD-10-PCS | Mod: CPTII,S$GLB,, | Performed by: PHYSICIAN ASSISTANT

## 2022-11-08 PROCEDURE — 3008F PR BODY MASS INDEX (BMI) DOCUMENTED: ICD-10-PCS | Mod: CPTII,S$GLB,, | Performed by: PHYSICIAN ASSISTANT

## 2022-11-08 PROCEDURE — 1160F RVW MEDS BY RX/DR IN RCRD: CPT | Mod: CPTII,S$GLB,, | Performed by: PHYSICIAN ASSISTANT

## 2022-11-08 PROCEDURE — 3077F SYST BP >= 140 MM HG: CPT | Mod: CPTII,S$GLB,, | Performed by: PHYSICIAN ASSISTANT

## 2022-11-08 PROCEDURE — 99999 PR PBB SHADOW E&M-EST. PATIENT-LVL IV: CPT | Mod: PBBFAC,,, | Performed by: PHYSICIAN ASSISTANT

## 2022-11-08 PROCEDURE — 1159F PR MEDICATION LIST DOCUMENTED IN MEDICAL RECORD: ICD-10-PCS | Mod: CPTII,S$GLB,, | Performed by: PHYSICIAN ASSISTANT

## 2022-11-08 PROCEDURE — 1159F MED LIST DOCD IN RCRD: CPT | Mod: CPTII,S$GLB,, | Performed by: PHYSICIAN ASSISTANT

## 2022-11-08 PROCEDURE — 99999 PR PBB SHADOW E&M-EST. PATIENT-LVL IV: ICD-10-PCS | Mod: PBBFAC,,, | Performed by: PHYSICIAN ASSISTANT

## 2022-11-08 PROCEDURE — 99214 OFFICE O/P EST MOD 30 MIN: CPT | Mod: S$GLB,,, | Performed by: PHYSICIAN ASSISTANT

## 2022-11-08 RX ORDER — TIZANIDINE 4 MG/1
2-4 TABLET ORAL 2 TIMES DAILY PRN
Qty: 60 TABLET | Refills: 2 | Status: SHIPPED | OUTPATIENT
Start: 2022-11-08 | End: 2022-12-08

## 2022-11-08 RX ORDER — GABAPENTIN 300 MG/1
300 CAPSULE ORAL 3 TIMES DAILY
Qty: 90 CAPSULE | Refills: 2 | Status: SHIPPED | OUTPATIENT
Start: 2022-11-08 | End: 2023-02-21 | Stop reason: SDUPTHER

## 2022-11-08 NOTE — PROGRESS NOTES
The patient location is: home  The chief complaint leading to consultation is: low back pain, leg pain            Chief Pain Complaint:  Low back pain + RLE radiculopathy     Interval History (11/8/2022):  Modesta Camacho presents today for follow-up visit.  Patient was last seen on 01/05/2022. Patient reports pain as 6/10 today. Underwent revision of her right knee with Dr. Clifford 09/27/2022. Referred back to [facility] for low back pain by Dr. Clifford. Patient reports pain as axial in nature across her lower lumbar spine without radiation into her lower extremities. Pain is constant and interferes with daily activities. Taking cymbalta and Gabapentin with minimal relief. Tizanidine helpful in the past, she is out of this medication. Not interested in injections at this time.    X-ray lumbar spine  FINDINGS:  AP, lateral and coned down radiographs of the lumbar spine demonstrate no evidence for acute fracture or dislocation.  Persistent grade 1 anterolisthesis of L4 with respect L5 is again identified.  Moderate to severe posterior facet hypertrophic changes are identified at L5/S1.  Remaining intervertebral disk spaces and vertebral body heights are well-preserved.  Visualized SI joints are intact.  Patient is status post right hip arthroplasty.  Multiple calcified phleboliths are identified.  Multiple clips are identified in the epigastric region.     Impression:     Degenerative disease, most prominent at the lower lumbar spine.  Overall, no significant interval change.       Interval History (1/5/2022):  Modesta Camacho presents today for follow-up visit.  She is here today to review lumbar MRI.  She continues to have RLE pain.  She saw Dr. Rodriguez on 12/21/2021, who referred her to have an ultrasound-guided right hip injection by Dr. Acevedo.  She recently had this procedure with short-term pain relief, but it did not help the sciatica pain into the foot.  She was also referred to Dr. Clifford (Orthopedics) for  evaluation for right hip replacement.  To note, patient reports history of left hip replacement and bilateral knee replacement in the past.  She has been doing physical therapy twice a week, but she does not feel this has been overly helpful.      Initial HPI (11/09/2021):  Modesta Camacho is a 56 y.o. female  who is presenting with a chief complaint of low back pain. The patient began experiencing this problem insidiously, and the pain has been gradually worsening over the past 6 month(s). The pain is described as throbbing, shooting, burning and electrical and is located in the right lumbar spine . Pain is intermittent and lasts hours. The pain radiates to right leg L4 distribution. The patient rates her pain a 7 out of ten and interferes with activities of daily living a 8 out of ten. Pain is exacerbated by flexion of the lumbar spine, and is improved by rest. Patient reports no prior trauma, prior hip surgery Left Hip [facility] 6/[facility]. Right Knee Replacement 5/2021.     - pertinent negatives: No fever, No chills, No weight loss, No bladder dysfunction, No bowel dysfunction, No saddle anesthesia  - pertinent positives: right leg weakness    - medications, other therapies tried (physical therapy, injections):     >> NSAIDs, Tylenol, Tramadol, gabapentin, zanaflex and robaxin    >> Has previously undergone Physical Therapy    >>  Injections:     - ultrasound-guided right hip injection by Dr. Acevedo on 12/21/2021 with short-term pain relief      Imaging / Labs / Studies (reviewed on 11/8/2022):    11/16/2021 MRI Lumbar Spine Without Contrast  FINDINGS:  Image quality is degraded by motion, lowering exam sensitivity and specificity.  Interpretation is offered within these confines.    Alignment: There is mild grade 1 anterolisthesis of L4 on L5.  There is slight leftward convexity of the lumbar spine.    Vertebrae: Diffuse loss normal T1 hyperintense marrow signal may be related to chronic  anemia or other causes of red marrow hyperplasia, correlate clinically.  No evidence of fracture.    Discs: Normal height and signal.    Cord: Normal.  Conus terminates at T12-L1    Degenerative findings:    T12-L1:  No spinal canal or neuroforaminal stenosis.    L1-L2:  No spinal canal or neuroforaminal stenosis.    L2-L3: Mild generalized disc bulge. No spinal canal or neuroforaminal stenosis.    L3-L4: Mild generalized disc bulge.  Mild bilateral facet arthropathy. No spinal canal or neuroforaminal stenosis.    L4-L5: Severe bilateral facet arthropathy with some uncovering of the intervertebral disc and thickening of the ligamentum flavum.  Moderate bilateral neural foraminal narrowing.  No spinal canal stenosis.    L5-S1: Moderate bilateral facet arthropathy. No spinal canal or neuroforaminal stenosis..    Paraspinal muscles & soft tissues: Unremarkable.    Impression  1. Grade 1 anterolisthesis of L4 on L5 secondary to facet arthropathy with associated moderate bilateral neural foraminal narrowing at this level.  2. Other multilevel degenerative findings as above      7/31/20 X-Ray Hip 2 or 3 views Left  COMPARISON:  Prior radiograph from May 20, 2020.  FINDINGS:  Interval total left hip arthroplasty with soft tissue air in the area.  There is normal alignment of the joint.  Densely calcified uterine leiomyomata are unchanged.  There also calcified phleboliths within the pelvis.  Impression  Normal alignment of the total left hip arthroplasty that has been placed in the interval.      11/24/2021 X-Ray Hip 2 or 3 views Right (with Pelvis when performed)  COMPARISON:  02/11/2021  FINDINGS:  There are multiple calcified fibroid seen projecting over the uterus.  There also multiple calcified pelvic phleboliths.  There is a single surgical clips seen to the right of the midline.  A left total hip arthroplasty is noted.  There is moderate to severe superolateral joint space narrowing involving the right hip with  "osteophyte formation noted.  Minimal subchondral cystic changes seen on either side of the right hip joint.      5/01/15 X-Ray Cervical Spine AP And Lateral  Findings:  The reversal of the lordotic curvature of the cervical spine secondary to moderate degenerative disk disease at C4-5 and C5-6.  Chronic anterior wedging of the C4 and C5 vertebral bodies.  Associated endplate sclerosis and uncovertebral joint  hypertrophy. The posterior elements are intact.  IMPRESSION:  No radiographic evidence of fracture or subluxation.  Moderate degenerative disk disease at C4-5 and C5-6.        Review of Systems:  CONSTITUTIONAL: patient denies any fever, chills, or weight loss  SKIN: patient denies any rash or itching  RESPIRATORY: patient denies having any shortness of breath  GASTROINTESTINAL: patient denies having any diarrhea, constipation, or bowel incontinence  GENITOURINARY: patient denies having any abnormal bladder function    MUSCULOSKELETAL:  - patient complains of the above noted pain/s (see chief pain complaint)    NEUROLOGICAL:   - pain as above  - strength in Lower extremities is intact, BILATERALLY  - sensation in Lower extremities is intact, BILATERALLY  - patient denies any loss of bowel or bladder control      PSYCHIATRIC: patient denies any change in mood    Other:  All other systems reviewed and are negative      Physical Exam:    Vitals:    11/08/22 1108   BP: (!) 145/84   Pulse: 71   Weight: 75.2 kg (165 lb 12.6 oz)   Height: 5' 2" (1.575 m)     Body mass index is 30.32 kg/m².   (reviewed on 11/8/2022)     GENERAL: Well appearing, in no acute distress, alert and oriented x3.  Cooperative.  PSYCH:  Mood and affect appropriate.  SKIN: Skin color & texture with no obvious abnormalities.    HEAD/FACE:  Normocephalic, atraumatic.    PULM:  No difficulty breathing. No nasal flaring. No obvious wheezing.  NECK: ROM fairly preserved.  Supple.   BACK: Palpation over the lumbar paraspinous muscles causes pain. Pain " with palpation over lumbar facets. Some pain with flexion. Some pain with extension.  Limited ROM secondary to pain reproduction.Pain with facet loading.   EXTREMITIES: No obvious deformities. Moving all extremities well, appears to have symmetric strength throughout.  MUSCULOSKELETAL: No obvious atrophy abnormalities are noted.   NEURO: No obvious neurologic deficit.   GAIT: sitting.       Musculoskeletal:    Cervical Spine    - Pain on flexion of cervical spine Absent  - Spurling's Test:  Absent    - Pain on extension of cervical spine Absent  - TTP over the cervical facet joints Absent  - Cervical facet loading Absent      Lumbar Spine    - Pain on flexion of lumbar spine Absent  - Straight Leg Raise:  Absent    - Pain on extension of lumbar spine Present  - TTP over the lumbar facet joints Present  - Lumbar facet loading Present    -Pain on palpation over the SI joint  Negative  - GALLO: Equivocal        Neuro:    Strength:  UE R/L: D: 5/5; B: 5/5; T: 5/5; WF: 5/5; WE: 5/5; IO: 5/5;  LE R/L: HF: 5/5, HE: 5/5, KF: 5/5; KE: 5/5; FE: 5/5; FF: 5/5    Extremity Reflexes: Brisk and symmetric throughout.      Extremity Sensory: Sensation to pinprick and temperature symmetric. Proprioception intact.      Psych:  Mood and affect is appropriate      Assessment:    Modesta Camacho is a 57 y.o. year old female who is presenting with       ICD-10-CM ICD-9-CM    1. DDD (degenerative disc disease), lumbar  M51.36 722.52 Ambulatory referral[facility]          Plan:  1. Interventional: Consider L3-5 MBB for axial back pain, patient reports she is not interested in injections at this time  - S/p ultrasound-guided right hip injection by Dr. Acevedo on 12/21/2021 with short-term pain relief.      2. Pharmacologic:   - D/c Topamax- dry mouth  -D/c gabapentin - couldn't tolerate.  - Continue Tramadol 50 mg Po BID PRN (60 tabs) - from Rheumatology.   - Continue Cymbalta 60 mg PO BID.   -Start Zanaflex 4mg to take  1/2 to 1 tab BID PRN (60 tablets). Patient understands this may cause drowsiness.  -Increase Gabapentin from 100/100/300 to 300 mg TID as tolerated      3. Rehabilitative: Encouraged ambulation. Continue physical therapy to help with strengthening, although patient reports not helping with pain.   Patient previously informed that we will fill out [facility] paperwork for physical therapy and procedures, but we will not fill out paperwork for disability.  Our goal is to improve pain to continue job responsibility.     4. Diagnostic: Lumbar MRI and x-ray reviewed with patient.    5. Follow up: 12 weeks or PRN    - This condition does not require this patient to take time off of work, and the primary goal of our Pain Management services is to improve the patient's functional capacity.   - I discussed the risks, benefits, and alternatives to potential treatment options. All questions and concerns were fully addressed today in clinic.       Barby Casillas PA-C    Disclaimer:  This note was prepared using voice recognition system and is likely to have sound alike errors that may have been overlooked even after proof reading.  Please call me with any questions.

## 2022-11-11 ENCOUNTER — CLINICAL SUPPORT (OUTPATIENT)
Dept: REHABILITATION | Facility: HOSPITAL | Age: 57
End: 2022-11-11
Attending: ORTHOPAEDIC SURGERY
Payer: COMMERCIAL

## 2022-11-11 DIAGNOSIS — Z96.651 STATUS POST TOTAL RIGHT KNEE REPLACEMENT: ICD-10-CM

## 2022-11-11 DIAGNOSIS — T84.012D FAILED TOTAL RIGHT KNEE REPLACEMENT, SUBSEQUENT ENCOUNTER: ICD-10-CM

## 2022-11-11 DIAGNOSIS — R29.898 RIGHT LEG WEAKNESS: ICD-10-CM

## 2022-11-11 PROCEDURE — 97161 PT EVAL LOW COMPLEX 20 MIN: CPT | Mod: PN

## 2022-11-11 PROCEDURE — 97110 THERAPEUTIC EXERCISES: CPT | Mod: PN

## 2022-11-11 NOTE — PROGRESS NOTES
[facility]  Physical Therapy Initial Evaluation    Name: Modesta Camacho  Clinic Number: [id number]    Therapy Diagnosis:   Encounter Diagnoses   Name Primary?    Status post total right knee replacement     Failed total right knee replacement, subsequent encounter     Right leg weakness      Physician: Trey Clifford MD    Physician Orders: PT Eval and Treat   Medical Diagnosis from Referral:M15.9 (ICD-10-CM) - Osteoarthritis of multiple joints, unspecified osteoarthritis type   Z96.651 (ICD-10-CM) - Status post total right knee replacement   T84.012D (ICD-10-CM) - Failed total right knee replacement, subsequent encounter     Evaluation Date: 11/11/2022  Authorization Period Expiration: 12/31/22  Plan of Care Expiration: 1/27/23  Visit # / Visits authorized: 1/ 1    Time In: 10:40  Time Out: 11:35  Total Billable Time: 12 minutes    Precautions: Standard, HTN    Subjective   Date of onset: 9/27/22 R TKA  History of current condition - Modesta reports: S/P R TKA 9/27/22.  Pt. Reports having lots of pain in R hip but had to have R knee replaced to eventually have R [id number].       Pain:  R knee pain 4/10, current; 5-6/10 worse  R hip pain 5/10   LBP 6/10   Aggravating Factors: doing too much activity, over exertion  Easing Factors: Gabapentin    Prior Therapy: yes  Social History:  lives with  and grandson, no stairs   Occupation: Registration work for clinic  Prior Level of Function: Independent  Current Level of Function: limited with use of hands for a couple weeks and hands felt cold until received mitts from doctor,    Imaging:  Knee X-Ray 9/27/22  FINDINGS:  Status post right total knee arthroplasty without complicating process.     Impression:     See above    Hip x-ray 6/27/22  FINDINGS:  There is no radiographic evidence of acute osseous, articular, or soft tissue abnormality.  Prior left total hip arthroplasty is again noted.  Hardware is unchanged in appearance without  definite evidence of failure or loosening.  No acute fractures or dislocations visualized.  There is severe osteoarthritis present at the right hip which appears similar to prior.Multiple calcified fibroids are again noted in the pelvis.  Degenerative findings are also seen in the visualized lower lumbar spine.     Impression:     Degenerative findings as above           Medical History:   Past Medical History:   Diagnosis Date    Abnormal EKG 2/5/2021    Anxiety     Hypertension     Osteoarthritis     Stage 2 chronic kidney disease 2/5/2021       Surgical History:   Modesta Camacho  has a past surgical history that includes gastric sleeve; Uterine fibroid embolization; Tubal ligation; Arthroplasty of hip by anterior approach (Left, 7/31/2020); Knee Arthroplasty (Left, 5/6/2021); Joint replacement (Right, 06/14/2016); Joint replacement (Left, 05/20/2021); Joint replacement (Left, 07/30/2020); Revision of knee arthroplasty (Right, 9/27/2022); and Resurfacing of patella (Right, 9/27/2022).    Medications:   Modesta has a current medication list which includes the following prescription(s): alprazolam, amlodipine, clonidine, docusate sodium, duloxetine, gabapentin, metoprolol succinate, multivit-min/ferrous fumarate, ondansetron, ondansetron, oxycodone-acetaminophen, sulfamethoxazole-trimethoprim 800-160mg, telmisartan, tizanidine, and vitamin d.    Allergies:   Review of patient's allergies indicates:   Allergen Reactions    Codeine Hives and Rash     Takes Tramadol and has never had an issue with SOB/swelling  Also tolerated hydromorphone 7/2020      Penicillin     Penicillins Hives     Pt tolerated cefazolin 7/2020        Pts goals: improve strength in R LE to decrease pain and tolerate daily activities    Objective       CMS Impairment/Limitation/Restriction for FOTO Knee Survey    Therapist reviewed FOTO scores for Modesta Camacho on 11/11/2022.   FOTO documents entered into [employer] - see Media section.    Limitation  Score: 60%         Gait: WNL, mild decreased step length R LE    Squat: Double leg: decreased knee and hip flexion B   Single leg    Balance:   L SLS: 60 seconds  R SLS: 13 seconds    Reflex/Sensation:     Knee AROM:     (L) (R)     Flexion   113 102     Extension  0 0    Hip AROM   Flexion :     95 90 --> 112 after heel slides for eccentric quad stretch   Abduction:    30 20   Ext:      limited    Strength:  Hip flexors  4/5 4/5  Quadriceps  5/5 5/5      Hamstrings  5/5 4/5     Anterior Tibialis 4+/5 4+/5     Peroneals  5/5 5/5     Gastrocnemius 5/5 10/20     Adductors  1+/5 3+/5     External rotators 3+/5 5/5     Gluteus Medius 5/5 4+/5     Gluteus Nicola 2-/5 2-/5     Ankle inverters 5/5 4+/5     Internal Rotators 5/5 3/5 pain      Joint Mobility:   Discomfort with R proximal tibiofemoral mobilization  Discomfort with R patellofemoral mobilizations - hypomobile in all directions  L patellofemoral joint more mobile than R in all directions    Muscle Length:  Hamstrings: L: 30 deg tension; R: NT     1/2 Kneeling Soleus:NT     Miguel Test: L: hip flexor and quad tightness R: quad tightness    Tenderness to palpation:   Tender to palpation of R quads at moderate depth              TREATMENT   Treatment Time In: 11:10  Treatment Time Out: 11:30  Total Treatment time separate from Evaluation: 18 minutes    Modesta received therapeutic exercises to develop strength, ROM, and flexibility for 12 minutes including:      Prone Knee bends  DL bridges  Clams  R Heel slides  SLR --> pain and discomfort. Discontinued  LAQ- discomofort    Modesta received the following manual therapy techniques: Joint mobilizations and friction massage were applied to the: Lower extremities for x 6 min. minutes, including:    R patella mobilizations all planes  Scar mobilizations    Home Exercises and Patient Education Provided yes    Education provided:   -Education on condition, HEP, and POC.    Written Home Exercises Provided: yes.  Exercises were  reviewed and Modesta was able to demonstrate them prior to the end of the session.  Modesta demonstrated good  understanding of the education provided.     See EMR under Patient Instructions for exercises provided 11/11/2022.    Assessment   Modesta is a 57 y.o. female referred to outpatient Physical Therapy with a medical diagnosis of   Z96.651 (ICD-10-CM) - Status post total right knee replacement   T84.012D (ICD-10-CM) - Failed total right knee replacement, subsequent encounter   . Pt presents with mild decrease R knee flexion, mild to significant weakness in R LE, hypomobility R patella.  Pt. Has tension palpable in R quads.      Pt prognosis is Excellent.   Pt will benefit from skilled outpatient Physical Therapy to address the deficits stated above and in the chart below, provide pt/family education, and to maximize pt's level of independence.     Plan of care discussed with patient: Yes  Pt's spiritual, cultural and educational needs considered and patient is agreeable to the plan of care and goals as stated below:     Anticipated Barriers for therapy: transportation, long commute to clinic    Medical Necessity is demonstrated by the following  History  Co-morbidities and personal factors that may impact the plan of care Co-morbidities:   anxiety and HTN    Personal Factors:   no deficits     low   Examination  Body Structures and Functions, activity limitations and participation restrictions that may impact the plan of care Body Regions:   back  lower extremities    Body Systems:    gross symmetry  ROM  strength  gait    Participation Restrictions:   Limited with standing, walking, recreational activities    Activity limitations:   Learning and applying knowledge  no deficits    General Tasks and Commands  no deficits    Communication  no deficits    Mobility  lifting and carrying objects  walking    Self care  no deficits    Domestic Life  doing house work (cleaning house, washing dishes, laundry)  assisting  others    Interactions/Relationships  no deficits    Life Areas  no deficits    Community and Social Life  no deficits         low   Clinical Presentation stable and uncomplicated low   Decision Making/ Complexity Score: low     Goals:  Short Term Goals: In 5 weeks:  1.I with HEP  2.Patient to demo increased R knee flexion to 115 degrees or greater to ascend and descend steps comfortably.  3.Patient to demo increase R LE strength to 4/5 MMT or greater to increase R knee and R hip stability.  4.Patient to have decreased pain to 0/10 consistently.  5.Patient to ambulate with normal amb pattern on even and uneven surfaces.      Long Term Goals: In 10 weeks  1. Patient to perform daily activities including household chores without limitation.  2. Patient to demonstrate good dynamic standing balance to participate in community and social activities safely.  3. Patient to tolerate lifting moderate sized objects with proper technique for shopping activities.  4. Patient to score less than 20% impaired on the FOTO.    Plan   Plan of care Certification: 11/11/2022 to 1/27/22.    Outpatient Physical Therapy 2-3 times weekly for 10 weeks to include the following interventions: Electrical Stimulation  , Gait Training, Manual Therapy, Neuromuscular Re-ed, Self Care, Therapeutic Activities, and Therapeutic Exercise, FDN.    Lanette Delgado, PT    Thank you for this referral.    These services are reasonable and necessary for the conditions set forth above while under my care.

## 2022-11-16 LAB
ACID FAST MOD KINY STN SPEC: NORMAL
MYCOBACTERIUM SPEC QL CULT: NORMAL

## 2022-11-17 NOTE — PROGRESS NOTES
[facility]   Physical Therapy Treatment Note     Name: Modesta Camacho  Clinic Number: [id number]    Therapy Diagnosis:   Encounter Diagnosis   Name Primary?    Right leg weakness Yes     Physician: Trey Clifford MD    Visit Date: 11/18/2022  Physician Orders: PT Eval and Treat   Medical Diagnosis from Referral:M15.9 (ICD-10-CM) - Osteoarthritis of multiple joints, unspecified osteoarthritis type   Z96.651 (ICD-10-CM) - Status post total right knee replacement   T84.012D (ICD-10-CM) - Failed total right knee replacement, subsequent encounter      Evaluation Date: 11/11/2022  Authorization Period Expiration: 12/31/22  Plan of Care Expiration: 1/27/23  Visit # / Visits authorized: 1/20 (1/ 1 eval)  PTA Visit #: 1/5     Precautions: Standard, HTN    Time In: 1018 am  Time Out: 1100 am  Total Appointment Time: 43 minutes    SUBJECTIVE     Pt reports: the knee is pretty stiff this morning.    She was compliant with home exercise program.  Response to previous treatment: Initial evaluation  Functional change: Too soon to tell    Pain: 5/10  Location: right knee      OBJECTIVE     Objective Measures updated at progress report unless specified.     Treatment     Modesta received the treatments listed below:      Therapeutic exercises to develop strength, endurance, ROM, flexibility, posture, and core stabilization for 43 minutes including:    Heel slides 3s x20 R   - Painful on hip Glut sets 3s x20  Prone Knee bends 3s x20  Clams 3s x10 ea  LAQ 3s x10 - discomfort  SLS 10s x4 ea - intermittent UE support as needed  Mini squats x10 - no UE support  Standing marching x10 ea - no UE support    Not performed:  SLR --> pain and discomfort. Discontinued    Manual therapy techniques for 00 minutes:    R patella mobilizations all planes  Scar mobilizations    Patient Education and Home Exercises     Education provided:   -Education on condition, HEP, and POC.    Written Home Exercises Provided:  Patient instructed to cont prior HEP. Exercises were reviewed and Modesta was able to demonstrate them prior to the end of the session. Modesta demonstrated good  understanding of the education provided. See EMR under Patient Instructions for exercises provided during therapy sessions.    ASSESSMENT     Patient reported slight discomfort with therex this date. She was having hip pain with bridges so glut sets were done in place of this.     Modesta Is progressing well towards her goals.   Pt prognosis is Excellent.     Pt will continue to benefit from skilled outpatient physical therapy to address the deficits listed in the problem list box on initial evaluation, provide pt/family education and to maximize pt's level of independence in the home and community environment.     Pt's spiritual, cultural and educational needs considered and pt agreeable to plan of care and goals.     Anticipated barriers to physical therapy: transportation, long commute to clinic    Goals:  Short Term Goals: In 5 weeks:  1.I with HEP (Progressing, not met)  2.Patient to demo increased R knee flexion to 115 degrees or greater to ascend and descend steps comfortably. (Progressing, not met)  3.Patient to demo increase R LE strength to 4/5 MMT or greater to increase R knee and R hip stability. (Progressing, not met)  4.Patient to have decreased pain to 0/10 consistently. (Progressing, not met)  5.Patient to ambulate with normal amb pattern on even and uneven surfaces. (Progressing, not met)        Long Term Goals: In 10 weeks  1. Patient to perform daily activities including household chores without limitation. (Progressing, not met)  2. Patient to demonstrate good dynamic standing balance to participate in community and social activities safely. (Progressing, not met)  3. Patient to tolerate lifting moderate sized objects with proper technique for shopping activities. (Progressing, not met)  4. Patient to score less than 20% impaired on the FOTO.  (Progressing, not met)    PLAN     Continue PT current POC.    Jonny Busby, PTA

## 2022-11-18 ENCOUNTER — CLINICAL SUPPORT (OUTPATIENT)
Dept: REHABILITATION | Facility: HOSPITAL | Age: 57
End: 2022-11-18
Payer: COMMERCIAL

## 2022-11-18 DIAGNOSIS — Z96.651 STATUS POST TOTAL RIGHT KNEE REPLACEMENT: ICD-10-CM

## 2022-11-18 DIAGNOSIS — R29.898 RIGHT LEG WEAKNESS: Primary | ICD-10-CM

## 2022-11-18 PROCEDURE — 97110 THERAPEUTIC EXERCISES: CPT | Mod: PN,CQ

## 2022-11-18 RX ORDER — OXYCODONE AND ACETAMINOPHEN 10; 325 MG/1; MG/1
1 TABLET ORAL EVERY 8 HOURS PRN
Qty: 15 TABLET | Refills: 0 | Status: SHIPPED | OUTPATIENT
Start: 2022-11-18 | End: 2023-05-25

## 2022-11-18 NOTE — TELEPHONE ENCOUNTER
----- Message from Gladys Addison sent at 11/18/2022  9:09 AM CST -----  Type:  RX Refill Request    Who Called: patient  Refill or New Rx:refill  RX Name and Strength:Percocet or oxycodone per physical therapy due to serve pain, blood pressure increase  How is the patient currently taking it? (ex. 1XDay):na  Is this a 30 day or 90 day RX:na  Preferred Pharmacy with phone number:[facility]  Local or Mail Order:local  Ordering Provider:dr Menjivar  Would the patient rather a call back or a response via [facility]? Call back  Best Call Back Number:[phone]  Additional Information: malissa

## 2022-11-21 ENCOUNTER — PATIENT OUTREACH (OUTPATIENT)
Dept: ADMINISTRATIVE | Facility: HOSPITAL | Age: 57
End: 2022-11-21
Payer: COMMERCIAL

## 2022-11-28 ENCOUNTER — OFFICE VISIT (OUTPATIENT)
Dept: ORTHOPEDICS | Facility: CLINIC | Age: 57
End: 2022-11-28
Payer: COMMERCIAL

## 2022-11-28 ENCOUNTER — HOSPITAL ENCOUNTER (OUTPATIENT)
Dept: RADIOLOGY | Facility: HOSPITAL | Age: 57
Discharge: HOME | End: 2022-11-28
Attending: ORTHOPAEDIC SURGERY
Payer: COMMERCIAL

## 2022-11-28 VITALS — WEIGHT: 165.81 LBS | BODY MASS INDEX: 30.51 KG/M2 | HEIGHT: 62 IN

## 2022-11-28 DIAGNOSIS — Z96.651 S/P REVISION OF TOTAL KNEE, RIGHT: Primary | ICD-10-CM

## 2022-11-28 DIAGNOSIS — Z96.652 HISTORY OF TOTAL LEFT KNEE REPLACEMENT: ICD-10-CM

## 2022-11-28 DIAGNOSIS — M16.11 ARTHRITIS OF RIGHT HIP: ICD-10-CM

## 2022-11-28 PROCEDURE — 73562 XR KNEE ORTHO RIGHT WITH FLEXION: ICD-10-PCS | Mod: 26,LT,, | Performed by: RADIOLOGY

## 2022-11-28 PROCEDURE — 73562 X-RAY EXAM OF KNEE 3: CPT | Mod: 26,LT,, | Performed by: RADIOLOGY

## 2022-11-28 PROCEDURE — 73564 XR KNEE ORTHO RIGHT WITH FLEXION: ICD-10-PCS | Mod: 26,RT,, | Performed by: RADIOLOGY

## 2022-11-28 PROCEDURE — 99999 PR PBB SHADOW E&M-EST. PATIENT-LVL IV: CPT | Mod: PBBFAC,,, | Performed by: PHYSICIAN ASSISTANT

## 2022-11-28 PROCEDURE — 1159F MED LIST DOCD IN RCRD: CPT | Mod: CPTII,S$GLB,, | Performed by: PHYSICIAN ASSISTANT

## 2022-11-28 PROCEDURE — 4010F ACE/ARB THERAPY RXD/TAKEN: CPT | Mod: CPTII,S$GLB,, | Performed by: PHYSICIAN ASSISTANT

## 2022-11-28 PROCEDURE — 99999 PR PBB SHADOW E&M-EST. PATIENT-LVL IV: ICD-10-PCS | Mod: PBBFAC,,, | Performed by: PHYSICIAN ASSISTANT

## 2022-11-28 PROCEDURE — 1159F PR MEDICATION LIST DOCUMENTED IN MEDICAL RECORD: ICD-10-PCS | Mod: CPTII,S$GLB,, | Performed by: PHYSICIAN ASSISTANT

## 2022-11-28 PROCEDURE — 99024 PR POST-OP FOLLOW-UP VISIT: ICD-10-PCS | Mod: S$GLB,,, | Performed by: PHYSICIAN ASSISTANT

## 2022-11-28 PROCEDURE — 73564 X-RAY EXAM KNEE 4 OR MORE: CPT | Mod: 26,RT,, | Performed by: RADIOLOGY

## 2022-11-28 PROCEDURE — 1160F RVW MEDS BY RX/DR IN RCRD: CPT | Mod: CPTII,S$GLB,, | Performed by: PHYSICIAN ASSISTANT

## 2022-11-28 PROCEDURE — 3008F PR BODY MASS INDEX (BMI) DOCUMENTED: ICD-10-PCS | Mod: CPTII,S$GLB,, | Performed by: PHYSICIAN ASSISTANT

## 2022-11-28 PROCEDURE — 1160F PR REVIEW ALL MEDS BY PRESCRIBER/CLIN PHARMACIST DOCUMENTED: ICD-10-PCS | Mod: CPTII,S$GLB,, | Performed by: PHYSICIAN ASSISTANT

## 2022-11-28 PROCEDURE — 4010F PR ACE/ARB THEARPY RXD/TAKEN: ICD-10-PCS | Mod: CPTII,S$GLB,, | Performed by: PHYSICIAN ASSISTANT

## 2022-11-28 PROCEDURE — 99024 POSTOP FOLLOW-UP VISIT: CPT | Mod: S$GLB,,, | Performed by: PHYSICIAN ASSISTANT

## 2022-11-28 PROCEDURE — 3008F BODY MASS INDEX DOCD: CPT | Mod: CPTII,S$GLB,, | Performed by: PHYSICIAN ASSISTANT

## 2022-11-28 PROCEDURE — 73564 X-RAY EXAM KNEE 4 OR MORE: CPT | Mod: TC,RT

## 2022-11-28 RX ORDER — TRAMADOL HYDROCHLORIDE 50 MG/1
50 TABLET ORAL NIGHTLY
Qty: 30 TABLET | Refills: 0 | Status: SHIPPED | OUTPATIENT
Start: 2022-11-28 | End: 2023-05-25

## 2022-11-28 NOTE — PROGRESS NOTES
Patient ID: Modesta Camacho is a 57 y.o. female.    Chief Complaint: Post-op Evaluation of the Right Knee      HPI: Modesta Camacho  is a 57 y.o. female who c/o Post-op Evaluation of the Right Knee     Post op visit 2  Patient notes pain is 5/10   The patient is doing well since surgery and is pleased with the results thus far  She notes that she is no longer taking the narcotic pain medication but is not getting much relief with Tylenol   She inquires about restarting her tramadol which she had previously and really helped her sleep at night  She states most of her pain comes especially after therapy and has been increasing her blood pressure where she has to take her clonidine pill per her PCP requirements  She is ambulating without any assistant devices today   DVT compliant   Therapy compliant    Patient is presently denying any shortness of breath, chest pain, fever/chills, nausea/vomiting, loss of taste or smell, numbness/tingling or sensation changes, loss of bladder or bowel function, loss of taste/smell.     Surgery: Right Total Knee revision    Surgery Date:  09/27/2022    Past Medical History:   Diagnosis Date    Abnormal EKG 2/5/2021    Anxiety     Hypertension     Osteoarthritis     Stage 2 chronic kidney disease 2/5/2021     Past Surgical History:   Procedure Laterality Date    ARTHROPLASTY OF HIP BY ANTERIOR APPROACH Left 7/31/2020    Procedure: ARTHROPLASTY, HIP, ANTERIOR APPROACH;  Surgeon: Xavi Gandara MD;  Location: [facility] [place];  Service: Orthopedics;  Laterality: Left;    gastric sleeve      JOINT REPLACEMENT Right 06/14/2016    knee    JOINT REPLACEMENT Left 05/20/2021    KNEE    JOINT REPLACEMENT Left 07/30/2020    HIP    KNEE ARTHROPLASTY Left 5/6/2021    Procedure: ARTHROPLASTY, KNEE;  Surgeon: Xavi Gandara MD;  Location: [facility] OR;  Service: Orthopedics;  Laterality: Left;    RESURFACING OF PATELLA Right 9/27/2022    Procedure: RESURFACING, PATELLA;  Surgeon: Trey Clifford MD;   Location: [facility] OR;  Service: General;  Laterality: Right;    REVISION OF KNEE ARTHROPLASTY Right 9/27/2022    Procedure: REVISION, ARTHROPLASTY, KNEE;  Surgeon: Trey Clifford MD;  Location: [facility] OR;  Service: General;  Laterality: Right;    TUBAL LIGATION      UTERINE FIBROID EMBOLIZATION       Family History   Problem Relation Age of Onset    Hypertension Mother     Arthritis Mother     Diabetes Father     Heart disease Father     Depression Sister     Hypertension Sister     Arthritis Brother     Thyroid disease Son     Hypertension Son     Arthritis Maternal Grandmother     Heart disease Maternal Grandmother     Kidney disease Maternal Grandmother     Heart attack Maternal Grandfather     Stroke Paternal Grandmother     No Known Problems Paternal Grandfather     Eczema Son     Breast cancer Sister 47     Social History     Socioeconomic History    Marital status:      Spouse name: Pop Land    Number of children: 3   Occupational History    Occupation: patient access   Tobacco Use    Smoking status: Never     Passive exposure: Never    Smokeless tobacco: Never   Substance and Sexual Activity    Alcohol use: Yes     Comment: occasional: hold 72hrs. prior to surgery    Drug use: No    Sexual activity: Yes     Partners: Male     Birth control/protection: See Surgical Hx     Medication List with Changes/Refills   New Medications    TRAMADOL (ULTRAM) 50 MG TABLET    Take 1 tablet (50 mg total) by mouth nightly.   Current Medications    ALPRAZOLAM (XANAX) 0.5 MG TABLET    Take 1 tablet (0.5 mg total) by mouth nightly as needed for Anxiety.    AMLODIPINE (NORVASC) 5 MG TABLET    Take 1 tablet (5 mg total) by mouth 2 (two) times daily.    CLONIDINE (CATAPRES) 0.1 MG TABLET    TAKE 1 TABLET BY MOUTH ONCE DAILY USE FOR SBP GREATER THAN 160 MMHG.    DOCUSATE SODIUM (COLACE) 100 MG CAPSULE    Take 100 mg by mouth 2 (two) times daily as needed.    DULOXETINE (CYMBALTA) 60 MG CAPSULE    Take 1 capsule (60 mg  total) by mouth 2 (two) times daily.    GABAPENTIN (NEURONTIN) 300 MG CAPSULE    Take 1 capsule (300 mg total) by mouth 3 (three) times daily.    METOPROLOL SUCCINATE (TOPROL-XL) 50 MG 24 HR TABLET    Take 2 tablets (100 mg total) by mouth nightly.    MULTIVIT-MIN/FERROUS FUMARATE (MULTI VITAMIN ORAL)    Take 2 tablets by mouth Daily.    ONDANSETRON (ZOFRAN-ODT) 4 MG TBDL    Take 1 tablet (4 mg total) by mouth every 8 (eight) hours as needed (nausea).    ONDANSETRON (ZOFRAN-ODT) 4 MG TBDL    Take 2 tablets (8 mg total) by mouth every 8 (eight) hours as needed.    OXYCODONE-ACETAMINOPHEN (PERCOCET)  MG PER TABLET    Take 1 tablet by mouth every 8 (eight) hours as needed for Pain.    SULFAMETHOXAZOLE-TRIMETHOPRIM 800-160MG (BACTRIM DS) 800-160 MG TAB    Take 1 tablet by mouth 2 (two) times daily.    TELMISARTAN (MICARDIS) 80 MG TAB    Take 1 tablet (80 mg total) by mouth once daily.    TIZANIDINE (ZANAFLEX) 4 MG TABLET    Take 0.5-1 tablets (2-4 mg total) by mouth 2 (two) times daily as needed (muscle spasms). May cause drowsiness.    VITAMIN D (VITAMIN D3) 1000 UNITS TAB    Take 1,000 Units by mouth once daily.     Review of patient's allergies indicates:   Allergen Reactions    Codeine Hives and Rash     Takes Tramadol and has never had an issue with SOB/swelling  Also tolerated hydromorphone 7/2020      Penicillin     Penicillins Hives     Pt tolerated cefazolin 7/2020       Objective:     Right Lower Extremity  NVI  WWP foot  Comp soft  Cap refill < 2 sec  Calf NT, soft  (-) Gwen sign  ELY  ROM : Patient is able to easily exhibit full flexion and extension on passive range of motion.   Wiggles toes  DF/PF intact  Sensation intact  Inc C/D/I  No SOI    Imaging:    FINDINGS:  Postsurgical changes of bilateral total knee arthroplasties.  The hardware is well seated of the tibial and femoral components.  No significant subsidence.  No soft tissue swelling or joint effusion.     Impression:     Expected  postsurgical changes of bilateral total knee arthroplasties.       Assessment:       Encounter Diagnoses   Name Primary?    S/P revision of total knee, right Yes    History of total left knee replacement           Plan:       Modesta was seen today for post-op evaluation.    Diagnoses and all orders for this visit:    S/P revision of total knee, right    History of total left knee replacement    Other orders  -     traMADoL (ULTRAM) 50 mg tablet; Take 1 tablet (50 mg total) by mouth nightly.        Modesta Camacho is an established pt here for postop follow-up after right total knee replacement by Dr. Clifford.  Pain medication was refilled appropriately.  The patient will continue the current medication regimen and treatment plan.  Patient should notify the office of any signs or symptoms of infection including fevers, erythema, purulent drainage, increasing pain.  Patient will continue with DVT prophylaxis until at least 6 weeks postop.  Patient will continue outpatient physical therapy.  Will follow-up as scheduled. Patient verbalized understanding of all instructions and agreed with the above plan.    No follow-ups on file.    The patient understands, chooses and consents to this plan and accepts all   the risks which include but are not limited to the risks mentioned above.     Disclaimer: This note was prepared using a voice recognition system and is likely to have sound alike errors within the text.

## 2022-11-29 ENCOUNTER — PATIENT MESSAGE (OUTPATIENT)
Dept: ORTHOPEDICS | Facility: CLINIC | Age: 57
End: 2022-11-29
Payer: COMMERCIAL

## 2022-11-30 ENCOUNTER — PATIENT OUTREACH (OUTPATIENT)
Dept: ADMINISTRATIVE | Facility: HOSPITAL | Age: 57
End: 2022-11-30
Payer: COMMERCIAL

## 2022-12-02 ENCOUNTER — CLINICAL SUPPORT (OUTPATIENT)
Dept: REHABILITATION | Facility: HOSPITAL | Age: 57
End: 2022-12-02
Payer: COMMERCIAL

## 2022-12-02 DIAGNOSIS — R29.898 RIGHT LEG WEAKNESS: Primary | ICD-10-CM

## 2022-12-02 PROCEDURE — 97110 THERAPEUTIC EXERCISES: CPT | Mod: PN

## 2022-12-02 NOTE — PROGRESS NOTES
"[facility]   Physical Therapy Treatment Note     Name: Modesta Camacho  Clinic Number: [id number]    Therapy Diagnosis:   Encounter Diagnosis   Name Primary?    Right leg weakness Yes     Physician: Trey Clifford MD    Visit Date: 12/2/2022  Physician Orders: PT Eval and Treat   Medical Diagnosis from Referral:M15.9 (ICD-10-CM) - Osteoarthritis of multiple joints, unspecified osteoarthritis type   Z96.651 (ICD-10-CM) - Status post total right knee replacement   T84.012D (ICD-10-CM) - Failed total right knee replacement, subsequent encounter      Evaluation Date: 11/11/2022  Authorization Period Expiration: 12/31/22  Plan of Care Expiration: 1/27/23  Visit # / Visits authorized: 2/20 (1/ 1 eval)  PTA Visit #: 1/5     Precautions: Standard, HTN    Time In: 1030 am  Time Out: 1115 am  Total Appointment Time: 45 minutes    SUBJECTIVE     Pt reports: she took tramadol so she is feeling okay    She was compliant with home exercise program.  Response to previous treatment: sore but not bad  Functional change: Too soon to tell    Pain: 5/10  Location: right knee      OBJECTIVE     Objective Measures updated at progress report unless specified.     Treatment     Modesta received the treatments listed below:      Therapeutic exercises to develop strength, endurance, ROM, flexibility, posture, and core stabilization for 45 minutes including:    SAQ - 20x, 5", 2#  Sb DKTC - 20x  Hip abduction - 2x10  Prone knee flexion hold at end range in supine - 30"  Shuttle DLP - 2x1', 5 black  Shuttle SLP - 2x1', 3 black  Slantboard stretch - 3x30"  Slantboard HR - 2x15    Not performed:  SLR --> pain and discomfort. Discontinued    Manual therapy techniques for 00 minutes:    R patella mobilizations all planes  Scar mobilizations    Patient Education and Home Exercises     Education provided:   -Education on condition, HEP, and POC.    Written Home Exercises Provided: Patient instructed to cont prior HEP. " Exercises were reviewed and Modesta was able to demonstrate them prior to the end of the session. Modesta demonstrated good  understanding of the education provided. See EMR under Patient Instructions for exercises provided during therapy sessions.    ASSESSMENT     Patient demonstrates more quadriceps than joint stiffness limiting further knee flexion ROM    Modesta Is progressing well towards her goals.   Pt prognosis is Excellent.     Pt will continue to benefit from skilled outpatient physical therapy to address the deficits listed in the problem list box on initial evaluation, provide pt/family education and to maximize pt's level of independence in the home and community environment.     Pt's spiritual, cultural and educational needs considered and pt agreeable to plan of care and goals.     Anticipated barriers to physical therapy: transportation, long commute to clinic    Goals:  Short Term Goals: In 5 weeks:  1.I with HEP (Progressing, not met)  2.Patient to demo increased R knee flexion to 115 degrees or greater to ascend and descend steps comfortably. (Progressing, not met)  3.Patient to demo increase R LE strength to 4/5 MMT or greater to increase R knee and R hip stability. (Progressing, not met)  4.Patient to have decreased pain to 0/10 consistently. (Progressing, not met)  5.Patient to ambulate with normal amb pattern on even and uneven surfaces. (Progressing, not met)        Long Term Goals: In 10 weeks  1. Patient to perform daily activities including household chores without limitation. (Progressing, not met)  2. Patient to demonstrate good dynamic standing balance to participate in community and social activities safely. (Progressing, not met)  3. Patient to tolerate lifting moderate sized objects with proper technique for shopping activities. (Progressing, not met)  4. Patient to score less than 20% impaired on the FOTO. (Progressing, not met)    PLAN     Continue PT current POC.    Jaylon Lunsford,  PT

## 2022-12-05 ENCOUNTER — TELEPHONE (OUTPATIENT)
Dept: ORTHOPEDICS | Facility: CLINIC | Age: 57
End: 2022-12-05
Payer: COMMERCIAL

## 2022-12-06 ENCOUNTER — TELEPHONE (OUTPATIENT)
Dept: ORTHOPEDICS | Facility: CLINIC | Age: 57
End: 2022-12-06
Payer: COMMERCIAL

## 2022-12-07 DIAGNOSIS — Z12.31 OTHER SCREENING MAMMOGRAM: ICD-10-CM

## 2022-12-09 ENCOUNTER — CLINICAL SUPPORT (OUTPATIENT)
Dept: REHABILITATION | Facility: HOSPITAL | Age: 57
End: 2022-12-09
Payer: COMMERCIAL

## 2022-12-09 DIAGNOSIS — R29.898 RIGHT LEG WEAKNESS: Primary | ICD-10-CM

## 2022-12-09 PROCEDURE — 97110 THERAPEUTIC EXERCISES: CPT | Mod: PN

## 2022-12-09 NOTE — PROGRESS NOTES
"[facility]   Physical Therapy Treatment Note     Name: Modesta Camacho  Clinic Number: [id number]    Therapy Diagnosis:   Encounter Diagnosis   Name Primary?    Right leg weakness Yes     Physician: Trey Clifford MD    Visit Date: 12/9/2022  Physician Orders: PT Eval and Treat   Medical Diagnosis from Referral:M15.9 (ICD-10-CM) - Osteoarthritis of multiple joints, unspecified osteoarthritis type   Z96.651 (ICD-10-CM) - Status post total right knee replacement   T84.012D (ICD-10-CM) - Failed total right knee replacement, subsequent encounter      Evaluation Date: 11/11/2022  Authorization Period Expiration: 12/31/22  Plan of Care Expiration: 1/27/23  Visit # / Visits authorized: 3/20 (1/ 1 eval)  PTA Visit #: 1/5     Precautions: Standard, HTN    Time In: 945 am  Time Out: 1040 am  Total Appointment Time: 55 minutes    SUBJECTIVE     Pt reports: she is very sore.  She fell in her back yard this week forward onto her knees.  She states that she hasnt been able to do exercises for a couple of days but is feeling better now.    She was compliant with home exercise program.  Response to previous treatment: sore but not bad  Functional change: Too soon to tell    Pain: 5/10  Location: right knee      OBJECTIVE     Objective Measures updated at progress report unless specified.     Treatment     Modesta received the treatments listed below:      Therapeutic exercises to develop strength, endurance, ROM, flexibility, posture, and core stabilization for 55 minutes including:    SAQ - 20x, 5", 2#  Sb DKTC - 20x  SB bridge - 2x10  Hip abduction - 2x10  Shuttle DLP - 2x1', 5 black  Shuttle SLP - 2x1', 3 black  Slantboard stretch - 3x30"  Slantboard HR - 2x15  TKE - 3x10, 50#  Recumbent bicycle - 8'    Not performed:  SLR --> pain and discomfort. Discontinued    Manual therapy techniques for 00 minutes:    R patella mobilizations all planes  Scar mobilizations    Patient Education and Home " Exercises     Education provided:   -Education on condition, HEP, and POC.    Written Home Exercises Provided: Patient instructed to cont prior HEP. Exercises were reviewed and Modesta was able to demonstrate them prior to the end of the session. Modesta demonstrated good  understanding of the education provided. See EMR under Patient Instructions for exercises provided during therapy sessions.    ASSESSMENT     Patient demonstrates improvement in ability to sustain knee extension AROM by the end of session but very sore with knee flexion ROM    Modesta Is progressing well towards her goals.   Pt prognosis is Excellent.     Pt will continue to benefit from skilled outpatient physical therapy to address the deficits listed in the problem list box on initial evaluation, provide pt/family education and to maximize pt's level of independence in the home and community environment.     Pt's spiritual, cultural and educational needs considered and pt agreeable to plan of care and goals.     Anticipated barriers to physical therapy: transportation, long commute to clinic    Goals:  Short Term Goals: In 5 weeks:  1.I with HEP (Progressing, not met)  2.Patient to demo increased R knee flexion to 115 degrees or greater to ascend and descend steps comfortably. (Progressing, not met)  3.Patient to demo increase R LE strength to 4/5 MMT or greater to increase R knee and R hip stability. (Progressing, not met)  4.Patient to have decreased pain to 0/10 consistently. (Progressing, not met)  5.Patient to ambulate with normal amb pattern on even and uneven surfaces. (Progressing, not met)        Long Term Goals: In 10 weeks  1. Patient to perform daily activities including household chores without limitation. (Progressing, not met)  2. Patient to demonstrate good dynamic standing balance to participate in community and social activities safely. (Progressing, not met)  3. Patient to tolerate lifting moderate sized objects with proper technique  for shopping activities. (Progressing, not met)  4. Patient to score less than 20% impaired on the FOTO. (Progressing, not met)    PLAN     Continue PT current POC.    Jaylon Lunsford, PT

## 2022-12-15 ENCOUNTER — OFFICE VISIT (OUTPATIENT)
Dept: ORTHOPEDICS | Facility: CLINIC | Age: 57
End: 2022-12-15
Payer: COMMERCIAL

## 2022-12-15 VITALS — BODY MASS INDEX: 30.51 KG/M2 | HEIGHT: 62 IN | WEIGHT: 165.81 LBS

## 2022-12-15 DIAGNOSIS — Z96.652 HISTORY OF TOTAL LEFT KNEE REPLACEMENT: ICD-10-CM

## 2022-12-15 DIAGNOSIS — M16.11 ARTHRITIS OF RIGHT HIP: ICD-10-CM

## 2022-12-15 DIAGNOSIS — Z96.651 S/P REVISION OF TOTAL KNEE, RIGHT: Primary | ICD-10-CM

## 2022-12-15 DIAGNOSIS — Z96.642 HX OF TOTAL HIP ARTHROPLASTY, LEFT: ICD-10-CM

## 2022-12-15 PROCEDURE — 1159F MED LIST DOCD IN RCRD: CPT | Mod: CPTII,S$GLB,, | Performed by: ORTHOPAEDIC SURGERY

## 2022-12-15 PROCEDURE — 3008F PR BODY MASS INDEX (BMI) DOCUMENTED: ICD-10-PCS | Mod: CPTII,S$GLB,, | Performed by: ORTHOPAEDIC SURGERY

## 2022-12-15 PROCEDURE — 1159F PR MEDICATION LIST DOCUMENTED IN MEDICAL RECORD: ICD-10-PCS | Mod: CPTII,S$GLB,, | Performed by: ORTHOPAEDIC SURGERY

## 2022-12-15 PROCEDURE — 99024 PR POST-OP FOLLOW-UP VISIT: ICD-10-PCS | Mod: S$GLB,,, | Performed by: ORTHOPAEDIC SURGERY

## 2022-12-15 PROCEDURE — 1160F RVW MEDS BY RX/DR IN RCRD: CPT | Mod: CPTII,S$GLB,, | Performed by: ORTHOPAEDIC SURGERY

## 2022-12-15 PROCEDURE — 99999 PR PBB SHADOW E&M-EST. PATIENT-LVL III: ICD-10-PCS | Mod: PBBFAC,,, | Performed by: ORTHOPAEDIC SURGERY

## 2022-12-15 PROCEDURE — 99999 PR PBB SHADOW E&M-EST. PATIENT-LVL III: CPT | Mod: PBBFAC,,, | Performed by: ORTHOPAEDIC SURGERY

## 2022-12-15 PROCEDURE — 4010F PR ACE/ARB THEARPY RXD/TAKEN: ICD-10-PCS | Mod: CPTII,S$GLB,, | Performed by: ORTHOPAEDIC SURGERY

## 2022-12-15 PROCEDURE — 99024 POSTOP FOLLOW-UP VISIT: CPT | Mod: S$GLB,,, | Performed by: ORTHOPAEDIC SURGERY

## 2022-12-15 PROCEDURE — 4010F ACE/ARB THERAPY RXD/TAKEN: CPT | Mod: CPTII,S$GLB,, | Performed by: ORTHOPAEDIC SURGERY

## 2022-12-15 PROCEDURE — 3008F BODY MASS INDEX DOCD: CPT | Mod: CPTII,S$GLB,, | Performed by: ORTHOPAEDIC SURGERY

## 2022-12-15 PROCEDURE — 1160F PR REVIEW ALL MEDS BY PRESCRIBER/CLIN PHARMACIST DOCUMENTED: ICD-10-PCS | Mod: CPTII,S$GLB,, | Performed by: ORTHOPAEDIC SURGERY

## 2022-12-15 RX ORDER — TRAMADOL HYDROCHLORIDE 50 MG/1
50 TABLET ORAL EVERY 6 HOURS PRN
Qty: 30 TABLET | Refills: 0 | Status: SHIPPED | OUTPATIENT
Start: 2022-12-15 | End: 2023-05-25

## 2022-12-15 NOTE — PATIENT INSTRUCTIONS
Your left total knee revision much better stability than before surgery   We had to give you the thick is plastic that the make for that size.  Your x-ray from June showing that she have severe arthritis of the right hip and the left total hip looks in excellent alignment  You would like the right total hip replacement done in March   I will have you come and see me in February with repeat x-ray of her pelvis   As far as the left knee is concerned you still feeling it a little bit tight however you can weight on that sometimes people after a year year and have it loosens up and might be okay you can always have that left knee plastic exchange to  Return to see me in February with repeat x-ray of the pelvis  You will not need any cardiac clearance we will obtain the EKG and if that is still the same then no need to see cardiologist however there is changes then will arrange for you to see the cardiologist

## 2022-12-15 NOTE — PROGRESS NOTES
Subjective:     Patient ID: Modesta Camacho is a 57 y.o. female.    Chief Complaint: Post-op Evaluation of the Right Knee  06/27/2022  HPI:  Left TKA 05/07/2021 by Dr. Gandara, left KRZYSZTOF a by Dr. Gandara  Right TKA by Dr. Lan 2016  Patient is complaining that the left knee is tight unable to fully extend compared to the right side.  She is not having any pain with the left hip.  She has very mild discomfort in the left knee.  As far as the right hip is concerned she is having severe pain in the groin and she knows she has arthritis.  She stated the right TKA done by Dr. Lan is not painful but it gives out with difficulty doing stairs.  Overall pain is 4/10.  She still working at the [place].  She ambulates without any assistive devices.  She feels her left knee is not doing well and cannot straighten it out as much as she does in the right knee.  For some reason she was upset with Dr. Atkinson.  No fever no chills no shortness of breath or difficulty with chewing or swallowing.  She does have low back pain and she sees Dr. Weaver    07/14/2022  Left KRZYSZTOF by Dr. Gandara doing extremely well  Left TKA 05/07/2021 by Dr. Gandara and feels tight unable to hyperextend.  She has around maybe 2-3 degrees of contracture but she flexes really well.  At this time this is a very stable knee    Right TKA 2016 by Dr. Lan.  She is extremely loosen hyperextends and medial collateral ligaments seem loose.  We had asked her to get us the operative report from the [place] and she brought it with her.  We went over the operative note and it was vanguard knee by [employer].  The insert is 14 mm.  For 67 base plate  We did call the [employer] rep and he said they go up to 18 mm for that size and prosthesis.  Femoral component 57.5  Right hip arthritis I did tell her we will deal with that later on because is not bother her as much.  Pain is 5/10    12/15/2022   12/15/2022   Right total knee revision 9/20 9/22 12/15/2022   Right  total knee revision 09/27/2022 where we change the poly liner to a very thick 1 to achieve stability.  This total knee had been done by Dr. Lan prior to that and she was ligamentously loose.  She did fall after things given.  She feels that the right total knee poly exchange seems to have helped significantly with the instability.  She had severe pain in her hip on the right side she has arthritis by x-ray and diagnosis she seen Dr. Acevedo who gave her injection in the office in November2,2022 2nd 2022    She wants to have her total knee replacement done and she knows she needs to wait 3 months.    She would like to return to work to make some money and she is looking at having her right total hip replacement in March 2023     Patient stated the arthritis run in her family everybody in the family had a joint replacement including her brother her mother etcetera and she was diagnosed with osteoarthritis rather than rheumatoid arthritis.    Requesting tramadol  Pain is 6/10    We discussed the left knee which she stated still feels tight that if she waits a year year and half in might loosen up with time you can always have that poly exchange to a smaller 1 if needed in the future    Past Medical History:   Diagnosis Date    Abnormal EKG 2/5/2021    Anxiety     Hypertension     Osteoarthritis     Stage 2 chronic kidney disease 2/5/2021     Past Surgical History:   Procedure Laterality Date    ARTHROPLASTY OF HIP BY ANTERIOR APPROACH Left 7/31/2020    Procedure: ARTHROPLASTY, HIP, ANTERIOR APPROACH;  Surgeon: Xavi Gandara MD;  Location: [facility] OR;  Service: Orthopedics;  Laterality: Left;    gastric sleeve      JOINT REPLACEMENT Right 06/14/2016    knee    JOINT REPLACEMENT Left 05/20/2021    KNEE    JOINT REPLACEMENT Left 07/30/2020    HIP    KNEE ARTHROPLASTY Left 5/6/2021    Procedure: ARTHROPLASTY, KNEE;  Surgeon: Xavi Gandara MD;  Location: [facility] OR;  Service: Orthopedics;  Laterality: Left;    RESURFACING  OF PATELLA Right 9/27/2022    Procedure: RESURFACING, PATELLA;  Surgeon: Trey Clifford MD;  Location: [facility] OR;  Service: General;  Laterality: Right;    REVISION OF KNEE ARTHROPLASTY Right 9/27/2022    Procedure: REVISION, ARTHROPLASTY, KNEE;  Surgeon: Trey Clifford MD;  Location: [facility] OR;  Service: General;  Laterality: Right;    TUBAL LIGATION      UTERINE FIBROID EMBOLIZATION       Family History   Problem Relation Age of Onset    Hypertension Mother     Arthritis Mother     Diabetes Father     Heart disease Father     Depression Sister     Hypertension Sister     Arthritis Brother     Thyroid disease Son     Hypertension Son     Arthritis Maternal Grandmother     Heart disease Maternal Grandmother     Kidney disease Maternal Grandmother     Heart attack Maternal Grandfather     Stroke Paternal Grandmother     No Known Problems Paternal Grandfather     Eczema Son     Breast cancer Sister 47     Social History     Socioeconomic History    Marital status:      Spouse name: Pop Land    Number of children: 3   Occupational History    Occupation: patient access   Tobacco Use    Smoking status: Never     Passive exposure: Never    Smokeless tobacco: Never   Substance and Sexual Activity    Alcohol use: Yes     Comment: occasional: hold 72hrs. prior to surgery    Drug use: No    Sexual activity: Yes     Partners: Male     Birth control/protection: See Surgical Hx     Medication List with Changes/Refills   New Medications    TRAMADOL (ULTRAM) 50 MG TABLET    Take 1 tablet (50 mg total) by mouth every 6 (six) hours as needed for Pain.   Current Medications    ALPRAZOLAM (XANAX) 0.5 MG TABLET    Take 1 tablet (0.5 mg total) by mouth nightly as needed for Anxiety.    AMLODIPINE (NORVASC) 5 MG TABLET    Take 1 tablet (5 mg total) by mouth 2 (two) times daily.    CLONIDINE (CATAPRES) 0.1 MG TABLET    TAKE 1 TABLET BY MOUTH ONCE DAILY USE FOR SBP GREATER THAN 160 MMHG.    DOCUSATE SODIUM (COLACE) 100 MG  CAPSULE    Take 100 mg by mouth 2 (two) times daily as needed.    DULOXETINE (CYMBALTA) 60 MG CAPSULE    Take 1 capsule (60 mg total) by mouth 2 (two) times daily.    GABAPENTIN (NEURONTIN) 300 MG CAPSULE    Take 1 capsule (300 mg total) by mouth 3 (three) times daily.    METOPROLOL SUCCINATE (TOPROL-XL) 50 MG 24 HR TABLET    Take 2 tablets (100 mg total) by mouth nightly.    MULTIVIT-MIN/FERROUS FUMARATE (MULTI VITAMIN ORAL)    Take 2 tablets by mouth Daily.    ONDANSETRON (ZOFRAN-ODT) 4 MG TBDL    Take 1 tablet (4 mg total) by mouth every 8 (eight) hours as needed (nausea).    ONDANSETRON (ZOFRAN-ODT) 4 MG TBDL    Take 2 tablets (8 mg total) by mouth every 8 (eight) hours as needed.    OXYCODONE-ACETAMINOPHEN (PERCOCET)  MG PER TABLET    Take 1 tablet by mouth every 8 (eight) hours as needed for Pain.    SULFAMETHOXAZOLE-TRIMETHOPRIM 800-160MG (BACTRIM DS) 800-160 MG TAB    Take 1 tablet by mouth 2 (two) times daily.    TELMISARTAN (MICARDIS) 80 MG TAB    Take 1 tablet (80 mg total) by mouth once daily.    TRAMADOL (ULTRAM) 50 MG TABLET    Take 1 tablet (50 mg total) by mouth nightly.    VITAMIN D (VITAMIN D3) 1000 UNITS TAB    Take 1,000 Units by mouth once daily.     Review of patient's allergies indicates:   Allergen Reactions    Codeine Hives and Rash     Takes Tramadol and has never had an issue with SOB/swelling  Also tolerated hydromorphone 7/2020      Penicillin     Penicillins Hives     Pt tolerated cefazolin 7/2020     Review of Systems   Constitutional: Negative for decreased appetite.   HENT:  Negative for tinnitus.    Eyes:  Negative for double vision.   Cardiovascular:  Negative for chest pain.   Respiratory:  Negative for wheezing.    Hematologic/Lymphatic: Negative for bleeding problem.   Skin:  Negative for dry skin.   Musculoskeletal:  Positive for arthritis, back pain, joint pain and stiffness. Negative for gout and neck pain.   Gastrointestinal:  Negative for abdominal pain.    Genitourinary:  Negative for bladder incontinence.   Neurological:  Negative for numbness, paresthesias and sensory change.   Psychiatric/Behavioral:  Negative for altered mental status.      Objective:   Body mass index is 30.32 kg/m².  There were no vitals filed for this visit.       General    Constitutional: She is oriented to person, place, and time. She appears well-developed.   HENT:   Head: Atraumatic.   Eyes: EOM are normal.   Pulmonary/Chest: Effort normal.   Neurological: She is alert and oriented to person, place, and time.   Psychiatric: Judgment normal.         Ambulating without any assistive devices  Right hip pain to internal external rotation in the groin.  She has around 15° internal rotation around 25° external rotation with around may be 5° of contractures  The left total hip journal external rotation without pain in the groin.  Excellent range of motion  Pelvis is level  The sitting position  Hip flexors, abductors adductors quads and hamstrings ankle extensors and flexors were 5/5  Left TKA has around 3° of contracture and she flexes to 120°.  Slightly tight.  No defect in the patella or quadriceps tendon.  No erythema, not warm to touch.  Collaterals stable to varus and valgus stressing in extension and in flexion at 90°  Right knee surgical incision healed well.  She has 0-130 degrees of flexion.  She is not hyperextending anymore when preoperatively she was hyperextending approximately 10°.  She is stable in extension with slight opening if any approximately 2 mm on the medial side with the knee in extension.  She is stable in flexion and 90°.  No swelling no effusion no erythema  Ankle motion intact  Skin is warm to touch    Relevant imaging results reviewed and interpreted by me, discussed with the patient and / or family today   X-ray 11/28/2022 of the right knee showing the thick poly insert with the right total knee ingrowth prosthesis performed by Dr. Lan prior no evidence of  fracture  X-ray bilateral knees 06/27/2022 showing left TKA with very thick poly insert and the patella was not resurfaced with excellent alignment/posterior sacrificing knee system..  Right TKA/looks like [employer] with slightly oversized base plate on the tibia but overall alignment is intact and looks like it is ingrowth prosthesis with patella not resurfaced and posterior cruciate sparing knee  X-ray 11/24/2021 and 06/27/2022 left KRZYSZTOF in excellent alignment.  Right hip complete loss of joint space with marginal osteophyte and cystic changes consistent with severe arthritis of the right hip    X-ray 06/27/2022 of the lumbar spine showing spondylolisthesis L4 on 5 grade 1 and facet arthropathy.  No fracture seen  Assessment:     Encounter Diagnoses   Name Primary?    S/P revision of total knee, right Yes    History of total left knee replacement     Arthritis of right hip     Hx of total hip arthroplasty, left         Plan:   S/P revision of total knee, right  -     traMADoL (ULTRAM) 50 mg tablet; Take 1 tablet (50 mg total) by mouth every 6 (six) hours as needed for Pain.  Dispense: 30 tablet; Refill: 0    History of total left knee replacement  -     traMADoL (ULTRAM) 50 mg tablet; Take 1 tablet (50 mg total) by mouth every 6 (six) hours as needed for Pain.  Dispense: 30 tablet; Refill: 0    Arthritis of right hip  -     traMADoL (ULTRAM) 50 mg tablet; Take 1 tablet (50 mg total) by mouth every 6 (six) hours as needed for Pain.  Dispense: 30 tablet; Refill: 0    Hx of total hip arthroplasty, left  -     traMADoL (ULTRAM) 50 mg tablet; Take 1 tablet (50 mg total) by mouth every 6 (six) hours as needed for Pain.  Dispense: 30 tablet; Refill: 0       Patient Instructions   Your left total knee revision much better stability than before surgery   We had to give you the thick is plastic that the make for that size.  Your x-ray from June showing that she have severe arthritis of the right hip and the left total hip looks  in excellent alignment  You would like the right total hip replacement done in March   I will have you come and see me in February with repeat x-ray of her pelvis   As far as the left knee is concerned you still feeling it a little bit tight however you can weight on that sometimes people after a year year and have it loosens up and might be okay you can always have that left knee plastic exchange to  Return to see me in February with repeat x-ray of the pelvis  You will not need any cardiac clearance we will obtain the EKG and if that is still the same then no need to see cardiologist however there is changes then will arrange for you to see the cardiologist    Hyper extension in the right TKA with instability to varus valgus stressing as well to anterior drawer and that making the left TKA which is stiff a little bit uncomfortable to ambulate.  I did tell her you can make the left TKA looser by changing the poly insert into smaller 1 but that might keep her and get her into instability during stairs.  The right knee is quite ligamentously unstable and might need to make it tighter by going up on the poly insert to make it not as loose and allows doing stairs in a better situation and like there is you wound feel the instability and the difference as much.  Total knee replacements are not perfect they are 80% successful in decreasing pain increasing function but not perfect.  I think the right knee is the problem rather than the left which has mild tightness.  We can tighten the right knee if we know the brand and the sizes and we can discuss with the company if they have multiple in higher sizes to make it tighter knee.  In the future you would need also the right hip replaced      Operative report 06/09/2016 Dr. Lan [facility] size 57.5 femur, 14 mm polyethylene insert.  Tibial modular tray size 67, modular finned stem with screw system 80 x 10 mm, self taping screws 6.5 x  25    I did discuss with the patient that we would do not know what is going to happen until we doing the surgery.  If the poly exchange give her enough stability than things will be okay otherwise if we not happy with the stability then we might have to take everything out and do a complete revision.  She expressed understanding that we going in to make sure that the knee becomes stable.    We will deal with 1 problem at a time.  I did tell her that increasing the poly insert by 4 more mm might give her enough stability.  In the future she would need right total hip replacement.  As far as the left total knee you can always change the poly liner to a smaller 1 but we risk instability at that point.  Disclaimer: This note was prepared using a voice recognition system and is likely to have sound alike errors within the text.

## 2022-12-19 ENCOUNTER — PATIENT MESSAGE (OUTPATIENT)
Dept: ORTHOPEDICS | Facility: CLINIC | Age: 57
End: 2022-12-19
Payer: COMMERCIAL

## 2022-12-19 ENCOUNTER — PATIENT OUTREACH (OUTPATIENT)
Dept: ADMINISTRATIVE | Facility: HOSPITAL | Age: 57
End: 2022-12-19
Payer: COMMERCIAL

## 2022-12-19 NOTE — PROGRESS NOTES
Working the mammogram report: called patient to discuss scheduling a mammogram Patient agreed to schedule on 01/27/22

## 2022-12-20 ENCOUNTER — PATIENT MESSAGE (OUTPATIENT)
Dept: ORTHOPEDICS | Facility: CLINIC | Age: 57
End: 2022-12-20
Payer: COMMERCIAL

## 2022-12-22 ENCOUNTER — PATIENT MESSAGE (OUTPATIENT)
Dept: ORTHOPEDICS | Facility: CLINIC | Age: 57
End: 2022-12-22
Payer: COMMERCIAL

## 2022-12-23 ENCOUNTER — DOCUMENTATION ONLY (OUTPATIENT)
Dept: REHABILITATION | Facility: HOSPITAL | Age: 57
End: 2022-12-23

## 2022-12-23 NOTE — PROGRESS NOTES
Attempted to call patient regarding missed appointment today. Patient has a full voice mailbox and was unable to leave a message.

## 2023-02-13 ENCOUNTER — OFFICE VISIT (OUTPATIENT)
Dept: ORTHOPEDICS | Facility: CLINIC | Age: 58
End: 2023-02-13
Payer: COMMERCIAL

## 2023-02-13 VITALS — BODY MASS INDEX: 30.55 KG/M2 | HEIGHT: 62 IN | WEIGHT: 166 LBS

## 2023-02-13 DIAGNOSIS — Z96.651 S/P REVISION OF TOTAL KNEE, RIGHT: ICD-10-CM

## 2023-02-13 DIAGNOSIS — M16.11 ARTHRITIS OF RIGHT HIP: Primary | ICD-10-CM

## 2023-02-13 DIAGNOSIS — Z96.652 HISTORY OF TOTAL LEFT KNEE REPLACEMENT: ICD-10-CM

## 2023-02-13 DIAGNOSIS — Z96.642 HX OF TOTAL HIP ARTHROPLASTY, LEFT: ICD-10-CM

## 2023-02-13 PROCEDURE — 3008F BODY MASS INDEX DOCD: CPT | Mod: CPTII,S$GLB,, | Performed by: ORTHOPAEDIC SURGERY

## 2023-02-13 PROCEDURE — 99999 PR PBB SHADOW E&M-EST. PATIENT-LVL III: ICD-10-PCS | Mod: PBBFAC,,, | Performed by: ORTHOPAEDIC SURGERY

## 2023-02-13 PROCEDURE — 3008F PR BODY MASS INDEX (BMI) DOCUMENTED: ICD-10-PCS | Mod: CPTII,S$GLB,, | Performed by: ORTHOPAEDIC SURGERY

## 2023-02-13 PROCEDURE — 1159F PR MEDICATION LIST DOCUMENTED IN MEDICAL RECORD: ICD-10-PCS | Mod: CPTII,S$GLB,, | Performed by: ORTHOPAEDIC SURGERY

## 2023-02-13 PROCEDURE — 99213 PR OFFICE/OUTPT VISIT, EST, LEVL III, 20-29 MIN: ICD-10-PCS | Mod: S$GLB,,, | Performed by: ORTHOPAEDIC SURGERY

## 2023-02-13 PROCEDURE — 99999 PR PBB SHADOW E&M-EST. PATIENT-LVL III: CPT | Mod: PBBFAC,,, | Performed by: ORTHOPAEDIC SURGERY

## 2023-02-13 PROCEDURE — 1159F MED LIST DOCD IN RCRD: CPT | Mod: CPTII,S$GLB,, | Performed by: ORTHOPAEDIC SURGERY

## 2023-02-13 PROCEDURE — 99213 OFFICE O/P EST LOW 20 MIN: CPT | Mod: S$GLB,,, | Performed by: ORTHOPAEDIC SURGERY

## 2023-02-13 NOTE — PROGRESS NOTES
Subjective:     Patient ID: Modesta Camacho is a 57 y.o. female.    Chief Complaint: Pain of the Right Hip  06/27/2022  HPI:  Left TKA 05/07/2021 by Dr. Gandara, left KRZYSZTOF a by Dr. Gandara  Right TKA by Dr. Lan 2016  Patient is complaining that the left knee is tight unable to fully extend compared to the right side.  She is not having any pain with the left hip.  She has very mild discomfort in the left knee.  As far as the right hip is concerned she is having severe pain in the groin and she knows she has arthritis.  She stated the right TKA done by Dr. Lan is not painful but it gives out with difficulty doing stairs.  Overall pain is 4/10.  She still working at the [facility].  She ambulates without any assistive devices.  She feels her left knee is not doing well and cannot straighten it out as much as she does in the right knee.  For some reason she was upset with Dr. Atkinson.  No fever no chills no shortness of breath or difficulty with chewing or swallowing.  She does have low back pain and she sees Dr. Weaver    07/14/2022  Left KRZYSZTOF by Dr. Gandara doing extremely well  Left TKA 05/07/2021 by Dr. Gandara and feels tight unable to hyperextend.  She has around maybe 2-3 degrees of contracture but she flexes really well.  At this time this is a very stable knee    Right TKA 2016 by Dr. Lan.  She is extremely loosen hyperextends and medial collateral ligaments seem loose.  We had asked her to get us the operative report from the [facility] and she brought it with her.  We went over the operative note and it was vanguard knee by [employer].  The insert is 14 mm.  For 67 base plate  We did call the [employer] rep and he said they go up to 18 mm for that size and prosthesis.  Femoral component 57.5  Right hip arthritis I did tell her we will deal with that later on because is not bother her as much.  Pain is 5/10    12/15/2022     Right total knee revision 09/27/2022 where we change the poly liner to a very  thick 1 to achieve stability.  This total knee had been done by Dr. Lan prior to that and she was ligamentously loose.  She did fall after things given.  She feels that the right total knee poly exchange seems to have helped significantly with the instability.  She had severe pain in her hip on the right side she has arthritis by x-ray and diagnosis she seen Dr. Acevedo who gave her injection in the office in November2,2022 2nd 2022    She wants to have her total knee replacement done and she knows she needs to wait 3 months.    She would like to return to work to make some money and she is looking at having her right total hip replacement in March 2023     Patient stated the arthritis run in her family everybody in the family had a joint replacement including her brother her mother clarita and she was diagnosed with osteoarthritis rather than rheumatoid arthritis.    Requesting tramadol  Pain is 6/10    We discussed the left knee which she stated still feels tight that if she waits a year year and half in might loosen up with time you can always have that poly exchange to a smaller 1 if needed in the future      02/13/2023    Severe right hip arthritis.  Received intra-articular injection by Dr. Acevedo 11/02/2022 with good relief.  You want a proceed with the right total hip replacement sometime to words the end of May early June 2023 because you want a work to make some money.  You having hard time walking distances and we gave you a temporary disability parking sticker.  As far as the right total knee revision 09/27/2022 you said you not having any pain in that or neither the left KRZYSZTOF that was done by Dr. Gandara through the anterior approach.  As far as the left TKA done by Dr. Gandara you feel that you have pain still unable to fully extended and it is tender and painful.  I did tell her let us give it some more time roughly 18 months to see if things improve if not then she would need to have poly exchange to  a smaller 1.  No fever no chills no shortness of breath difficulty with chewing swallowing loss of bowel bladder control blurry vision double vision loss sense smell or taste     Past Medical History:   Diagnosis Date    Abnormal EKG 2/5/2021    Anxiety     Hypertension     Osteoarthritis     Stage 2 chronic kidney disease 2/5/2021     Past Surgical History:   Procedure Laterality Date    ARTHROPLASTY OF HIP BY ANTERIOR APPROACH Left 7/31/2020    Procedure: ARTHROPLASTY, HIP, ANTERIOR APPROACH;  Surgeon: Xavi Gandara MD;  Location: [facility] OR;  Service: Orthopedics;  Laterality: Left;    gastric sleeve      JOINT REPLACEMENT Right 06/14/2016    knee    JOINT REPLACEMENT Left 05/20/2021    KNEE    JOINT REPLACEMENT Left 07/30/2020    HIP    KNEE ARTHROPLASTY Left 5/6/2021    Procedure: ARTHROPLASTY, KNEE;  Surgeon: Xavi Gandara MD;  Location: [facility] OR;  Service: Orthopedics;  Laterality: Left;    RESURFACING OF PATELLA Right 9/27/2022    Procedure: RESURFACING, PATELLA;  Surgeon: Trey Clifford MD;  Location: [facility] OR;  Service: General;  Laterality: Right;    REVISION OF KNEE ARTHROPLASTY Right 9/27/2022    Procedure: REVISION, ARTHROPLASTY, KNEE;  Surgeon: Trey Clifford MD;  Location: [facility] OR;  Service: General;  Laterality: Right;    TUBAL LIGATION      UTERINE FIBROID EMBOLIZATION       Family History   Problem Relation Age of Onset    Hypertension Mother     Arthritis Mother     Diabetes Father     Heart disease Father     Depression Sister     Hypertension Sister     Arthritis Brother     Thyroid disease Son     Hypertension Son     Arthritis Maternal Grandmother     Heart disease Maternal Grandmother     Kidney disease Maternal Grandmother     Heart attack Maternal Grandfather     Stroke Paternal Grandmother     No Known Problems Paternal Grandfather     Eczema Son     Breast cancer Sister 47     Social History     Socioeconomic History    Marital status:      Spouse name: Pop Land     Number of children: 3   Occupational History    Occupation: patient access   Tobacco Use    Smoking status: Never     Passive exposure: Never    Smokeless tobacco: Never   Substance and Sexual Activity    Alcohol use: Yes     Comment: occasional: hold 72hrs. prior to surgery    Drug use: No    Sexual activity: Yes     Partners: Male     Birth control/protection: See Surgical Hx     Medication List with Changes/Refills   Current Medications    ALPRAZOLAM (XANAX) 0.5 MG TABLET    Take 1 tablet (0.5 mg total) by mouth nightly as needed for Anxiety.    AMLODIPINE (NORVASC) 5 MG TABLET    Take 1 tablet (5 mg total) by mouth 2 (two) times daily.    CLONIDINE (CATAPRES) 0.1 MG TABLET    TAKE 1 TABLET BY MOUTH ONCE DAILY USE FOR SBP GREATER THAN 160 MMHG.    DOCUSATE SODIUM (COLACE) 100 MG CAPSULE    Take 100 mg by mouth 2 (two) times daily as needed.    DULOXETINE (CYMBALTA) 60 MG CAPSULE    Take 1 capsule (60 mg total) by mouth 2 (two) times daily.    GABAPENTIN (NEURONTIN) 300 MG CAPSULE    Take 1 capsule (300 mg total) by mouth 3 (three) times daily.    METOPROLOL SUCCINATE (TOPROL-XL) 50 MG 24 HR TABLET    Take 2 tablets (100 mg total) by mouth nightly.    MULTIVIT-MIN/FERROUS FUMARATE (MULTI VITAMIN ORAL)    Take 2 tablets by mouth Daily.    ONDANSETRON (ZOFRAN-ODT) 4 MG TBDL    Take 1 tablet (4 mg total) by mouth every 8 (eight) hours as needed (nausea).    ONDANSETRON (ZOFRAN-ODT) 4 MG TBDL    Take 2 tablets (8 mg total) by mouth every 8 (eight) hours as needed.    OXYCODONE-ACETAMINOPHEN (PERCOCET)  MG PER TABLET    Take 1 tablet by mouth every 8 (eight) hours as needed for Pain.    SULFAMETHOXAZOLE-TRIMETHOPRIM 800-160MG (BACTRIM DS) 800-160 MG TAB    Take 1 tablet by mouth 2 (two) times daily.    TELMISARTAN (MICARDIS) 80 MG TAB    Take 1 tablet (80 mg total) by mouth once daily.    TRAMADOL (ULTRAM) 50 MG TABLET    Take 1 tablet (50 mg total) by mouth nightly.    TRAMADOL (ULTRAM) 50 MG TABLET    Take 1  tablet (50 mg total) by mouth every 6 (six) hours as needed for Pain.    VITAMIN D (VITAMIN D3) 1000 UNITS TAB    Take 1,000 Units by mouth once daily.     Review of patient's allergies indicates:   Allergen Reactions    Codeine Hives and Rash     Takes Tramadol and has never had an issue with SOB/swelling  Also tolerated hydromorphone 7/2020      Penicillin     Penicillins Hives     Pt tolerated cefazolin 7/2020     Review of Systems   Constitutional: Negative for decreased appetite.   HENT:  Negative for tinnitus.    Eyes:  Negative for double vision.   Cardiovascular:  Negative for chest pain.   Respiratory:  Negative for wheezing.    Hematologic/Lymphatic: Negative for bleeding problem.   Skin:  Negative for dry skin.   Musculoskeletal:  Positive for arthritis, back pain, joint pain and stiffness. Negative for gout and neck pain.   Gastrointestinal:  Negative for abdominal pain.   Genitourinary:  Negative for bladder incontinence.   Neurological:  Negative for numbness, paresthesias and sensory change.   Psychiatric/Behavioral:  Negative for altered mental status.      Objective:   Body mass index is 30.36 kg/m².  There were no vitals filed for this visit.       General    Constitutional: She is oriented to person, place, and time. She appears well-developed.   HENT:   Head: Atraumatic.   Eyes: EOM are normal.   Pulmonary/Chest: Effort normal.   Neurological: She is alert and oriented to person, place, and time.   Psychiatric: Judgment normal.         Ambulating without any assistive devices  Right hip pain to internal external rotation in the groin.  She has around 15° internal rotation around 25° external rotation with around may be 5° of contractures  The left total hip journal external rotation without pain in the groin.  Excellent range of motion  Pelvis is level  The sitting position  Hip flexors, abductors adductors quads and hamstrings ankle extensors and flexors were 5/5  Left TKA has around 3° of  contracture and she flexes to 120°.  Slightly tight.  No defect in the patella or quadriceps tendon.  No erythema, not warm to touch.  Collaterals stable to varus and valgus stressing in extension and in flexion at 90°  Right knee surgical incision healed well.  She has 0-130 degrees of flexion.  She is not hyperextending anymore when preoperatively she was hyperextending approximately 10°.  She is stable in extension with slight opening if any approximately 2 mm on the medial side with the knee in extension.  She is stable in flexion and 90°.  No swelling no effusion no erythema  Ankle motion intact  Skin is warm to touch    Relevant imaging results reviewed and interpreted by me, discussed with the patient and / or family today   X-ray 11/28/2022 of the right knee showing the thick poly insert with the right total knee ingrowth prosthesis performed by Dr. Lan prior no evidence of fracture  X-ray bilateral knees 06/27/2022 showing left TKA with very thick poly insert and the patella was not resurfaced with excellent alignment/posterior sacrificing knee system..  Right TKA/looks like [employer] with slightly oversized base plate on the tibia but overall alignment is intact and looks like it is ingrowth prosthesis with patella not resurfaced and posterior cruciate sparing knee  X-ray 11/24/2021 and 06/27/2022 left KRZYSZTOF in excellent alignment.  Right hip complete loss of joint space with marginal osteophyte and cystic changes consistent with severe arthritis of the right hip    X-ray 06/27/2022 of the lumbar spine showing spondylolisthesis L4 on 5 grade 1 and facet arthropathy.  No fracture seen  Assessment:     Encounter Diagnoses   Name Primary?    Arthritis of right hip Yes    S/P revision of total knee, right     Hx of total hip arthroplasty, left     History of total left knee replacement         Plan:   Arthritis of right hip    S/P revision of total knee, right    Hx of total hip arthroplasty, left    History of  total left knee replacement       Patient Instructions   Received right hip injection November 2nd by Dr. Acevedo and that seems to have helped quite a bit.  You need to avoid having repeat steroid injection because in might increase the risk of infection if surgeries performed less than 3 months from now   Use claims that you want a work until you have enough money before you can do your right total hip and you looking at the end of May early June   We did discuss that I go posteriorly not anteriorly to the hip replacement  As far as the right total knee revision you doing extremely well with that and it is not hurting   You stated the left total hip is not hurting but the left total knee seems to bother you in seems to be a little tight.  It will take up to 18 months before we know if we really need to go inside the left total knee and change the poly insert to a smaller 1.  I will see you several weeks before surgery to go over the risks involved and having surgery and go over the surgery itself       Operative report 06/09/2016 Dr. Lan implant [employer] vanguard cementless components size 57.5 femur, 14 mm polyethylene insert.  Tibial modular tray size 67, modular finned stem with screw system 80 x 10 mm, self taping screws 6.5 x 25    I did discuss with the patient that we would do not know what is going to happen until we doing the surgery.  If the poly exchange give her enough stability than things will be okay otherwise if we not happy with the stability then we might have to take everything out and do a complete revision.  She expressed understanding that we going in to make sure that the knee becomes stable.    Disclaimer: This note was prepared using a voice recognition system and is likely to have sound alike errors within the text.

## 2023-02-13 NOTE — PATIENT INSTRUCTIONS
Received right hip injection November 2nd by Dr. Acevedo and that seems to have helped quite a bit.  You need to avoid having repeat steroid injection because in might increase the risk of infection if surgeries performed less than 3 months from now   Use claims that you want a work until you have enough money before you can do your right total hip and you looking at the end of May early June   We did discuss that I go posteriorly not anteriorly to the hip replacement  As far as the right total knee revision you doing extremely well with that and it is not hurting   You stated the left total hip is not hurting but the left total knee seems to bother you in seems to be a little tight.  It will take up to 18 months before we know if we really need to go inside the left total knee and change the poly insert to a smaller 1.  I will see you several weeks before surgery to go over the risks involved and having surgery and go over the surgery itself

## 2023-02-17 NOTE — PROGRESS NOTES
The patient location is: home  The chief complaint leading to consultation is: low back pain, leg pain            Chief Pain Complaint:  Low back pain + RLE radiculopathy     Modesta Camacho presents today for follow-up visit.  Patient was last seen on 11/8/2022. Patient reports pain as 6/10 today. She reports persistent left knee pain, previously underwent left TKA with Dr. Gandara, states she has not seen him in over 2 years as he was dismissive of her continued pain after surgery. Sees Dr. Clifford for hip, but may also consider revision of left knee. Hx of right knee TKA with revision 9/22. Right knee is doing well. Last visit with Dr. Clifford on 02/13/2023. Considering right hip replacement in [place]. She also reports continued lower back pain, axial in nature, deep achy and without radiation into her lower extremities. Requesting refills of her Gabapentin, Nabumetone, and Tizanidine.    Interval History (11/08/2022):  Modesta Camacho presents today for follow-up visit.  Patient was last seen on 01/05/2022. Patient reports pain as 6/10 today. Underwent revision of her right knee with Dr. Clifford 09/27/2022. Referred back to [facility] for low back pain by Dr. Clifford. Patient reports pain as axial in nature across her lower lumbar spine without radiation into her lower extremities. Pain is constant and interferes with daily activities. Taking cymbalta and Gabapentin with minimal relief. Tizanidine helpful in the past, she is out of this medication. Not interested in injections at this time.    X-ray lumbar spine  FINDINGS:  AP, lateral and coned down radiographs of the lumbar spine demonstrate no evidence for acute fracture or dislocation.  Persistent grade 1 anterolisthesis of L4 with respect L5 is again identified.  Moderate to severe posterior facet hypertrophic changes are identified at L5/S1.  Remaining intervertebral disk spaces and vertebral body heights are well-preserved.  Visualized SI joints are  intact.  Patient is status post right hip arthroplasty.  Multiple calcified phleboliths are identified.  Multiple clips are identified in the epigastric region.     Impression:     Degenerative disease, most prominent at the lower lumbar spine.  Overall, no significant interval change.       Interval History (1/5/2022):  Modesta Camacho presents today for follow-up visit.  She is here today to review lumbar MRI.  She continues to have RLE pain.  She saw Dr. Rodriguez on 12/21/2021, who referred her to have an ultrasound-guided right hip injection by Dr. Acevedo.  She recently had this procedure with short-term pain relief, but it did not help the sciatica pain into the foot.  She was also referred to Dr. Clifford (Orthopedics) for evaluation for right hip replacement.  To note, patient reports history of left hip replacement and bilateral knee replacement in the past.  She has been doing physical therapy twice a week, but she does not feel this has been overly helpful.      Initial HPI (11/09/2021):  Modesta Camacho is a 56 y.o. female  who is presenting with a chief complaint of low back pain. The patient began experiencing this problem insidiously, and the pain has been gradually worsening over the past 6 month(s). The pain is described as throbbing, shooting, burning and electrical and is located in the right lumbar spine . Pain is intermittent and lasts hours. The pain radiates to right leg L4 distribution. The patient rates her pain a 7 out of ten and interferes with activities of daily living a 8 out of ten. Pain is exacerbated by flexion of the lumbar spine, and is improved by rest. Patient reports no prior trauma, prior hip surgery Left Hip [facility] 6/2020 [facility]. Right Knee Replacement 5/2021.     - pertinent negatives: No fever, No chills, No weight loss, No bladder dysfunction, No bowel dysfunction, No saddle anesthesia  - pertinent positives: right leg weakness    - medications, other  therapies tried (physical therapy, injections):     >> NSAIDs, Tylenol, Tramadol, gabapentin, zanaflex and robaxin    >> Has previously undergone Physical Therapy    >>  Injections:     - ultrasound-guided right hip injection by Dr. Acevedo on 12/21/2021 with short-term pain relief      Imaging / Labs / Studies (reviewed on 2/24/2023):    11/16/2021 MRI Lumbar Spine Without Contrast  FINDINGS:  Image quality is degraded by motion, lowering exam sensitivity and specificity.  Interpretation is offered within these confines.    Alignment: There is mild grade 1 anterolisthesis of L4 on L5.  There is slight leftward convexity of the lumbar spine.    Vertebrae: Diffuse loss normal T1 hyperintense marrow signal may be related to chronic anemia or other causes of red marrow hyperplasia, correlate clinically.  No evidence of fracture.    Discs: Normal height and signal.    Cord: Normal.  Conus terminates at T12-L1    Degenerative findings:    T12-L1:  No spinal canal or neuroforaminal stenosis.    L1-L2:  No spinal canal or neuroforaminal stenosis.    L2-L3: Mild generalized disc bulge. No spinal canal or neuroforaminal stenosis.    L3-L4: Mild generalized disc bulge.  Mild bilateral facet arthropathy. No spinal canal or neuroforaminal stenosis.    L4-L5: Severe bilateral facet arthropathy with some uncovering of the intervertebral disc and thickening of the ligamentum flavum.  Moderate bilateral neural foraminal narrowing.  No spinal canal stenosis.    L5-S1: Moderate bilateral facet arthropathy. No spinal canal or neuroforaminal stenosis..    Paraspinal muscles & soft tissues: Unremarkable.    Impression  1. Grade 1 anterolisthesis of L4 on L5 secondary to facet arthropathy with associated moderate bilateral neural foraminal narrowing at this level.  2. Other multilevel degenerative findings as above      7/31/20 X-Ray Hip 2 or 3 views Left  COMPARISON:  Prior radiograph from May 20, 2020.  FINDINGS:  Interval total left  hip arthroplasty with soft tissue air in the area.  There is normal alignment of the joint.  Densely calcified uterine leiomyomata are unchanged.  There also calcified phleboliths within the pelvis.  Impression  Normal alignment of the total left hip arthroplasty that has been placed in the interval.      11/24/2021 X-Ray Hip 2 or 3 views Right (with Pelvis when performed)  COMPARISON:  02/11/2021  FINDINGS:  There are multiple calcified fibroid seen projecting over the uterus.  There also multiple calcified pelvic phleboliths.  There is a single surgical clips seen to the right of the midline.  A left total hip arthroplasty is noted.  There is moderate to severe superolateral joint space narrowing involving the right hip with osteophyte formation noted.  Minimal subchondral cystic changes seen on either side of the right hip joint.      5/01/15 X-Ray Cervical Spine AP And Lateral  Findings:  The reversal of the lordotic curvature of the cervical spine secondary to moderate degenerative disk disease at C4-5 and C5-6.  Chronic anterior wedging of the C4 and C5 vertebral bodies.  Associated endplate sclerosis and uncovertebral joint  hypertrophy. The posterior elements are intact.  IMPRESSION:  No radiographic evidence of fracture or subluxation.  Moderate degenerative disk disease at C4-5 and C5-6.        Review of Systems:  CONSTITUTIONAL: patient denies any fever, chills, or weight loss  SKIN: patient denies any rash or itching  RESPIRATORY: patient denies having any shortness of breath  GASTROINTESTINAL: patient denies having any diarrhea, constipation, or bowel incontinence  GENITOURINARY: patient denies having any abnormal bladder function    MUSCULOSKELETAL:  - patient complains of the above noted pain/s (see chief pain complaint)    NEUROLOGICAL:   - pain as above  - strength in Lower extremities is intact, BILATERALLY  - sensation in Lower extremities is intact, BILATERALLY  - patient denies any loss of bowel  "or bladder control      PSYCHIATRIC: patient denies any change in mood    Other:  All other systems reviewed and are negative      Physical Exam:    Vitals:    02/21/23 1020   BP: (!) 172/93   BP Location: Left arm   Patient Position: Sitting   Pulse: 75   Weight: 74.2 kg (163 lb 9.3 oz)   Height: 5' 2" (1.575 m)       Body mass index is 29.92 kg/m².   (reviewed on 2/24/2023)     GENERAL: Well appearing, in no acute distress, alert and oriented x3.  Cooperative.  PSYCH:  Mood and affect appropriate.  SKIN: Skin color & texture with no obvious abnormalities.    HEAD/FACE:  Normocephalic, atraumatic.    PULM:  No difficulty breathing. No nasal flaring. No obvious wheezing.  NECK: ROM fairly preserved.  Supple.   BACK: Palpation over the lumbar paraspinous muscles causes pain. Pain with palpation over lumbar facets. Some pain with flexion. Some pain with extension.  Limited ROM secondary to pain reproduction.Pain with facet loading.   EXTREMITIES: No obvious deformities. Moving all extremities well, appears to have symmetric strength throughout. TTP along bilateral joint line of left knee, TTP along left pes bursa, decreased ROM secondary to pain and stiffness, no erythema, warmth or swelling. Right knee with very mild TTP along joint line and mild limitation to ROM  MUSCULOSKELETAL: No obvious atrophy abnormalities are noted.   NEURO: No obvious neurologic deficit.   GAIT: sitting.       Musculoskeletal:    Lumbar Spine    - Pain on flexion of lumbar spine Absent  - Straight Leg Raise:  Absent    - Pain on extension of lumbar spine Present  - TTP over the lumbar facet joints Present  - Lumbar facet loading Present    -Pain on palpation over the SI joint  Negative  - GALLO: Equivocal        Neuro:    Strength:  UE R/L: D: 5/5; B: 5/5; T: 5/5; WF: 5/5; WE: 5/5; IO: 5/5;  LE R/L: HF: 5/5, HE: 5/5, KF: 5/5; KE: 5/5; FE: 5/5; FF: 5/5    Extremity Reflexes: Brisk and symmetric throughout.      Extremity Sensory: Sensation " to pinprick and temperature symmetric. Proprioception intact.      Psych:  Mood and affect is appropriate      Assessment:    Modesta Camacho is a 57 y.o. year old female who is presenting with       ICD-10-CM ICD-9-CM    1. Primary osteoarthritis of right hip  M16.11 715.15 gabapentin (NEURONTIN) 300 MG capsule      tiZANidine (ZANAFLEX) 4 MG tablet      nabumetone (RELAFEN) 500 MG tablet      2. DDD (degenerative disc disease), lumbar  M51.36 722.52 gabapentin (NEURONTIN) 300 MG capsule      tiZANidine (ZANAFLEX) 4 MG tablet      nabumetone (RELAFEN) 500 MG tablet      3. Knee pain, unspecified chronicity, unspecified laterality  M25.569 719.46 gabapentin (NEURONTIN) 300 MG capsule      tiZANidine (ZANAFLEX) 4 MG tablet      nabumetone (RELAFEN) 500 MG tablet            Plan:  1. Interventional: Consider L3-5 MBB for axial back pain, patient reports she is not interested in injections at this time  - S/p ultrasound-guided right hip injection by Dr. Acevedo on 12/21/2021 with short-term pain relief.      2. Pharmacologic:   - D/c Topamax- dry mouth  -Continue Gabapentin 300 mg TID  - Continue Tramadol 50 mg Po BID PRN (60 tabs) - from Rheumatology.   - Continue Cymbalta 60 mg PO BID.   -Continue  Zanaflex 4mg to take 1/2 to 1 tab BID PRN (60 tablets). Patient understands this may cause drowsiness.  -Continue Nabumetone 500 mg BID prn pain      3. Rehabilitative: Encouraged ambulation. Continue physical therapy to help with strengthening, although patient reports not helping with pain.   Patient previously informed that we will fill out [facility] paperwork for physical therapy and procedures, but we will not fill out paperwork for disability.  Our goal is to improve pain to continue job responsibility.     4. Diagnostic: Lumbar MRI and x-ray reviewed with patient.    5. Follow up: 12 weeks or PRN, continue follow up with Dr. Clifford regarding hip and knee    - This condition does not require this patient to take time  off of work, and the primary goal of our Pain Management services is to improve the patient's functional capacity.   - I discussed the risks, benefits, and alternatives to potential treatment options. All questions and concerns were fully addressed today in clinic.       Barby Casillas PA-C    Disclaimer:  This note was prepared using voice recognition system and is likely to have sound alike errors that may have been overlooked even after proof reading.  Please call me with any questions.

## 2023-02-20 ENCOUNTER — TELEPHONE (OUTPATIENT)
Dept: PAIN MEDICINE | Facility: CLINIC | Age: 58
End: 2023-02-20
Payer: COMMERCIAL

## 2023-02-21 ENCOUNTER — OFFICE VISIT (OUTPATIENT)
Dept: PAIN MEDICINE | Facility: CLINIC | Age: 58
End: 2023-02-21
Payer: COMMERCIAL

## 2023-02-21 VITALS
WEIGHT: 163.56 LBS | DIASTOLIC BLOOD PRESSURE: 93 MMHG | HEIGHT: 62 IN | HEART RATE: 75 BPM | BODY MASS INDEX: 30.1 KG/M2 | SYSTOLIC BLOOD PRESSURE: 172 MMHG

## 2023-02-21 DIAGNOSIS — M25.569 KNEE PAIN, UNSPECIFIED CHRONICITY, UNSPECIFIED LATERALITY: ICD-10-CM

## 2023-02-21 DIAGNOSIS — M51.36 DDD (DEGENERATIVE DISC DISEASE), LUMBAR: ICD-10-CM

## 2023-02-21 DIAGNOSIS — M16.11 PRIMARY OSTEOARTHRITIS OF RIGHT HIP: Primary | ICD-10-CM

## 2023-02-21 PROCEDURE — 1160F RVW MEDS BY RX/DR IN RCRD: CPT | Mod: CPTII,S$GLB,, | Performed by: PHYSICIAN ASSISTANT

## 2023-02-21 PROCEDURE — 99999 PR PBB SHADOW E&M-EST. PATIENT-LVL III: ICD-10-PCS | Mod: PBBFAC,,, | Performed by: PHYSICIAN ASSISTANT

## 2023-02-21 PROCEDURE — 3008F BODY MASS INDEX DOCD: CPT | Mod: CPTII,S$GLB,, | Performed by: PHYSICIAN ASSISTANT

## 2023-02-21 PROCEDURE — 1159F PR MEDICATION LIST DOCUMENTED IN MEDICAL RECORD: ICD-10-PCS | Mod: CPTII,S$GLB,, | Performed by: PHYSICIAN ASSISTANT

## 2023-02-21 PROCEDURE — 99214 PR OFFICE/OUTPT VISIT, EST, LEVL IV, 30-39 MIN: ICD-10-PCS | Mod: S$GLB,,, | Performed by: PHYSICIAN ASSISTANT

## 2023-02-21 PROCEDURE — 3077F PR MOST RECENT SYSTOLIC BLOOD PRESSURE >= 140 MM HG: ICD-10-PCS | Mod: CPTII,S$GLB,, | Performed by: PHYSICIAN ASSISTANT

## 2023-02-21 PROCEDURE — 3008F PR BODY MASS INDEX (BMI) DOCUMENTED: ICD-10-PCS | Mod: CPTII,S$GLB,, | Performed by: PHYSICIAN ASSISTANT

## 2023-02-21 PROCEDURE — 1160F PR REVIEW ALL MEDS BY PRESCRIBER/CLIN PHARMACIST DOCUMENTED: ICD-10-PCS | Mod: CPTII,S$GLB,, | Performed by: PHYSICIAN ASSISTANT

## 2023-02-21 PROCEDURE — 3080F DIAST BP >= 90 MM HG: CPT | Mod: CPTII,S$GLB,, | Performed by: PHYSICIAN ASSISTANT

## 2023-02-21 PROCEDURE — 99999 PR PBB SHADOW E&M-EST. PATIENT-LVL III: CPT | Mod: PBBFAC,,, | Performed by: PHYSICIAN ASSISTANT

## 2023-02-21 PROCEDURE — 3077F SYST BP >= 140 MM HG: CPT | Mod: CPTII,S$GLB,, | Performed by: PHYSICIAN ASSISTANT

## 2023-02-21 PROCEDURE — 3080F PR MOST RECENT DIASTOLIC BLOOD PRESSURE >= 90 MM HG: ICD-10-PCS | Mod: CPTII,S$GLB,, | Performed by: PHYSICIAN ASSISTANT

## 2023-02-21 PROCEDURE — 99214 OFFICE O/P EST MOD 30 MIN: CPT | Mod: S$GLB,,, | Performed by: PHYSICIAN ASSISTANT

## 2023-02-21 PROCEDURE — 1159F MED LIST DOCD IN RCRD: CPT | Mod: CPTII,S$GLB,, | Performed by: PHYSICIAN ASSISTANT

## 2023-02-21 RX ORDER — GABAPENTIN 300 MG/1
300 CAPSULE ORAL 3 TIMES DAILY
Qty: 90 CAPSULE | Refills: 2 | Status: SHIPPED | OUTPATIENT
Start: 2023-02-21 | End: 2023-03-23

## 2023-02-21 RX ORDER — TIZANIDINE 4 MG/1
2-4 TABLET ORAL 2 TIMES DAILY PRN
Qty: 90 TABLET | Refills: 2 | Status: SHIPPED | OUTPATIENT
Start: 2023-02-21 | End: 2023-07-24 | Stop reason: SDUPTHER

## 2023-02-21 RX ORDER — TIZANIDINE 4 MG/1
2-4 TABLET ORAL 2 TIMES DAILY PRN
COMMUNITY
Start: 2023-02-08 | End: 2023-02-21 | Stop reason: SDUPTHER

## 2023-02-21 RX ORDER — NABUMETONE 500 MG/1
500 TABLET, FILM COATED ORAL 2 TIMES DAILY PRN
Qty: 60 TABLET | Refills: 1 | Status: SHIPPED | OUTPATIENT
Start: 2023-02-21 | End: 2023-03-23

## 2023-02-27 ENCOUNTER — TELEPHONE (OUTPATIENT)
Dept: INTERNAL MEDICINE | Facility: CLINIC | Age: 58
End: 2023-02-27
Payer: COMMERCIAL

## 2023-02-27 NOTE — TELEPHONE ENCOUNTER
Left vm to return call.     ----- Message from Leslie De La Cruz sent at 2/27/2023  4:27 PM CST -----  Type:  Sooner Apoointment Request    Caller is requesting a sooner appointment. Caller will  accept being placed on the waitlist and is requesting a message be sent to doctor.  Name of Caller:pt  When is the first available appointment?03/02/2023  Symptoms:sore throat and fever    Would the patient rather a call back or a response via [facility]? call  Best Call Back Number:[phone]  Additional Information:

## 2023-03-01 ENCOUNTER — HOSPITAL ENCOUNTER (OUTPATIENT)
Dept: CARDIOLOGY | Facility: HOSPITAL | Age: 58
Discharge: HOME | End: 2023-03-01
Attending: ORTHOPAEDIC SURGERY
Payer: COMMERCIAL

## 2023-03-01 ENCOUNTER — OFFICE VISIT (OUTPATIENT)
Dept: CARDIOLOGY | Facility: CLINIC | Age: 58
End: 2023-03-01
Payer: COMMERCIAL

## 2023-03-01 VITALS
OXYGEN SATURATION: 97 % | BODY MASS INDEX: 29.21 KG/M2 | BODY MASS INDEX: 29.03 KG/M2 | DIASTOLIC BLOOD PRESSURE: 76 MMHG | HEART RATE: 117 BPM | WEIGHT: 158.75 LBS | SYSTOLIC BLOOD PRESSURE: 106 MMHG | HEIGHT: 62 IN | WEIGHT: 158.75 LBS

## 2023-03-01 DIAGNOSIS — I10 ESSENTIAL HYPERTENSION: ICD-10-CM

## 2023-03-01 DIAGNOSIS — Z01.818 PREOP TESTING: ICD-10-CM

## 2023-03-01 DIAGNOSIS — Z98.84 HISTORY OF GASTRIC RESTRICTIVE SURGERY: ICD-10-CM

## 2023-03-01 DIAGNOSIS — R94.31 ABNORMAL EKG: ICD-10-CM

## 2023-03-01 DIAGNOSIS — Z01.810 PREOP CARDIOVASCULAR EXAM: Primary | ICD-10-CM

## 2023-03-01 PROCEDURE — 3008F BODY MASS INDEX DOCD: CPT | Mod: CPTII,S$GLB,, | Performed by: INTERNAL MEDICINE

## 2023-03-01 PROCEDURE — 3074F SYST BP LT 130 MM HG: CPT | Mod: CPTII,S$GLB,, | Performed by: INTERNAL MEDICINE

## 2023-03-01 PROCEDURE — 93010 ELECTROCARDIOGRAM REPORT: CPT | Mod: ,,, | Performed by: INTERNAL MEDICINE

## 2023-03-01 PROCEDURE — 93005 ELECTROCARDIOGRAM TRACING: CPT

## 2023-03-01 PROCEDURE — 99999 PR PBB SHADOW E&M-EST. PATIENT-LVL IV: CPT | Mod: PBBFAC,,, | Performed by: INTERNAL MEDICINE

## 2023-03-01 PROCEDURE — 93010 EKG 12-LEAD: ICD-10-PCS | Mod: ,,, | Performed by: INTERNAL MEDICINE

## 2023-03-01 PROCEDURE — 1159F MED LIST DOCD IN RCRD: CPT | Mod: CPTII,S$GLB,, | Performed by: INTERNAL MEDICINE

## 2023-03-01 PROCEDURE — 1159F PR MEDICATION LIST DOCUMENTED IN MEDICAL RECORD: ICD-10-PCS | Mod: CPTII,S$GLB,, | Performed by: INTERNAL MEDICINE

## 2023-03-01 PROCEDURE — 99214 OFFICE O/P EST MOD 30 MIN: CPT | Mod: S$GLB,,, | Performed by: INTERNAL MEDICINE

## 2023-03-01 PROCEDURE — 3008F PR BODY MASS INDEX (BMI) DOCUMENTED: ICD-10-PCS | Mod: CPTII,S$GLB,, | Performed by: INTERNAL MEDICINE

## 2023-03-01 PROCEDURE — 3074F PR MOST RECENT SYSTOLIC BLOOD PRESSURE < 130 MM HG: ICD-10-PCS | Mod: CPTII,S$GLB,, | Performed by: INTERNAL MEDICINE

## 2023-03-01 PROCEDURE — 99999 PR PBB SHADOW E&M-EST. PATIENT-LVL IV: ICD-10-PCS | Mod: PBBFAC,,, | Performed by: INTERNAL MEDICINE

## 2023-03-01 PROCEDURE — 3078F DIAST BP <80 MM HG: CPT | Mod: CPTII,S$GLB,, | Performed by: INTERNAL MEDICINE

## 2023-03-01 PROCEDURE — 99214 PR OFFICE/OUTPT VISIT, EST, LEVL IV, 30-39 MIN: ICD-10-PCS | Mod: S$GLB,,, | Performed by: INTERNAL MEDICINE

## 2023-03-01 PROCEDURE — 3078F PR MOST RECENT DIASTOLIC BLOOD PRESSURE < 80 MM HG: ICD-10-PCS | Mod: CPTII,S$GLB,, | Performed by: INTERNAL MEDICINE

## 2023-03-01 NOTE — PROGRESS NOTES
Subjective:   Patient ID:  Modesta Camacho is a 57 y.o. female who presents for follow up of Follow-up (Patient in for surgical clearance. )      HPI  2/5/2021  A 56 yo female with htn uncontrolled anxiety h/o ckd who is sedentary due to hip pain and knee pain. She has multiple issues with anxiety has h/o heart disease chf in her family she is very concerned she is very anxious she thinks she is dying she has had a cardiologist who was seeing her for tachycardia and used b blockers. She has been taken off lisinopril was placed on micardis . Her bp is not well controlled had an er visit yesterday had bp 203 systolic had chest pain her keg is abnormal t wave changes that has been threre since 200 at least her ekg was abnormal since 2015. She felt bad yesterday. She claims compliance with slat he r weight is increased despite having weight loss surgery in 2012 . She gets upset she gets blood pressure.   Stress echo in 2019 was normal. Done at [facility] by DR RUELAS.     2/24/2021  HERE FOR F/U BP IMPROVED  HAD ECHO THAT WAS  NORMAL LVF HAS TRICUSPID REGURGITATION WITH MILD PULMONARY HTN HER CARDIOLITE WAS NEGATIVE SHE SNORES LOUD AT NITE HER  WAKES HER UP SHE HAS DAYTIME FATIGUE AND SLEEPINESS. SHE HAS AN ABNORMAL EKG THAT HAS BEEN LIKE THAT SINCE 2015. I THINK ALTHOUGH IT IS ISCHEMIC IOT IS HTN RELATED .  SHE IS COMPLIANT WITH MEDS SHE NEEDS TO DO BETETR WITH SALT INTAKE . HAS TAKEN CLONIDINE ONMCE SINCE LAST VIST CHEST PAIBN REMARKABLY IMPROVED.      10/8/2021   Here for  f /u she is still in pain in her leg . Has sleep eval in 2012 has no sleep apnea still snores a lot . Her bp is not controlled . She has used clonidine she thinks pain has something to do with it . Ha sno leg swelling. She is trying to control her slat intake.      12/29/2021   Had steroid injection for hip pain her bp was elevated yesterday had clonidine. Has been taking meds regularily has muscle spasm . Has not been eating banana.no leg  swelling goes to physical therapy no orthopnea pnd chest pain had home sleep study has snoring some apnea episodes will get sleep team to see. She ahs referral.compliant with salt .     8/26/2022  Here fro f/u. She needs rt knee surgery her prosthesis is lose. Her bp meds adjusted her amlodipine dose up and her metoprolol 2 pills at nite. Her bp is elevated when she has pain. Has no new complaints she uses clonidine prn for elevated bp. She stays active physically has no chest pain or shortness of breath     3/1/2023  Had rt knee surgery in September no complication. Has rt hip pain needs hip replacement. Still active works has  a cold had steroid injection yesterday. Her chest feels tight. Has a cough has no sputum production.she is wheezing had fever yesterday.   Past Medical History:   Diagnosis Date    Abnormal EKG 2/5/2021    Anxiety     Hypertension     Osteoarthritis     Stage 2 chronic kidney disease 2/5/2021       Past Surgical History:   Procedure Laterality Date    ARTHROPLASTY OF HIP BY ANTERIOR APPROACH Left 7/31/2020    Procedure: ARTHROPLASTY, HIP, ANTERIOR APPROACH;  Surgeon: Xavi Gandara MD;  Location: [facility] OR;  Service: Orthopedics;  Laterality: Left;    gastric sleeve      JOINT REPLACEMENT Right 06/14/2016    knee    JOINT REPLACEMENT Left 05/20/2021    KNEE    JOINT REPLACEMENT Left 07/30/2020    HIP    KNEE ARTHROPLASTY Left 5/6/2021    Procedure: ARTHROPLASTY, KNEE;  Surgeon: Xavi Gandara MD;  Location: [facility] OR;  Service: Orthopedics;  Laterality: Left;    RESURFACING OF PATELLA Right 9/27/2022    Procedure: RESURFACING, PATELLA;  Surgeon: Trey Clifford MD;  Location: [facility] OR;  Service: General;  Laterality: Right;    REVISION OF KNEE ARTHROPLASTY Right 9/27/2022    Procedure: REVISION, ARTHROPLASTY, KNEE;  Surgeon: Trey Clifford MD;  Location: [facility] OR;  Service: General;  Laterality: Right;    TUBAL LIGATION      UTERINE FIBROID EMBOLIZATION         Social History      Tobacco Use    Smoking status: Never     Passive exposure: Never    Smokeless tobacco: Never   Substance Use Topics    Alcohol use: Yes     Comment: occasional: hold 72hrs. prior to surgery    Drug use: No       Family History   Problem Relation Age of Onset    Hypertension Mother     Arthritis Mother     Diabetes Father     Heart disease Father     Depression Sister     Hypertension Sister     Arthritis Brother     Thyroid disease Son     Hypertension Son     Arthritis Maternal Grandmother     Heart disease Maternal Grandmother     Kidney disease Maternal Grandmother     Heart attack Maternal Grandfather     Stroke Paternal Grandmother     No Known Problems Paternal Grandfather     Eczema Son     Breast cancer Sister 47       Current Outpatient Medications   Medication Sig    ALPRAZolam (XANAX) 0.5 MG tablet Take 1 tablet (0.5 mg total) by mouth nightly as needed for Anxiety.    amLODIPine (NORVASC) 5 MG tablet Take 1 tablet (5 mg total) by mouth 2 (two) times daily.    cloNIDine (CATAPRES) 0.1 MG tablet TAKE 1 TABLET BY MOUTH ONCE DAILY USE FOR SBP GREATER THAN 160 MMHG.    docusate sodium (COLACE) 100 MG capsule Take 100 mg by mouth 2 (two) times daily as needed.    DULoxetine (CYMBALTA) 60 MG capsule Take 1 capsule (60 mg total) by mouth 2 (two) times daily.    gabapentin (NEURONTIN) 300 MG capsule Take 1 capsule (300 mg total) by mouth 3 (three) times daily.    metoprolol succinate (TOPROL-XL) 50 MG 24 hr tablet Take 2 tablets (100 mg total) by mouth nightly.    multivit-min/ferrous fumarate (MULTI VITAMIN ORAL) Take 2 tablets by mouth Daily.    nabumetone (RELAFEN) 500 MG tablet Take 1 tablet (500 mg total) by mouth 2 (two) times daily as needed for Pain.    ondansetron (ZOFRAN-ODT) 4 MG TbDL Take 1 tablet (4 mg total) by mouth every 8 (eight) hours as needed (nausea).    ondansetron (ZOFRAN-ODT) 4 MG TbDL Take 2 tablets (8 mg total) by mouth every 8 (eight) hours as needed.    oxyCODONE-acetaminophen  (PERCOCET)  mg per tablet Take 1 tablet by mouth every 8 (eight) hours as needed for Pain.    sulfamethoxazole-trimethoprim 800-160mg (BACTRIM DS) 800-160 mg Tab Take 1 tablet by mouth 2 (two) times daily.    telmisartan (MICARDIS) 80 MG Tab Take 1 tablet (80 mg total) by mouth once daily.    tiZANidine (ZANAFLEX) 4 MG tablet Take 0.5-1 tablets (2-4 mg total) by mouth 2 (two) times daily as needed (pain).    traMADoL (ULTRAM) 50 mg tablet Take 1 tablet (50 mg total) by mouth nightly.    traMADoL (ULTRAM) 50 mg tablet Take 1 tablet (50 mg total) by mouth every 6 (six) hours as needed for Pain.    vitamin D (VITAMIN D3) 1000 units Tab Take 1,000 Units by mouth once daily.     No current facility-administered medications for this visit.     Current Outpatient Medications on File Prior to Visit   Medication Sig    ALPRAZolam (XANAX) 0.5 MG tablet Take 1 tablet (0.5 mg total) by mouth nightly as needed for Anxiety.    amLODIPine (NORVASC) 5 MG tablet Take 1 tablet (5 mg total) by mouth 2 (two) times daily.    cloNIDine (CATAPRES) 0.1 MG tablet TAKE 1 TABLET BY MOUTH ONCE DAILY USE FOR SBP GREATER THAN 160 MMHG.    docusate sodium (COLACE) 100 MG capsule Take 100 mg by mouth 2 (two) times daily as needed.    DULoxetine (CYMBALTA) 60 MG capsule Take 1 capsule (60 mg total) by mouth 2 (two) times daily.    gabapentin (NEURONTIN) 300 MG capsule Take 1 capsule (300 mg total) by mouth 3 (three) times daily.    metoprolol succinate (TOPROL-XL) 50 MG 24 hr tablet Take 2 tablets (100 mg total) by mouth nightly.    multivit-min/ferrous fumarate (MULTI VITAMIN ORAL) Take 2 tablets by mouth Daily.    nabumetone (RELAFEN) 500 MG tablet Take 1 tablet (500 mg total) by mouth 2 (two) times daily as needed for Pain.    ondansetron (ZOFRAN-ODT) 4 MG TbDL Take 1 tablet (4 mg total) by mouth every 8 (eight) hours as needed (nausea).    ondansetron (ZOFRAN-ODT) 4 MG TbDL Take 2 tablets (8 mg total) by mouth every 8 (eight) hours as  needed.    oxyCODONE-acetaminophen (PERCOCET)  mg per tablet Take 1 tablet by mouth every 8 (eight) hours as needed for Pain.    sulfamethoxazole-trimethoprim 800-160mg (BACTRIM DS) 800-160 mg Tab Take 1 tablet by mouth 2 (two) times daily.    telmisartan (MICARDIS) 80 MG Tab Take 1 tablet (80 mg total) by mouth once daily.    tiZANidine (ZANAFLEX) 4 MG tablet Take 0.5-1 tablets (2-4 mg total) by mouth 2 (two) times daily as needed (pain).    traMADoL (ULTRAM) 50 mg tablet Take 1 tablet (50 mg total) by mouth nightly.    traMADoL (ULTRAM) 50 mg tablet Take 1 tablet (50 mg total) by mouth every 6 (six) hours as needed for Pain.    vitamin D (VITAMIN D3) 1000 units Tab Take 1,000 Units by mouth once daily.     No current facility-administered medications on file prior to visit.     Review of patient's allergies indicates:   Allergen Reactions    Codeine Hives and Rash     Takes Tramadol and has never had an issue with SOB/swelling  Also tolerated hydromorphone 7/2020      Penicillin     Penicillins Hives     Pt tolerated cefazolin 7/2020      Review of Systems   Constitutional: Negative for diaphoresis, malaise/fatigue and weight gain.   HENT:  Negative for hoarse voice.    Eyes:  Negative for double vision and visual disturbance.   Cardiovascular:  Negative for chest pain, claudication, cyanosis, dyspnea on exertion, irregular heartbeat, leg swelling, near-syncope, orthopnea, palpitations, paroxysmal nocturnal dyspnea and syncope.   Respiratory:  Positive for cough, shortness of breath and wheezing. Negative for hemoptysis and snoring.    Hematologic/Lymphatic: Negative for bleeding problem. Does not bruise/bleed easily.   Skin:  Negative for color change and poor wound healing.   Musculoskeletal:  Positive for arthritis and joint pain. Negative for muscle cramps, muscle weakness and myalgias.   Gastrointestinal:  Negative for bloating, abdominal pain, change in bowel habit, diarrhea, heartburn, hematemesis,  hematochezia, melena and nausea.   Neurological:  Negative for excessive daytime sleepiness, dizziness, headaches, light-headedness, loss of balance, numbness and weakness.   Psychiatric/Behavioral:  Negative for memory loss. The patient does not have insomnia.    Allergic/Immunologic: Negative for hives.     Objective:   Physical Exam  Vitals and nursing note reviewed.   Constitutional:       General: She is not in acute distress.     Appearance: Normal appearance. She is well-developed. She is not ill-appearing.   HENT:      Head: Normocephalic and atraumatic.   Eyes:      General: No scleral icterus.     Pupils: Pupils are equal, round, and reactive to light.   Neck:      Thyroid: No thyromegaly.      Vascular: Normal carotid pulses. No carotid bruit, hepatojugular reflux or JVD.      Trachea: No tracheal deviation.   Cardiovascular:      Rate and Rhythm: Normal rate and regular rhythm.      Pulses: Normal pulses.      Heart sounds: Normal heart sounds. No murmur heard.    No friction rub. No gallop.   Pulmonary:      Effort: Pulmonary effort is normal. No respiratory distress.      Breath sounds: Rhonchi present. No wheezing or rales.   Chest:      Chest wall: No tenderness.   Abdominal:      General: Bowel sounds are normal. There is no abdominal bruit.      Palpations: Abdomen is soft. There is no hepatomegaly or pulsatile mass.      Tenderness: There is no abdominal tenderness.   Musculoskeletal:      Right shoulder: No deformity.      Cervical back: Normal range of motion and neck supple.      Right lower leg: No edema.      Left lower leg: No edema.   Skin:     General: Skin is warm and dry.      Findings: No erythema or rash.      Nails: There is no clubbing.   Neurological:      Mental Status: She is alert and oriented to person, place, and time.      Cranial Nerves: No cranial nerve deficit.      Coordination: Coordination normal.   Psychiatric:         Speech: Speech normal.         Behavior: Behavior  "normal.         Thought Content: Thought content normal.     Vitals:    03/01/23 0822 03/01/23 0823   BP: 110/70 106/76   BP Location: Right arm Left arm   Pulse: (!) 117    SpO2: 97%    Weight: 72 kg (158 lb 11.7 oz)    Height: 5' 2" (1.575 m)      No results found for: CHOL  Lab Results   Component Value Date    HDL 43 10/01/2019     Lab Results   Component Value Date    LDLCALC 89 10/01/2019     Lab Results   Component Value Date    TRIG 114 10/01/2019     No results found for: CHOLHDL    Chemistry        Component Value Date/Time     08/26/2022 1520    K 4.1 08/26/2022 1520     08/26/2022 1520    CO2 27 08/26/2022 1520    BUN 18 08/26/2022 1520    CREATININE 0.8 08/26/2022 1520     (H) 08/26/2022 1520        Component Value Date/Time    CALCIUM 10.0 08/26/2022 1520    ALKPHOS 74 08/26/2022 1520    AST 25 08/26/2022 1520    ALT 26 08/26/2022 1520    BILITOT 0.5 08/26/2022 1520    ESTGFRAFRICA 41 (A) 01/14/2022 1429    EGFRNONAA 36 (A) 01/14/2022 1429          No results found for: TSH  Lab Results   Component Value Date    INR 1.0 04/27/2021    INR 1.0 03/13/2020    INR 1.0 06/27/2015     Lab Results   Component Value Date    WBC 7.02 08/26/2022    HGB 12.9 08/26/2022    HCT 39.7 08/26/2022    MCV 94 08/26/2022     08/26/2022     BMP  Sodium   Date Value Ref Range Status   08/26/2022 143 136 - 145 mmol/L Final     Potassium   Date Value Ref Range Status   08/26/2022 4.1 3.5 - 5.1 mmol/L Final     Chloride   Date Value Ref Range Status   08/26/2022 109 95 - 110 mmol/L Final     CO2   Date Value Ref Range Status   08/26/2022 27 23 - 29 mmol/L Final     BUN   Date Value Ref Range Status   08/26/2022 18 6 - 20 mg/dL Final     Creatinine   Date Value Ref Range Status   08/26/2022 0.8 0.5 - 1.4 mg/dL Final     Calcium   Date Value Ref Range Status   08/26/2022 10.0 8.7 - 10.5 mg/dL Final     Anion Gap   Date Value Ref Range Status   08/26/2022 7 (L) 8 - 16 mmol/L Final     eGFR if African " American   Date Value Ref Range Status   01/14/2022 41 (A) >60 mL/min/1.73 m^2 Final     eGFR if non    Date Value Ref Range Status   01/14/2022 36 (A) >60 mL/min/1.73 m^2 Final     Comment:     Calculation used to obtain the estimated glomerular filtration  rate (eGFR) is the CKD-EPI equation.        CrCl cannot be calculated (Patient's most recent lab result is older than the maximum 7 days allowed.).    Assessment:     1. Preop cardiovascular exam    2. Abnormal EKG    3. Essential hypertension    4. History of gastric restrictive surgery    Has symptoms of uri evaluated at [facility] yesterday has no covid or fly. She is following with pcp.    Htn controlled continue same   Despite her joint limitation she is fairly active maintain an active job asymptomatic cardiovascular wise. Despite an abnormal ekg that is unchanged with negative w/u cardiac wise.    I see no cardiac contraindication for hip surgery.   She will follow with pcp for her cold.     Plan:     As per above   F/u in 6 months - 1 year earlier if any issues.

## 2023-03-02 ENCOUNTER — APPOINTMENT (OUTPATIENT)
Dept: RADIOLOGY | Facility: HOSPITAL | Age: 58
End: 2023-03-02
Attending: NURSE PRACTITIONER
Payer: COMMERCIAL

## 2023-03-02 ENCOUNTER — OFFICE VISIT (OUTPATIENT)
Dept: INTERNAL MEDICINE | Facility: CLINIC | Age: 58
End: 2023-03-02
Payer: COMMERCIAL

## 2023-03-02 VITALS
DIASTOLIC BLOOD PRESSURE: 62 MMHG | BODY MASS INDEX: 29.32 KG/M2 | HEART RATE: 80 BPM | RESPIRATION RATE: 18 BRPM | SYSTOLIC BLOOD PRESSURE: 100 MMHG | OXYGEN SATURATION: 97 % | WEIGHT: 159.31 LBS | TEMPERATURE: 98 F | HEIGHT: 62 IN

## 2023-03-02 DIAGNOSIS — J98.8 BACTERIAL RESPIRATORY INFECTION: ICD-10-CM

## 2023-03-02 DIAGNOSIS — B96.89 BACTERIAL RESPIRATORY INFECTION: ICD-10-CM

## 2023-03-02 DIAGNOSIS — J02.9 SORE THROAT: ICD-10-CM

## 2023-03-02 DIAGNOSIS — Z13.31 POSITIVE DEPRESSION SCREENING: Primary | ICD-10-CM

## 2023-03-02 LAB
CTP QC/QA: YES
MOLECULAR STREP A: NEGATIVE
POC MOLECULAR INFLUENZA A AGN: NEGATIVE
POC MOLECULAR INFLUENZA B AGN: NEGATIVE
SARS-COV-2 RDRP RESP QL NAA+PROBE: NEGATIVE

## 2023-03-02 PROCEDURE — 71046 X-RAY EXAM CHEST 2 VIEWS: CPT | Mod: TC,PO

## 2023-03-02 PROCEDURE — 87651 POCT STREP A MOLECULAR: ICD-10-PCS | Mod: QW,S$GLB,, | Performed by: NURSE PRACTITIONER

## 2023-03-02 PROCEDURE — 3008F BODY MASS INDEX DOCD: CPT | Mod: CPTII,S$GLB,, | Performed by: NURSE PRACTITIONER

## 2023-03-02 PROCEDURE — 3078F PR MOST RECENT DIASTOLIC BLOOD PRESSURE < 80 MM HG: ICD-10-PCS | Mod: CPTII,S$GLB,, | Performed by: NURSE PRACTITIONER

## 2023-03-02 PROCEDURE — 99999 PR PBB SHADOW E&M-EST. PATIENT-LVL V: ICD-10-PCS | Mod: PBBFAC,,, | Performed by: NURSE PRACTITIONER

## 2023-03-02 PROCEDURE — 3074F SYST BP LT 130 MM HG: CPT | Mod: CPTII,S$GLB,, | Performed by: NURSE PRACTITIONER

## 2023-03-02 PROCEDURE — 71046 XR CHEST PA AND LATERAL: ICD-10-PCS | Mod: 26,,, | Performed by: STUDENT IN AN ORGANIZED HEALTH CARE EDUCATION/TRAINING PROGRAM

## 2023-03-02 PROCEDURE — 87651 STREP A DNA AMP PROBE: CPT | Mod: QW,S$GLB,, | Performed by: NURSE PRACTITIONER

## 2023-03-02 PROCEDURE — 71046 X-RAY EXAM CHEST 2 VIEWS: CPT | Mod: 26,,, | Performed by: STUDENT IN AN ORGANIZED HEALTH CARE EDUCATION/TRAINING PROGRAM

## 2023-03-02 PROCEDURE — 87635 SARS-COV-2 COVID-19 AMP PRB: CPT | Mod: QW,S$GLB,, | Performed by: NURSE PRACTITIONER

## 2023-03-02 PROCEDURE — 87502 INFLUENZA DNA AMP PROBE: CPT | Mod: QW,S$GLB,, | Performed by: NURSE PRACTITIONER

## 2023-03-02 PROCEDURE — 3078F DIAST BP <80 MM HG: CPT | Mod: CPTII,S$GLB,, | Performed by: NURSE PRACTITIONER

## 2023-03-02 PROCEDURE — 3008F PR BODY MASS INDEX (BMI) DOCUMENTED: ICD-10-PCS | Mod: CPTII,S$GLB,, | Performed by: NURSE PRACTITIONER

## 2023-03-02 PROCEDURE — 99999 PR PBB SHADOW E&M-EST. PATIENT-LVL V: CPT | Mod: PBBFAC,,, | Performed by: NURSE PRACTITIONER

## 2023-03-02 PROCEDURE — 87502 POCT INFLUENZA A/B MOLECULAR: ICD-10-PCS | Mod: QW,S$GLB,, | Performed by: NURSE PRACTITIONER

## 2023-03-02 PROCEDURE — 99214 OFFICE O/P EST MOD 30 MIN: CPT | Mod: S$GLB,,, | Performed by: NURSE PRACTITIONER

## 2023-03-02 PROCEDURE — 99214 PR OFFICE/OUTPT VISIT, EST, LEVL IV, 30-39 MIN: ICD-10-PCS | Mod: S$GLB,,, | Performed by: NURSE PRACTITIONER

## 2023-03-02 PROCEDURE — 87635: ICD-10-PCS | Mod: QW,S$GLB,, | Performed by: NURSE PRACTITIONER

## 2023-03-02 PROCEDURE — 3074F PR MOST RECENT SYSTOLIC BLOOD PRESSURE < 130 MM HG: ICD-10-PCS | Mod: CPTII,S$GLB,, | Performed by: NURSE PRACTITIONER

## 2023-03-02 RX ORDER — PROMETHAZINE HYDROCHLORIDE AND DEXTROMETHORPHAN HYDROBROMIDE 6.25; 15 MG/5ML; MG/5ML
5 SYRUP ORAL
Qty: 118 ML | Refills: 0 | Status: SHIPPED | OUTPATIENT
Start: 2023-03-02 | End: 2023-03-12

## 2023-03-02 RX ORDER — DOXYCYCLINE 100 MG/1
100 CAPSULE ORAL EVERY 12 HOURS
Qty: 20 CAPSULE | Refills: 0 | Status: SHIPPED | OUTPATIENT
Start: 2023-03-02 | End: 2023-03-12

## 2023-03-02 NOTE — LETTER
March 2, 2023    Modesta Camacho  [address]  [place] [address]             [facility]  [facility] Medicine  [address] [place] [address]  [place] [address]  Phone: [phone]  Fax: [phone]   March 2, 2023     Patient: Modesta Camacho   YOB: 1965   Date of Visit: 3/2/2023       To Whom it May Concern:    Modesta Camacho was seen in my clinic on 3/2/2023. She may return to work on 03/06/2023 or sooner if symptoms improve.     Please excuse her from any work missed.    If you have any questions or concerns, please don't hesitate to call.    Sincerely,         Mary Rivera NP

## 2023-03-02 NOTE — PROGRESS NOTES
I have reviewed the positive depression score which warrants active treatment with psychotherapy and/or medications. Advised to see mental health provider.

## 2023-03-02 NOTE — PROGRESS NOTES
Subjective:       Patient ID: Modesta Camacho is a 57 y.o. female.    Chief Complaint: No chief complaint on file.    Patient presents with chest congestion, chest tightness, and wheezing.  Started 6 days ago.      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  No improvement.  Tried other medications over the counter--no improvement.      Positive depression screening.  Saw therapist -last year.  Taking Cymbalta.  Reports some family stress.  Denies HI/SI    Review of Systems   Constitutional:  Negative for chills, fatigue and fever.   HENT:  Positive for sore throat and voice change. Negative for nasal congestion and sinus pressure/congestion.    Respiratory:  Positive for cough, chest tightness and shortness of breath.    Cardiovascular:  Negative for chest pain and leg swelling.   Gastrointestinal:  Negative for abdominal pain, constipation and diarrhea.   Psychiatric/Behavioral:  Negative for agitation and confusion.        Objective:      Physical Exam  Vitals reviewed.   Constitutional:       Appearance: Normal appearance.   HENT:      Head: Normocephalic.      Right Ear: Tympanic membrane normal.      Left Ear: Tympanic membrane normal.      Mouth/Throat:      Pharynx: Posterior oropharyngeal erythema present.   Cardiovascular:      Rate and Rhythm: Normal rate and regular rhythm.   Pulmonary:      Effort: Pulmonary effort is normal.   Musculoskeletal:         General: Normal range of motion.   Skin:     General: Skin is warm.   Neurological:      General: No focal deficit present.      Mental Status: She is alert.   Psychiatric:         Mood and Affect: Mood normal.         Behavior: Behavior normal. Behavior is cooperative.       Assessment:       Problem List Items Addressed This Visit    None  Visit Diagnoses       Positive depression screening    -  Primary    I have reviewed the positive depression score which warrants active treatment with psychotherapy and/or medications.    Bacterial  respiratory infection        Relevant Medications    promethazine-dextromethorphan (PROMETHAZINE-DM) 6.25-15 mg/5 mL Syrp    doxycycline (VIBRAMYCIN) 100 MG Cap    Other Relevant Orders    POCT COVID-19 Rapid Screening (Completed)    POCT Influenza A/B Molecular (Completed)    X-Ray Chest PA And Lateral    Sore throat        Relevant Orders    POCT Strep A, Molecular (Completed)              Plan:           Positive depression screening  Comments:  I have reviewed the positive depression score which warrants active treatment with psychotherapy and/or medications.    Bacterial respiratory infection  -     POCT COVID-19 Rapid Screening  -     POCT Influenza A/B Molecular  -     X-Ray Chest PA And Lateral; Future; Expected date: 03/02/2023  -     promethazine-dextromethorphan (PROMETHAZINE-DM) 6.25-15 mg/5 mL Syrp; Take 5 mLs by mouth every 4 to 6 hours as needed (cough). Do not drive/operate heavy machinery while taking this medication.  Dispense: 118 mL; Refill: 0  -     doxycycline (VIBRAMYCIN) 100 MG Cap; Take 1 capsule (100 mg total) by mouth every 12 (twelve) hours. for 10 days  Dispense: 20 capsule; Refill: 0    Sore throat  -     POCT Strep A, Molecular         Advised to reach out to Dr. Sr and staff and get a follow up appointment.     Continue to monitor symptoms.     Follow up if no improvement.

## 2023-03-06 ENCOUNTER — TELEPHONE (OUTPATIENT)
Dept: INTERNAL MEDICINE | Facility: CLINIC | Age: 58
End: 2023-03-06
Payer: COMMERCIAL

## 2023-03-06 NOTE — TELEPHONE ENCOUNTER
----- Message from Anastasia Dunne sent at 3/6/2023  8:05 AM CST -----  Contact: Modesta Byers is needing something else called in as the antibiotic and cough syrup that were sent in on Thursday are not working. She states her symptoms are still the same as before. Please call her back at [phone].    Please send it to:   [id number] [address] - [facility]  [facility]  [place] [address]  Phone: [phone] Fax: [phone]

## 2023-03-06 NOTE — TELEPHONE ENCOUNTER
I returned a call back to the pt and advised via vm that I will forward her concerns to Mary regarding the medication given on last Thursday not helping and her requesting something else to be sent in to her pharmacy. Please advise. Thanks //kah

## 2023-03-10 ENCOUNTER — PATIENT MESSAGE (OUTPATIENT)
Dept: INTERNAL MEDICINE | Facility: CLINIC | Age: 58
End: 2023-03-10

## 2023-03-17 ENCOUNTER — TELEPHONE (OUTPATIENT)
Dept: ORTHOPEDICS | Facility: CLINIC | Age: 58
End: 2023-03-17
Payer: COMMERCIAL

## 2023-03-17 ENCOUNTER — PATIENT MESSAGE (OUTPATIENT)
Dept: ORTHOPEDICS | Facility: CLINIC | Age: 58
End: 2023-03-17
Payer: COMMERCIAL

## 2023-03-17 NOTE — TELEPHONE ENCOUNTER
I called and left message for patient to call back     Patient was cleared by dr. Rosario on 3/1/23     Patient does have appt with dr. Clifford on 5/15/23 -- we will need to talk with patient and get her f/u appt moved up so that we can sign consents and set up the rest of her surgery

## 2023-03-17 NOTE — TELEPHONE ENCOUNTER
----- Message from Ximena Bernstein sent at 3/17/2023 12:23 PM CDT -----  .Type:  Needs Medical Advice    Who Called: pt    Would the patient rather a call back or a response via [facility]? Call back  Best Call Back Number: [phone]  Additional Information:     Pt would like to know when will her surgery be scheduled either in May or June, pt stated she have not received a call for a follow up appointment nor the surgery and to please call pt back

## 2023-04-19 ENCOUNTER — PATIENT MESSAGE (OUTPATIENT)
Dept: ADMINISTRATIVE | Facility: HOSPITAL | Age: 58
End: 2023-04-19
Payer: COMMERCIAL

## 2023-04-21 ENCOUNTER — OFFICE VISIT (OUTPATIENT)
Dept: ORTHOPEDICS | Facility: CLINIC | Age: 58
End: 2023-04-21
Payer: COMMERCIAL

## 2023-04-21 VITALS — BODY MASS INDEX: 29.26 KG/M2 | HEIGHT: 62 IN | WEIGHT: 159 LBS

## 2023-04-21 DIAGNOSIS — M16.11 ARTHRITIS OF RIGHT HIP: ICD-10-CM

## 2023-04-21 DIAGNOSIS — M16.11 ARTHRITIS OF RIGHT HIP: Primary | ICD-10-CM

## 2023-04-21 DIAGNOSIS — Z01.818 PREOP TESTING: Primary | ICD-10-CM

## 2023-04-21 DIAGNOSIS — Z96.651 S/P REVISION OF TOTAL KNEE, RIGHT: ICD-10-CM

## 2023-04-21 DIAGNOSIS — Z96.642 HX OF TOTAL HIP ARTHROPLASTY, LEFT: ICD-10-CM

## 2023-04-21 DIAGNOSIS — Z96.652 HISTORY OF TOTAL LEFT KNEE REPLACEMENT: ICD-10-CM

## 2023-04-21 PROCEDURE — 3008F PR BODY MASS INDEX (BMI) DOCUMENTED: ICD-10-PCS | Mod: CPTII,S$GLB,, | Performed by: ORTHOPAEDIC SURGERY

## 2023-04-21 PROCEDURE — 1159F PR MEDICATION LIST DOCUMENTED IN MEDICAL RECORD: ICD-10-PCS | Mod: CPTII,S$GLB,, | Performed by: ORTHOPAEDIC SURGERY

## 2023-04-21 PROCEDURE — 3008F BODY MASS INDEX DOCD: CPT | Mod: CPTII,S$GLB,, | Performed by: ORTHOPAEDIC SURGERY

## 2023-04-21 PROCEDURE — 4010F ACE/ARB THERAPY RXD/TAKEN: CPT | Mod: CPTII,S$GLB,, | Performed by: ORTHOPAEDIC SURGERY

## 2023-04-21 PROCEDURE — 4010F PR ACE/ARB THEARPY RXD/TAKEN: ICD-10-PCS | Mod: CPTII,S$GLB,, | Performed by: ORTHOPAEDIC SURGERY

## 2023-04-21 PROCEDURE — 99213 OFFICE O/P EST LOW 20 MIN: CPT | Mod: S$GLB,,, | Performed by: ORTHOPAEDIC SURGERY

## 2023-04-21 PROCEDURE — 99999 PR PBB SHADOW E&M-EST. PATIENT-LVL III: ICD-10-PCS | Mod: PBBFAC,,, | Performed by: ORTHOPAEDIC SURGERY

## 2023-04-21 PROCEDURE — 99213 PR OFFICE/OUTPT VISIT, EST, LEVL III, 20-29 MIN: ICD-10-PCS | Mod: S$GLB,,, | Performed by: ORTHOPAEDIC SURGERY

## 2023-04-21 PROCEDURE — 99999 PR PBB SHADOW E&M-EST. PATIENT-LVL III: CPT | Mod: PBBFAC,,, | Performed by: ORTHOPAEDIC SURGERY

## 2023-04-21 PROCEDURE — 1159F MED LIST DOCD IN RCRD: CPT | Mod: CPTII,S$GLB,, | Performed by: ORTHOPAEDIC SURGERY

## 2023-04-21 RX ORDER — GABAPENTIN 300 MG/1
300 CAPSULE ORAL 3 TIMES DAILY
Qty: 90 CAPSULE | Refills: 11 | Status: SHIPPED | OUTPATIENT
Start: 2023-04-21 | End: 2023-06-06 | Stop reason: SDUPTHER

## 2023-04-21 NOTE — PROGRESS NOTES
Subjective:     Patient ID: Modesta Camacho is a 57 y.o. female.    Chief Complaint: Pain of the Right Hip  06/27/2022  HPI:  Left TKA 05/07/2021 by Dr. Gandara, left KRZYSZTOF a by Dr. Gandara  Right TKA by Dr. Lan 2016  Patient is complaining that the left knee is tight unable to fully extend compared to the right side.  She is not having any pain with the left hip.  She has very mild discomfort in the left knee.  As far as the right hip is concerned she is having severe pain in the groin and she knows she has arthritis.  She stated the right TKA done by Dr. Lan is not painful but it gives out with difficulty doing stairs.  Overall pain is 4/10.  She still working at the [facility].  She ambulates without any assistive devices.  She feels her left knee is not doing well and cannot straighten it out as much as she does in the right knee.  For some reason she was upset with Dr. Atkinson.  No fever no chills no shortness of breath or difficulty with chewing or swallowing.  She does have low back pain and she sees Dr. Weaver    07/14/2022  Left KRZYSZTOF by Dr. Gandara doing extremely well  Left TKA 05/07/2021 by Dr. Gandara and feels tight unable to hyperextend.  She has around maybe 2-3 degrees of contracture but she flexes really well.  At this time this is a very stable knee    Right TKA 2016 by Dr. Lan.  She is extremely loosen hyperextends and medial collateral ligaments seem loose.  We had asked her to get us the operative report from the [facility] and she brought it with her.  We went over the operative note and it was vanguard knee by [employer].  The insert is 14 mm.  For 67 base plate  We did call the [employer] rep and he said they go up to 18 mm for that size and prosthesis.  Femoral component 57.5  Right hip arthritis I did tell her we will deal with that later on because is not bother her as much.  Pain is 5/10    12/15/2022     Right total knee revision 09/27/2022 where we change the poly liner to a very  thick 1 to achieve stability.  This total knee had been done by Dr. Lan prior to that and she was ligamentously loose.  She did fall after things given.  She feels that the right total knee poly exchange seems to have helped significantly with the instability.  She had severe pain in her hip on the right side she has arthritis by x-ray and diagnosis she seen Dr. Acevedo who gave her injection in the office in November2,2022 2nd 2022    She wants to have her total knee replacement done and she knows she needs to wait 3 months.    She would like to return to work to make some money and she is looking at having her right total hip replacement in March 2023     Patient stated the arthritis run in her family everybody in the family had a joint replacement including her brother her mother clarita and she was diagnosed with osteoarthritis rather than rheumatoid arthritis.    Requesting tramadol  Pain is 6/10    We discussed the left knee which she stated still feels tight that if she waits a year year and half in might loosen up with time you can always have that poly exchange to a smaller 1 if needed in the future      02/13/2023    Severe right hip arthritis.  Received intra-articular injection by Dr. Acevedo 11/02/2022 with good relief.  You want a proceed with the right total hip replacement sometime to words the end of May early June 2023 because you want a work to make some money.  You having hard time walking distances and we gave you a temporary disability parking sticker.  As far as the right total knee revision 09/27/2022 you said you not having any pain in that or neither the left KRZYSZTOF that was done by Dr. Gandara through the anterior approach.  As far as the left TKA done by Dr. Gandara you feel that you have pain still unable to fully extended and it is tender and painful.  I did tell her let us give it some more time roughly 18 months to see if things improve if not then she would need to have poly exchange to  a smaller 1.  No fever no chills no shortness of breath difficulty with chewing swallowing loss of bowel bladder control blurry vision double vision loss sense smell or taste     04/21/2023   Right hip arthritis.  She is ready to proceed with total hip replacement.  I did tell her I go posterior approach not anterior.  We had some questions and answered.  I did tell her there is slight risk of dislocation of 3% chance.  She will be going home the same day this is considered outpatient surgery by the government.  If there is any problem then we will do extended recovery.  She will receive home health for couple weeks.  She would need help at home.  We briefly went over the pros and cons and she does remember it.  She said she is active on MyChart and she can read it.  She did complain that the left knee still little tight and tape painful and I did tell her give it some time once we know we have done all the joints then we can arrange for her to go and change the poly insert to a smaller 1 to achieve a little bit more extension  Pain 5/10    Past Medical History:   Diagnosis Date    Abnormal EKG 2/5/2021    Anxiety     Hypertension     Osteoarthritis     Stage 2 chronic kidney disease 2/5/2021     Past Surgical History:   Procedure Laterality Date    ARTHROPLASTY OF HIP BY ANTERIOR APPROACH Left 7/31/2020    Procedure: ARTHROPLASTY, HIP, ANTERIOR APPROACH;  Surgeon: Xavi Gandara MD;  Location: [facility] OR;  Service: Orthopedics;  Laterality: Left;    gastric sleeve      JOINT REPLACEMENT Right 06/14/2016    knee    JOINT REPLACEMENT Left 05/20/2021    KNEE    JOINT REPLACEMENT Left 07/30/2020    HIP    KNEE ARTHROPLASTY Left 5/6/2021    Procedure: ARTHROPLASTY, KNEE;  Surgeon: Xavi Gandara MD;  Location: [facility] OR;  Service: Orthopedics;  Laterality: Left;    RESURFACING OF PATELLA Right 9/27/2022    Procedure: RESURFACING, PATELLA;  Surgeon: Trey Clifford MD;  Location: [facility] OR;  Service: General;   Laterality: Right;    REVISION OF KNEE ARTHROPLASTY Right 9/27/2022    Procedure: REVISION, ARTHROPLASTY, KNEE;  Surgeon: Trey Cilfford MD;  Location: [facility] ER;  Service: General;  Laterality: Right;    TUBAL LIGATION      UTERINE FIBROID EMBOLIZATION       Family History   Problem Relation Age of Onset    Hypertension Mother     Arthritis Mother     Diabetes Father     Heart disease Father     Depression Sister     Hypertension Sister     Arthritis Brother     Thyroid disease Son     Hypertension Son     Arthritis Maternal Grandmother     Heart disease Maternal Grandmother     Kidney disease Maternal Grandmother     Heart attack Maternal Grandfather     Stroke Paternal Grandmother     No Known Problems Paternal Grandfather     Eczema Son     Breast cancer Sister 47     Social History     Socioeconomic History    Marital status:      Spouse name: Pop Ladn    Number of children: 3   Occupational History    Occupation: patient access   Tobacco Use    Smoking status: Never     Passive exposure: Never    Smokeless tobacco: Never   Substance and Sexual Activity    Alcohol use: Yes     Comment: occasional: hold 72hrs. prior to surgery    Drug use: No    Sexual activity: Yes     Partners: Male     Birth control/protection: See Surgical Hx     Medication List with Changes/Refills   New Medications    GABAPENTIN (NEURONTIN) 300 MG CAPSULE    Take 1 capsule (300 mg total) by mouth 3 (three) times daily.   Current Medications    ALPRAZOLAM (XANAX) 0.5 MG TABLET    Take 1 tablet (0.5 mg total) by mouth nightly as needed for Anxiety.    AMLODIPINE (NORVASC) 5 MG TABLET    Take 1 tablet (5 mg total) by mouth 2 (two) times daily.    CLONIDINE (CATAPRES) 0.1 MG TABLET    TAKE 1 TABLET BY MOUTH ONCE DAILY USE FOR SBP GREATER THAN 160 MMHG.    DOCUSATE SODIUM (COLACE) 100 MG CAPSULE    Take 100 mg by mouth 2 (two) times daily as needed.    DULOXETINE (CYMBALTA) 60 MG CAPSULE    Take 1 capsule (60 mg total) by mouth 2  (two) times daily.    GABAPENTIN (NEURONTIN) 300 MG CAPSULE    Take 1 capsule (300 mg total) by mouth 3 (three) times daily.    METOPROLOL SUCCINATE (TOPROL-XL) 50 MG 24 HR TABLET    Take 2 tablets (100 mg total) by mouth nightly.    MULTIVIT-MIN/FERROUS FUMARATE (MULTI VITAMIN ORAL)    Take 2 tablets by mouth Daily.    ONDANSETRON (ZOFRAN-ODT) 4 MG TBDL    Take 1 tablet (4 mg total) by mouth every 8 (eight) hours as needed (nausea).    ONDANSETRON (ZOFRAN-ODT) 4 MG TBDL    Take 2 tablets (8 mg total) by mouth every 8 (eight) hours as needed.    OXYCODONE-ACETAMINOPHEN (PERCOCET)  MG PER TABLET    Take 1 tablet by mouth every 8 (eight) hours as needed for Pain.    SULFAMETHOXAZOLE-TRIMETHOPRIM 800-160MG (BACTRIM DS) 800-160 MG TAB    Take 1 tablet by mouth 2 (two) times daily.    TELMISARTAN (MICARDIS) 80 MG TAB    Take 1 tablet (80 mg total) by mouth once daily.    TIZANIDINE (ZANAFLEX) 4 MG TABLET    Take 0.5-1 tablets (2-4 mg total) by mouth 2 (two) times daily as needed (pain).    TRAMADOL (ULTRAM) 50 MG TABLET    Take 1 tablet (50 mg total) by mouth nightly.    TRAMADOL (ULTRAM) 50 MG TABLET    Take 1 tablet (50 mg total) by mouth every 6 (six) hours as needed for Pain.    VITAMIN D (VITAMIN D3) 1000 UNITS TAB    Take 1,000 Units by mouth once daily.     Review of patient's allergies indicates:   Allergen Reactions    Codeine Hives and Rash     Takes Tramadol and has never had an issue with SOB/swelling  Also tolerated hydromorphone 7/2020      Penicillin     Penicillins Hives     Pt tolerated cefazolin 7/2020     Review of Systems   Constitutional: Negative for decreased appetite.   HENT:  Negative for tinnitus.    Eyes:  Negative for double vision.   Cardiovascular:  Negative for chest pain.   Respiratory:  Negative for wheezing.    Hematologic/Lymphatic: Negative for bleeding problem.   Skin:  Negative for dry skin.   Musculoskeletal:  Positive for arthritis, back pain, joint pain and stiffness.  Negative for gout and neck pain.   Gastrointestinal:  Negative for abdominal pain.   Genitourinary:  Negative for bladder incontinence.   Neurological:  Negative for numbness, paresthesias and sensory change.   Psychiatric/Behavioral:  Negative for altered mental status.      Objective:   Body mass index is 29.08 kg/m².  There were no vitals filed for this visit.       General    Constitutional: She is oriented to person, place, and time. She appears well-developed.   HENT:   Head: Atraumatic.   Eyes: EOM are normal.   Pulmonary/Chest: Effort normal.   Neurological: She is alert and oriented to person, place, and time.   Psychiatric: Judgment normal.         Ambulating without any assistive devices  Right hip pain to internal external rotation in the groin.  She has around 10° internal rotation around 25° external rotation with around may be 5° of contractures  The left total hip journal external rotation without pain in the groin.  Excellent range of motion  Pelvis is level  The sitting position  Hip flexors, abductors adductors quads and hamstrings ankle extensors and flexors were 5/5  Left TKA has around 3° of contracture and she flexes to 120°.  Slightly tight.  No defect in the patella or quadriceps tendon.  No erythema, not warm to touch.  Collaterals stable to varus and valgus stressing in extension and in flexion at 90°  Right knee surgical incision healed well.  She has 0-130 degrees of flexion.  She is not hyperextending anymore when preoperatively she was hyperextending approximately 10°.  She is stable in extension with slight opening if any approximately 2 mm on the medial side with the knee in extension.  She is stable in flexion and 90°.  No swelling no effusion no erythema  Ankle motion intact  Skin is warm to touch    Relevant imaging results reviewed and interpreted by me, discussed with the patient and / or family today   X-ray 11/28/2022 of the right knee showing the thick poly insert with the  right total knee ingrowth prosthesis performed by Dr. Lan prior no evidence of fracture  X-ray bilateral knees 06/27/2022 showing left TKA with very thick poly insert and the patella was not resurfaced with excellent alignment/posterior sacrificing knee system..  Right TKA/looks like [employer] with slightly oversized base plate on the tibia but overall alignment is intact and looks like it is ingrowth prosthesis with patella not resurfaced and posterior cruciate sparing knee  X-ray 11/24/2021 and 06/27/2022 left KRZYSZTOF in excellent alignment.  Right hip complete loss of joint space with marginal osteophyte and cystic changes consistent with severe arthritis of the right hip    X-ray 06/27/2022 of the lumbar spine showing spondylolisthesis L4 on 5 grade 1 and facet arthropathy.  No fracture seen  Assessment:     Encounter Diagnoses   Name Primary?    Arthritis of right hip Yes    S/P revision of total knee, right     Hx of total hip arthroplasty, left     History of total left knee replacement         Plan:   Arthritis of right hip  -     gabapentin (NEURONTIN) 300 MG capsule; Take 1 capsule (300 mg total) by mouth 3 (three) times daily.  Dispense: 90 capsule; Refill: 11    S/P revision of total knee, right    Hx of total hip arthroplasty, left    History of total left knee replacement         Patient Instructions   Will proceed with right total hip replacement  Procedure, common risks and benefits,alternatives discussed in details.All questions answered.Patient expressed understanding.Patient instructed to call for any questions that could arise in the future.    Most common Risks:  Infection  Leg-length discrepancy  Dislocation  Neuro-vascular injury ( resulting in loss of motor and sensory functions)  Pain  Blood clot  Fat clot  Loss of motion  Fracture of bone  Failure of procedure to achieve its intended purpose  Failure of hardware  Non-union or mal-union of bone  Malalignment  Death    Patient instructions for  joint replacement    Before surgery  1.  Shower with Hibiclens soap/antibacterial for 3 days prior to surgery to decrease risk of infection  2..  Stop all blood thinners/aspirin, Coumadin, warfarin, Plavix, Eliquis, Xarelto etc 7days prior to surgery.  Need to stop all anti-inflammatories and vitamins and natural products also 7 days prior  3. No eating or drinking after midnight the night before surgery.  I do recommend that you drink a bottle of Gatorade before midnight the night before  4.  Take Tylenol 650 mg the night prior to surgery    Ask physicians for prescription of Celebrex or Mobic if needed    After surgery at home  1.  Take Tylenol 650 mg 3 times a day for 14 days then as needed for mild pain  2.  Take gabapentin 300 mg nightly for 6 weeks  3.  Take Celebrex 200 mg or meloxicam 15 mg daily for 6 weeks unless having cardiac issues to take for 2 weeks only  4.  Must take aspirin 81 mg twice a day for 6 weeks unless you are on other blood thinners/Plavix, Eliquis, Xarelto, Coumadin etc  5.  Must wear compressive stockings for 6 weeks minimum to decrease the risk of blood clot and swelling  6.  Hydrocodone/Norco or oxycodone/Percocet will be prescribed to take every 6 hr as needed for breakthrough pain  7.  May apply ice on the knee to help with decreasing pain  8.  Keep wound dry for 2 weeks until stitches/staples removed than you will be allowed to shower in 24 hr and get the wound wet.  No soaking of the wound in the tub or swimming for 4 weeks after surgery  9.  No driving for 4 weeks unless specified by physician  10.  Avoid touching the wound or surrounding area /at least 2 inches on each side of the surgical incision until staples are removed/stitches   11.  May change the surgical dressing if extremely bloody or has drainage on it. May clean the wound with peroxide or Betadine and apply sterile dressing and Ace wrap over it  12.  Leave hospital dressing on for 3 days then may change by applying  sterile 4 x 4 and Ace wrap after cleaning with Betadine or peroxide.  May leave this dressing change to home health nurses        Operative report 06/09/2016 Dr. Lan implant [employer] vanguard cementless components size 57.5 femur, 14 mm polyethylene insert.  Tibial modular tray size 67, modular finned stem with screw system 80 x 10 mm, self taping screws 6.5 x 25    I did discuss with the patient that we would do not know what is going to happen until we doing the surgery.  If the poly exchange give her enough stability than things will be okay otherwise if we not happy with the stability then we might have to take everything out and do a complete revision.  She expressed understanding that we going in to make sure that the knee becomes stable.    Disclaimer: This note was prepared using a voice recognition system and is likely to have sound alike errors within the text.

## 2023-04-21 NOTE — PATIENT INSTRUCTIONS
Will proceed with right total hip replacement  Procedure, common risks and benefits,alternatives discussed in details.All questions answered.Patient expressed understanding.Patient instructed to call for any questions that could arise in the future.    Most common Risks:  Infection  Leg-length discrepancy  Dislocation  Neuro-vascular injury ( resulting in loss of motor and sensory functions)  Pain  Blood clot  Fat clot  Loss of motion  Fracture of bone  Failure of procedure to achieve its intended purpose  Failure of hardware  Non-union or mal-union of bone  Malalignment  Death    Patient instructions for joint replacement    Before surgery  1.  Shower with Hibiclens soap/antibacterial for 3 days prior to surgery to decrease risk of infection  2..  Stop all blood thinners/aspirin, Coumadin, warfarin, Plavix, Eliquis, Xarelto etc 7days prior to surgery.  Need to stop all anti-inflammatories and vitamins and natural products also 7 days prior  3. No eating or drinking after midnight the night before surgery.  I do recommend that you drink a bottle of Gatorade before midnight the night before  4.  Take Tylenol 650 mg the night prior to surgery    Ask physicians for prescription of Celebrex or Mobic if needed    After surgery at home  1.  Take Tylenol 650 mg 3 times a day for 14 days then as needed for mild pain  2.  Take gabapentin 300 mg nightly for 6 weeks  3.  Take Celebrex 200 mg or meloxicam 15 mg daily for 6 weeks unless having cardiac issues to take for 2 weeks only  4.  Must take aspirin 81 mg twice a day for 6 weeks unless you are on other blood thinners/Plavix, Eliquis, Xarelto, Coumadin etc  5.  Must wear compressive stockings for 6 weeks minimum to decrease the risk of blood clot and swelling  6.  Hydrocodone/Norco or oxycodone/Percocet will be prescribed to take every 6 hr as needed for breakthrough pain  7.  May apply ice on the knee to help with decreasing pain  8.  Keep wound dry for 2 weeks until  stitches/staples removed than you will be allowed to shower in 24 hr and get the wound wet.  No soaking of the wound in the tub or swimming for 4 weeks after surgery  9.  No driving for 4 weeks unless specified by physician  10.  Avoid touching the wound or surrounding area /at least 2 inches on each side of the surgical incision until staples are removed/stitches   11.  May change the surgical dressing if extremely bloody or has drainage on it. May clean the wound with peroxide or Betadine and apply sterile dressing and Ace wrap over it  12.  Leave hospital dressing on for 3 days then may change by applying sterile 4 x 4 and Ace wrap after cleaning with Betadine or peroxide.  May leave this dressing change to home health nurses

## 2023-05-03 ENCOUNTER — LAB VISIT (OUTPATIENT)
Dept: LAB | Facility: HOSPITAL | Age: 58
End: 2023-05-03
Attending: NURSE PRACTITIONER
Payer: COMMERCIAL

## 2023-05-03 DIAGNOSIS — G56.00 CARPAL TUNNEL SYNDROME, UNSPECIFIED LATERALITY: ICD-10-CM

## 2023-05-03 DIAGNOSIS — R76.8 POSITIVE ANA (ANTINUCLEAR ANTIBODY): ICD-10-CM

## 2023-05-03 LAB
ALBUMIN SERPL BCP-MCNC: 4.6 G/DL (ref 3.5–5.2)
ALP SERPL-CCNC: 75 U/L (ref 55–135)
ALT SERPL W/O P-5'-P-CCNC: 24 U/L (ref 10–44)
ANION GAP SERPL CALC-SCNC: 11 MMOL/L (ref 8–16)
AST SERPL-CCNC: 25 U/L (ref 10–40)
BASOPHILS # BLD AUTO: 0.06 K/UL (ref 0–0.2)
BASOPHILS NFR BLD: 1.2 % (ref 0–1.9)
BILIRUB SERPL-MCNC: 1 MG/DL (ref 0.1–1)
BILIRUB UR QL STRIP: NEGATIVE
BUN SERPL-MCNC: 24 MG/DL (ref 6–20)
C3 SERPL-MCNC: 134 MG/DL (ref 50–180)
C4 SERPL-MCNC: 28 MG/DL (ref 11–44)
CALCIUM SERPL-MCNC: 10.6 MG/DL (ref 8.7–10.5)
CCP AB SER IA-ACNC: 0.9 U/ML
CHLORIDE SERPL-SCNC: 106 MMOL/L (ref 95–110)
CLARITY UR: ABNORMAL
CO2 SERPL-SCNC: 23 MMOL/L (ref 23–29)
COLOR UR: YELLOW
CREAT SERPL-MCNC: 1.1 MG/DL (ref 0.5–1.4)
CREAT UR-MCNC: 148 MG/DL (ref 15–325)
DIFFERENTIAL METHOD: ABNORMAL
EOSINOPHIL # BLD AUTO: 0.7 K/UL (ref 0–0.5)
EOSINOPHIL NFR BLD: 13.8 % (ref 0–8)
ERYTHROCYTE [DISTWIDTH] IN BLOOD BY AUTOMATED COUNT: 13.6 % (ref 11.5–14.5)
EST. GFR  (NO RACE VARIABLE): 59 ML/MIN/1.73 M^2
GLUCOSE SERPL-MCNC: 81 MG/DL (ref 70–110)
GLUCOSE UR QL STRIP: NEGATIVE
HCT VFR BLD AUTO: 44.2 % (ref 37–48.5)
HGB BLD-MCNC: 14.3 G/DL (ref 12–16)
HGB UR QL STRIP: NEGATIVE
IMM GRANULOCYTES # BLD AUTO: 0 K/UL (ref 0–0.04)
IMM GRANULOCYTES NFR BLD AUTO: 0 % (ref 0–0.5)
KETONES UR QL STRIP: NEGATIVE
LEUKOCYTE ESTERASE UR QL STRIP: NEGATIVE
LYMPHOCYTES # BLD AUTO: 2.7 K/UL (ref 1–4.8)
LYMPHOCYTES NFR BLD: 53.2 % (ref 18–48)
MCH RBC QN AUTO: 31 PG (ref 27–31)
MCHC RBC AUTO-ENTMCNC: 32.4 G/DL (ref 32–36)
MCV RBC AUTO: 96 FL (ref 82–98)
MONOCYTES # BLD AUTO: 0.3 K/UL (ref 0.3–1)
MONOCYTES NFR BLD: 5 % (ref 4–15)
NEUTROPHILS # BLD AUTO: 1.4 K/UL (ref 1.8–7.7)
NEUTROPHILS NFR BLD: 26.8 % (ref 38–73)
NITRITE UR QL STRIP: NEGATIVE
NRBC BLD-RTO: 0 /100 WBC
PH UR STRIP: 5 [PH] (ref 5–8)
PLATELET # BLD AUTO: 309 K/UL (ref 150–450)
PMV BLD AUTO: 9.6 FL (ref 9.2–12.9)
POTASSIUM SERPL-SCNC: 4.7 MMOL/L (ref 3.5–5.1)
PROT SERPL-MCNC: 8.5 G/DL (ref 6–8.4)
PROT UR QL STRIP: NEGATIVE
PROT UR-MCNC: 11 MG/DL (ref 0–15)
PROT/CREAT UR: 0.07 MG/G{CREAT} (ref 0–0.2)
RBC # BLD AUTO: 4.62 M/UL (ref 4–5.4)
SODIUM SERPL-SCNC: 140 MMOL/L (ref 136–145)
SP GR UR STRIP: 1.02 (ref 1–1.03)
URN SPEC COLLECT METH UR: ABNORMAL
WBC # BLD AUTO: 5.15 K/UL (ref 3.9–12.7)

## 2023-05-03 PROCEDURE — 86160 COMPLEMENT ANTIGEN: CPT | Performed by: INTERNAL MEDICINE

## 2023-05-03 PROCEDURE — 86160 COMPLEMENT ANTIGEN: CPT | Mod: 59 | Performed by: INTERNAL MEDICINE

## 2023-05-03 PROCEDURE — 80053 COMPREHEN METABOLIC PANEL: CPT | Performed by: INTERNAL MEDICINE

## 2023-05-03 PROCEDURE — 86039 ANTINUCLEAR ANTIBODIES (ANA): CPT | Performed by: INTERNAL MEDICINE

## 2023-05-03 PROCEDURE — 86225 DNA ANTIBODY NATIVE: CPT | Performed by: INTERNAL MEDICINE

## 2023-05-03 PROCEDURE — 86235 NUCLEAR ANTIGEN ANTIBODY: CPT | Mod: 59 | Performed by: INTERNAL MEDICINE

## 2023-05-03 PROCEDURE — 36415 COLL VENOUS BLD VENIPUNCTURE: CPT | Performed by: INTERNAL MEDICINE

## 2023-05-03 PROCEDURE — 85025 COMPLETE CBC W/AUTO DIFF WBC: CPT | Performed by: INTERNAL MEDICINE

## 2023-05-03 PROCEDURE — 86200 CCP ANTIBODY: CPT | Performed by: INTERNAL MEDICINE

## 2023-05-03 PROCEDURE — 81003 URINALYSIS AUTO W/O SCOPE: CPT | Performed by: INTERNAL MEDICINE

## 2023-05-03 PROCEDURE — 82570 ASSAY OF URINE CREATININE: CPT | Performed by: INTERNAL MEDICINE

## 2023-05-03 PROCEDURE — 86038 ANTINUCLEAR ANTIBODIES: CPT | Performed by: INTERNAL MEDICINE

## 2023-05-04 LAB
ANA PATTERN 1: NORMAL
ANA SER QL IF: POSITIVE
ANA TITR SER IF: NORMAL {TITER}

## 2023-05-05 LAB
ANTI SM ANTIBODY: 0.1 RATIO (ref 0–0.99)
ANTI SM/RNP ANTIBODY: 0.14 RATIO (ref 0–0.99)
ANTI-SM INTERPRETATION: NEGATIVE
ANTI-SM/RNP INTERPRETATION: NEGATIVE
ANTI-SSA ANTIBODY: 0.07 RATIO (ref 0–0.99)
ANTI-SSA INTERPRETATION: NEGATIVE
ANTI-SSB ANTIBODY: 0.06 RATIO (ref 0–0.99)
ANTI-SSB INTERPRETATION: NEGATIVE
DSDNA AB SER-ACNC: NORMAL [IU]/ML

## 2023-05-16 ENCOUNTER — TELEPHONE (OUTPATIENT)
Dept: ORTHOPEDICS | Facility: CLINIC | Age: 58
End: 2023-05-16
Payer: COMMERCIAL

## 2023-05-16 NOTE — TELEPHONE ENCOUNTER
Left a message for the patient to give the office a all back             ----- Message from Ebony Carpio LPN sent at 5/16/2023  1:02 PM CDT -----  Contact: Modesta    ----- Message -----  From: Faye Mendenhall  Sent: 5/16/2023  12:45 PM CDT  To: Venessa Yang Staff    Patient is calling to speak with someone regarding paperwork. Patient reports  insurance is requesting information from 10/2022-12/2022 in reference to right knee surgery. Patient reports the deadline to send the paperwork  was on May 10, 2023 but request the paperwork to be faxed to [phone]  as soon as possible. The claim number is [id number]. Please give patient a call back at  [phone]  to assist.  Thank you,  RH

## 2023-05-24 ENCOUNTER — OFFICE VISIT (OUTPATIENT)
Dept: PAIN MEDICINE | Facility: CLINIC | Age: 58
End: 2023-05-24
Payer: COMMERCIAL

## 2023-05-24 ENCOUNTER — TELEPHONE (OUTPATIENT)
Dept: PAIN MEDICINE | Facility: CLINIC | Age: 58
End: 2023-05-24
Payer: COMMERCIAL

## 2023-05-24 DIAGNOSIS — F41.1 GAD (GENERALIZED ANXIETY DISORDER): ICD-10-CM

## 2023-05-24 PROCEDURE — 99214 OFFICE O/P EST MOD 30 MIN: CPT | Mod: 95,,, | Performed by: PHYSICIAN ASSISTANT

## 2023-05-24 PROCEDURE — 4010F PR ACE/ARB THEARPY RXD/TAKEN: ICD-10-PCS | Mod: CPTII,95,, | Performed by: PHYSICIAN ASSISTANT

## 2023-05-24 PROCEDURE — 1160F RVW MEDS BY RX/DR IN RCRD: CPT | Mod: CPTII,95,, | Performed by: PHYSICIAN ASSISTANT

## 2023-05-24 PROCEDURE — 1160F PR REVIEW ALL MEDS BY PRESCRIBER/CLIN PHARMACIST DOCUMENTED: ICD-10-PCS | Mod: CPTII,95,, | Performed by: PHYSICIAN ASSISTANT

## 2023-05-24 PROCEDURE — 4010F ACE/ARB THERAPY RXD/TAKEN: CPT | Mod: CPTII,95,, | Performed by: PHYSICIAN ASSISTANT

## 2023-05-24 PROCEDURE — 1159F MED LIST DOCD IN RCRD: CPT | Mod: CPTII,95,, | Performed by: PHYSICIAN ASSISTANT

## 2023-05-24 PROCEDURE — 99214 PR OFFICE/OUTPT VISIT, EST, LEVL IV, 30-39 MIN: ICD-10-PCS | Mod: 95,,, | Performed by: PHYSICIAN ASSISTANT

## 2023-05-24 PROCEDURE — 1159F PR MEDICATION LIST DOCUMENTED IN MEDICAL RECORD: ICD-10-PCS | Mod: CPTII,95,, | Performed by: PHYSICIAN ASSISTANT

## 2023-05-24 RX ORDER — METHYLPREDNISOLONE 4 MG/1
TABLET ORAL
Qty: 1 EACH | Refills: 0 | Status: ON HOLD | OUTPATIENT
Start: 2023-05-24 | End: 2023-06-07 | Stop reason: HOSPADM

## 2023-05-24 RX ORDER — DULOXETIN HYDROCHLORIDE 60 MG/1
60 CAPSULE, DELAYED RELEASE ORAL 2 TIMES DAILY
Qty: 60 CAPSULE | Refills: 2 | Status: SHIPPED | OUTPATIENT
Start: 2023-05-24 | End: 2023-07-24 | Stop reason: SDUPTHER

## 2023-05-24 NOTE — PROGRESS NOTES
The patient location is: home  The chief complaint leading to consultation is: low back pain, leg pain    Established Patient - TeleHealth Visit    The patient location is: [place]  The chief complaint leading to consultation is: chronic pain     Visit type: audiovisual    Face to Face time with patient: 10-15 minutes  20 minutes of total time spent on the encounter, which includes face to face time and non-face to face time preparing to see the patient (eg, review of tests), Obtaining and/or reviewing separately obtained history, Documenting clinical information in the electronic or other health record, Independently interpreting results (not separately reported) and communicating results to the patient/family/caregiver, or Care coordination (not separately reported).     Each patient to whom he or she provides medical services by telemedicine is:  (1) informed of the relationship between the physician and patient and the respective role of any other health care provider with respect to management of the patient; and (2) notified that he or she may decline to receive medical services by telemedicine and may withdraw from such care at any time.        Chief Pain Complaint:  Low back pain + RLE radiculopathy     Interval History (5/23/2023):  Modesta Camacho presents today via telemed for follow-up visit.  Patient was last seen on 2/21/2023. She reports worsening left knee pain. She reports it keeps feeling as though it is popping out of place. She reports it began swelling last Friday and since then she can hardly bear weight. She has been resting, icing, elevating, and wearing her brace which has reduced the swelling and inflammation somewhat. She has spoken to Dr. Clifford about this knee before, but it was previously replaced recently and Dr. Clifford is hesitant to go in again so soon. Patient reports pain as 8/10 today.  She is scheduled for right hip arthroplasty with Dr. Clifford on 06/07/2032.    Interval HPI  02/21/2023    Modesta Camacho presents today for follow-up visit.  Patient was last seen on 11/8/2022. Patient reports pain as 6/10 today. She reports persistent left knee pain, previously underwent left TKA with Dr. Gandara, states she has not seen him in over 2 years as he was dismissive of her continued pain after surgery. Sees Dr. Clifford for hip, but may also consider revision of left knee. Hx of right knee TKA with revision 9/22. Right knee is doing well. Last visit with Dr. Clifford on 02/13/2023. Considering right hip replacement in [place]. She also reports continued lower back pain, axial in nature, deep achy and without radiation into her lower extremities. Requesting refills of her Gabapentin, Nabumetone, and Tizanidine.    Interval History (11/08/2022):  Modesta Camacho presents today for follow-up visit.  Patient was last seen on 01/05/2022. Patient reports pain as 6/10 today. Underwent revision of her right knee with Dr. Clifford 09/27/2022. Referred back to [facility] for low back pain by Dr. Clifford. Patient reports pain as axial in nature across her lower lumbar spine without radiation into her lower extremities. Pain is constant and interferes with daily activities. Taking cymbalta and Gabapentin with minimal relief. Tizanidine helpful in the past, she is out of this medication. Not interested in injections at this time.    X-ray lumbar spine  FINDINGS:  AP, lateral and coned down radiographs of the lumbar spine demonstrate no evidence for acute fracture or dislocation.  Persistent grade 1 anterolisthesis of L4 with respect L5 is again identified.  Moderate to severe posterior facet hypertrophic changes are identified at L5/S1.  Remaining intervertebral disk spaces and vertebral body heights are well-preserved.  Visualized SI joints are intact.  Patient is status post right hip arthroplasty.  Multiple calcified phleboliths are identified.  Multiple clips are identified in the epigastric region.      Impression:     Degenerative disease, most prominent at the lower lumbar spine.  Overall, no significant interval change.       Interval History (1/5/2022):  Modesta Camacho presents today for follow-up visit.  She is here today to review lumbar MRI.  She continues to have RLE pain.  She saw Dr. Rodriguez on 12/21/2021, who referred her to have an ultrasound-guided right hip injection by Dr. Acevedo.  She recently had this procedure with short-term pain relief, but it did not help the sciatica pain into the foot.  She was also referred to Dr. Clifford (Orthopedics) for evaluation for right hip replacement.  To note, patient reports history of left hip replacement and bilateral knee replacement in the past.  She has been doing physical therapy twice a week, but she does not feel this has been overly helpful.      Initial HPI (11/09/2021):  Modesta Camacho is a 56 y.o. female  who is presenting with a chief complaint of low back pain. The patient began experiencing this problem insidiously, and the pain has been gradually worsening over the past 6 month(s). The pain is described as throbbing, shooting, burning and electrical and is located in the right lumbar spine . Pain is intermittent and lasts hours. The pain radiates to right leg L4 distribution. The patient rates her pain a 7 out of ten and interferes with activities of daily living a 8 out of ten. Pain is exacerbated by flexion of the lumbar spine, and is improved by rest. Patient reports no prior trauma, prior hip surgery [facility] Right Knee Replacement 5/2021.     - pertinent negatives: No fever, No chills, No weight loss, No bladder dysfunction, No bowel dysfunction, No saddle anesthesia  - pertinent positives: right leg weakness    - medications, other therapies tried (physical therapy, injections):     >> NSAIDs, Tylenol, Tramadol, gabapentin, zanaflex and robaxin    >> Has previously undergone Physical Therapy     >>  Injections:     - ultrasound-guided right hip injection by Dr. Acevedo on 12/21/2021 with short-term pain relief      Imaging / Labs / Studies (reviewed on 5/24/2023):    11/16/2021 MRI Lumbar Spine Without Contrast  FINDINGS:  Image quality is degraded by motion, lowering exam sensitivity and specificity.  Interpretation is offered within these confines.    Alignment: There is mild grade 1 anterolisthesis of L4 on L5.  There is slight leftward convexity of the lumbar spine.    Vertebrae: Diffuse loss normal T1 hyperintense marrow signal may be related to chronic anemia or other causes of red marrow hyperplasia, correlate clinically.  No evidence of fracture.    Discs: Normal height and signal.    Cord: Normal.  Conus terminates at T12-L1    Degenerative findings:    T12-L1:  No spinal canal or neuroforaminal stenosis.    L1-L2:  No spinal canal or neuroforaminal stenosis.    L2-L3: Mild generalized disc bulge. No spinal canal or neuroforaminal stenosis.    L3-L4: Mild generalized disc bulge.  Mild bilateral facet arthropathy. No spinal canal or neuroforaminal stenosis.    L4-L5: Severe bilateral facet arthropathy with some uncovering of the intervertebral disc and thickening of the ligamentum flavum.  Moderate bilateral neural foraminal narrowing.  No spinal canal stenosis.    L5-S1: Moderate bilateral facet arthropathy. No spinal canal or neuroforaminal stenosis..    Paraspinal muscles & soft tissues: Unremarkable.    Impression  1. Grade 1 anterolisthesis of L4 on L5 secondary to facet arthropathy with associated moderate bilateral neural foraminal narrowing at this level.  2. Other multilevel degenerative findings as above      7/31/20 X-Ray Hip 2 or 3 views Left  COMPARISON:  Prior radiograph from May 20, 2020.  FINDINGS:  Interval total left hip arthroplasty with soft tissue air in the area.  There is normal alignment of the joint.  Densely calcified uterine leiomyomata are unchanged.  There also calcified  phleboliths within the pelvis.  Impression  Normal alignment of the total left hip arthroplasty that has been placed in the interval.      11/24/2021 X-Ray Hip 2 or 3 views Right (with Pelvis when performed)  COMPARISON:  02/11/2021  FINDINGS:  There are multiple calcified fibroid seen projecting over the uterus.  There also multiple calcified pelvic phleboliths.  There is a single surgical clips seen to the right of the midline.  A left total hip arthroplasty is noted.  There is moderate to severe superolateral joint space narrowing involving the right hip with osteophyte formation noted.  Minimal subchondral cystic changes seen on either side of the right hip joint.      5/01/15 X-Ray Cervical Spine AP And Lateral  Findings:  The reversal of the lordotic curvature of the cervical spine secondary to moderate degenerative disk disease at C4-5 and C5-6.  Chronic anterior wedging of the C4 and C5 vertebral bodies.  Associated endplate sclerosis and uncovertebral joint  hypertrophy. The posterior elements are intact.  IMPRESSION:  No radiographic evidence of fracture or subluxation.  Moderate degenerative disk disease at C4-5 and C5-6.        Review of Systems:  CONSTITUTIONAL: patient denies any fever, chills, or weight loss  SKIN: patient denies any rash or itching  RESPIRATORY: patient denies having any shortness of breath  GASTROINTESTINAL: patient denies having any diarrhea, constipation, or bowel incontinence  GENITOURINARY: patient denies having any abnormal bladder function    MUSCULOSKELETAL:  - patient complains of the above noted pain/s (see chief pain complaint)    NEUROLOGICAL:   - pain as above  - strength in Lower extremities is intact, BILATERALLY  - sensation in Lower extremities is intact, BILATERALLY  - patient denies any loss of bowel or bladder control      PSYCHIATRIC: patient denies any change in mood    Other:  All other systems reviewed and are negative      Physical Exam:  Telemedicine  Exam  There were no vitals filed for this visit.  There is no height or weight on file to calculate BMI.   (reviewed on 5/25/2023)     GENERAL: Well appearing, in no acute distress, alert and oriented x3.  Cooperative.  PSYCH:  Mood and affect appropriate.  SKIN: Skin color & texture with no obvious abnormalities.    HEAD/FACE:  Normocephalic, atraumatic.    PULM:  No difficulty breathing. No nasal flaring. No obvious wheezing.  EXTREMITIES: No obvious deformities. Moving all extremities well, appears to have symmetric strength throughout. Moderate swelling to left knee, limited ROM secondary to pain and swelling  MUSCULOSKELETAL: No obvious atrophy abnormalities are noted.   NEURO: No obvious neurologic deficit.   GAIT: sitting.     Physical Exam: last in clinic visit:    There were no vitals filed for this visit.      There is no height or weight on file to calculate BMI.   (reviewed on 5/24/2023)     GENERAL: Well appearing, in no acute distress, alert and oriented x3.  Cooperative.  PSYCH:  Mood and affect appropriate.  SKIN: Skin color & texture with no obvious abnormalities.    HEAD/FACE:  Normocephalic, atraumatic.    PULM:  No difficulty breathing. No nasal flaring. No obvious wheezing.  NECK: ROM fairly preserved.  Supple.   BACK: Palpation over the lumbar paraspinous muscles causes pain. Pain with palpation over lumbar facets. Some pain with flexion. Some pain with extension.  Limited ROM secondary to pain reproduction.Pain with facet loading.   EXTREMITIES: No obvious deformities. Moving all extremities well, appears to have symmetric strength throughout. TTP along bilateral joint line of left knee, TTP along left pes bursa, decreased ROM secondary to pain and stiffness, no erythema, warmth or swelling. Right knee with very mild TTP along joint line and mild limitation to ROM  MUSCULOSKELETAL: No obvious atrophy abnormalities are noted.   NEURO: No obvious neurologic deficit.   GAIT: sitting.        Musculoskeletal:    Lumbar Spine    - Pain on flexion of lumbar spine Absent  - Straight Leg Raise:  Absent    - Pain on extension of lumbar spine Present  - TTP over the lumbar facet joints Present  - Lumbar facet loading Present    -Pain on palpation over the SI joint  Negative  - GALLO: Equivocal        Neuro:    Strength:  UE R/L: D: 5/5; B: 5/5; T: 5/5; WF: 5/5; WE: 5/5; IO: 5/5;  LE R/L: HF: 5/5, HE: 5/5, KF: 5/5; KE: 5/5; FE: 5/5; FF: 5/5    Extremity Reflexes: Brisk and symmetric throughout.      Extremity Sensory: Sensation to pinprick and temperature symmetric. Proprioception intact.      Psych:  Mood and affect is appropriate      Assessment:    Modesta Camacho is a 57 y.o. year old female who is presenting with     No diagnosis found.        Plan:  1. Interventional: Consider L3-5 MBB for axial back pain, patient reports she is not interested in injections at this time  - S/p ultrasound-guided right hip injection by Dr. Acevedo on 12/21/2021 with short-term pain relief.      2. Pharmacologic:   - D/c Topamax- dry mouth  -Continue Gabapentin 300 mg TID  - Continue Tramadol 50 mg Po BID PRN (60 tabs) - from Rheumatology.   - Continue Cymbalta 60 mg PO BID.   -Continue  Zanaflex 4mg to take 1/2 to 1 tab BID PRN (60 tablets). Patient understands this may cause drowsiness.  -Continue Nabumetone 500 mg BID prn pain  - Will prescribe Medrol Dose pack with instructions - for acute pain flare:  Day 1: 2 tablets before breakfast, 1 tablet after lunch, 1 tablet after dinner, 2 tablets at bedtime   Day 2: 1 tablet before breakfast, 1 tablet after lunch, 1 tablet after dinner, 2 tablets at bedtime   Day 3: 1 tablet before breakfast, 1 tablet after lunch, 1 tablet after dinner, 1 tablet at bedtime   Day 4: 1 tablet before breakfast, 1 tablet after lunch, 1 tablet at bedtime   Day 5: 1 tablet before breakfast, 1 tablet at bedtime   Day 6: 1 tablet before breakfast.      3. Rehabilitative: Encouraged ambulation.  Continue physical therapy to help with strengthening, although patient reports not helping with pain.   Patient previously informed that we will fill out [facility] paperwork for physical therapy and procedures, but we will not fill out paperwork for disability.  Our goal is to improve pain to continue job responsibility.     4. Diagnostic: Lumbar MRI and x-ray reviewed with patient.    5. Follow up: 12 weeks or PRN, continue follow up with Dr. Clifford regarding hip and knee    - This condition does not require this patient to take time off of work, and the primary goal of our Pain Management services is to improve the patient's functional capacity.   - I discussed the risks, benefits, and alternatives to potential treatment options. All questions and concerns were fully addressed today in clinic.       Barby Casillas PA-C    Disclaimer:  This note was prepared using voice recognition system and is likely to have sound alike errors that may have been overlooked even after proof reading.  Please call me with any questions.

## 2023-06-02 ENCOUNTER — TELEPHONE (OUTPATIENT)
Dept: PREADMISSION TESTING | Facility: HOSPITAL | Age: 58
End: 2023-06-02
Payer: COMMERCIAL

## 2023-06-02 NOTE — TELEPHONE ENCOUNTER
Pre op instructions reviewed with Pt: verbalized understanding.    Surgery is scheduled on 6/07/23. We will call you late afternoon the business day prior to surgery with your arrival time.    *Please report to the [facility] Lobby (1st Floor) located off of [facility] (2nd Entrance/Building on the left, in front of the flag pole).  Address: [address] [place] [address] [place]. [address]    Your Type & Screen appointment is scheduled on 6/06/23 @ [facility] ([facility]). Please DO NOT remove the red arm band applied.      INSTRUCTIONS IMPORTANT!!!  Do Not Eat, Drink, or Smoke after 12 midnight unless instructed otherwise by your Surgeon. OK to brush teeth, no gum, candy or mints!      *Take Only these medications with a small sip of water Morning of Surgery:  Amlodipine  Duloxetine  Gabapentin      ____  HOLD all vitamins, herbal supplements, aspirin products & NSAIDS 7 days prior to surgery, as these can thin the blood.  ____  Avoid Alcoholic beverages 3 days prior to surgery, as it can thin the blood.  ____  NO Acrylic/fake nails or nail polish worn day of surgery (specifically hand/arm & foot surgeries).  ____  NO powder, lotions, deodorants, oils or cream on body.  ____  Remove all jewelry & piercings prior to surgery.  ____  Remove Dentures, Hearing Aids & Contact Lens prior to surgery.  ____  Bring photo ID and insurance information to hospital (Leave Valuables at Home).  ____  If going home the same day, arrange for a ride home. You will not be able to drive for 24 hrs if Anesthesia was used.   ____  Females (ages 11-60): may need to give a urine sample the morning of surgery; please see Pre op Nurse prior to using the restroom.  ____  Males: Stop ED medications (Viagra, Cialis) 24 hrs prior to surgery.  ____  Wear clean, loose fitting clothing to allow for dressings/ bandages (elastic waistbands, sweat pants, pajamas pants, dresses, etc.)            Diabetic Patients: If you take diabetic  medication, do NOT take morning of surgery unless instructed by Doctor. Metformin to be stopped 24 hrs prior to surgery time. DO NOT take long-acting insulin the evening before surgery. Blood sugars will be checked in pre-op by Nurse.    Bathing Instructions:    -Shower with anti-bacterial soap 3 days prior to Surgery (Dial, Lever 2000, etc.)   - Use Hibiclens on surgical site the night before & morning of surgery (3-5 minutes, then rinse).   -Do not use Hibiclens on your face or genitals.   -Apply clean clothes after shower.  -Do not shave your face or body 2 days prior to surgery unless instructed otherwise by your Surgeon.    Physical Therapy: You will meet with a Physical Therapist day of surgery. You will need to bring your walker with you to the hospital if you already have one at home. You will be given a walker in Recovery if you do not receive one prior to surgery day. Please wear comfortable shoes, as you will be wearing them to walk post-surgery with the Therapist. Outpatient surgery patients will have a Physical Therapist following up with them at home after surgery (they will call you to set up schedule).    [facility] Vis[facility]/Ride Policy:  Only 2 adults allowed (over the age of 18) to accompany you to the Hospital. You Must have a ride home from a responsible adult that you know and trust. Medical Transport, Uber or Lyft can only be used if patient has a responsible adult to accompany them during ride home.    Discharge Instructions: You will receive Post-op/Discharge instructions by your Discharge Nurse prior to going home. Please call your Surgeon's office with any post-surgery questions/concerns @ [phone].    *If you are running late or have questions the morning of surgery, please call the Surgery Dept @ [phone]  *Insurance/ Financial Questions, please call [phone]  *If you need to Cancel/Reschedule Surgery, please call Surgeon office @ [phone].    Thank you,  -[facility] Pre  Admit Dept.  [phone] or [phone]  M-F 7:30 am - 4:30 pm (Closed Major Holidays)

## 2023-06-06 ENCOUNTER — TELEPHONE (OUTPATIENT)
Dept: PREADMISSION TESTING | Facility: HOSPITAL | Age: 58
End: 2023-06-06
Payer: COMMERCIAL

## 2023-06-06 ENCOUNTER — LAB VISIT (OUTPATIENT)
Dept: LAB | Facility: HOSPITAL | Age: 58
End: 2023-06-06
Attending: ORTHOPAEDIC SURGERY
Payer: COMMERCIAL

## 2023-06-06 ENCOUNTER — ANESTHESIA EVENT (OUTPATIENT)
Dept: SURGERY | Facility: HOSPITAL | Age: 58
End: 2023-06-06
Payer: COMMERCIAL

## 2023-06-06 DIAGNOSIS — I10 ESSENTIAL HYPERTENSION: ICD-10-CM

## 2023-06-06 DIAGNOSIS — Z01.818 PREOP TESTING: ICD-10-CM

## 2023-06-06 DIAGNOSIS — M16.11 ARTHRITIS OF RIGHT HIP: ICD-10-CM

## 2023-06-06 LAB
ABO + RH BLD: NORMAL
BLD GP AB SCN CELLS X3 SERPL QL: NORMAL
SPECIMEN OUTDATE: NORMAL

## 2023-06-06 PROCEDURE — 36415 COLL VENOUS BLD VENIPUNCTURE: CPT | Performed by: ORTHOPAEDIC SURGERY

## 2023-06-06 PROCEDURE — 86900 BLOOD TYPING SEROLOGIC ABO: CPT | Performed by: ORTHOPAEDIC SURGERY

## 2023-06-06 RX ORDER — GABAPENTIN 300 MG/1
300 CAPSULE ORAL 3 TIMES DAILY
Qty: 90 CAPSULE | Refills: 11 | Status: SHIPPED | OUTPATIENT
Start: 2023-06-06 | End: 2024-01-11 | Stop reason: SDUPTHER

## 2023-06-06 NOTE — TELEPHONE ENCOUNTER
Called and spoke with pt about the following:     Please arrive to [facility] ([facility]) at 0530 am on 6/7/23 for your scheduled procedure.  Address: [address] [place] [address] [place]. [address] (2nd Building on left, 1st Floor Lobby)  >>>NO eating or drinking after midnight unless instructed otherwise by your Surgeon<<<    Thank you,  -[facility] Pre Admit Testing Dept.  Mon-Fri 8 am - 4 pm [phone]

## 2023-06-07 ENCOUNTER — ANESTHESIA (OUTPATIENT)
Dept: SURGERY | Facility: HOSPITAL | Age: 58
End: 2023-06-07
Payer: COMMERCIAL

## 2023-06-07 ENCOUNTER — HOSPITAL ENCOUNTER (OUTPATIENT)
Facility: HOSPITAL | Age: 58
Discharge: HOME | End: 2023-06-07
Attending: ORTHOPAEDIC SURGERY | Admitting: ORTHOPAEDIC SURGERY
Payer: COMMERCIAL

## 2023-06-07 DIAGNOSIS — M16.12 ARTHRITIS OF LEFT HIP: ICD-10-CM

## 2023-06-07 DIAGNOSIS — M16.11 ARTHRITIS OF RIGHT HIP: Primary | ICD-10-CM

## 2023-06-07 LAB
HCT VFR BLD AUTO: 43.2 % (ref 37–48.5)
HGB BLD-MCNC: 13.9 G/DL (ref 12–16)

## 2023-06-07 PROCEDURE — 25000003 PHARM REV CODE 250: Performed by: ORTHOPAEDIC SURGERY

## 2023-06-07 PROCEDURE — 85018 HEMOGLOBIN: CPT | Performed by: ORTHOPAEDIC SURGERY

## 2023-06-07 PROCEDURE — 36000710: Performed by: ORTHOPAEDIC SURGERY

## 2023-06-07 PROCEDURE — 27130 PR TOTAL HIP ARTHROPLASTY: ICD-10-PCS | Mod: AS,RT,, | Performed by: PHYSICIAN ASSISTANT

## 2023-06-07 PROCEDURE — 97166 OT EVAL MOD COMPLEX 45 MIN: CPT

## 2023-06-07 PROCEDURE — 71000033 HC RECOVERY, INTIAL HOUR: Performed by: ORTHOPAEDIC SURGERY

## 2023-06-07 PROCEDURE — 63600175 PHARM REV CODE 636 W HCPCS: Performed by: STUDENT IN AN ORGANIZED HEALTH CARE EDUCATION/TRAINING PROGRAM

## 2023-06-07 PROCEDURE — 97530 THERAPEUTIC ACTIVITIES: CPT

## 2023-06-07 PROCEDURE — 27130 PR TOTAL HIP ARTHROPLASTY: ICD-10-PCS | Mod: RT,,, | Performed by: ORTHOPAEDIC SURGERY

## 2023-06-07 PROCEDURE — C1713 ANCHOR/SCREW BN/BN,TIS/BN: HCPCS | Performed by: ORTHOPAEDIC SURGERY

## 2023-06-07 PROCEDURE — 27201423 OPTIME MED/SURG SUP & DEVICES STERILE SUPPLY: Performed by: ORTHOPAEDIC SURGERY

## 2023-06-07 PROCEDURE — 63600175 PHARM REV CODE 636 W HCPCS: Performed by: ORTHOPAEDIC SURGERY

## 2023-06-07 PROCEDURE — 37000008 HC ANESTHESIA 1ST 15 MINUTES: Performed by: ORTHOPAEDIC SURGERY

## 2023-06-07 PROCEDURE — 36000711: Performed by: ORTHOPAEDIC SURGERY

## 2023-06-07 PROCEDURE — 27130 TOTAL HIP ARTHROPLASTY: CPT | Mod: RT,,, | Performed by: ORTHOPAEDIC SURGERY

## 2023-06-07 PROCEDURE — 63600175 PHARM REV CODE 636 W HCPCS: Performed by: ANESTHESIOLOGY

## 2023-06-07 PROCEDURE — 37000009 HC ANESTHESIA EA ADD 15 MINS: Performed by: ORTHOPAEDIC SURGERY

## 2023-06-07 PROCEDURE — 25000003 PHARM REV CODE 250: Performed by: ANESTHESIOLOGY

## 2023-06-07 PROCEDURE — 71000039 HC RECOVERY, EACH ADD'L HOUR: Performed by: ORTHOPAEDIC SURGERY

## 2023-06-07 PROCEDURE — 25000003 PHARM REV CODE 250: Performed by: STUDENT IN AN ORGANIZED HEALTH CARE EDUCATION/TRAINING PROGRAM

## 2023-06-07 PROCEDURE — 27130 TOTAL HIP ARTHROPLASTY: CPT | Mod: AS,RT,, | Performed by: PHYSICIAN ASSISTANT

## 2023-06-07 PROCEDURE — 85014 HEMATOCRIT: CPT | Performed by: ORTHOPAEDIC SURGERY

## 2023-06-07 PROCEDURE — C1776 JOINT DEVICE (IMPLANTABLE): HCPCS | Performed by: ORTHOPAEDIC SURGERY

## 2023-06-07 PROCEDURE — 71000015 HC POSTOP RECOV 1ST HR: Performed by: ORTHOPAEDIC SURGERY

## 2023-06-07 DEVICE — U-MOTION II PS+ CUP, CLUSTER-HOLE, Ø48MM
Type: IMPLANTABLE DEVICE | Site: HIP | Status: FUNCTIONAL
Brand: U-MOTION II PS+ CUP, CLUSTER-HOLE

## 2023-06-07 DEVICE — SCREW TI CANC 6.5X30MM: Type: IMPLANTABLE DEVICE | Site: HIP | Status: FUNCTIONAL

## 2023-06-07 RX ORDER — FENTANYL CITRATE 50 UG/ML
INJECTION, SOLUTION INTRAMUSCULAR; INTRAVENOUS
Status: DISCONTINUED | OUTPATIENT
Start: 2023-06-07 | End: 2023-06-07

## 2023-06-07 RX ORDER — ACETAMINOPHEN 325 MG/1
650 TABLET ORAL EVERY 8 HOURS PRN
Status: DISPENSED | OUTPATIENT
Start: 2023-06-07

## 2023-06-07 RX ORDER — ONDANSETRON 2 MG/ML
4 INJECTION INTRAMUSCULAR; INTRAVENOUS EVERY 12 HOURS PRN
Status: DISCONTINUED | OUTPATIENT
Start: 2023-06-07 | End: 2023-06-07 | Stop reason: HOSPADM

## 2023-06-07 RX ORDER — CHLORHEXIDINE GLUCONATE ORAL RINSE 1.2 MG/ML
10 SOLUTION DENTAL 2 TIMES DAILY
Status: DISCONTINUED | OUTPATIENT
Start: 2023-06-07 | End: 2023-06-07 | Stop reason: HOSPADM

## 2023-06-07 RX ORDER — DOCUSATE SODIUM 100 MG/1
100 CAPSULE, LIQUID FILLED ORAL 2 TIMES DAILY
Status: DISCONTINUED | OUTPATIENT
Start: 2023-06-07 | End: 2023-06-07 | Stop reason: HOSPADM

## 2023-06-07 RX ORDER — ONDANSETRON 2 MG/ML
INJECTION INTRAMUSCULAR; INTRAVENOUS
Status: DISCONTINUED | OUTPATIENT
Start: 2023-06-07 | End: 2023-06-07

## 2023-06-07 RX ORDER — HYDRALAZINE HYDROCHLORIDE 20 MG/ML
10 INJECTION INTRAMUSCULAR; INTRAVENOUS ONCE
Status: COMPLETED | OUTPATIENT
Start: 2023-06-07 | End: 2023-06-07

## 2023-06-07 RX ORDER — ONDANSETRON 4 MG/1
8 TABLET, ORALLY DISINTEGRATING ORAL EVERY 8 HOURS PRN
Qty: 20 TABLET | Refills: 0 | Status: SHIPPED | OUTPATIENT
Start: 2023-06-07

## 2023-06-07 RX ORDER — CEFAZOLIN SODIUM 2 G/50ML
2 SOLUTION INTRAVENOUS
Status: COMPLETED | OUTPATIENT
Start: 2023-06-07 | End: 2023-06-07

## 2023-06-07 RX ORDER — SODIUM CHLORIDE 9 MG/ML
INJECTION, SOLUTION INTRAVENOUS CONTINUOUS
Status: DISCONTINUED | OUTPATIENT
Start: 2023-06-07 | End: 2023-06-07 | Stop reason: SDUPTHER

## 2023-06-07 RX ORDER — CHLORHEXIDINE GLUCONATE ORAL RINSE 1.2 MG/ML
10 SOLUTION DENTAL
Status: DISPENSED | OUTPATIENT
Start: 2023-06-07

## 2023-06-07 RX ORDER — ROCURONIUM BROMIDE 10 MG/ML
INJECTION, SOLUTION INTRAVENOUS
Status: DISCONTINUED | OUTPATIENT
Start: 2023-06-07 | End: 2023-06-07

## 2023-06-07 RX ORDER — HYDROMORPHONE HYDROCHLORIDE 2 MG/ML
0.2 INJECTION, SOLUTION INTRAMUSCULAR; INTRAVENOUS; SUBCUTANEOUS EVERY 5 MIN PRN
Status: COMPLETED | OUTPATIENT
Start: 2023-06-07 | End: 2023-06-07

## 2023-06-07 RX ORDER — DEXAMETHASONE SODIUM PHOSPHATE 4 MG/ML
8 INJECTION, SOLUTION INTRA-ARTICULAR; INTRALESIONAL; INTRAMUSCULAR; INTRAVENOUS; SOFT TISSUE
Status: DISCONTINUED | OUTPATIENT
Start: 2023-06-07 | End: 2023-06-07 | Stop reason: SDUPTHER

## 2023-06-07 RX ORDER — ONDANSETRON 8 MG/1
8 TABLET, ORALLY DISINTEGRATING ORAL EVERY 8 HOURS PRN
Status: DISCONTINUED | OUTPATIENT
Start: 2023-06-07 | End: 2023-06-07 | Stop reason: HOSPADM

## 2023-06-07 RX ORDER — GABAPENTIN 300 MG/1
300 CAPSULE ORAL DAILY
Status: DISCONTINUED | OUTPATIENT
Start: 2023-06-07 | End: 2023-06-07 | Stop reason: SDUPTHER

## 2023-06-07 RX ORDER — TRANEXAMIC ACID 10 MG/ML
1000 INJECTION, SOLUTION INTRAVENOUS
Status: COMPLETED | OUTPATIENT
Start: 2023-06-07 | End: 2023-06-07

## 2023-06-07 RX ORDER — CHLORHEXIDINE GLUCONATE ORAL RINSE 1.2 MG/ML
10 SOLUTION DENTAL
Status: DISCONTINUED | OUTPATIENT
Start: 2023-06-07 | End: 2023-06-07 | Stop reason: SDUPTHER

## 2023-06-07 RX ORDER — DEXAMETHASONE SODIUM PHOSPHATE 4 MG/ML
8 INJECTION, SOLUTION INTRA-ARTICULAR; INTRALESIONAL; INTRAMUSCULAR; INTRAVENOUS; SOFT TISSUE
Status: DISPENSED | OUTPATIENT
Start: 2023-06-07

## 2023-06-07 RX ORDER — OXYCODONE HYDROCHLORIDE 5 MG/1
10 TABLET ORAL
Status: DISCONTINUED | OUTPATIENT
Start: 2023-06-07 | End: 2023-06-07 | Stop reason: HOSPADM

## 2023-06-07 RX ORDER — MEPERIDINE HYDROCHLORIDE 25 MG/ML
12.5 INJECTION INTRAMUSCULAR; INTRAVENOUS; SUBCUTANEOUS ONCE
Status: COMPLETED | OUTPATIENT
Start: 2023-06-07 | End: 2023-06-07

## 2023-06-07 RX ORDER — ACETAMINOPHEN 325 MG/1
650 TABLET ORAL EVERY 8 HOURS PRN
Status: DISCONTINUED | OUTPATIENT
Start: 2023-06-07 | End: 2023-06-07 | Stop reason: HOSPADM

## 2023-06-07 RX ORDER — CEFAZOLIN SODIUM 2 G/50ML
2 SOLUTION INTRAVENOUS
Status: DISCONTINUED | OUTPATIENT
Start: 2023-06-07 | End: 2023-06-07 | Stop reason: SDUPTHER

## 2023-06-07 RX ORDER — PROPOFOL 10 MG/ML
VIAL (ML) INTRAVENOUS
Status: DISCONTINUED | OUTPATIENT
Start: 2023-06-07 | End: 2023-06-07

## 2023-06-07 RX ORDER — LIDOCAINE HYDROCHLORIDE 20 MG/ML
INJECTION INTRAVENOUS
Status: DISCONTINUED | OUTPATIENT
Start: 2023-06-07 | End: 2023-06-07

## 2023-06-07 RX ORDER — DIPHENHYDRAMINE HYDROCHLORIDE 50 MG/ML
6.25 INJECTION INTRAMUSCULAR; INTRAVENOUS ONCE
Status: COMPLETED | OUTPATIENT
Start: 2023-06-07 | End: 2023-06-07

## 2023-06-07 RX ORDER — SUCCINYLCHOLINE CHLORIDE 20 MG/ML
INJECTION INTRAMUSCULAR; INTRAVENOUS
Status: DISCONTINUED | OUTPATIENT
Start: 2023-06-07 | End: 2023-06-07

## 2023-06-07 RX ORDER — MORPHINE SULFATE 4 MG/ML
2 INJECTION, SOLUTION INTRAMUSCULAR; INTRAVENOUS EVERY 4 HOURS PRN
Status: DISCONTINUED | OUTPATIENT
Start: 2023-06-07 | End: 2023-06-07 | Stop reason: HOSPADM

## 2023-06-07 RX ORDER — OXYCODONE AND ACETAMINOPHEN 10; 325 MG/1; MG/1
1 TABLET ORAL EVERY 6 HOURS PRN
Qty: 30 TABLET | Refills: 0 | Status: SHIPPED | OUTPATIENT
Start: 2023-06-07 | End: 2023-06-15 | Stop reason: SDUPTHER

## 2023-06-07 RX ORDER — PROCHLORPERAZINE EDISYLATE 5 MG/ML
5 INJECTION INTRAMUSCULAR; INTRAVENOUS EVERY 30 MIN PRN
Status: DISCONTINUED | OUTPATIENT
Start: 2023-06-07 | End: 2023-06-07 | Stop reason: HOSPADM

## 2023-06-07 RX ORDER — TRANEXAMIC ACID 10 MG/ML
1000 INJECTION, SOLUTION INTRAVENOUS
Status: DISCONTINUED | OUTPATIENT
Start: 2023-06-07 | End: 2023-06-07 | Stop reason: SDUPTHER

## 2023-06-07 RX ORDER — DIPHENHYDRAMINE HYDROCHLORIDE 50 MG/ML
25 INJECTION INTRAMUSCULAR; INTRAVENOUS EVERY 6 HOURS PRN
Status: DISCONTINUED | OUTPATIENT
Start: 2023-06-07 | End: 2023-06-07 | Stop reason: HOSPADM

## 2023-06-07 RX ORDER — METOPROLOL TARTRATE 25 MG/1
25 TABLET, FILM COATED ORAL ONCE
Status: COMPLETED | OUTPATIENT
Start: 2023-06-07 | End: 2023-06-07

## 2023-06-07 RX ORDER — ONDANSETRON 2 MG/ML
4 INJECTION INTRAMUSCULAR; INTRAVENOUS DAILY PRN
Status: DISCONTINUED | OUTPATIENT
Start: 2023-06-07 | End: 2023-06-07 | Stop reason: HOSPADM

## 2023-06-07 RX ORDER — TELMISARTAN 80 MG/1
80 TABLET ORAL DAILY
Qty: 90 TABLET | Refills: 3 | Status: SHIPPED | OUTPATIENT
Start: 2023-06-07

## 2023-06-07 RX ORDER — GABAPENTIN 300 MG/1
300 CAPSULE ORAL DAILY
Status: DISPENSED | OUTPATIENT
Start: 2023-06-07

## 2023-06-07 RX ORDER — MIDAZOLAM HYDROCHLORIDE 1 MG/ML
INJECTION INTRAMUSCULAR; INTRAVENOUS
Status: DISCONTINUED | OUTPATIENT
Start: 2023-06-07 | End: 2023-06-07

## 2023-06-07 RX ORDER — SODIUM CHLORIDE 0.9 % (FLUSH) 0.9 %
3 SYRINGE (ML) INJECTION
Status: DISCONTINUED | OUTPATIENT
Start: 2023-06-07 | End: 2023-06-07 | Stop reason: HOSPADM

## 2023-06-07 RX ORDER — ROPIVACAINE/EPI/CLONIDINE/KET 2.46-0.005
SYRINGE (ML) INJECTION
Status: DISCONTINUED | OUTPATIENT
Start: 2023-06-07 | End: 2023-06-07 | Stop reason: HOSPADM

## 2023-06-07 RX ORDER — ALBUTEROL SULFATE 0.83 MG/ML
2.5 SOLUTION RESPIRATORY (INHALATION) EVERY 4 HOURS PRN
Status: DISCONTINUED | OUTPATIENT
Start: 2023-06-07 | End: 2023-06-07 | Stop reason: HOSPADM

## 2023-06-07 RX ORDER — SODIUM CHLORIDE 9 MG/ML
INJECTION, SOLUTION INTRAVENOUS CONTINUOUS
Status: ACTIVE | OUTPATIENT
Start: 2023-06-07

## 2023-06-07 RX ORDER — ACETAMINOPHEN 325 MG/1
650 TABLET ORAL EVERY 8 HOURS PRN
Status: DISCONTINUED | OUTPATIENT
Start: 2023-06-07 | End: 2023-06-07 | Stop reason: SDUPTHER

## 2023-06-07 RX ORDER — OXYCODONE HYDROCHLORIDE 5 MG/1
5 TABLET ORAL
Status: DISCONTINUED | OUTPATIENT
Start: 2023-06-07 | End: 2023-06-07 | Stop reason: HOSPADM

## 2023-06-07 RX ORDER — BISACODYL 10 MG
10 SUPPOSITORY, RECTAL RECTAL DAILY PRN
Status: DISCONTINUED | OUTPATIENT
Start: 2023-06-07 | End: 2023-06-07 | Stop reason: HOSPADM

## 2023-06-07 RX ORDER — SODIUM CHLORIDE 9 MG/ML
INJECTION, SOLUTION INTRAVENOUS CONTINUOUS
Status: DISCONTINUED | OUTPATIENT
Start: 2023-06-07 | End: 2023-06-07 | Stop reason: HOSPADM

## 2023-06-07 RX ORDER — KETAMINE HCL IN 0.9 % NACL 50 MG/5 ML
SYRINGE (ML) INTRAVENOUS
Status: DISCONTINUED | OUTPATIENT
Start: 2023-06-07 | End: 2023-06-07

## 2023-06-07 RX ADMIN — Medication 20 MG: at 07:06

## 2023-06-07 RX ADMIN — CEFAZOLIN SODIUM 2 G: 2 SOLUTION INTRAVENOUS at 07:06

## 2023-06-07 RX ADMIN — PROPOFOL 100 MG: 10 INJECTION, EMULSION INTRAVENOUS at 07:06

## 2023-06-07 RX ADMIN — SUCCINYLCHOLINE CHLORIDE 100 MG: 20 INJECTION, SOLUTION INTRAMUSCULAR; INTRAVENOUS; PARENTERAL at 07:06

## 2023-06-07 RX ADMIN — TRANEXAMIC ACID 1000 MG: 10 INJECTION, SOLUTION INTRAVENOUS at 07:06

## 2023-06-07 RX ADMIN — CHLORHEXIDINE GLUCONATE 0.12% ORAL RINSE 10 ML: 1.2 LIQUID ORAL at 06:06

## 2023-06-07 RX ADMIN — ACETAMINOPHEN 650 MG: 325 TABLET ORAL at 06:06

## 2023-06-07 RX ADMIN — ROCURONIUM BROMIDE 25 MG: 10 SOLUTION INTRAVENOUS at 07:06

## 2023-06-07 RX ADMIN — HYDROMORPHONE HYDROCHLORIDE 0.2 MG: 2 INJECTION INTRAMUSCULAR; INTRAVENOUS; SUBCUTANEOUS at 09:06

## 2023-06-07 RX ADMIN — GABAPENTIN 300 MG: 300 CAPSULE ORAL at 10:06

## 2023-06-07 RX ADMIN — TRANEXAMIC ACID 1000 MG: 10 INJECTION, SOLUTION INTRAVENOUS at 08:06

## 2023-06-07 RX ADMIN — HYDROMORPHONE HYDROCHLORIDE 0.2 MG: 2 INJECTION INTRAMUSCULAR; INTRAVENOUS; SUBCUTANEOUS at 10:06

## 2023-06-07 RX ADMIN — FENTANYL CITRATE 50 MCG: 50 INJECTION, SOLUTION INTRAMUSCULAR; INTRAVENOUS at 07:06

## 2023-06-07 RX ADMIN — MIDAZOLAM HYDROCHLORIDE 2 MG: 1 INJECTION, SOLUTION INTRAMUSCULAR; INTRAVENOUS at 07:06

## 2023-06-07 RX ADMIN — SODIUM CHLORIDE, SODIUM LACTATE, POTASSIUM CHLORIDE, AND CALCIUM CHLORIDE: .6; .31; .03; .02 INJECTION, SOLUTION INTRAVENOUS at 07:06

## 2023-06-07 RX ADMIN — DIPHENHYDRAMINE HYDROCHLORIDE 6.25 MG: 50 INJECTION, SOLUTION INTRAMUSCULAR; INTRAVENOUS at 10:06

## 2023-06-07 RX ADMIN — METOPROLOL TARTRATE 25 MG: 25 TABLET, FILM COATED ORAL at 07:06

## 2023-06-07 RX ADMIN — ROCURONIUM BROMIDE 20 MG: 10 SOLUTION INTRAVENOUS at 07:06

## 2023-06-07 RX ADMIN — LIDOCAINE HYDROCHLORIDE 100 MG: 20 INJECTION INTRAVENOUS at 07:06

## 2023-06-07 RX ADMIN — ROCURONIUM BROMIDE 5 MG: 10 SOLUTION INTRAVENOUS at 07:06

## 2023-06-07 RX ADMIN — DEXAMETHASONE SODIUM PHOSPHATE 8 MG: 4 INJECTION, SOLUTION INTRA-ARTICULAR; INTRALESIONAL; INTRAMUSCULAR; INTRAVENOUS; SOFT TISSUE at 07:06

## 2023-06-07 RX ADMIN — MEPERIDINE HYDROCHLORIDE 12.5 MG: 25 INJECTION INTRAMUSCULAR; INTRAVENOUS; SUBCUTANEOUS at 10:06

## 2023-06-07 RX ADMIN — ONDANSETRON 4 MG: 2 INJECTION INTRAMUSCULAR; INTRAVENOUS at 08:06

## 2023-06-07 RX ADMIN — FENTANYL CITRATE 50 MCG: 50 INJECTION, SOLUTION INTRAMUSCULAR; INTRAVENOUS at 08:06

## 2023-06-07 RX ADMIN — SUGAMMADEX 200 MG: 100 INJECTION, SOLUTION INTRAVENOUS at 08:06

## 2023-06-07 RX ADMIN — HYDRALAZINE HYDROCHLORIDE 10 MG: 20 INJECTION, SOLUTION INTRAMUSCULAR; INTRAVENOUS at 09:06

## 2023-06-07 RX ADMIN — Medication 10 MG: at 08:06

## 2023-06-07 NOTE — ANESTHESIA PREPROCEDURE EVALUATION
06/06/2023  Modesta Camacho is a 57 y.o., female.    Patient Active Problem List   Diagnosis    Essential hypertension    Anxiety    Abnormal EKG    History of gastric restrictive surgery    Status post total right knee replacement    Hyperuricemia    DDD (degenerative disc disease), cervical    Degenerative arthritis of left knee    Hip pain, bilateral    Knee pain, left    Decreased ROM of left knee    Decreased ROM of ankle    Preop cardiovascular exam    Right leg weakness     Pre-op Assessment    I have reviewed the Patient Summary Reports.     I have reviewed the Nursing Notes. I have reviewed the NPO Status.   I have reviewed the Medications.     Review of Systems  Anesthesia Hx:  Denies Family Hx of Anesthesia complications.   Denies Personal Hx of Anesthesia complications.   Hematology/Oncology:  Hematology Normal   Oncology Normal     EENT/Dental:EENT/Dental Normal   Cardiovascular:   Hypertension ECG has been reviewed. Cleared by cardiologist  EKG not significantly changed from 2021 when she had a negative NMST and satisfactory ECHO   Pulmonary:  Pulmonary Normal    Renal/:   Chronic Renal Disease, CKD    Hepatic/GI:  Hepatic/GI Normal    Musculoskeletal:   Arthritis     Neurological:  Neurology Normal    Endocrine:  Endocrine Normal    Dermatological:  Skin Normal    Psych:  Psychiatric Normal           Physical Exam  General: Alert and Oriented    Airway:  Mallampati: II   Mouth Opening: Normal  TM Distance: Normal  Tongue: Normal  Neck ROM: Normal ROM    TTE   The left ventricle is normal in size with concentric remodeling and normal systolic function. The estimated ejection fraction is 55%   Normal left ventricular diastolic function.   Normal right ventricular size with normal right ventricular systolic function.   Mild to moderate tricuspid regurgitation.   Normal central  venous pressure (3 mmHg).   The estimated PA systolic pressure is 41 mmHg.   There is pulmonary hypertension.         Anesthesia Plan  Type of Anesthesia, risks & benefits discussed:    Anesthesia Type: Gen ETT  Intra-op Monitoring Plan: Standard ASA Monitors  Induction:  IV  Informed Consent: Informed consent signed with the Patient and all parties understand the risks and agree with anesthesia plan.  All questions answered.   ASA Score: 2  Day of Surgery Review of History & Physical: I have interviewed and examined the patient. I have reviewed the patient's H&P dated:     Ready For Surgery From Anesthesia Perspective.     .

## 2023-06-07 NOTE — INTERVAL H&P NOTE
Patient consented for right total hip replacement answered all her questions      The patient has been examined and the H&P has been reviewed:    I concur with the findings and no changes have occurred since H&P was written.    Anesthesia/Surgery risks, benefits and alternative options discussed and understood by patient/family.

## 2023-06-07 NOTE — PLAN OF CARE
[facility] - Surgery (Hospital)  Discharge Final Note    Primary Care Provider: Mary Rivera NP    Expected Discharge Date: 6/7/2023    Final Discharge Note (most recent)       Final Note - 06/07/23 1006          Final Note    Assessment Type Final Discharge Note     Anticipated Discharge Disposition Home-Health Care [facility] Resources/Appts/Education Provided Appointments scheduled and added to AVS        Post-Acute Status    Post-Acute Authorization Home Health                              Contact Info       Trey Clifford MD   Specialty: Orthopedic Surgery    [address] [place] [address] DR FREDDY LAZCANO [id number]   Phone: [phone]       Next Steps: Follow up in 1 week(s)        Current with [facility].

## 2023-06-07 NOTE — TRANSFER OF CARE
"Anesthesia Transfer of Care Note    Patient: Modesta Camacho    Procedure(s) Performed: Procedure(s) (LRB):  ARTHROPLASTY, HIP (Right)    Patient location: PACU    Anesthesia Type: general    Transport from OR: Transported from OR on room air with adequate spontaneous ventilation    Post pain: pain needs to be addressed    Post assessment: no apparent anesthetic complications and tolerated procedure well    Post vital signs: stable    Level of consciousness: awake, responds to stimulation and alert    Nausea/Vomiting: no nausea/vomiting    Complications: none    Transfer of care protocol was followedComments: Report given to PACU RN at bedside. RN given opportunity to ask questions or clarify concerns. No Concerns verbalized. RN was asked if ready to assume care of patient. RN verbally confirmed. SV. Vital Signs Return to Near Baseline. HTN Noted SBP at 200. Pt. Verbalized feeling pain presently. No additional s/s of distress noted. Orders for post op pain and HTN management noted.       Last vitals:   Visit Vitals  BP (!) 211/99 (BP Location: Right arm, Patient Position: Lying)   Pulse 76   Temp 36.7 °C (98.1 °F) (Temporal)   Resp 18   Ht 5' 2" (1.575 m)   Wt 73.8 kg (162 lb 11.2 oz)   SpO2 99%   Breastfeeding No   BMI 29.76 kg/m²     "

## 2023-06-07 NOTE — PLAN OF CARE
[facility] - Surgery (Hospital)  Initial Discharge Assessment       Primary Care Provider: Mary Rivera NP    Admission Diagnosis: Arthritis of right hip [M16.11]    Admission Date: 6/7/2023  Expected Discharge Date: per attending         Payor: [employer] BL[employer] / Plan: BCBS ALL OUT OF STATE / Product Type: PPO /     Extended Emergency Contact Information  Primary Emergency Contact: Pop Camacho  Address: [address] [place] [address] [facility]  Home Phone: [phone]  Mobile Phone: [phone]  Relation: Spouse  Secondary Emergency Contact: Ruthie Lara  Address: [address] [place] [address] [facility]  Home Phone: [phone]  Relation: Daughter    Discharge Plan A: Home with family         [facility] - [place] - [address]  [address]  [place] [address]  Phone: [phone] Fax: [phone]    [id number] - [place], [place] - [address] [place] [address]  [address] [place] [address]  [place] [address]  Phone: [phone] Fax: [phone]    [facility] [place] [address]  Phone: [phone] Fax: [phone]      Initial Assessment (most recent)       Adult Discharge Assessment - 06/07/23 0911          Discharge Assessment    Assessment Type Discharge Planning Assessment     Confirmed/corrected address, phone number and insurance Yes     Confirmed Demographics Correct on Facesheet     Source of Information family     When was your last doctors appointment? --   n/a    Communicated LUIS with patient/caregiver Date not available/Unable to determine     Reason For Admission surgery     People in Home spouse     Do you expect to return to your current living situation? Yes     Do you have help at home or someone to help you manage your care at home? Yes     Who are your caregiver(s) and their phone number(s)? spouse     Prior to hospitilization cognitive status: Alert/Oriented      Current cognitive status: Alert/Oriented     Equipment Currently Used at Home bedside commode;walker, rolling     Readmission within 30 days? No     Patient currently being followed by outpatient case management? No     Do you currently have service(s) that help you manage your care at home? No     Do you take prescription medications? Yes     Do you have prescription coverage? Yes     Coverage BCBS     Do you have any problems affording any of your prescribed medications? No     Is the patient taking medications as prescribed? yes     Who is going to help you get home at discharge? spouse     How do you get to doctors appointments? car, drives self     Are you on dialysis? No     Do you take coumadin? No     Discharge Plan A Home with family

## 2023-06-07 NOTE — DISCHARGE SUMMARY
[facility] - [facility] (Hospital)  Discharge Note  Short Stay    Procedure(s) (LRB):  ARTHROPLASTY, HIP (Right)      OUTCOME: Patient tolerated treatment/procedure well without complication and is now ready for discharge.    DISPOSITION: Home-Health Care [facility]    FINAL DIAGNOSIS:  <principal problem not specified>  Arthritis of right hip  FOLLOWUP: In clinic    DISCHARGE INSTRUCTIONS:  No discharge procedures on file.     TIME SPENT ON DISCHARGE:  25 minutes

## 2023-06-07 NOTE — PT/OT/SLP EVAL
"Occupational Therapy   Evaluation and Discharge Note    Name: Modesta Camacho  MRN: [id number]  Admitting Diagnosis: <principal problem not specified>  Recent Surgery: Procedure(s) (LRB):  ARTHROPLASTY, HIP (Right) Day of Surgery    Recommendations:     Discharge Recommendations: home health OT  Discharge Equipment Recommendations: none  Barriers to discharge:  None    Assessment:     Modesta Camacho is a 57 y.o. female with a medical diagnosis of R THR. At this time, patient does not require further acute OT services. Recommending OT services for home safety assessment and education re: dressing/bathing within precautions.    Plan:     During this hospitalization, patient does not require further acute OT services.  Please re-consult if situation changes.    Plan of Care Reviewed with: patient, spouse    Subjective     Chief Complaint: Patient reported "I am doing ok."  Patient/Family Comments/goals: get stronger    Occupational Profile:  Living Environment: Patient resides in a 1 story home with no steps to enter, with her .  Previous level of function: Patient was independent with ADLs and ambulation prior to admission.  Roles and Routines: She was an active  and working.  Equipment Used at home: none  Assistance upon Discharge: family    Pain/Comfort:  Pain Rating 1: 2/10  Location - Side 1: Right  Location 1: hip  Pain Addressed 1:  (activity pacing)    Objective:     Communicated with: Nurse, Anton, prior to session.  Patient found sitting edge of bed with peripheral IV upon OT entry to room.    General Precautions: Standard, fall  Orthopedic Precautions: RLE weight bearing as tolerated, RLE posterior precautions  Braces: N/A  Respiratory Status: Room air     Occupational Performance:    Functional Mobility/Transfers:  Patient completed Sit <> Stand Transfer with stand by assistance  with  rolling walker   Patient completed Bed <> Chair Transfer using Step Transfer technique with stand by " assistance with rolling walker  Functional Mobility: Patient completed x200ft functional mobility with RW and SBA to increase dynamic standing balance and activity tolerance needed for ADL completion.    Activities of Daily Living:  Grooming: modified independence .  Upper Body Dressing: setup A .    Cognitive/Visual Perceptual:  Cognitive/Psychosocial Skills:     -       Oriented to: Person, Place, Time, and Situation   -       Follows Commands/attention:Follows two-step commands    Physical Exam:  Balance:    -       sitting: WFL, standing: fair +  Upper Extremity Range of Motion:     -       Right Upper Extremity: WFL  -       Left Upper Extremity: WFL  Upper Extremity Strength:    -       Right Upper Extremity: WFL  -       Left Upper Extremity: WFL   Strength:    -       Right Upper Extremity: WFL  -       Left Upper Extremity: WFL    AMPAC 6 Click ADL:  AMPAC Total Score: 20    Treatment & Education:  Patient educated on role of OT in acute setting and benefits of participation. Educated on posterior hip precautions and their functional implications. Educated on safe mobility techniques to use to increase independence, ensure compliance with posterior precautions, and decrease fall risk. Educated on importance of Q hour ambulation and appropriate use of cold modalities upon d/c home to minimize pain. Patient stated understanding and in agreement with POC.     Patient left  seated in w/c with nurse present preparing for discharge.    History:     Past Medical History:   Diagnosis Date    Abnormal EKG 2/5/2021    Anxiety     Hypertension     Osteoarthritis     Stage 2 chronic kidney disease 2/5/2021         Past Surgical History:   Procedure Laterality Date    ARTHROPLASTY OF HIP BY ANTERIOR APPROACH Left 7/31/2020    Procedure: ARTHROPLASTY, HIP, ANTERIOR APPROACH;  Surgeon: aXvi Gandara MD;  Location: [facility];  Service: Orthopedics;  Laterality: Left;    gastric sleeve      JOINT REPLACEMENT Right  06/14/2016    knee    JOINT REPLACEMENT Left 05/20/2021    KNEE    JOINT REPLACEMENT Left 07/30/2020    HIP    KNEE ARTHROPLASTY Left 5/6/2021    Procedure: ARTHROPLASTY, KNEE;  Surgeon: Xavi Gandara MD;  Location: [facility];  Service: Orthopedics;  Laterality: Left;    RESURFACING OF PATELLA Right 9/27/2022    Procedure: RESURFACING, PATELLA;  Surgeon: Trey Clifford MD;  Location: [facility];  Service: General;  Laterality: Right;    REVISION OF KNEE ARTHROPLASTY Right 9/27/2022    Procedure: REVISION, ARTHROPLASTY, KNEE;  Surgeon: Trey Clifford MD;  Location: [facility];  Service: General;  Laterality: Right;    TUBAL LIGATION      UTERINE FIBROID EMBOLIZATION         Time Tracking:     OT Date of Treatment: 06/07/23  OT Start Time: 1210  OT Stop Time: 1235  OT Total Time (min): 25 min    Billable Minutes:Evaluation 15  Therapeutic Activity 10    6/7/2023

## 2023-06-07 NOTE — ANESTHESIA POSTPROCEDURE EVALUATION
Anesthesia Post Evaluation    Patient: Modesta Camacho    Procedure(s) Performed: Procedure(s) (LRB):  ARTHROPLASTY, HIP (Right)    Final Anesthesia Type: general      Patient location during evaluation: PACU  Patient participation: Yes- Able to Participate  Level of consciousness: awake  Post-procedure vital signs: reviewed and stable  Pain management: adequate  Airway patency: patent    PONV status at discharge: No PONV  Anesthetic complications: no      Cardiovascular status: hemodynamically stable  Respiratory status: unassisted  Hydration status: euvolemic  Follow-up not needed.          Vitals Value Taken Time   /67 06/07/23 1146   Temp 36.7 °C (98 °F) 06/07/23 1145   Pulse 95 06/07/23 1154   Resp 55 06/07/23 1154   SpO2 93 % 06/07/23 1154   Vitals shown include unvalidated device data.      Event Time   Out of Recovery 11:56:40         Pain/Juvenal Score: Pain Rating Prior to Med Admin: 6 (6/7/2023 10:20 AM)  Juvenal Score: 8 (6/7/2023 11:30 AM)

## 2023-06-07 NOTE — H&P
Subjective:     Patient ID: Modesta Camacho is a 57 y.o. female.    Chief Complaint: Pain of the Right Hip  06/27/2022  HPI:  Left TKA 05/07/2021 by Dr. Gandara, left KRZYSZTOF a by Dr. Gandara  Right TKA by Dr. Lan 2016  Patient is complaining that the left knee is tight unable to fully extend compared to the right side.  She is not having any pain with the left hip.  She has very mild discomfort in the left knee.  As far as the right hip is concerned she is having severe pain in the groin and she knows she has arthritis.  She stated the right TKA done by Dr. Lan is not painful but it gives out with difficulty doing stairs.  Overall pain is 4/10.  She still working at the [employer].  She ambulates without any assistive devices.  She feels her left knee is not doing well and cannot straighten it out as much as she does in the right knee.  For some reason she was upset with Dr. Atkinson.  No fever no chills no shortness of breath or difficulty with chewing or swallowing.  She does have low back pain and she sees Dr. Weaver    07/14/2022  Left KRZYSZTOF by Dr. Gandara doing extremely well  Left TKA 05/07/2021 by Dr. Gandara and feels tight unable to hyperextend.  She has around maybe 2-3 degrees of contracture but she flexes really well.  At this time this is a very stable knee    Right TKA 2016 by Dr. Lan.  She is extremely loosen hyperextends and medial collateral ligaments seem loose.  We had asked her to get us the operative report from the [facility] and she brought it with her.  We went over the operative note and it was vanguard knee by [employer].  The insert is 14 mm.  For 67 base plate  We did call the [employer] rep and he said they go up to 18 mm for that size and prosthesis.  Femoral component 57.5  Right hip arthritis I did tell her we will deal with that later on because is not bother her as much.  Pain is 5/10    12/15/2022     Right total knee revision 09/27/2022 where we change the poly liner to a very  thick 1 to achieve stability.  This total knee had been done by Dr. Lan prior to that and she was ligamentously loose.  She did fall after things given.  She feels that the right total knee poly exchange seems to have helped significantly with the instability.  She had severe pain in her hip on the right side she has arthritis by x-ray and diagnosis she seen Dr. Acevedo who gave her injection in the office in November2,2022 2nd 2022    She wants to have her total knee replacement done and she knows she needs to wait 3 months.    She would like to return to work to make some money and she is looking at having her right total hip replacement in March 2023     Patient stated the arthritis run in her family everybody in the family had a joint replacement including her brother her mother clarita and she was diagnosed with osteoarthritis rather than rheumatoid arthritis.    Requesting tramadol  Pain is 6/10    We discussed the left knee which she stated still feels tight that if she waits a year year and half in might loosen up with time you can always have that poly exchange to a smaller 1 if needed in the future      02/13/2023    Severe right hip arthritis.  Received intra-articular injection by Dr. Acevedo 11/02/2022 with good relief.  You want a proceed with the right total hip replacement sometime to words the end of May early June 2023 because you want a work to make some money.  You having hard time walking distances and we gave you a temporary disability parking sticker.  As far as the right total knee revision 09/27/2022 you said you not having any pain in that or neither the left KRZYSZTOF that was done by Dr. Gandara through the anterior approach.  As far as the left TKA done by Dr. Gandara you feel that you have pain still unable to fully extended and it is tender and painful.  I did tell her let us give it some more time roughly 18 months to see if things improve if not then she would need to have poly exchange to  a smaller 1.  No fever no chills no shortness of breath difficulty with chewing swallowing loss of bowel bladder control blurry vision double vision loss sense smell or taste     04/21/2023   Right hip arthritis.  She is ready to proceed with total hip replacement.  I did tell her I go posterior approach not anterior.  We had some questions and answered.  I did tell her there is slight risk of dislocation of 3% chance.  She will be going home the same day this is considered outpatient surgery by the government.  If there is any problem then we will do extended recovery.  She will receive home health for couple weeks.  She would need help at home.  We briefly went over the pros and cons and she does remember it.  She said she is active on MyChart and she can read it.  She did complain that the left knee still little tight and tape painful and I did tell her give it some time once we know we have done all the joints then we can arrange for her to go and change the poly insert to a smaller 1 to achieve a little bit more extension  Pain 5/10    Past Medical History:   Diagnosis Date    Abnormal EKG 2/5/2021    Anxiety     Hypertension     Osteoarthritis     Stage 2 chronic kidney disease 2/5/2021     Past Surgical History:   Procedure Laterality Date    ARTHROPLASTY OF HIP BY ANTERIOR APPROACH Left 7/31/2020    Procedure: ARTHROPLASTY, HIP, ANTERIOR APPROACH;  Surgeon: Xavi Gandara MD;  Location: [facility] OR;  Service: Orthopedics;  Laterality: Left;    gastric sleeve      JOINT REPLACEMENT Right 06/14/2016    knee    JOINT REPLACEMENT Left 05/20/2021    KNEE    JOINT REPLACEMENT Left 07/30/2020    HIP    KNEE ARTHROPLASTY Left 5/6/2021    Procedure: ARTHROPLASTY, KNEE;  Surgeon: Xavi Gandara MD;  Location: [facility] OR;  Service: Orthopedics;  Laterality: Left;    RESURFACING OF PATELLA Right 9/27/2022    Procedure: RESURFACING, PATELLA;  Surgeon: Trey Clifford MD;  Location: [facility] OR;  Service: General;   Laterality: Right;    REVISION OF KNEE ARTHROPLASTY Right 9/27/2022    Procedure: REVISION, ARTHROPLASTY, KNEE;  Surgeon: Trey Clifford MD;  Location: [facility];  Service: General;  Laterality: Right;    TUBAL LIGATION      UTERINE FIBROID EMBOLIZATION       Family History   Problem Relation Age of Onset    Hypertension Mother     Arthritis Mother     Diabetes Father     Heart disease Father     Depression Sister     Hypertension Sister     Arthritis Brother     Thyroid disease Son     Hypertension Son     Arthritis Maternal Grandmother     Heart disease Maternal Grandmother     Kidney disease Maternal Grandmother     Heart attack Maternal Grandfather     Stroke Paternal Grandmother     No Known Problems Paternal Grandfather     Eczema Son     Breast cancer Sister 47     Social History     Socioeconomic History    Marital status:      Spouse name: Pop Land    Number of children: 3   Occupational History    Occupation: patient access   Tobacco Use    Smoking status: Never     Passive exposure: Never    Smokeless tobacco: Never   Substance and Sexual Activity    Alcohol use: Yes     Comment: occasional: hold 72hrs. prior to surgery    Drug use: No    Sexual activity: Yes     Partners: Male     Birth control/protection: See Surgical Hx     Medication List with Changes/Refills   New Medications    GABAPENTIN (NEURONTIN) 300 MG CAPSULE    Take 1 capsule (300 mg total) by mouth 3 (three) times daily.   Current Medications    ALPRAZOLAM (XANAX) 0.5 MG TABLET    Take 1 tablet (0.5 mg total) by mouth nightly as needed for Anxiety.    AMLODIPINE (NORVASC) 5 MG TABLET    Take 1 tablet (5 mg total) by mouth 2 (two) times daily.    CLONIDINE (CATAPRES) 0.1 MG TABLET    TAKE 1 TABLET BY MOUTH ONCE DAILY USE FOR SBP GREATER THAN 160 MMHG.    DOCUSATE SODIUM (COLACE) 100 MG CAPSULE    Take 100 mg by mouth 2 (two) times daily as needed.    DULOXETINE (CYMBALTA) 60 MG CAPSULE    Take 1 capsule (60 mg total) by mouth 2  (two) times daily.    GABAPENTIN (NEURONTIN) 300 MG CAPSULE    Take 1 capsule (300 mg total) by mouth 3 (three) times daily.    METOPROLOL SUCCINATE (TOPROL-XL) 50 MG 24 HR TABLET    Take 2 tablets (100 mg total) by mouth nightly.    MULTIVIT-MIN/FERROUS FUMARATE (MULTI VITAMIN ORAL)    Take 2 tablets by mouth Daily.    ONDANSETRON (ZOFRAN-ODT) 4 MG TBDL    Take 1 tablet (4 mg total) by mouth every 8 (eight) hours as needed (nausea).    ONDANSETRON (ZOFRAN-ODT) 4 MG TBDL    Take 2 tablets (8 mg total) by mouth every 8 (eight) hours as needed.    OXYCODONE-ACETAMINOPHEN (PERCOCET)  MG PER TABLET    Take 1 tablet by mouth every 8 (eight) hours as needed for Pain.    SULFAMETHOXAZOLE-TRIMETHOPRIM 800-160MG (BACTRIM DS) 800-160 MG TAB    Take 1 tablet by mouth 2 (two) times daily.    TELMISARTAN (MICARDIS) 80 MG TAB    Take 1 tablet (80 mg total) by mouth once daily.    TIZANIDINE (ZANAFLEX) 4 MG TABLET    Take 0.5-1 tablets (2-4 mg total) by mouth 2 (two) times daily as needed (pain).    TRAMADOL (ULTRAM) 50 MG TABLET    Take 1 tablet (50 mg total) by mouth nightly.    TRAMADOL (ULTRAM) 50 MG TABLET    Take 1 tablet (50 mg total) by mouth every 6 (six) hours as needed for Pain.    VITAMIN D (VITAMIN D3) 1000 UNITS TAB    Take 1,000 Units by mouth once daily.     Review of patient's allergies indicates:   Allergen Reactions    Codeine Hives and Rash     Takes Tramadol and has never had an issue with SOB/swelling  Also tolerated hydromorphone 7/2020      Penicillin     Penicillins Hives     Pt tolerated cefazolin 7/2020     Review of Systems   Constitutional: Negative for decreased appetite.   HENT:  Negative for tinnitus.    Eyes:  Negative for double vision.   Cardiovascular:  Negative for chest pain.   Respiratory:  Negative for wheezing.    Hematologic/Lymphatic: Negative for bleeding problem.   Skin:  Negative for dry skin.   Musculoskeletal:  Positive for arthritis, back pain, joint pain and stiffness.  Negative for gout and neck pain.   Gastrointestinal:  Negative for abdominal pain.   Genitourinary:  Negative for bladder incontinence.   Neurological:  Negative for numbness, paresthesias and sensory change.   Psychiatric/Behavioral:  Negative for altered mental status.      Objective:   Body mass index is 29.08 kg/m².  There were no vitals filed for this visit.       General    Constitutional: She is oriented to person, place, and time. She appears well-developed.   HENT:   Head: Atraumatic.   Eyes: EOM are normal.   Pulmonary/Chest: Effort normal.   Neurological: She is alert and oriented to person, place, and time.   Psychiatric: Judgment normal.         Ambulating without any assistive devices  Right hip pain to internal external rotation in the groin.  She has around 10° internal rotation around 25° external rotation with around may be 5° of contractures  The left total hip journal external rotation without pain in the groin.  Excellent range of motion  Pelvis is level  The sitting position  Hip flexors, abductors adductors quads and hamstrings ankle extensors and flexors were 5/5  Left TKA has around 3° of contracture and she flexes to 120°.  Slightly tight.  No defect in the patella or quadriceps tendon.  No erythema, not warm to touch.  Collaterals stable to varus and valgus stressing in extension and in flexion at 90°  Right knee surgical incision healed well.  She has 0-130 degrees of flexion.  She is not hyperextending anymore when preoperatively she was hyperextending approximately 10°.  She is stable in extension with slight opening if any approximately 2 mm on the medial side with the knee in extension.  She is stable in flexion and 90°.  No swelling no effusion no erythema  Ankle motion intact  Skin is warm to touch    Relevant imaging results reviewed and interpreted by me, discussed with the patient and / or family today   X-ray 11/28/2022 of the right knee showing the thick poly insert with the  right total knee ingrowth prosthesis performed by Dr. Lan prior no evidence of fracture  X-ray bilateral knees 06/27/2022 showing left TKA with very thick poly insert and the patella was not resurfaced with excellent alignment/posterior sacrificing knee system..  Right TKA/looks like [employer] with slightly oversized base plate on the tibia but overall alignment is intact and looks like it is ingrowth prosthesis with patella not resurfaced and posterior cruciate sparing knee  X-ray 11/24/2021 and 06/27/2022 left KRZYSZTOF in excellent alignment.  Right hip complete loss of joint space with marginal osteophyte and cystic changes consistent with severe arthritis of the right hip    X-ray 06/27/2022 of the lumbar spine showing spondylolisthesis L4 on 5 grade 1 and facet arthropathy.  No fracture seen  Assessment:     Encounter Diagnoses   Name Primary?    Arthritis of right hip Yes    S/P revision of total knee, right     Hx of total hip arthroplasty, left     History of total left knee replacement         Plan:   Arthritis of right hip  -     gabapentin (NEURONTIN) 300 MG capsule; Take 1 capsule (300 mg total) by mouth 3 (three) times daily.  Dispense: 90 capsule; Refill: 11    S/P revision of total knee, right    Hx of total hip arthroplasty, left    History of total left knee replacement         Patient Instructions   Will proceed with right total hip replacement  Procedure, common risks and benefits,alternatives discussed in details.All questions answered.Patient expressed understanding.Patient instructed to call for any questions that could arise in the future.    Most common Risks:  Infection  Leg-length discrepancy  Dislocation  Neuro-vascular injury ( resulting in loss of motor and sensory functions)  Pain  Blood clot  Fat clot  Loss of motion  Fracture of bone  Failure of procedure to achieve its intended purpose  Failure of hardware  Non-union or mal-union of bone  Malalignment  Death    Patient instructions for  joint replacement    Before surgery  1.  Shower with Hibiclens soap/antibacterial for 3 days prior to surgery to decrease risk of infection  2..  Stop all blood thinners/aspirin, Coumadin, warfarin, Plavix, Eliquis, Xarelto etc 7days prior to surgery.  Need to stop all anti-inflammatories and vitamins and natural products also 7 days prior  3. No eating or drinking after midnight the night before surgery.  I do recommend that you drink a bottle of Gatorade before midnight the night before  4.  Take Tylenol 650 mg the night prior to surgery    Ask physicians for prescription of Celebrex or Mobic if needed    After surgery at home  1.  Take Tylenol 650 mg 3 times a day for 14 days then as needed for mild pain  2.  Take gabapentin 300 mg nightly for 6 weeks  3.  Take Celebrex 200 mg or meloxicam 15 mg daily for 6 weeks unless having cardiac issues to take for 2 weeks only  4.  Must take aspirin 81 mg twice a day for 6 weeks unless you are on other blood thinners/Plavix, Eliquis, Xarelto, Coumadin etc  5.  Must wear compressive stockings for 6 weeks minimum to decrease the risk of blood clot and swelling  6.  Hydrocodone/Norco or oxycodone/Percocet will be prescribed to take every 6 hr as needed for breakthrough pain  7.  May apply ice on the knee to help with decreasing pain  8.  Keep wound dry for 2 weeks until stitches/staples removed than you will be allowed to shower in 24 hr and get the wound wet.  No soaking of the wound in the tub or swimming for 4 weeks after surgery  9.  No driving for 4 weeks unless specified by physician  10.  Avoid touching the wound or surrounding area /at least 2 inches on each side of the surgical incision until staples are removed/stitches   11.  May change the surgical dressing if extremely bloody or has drainage on it. May clean the wound with peroxide or Betadine and apply sterile dressing and Ace wrap over it  12.  Leave hospital dressing on for 3 days then may change by applying  sterile 4 x 4 and Ace wrap after cleaning with Betadine or peroxide.  May leave this dressing change to home health nurses        Operative report 06/09/2016 Dr. Lan implant [employer] vanguard cementless components size 57.5 femur, 14 mm polyethylene insert.  Tibial modular tray size 67, modular finned stem with screw system 80 x 10 mm, self taping screws 6.5 x 25    I did discuss with the patient that we would do not know what is going to happen until we doing the surgery.  If the poly exchange give her enough stability than things will be okay otherwise if we not happy with the stability then we might have to take everything out and do a complete revision.  She expressed understanding that we going in to make sure that the knee becomes stable.    Disclaimer: This note was prepared using a voice recognition system and is likely to have sound alike errors within the text.

## 2023-06-07 NOTE — PLAN OF CARE
OT eval completed. Sit>stand SBA, functional mobility x200ft with RW and SBA, step>pivot to bedside chair with SBA. Recommending HHOT at d/c for home safety assessment. Continued acute intervention not warranted.

## 2023-06-07 NOTE — ANESTHESIA PROCEDURE NOTES
Intubation    Date/Time: 6/7/2023 7:10 AM  Performed by: Alexx Whitten CRNA  Authorized by: David Osborne II, MD     Intubation:     Induction:  Intravenous    Intubated:  Postinduction    Mask Ventilation:  Easy mask    Attempts:  1    Attempted By:  CRNA    Method of Intubation:  Direct    Blade:  Risa 3    Laryngeal View Grade: Grade I - full view of cords      Difficult Airway Encountered?: No      Complications:  None    Airway Device:  Oral endotracheal tube    Airway Device Size:  7.0    Style/Cuff Inflation:  Cuffed (inflated to minimal occlusive pressure)    Tube secured:  21    Secured at:  The lips    Placement Verified By:  Capnometry    Complicating Factors:  Obesity    Findings Post-Intubation:  BS equal bilateral and atraumatic/condition of teeth unchanged

## 2023-06-07 NOTE — OP NOTE
[facility] - Surgery ([facility])  Orthopedic Surgery  Operative Note    SUMMARY     Date of Procedure: 6/7/2023     Procedure: Procedure(s) (LRB):  ARTHROPLASTY, HIP (Right)       Surgeon(s) and Role:     * Trey Clifford MD - Primary    Assisting Surgeon:  Kitty GARCIA    Pre-Operative Diagnosis: Arthritis of right hip [M16.11]    Post-Operative Diagnosis: Post-Op Diagnosis Codes:     * Arthritis of right hip [M16.11]    Anesthesia: General    Injections/Meds: CHELO with 40 cc of injectable saline     Tourniquet time:  No tourniquet    Significant Surgical Tasks Conducted by the Assistant(s), if Applicable:  No assistant    Complications: none     Estimated Blood Loss (EBL):  200 mL           Implants:  [facility] primary total hip system acetabulum size 48 with 1 screw and neutral liner,   Tapered femoral stem  standard 1., femoral head ceramic/delta-3/32    Specimens: None           Condition: Good    Disposition: PACU - hemodynamically stable.    Attestation: I performed the procedure.  Due to very tight and narrow femoral canal we obtained multiple x-rays intraop.  No fracture seen  Description:  Posterior approach performed for right total hip arthroplasty. After administering appropriate meds patient positioned in the lateral decubitus for a posterior approach.  Operative site was prepped and draped in usual sterile fashion. Incision was made for posterior approach to the hip carried down through subcutaneous tissues.  Full-thickness skin flap raised anterior and posteriorly. Iliotibial band and gluteus fascia was split .self-retaining retractor applied.  We identified the external rotators tagged those and released them. Capsule was opened in a T-fashion and partially resected. Hip was dislocated posteriorly without difficulty.  We marked our cut on the femoral neck and was cut with the oscillating saw.  The femoral head was measured.  Proceeded with preparing the femoral canal  after we put the femoral neck retractor.  Canal Finders inserted into the femoral canal and then a cookie cutter was used to lateralize.  We sequentially broached up to the appropriate tight fit. The broach was removed.  He then directed attention to the acetabulum anterior and posterior retractors applied.  We proceeded sequentially reaming the acetabulum using the [employer] inclination angle guide at 45° of inclination.  the acetabulum was reamed out on 15-20 degrees of anteversion/45 deg inclination.. Once we had some punctate bleeding and the acetabulum  appropriate size trial was applied . All the time with multiple retractors to protect the sciatic nerve. We went ahead at that point inserted the real acetabular component in a Press-Fit mode.  Screws applied in posterior superior quadrant.  Trial acetabular insert applied. We proceeded putting a broach in the femur for trial and sequentially tried numerous head sizes.  We put the hip through 90° of flexion neutral abduction and put through maneuvers of dislocation until we felt that it is appropriate and stable. We took all the trials out . pulse lavage the wounds.  Bleeders cauterized.  Proceeded with regional block injection into the soft tissue around the hip. Appropriate acetabular insert locked in place.  The femoral component was inserted and we tried again with a trial head.  We confirmed the size and then proceeded to insert the real femoral head. Tested the hip again felt stable proceed with closing the capsule with 1.  Vicryl and then reattaching the external rotators to the greater trochanter. The iliotibial band and the gluteus fascia was closed 1.  Vicryl figure-of-eight.  Subcutaneous tissue was closed 1.  Vicryl inverted stitch.  Skin using staple gun.  Sterile dressing applied.  Tolerated procedure well

## 2023-06-07 NOTE — PATIENT INSTRUCTIONS
Patient instructions for joint replacement    After surgery at home  1.  Take Tylenol 650 mg 3 times a day for 14 days then as needed for mild pain  2.  Take gabapentin 300 mg nightly for 6 weeks/continue taking gabapentin as prescribed  3.  With a knee immobilizer for a period of 3 weeks at night.  May remove during the day to ambulate   4.  Must take aspirin 81 mg twice a day for 6 weeks unless you are on other blood thinners/Plavix, Eliquis, Xarelto, Coumadin etc  5.  Must wear compressive stockings for 6 weeks minimum to decrease the risk of blood clot and swelling  6.  Hydrocodone/Norco or oxycodone/Percocet will be prescribed to take every 6 hr as needed for breakthrough pain  7.  May apply ice on the knee to help with decreasing pain  8.  Keep wound dry for 2 weeks until stitches/staples removed than you will be allowed to shower in 24 hr and get the wound wet.  No soaking of the wound in the tub or swimming for 4 weeks after surgery  9.  No driving for 4 weeks unless specified by physician  10.  Avoid touching the wound or surrounding area /at least 2 inches on each side of the surgical incision until staples are removed/stitches   11.  May change the surgical dressing if extremely bloody or has drainage on it. May clean the wound with peroxide or Betadine and apply sterile dressing and Ace wrap over it  12.  Leave hospital dressing on for 3 days then may change by applying sterile 4 x 4 and Ace wrap after cleaning with Betadine or peroxide.  May leave this dressing change to home health nurses

## 2023-06-08 VITALS
SYSTOLIC BLOOD PRESSURE: 130 MMHG | BODY MASS INDEX: 29.94 KG/M2 | DIASTOLIC BLOOD PRESSURE: 67 MMHG | HEIGHT: 62 IN | OXYGEN SATURATION: 94 % | WEIGHT: 162.69 LBS | HEART RATE: 94 BPM | TEMPERATURE: 98 F | RESPIRATION RATE: 13 BRPM

## 2023-06-08 PROCEDURE — G0180 PR HOME HEALTH MD CERTIFICATION: ICD-10-PCS | Mod: ,,, | Performed by: ORTHOPAEDIC SURGERY

## 2023-06-08 PROCEDURE — G0180 MD CERTIFICATION HHA PATIENT: HCPCS | Mod: ,,, | Performed by: ORTHOPAEDIC SURGERY

## 2023-06-13 ENCOUNTER — TELEPHONE (OUTPATIENT)
Dept: ORTHOPEDICS | Facility: CLINIC | Age: 58
End: 2023-06-13
Payer: COMMERCIAL

## 2023-06-13 NOTE — TELEPHONE ENCOUNTER
Called the patient in regards to their appointment on 06/22/23. Left a message letting the patient know that I need to reschedule their appointment due to the fact that Kitty will be in surgery on that day.  Asked the patient to give the office a call back to reschedule.

## 2023-06-13 NOTE — TELEPHONE ENCOUNTER
----- Message from ZAHRA Hardy sent at 6/13/2023  9:09 AM CDT -----  Monitor   Drink plenty of fluids   Protein- even if it's sipping on replacement/protein shakes   Tylenol if temp is 100.4 or greater     ----- Message -----  From: Nayely Garnica MA  Sent: 6/13/2023   9:06 AM CDT  To: ZAHRA Hardy    Patient called she is running a low grade temp from  degree with some congestion. Wanted to know if this is cause for concern.

## 2023-06-13 NOTE — TELEPHONE ENCOUNTER
Spoke to patient to let her know we need to change her post-op. Patient had some concerns she wanted to discuss. We went over her dressing change, her staple removal and her low grade fever. I did go over low grade post op fever is not abnormal but I would talk to Kitty or Dr Clifford to be sure they are not concerned. Patient verbalized understanding and was thankful for the call.

## 2023-06-13 NOTE — TELEPHONE ENCOUNTER
Called patient back to let her know what Kitty suggested. Patient verbalized understanding and was thankful for the call.

## 2023-06-15 RX ORDER — OXYCODONE AND ACETAMINOPHEN 10; 325 MG/1; MG/1
1 TABLET ORAL EVERY 6 HOURS PRN
Qty: 30 TABLET | Refills: 0 | Status: SHIPPED | OUTPATIENT
Start: 2023-06-15 | End: 2023-06-25 | Stop reason: SDUPTHER

## 2023-06-25 DIAGNOSIS — Z96.651 S/P REVISION OF TOTAL KNEE, RIGHT: ICD-10-CM

## 2023-06-25 DIAGNOSIS — M25.551 RIGHT HIP PAIN: Primary | ICD-10-CM

## 2023-06-26 ENCOUNTER — DOCUMENT SCAN (OUTPATIENT)
Dept: HOME HEALTH SERVICES | Facility: HOSPITAL | Age: 58
End: 2023-06-26
Payer: COMMERCIAL

## 2023-06-26 RX ORDER — OXYCODONE AND ACETAMINOPHEN 10; 325 MG/1; MG/1
1 TABLET ORAL EVERY 8 HOURS PRN
Qty: 21 TABLET | Refills: 0 | Status: SHIPPED | OUTPATIENT
Start: 2023-06-26 | End: 2023-07-05 | Stop reason: SDUPTHER

## 2023-06-28 ENCOUNTER — PATIENT MESSAGE (OUTPATIENT)
Dept: ORTHOPEDICS | Facility: CLINIC | Age: 58
End: 2023-06-28
Payer: COMMERCIAL

## 2023-06-30 ENCOUNTER — HOSPITAL ENCOUNTER (OUTPATIENT)
Dept: RADIOLOGY | Facility: HOSPITAL | Age: 58
Discharge: HOME | End: 2023-06-30
Attending: ORTHOPAEDIC SURGERY
Payer: COMMERCIAL

## 2023-06-30 ENCOUNTER — OFFICE VISIT (OUTPATIENT)
Dept: ORTHOPEDICS | Facility: CLINIC | Age: 58
End: 2023-06-30
Payer: COMMERCIAL

## 2023-06-30 VITALS — WEIGHT: 162 LBS | BODY MASS INDEX: 29.81 KG/M2 | HEIGHT: 62 IN

## 2023-06-30 DIAGNOSIS — M16.11 ARTHRITIS OF RIGHT HIP: ICD-10-CM

## 2023-06-30 DIAGNOSIS — Z96.641 S/P TOTAL RIGHT HIP ARTHROPLASTY: Primary | ICD-10-CM

## 2023-06-30 PROCEDURE — 1160F PR REVIEW ALL MEDS BY PRESCRIBER/CLIN PHARMACIST DOCUMENTED: ICD-10-PCS | Mod: CPTII,S$GLB,, | Performed by: PHYSICIAN ASSISTANT

## 2023-06-30 PROCEDURE — 99999 PR PBB SHADOW E&M-EST. PATIENT-LVL IV: ICD-10-PCS | Mod: PBBFAC,,, | Performed by: PHYSICIAN ASSISTANT

## 2023-06-30 PROCEDURE — 73502 X-RAY EXAM HIP UNI 2-3 VIEWS: CPT | Mod: 26,RT,, | Performed by: RADIOLOGY

## 2023-06-30 PROCEDURE — 4010F PR ACE/ARB THEARPY RXD/TAKEN: ICD-10-PCS | Mod: CPTII,S$GLB,, | Performed by: PHYSICIAN ASSISTANT

## 2023-06-30 PROCEDURE — 3008F PR BODY MASS INDEX (BMI) DOCUMENTED: ICD-10-PCS | Mod: CPTII,S$GLB,, | Performed by: PHYSICIAN ASSISTANT

## 2023-06-30 PROCEDURE — 99999 PR PBB SHADOW E&M-EST. PATIENT-LVL IV: CPT | Mod: PBBFAC,,, | Performed by: PHYSICIAN ASSISTANT

## 2023-06-30 PROCEDURE — 73502 X-RAY EXAM HIP UNI 2-3 VIEWS: CPT | Mod: TC,RT

## 2023-06-30 PROCEDURE — 1159F MED LIST DOCD IN RCRD: CPT | Mod: CPTII,S$GLB,, | Performed by: PHYSICIAN ASSISTANT

## 2023-06-30 PROCEDURE — 99024 POSTOP FOLLOW-UP VISIT: CPT | Mod: S$GLB,,, | Performed by: PHYSICIAN ASSISTANT

## 2023-06-30 PROCEDURE — 73502 XR HIP WITH PELVIS WHEN PERFORMED, 2 OR 3  VIEWS RIGHT: ICD-10-PCS | Mod: 26,RT,, | Performed by: RADIOLOGY

## 2023-06-30 PROCEDURE — 99024 PR POST-OP FOLLOW-UP VISIT: ICD-10-PCS | Mod: S$GLB,,, | Performed by: PHYSICIAN ASSISTANT

## 2023-06-30 PROCEDURE — 3008F BODY MASS INDEX DOCD: CPT | Mod: CPTII,S$GLB,, | Performed by: PHYSICIAN ASSISTANT

## 2023-06-30 PROCEDURE — 1160F RVW MEDS BY RX/DR IN RCRD: CPT | Mod: CPTII,S$GLB,, | Performed by: PHYSICIAN ASSISTANT

## 2023-06-30 PROCEDURE — 4010F ACE/ARB THERAPY RXD/TAKEN: CPT | Mod: CPTII,S$GLB,, | Performed by: PHYSICIAN ASSISTANT

## 2023-06-30 PROCEDURE — 1159F PR MEDICATION LIST DOCUMENTED IN MEDICAL RECORD: ICD-10-PCS | Mod: CPTII,S$GLB,, | Performed by: PHYSICIAN ASSISTANT

## 2023-06-30 NOTE — PROGRESS NOTES
Patient ID: Modesta Camacho is a 57 y.o. female.    Chief Complaint: Pain and Post-op Evaluation of the Right Hip      HPI: Modesta Camacho  is a 57 y.o. female who c/o Pain and Post-op Evaluation of the Right Hip     Post op visit 1   Patient notes pain is 5/10   The patient is doing okay since surgery although eager to see full advancement   She does note that this right side seems be harder than the left   She pain is increased with use and activity over the course the day as well as with therapy which she states is getting off to a slow start   The patient is using Percocet as well as Tylenol, gabapentin at night and tizanidine  She is been fully compliant with postop instructions and keeping the extremity dry  She is using a rolling walker to assist with ambulation   Compliant with DVT   She is completed home health therapy and wishes to go to outpatient therapy at [facility]    Patient is presently denying any shortness of breath, chest pain, fever/chills, nausea/vomiting, loss of taste or smell, numbness/tingling or sensation changes, loss of bladder or bowel function, loss of taste/smell.     Surgery: Right Total Hip    Surgery Date:  06/07/2023    Past Medical History:   Diagnosis Date    Abnormal EKG 2/5/2021    Anxiety     Hypertension     Osteoarthritis     Stage 2 chronic kidney disease 2/5/2021     Past Surgical History:   Procedure Laterality Date    ARTHROPLASTY OF HIP BY ANTERIOR APPROACH Left 7/31/2020    Procedure: ARTHROPLASTY, HIP, ANTERIOR APPROACH;  Surgeon: Xavi Gandara MD;  Location: [facility] OR;  Service: Orthopedics;  Laterality: Left;    gastric sleeve      HIP ARTHROPLASTY Right 6/7/2023    Procedure: ARTHROPLASTY, HIP;  Surgeon: Trey Clifford MD;  Location: [facility] OR;  Service: Orthopedics;  Laterality: Right;    JOINT REPLACEMENT Right 06/14/2016    knee    JOINT REPLACEMENT Left 05/20/2021    KNEE    JOINT REPLACEMENT Left 07/30/2020    HIP    KNEE ARTHROPLASTY Left 5/6/2021     Procedure: ARTHROPLASTY, KNEE;  Surgeon: Xavi Gandara MD;  Location: [facility] OR;  Service: Orthopedics;  Laterality: Left;    RESURFACING OF PATELLA Right 9/27/2022    Procedure: RESURFACING, PATELLA;  Surgeon: Trey Clifford MD;  Location: [facility] OR;  Service: General;  Laterality: Right;    REVISION OF KNEE ARTHROPLASTY Right 9/27/2022    Procedure: REVISION, ARTHROPLASTY, KNEE;  Surgeon: Trey Clifford MD;  Location: [facility] OR;  Service: General;  Laterality: Right;    TUBAL LIGATION      UTERINE FIBROID EMBOLIZATION       Family History   Problem Relation Age of Onset    Hypertension Mother     Arthritis Mother     Diabetes Father     Heart disease Father     Depression Sister     Hypertension Sister     Arthritis Brother     Thyroid disease Son     Hypertension Son     Arthritis Maternal Grandmother     Heart disease Maternal Grandmother     Kidney disease Maternal Grandmother     Heart attack Maternal Grandfather     Stroke Paternal Grandmother     No Known Problems Paternal Grandfather     Eczema Son     Breast cancer Sister 47     Social History     Socioeconomic History    Marital status:      Spouse name: Pop Land    Number of children: 3   Occupational History    Occupation: patient access   Tobacco Use    Smoking status: Never     Passive exposure: Never    Smokeless tobacco: Never   Substance and Sexual Activity    Alcohol use: Yes     Comment: occasional: hold 72hrs. prior to surgery    Drug use: No    Sexual activity: Yes     Partners: Male     Birth control/protection: See Surgical Hx     Medication List with Changes/Refills   Current Medications    ALPRAZOLAM (XANAX) 0.5 MG TABLET    Take 1 tablet (0.5 mg total) by mouth nightly as needed for Anxiety.    AMLODIPINE (NORVASC) 5 MG TABLET    Take 1 tablet (5 mg total) by mouth 2 (two) times daily.    CLONIDINE (CATAPRES) 0.1 MG TABLET    TAKE 1 TABLET BY MOUTH ONCE DAILY USE FOR SBP GREATER THAN 160 MMHG.    DOCUSATE SODIUM (COLACE)  100 MG CAPSULE    Take 100 mg by mouth 2 (two) times daily as needed.    DULOXETINE (CYMBALTA) 60 MG CAPSULE    Take 1 capsule (60 mg total) by mouth 2 (two) times daily.    GABAPENTIN (NEURONTIN) 300 MG CAPSULE    Take 1 capsule (300 mg total) by mouth 3 (three) times daily.    METOPROLOL SUCCINATE (TOPROL-XL) 50 MG 24 HR TABLET    Take 2 tablets (100 mg total) by mouth nightly.    MULTIVIT-MIN/FERROUS FUMARATE (MULTI VITAMIN ORAL)    Take 2 tablets by mouth Daily.    ONDANSETRON (ZOFRAN-ODT) 4 MG TBDL    dissolve 2 tablets (8 mg total) by mouth every 8 (eight) hours as needed (nausea).    OXYCODONE-ACETAMINOPHEN (PERCOCET)  MG PER TABLET    Take 1 tablet by mouth every 8 (eight) hours as needed for Pain.    TELMISARTAN (MICARDIS) 80 MG TAB    Take 1 tablet (80 mg total) by mouth once daily.    TIZANIDINE (ZANAFLEX) 4 MG TABLET    Take 0.5-1 tablets (2-4 mg total) by mouth 2 (two) times daily as needed (pain).    VITAMIN D (VITAMIN D3) 1000 UNITS TAB    Take 1,000 Units by mouth once daily.     Review of patient's allergies indicates:   Allergen Reactions    Codeine Hives and Rash     Takes Tramadol and has never had an issue with SOB/swelling  Also tolerated hydromorphone 7/2020      Penicillin     Penicillins Hives     Pt tolerated cefazolin 7/2020       IMAGING   FINDINGS:  Comparison studies 06/07/2023.  No change.  Bilateral total hip arthroplasties.  No fracture or dislocation.    Objective:     Right Lower Extremity  NVI  WWP foot  Comp soft  Cap refill < 2 sec  Calf NT, Soft  (-) Gwen sign  ELY  ROM : Patient is able to easily exhibit full flexion and extension on passive range of motion.   Abduction and adduction is full   Quad resistant full  Wiggles toes  DF/PF intact  Sensation intact  Inc C/D/I  No SOI    Assessment:       No diagnosis found.       Plan:       There are no diagnoses linked to this encounter.    Modesta Camacho is an established pt here for postop follow-up after right total hip  replacement by Dr. Clifford.  The patient will continue the current medication regimen and treatment plan.   The patient was instructed not to soak the incision in standing water but may clean the incision with clean running water and antibacterial soap.  Patient should notify the office of any signs or symptoms of infection including fevers, erythema, purulent drainage, increasing pain.  Patient will continue with DVT prophylaxis.  Patient will start outpatient physical therapy.  Will follow-up in 4-6 weeks.  Patient verbalized understanding of all instructions and agreed with the above plan.    No follow-ups on file.    The patient understands, chooses and consents to this plan and accepts all   the risks which include but are not limited to the risks mentioned above.     Disclaimer: This note was prepared using a voice recognition system and is likely to have sound alike errors within the text.

## 2023-07-05 ENCOUNTER — DOCUMENT SCAN (OUTPATIENT)
Dept: HOME HEALTH SERVICES | Facility: HOSPITAL | Age: 58
End: 2023-07-05
Payer: COMMERCIAL

## 2023-07-05 DIAGNOSIS — M25.551 RIGHT HIP PAIN: ICD-10-CM

## 2023-07-05 RX ORDER — OXYCODONE AND ACETAMINOPHEN 10; 325 MG/1; MG/1
1 TABLET ORAL EVERY 8 HOURS PRN
Qty: 21 TABLET | Refills: 0 | Status: SHIPPED | OUTPATIENT
Start: 2023-07-05 | End: 2023-07-06 | Stop reason: SDUPTHER

## 2023-07-06 ENCOUNTER — EXTERNAL HOME HEALTH (OUTPATIENT)
Dept: HOME HEALTH SERVICES | Facility: HOSPITAL | Age: 58
End: 2023-07-06
Payer: COMMERCIAL

## 2023-07-06 ENCOUNTER — CLINICAL SUPPORT (OUTPATIENT)
Dept: REHABILITATION | Facility: HOSPITAL | Age: 58
End: 2023-07-06
Payer: COMMERCIAL

## 2023-07-06 DIAGNOSIS — M25.551 RIGHT HIP PAIN: ICD-10-CM

## 2023-07-06 DIAGNOSIS — R29.898 WEAKNESS OF RIGHT HIP: ICD-10-CM

## 2023-07-06 DIAGNOSIS — Z96.641 S/P TOTAL RIGHT HIP ARTHROPLASTY: ICD-10-CM

## 2023-07-06 PROCEDURE — 97530 THERAPEUTIC ACTIVITIES: CPT | Mod: PN

## 2023-07-06 PROCEDURE — 97161 PT EVAL LOW COMPLEX 20 MIN: CPT | Mod: PN

## 2023-07-06 PROCEDURE — 97112 NEUROMUSCULAR REEDUCATION: CPT | Mod: PN

## 2023-07-06 NOTE — TELEPHONE ENCOUNTER
----- Message from Julia Landa sent at 7/6/2023  2:27 PM CDT -----  Type:  Needs Medical Advice    Who Called: pt    Would the patient rather a call back or a response via [facility]? call  Best Call Back Number: [phone]  Additional Information: pt is requesting to get a return call in regards to some medication that she is having trouble getting it filled at [facility]  for pain  and she is requesting to have it sent to the [facility] on [address] if possible and she is requesting to get a return call to discuss

## 2023-07-06 NOTE — PLAN OF CARE
[facility]  Physical Therapy Initial Evaluation    Name: Modesta Camacho  Clinic Number: [id number]    Therapy Diagnosis:   Encounter Diagnoses   Name Primary?    S/P total right hip arthroplasty     Weakness of right hip      Physician: Kitty García PA    Physician Orders: PT Eval and Treat   Medical Diagnosis from Referral: S/P total right hip arthroplasty   Evaluation Date: 7/6/2023  Authorization Period Expiration: 12/31/2023  Plan of Care Expiration: 9/10/2023  Visit # / Visits authorized: 1/ 12    Time In: 11:05 am  Time Out: 11:45 am  Total Billable Time: 40 minutes    Precautions: Standard, Fall, Weightbearing, and hip precautions    Subjective   Date of onset: S/P total right hip arthroplasty on June 7,2023  History of current condition - Modesta reports: increased difficulty and pain post surgery. She requires assistance with home chores, cooking, and increased time with transfers and ADLs.   Pt using RW to ambulate in community.     Pain:  Current 3/10, worst 10/10, best 3/10   Location: right back  and buttocks   Description: Aching, Throbbing, and Grabbing  Aggravating Factors: Standing, Laying, Bending, Touching, and Walking  Easing Factors: relaxation    Prior Therapy: yes  Social History:  lives with their family, cares for 2 y.o grandchild  Occupation: retired  Prior Level of Function: Independent     Current Level of Function: [place] with AD    Imaging, bone scan films: IMAGING   FINDINGS:  Comparison studies 06/07/2023.  No change.  Bilateral total hip arthroplasties.  No fracture or dislocation    Medical History:   Past Medical History:   Diagnosis Date    Abnormal EKG 2/5/2021    Anxiety     Hypertension     Osteoarthritis     Stage 2 chronic kidney disease 2/5/2021       Surgical History:   Modesta Camacho  has a past surgical history that includes gastric sleeve; Uterine fibroid embolization; Tubal ligation; Arthroplasty of hip by anterior approach (Left,  7/31/2020); Knee Arthroplasty (Left, 5/6/2021); Joint replacement (Right, 06/14/2016); Joint replacement (Left, 05/20/2021); Joint replacement (Left, 07/30/2020); Revision of knee arthroplasty (Right, 9/27/2022); Resurfacing of patella (Right, 9/27/2022); and Hip Arthroplasty (Right, 6/7/2023).    Medications:   Modesta has a current medication list which includes the following prescription(s): alprazolam, amlodipine, clonidine, docusate sodium, duloxetine, gabapentin, metoprolol succinate, multivit-min/ferrous fumarate, ondansetron, oxycodone-acetaminophen, telmisartan, tizanidine, and vitamin d, and the following Facility-Administered Medications: sodium chloride 0.9%, acetaminophen, chlorhexidine, dexamethasone, and gabapentin.    Allergies:   Review of patient's allergies indicates:   Allergen Reactions    Codeine Hives and Rash     Takes Tramadol and has never had an issue with SOB/swelling  Also tolerated hydromorphone 7/2020      Penicillin     Penicillins Hives     Pt tolerated cefazolin 7/2020        Pts goals: be independent and return to PLOF    Objective         CMS Impairment/Limitation/Restriction for FOTO Hip Survey    Therapist reviewed FOTO scores for Modesta Camacho on 7/6/2023.   FOTO documents entered into [employer] - see Media section.    Limitation Score: 60%       Gait: occasional unsteady gait, decreased step length, decreased base of support, decreased weight shift, flexed posturing, and poor heel strike and push off on right with excessive UE support via RW     Hip AROM: within parameters of surgical precautions       Strength:  Hip flexors  3+/5 4+/5  Quadriceps  3+/5 4+/5      Hamstrings  4/5 4+/5     PF   3/5 3/5     DF   4/5 5/5     IR   3+/5 5/5     ER   3/5 4+/5     Gluteus Medius 3/5 4+/5     Gluteus Nicola 2+/5 4/5     Great toe extension 5/5 5/5         Palpation: increased scar tissue noted at surgical site    Pt instructed in scar tissue massage to improve elasticity     FTSTS: 1.05  sec    TREATMENT   Treatment Time In: 11:30 am  Treatment Time Out: 11:45 am  Total Treatment time separate from Evaluation: 15 minutes      Modesta participated in neuromuscular re-education activities to improve: Kinesthetic, Sense, Proprioception, and Posture for 15 minutes. The following activities were included:  Bridge, SLR, SL clam, heel slides    Modesta participated in dynamic functional therapeutic activities to improve functional performance for 5  minutes, including:  Rolling, breathing with STS    Modesta participated in gait training to improve functional mobility and safety for 5  minutes, including:  With RW to improve step length and heel strike     Home Exercises and Patient Education Provided    Education provided:   -Education on condition, HEP, and postural awareness and hip precautions post posterior approach     Written Home Exercises Provided: yes.  Exercises were reviewed and Modesta was able to demonstrate them prior to the end of the session.  Modesta demonstrated good  understanding of the education provided.     See EMR under Patient Instructions for exercises provided 7/6/2023.    Assessment   Modesta is a 57 y.o. female referred to outpatient Physical Therapy with a medical diagnosis of S/P total right hip arthroplasty . The patient presents with signs and symptoms consistent with diagnosis with impairments which include impairments list: ROM, strength, endurance, joint mobility, muscle length, balance, posture, gait mechanics, core strength and stability, functional movement patterns, post-operative precautions, scar tissue, sensation, and safety awareness.  These impairments are limiting patient's ability to complete ADLs, home chores, community ambulation and stair negotiaiton.     Pt prognosis is Good.   Pt will benefit from skilled outpatient Physical Therapy to address the deficits stated above and in the chart below, provide pt/family education, and to maximize pt's level of independence.      Plan of care discussed with patient: Yes  Pt's spiritual, cultural and educational needs considered and patient is agreeable to the plan of care and goals as stated below:     Anticipated Barriers for therapy: none    Medical Necessity is demonstrated by the following  History  Co-morbidities and personal factors that may impact the plan of care Co-morbidities:   See past Medical Hx listed above    Personal Factors:   no deficits     low   Examination  Body Structures and Functions, activity limitations and participation restrictions that may impact the plan of care Body Regions:   lower extremities    Body Systems:    ROM  strength  balance  gait    Participation Restrictions:   See current limitations    Activity limitations:   Learning and applying knowledge  no deficits    General Tasks and Commands  no deficits    Communication  no deficits    Mobility  walking    Self care  dressing    Domestic Life  shopping  cooking    Interactions/Relationships  no deficits    Life Areas  no deficits    Community and Social Life  community life  recreation and leisure         low   Clinical Presentation stable and uncomplicated low   Decision Making/ Complexity Score: low     Goals:  Short Term Goals: In 6 weeks   1.I with HEP  2.Pt to improve efficiency with rolling and home transfers   3.Pt to improve FTSTS test from over a minute to less than 40 seconds   4.Pt to increase BLE strength by 1/2 grade.   5.Pt to have pain less than 4 at all times.  6.Pt to improve score on the FOTO by 10%.  7. Pt to be educated on postural/body mechanics awareness.    Long Term Goals: In 10 weeks  1.Patient to improve score on the FOTO to 30% or less..  2. Patient to demo increase in LE strength to 4+/5  3. Patient to have decreased pain to 2 at worst.  4. Patient to ambulate short distance of 500ft without deviations to improve home ambulation  5. Patient to increase hip AROM to WFL.  5. Patient to perform daily activities including  ADLs, home chores, caring for grandchild, and driving without limitation.      Plan   Plan of care Certification: 7/6/2023 to 9/10/2023.    Outpatient Physical Therapy 2 times weekly for 10 weeks to include the following interventions: Electrical Stimulation prn, Gait Training, Manual Therapy, Moist Heat/ Ice, Neuromuscular Re-ed, Orthotic Management and Training, Patient Education, Self Care, Therapeutic Activities, Therapeutic Exercise, and dry needling .     Apryl Mcqueen, PT, DPT    Thank you for this referral.    These services are reasonable and necessary for the conditions set forth above while under my care.

## 2023-07-07 RX ORDER — OXYCODONE AND ACETAMINOPHEN 10; 325 MG/1; MG/1
1 TABLET ORAL EVERY 8 HOURS PRN
Qty: 21 TABLET | Refills: 0 | Status: SHIPPED | OUTPATIENT
Start: 2023-07-07 | End: 2023-07-13 | Stop reason: SDUPTHER

## 2023-07-12 ENCOUNTER — CLINICAL SUPPORT (OUTPATIENT)
Dept: REHABILITATION | Facility: HOSPITAL | Age: 58
End: 2023-07-12
Payer: COMMERCIAL

## 2023-07-12 DIAGNOSIS — R29.898 WEAKNESS OF RIGHT HIP: ICD-10-CM

## 2023-07-12 DIAGNOSIS — Z96.641 S/P TOTAL RIGHT HIP ARTHROPLASTY: Primary | ICD-10-CM

## 2023-07-12 PROCEDURE — 97112 NEUROMUSCULAR REEDUCATION: CPT | Mod: PN

## 2023-07-12 PROCEDURE — 97110 THERAPEUTIC EXERCISES: CPT | Mod: PN

## 2023-07-12 NOTE — PROGRESS NOTES
[facility]   Physical Therapy Treatment Note      Name: Modesta Camacho  Clinic Number: [id number]    Therapy Diagnosis:   Encounter Diagnoses   Name Primary?    S/P total right hip arthroplasty Yes    Weakness of right hip      Physician: Kitty García PA    Visit Date: 7/12/2023    Physician Orders: PT Eval and Treat   Medical Diagnosis from Referral: S/P total right hip arthroplasty   Evaluation Date: 7/6/2023  Authorization Period Expiration: 12/31/2023  Plan of Care Expiration: 9/10/2023  Visit # / Visits authorized: 2/12     Time In: 1100  Time Out: 1145  Total Billable Time: 45 minutes     Precautions: Standard, Fall, Weightbearing, and hip precautions    PTA Visit #: 0/5       Subjective     Pt reports: left knee pain.  She was compliant with home exercise program.  Response to previous treatment: N/A  Functional change: N/A    Pain: 3/10  Location: right back and buttocks     Objective      Objective Measures updated at progress report unless specified.     Treatment     Modesta received the treatments listed below:      therapeutic exercises to develop strength, ROM, and flexibility for 10 minutes including:  Nustep 5 minutes  Heel slides  neuromuscular re-education activities to improve: Coordination, Kinesthetic, Sense, and Proprioception for 35 minutes. The following activities were included:  Quad sets  SLR  Supine SAQ  Seated LAQ  Side lying hip abduction  Prone TKE  Prone hip extension    cold pack for 10 minutes to right knee.    Patient Education and Home Exercises       Education provided:   - Home program  - Instruction in total hip precautions    Written Home Exercises Provided: Patient instructed to cont prior HEP. Exercises were reviewed and Modesta was able to demonstrate them prior to the end of the session.  Modesta demonstrated good  understanding of the education provided. See EMR under Patient Instructions for exercises provided during therapy  sessions    Assessment     The patient has pain complaints left knee.  She is progressing with hip and lower extremity strengthening and range of motion activity.      Modesta Is progressing well towards her goals.   Pt prognosis is Good.     Pt will continue to benefit from skilled outpatient physical therapy to address the deficits listed in the problem list box on initial evaluation, provide pt/family education and to maximize pt's level of independence in the home and community environment.     Pt's spiritual, cultural and educational needs considered and pt agreeable to plan of care and goals.     Anticipated barriers to physical therapy: None    Goals:   Short Term Goals: In 6 weeks   1.I with HEP  2.Pt to improve efficiency with rolling and home transfers   3.Pt to improve FTSTS test from over a minute to less than 40 seconds   4.Pt to increase BLE strength by 1/2 grade.   5.Pt to have pain less than 4 at all times.  6.Pt to improve score on the FOTO by 10%.  7. Pt to be educated on postural/body mechanics awareness.     Long Term Goals: In 10 weeks  1.Patient to improve score on the FOTO to 30% or less..  2. Patient to demo increase in LE strength to 4+/5  3. Patient to have decreased pain to 2 at worst.  4. Patient to ambulate short distance of 500ft without deviations to improve home ambulation  5. Patient to increase hip AROM to WFL.  5. Patient to perform daily activities including ADLs, home chores, caring for grandchild, and driving without limitation.    Plan     Plan of care Certification: 7/6/2023 to 9/10/2023.     Outpatient Physical Therapy 2 times weekly for 10 weeks to include the following interventions: Electrical Stimulation prn, Gait Training, Manual Therapy, Moist Heat/ Ice, Neuromuscular Re-ed, Orthotic Management and Training, Patient Education, Self Care, Therapeutic Activities, Therapeutic Exercise, and dry needling .     Titus Rosario, PT

## 2023-07-13 DIAGNOSIS — M25.551 RIGHT HIP PAIN: ICD-10-CM

## 2023-07-14 ENCOUNTER — CLINICAL SUPPORT (OUTPATIENT)
Dept: REHABILITATION | Facility: HOSPITAL | Age: 58
End: 2023-07-14
Payer: COMMERCIAL

## 2023-07-14 DIAGNOSIS — R29.898 WEAKNESS OF RIGHT HIP: ICD-10-CM

## 2023-07-14 DIAGNOSIS — Z96.641 S/P TOTAL RIGHT HIP ARTHROPLASTY: Primary | ICD-10-CM

## 2023-07-14 PROCEDURE — 97112 NEUROMUSCULAR REEDUCATION: CPT | Mod: PN

## 2023-07-14 PROCEDURE — 97110 THERAPEUTIC EXERCISES: CPT | Mod: PN

## 2023-07-14 RX ORDER — OXYCODONE AND ACETAMINOPHEN 10; 325 MG/1; MG/1
1 TABLET ORAL EVERY 8 HOURS PRN
Qty: 15 TABLET | Refills: 0 | Status: SHIPPED | OUTPATIENT
Start: 2023-07-14 | End: 2023-07-20 | Stop reason: SDUPTHER

## 2023-07-16 NOTE — PROGRESS NOTES
[facility]   Physical Therapy Treatment Note      Name: Modseta Camacho  Clinic Number: [id number]    Therapy Diagnosis:   Encounter Diagnoses   Name Primary?    S/P total right hip arthroplasty Yes    Weakness of right hip      Physician: Kitty García PA    Visit Date: 7/14/2023    Physician Orders: PT Eval and Treat   Medical Diagnosis from Referral: S/P total right hip arthroplasty   Evaluation Date: 7/6/2023  Authorization Period Expiration: 12/31/2023  Plan of Care Expiration: 9/10/2023  Visit # / Visits authorized: 2/12     Time In: 1100  Time Out: 1145  Total Billable Time: 45 minutes     Precautions: Standard, Fall, Weightbearing, and hip precautions    PTA Visit #: 0/5       Subjective     Pt reports: left knee pain.  She was compliant with home exercise program.  Response to previous treatment: N/A  Functional change: N/A    Pain: 3/10  Location: right back and buttocks     Objective      Objective Measures updated at progress report unless specified.     Treatment     Modesta received the treatments listed below:      therapeutic exercises to develop strength, ROM, and flexibility for 10 minutes including:  Nustep 5 minutes  Heel slides  neuromuscular re-education activities to improve: Coordination, Kinesthetic, Sense, and Proprioception for 35 minutes. The following activities were included:  Quad sets  SLR  Supine SAQ  Seated LAQ  Side lying hip abduction  Prone TKE  Prone hip extension    cold pack for 10 minutes to right knee.    Patient Education and Home Exercises       Education provided:   - Home program  - Instruction in total hip precautions    Written Home Exercises Provided: Patient instructed to cont prior HEP. Exercises were reviewed and Modesta was able to demonstrate them prior to the end of the session.  Modesta demonstrated good  understanding of the education provided. See EMR under Patient Instructions for exercises provided during therapy  sessions    Assessment     The patient's knee pain interfere's with therapy.  She was instructed in activity to improve motion and strength of the right hip    Modesta Is progressing well towards her goals.   Pt prognosis is Good.     Pt will continue to benefit from skilled outpatient physical therapy to address the deficits listed in the problem list box on initial evaluation, provide pt/family education and to maximize pt's level of independence in the home and community environment.     Pt's spiritual, cultural and educational needs considered and pt agreeable to plan of care and goals.     Anticipated barriers to physical therapy: None    Goals:   Short Term Goals: In 6 weeks   1.I with HEP  2.Pt to improve efficiency with rolling and home transfers   3.Pt to improve FTSTS test from over a minute to less than 40 seconds   4.Pt to increase BLE strength by 1/2 grade.   5.Pt to have pain less than 4 at all times.  6.Pt to improve score on the FOTO by 10%.  7. Pt to be educated on postural/body mechanics awareness.     Long Term Goals: In 10 weeks  1.Patient to improve score on the FOTO to 30% or less..  2. Patient to demo increase in LE strength to 4+/5  3. Patient to have decreased pain to 2 at worst.  4. Patient to ambulate short distance of 500ft without deviations to improve home ambulation  5. Patient to increase hip AROM to WFL.  5. Patient to perform daily activities including ADLs, home chores, caring for grandchild, and driving without limitation.    Plan     Plan of care Certification: 7/6/2023 to 9/10/2023.     Outpatient Physical Therapy 2 times weekly for 10 weeks to include the following interventions: Electrical Stimulation prn, Gait Training, Manual Therapy, Moist Heat/ Ice, Neuromuscular Re-ed, Orthotic Management and Training, Patient Education, Self Care, Therapeutic Activities, Therapeutic Exercise, and dry needling .     Titus Rosario, PT

## 2023-07-18 ENCOUNTER — CLINICAL SUPPORT (OUTPATIENT)
Dept: REHABILITATION | Facility: HOSPITAL | Age: 58
End: 2023-07-18
Payer: COMMERCIAL

## 2023-07-18 ENCOUNTER — DOCUMENTATION ONLY (OUTPATIENT)
Dept: REHABILITATION | Facility: HOSPITAL | Age: 58
End: 2023-07-18

## 2023-07-18 DIAGNOSIS — R29.898 WEAKNESS OF RIGHT HIP: ICD-10-CM

## 2023-07-18 DIAGNOSIS — Z96.641 S/P TOTAL RIGHT HIP ARTHROPLASTY: Primary | ICD-10-CM

## 2023-07-18 PROCEDURE — 97116 GAIT TRAINING THERAPY: CPT | Mod: PN,CQ

## 2023-07-18 PROCEDURE — 97112 NEUROMUSCULAR REEDUCATION: CPT | Mod: PN,CQ

## 2023-07-18 NOTE — PROGRESS NOTES
PT/PTA met face to face to discuss pt's treatment plan and progress towards established goals. Pt will be seen by a physical therapist minimally every 6th visit or every 30 days.    Miracle Baca PTA

## 2023-07-18 NOTE — PROGRESS NOTES
[facility]   Physical Therapy Treatment Note      Name: Modesta Camacho  Clinic Number: [id number]    Therapy Diagnosis:   Encounter Diagnoses   Name Primary?    S/P total right hip arthroplasty Yes    Weakness of right hip        Physician: Kitty García PA    Visit Date: 7/18/2023    Physician Orders: PT Eval and Treat   Medical Diagnosis from Referral: S/P total right hip arthroplasty   Evaluation Date: 7/6/2023  Authorization Period Expiration: 12/31/2023  Plan of Care Expiration: 9/10/2023  Visit # / Visits authorized: 3/12     Time In: 1015  Time Out: 1100  Total Billable Time: 45 minutes     Precautions: Standard, Fall, Weightbearing, and hip precautions    PTA Visit #: 1/5       Subjective     Pt reports: left knee pain continued. Her right hip hurts but she is taking otc medication to help with this.  She was compliant with home exercise program.  Response to previous treatment: N/A  Functional change: N/A    Pain: 2/10  Location: right back and buttocks     Objective      Objective Measures updated at progress report unless specified.     Treatment     Modesta received the treatments listed below:      therapeutic exercises to develop strength, ROM, and flexibility for 0 minutes including:  Nustep 5 minutes  Heel slides    neuromuscular re-education activities to improve: Coordination, Kinesthetic, Sense, and Proprioception for 35 minutes. The following activities were included:  Quad sets  SLR  Supine SAQ  Side lying hip abduction  Prone TKE  Prone hip extension  Seated LAQ    Gait training x 10 minutes to improve heel strike,step length and equal weight bearing     cold pack for 0 minutes to right knee.    Patient Education and Home Exercises       Education provided:   - Home program  - Instruction in total hip precautions    Written Home Exercises Provided: Patient instructed to cont prior HEP. Exercises were reviewed and Modesta was able to demonstrate them prior to the end  of the session.  Modesta demonstrated good  understanding of the education provided. See EMR under Patient Instructions for exercises provided during therapy sessions    Assessment     The patient performed exercises without any complaints of hip pain although continues to have left knee pain. Worked on gait training to improve quality of gait with emphasis on heel strike, equal step length and equal weight bearing    Modesta Is progressing well towards her goals.   Pt prognosis is Good.     Pt will continue to benefit from skilled outpatient physical therapy to address the deficits listed in the problem list box on initial evaluation, provide pt/family education and to maximize pt's level of independence in the home and community environment.     Pt's spiritual, cultural and educational needs considered and pt agreeable to plan of care and goals.     Anticipated barriers to physical therapy: None    Goals:   Short Term Goals: In 6 weeks   1.I with HEP  2.Pt to improve efficiency with rolling and home transfers   3.Pt to improve FTSTS test from over a minute to less than 40 seconds   4.Pt to increase BLE strength by 1/2 grade.   5.Pt to have pain less than 4 at all times.  6.Pt to improve score on the FOTO by 10%.  7. Pt to be educated on postural/body mechanics awareness.     Long Term Goals: In 10 weeks  1.Patient to improve score on the FOTO to 30% or less..  2. Patient to demo increase in LE strength to 4+/5  3. Patient to have decreased pain to 2 at worst.  4. Patient to ambulate short distance of 500ft without deviations to improve home ambulation  5. Patient to increase hip AROM to WFL.  5. Patient to perform daily activities including ADLs, home chores, caring for grandchild, and driving without limitation.    Plan     Plan of care Certification: 7/6/2023 to 9/10/2023.     Outpatient Physical Therapy 2 times weekly for 10 weeks to include the following interventions: Electrical Stimulation prn, Gait Training,  Manual Therapy, Moist Heat/ Ice, Neuromuscular Re-ed, Orthotic Management and Training, Patient Education, Self Care, Therapeutic Activities, Therapeutic Exercise, and dry needling .     Miracle Baca, PTA

## 2023-07-19 ENCOUNTER — DOCUMENTATION ONLY (OUTPATIENT)
Dept: REHABILITATION | Facility: HOSPITAL | Age: 58
End: 2023-07-19

## 2023-07-19 ENCOUNTER — PATIENT MESSAGE (OUTPATIENT)
Dept: CARDIOLOGY | Facility: CLINIC | Age: 58
End: 2023-07-19
Payer: COMMERCIAL

## 2023-07-19 ENCOUNTER — PATIENT MESSAGE (OUTPATIENT)
Dept: ORTHOPEDICS | Facility: CLINIC | Age: 58
End: 2023-07-19
Payer: COMMERCIAL

## 2023-07-20 ENCOUNTER — PATIENT MESSAGE (OUTPATIENT)
Dept: ORTHOPEDICS | Facility: CLINIC | Age: 58
End: 2023-07-20
Payer: COMMERCIAL

## 2023-07-20 ENCOUNTER — CLINICAL SUPPORT (OUTPATIENT)
Dept: REHABILITATION | Facility: HOSPITAL | Age: 58
End: 2023-07-20
Payer: COMMERCIAL

## 2023-07-20 DIAGNOSIS — R29.898 WEAKNESS OF RIGHT HIP: ICD-10-CM

## 2023-07-20 DIAGNOSIS — Z96.641 S/P TOTAL RIGHT HIP ARTHROPLASTY: Primary | ICD-10-CM

## 2023-07-20 DIAGNOSIS — M25.551 RIGHT HIP PAIN: ICD-10-CM

## 2023-07-20 PROCEDURE — 97112 NEUROMUSCULAR REEDUCATION: CPT | Mod: PN

## 2023-07-20 PROCEDURE — 97110 THERAPEUTIC EXERCISES: CPT | Mod: PN

## 2023-07-20 NOTE — PROGRESS NOTES
[facility]   Physical Therapy Treatment Note      Name: Modesta Camacho  Clinic Number: [id number]    Therapy Diagnosis:   No diagnosis found.      Physician: Kitty García PA    Visit Date: 7/20/2023    Physician Orders: PT Eval and Treat   Medical Diagnosis from Referral: S/P total right hip arthroplasty   Evaluation Date: 7/6/2023  Authorization Period Expiration: 12/31/2023  Plan of Care Expiration: 9/10/2023  Visit # / Visits authorized: 4/12     Time In: 1100  Time Out: 1145  Total Billable Time: 45 minutes     Precautions: Standard, Fall, Weightbearing, and hip precautions    PTA Visit #: 1/5       Subjective     Pt reports: feeling better  She was compliant with home exercise program.  Response to previous treatment: N/A  Functional change: N/A    Pain: 2/10  Location: right back and buttocks     Objective      Objective Measures updated at progress report unless specified.     Treatment     Modesta received the treatments listed below:      therapeutic exercises to develop strength, ROM, and flexibility for 0 minutes including:  Nustep 5 minutes  Heel slides  Supine SAQ     neuromuscular re-education activities to improve: Coordination, Kinesthetic, Sense, and Proprioception for 35 minutes. The following activities were included:  Quad sets  SLR  Supine SAQ  Side lying hip abduction  Prone TKE  Prone hip extension  Seated LAQ    Gait training x 10 minutes to improve heel strike,step length and equal weight bearing     cold pack for 0 minutes to right knee.    Patient Education and Home Exercises       Education provided:   - Home program  - Instruction in total hip precautions    Written Home Exercises Provided: Patient instructed to cont prior HEP. Exercises were reviewed and Modesta was able to demonstrate them prior to the end of the session.  Modesta demonstrated good  understanding of the education provided. See EMR under Patient Instructions for exercises provided during therapy  sessions    Assessment     The patient performed exercises to improve hip strength and range of motion.      Modesta Is progressing well towards her goals.   Pt prognosis is Good.     Pt will continue to benefit from skilled outpatient physical therapy to address the deficits listed in the problem list box on initial evaluation, provide pt/family education and to maximize pt's level of independence in the home and community environment.     Pt's spiritual, cultural and educational needs considered and pt agreeable to plan of care and goals.     Anticipated barriers to physical therapy: None    Goals:   Short Term Goals: In 6 weeks   1.I with HEP  2.Pt to improve efficiency with rolling and home transfers   3.Pt to improve FTSTS test from over a minute to less than 40 seconds   4.Pt to increase BLE strength by 1/2 grade.   5.Pt to have pain less than 4 at all times.  6.Pt to improve score on the FOTO by 10%.  7. Pt to be educated on postural/body mechanics awareness.     Long Term Goals: In 10 weeks  1.Patient to improve score on the FOTO to 30% or less..  2. Patient to demo increase in LE strength to 4+/5  3. Patient to have decreased pain to 2 at worst.  4. Patient to ambulate short distance of 500ft without deviations to improve home ambulation  5. Patient to increase hip AROM to WFL.  5. Patient to perform daily activities including ADLs, home chores, caring for grandchild, and driving without limitation.    Plan     Plan of care Certification: 7/6/2023 to 9/10/2023.     Outpatient Physical Therapy 2 times weekly for 10 weeks to include the following interventions: Electrical Stimulation prn, Gait Training, Manual Therapy, Moist Heat/ Ice, Neuromuscular Re-ed, Orthotic Management and Training, Patient Education, Self Care, Therapeutic Activities, Therapeutic Exercise, and dry needling .     Titus Rosario, PT

## 2023-07-21 ENCOUNTER — TELEPHONE (OUTPATIENT)
Dept: PAIN MEDICINE | Facility: CLINIC | Age: 58
End: 2023-07-21
Payer: COMMERCIAL

## 2023-07-21 RX ORDER — OXYCODONE AND ACETAMINOPHEN 10; 325 MG/1; MG/1
1 TABLET ORAL EVERY 8 HOURS PRN
Qty: 15 TABLET | Refills: 0 | Status: SHIPPED | OUTPATIENT
Start: 2023-07-21 | End: 2023-07-28 | Stop reason: SDUPTHER

## 2023-07-24 ENCOUNTER — OFFICE VISIT (OUTPATIENT)
Dept: PAIN MEDICINE | Facility: CLINIC | Age: 58
End: 2023-07-24
Payer: COMMERCIAL

## 2023-07-24 VITALS
HEIGHT: 62 IN | HEART RATE: 79 BPM | WEIGHT: 162.06 LBS | DIASTOLIC BLOOD PRESSURE: 91 MMHG | RESPIRATION RATE: 18 BRPM | SYSTOLIC BLOOD PRESSURE: 144 MMHG | BODY MASS INDEX: 29.82 KG/M2

## 2023-07-24 DIAGNOSIS — M51.36 DDD (DEGENERATIVE DISC DISEASE), LUMBAR: ICD-10-CM

## 2023-07-24 DIAGNOSIS — F41.1 GAD (GENERALIZED ANXIETY DISORDER): ICD-10-CM

## 2023-07-24 DIAGNOSIS — M16.11 PRIMARY OSTEOARTHRITIS OF RIGHT HIP: ICD-10-CM

## 2023-07-24 DIAGNOSIS — M25.569 KNEE PAIN, UNSPECIFIED CHRONICITY, UNSPECIFIED LATERALITY: Primary | ICD-10-CM

## 2023-07-24 PROCEDURE — 3077F PR MOST RECENT SYSTOLIC BLOOD PRESSURE >= 140 MM HG: ICD-10-PCS | Mod: CPTII,S$GLB,, | Performed by: PHYSICIAN ASSISTANT

## 2023-07-24 PROCEDURE — 1160F PR REVIEW ALL MEDS BY PRESCRIBER/CLIN PHARMACIST DOCUMENTED: ICD-10-PCS | Mod: CPTII,S$GLB,, | Performed by: PHYSICIAN ASSISTANT

## 2023-07-24 PROCEDURE — 1159F MED LIST DOCD IN RCRD: CPT | Mod: CPTII,S$GLB,, | Performed by: PHYSICIAN ASSISTANT

## 2023-07-24 PROCEDURE — 3080F PR MOST RECENT DIASTOLIC BLOOD PRESSURE >= 90 MM HG: ICD-10-PCS | Mod: CPTII,S$GLB,, | Performed by: PHYSICIAN ASSISTANT

## 2023-07-24 PROCEDURE — 3008F PR BODY MASS INDEX (BMI) DOCUMENTED: ICD-10-PCS | Mod: CPTII,S$GLB,, | Performed by: PHYSICIAN ASSISTANT

## 2023-07-24 PROCEDURE — 3080F DIAST BP >= 90 MM HG: CPT | Mod: CPTII,S$GLB,, | Performed by: PHYSICIAN ASSISTANT

## 2023-07-24 PROCEDURE — 1160F RVW MEDS BY RX/DR IN RCRD: CPT | Mod: CPTII,S$GLB,, | Performed by: PHYSICIAN ASSISTANT

## 2023-07-24 PROCEDURE — 3008F BODY MASS INDEX DOCD: CPT | Mod: CPTII,S$GLB,, | Performed by: PHYSICIAN ASSISTANT

## 2023-07-24 PROCEDURE — 99214 OFFICE O/P EST MOD 30 MIN: CPT | Mod: S$GLB,,, | Performed by: PHYSICIAN ASSISTANT

## 2023-07-24 PROCEDURE — 99999 PR PBB SHADOW E&M-EST. PATIENT-LVL IV: CPT | Mod: PBBFAC,,, | Performed by: PHYSICIAN ASSISTANT

## 2023-07-24 PROCEDURE — 99999 PR PBB SHADOW E&M-EST. PATIENT-LVL IV: ICD-10-PCS | Mod: PBBFAC,,, | Performed by: PHYSICIAN ASSISTANT

## 2023-07-24 PROCEDURE — 1159F PR MEDICATION LIST DOCUMENTED IN MEDICAL RECORD: ICD-10-PCS | Mod: CPTII,S$GLB,, | Performed by: PHYSICIAN ASSISTANT

## 2023-07-24 PROCEDURE — 4010F ACE/ARB THERAPY RXD/TAKEN: CPT | Mod: CPTII,S$GLB,, | Performed by: PHYSICIAN ASSISTANT

## 2023-07-24 PROCEDURE — 3077F SYST BP >= 140 MM HG: CPT | Mod: CPTII,S$GLB,, | Performed by: PHYSICIAN ASSISTANT

## 2023-07-24 PROCEDURE — 4010F PR ACE/ARB THEARPY RXD/TAKEN: ICD-10-PCS | Mod: CPTII,S$GLB,, | Performed by: PHYSICIAN ASSISTANT

## 2023-07-24 PROCEDURE — 99214 PR OFFICE/OUTPT VISIT, EST, LEVL IV, 30-39 MIN: ICD-10-PCS | Mod: S$GLB,,, | Performed by: PHYSICIAN ASSISTANT

## 2023-07-24 RX ORDER — DULOXETIN HYDROCHLORIDE 60 MG/1
60 CAPSULE, DELAYED RELEASE ORAL 2 TIMES DAILY
Qty: 60 CAPSULE | Refills: 2 | Status: SHIPPED | OUTPATIENT
Start: 2023-07-24 | End: 2024-01-10 | Stop reason: SDUPTHER

## 2023-07-24 RX ORDER — TIZANIDINE 4 MG/1
2-4 TABLET ORAL 2 TIMES DAILY PRN
Qty: 90 TABLET | Refills: 2 | Status: SHIPPED | OUTPATIENT
Start: 2023-07-24 | End: 2023-08-28 | Stop reason: SDUPTHER

## 2023-07-24 NOTE — PROGRESS NOTES
The patient location is: home  The chief complaint leading to consultation is: low back pain, leg pain      Chief Pain Complaint:  Low back pain + RLE radiculopathy     Interval History (7/24/2023):  Modesta Camacho presents today for follow-up visit.  Patient was last seen on 05/23/2023. At last visit, she was to follow up with Dr. Clifford. She underwent right hip arthroplasty with Dr. Clifford on 06/07/2023. Currently enrolled in physical therapy, has 6 weeks remaining. So far she has had her first post op after hip arthroplasty on 06/30/2023, next post op in August. Currently prescribed Endocet and Gabapentin for post op pain.     Patient reports pain as 8/10 today. She continues to report left knee pain. She previously underwent left TKA with Dr. Gandara. She has spoken to Dr. Clifford about this knee before, but it was previously replaced recently and Dr. Clifford is hesitant to go in again so soon.       Interval History (5/23/2023):  Modesta Camacho presents today via telemed for follow-up visit.  Patient was last seen on 2/21/2023. She reports worsening left knee pain. She reports it keeps feeling as though it is popping out of place. She reports it began swelling last Friday and since then she can hardly bear weight. She has been resting, icing, elevating, and wearing her brace which has reduced the swelling and inflammation somewhat. She has spoken to Dr. Clifford about this knee before, but it was previously replaced recently and Dr. Clifford is hesitant to go in again so soon. Patient reports pain as 8/10 today.  She is scheduled for right hip arthroplasty with Dr. Clifford on 06/07/2023.    Interval HPI 02/21/2023    Modesta Camacho presents today for follow-up visit.  Patient was last seen on 11/8/2022. Patient reports pain as 6/10 today. She reports persistent left knee pain, previously underwent left TKA with Dr. Gandara, states she has not seen him in over 2 years as he was dismissive of her  continued pain after surgery. Sees Dr. Clifford for hip, but may also consider revision of left knee. Hx of right knee TKA with revision 9/22. Right knee is doing well. Last visit with Dr. Clifford on 02/13/2023. Considering right hip replacement in May/June. She also reports continued lower back pain, axial in nature, deep achy and without radiation into her lower extremities. Requesting refills of her Gabapentin, Nabumetone, and Tizanidine.    Interval History (11/08/2022):  Modesta Camacho presents today for follow-up visit.  Patient was last seen on 01/05/2022. Patient reports pain as 6/10 today. Underwent revision of her right knee with Dr. Clifford 09/27/2022. Referred back to [facility] for low back pain by Dr. Clifford. Patient reports pain as axial in nature across her lower lumbar spine without radiation into her lower extremities. Pain is constant and interferes with daily activities. Taking cymbalta and Gabapentin with minimal relief. Tizanidine helpful in the past, she is out of this medication. Not interested in injections at this time.    X-ray lumbar spine  FINDINGS:  AP, lateral and coned down radiographs of the lumbar spine demonstrate no evidence for acute fracture or dislocation.  Persistent grade 1 anterolisthesis of L4 with respect L5 is again identified.  Moderate to severe posterior facet hypertrophic changes are identified at L5/S1.  Remaining intervertebral disk spaces and vertebral body heights are well-preserved.  Visualized SI joints are intact.  Patient is status post right hip arthroplasty.  Multiple calcified phleboliths are identified.  Multiple clips are identified in the epigastric region.     Impression:     Degenerative disease, most prominent at the lower lumbar spine.  Overall, no significant interval change.       Interval History (1/5/2022):  Modesta Camacho presents today for follow-up visit.  She is here today to review lumbar MRI.  She continues to have RLE pain.  She saw   Jennifer on 12/21/2021, who referred her to have an ultrasound-guided right hip injection by Dr. Acevedo.  She recently had this procedure with short-term pain relief, but it did not help the sciatica pain into the foot.  She was also referred to Dr. Clifford (Orthopedics) for evaluation for right hip replacement.  To note, patient reports history of left hip replacement and bilateral knee replacement in the past.  She has been doing physical therapy twice a week, but she does not feel this has been overly helpful.      Initial HPI (11/09/2021):  Modesta Camacho is a 56 y.o. female  who is presenting with a chief complaint of low back pain. The patient began experiencing this problem insidiously, and the pain has been gradually worsening over the past 6 month(s). The pain is described as throbbing, shooting, burning and electrical and is located in the right lumbar spine . Pain is intermittent and lasts hours. The pain radiates to right leg L4 distribution. The patient rates her pain a 7 out of ten and interferes with activities of daily living a 8 out of ten. Pain is exacerbated by flexion of the lumbar spine, and is improved by rest. Patient reports no prior trauma, prior hip surgery Left Hip [facility] 6/[facility]. Right Knee Replacement 5/2021.     - pertinent negatives: No fever, No chills, No weight loss, No bladder dysfunction, No bowel dysfunction, No saddle anesthesia  - pertinent positives: right leg weakness    - medications, other therapies tried (physical therapy, injections):     >> NSAIDs, Tylenol, Tramadol, gabapentin, zanaflex and robaxin    >> Has previously undergone Physical Therapy    >>  Injections:     - ultrasound-guided right hip injection by Dr. Acevedo on 12/21/2021 with short-term pain relief      Imaging / Labs / Studies (reviewed on 7/24/2023):    11/16/2021 MRI Lumbar Spine Without Contrast  FINDINGS:  Image quality is degraded by motion, lowering exam sensitivity and  specificity.  Interpretation is offered within these confines.    Alignment: There is mild grade 1 anterolisthesis of L4 on L5.  There is slight leftward convexity of the lumbar spine.    Vertebrae: Diffuse loss normal T1 hyperintense marrow signal may be related to chronic anemia or other causes of red marrow hyperplasia, correlate clinically.  No evidence of fracture.    Discs: Normal height and signal.    Cord: Normal.  Conus terminates at T12-L1    Degenerative findings:    T12-L1:  No spinal canal or neuroforaminal stenosis.    L1-L2:  No spinal canal or neuroforaminal stenosis.    L2-L3: Mild generalized disc bulge. No spinal canal or neuroforaminal stenosis.    L3-L4: Mild generalized disc bulge.  Mild bilateral facet arthropathy. No spinal canal or neuroforaminal stenosis.    L4-L5: Severe bilateral facet arthropathy with some uncovering of the intervertebral disc and thickening of the ligamentum flavum.  Moderate bilateral neural foraminal narrowing.  No spinal canal stenosis.    L5-S1: Moderate bilateral facet arthropathy. No spinal canal or neuroforaminal stenosis..    Paraspinal muscles & soft tissues: Unremarkable.    Impression  1. Grade 1 anterolisthesis of L4 on L5 secondary to facet arthropathy with associated moderate bilateral neural foraminal narrowing at this level.  2. Other multilevel degenerative findings as above      7/31/20 X-Ray Hip 2 or 3 views Left  COMPARISON:  Prior radiograph from May 20, 2020.  FINDINGS:  Interval total left hip arthroplasty with soft tissue air in the area.  There is normal alignment of the joint.  Densely calcified uterine leiomyomata are unchanged.  There also calcified phleboliths within the pelvis.  Impression  Normal alignment of the total left hip arthroplasty that has been placed in the interval.      11/24/2021 X-Ray Hip 2 or 3 views Right (with Pelvis when performed)  COMPARISON:  02/11/2021  FINDINGS:  There are multiple calcified fibroid seen projecting  over the uterus.  There also multiple calcified pelvic phleboliths.  There is a single surgical clips seen to the right of the midline.  A left total hip arthroplasty is noted.  There is moderate to severe superolateral joint space narrowing involving the right hip with osteophyte formation noted.  Minimal subchondral cystic changes seen on either side of the right hip joint.      5/01/15 X-Ray Cervical Spine AP And Lateral  Findings:  The reversal of the lordotic curvature of the cervical spine secondary to moderate degenerative disk disease at C4-5 and C5-6.  Chronic anterior wedging of the C4 and C5 vertebral bodies.  Associated endplate sclerosis and uncovertebral joint  hypertrophy. The posterior elements are intact.  IMPRESSION:  No radiographic evidence of fracture or subluxation.  Moderate degenerative disk disease at C4-5 and C5-6.        Review of Systems:  CONSTITUTIONAL: patient denies any fever, chills, or weight loss  SKIN: patient denies any rash or itching  RESPIRATORY: patient denies having any shortness of breath  GASTROINTESTINAL: patient denies having any diarrhea, constipation, or bowel incontinence  GENITOURINARY: patient denies having any abnormal bladder function    MUSCULOSKELETAL:  - patient complains of the above noted pain/s (see chief pain complaint)    NEUROLOGICAL:   - pain as above  - strength in Lower extremities is intact, BILATERALLY  - sensation in Lower extremities is intact, BILATERALLY  - patient denies any loss of bowel or bladder control      PSYCHIATRIC: patient denies any change in mood    Other:  All other systems reviewed and are negative      Physical Exam:  Telemedicine Exam  There were no vitals filed for this visit.  There is no height or weight on file to calculate BMI.   (reviewed on 7/24/2023)     GENERAL: Well appearing, in no acute distress, alert and oriented x3.  Cooperative.  PSYCH:  Mood and affect appropriate.  SKIN: Skin color & texture with no obvious  abnormalities.    HEAD/FACE:  Normocephalic, atraumatic.    PULM:  No difficulty breathing. No nasal flaring. No obvious wheezing.  EXTREMITIES: No obvious deformities. Moving all extremities well, appears to have symmetric strength throughout. Moderate swelling to left knee, limited ROM secondary to pain and swelling  MUSCULOSKELETAL: No obvious atrophy abnormalities are noted.   NEURO: No obvious neurologic deficit.   GAIT: sitting.     Physical Exam: last in clinic visit:    There were no vitals filed for this visit.      There is no height or weight on file to calculate BMI.   (reviewed on 7/24/2023)     GENERAL: Well appearing, in no acute distress, alert and oriented x3.  Cooperative.  PSYCH:  Mood and affect appropriate.  SKIN: Skin color & texture with no obvious abnormalities.    HEAD/FACE:  Normocephalic, atraumatic.    PULM:  No difficulty breathing. No nasal flaring. No obvious wheezing.  NECK: ROM fairly preserved.  Supple.   BACK: Palpation over the lumbar paraspinous muscles causes pain. Pain with palpation over lumbar facets. Some pain with flexion. Some pain with extension.  Limited ROM secondary to pain reproduction.Pain with facet loading.   EXTREMITIES: No obvious deformities. Moving all extremities well, appears to have symmetric strength throughout. TTP along bilateral joint line of left knee, TTP along left pes bursa, decreased ROM secondary to pain and stiffness, no erythema, warmth or swelling. Right knee with very mild TTP along joint line and mild limitation to ROM  MUSCULOSKELETAL: No obvious atrophy abnormalities are noted.   NEURO: No obvious neurologic deficit.   GAIT: sitting.       Musculoskeletal:    Lumbar Spine    - Pain on flexion of lumbar spine Absent  - Straight Leg Raise:  Absent    - Pain on extension of lumbar spine Present  - TTP over the lumbar facet joints Present  - Lumbar facet loading Present    -Pain on palpation over the SI joint  Negative  - GALLO:  Equivocal    Knee: left  - Scars: Present   - TTP: Present over medial/ lateral joint line  - ROM: Decreased secondary to pain  - Pain with extension: Present  - Pain with flexion: Present  - Crepitus: Present        Neuro:    Strength:  UE R/L: D: 5/5; B: 5/5; T: 5/5; WF: 5/5; WE: 5/5; IO: 5/5;  LE R/L: HF: 5/5, HE: 5/5, KF: 5/5; KE: 5/5; FE: 5/5; FF: 5/5    Extremity Reflexes: Brisk and symmetric throughout.      Extremity Sensory: Sensation to pinprick and temperature symmetric. Proprioception intact.      Psych:  Mood and affect is appropriate      Assessment:    Modesta Camacho is a 57 y.o. year old female who is presenting with     No diagnosis found.        Plan:  1. Interventional: Schedule for left genicular block to address knee pain s/p left TKA  Consider L3-5 MBB for axial back pain, patient reports she is not interested in injections at this time  - S/p ultrasound-guided right hip injection by Dr. Acevedo on 12/21/2021 with short-term pain relief.      2. Pharmacologic:   - D/c Topamax- dry mouth  -Continue Gabapentin 300 mg TID  - Continue Tramadol 50 mg Po BID PRN (60 tabs) - from Rheumatology.   - Continue Cymbalta 60 mg PO BID.   -Continue  Zanaflex 4mg to take 1/2 to 1 tab BID PRN (60 tablets). Patient understands this may cause drowsiness.      3. Rehabilitative: Encouraged ambulation. Continue physical therapy to help with strengthening, although patient reports not helping with pain.   Patient previously informed that we will fill out [facility] paperwork for physical therapy and procedures, but we will not fill out paperwork for disability.  Our goal is to improve pain to continue job responsibility.     4. Diagnostic: Lumbar MRI and x-ray reviewed with patient.    5. Follow up: 4 weeks after injection, continue follow up with Dr. Clifford regarding hip and knee    - This condition does not require this patient to take time off of work, and the primary goal of our Pain Management services is to  improve the patient's functional capacity.   - I discussed the risks, benefits, and alternatives to potential treatment options. All questions and concerns were fully addressed today in clinic.       Barby Casillas PA-C    Disclaimer:  This note was prepared using voice recognition system and is likely to have sound alike errors that may have been overlooked even after proof reading.  Please call me with any questions.

## 2023-07-28 DIAGNOSIS — M25.551 RIGHT HIP PAIN: ICD-10-CM

## 2023-07-28 RX ORDER — OXYCODONE AND ACETAMINOPHEN 10; 325 MG/1; MG/1
1 TABLET ORAL EVERY 8 HOURS PRN
Qty: 15 TABLET | Refills: 0 | Status: CANCELLED | OUTPATIENT
Start: 2023-07-28

## 2023-07-31 DIAGNOSIS — M25.551 RIGHT HIP PAIN: ICD-10-CM

## 2023-07-31 RX ORDER — OXYCODONE AND ACETAMINOPHEN 10; 325 MG/1; MG/1
1 TABLET ORAL EVERY 8 HOURS PRN
Qty: 15 TABLET | Refills: 0 | Status: SHIPPED | OUTPATIENT
Start: 2023-07-31 | End: 2023-08-10 | Stop reason: SDUPTHER

## 2023-08-02 ENCOUNTER — CLINICAL SUPPORT (OUTPATIENT)
Dept: REHABILITATION | Facility: HOSPITAL | Age: 58
End: 2023-08-02
Payer: COMMERCIAL

## 2023-08-02 DIAGNOSIS — Z96.641 S/P TOTAL RIGHT HIP ARTHROPLASTY: Primary | ICD-10-CM

## 2023-08-02 DIAGNOSIS — R29.898 WEAKNESS OF RIGHT HIP: ICD-10-CM

## 2023-08-02 PROCEDURE — 97112 NEUROMUSCULAR REEDUCATION: CPT | Mod: PN

## 2023-08-02 PROCEDURE — 97530 THERAPEUTIC ACTIVITIES: CPT | Mod: PN

## 2023-08-02 NOTE — PROGRESS NOTES
[facility]   Physical Therapy Treatment Note      Name: Modesta Camacho  Clinic Number: [id number]    Therapy Diagnosis:   No diagnosis found.      Physician: Kitty García PA    Visit Date: 8/2/2023    Physician Orders: PT Eval and Treat   Medical Diagnosis from Referral: S/P total right hip arthroplasty   Evaluation Date: 7/6/2023  Authorization Period Expiration: 12/31/2023  Plan of Care Expiration: 9/10/2023  Visit # / Visits authorized: 5/12     Time In: 0753  Time Out: 0831  Total Billable Time: 38 minutes     Precautions: Standard, Fall, Weightbearing, and hip precautions    PTA Visit #: 0/5      Subjective     Pt reports: Knee is bothering her and waiting on a nerve block  She was compliant with home exercise program.  Response to previous treatment: N/A  Functional change: N/A    Pain: 2/10  Location: right back and buttocks     Objective      Objective Measures updated at progress report unless specified.     Treatment     Modesta received the treatments listed below:      therapeutic exercises to develop strength, ROM, and flexibility for 0 minutes including:  Nustep 7 minutes    neuromuscular re-education activities to improve: Coordination, Kinesthetic, Sense, and Proprioception for 13 minutes. The following activities were included:  Bridges 2x10  Standing hip abduction (B) 2x10  Standing hip extension (B) 2x10    Therapeutic activities to improve functional performance for 25  minutes, including:      [place] carry 10# x2 laps bilaterally  Sit to stand 20 inches 2x10  Sit to stand 18 inches 2x10    cold pack for 0 minutes to right knee.    Patient Education and Home Exercises       Education provided:   - Home program  - Instruction in total hip precautions    Written Home Exercises Provided: Patient instructed to cont prior HEP. Exercises were reviewed and Modesta was able to demonstrate them prior to the end of the session.  Modesta demonstrated good  understanding of the  education provided. See EMR under Patient Instructions for exercises provided during therapy sessions    Assessment     The patient performed exercises to improve hip strength and range of motion.  She shortened therapy today due to increased left knee pain.      Modesta Is progressing well towards her goals.   Pt prognosis is Good.     Pt will continue to benefit from skilled outpatient physical therapy to address the deficits listed in the problem list box on initial evaluation, provide pt/family education and to maximize pt's level of independence in the home and community environment.     Pt's spiritual, cultural and educational needs considered and pt agreeable to plan of care and goals.     Anticipated barriers to physical therapy: None    Goals:   Short Term Goals: In 6 weeks   1.I with HEP  2.Pt to improve efficiency with rolling and home transfers   3.Pt to improve FTSTS test from over a minute to less than 40 seconds   4.Pt to increase BLE strength by 1/2 grade.   5.Pt to have pain less than 4 at all times.  6.Pt to improve score on the FOTO by 10%.  7. Pt to be educated on postural/body mechanics awareness.     Long Term Goals: In 10 weeks  1.Patient to improve score on the FOTO to 30% or less..  2. Patient to demo increase in LE strength to 4+/5  3. Patient to have decreased pain to 2 at worst.  4. Patient to ambulate short distance of 500ft without deviations to improve home ambulation  5. Patient to increase hip AROM to WFL.  5. Patient to perform daily activities including ADLs, home chores, caring for grandchild, and driving without limitation.    Plan     Plan of care Certification: 7/6/2023 to 9/10/2023.     Outpatient Physical Therapy 2 times weekly for 10 weeks to include the following interventions: Electrical Stimulation prn, Gait Training, Manual Therapy, Moist Heat/ Ice, Neuromuscular Re-ed, Orthotic Management and Training, Patient Education, Self Care, Therapeutic Activities, Therapeutic  Exercise, and dry needling .     Titus Rosario, PT

## 2023-08-04 ENCOUNTER — CLINICAL SUPPORT (OUTPATIENT)
Dept: REHABILITATION | Facility: HOSPITAL | Age: 58
End: 2023-08-04
Payer: COMMERCIAL

## 2023-08-04 DIAGNOSIS — R29.898 WEAKNESS OF RIGHT HIP: ICD-10-CM

## 2023-08-04 DIAGNOSIS — Z96.641 S/P TOTAL RIGHT HIP ARTHROPLASTY: Primary | ICD-10-CM

## 2023-08-04 PROCEDURE — 97112 NEUROMUSCULAR REEDUCATION: CPT | Mod: PN,CQ

## 2023-08-04 PROCEDURE — 97530 THERAPEUTIC ACTIVITIES: CPT | Mod: PN,CQ

## 2023-08-04 NOTE — PROGRESS NOTES
[facility]   Physical Therapy Treatment Note      Name: Modesta Camacho  Clinic Number: [id number]    Therapy Diagnoss:   Encounter Diagnoses   Name Primary?    S/P total right hip arthroplasty Yes    Weakness of right hip          Physician: Kitty García PA    Visit Date: 8/4/2023    Physician Orders: PT Eval and Treat   Medical Diagnosis from Referral: S/P total right hip arthroplasty   Evaluation Date: 7/6/2023  Authorization Period Expiration: 12/31/2023  Plan of Care Expiration: 9/10/2023  Visit # / Visits authorized: 6/12     Time In: 1110  Time Out: 1155  Total Billable Time: 35 minutes     Precautions: Standard, Fall, Weightbearing, and hip precautions    PTA Visit #: 1/5      Subjective     Pt reports: Knee continues to hurt and limit her walking. She feels burning radiating down left leg to ankle. She has pulling in anterior right thigh and posterior thigh as well.  She was compliant with home exercise program.  Response to previous treatment: bridges were painful  Functional change: N/A    Pain: 5/10   Location: right back and buttocks     Objective      Objective Measures updated at progress report unless specified.     Treatment     Modesta received the treatments listed below:      neuromuscular re-education activities to improve: Coordination, Kinesthetic, Sense, and Proprioception for 20 minutes. The following activities were included:  Standing hip abduction (B) 2x10  Standing hip extension (B) 2x10  Lateral stepping    Therapeutic activities to improve functional performance for 24  minutes, including:      [place] carry 10# x2 laps bilaterally (progress to 15# next week)  Sit to stand 18 inches 2x15    therapeutic exercises to develop strength, ROM, and flexibility for 6 minutes including:  Nustep 6 minutes    cold pack for 0 minutes to right knee.    Patient Education and Home Exercises       Education provided:   - Home program  - Instruction in total hip  precautions    Written Home Exercises Provided: Patient instructed to cont prior HEP. Exercises were reviewed and Modesta was able to demonstrate them prior to the end of the session.  Modesta demonstrated good  understanding of the education provided. See EMR under Patient Instructions for exercises provided during therapy sessions    Assessment     The patient performed exercises in standing upon request to address hip and knee stability bilaterally. Patient demonstrates trendelenburg gait pattern so lateral stepping was added to improve glute med stability. She required use of pain patch with sit to stand due to pain in left knee. Patient will benefit from continued skilled therapy to improve strength, functional mobility and decrease pain    Modesta Is progressing well towards her goals.   Pt prognosis is Good.     Pt will continue to benefit from skilled outpatient physical therapy to address the deficits listed in the problem list box on initial evaluation, provide pt/family education and to maximize pt's level of independence in the home and community environment.     Pt's spiritual, cultural and educational needs considered and pt agreeable to plan of care and goals.     Anticipated barriers to physical therapy: None    Goals:   Short Term Goals: In 6 weeks   1.I with HEP  2.Pt to improve efficiency with rolling and home transfers   3.Pt to improve FTSTS test from over a minute to less than 40 seconds   4.Pt to increase BLE strength by 1/2 grade.   5.Pt to have pain less than 4 at all times.  6.Pt to improve score on the FOTO by 10%.  7. Pt to be educated on postural/body mechanics awareness.     Long Term Goals: In 10 weeks  1.Patient to improve score on the FOTO to 30% or less..  2. Patient to demo increase in LE strength to 4+/5  3. Patient to have decreased pain to 2 at worst.  4. Patient to ambulate short distance of 500ft without deviations to improve home ambulation  5. Patient to increase hip AROM to WFL.  5.  Patient to perform daily activities including ADLs, home chores, caring for grandchild, and driving without limitation.    Plan     Plan of care Certification: 7/6/2023 to 9/10/2023.     Outpatient Physical Therapy 2 times weekly for 10 weeks to include the following interventions: Electrical Stimulation prn, Gait Training, Manual Therapy, Moist Heat/ Ice, Neuromuscular Re-ed, Orthotic Management and Training, Patient Education, Self Care, Therapeutic Activities, Therapeutic Exercise, and dry needling .     Miracle Baca, PTA

## 2023-08-07 ENCOUNTER — OFFICE VISIT (OUTPATIENT)
Dept: ORTHOPEDICS | Facility: CLINIC | Age: 58
End: 2023-08-07
Payer: COMMERCIAL

## 2023-08-07 VITALS — BODY MASS INDEX: 29.81 KG/M2 | WEIGHT: 162 LBS | HEIGHT: 62 IN

## 2023-08-07 DIAGNOSIS — M16.11 ARTHRITIS OF RIGHT HIP: ICD-10-CM

## 2023-08-07 DIAGNOSIS — Z96.641 S/P TOTAL RIGHT HIP ARTHROPLASTY: Primary | ICD-10-CM

## 2023-08-07 PROCEDURE — 1159F PR MEDICATION LIST DOCUMENTED IN MEDICAL RECORD: ICD-10-PCS | Mod: CPTII,S$GLB,, | Performed by: PHYSICIAN ASSISTANT

## 2023-08-07 PROCEDURE — 1160F RVW MEDS BY RX/DR IN RCRD: CPT | Mod: CPTII,S$GLB,, | Performed by: PHYSICIAN ASSISTANT

## 2023-08-07 PROCEDURE — 1159F MED LIST DOCD IN RCRD: CPT | Mod: CPTII,S$GLB,, | Performed by: PHYSICIAN ASSISTANT

## 2023-08-07 PROCEDURE — 3008F PR BODY MASS INDEX (BMI) DOCUMENTED: ICD-10-PCS | Mod: CPTII,S$GLB,, | Performed by: PHYSICIAN ASSISTANT

## 2023-08-07 PROCEDURE — 1160F PR REVIEW ALL MEDS BY PRESCRIBER/CLIN PHARMACIST DOCUMENTED: ICD-10-PCS | Mod: CPTII,S$GLB,, | Performed by: PHYSICIAN ASSISTANT

## 2023-08-07 PROCEDURE — 99999 PR PBB SHADOW E&M-EST. PATIENT-LVL III: ICD-10-PCS | Mod: PBBFAC,,, | Performed by: PHYSICIAN ASSISTANT

## 2023-08-07 PROCEDURE — 99024 POSTOP FOLLOW-UP VISIT: CPT | Mod: S$GLB,,, | Performed by: PHYSICIAN ASSISTANT

## 2023-08-07 PROCEDURE — 3008F BODY MASS INDEX DOCD: CPT | Mod: CPTII,S$GLB,, | Performed by: PHYSICIAN ASSISTANT

## 2023-08-07 PROCEDURE — 4010F ACE/ARB THERAPY RXD/TAKEN: CPT | Mod: CPTII,S$GLB,, | Performed by: PHYSICIAN ASSISTANT

## 2023-08-07 PROCEDURE — 99024 PR POST-OP FOLLOW-UP VISIT: ICD-10-PCS | Mod: S$GLB,,, | Performed by: PHYSICIAN ASSISTANT

## 2023-08-07 PROCEDURE — 4010F PR ACE/ARB THEARPY RXD/TAKEN: ICD-10-PCS | Mod: CPTII,S$GLB,, | Performed by: PHYSICIAN ASSISTANT

## 2023-08-07 PROCEDURE — 99999 PR PBB SHADOW E&M-EST. PATIENT-LVL III: CPT | Mod: PBBFAC,,, | Performed by: PHYSICIAN ASSISTANT

## 2023-08-07 NOTE — PROGRESS NOTES
Patient ID: Modesta Camacho is a 57 y.o. female.    Chief Complaint: Pain and Post-op Evaluation of the Right Hip      HPI: Modesta Camacho  is a 57 y.o. female who c/o Pain and Post-op Evaluation of the Right Hip     Post op visit 2  Patient notes pain is 5/10   The patient is doing well since surgery and is pleased with her results   She knows this is a steadfast process but is still eager to see full results   She notes pain with use and activity over the course of the day but nothing she can not handle with over-the-counter medication  She is not using any devices to assist with ambulation  She is been compliant with PT as well as DVT     Aside patient has bilateral total knee replacements and no she needs left knee revision   She is set to undergo a procedure with pain management department       Patient is presently denying any shortness of breath, chest pain, fever/chills, nausea/vomiting, loss of taste or smell, numbness/tingling or sensation changes, loss of bladder or bowel function, loss of taste/smell.     Surgery: Right Total Hip    Surgery Date:  06/07/2023    Past Medical History:   Diagnosis Date    Abnormal EKG 2/5/2021    Anxiety     Hypertension     Osteoarthritis     Stage 2 chronic kidney disease 2/5/2021     Past Surgical History:   Procedure Laterality Date    ARTHROPLASTY OF HIP BY ANTERIOR APPROACH Left 7/31/2020    Procedure: ARTHROPLASTY, HIP, ANTERIOR APPROACH;  Surgeon: Xavi Gandara MD;  Location: [facility];  Service: Orthopedics;  Laterality: Left;    gastric sleeve      HIP ARTHROPLASTY Right 6/7/2023    Procedure: ARTHROPLASTY, HIP;  Surgeon: Trey Clifford MD;  Location: [facility];  Service: Orthopedics;  Laterality: Right;    JOINT REPLACEMENT Right 06/14/2016    knee    JOINT REPLACEMENT Left 05/20/2021    KNEE    JOINT REPLACEMENT Left 07/30/2020    HIP    KNEE ARTHROPLASTY Left 5/6/2021    Procedure: ARTHROPLASTY, KNEE;  Surgeon: Xavi Gandara MD;  Location: [facility];   Service: Orthopedics;  Laterality: Left;    RESURFACING OF PATELLA Right 9/27/2022    Procedure: RESURFACING, PATELLA;  Surgeon: Trey Clifford MD;  Location: [facility] OR;  Service: General;  Laterality: Right;    REVISION OF KNEE ARTHROPLASTY Right 9/27/2022    Procedure: REVISION, ARTHROPLASTY, KNEE;  Surgeon: Trey Clifford MD;  Location: [facility] OR;  Service: General;  Laterality: Right;    TUBAL LIGATION      UTERINE FIBROID EMBOLIZATION       Family History   Problem Relation Age of Onset    Hypertension Mother     Arthritis Mother     Diabetes Father     Heart disease Father     Depression Sister     Hypertension Sister     Arthritis Brother     Thyroid disease Son     Hypertension Son     Arthritis Maternal Grandmother     Heart disease Maternal Grandmother     Kidney disease Maternal Grandmother     Heart attack Maternal Grandfather     Stroke Paternal Grandmother     No Known Problems Paternal Grandfather     Eczema Son     Breast cancer Sister 47     Social History     Socioeconomic History    Marital status:      Spouse name: Pop Land    Number of children: 3   Occupational History    Occupation: patient access   Tobacco Use    Smoking status: Never     Passive exposure: Never    Smokeless tobacco: Never   Substance and Sexual Activity    Alcohol use: Yes     Comment: occasional: hold 72hrs. prior to surgery    Drug use: No    Sexual activity: Yes     Partners: Male     Birth control/protection: See Surgical Hx     Social Determinants of Health     Stress: Stress Concern Present (3/16/2020)    [employer] - Occupational Stress Questionnaire     Feeling of Stress : Rather much     Medication List with Changes/Refills   Current Medications    ALPRAZOLAM (XANAX) 0.5 MG TABLET    Take 1 tablet (0.5 mg total) by mouth nightly as needed for Anxiety.    AMLODIPINE (NORVASC) 5 MG TABLET    Take 1 tablet (5 mg total) by mouth 2 (two) times daily.    CLONIDINE (CATAPRES)  0.1 MG TABLET    TAKE 1 TABLET BY MOUTH ONCE DAILY USE FOR SBP GREATER THAN 160 MMHG.    DOCUSATE SODIUM (COLACE) 100 MG CAPSULE    Take 100 mg by mouth 2 (two) times daily as needed.    DULOXETINE (CYMBALTA) 60 MG CAPSULE    Take 1 capsule (60 mg total) by mouth 2 (two) times daily.    GABAPENTIN (NEURONTIN) 300 MG CAPSULE    Take 1 capsule (300 mg total) by mouth 3 (three) times daily.    METOPROLOL SUCCINATE (TOPROL-XL) 50 MG 24 HR TABLET    Take 2 tablets (100 mg total) by mouth nightly.    MULTIVIT-MIN/FERROUS FUMARATE (MULTI VITAMIN ORAL)    Take 2 tablets by mouth Daily.    ONDANSETRON (ZOFRAN-ODT) 4 MG TBDL    dissolve 2 tablets (8 mg total) by mouth every 8 (eight) hours as needed (nausea).    OXYCODONE-ACETAMINOPHEN (ENDOCET)  MG PER TABLET    Take 1 tablet by mouth every 8 (eight) hours as needed for Pain.    TELMISARTAN (MICARDIS) 80 MG TAB    Take 1 tablet (80 mg total) by mouth once daily.    TIZANIDINE (ZANAFLEX) 4 MG TABLET    Take 0.5-1 tablets (2-4 mg total) by mouth 2 (two) times daily as needed (pain).    VITAMIN D (VITAMIN D3) 1000 UNITS TAB    Take 1,000 Units by mouth once daily.     Review of patient's allergies indicates:   Allergen Reactions    Codeine Hives and Rash     Takes Tramadol and has never had an issue with SOB/swelling  Also tolerated hydromorphone 7/2020      Penicillin     Penicillins Hives     Pt tolerated cefazolin 7/2020       Objective:     Right Lower Extremity  NVI  WWP foot  Comp soft  Cap refill < 2 sec  Calf NT, soft  (-) Gwen sign  ELY  ROM : Patient is able to easily exhibit full flexion and extension on passive range of motion.   Abduction and adduction is full   Quadrants a simple  Wiggles toes  DF/PF intact  Sensation intact  Inc C/D/I  No SOI    Imaging:    No imaging obtained today    Assessment:       Encounter Diagnoses   Name Primary?    S/P total right hip arthroplasty Yes    Arthritis of right hip           Plan:       Modesta was seen today for pain  and post-op evaluation.    Diagnoses and all orders for this visit:    S/P total right hip arthroplasty    Arthritis of right hip        Modesta Camacho is an established pt here for postop follow-up after right total hip replacement by Dr. Clifford.  The patient will continue the current medication regimen and treatment plan.  Patient should notify the office of any signs or symptoms of infection including fevers, erythema, purulent drainage, increasing pain.  Patient will continue with DVT prophylaxis until at least 6 weeks postop.  Patient will continue outpatient physical therapy.  Will follow-up as scheduled. Patient verbalized understanding of all instructions and agreed with the above plan.  Additionally the patient requests an appointment be made around November to discuss left total knee revision.    No follow-ups on file.    The patient understands, chooses and consents to this plan and accepts all   the risks which include but are not limited to the risks mentioned above.     Disclaimer: This note was prepared using a voice recognition system and is likely to have sound alike errors within the text.

## 2023-08-08 ENCOUNTER — TELEPHONE (OUTPATIENT)
Dept: REHABILITATION | Facility: HOSPITAL | Age: 58
End: 2023-08-08

## 2023-08-09 ENCOUNTER — CLINICAL SUPPORT (OUTPATIENT)
Dept: REHABILITATION | Facility: HOSPITAL | Age: 58
End: 2023-08-09
Payer: COMMERCIAL

## 2023-08-09 DIAGNOSIS — R29.898 WEAKNESS OF RIGHT HIP: ICD-10-CM

## 2023-08-09 DIAGNOSIS — Z96.641 S/P TOTAL RIGHT HIP ARTHROPLASTY: Primary | ICD-10-CM

## 2023-08-09 PROCEDURE — 97112 NEUROMUSCULAR REEDUCATION: CPT | Mod: PN

## 2023-08-09 PROCEDURE — 97530 THERAPEUTIC ACTIVITIES: CPT | Mod: PN

## 2023-08-09 NOTE — PROGRESS NOTES
[facility]   Physical Therapy Treatment Note      Name: Modesta Camacho  Clinic Number: [id number]    Therapy Diagnoss:   Encounter Diagnoses   Name Primary?    S/P total right hip arthroplasty Yes    Weakness of right hip      Physician: Kitty García PA    Visit Date: 8/9/2023    Physician Orders: PT Eval and Treat   Medical Diagnosis from Referral: S/P total right hip arthroplasty   Evaluation Date: 7/6/2023  Authorization Period Expiration: 12/31/2023  Plan of Care Expiration: 9/10/2023  Visit # / Visits authorized: 7/12     Time In: 0630  Time Out:  0710  Total Billable Time: 40 minutes     Precautions: Standard, Fall, Weightbearing, and hip precautions    PTA Visit #: 1/5      Subjective     Pt reports: The patient states she will need a left knee revision.  She was compliant with home exercise program.  Response to previous treatment: bridges were painful  Functional change: N/A    Pain: 5/10   Location: right back and buttocks     Objective      Objective Measures updated at progress report unless specified.     Treatment     Modesta received the treatments listed below:      neuromuscular re-education activities to improve: Coordination, Kinesthetic, Sense, and Proprioception for 25 minutes. The following activities were included:  Standing hip abduction (B) 2x10  Standing hip extension (B) 2x10  Lateral stepping  Clamshells  Prone hip extension    Therapeutic activities to improve functional performance for 15  minutes, including:      [place] carry 15# x2 laps bilaterally   Sit to stand 18 inches 2x15    therapeutic exercises to develop strength, ROM, and flexibility for 00 minutes including:  Nustep 6 minutes  Heel slides  Supine SAQ     cold pack for 0 minutes to right knee.    Patient Education and Home Exercises       Education provided:   - Home program  - Instruction in total hip precautions    Written Home Exercises Provided: Patient instructed to cont prior HEP.  Exercises were reviewed and Modesta was able to demonstrate them prior to the end of the session.  Modesta demonstrated good  understanding of the education provided. See EMR under Patient Instructions for exercises provided during therapy sessions    Assessment     The patient is progressing more toward standing and dynamic activity.  She has left knee pain with interferes with mobility.  She was instructed in and performed strengthening and stability exercises.    Modesta Is progressing well towards her goals.   Pt prognosis is Good.     Pt will continue to benefit from skilled outpatient physical therapy to address the deficits listed in the problem list box on initial evaluation, provide pt/family education and to maximize pt's level of independence in the home and community environment.     Pt's spiritual, cultural and educational needs considered and pt agreeable to plan of care and goals.     Anticipated barriers to physical therapy: None    Goals:   Short Term Goals: In 6 weeks   1.I with HEP  2.Pt to improve efficiency with rolling and home transfers   3.Pt to improve FTSTS test from over a minute to less than 40 seconds   4.Pt to increase BLE strength by 1/2 grade.   5.Pt to have pain less than 4 at all times.  6.Pt to improve score on the FOTO by 10%.  7. Pt to be educated on postural/body mechanics awareness.     Long Term Goals: In 10 weeks  1.Patient to improve score on the FOTO to 30% or less..  2. Patient to demo increase in LE strength to 4+/5  3. Patient to have decreased pain to 2 at worst.  4. Patient to ambulate short distance of 500ft without deviations to improve home ambulation  5. Patient to increase hip AROM to WFL.  5. Patient to perform daily activities including ADLs, home chores, caring for grandchild, and driving without limitation.    Plan     Plan of care Certification: 7/6/2023 to 9/10/2023.     Outpatient Physical Therapy 2 times weekly for 10 weeks to include the following interventions:  Electrical Stimulation prn, Gait Training, Manual Therapy, Moist Heat/ Ice, Neuromuscular Re-ed, Orthotic Management and Training, Patient Education, Self Care, Therapeutic Activities, Therapeutic Exercise, and dry needling .     Titus Rosario, PT

## 2023-08-10 DIAGNOSIS — M25.551 RIGHT HIP PAIN: ICD-10-CM

## 2023-08-11 ENCOUNTER — PATIENT MESSAGE (OUTPATIENT)
Dept: ORTHOPEDICS | Facility: CLINIC | Age: 58
End: 2023-08-11
Payer: COMMERCIAL

## 2023-08-11 ENCOUNTER — PATIENT MESSAGE (OUTPATIENT)
Dept: PAIN MEDICINE | Facility: CLINIC | Age: 58
End: 2023-08-11
Payer: COMMERCIAL

## 2023-08-11 RX ORDER — OXYCODONE AND ACETAMINOPHEN 10; 325 MG/1; MG/1
1 TABLET ORAL EVERY 8 HOURS PRN
Qty: 15 TABLET | Refills: 0 | Status: SHIPPED | OUTPATIENT
Start: 2023-08-11 | End: 2023-10-16

## 2023-08-14 ENCOUNTER — HOSPITAL ENCOUNTER (OUTPATIENT)
Facility: HOSPITAL | Age: 58
Discharge: HOME | End: 2023-08-14
Attending: ANESTHESIOLOGY | Admitting: ANESTHESIOLOGY
Payer: COMMERCIAL

## 2023-08-14 VITALS
DIASTOLIC BLOOD PRESSURE: 79 MMHG | SYSTOLIC BLOOD PRESSURE: 178 MMHG | TEMPERATURE: 98 F | BODY MASS INDEX: 29.56 KG/M2 | OXYGEN SATURATION: 98 % | HEART RATE: 72 BPM | WEIGHT: 160.63 LBS | HEIGHT: 62 IN | RESPIRATION RATE: 16 BRPM

## 2023-08-14 DIAGNOSIS — M17.12 LOCALIZED OSTEOARTHRITIS OF LEFT KNEE: Primary | ICD-10-CM

## 2023-08-14 PROCEDURE — 64454 NJX AA&/STRD GNCLR NRV BRNCH: CPT | Mod: LT,,, | Performed by: ANESTHESIOLOGY

## 2023-08-14 PROCEDURE — 63600175 PHARM REV CODE 636 W HCPCS: Performed by: ANESTHESIOLOGY

## 2023-08-14 PROCEDURE — 64454 NJX AA&/STRD GNCLR NRV BRNCH: CPT | Mod: LT | Performed by: ANESTHESIOLOGY

## 2023-08-14 PROCEDURE — 64454 PR NERVE BLOCK INJ, ANES/STEROID, GENICULAR NERVE, W/IMG: ICD-10-PCS | Mod: LT,,, | Performed by: ANESTHESIOLOGY

## 2023-08-14 RX ORDER — DEXAMETHASONE SODIUM PHOSPHATE 10 MG/ML
INJECTION INTRAMUSCULAR; INTRAVENOUS
Status: DISCONTINUED | OUTPATIENT
Start: 2023-08-14 | End: 2023-08-14 | Stop reason: HOSPADM

## 2023-08-14 RX ORDER — MIDAZOLAM HYDROCHLORIDE 1 MG/ML
INJECTION, SOLUTION INTRAMUSCULAR; INTRAVENOUS
Status: DISCONTINUED | OUTPATIENT
Start: 2023-08-14 | End: 2023-08-14 | Stop reason: HOSPADM

## 2023-08-14 RX ORDER — ONDANSETRON 2 MG/ML
4 INJECTION INTRAMUSCULAR; INTRAVENOUS ONCE AS NEEDED
Status: DISCONTINUED | OUTPATIENT
Start: 2023-08-14 | End: 2023-08-14 | Stop reason: HOSPADM

## 2023-08-14 RX ORDER — BUPIVACAINE HYDROCHLORIDE 5 MG/ML
INJECTION, SOLUTION EPIDURAL; INTRACAUDAL
Status: DISCONTINUED | OUTPATIENT
Start: 2023-08-14 | End: 2023-08-14 | Stop reason: HOSPADM

## 2023-08-14 RX ORDER — FENTANYL CITRATE 50 UG/ML
INJECTION, SOLUTION INTRAMUSCULAR; INTRAVENOUS
Status: DISCONTINUED | OUTPATIENT
Start: 2023-08-14 | End: 2023-08-14 | Stop reason: HOSPADM

## 2023-08-14 NOTE — DISCHARGE SUMMARY
Discharge Note  Short Stay      SUMMARY     Admit Date: 8/14/2023    Attending Physician: Shaun Iyer MD        Discharge Physician: Shaun Iyer MD        Discharge Date: 8/14/2023 9:43 AM    Procedure(s) (LRB):  left genicular nerve block RN IV Sedation (Bilateral)    Final Diagnosis: Knee pain, unspecified chronicity, unspecified laterality [M25.569]    Disposition: Home or self care    Patient Instructions:   Current Discharge Medication List        CONTINUE these medications which have NOT CHANGED    Details   amLODIPine (NORVASC) 5 MG tablet Take 1 tablet (5 mg total) by mouth 2 (two) times daily.  Qty: 60 tablet, Refills: 11    Comments: .  Associated Diagnoses: Abnormal EKG; Essential hypertension; Pulmonary HTN; DDD (degenerative disc disease), cervical; Hyperuricemia; Anxiety      cloNIDine (CATAPRES) 0.1 MG tablet TAKE 1 TABLET BY MOUTH ONCE DAILY USE FOR SBP GREATER THAN 160 MMHG.  Qty: 90 tablet, Refills: 1    Comments: .  Associated Diagnoses: Essential hypertension      DULoxetine (CYMBALTA) 60 MG capsule Take 1 capsule (60 mg total) by mouth 2 (two) times daily.  Qty: 60 capsule, Refills: 2    Associated Diagnoses: LANE (generalized anxiety disorder)      gabapentin (NEURONTIN) 300 MG capsule Take 1 capsule (300 mg total) by mouth 3 (three) times daily.  Qty: 90 capsule, Refills: 11    Associated Diagnoses: Arthritis of right hip      metoprolol succinate (TOPROL-XL) 50 MG 24 hr tablet Take 2 tablets (100 mg total) by mouth nightly.  Qty: 180 tablet, Refills: 1    Comments: .  Associated Diagnoses: Essential hypertension      oxyCODONE-acetaminophen (ENDOCET)  mg per tablet Take 1 tablet by mouth every 8 (eight) hours as needed for Pain.  Qty: 15 tablet, Refills: 0    Comments: Quantity prescribed more than 7 day supply? No  Associated Diagnoses: Right hip pain      telmisartan (MICARDIS) 80 MG Tab Take 1 tablet (80 mg total) by mouth once daily.  Qty: 90 tablet, Refills: 3     Associated Diagnoses: Essential hypertension      tiZANidine (ZANAFLEX) 4 MG tablet Take 0.5-1 tablets (2-4 mg total) by mouth 2 (two) times daily as needed (pain).  Qty: 90 tablet, Refills: 2    Associated Diagnoses: Knee pain, unspecified chronicity, unspecified laterality; DDD (degenerative disc disease), lumbar; Primary osteoarthritis of right hip      ALPRAZolam (XANAX) 0.5 MG tablet Take 1 tablet (0.5 mg total) by mouth nightly as needed for Anxiety.  Qty: 30 tablet, Refills: 1    Associated Diagnoses: LANE (generalized anxiety disorder)      docusate sodium (COLACE) 100 MG capsule Take 100 mg by mouth 2 (two) times daily as needed.      multivit-min/ferrous fumarate (MULTI VITAMIN ORAL) Take 2 tablets by mouth Daily.      ondansetron (ZOFRAN-ODT) 4 MG TbDL dissolve 2 tablets (8 mg total) by mouth every 8 (eight) hours as needed (nausea).  Qty: 20 tablet, Refills: 0      vitamin D (VITAMIN D3) 1000 units Tab Take 1,000 Units by mouth once daily.                 Discharge Diagnosis: Knee pain, unspecified chronicity, unspecified laterality [M25.569]  Condition on Discharge: Stable with no complications to procedure   Diet on Discharge: Same as before.  Activity: as per instruction sheet.  Discharge to: Home with a responsible adult.  Follow up: 2-4 weeks       Please call the office at [phone] if you experience any weakness or loss of sensation, fever > 101.5, pain uncontrolled with oral medications, persistent nausea/vomiting/or diarrhea, redness or drainage from the incisions, or any other worrisome concerns. If physician on call was not reached or could not communicate with our office for any reason please go to the nearest emergency department

## 2023-08-14 NOTE — PRE-PROCEDURE INSTRUCTIONS
Spoke with patient regarding procedure scheduled on 8.14     Arrival time 0915     Has patient been sick with fever or on antibiotics within the last 7 days? No     Does the patient have any open wounds, sores or rashes? No     Does the patient have any recent fractures? no     Has patient received a vaccination within the last 7 days? No     Received the COVID vaccination? yes     Has the patient stopped all medications as directed? na     Does patient have a pacemaker and or defibrillator? no     Does the patient have a ride to and from procedure and someone reliable to remain with patient?      Is the patient diabetic? no     Does the patient have sleep apnea? Or use O2 at home? no     Is the patient receiving sedation? yes     Is the patient instructed to remain NPO beginning at midnight the night before their procedure? yes     Procedure location confirmed with patient? Yes     Covid- Denies signs/symptoms. Instructed to notify PAT/MD if any changes.

## 2023-08-14 NOTE — OP NOTE
Genicular Nerve Block    INFORMED CONSENT: The procedure, risks, benefits and options were discussed with patient. There are no contraindications to the procedure. The patient expressed understanding and agreed to proceed. The personnel performing the procedure was discussed.    Date of procedure 08/14/2023    Time-out taken to identify patient and procedure side prior to starting the procedure.                   Procedure Note:    Left  Geniculate nerve block under fluoroscopy                               Pre-Op Diagnosis:  Left  Knee pain, unspecified chronicity, unspecified laterality [M25.569]    Post-Op Diagnosis: Knee pain, unspecified chronicity, unspecified laterality [M25.569]    Surgeon: Shaun Iyer MD    MEDICATION: 6ml of Bupivacaine PF 0.25%    LOCAL: 8ml Lidocaine PF 1%    Assistant: None    EBL: None    Complications: None    Specimens: None    Sedation: Conscious sedation provided by M.D    SEDATION MEDICATIONS: local/IV sedation: Versed 2 mg and fentanyl 75 mcg IV.  Conscious sedation ordered by MD.  Patient reevaluated and sedation administered by MD and monitored by RN.  Total sedation time was less than 15 min.      TECHNIQUE: After written consent was obtained, patient placed in supine position.  The area over the medial and lateral aspect of the superior epi-condyle of the femur and the medial tibial metaphysis were prepped with chlorhexidine.  The area was draped in the usual sterile fashion.  Approximately 8 mL total 1% lidocaine was infiltrated into the skin overlying the 3 predetermined entry points. A 22 gauge spinal needle was then advanced under fluoroscopy in the AP and lateral views into the positions of the geniculate nerves at these levels. After negative aspiration and no paresthesias there was injection of 2 mL of 0.25% bupivacaine into each of these 3 areas for a total volume of 6 mL of 0.25% bupivacaine. Needle was withdrawn and a sterile band-aid applied to the  skin.      The patient was monitored for approximately 30 minutes after the procedure.  Patient was given post procedure and discharge instructions to follow at home.  The patient was discharged in a stable condition

## 2023-08-14 NOTE — H&P
HPI  Patient presenting for Procedure(s) (LRB):  left genicular nerve block RN IV Sedation (Bilateral)     Patient on Anti-coagulation No    No health changes since previous encounter    Past Medical History:   Diagnosis Date    Abnormal EKG 2/5/2021    Anxiety     Hypertension     Osteoarthritis     Stage 2 chronic kidney disease 2/5/2021     Past Surgical History:   Procedure Laterality Date    ARTHROPLASTY OF HIP BY ANTERIOR APPROACH Left 7/31/2020    Procedure: ARTHROPLASTY, HIP, ANTERIOR APPROACH;  Surgeon: Xavi Gandara MD;  Location: [facility] OR;  Service: Orthopedics;  Laterality: Left;    gastric sleeve      HIP ARTHROPLASTY Right 6/7/2023    Procedure: ARTHROPLASTY, HIP;  Surgeon: Trey Clifford MD;  Location: [facility] OR;  Service: Orthopedics;  Laterality: Right;    JOINT REPLACEMENT Right 06/14/2016    knee    JOINT REPLACEMENT Left 05/20/2021    KNEE    JOINT REPLACEMENT Left 07/30/2020    HIP    KNEE ARTHROPLASTY Left 5/6/2021    Procedure: ARTHROPLASTY, KNEE;  Surgeon: Xavi Gandara MD;  Location: [facility] OR;  Service: Orthopedics;  Laterality: Left;    RESURFACING OF PATELLA Right 9/27/2022    Procedure: RESURFACING, PATELLA;  Surgeon: Trey Clifford MD;  Location: [facility] OR;  Service: General;  Laterality: Right;    REVISION OF KNEE ARTHROPLASTY Right 9/27/2022    Procedure: REVISION, ARTHROPLASTY, KNEE;  Surgeon: Trey Clifford MD;  Location: [facility] OR;  Service: General;  Laterality: Right;    TUBAL LIGATION      UTERINE FIBROID EMBOLIZATION       Review of patient's allergies indicates:   Allergen Reactions    Codeine Hives and Rash     Takes Tramadol and has never had an issue with SOB/swelling  Also tolerated hydromorphone 7/2020      Penicillin     Penicillins Hives     Pt tolerated cefazolin 7/2020        Current Facility-Administered Medications on File Prior to Encounter   Medication Dose Route Frequency Provider Last Rate Last Admin    0.9%  NaCl infusion   Intravenous Continuous  Trey Clifford MD        acetaminophen tablet 650 mg  650 mg Oral Q8H PRN Trey Clifford MD   650 mg at 06/07/23 0653    chlorhexidine 0.12 % solution 10 mL  10 mL Mouth/Throat On Call Procedure Trey Clifford MD   10 mL at 06/07/23 0655    dexAMETHasone injection 8 mg  8 mg Intravenous On Call Procedure Trey Clifford MD   8 mg at 06/07/23 0715    gabapentin capsule 300 mg  300 mg Oral Daily Trey Clifford MD   300 mg at 06/07/23 1013     Current Outpatient Medications on File Prior to Encounter   Medication Sig Dispense Refill    amLODIPine (NORVASC) 5 MG tablet Take 1 tablet (5 mg total) by mouth 2 (two) times daily. 60 tablet 11    cloNIDine (CATAPRES) 0.1 MG tablet TAKE 1 TABLET BY MOUTH ONCE DAILY USE FOR SBP GREATER THAN 160 MMHG. 90 tablet 1    DULoxetine (CYMBALTA) 60 MG capsule Take 1 capsule (60 mg total) by mouth 2 (two) times daily. 60 capsule 2    gabapentin (NEURONTIN) 300 MG capsule Take 1 capsule (300 mg total) by mouth 3 (three) times daily. 90 capsule 11    metoprolol succinate (TOPROL-XL) 50 MG 24 hr tablet Take 2 tablets (100 mg total) by mouth nightly. 180 tablet 1    telmisartan (MICARDIS) 80 MG Tab Take 1 tablet (80 mg total) by mouth once daily. 90 tablet 3    tiZANidine (ZANAFLEX) 4 MG tablet Take 0.5-1 tablets (2-4 mg total) by mouth 2 (two) times daily as needed (pain). 90 tablet 2    ALPRAZolam (XANAX) 0.5 MG tablet Take 1 tablet (0.5 mg total) by mouth nightly as needed for Anxiety. 30 tablet 1    docusate sodium (COLACE) 100 MG capsule Take 100 mg by mouth 2 (two) times daily as needed.      multivit-min/ferrous fumarate (MULTI VITAMIN ORAL) Take 2 tablets by mouth Daily.      ondansetron (ZOFRAN-ODT) 4 MG TbDL dissolve 2 tablets (8 mg total) by mouth every 8 (eight) hours as needed (nausea). 20 tablet 0    vitamin D (VITAMIN D3) 1000 units Tab Take 1,000 Units by mouth once daily.          PMHx, PSHx, Allergies, Medications reviewed in epic    ROS  "negative except pain complaints in HPI    OBJECTIVE:    BP (!) 192/98 (BP Location: Right arm, Patient Position: Sitting)   Pulse 77   Temp 97.9 °F (36.6 °C) (Temporal)   Resp 16   Ht 5' 2" (1.575 m)   Wt 72.8 kg (160 lb 9.7 oz)   SpO2 98%   Breastfeeding No   BMI 29.38 kg/m²     PHYSICAL EXAMINATION:    GENERAL: Well appearing, in no acute distress, alert and oriented x3.  PSYCH:  Mood and affect appropriate.  SKIN: Skin color, texture, turgor normal, no rashes or lesions which will impact the procedure.  CV: RRR with palpation of the radial artery.  PULM: No evidence of respiratory difficulty, symmetric chest rise. Clear to auscultation.  NEURO: Cranial nerves grossly intact.    Plan:    Proceed with procedure as planned Procedure(s) (LRB):  left genicular nerve block RN IV Sedation (Bilateral)    Shaun Iyer MD  08/14/2023            "

## 2023-08-14 NOTE — DISCHARGE INSTRUCTIONS

## 2023-08-16 ENCOUNTER — CLINICAL SUPPORT (OUTPATIENT)
Dept: REHABILITATION | Facility: HOSPITAL | Age: 58
End: 2023-08-16
Payer: COMMERCIAL

## 2023-08-16 DIAGNOSIS — Z96.641 S/P TOTAL RIGHT HIP ARTHROPLASTY: Primary | ICD-10-CM

## 2023-08-16 DIAGNOSIS — R29.898 WEAKNESS OF RIGHT HIP: ICD-10-CM

## 2023-08-16 PROCEDURE — 97110 THERAPEUTIC EXERCISES: CPT | Mod: PN

## 2023-08-16 PROCEDURE — 97112 NEUROMUSCULAR REEDUCATION: CPT | Mod: PN

## 2023-08-17 ENCOUNTER — TELEPHONE (OUTPATIENT)
Dept: ORTHOPEDICS | Facility: CLINIC | Age: 58
End: 2023-08-17
Payer: COMMERCIAL

## 2023-08-17 NOTE — TELEPHONE ENCOUNTER
Returned the patient's phone call in regards to their message. Informed the patient that I have retyped a letter and fax it off for them. Patient verbalized understanding.           ----- Message from Ebony Carpio LPN sent at 8/17/2023  3:16 PM CDT -----  Regarding: FW: Medical Advice  Contact: Modesta  Were you or vernon working on this?   ----- Message -----  From: Mylene Jack  Sent: 8/17/2023   1:11 PM CDT  To: Venessa Yang Staff  Subject: Medical Advice                                   Type:  Needs Medical Advice    Who Called: Modesta  Symptoms (please be specific):    How long has patient had these symptoms:    Pharmacy name and phone #:    Would the patient rather a call back or a response via [facility]? call  Best Call Back Number: [phone] (home)    Additional Information: Modesta's job is trying to force her back to work for 08/22/2023 but the doctor had a return to work of 09/8/2023. She need a letter of medical necessity that she is still having physical therapy and has not had her post op visit yet. Her procedure was June 7, 2023. The patient still has problems sitting and walking. Please fax to [phone] Attn: Dari Morrell today.

## 2023-08-18 ENCOUNTER — CLINICAL SUPPORT (OUTPATIENT)
Dept: REHABILITATION | Facility: HOSPITAL | Age: 58
End: 2023-08-18
Payer: COMMERCIAL

## 2023-08-18 DIAGNOSIS — Z96.641 S/P TOTAL RIGHT HIP ARTHROPLASTY: Primary | ICD-10-CM

## 2023-08-18 DIAGNOSIS — R29.898 WEAKNESS OF RIGHT HIP: ICD-10-CM

## 2023-08-18 PROCEDURE — 97110 THERAPEUTIC EXERCISES: CPT | Mod: PN

## 2023-08-18 PROCEDURE — 97112 NEUROMUSCULAR REEDUCATION: CPT | Mod: PN

## 2023-08-18 NOTE — PROGRESS NOTES
[facility]   Physical Therapy Treatment Note      Name: Modesta Camacho  Clinic Number: [id number]    Therapy Diagnoss:   Encounter Diagnoses   Name Primary?    S/P total right hip arthroplasty Yes    Weakness of right hip      Physician: Kitty García PA    Visit Date: 8/18/2023    Physician Orders: PT Eval and Treat   Medical Diagnosis from Referral: S/P total right hip arthroplasty   Evaluation Date: 7/6/2023  Authorization Period Expiration: 12/31/2023  Plan of Care Expiration: 9/10/2023  Visit # / Visits authorized: 9/12     Time In: 1010  Time Out:  1100  Total Billable Time: 50 minutes     Precautions: Standard, Fall, Weightbearing, and hip precautions    PTA Visit #: 1/5      Subjective     Pt reports: She is doing ok  She was compliant with home exercise program.  Response to previous treatment: bridges were painful  Functional change: N/A    Pain: 5/10   Location: right back and buttocks     Objective      Objective Measures updated at progress report unless specified.     Treatment     Modesta received the treatments listed below:      neuromuscular re-education activities to improve: Coordination, Kinesthetic, Sense, and Proprioception for 25 minutes. The following activities were included:   Activity   Details    Standing hip abduction  x (B) 2x10 RTB    Standing hip extension  x (B) 2x10 RTB    Lateral stepping      Clamshells x 2x15    Hip extension over bench x 2x15    Bridges x 2x15                   Therapeutic activities to improve functional performance for 20  minutes, including:       Activity   Details    [place] carry  x 10# x2 laps bilaterally     Sit to stand  x 18 inches 2x15                               therapeutic exercises to develop strength, ROM, and flexibility for 00 minutes including:  Nustep  minutes:   Activity   Details     x 5 minutes                                           cold pack for 0 minutes to right knee.    Patient Education and Home  Exercises       Education provided:   - Home program  - Instruction in total hip precautions    Written Home Exercises Provided: Patient instructed to cont prior HEP. Exercises were reviewed and Modesta was able to demonstrate them prior to the end of the session.  Modesta demonstrated good  understanding of the education provided. See EMR under Patient Instructions for exercises provided during therapy sessions    Assessment     The patient is progressing more toward standing and dynamic activity.  She has left knee pain with interferes with mobility.  She was instructed in and performed strengthening and stability exercises.    Modesta Is progressing well towards her goals.   Pt prognosis is Good.     Pt will continue to benefit from skilled outpatient physical therapy to address the deficits listed in the problem list box on initial evaluation, provide pt/family education and to maximize pt's level of independence in the home and community environment.     Pt's spiritual, cultural and educational needs considered and pt agreeable to plan of care and goals.     Anticipated barriers to physical therapy: None    Goals:   Short Term Goals: In 6 weeks   1.I with HEP  2.Pt to improve efficiency with rolling and home transfers   3.Pt to improve FTSTS test from over a minute to less than 40 seconds   4.Pt to increase BLE strength by 1/2 grade.   5.Pt to have pain less than 4 at all times.  6.Pt to improve score on the FOTO by 10%.  7. Pt to be educated on postural/body mechanics awareness.     Long Term Goals: In 10 weeks  1.Patient to improve score on the FOTO to 30% or less..  2. Patient to demo increase in LE strength to 4+/5  3. Patient to have decreased pain to 2 at worst.  4. Patient to ambulate short distance of 500ft without deviations to improve home ambulation  5. Patient to increase hip AROM to WFL.  5. Patient to perform daily activities including ADLs, home chores, caring for grandchild, and driving without  limitation.    Plan     Plan of care Certification: 7/6/2023 to 9/10/2023.     Outpatient Physical Therapy 2 times weekly for 10 weeks to include the following interventions: Electrical Stimulation prn, Gait Training, Manual Therapy, Moist Heat/ Ice, Neuromuscular Re-ed, Orthotic Management and Training, Patient Education, Self Care, Therapeutic Activities, Therapeutic Exercise, and dry needling .     Titus Rosario, PT

## 2023-08-18 NOTE — PROGRESS NOTES
[facility]   Physical Therapy Treatment Note      Name: Modesta Camacho  Clinic Number: [id number]    Therapy Diagnoss:   Encounter Diagnoses   Name Primary?    S/P total right hip arthroplasty Yes    Weakness of right hip      Physician: Kitty García PA    Visit Date: 8/16/2023    Physician Orders: PT Eval and Treat   Medical Diagnosis from Referral: S/P total right hip arthroplasty   Evaluation Date: 7/6/2023  Authorization Period Expiration: 12/31/2023  Plan of Care Expiration: 9/10/2023  Visit # / Visits authorized: 8/12     Time In: 0631  Time Out:  0710  Total Billable Time: 49 minutes     Precautions: Standard, Fall, Weightbearing, and hip precautions    PTA Visit #: 1/5      Subjective     Pt reports:  Her left knee feels better.  She was compliant with home exercise program.  Response to previous treatment: bridges were painful  Functional change: N/A    Pain: 5/10   Location: right back and buttocks     Objective      Objective Measures updated at progress report unless specified.     Treatment     Modesta received the treatments listed below:      neuromuscular re-education activities to improve: Coordination, Kinesthetic, Sense, and Proprioception for 25 minutes. The following activities were included:  Standing hip abduction (B) 2x10  Standing hip extension (B) 2x10  Lateral stepping  Clamshells  Prone hip extension    Therapeutic activities to improve functional performance for 14  minutes, including:      [place] carry 15# x2 laps bilaterally   Sit to stand 18 inches 2x15    therapeutic exercises to develop strength, ROM, and flexibility for 00 minutes including:  Nustep 6 minutes  Heel slides  Supine SAQ     cold pack for 0 minutes to right knee.    Patient Education and Home Exercises       Education provided:   - Home program  - Instruction in total hip precautions    Written Home Exercises Provided: Patient instructed to cont prior HEP. Exercises were reviewed and  Modesta was able to demonstrate them prior to the end of the session.  Modesta demonstrated good  understanding of the education provided. See EMR under Patient Instructions for exercises provided during therapy sessions    Assessment     The patient is progressing more toward standing and dynamic activity.  She has left knee pain with interferes with mobility.  She was instructed in and performed strengthening and stability exercises.    Modesta Is progressing well towards her goals.   Pt prognosis is Good.     Pt will continue to benefit from skilled outpatient physical therapy to address the deficits listed in the problem list box on initial evaluation, provide pt/family education and to maximize pt's level of independence in the home and community environment.     Pt's spiritual, cultural and educational needs considered and pt agreeable to plan of care and goals.     Anticipated barriers to physical therapy: None    Goals:   Short Term Goals: In 6 weeks   1.I with HEP  2.Pt to improve efficiency with rolling and home transfers   3.Pt to improve FTSTS test from over a minute to less than 40 seconds   4.Pt to increase BLE strength by 1/2 grade.   5.Pt to have pain less than 4 at all times.  6.Pt to improve score on the FOTO by 10%.  7. Pt to be educated on postural/body mechanics awareness.     Long Term Goals: In 10 weeks  1.Patient to improve score on the FOTO to 30% or less..  2. Patient to demo increase in LE strength to 4+/5  3. Patient to have decreased pain to 2 at worst.  4. Patient to ambulate short distance of 500ft without deviations to improve home ambulation  5. Patient to increase hip AROM to WFL.  5. Patient to perform daily activities including ADLs, home chores, caring for grandchild, and driving without limitation.    Plan     Plan of care Certification: 7/6/2023 to 9/10/2023.     Outpatient Physical Therapy 2 times weekly for 10 weeks to include the following interventions: Electrical Stimulation prn,  Gait Training, Manual Therapy, Moist Heat/ Ice, Neuromuscular Re-ed, Orthotic Management and Training, Patient Education, Self Care, Therapeutic Activities, Therapeutic Exercise, and dry needling .     Titus Rosario, PT

## 2023-08-26 DIAGNOSIS — M51.36 DDD (DEGENERATIVE DISC DISEASE), LUMBAR: ICD-10-CM

## 2023-08-26 DIAGNOSIS — I10 ESSENTIAL HYPERTENSION: ICD-10-CM

## 2023-08-26 DIAGNOSIS — M16.11 PRIMARY OSTEOARTHRITIS OF RIGHT HIP: ICD-10-CM

## 2023-08-26 DIAGNOSIS — M25.569 KNEE PAIN, UNSPECIFIED CHRONICITY, UNSPECIFIED LATERALITY: ICD-10-CM

## 2023-08-28 RX ORDER — TIZANIDINE 4 MG/1
2-4 TABLET ORAL 2 TIMES DAILY PRN
Qty: 90 TABLET | Refills: 2 | OUTPATIENT
Start: 2023-08-28

## 2023-08-28 RX ORDER — CLONIDINE HYDROCHLORIDE 0.1 MG/1
TABLET ORAL
Qty: 90 TABLET | Refills: 1 | Status: SHIPPED | OUTPATIENT
Start: 2023-08-28 | End: 2024-03-06 | Stop reason: SDUPTHER

## 2023-08-28 NOTE — TELEPHONE ENCOUNTER
Can you refill?    Last Visit:7/24  Procedure: 8/14  Next Visit:10/3  Last refill:7/24 w/ 2 refills  How pt patient is currently taking medication requested:   Pharmacy: [facility] [place]       Pt is changing pharmacies to [facility] in [place].

## 2023-08-29 ENCOUNTER — PATIENT MESSAGE (OUTPATIENT)
Dept: INTERNAL MEDICINE | Facility: CLINIC | Age: 58
End: 2023-08-29
Payer: COMMERCIAL

## 2023-08-29 DIAGNOSIS — I10 ESSENTIAL HYPERTENSION: Primary | ICD-10-CM

## 2023-08-29 DIAGNOSIS — E79.0 HYPERURICEMIA: ICD-10-CM

## 2023-08-29 RX ORDER — METOPROLOL SUCCINATE 50 MG/1
100 TABLET, EXTENDED RELEASE ORAL NIGHTLY
Qty: 180 TABLET | Refills: 0 | Status: SHIPPED | OUTPATIENT
Start: 2023-08-29 | End: 2024-03-06 | Stop reason: SDUPTHER

## 2023-08-29 RX ORDER — TIZANIDINE 4 MG/1
2-4 TABLET ORAL 2 TIMES DAILY PRN
Qty: 90 TABLET | Refills: 2 | Status: SHIPPED | OUTPATIENT
Start: 2023-08-29 | End: 2023-10-18 | Stop reason: SDUPTHER

## 2023-08-30 ENCOUNTER — CLINICAL SUPPORT (OUTPATIENT)
Dept: REHABILITATION | Facility: HOSPITAL | Age: 58
End: 2023-08-30
Payer: COMMERCIAL

## 2023-08-30 DIAGNOSIS — R29.898 WEAKNESS OF RIGHT HIP: ICD-10-CM

## 2023-08-30 DIAGNOSIS — Z96.641 S/P TOTAL RIGHT HIP ARTHROPLASTY: Primary | ICD-10-CM

## 2023-08-30 PROCEDURE — 97112 NEUROMUSCULAR REEDUCATION: CPT | Mod: PN

## 2023-08-30 PROCEDURE — 97110 THERAPEUTIC EXERCISES: CPT | Mod: PN

## 2023-08-30 PROCEDURE — 97530 THERAPEUTIC ACTIVITIES: CPT | Mod: PN

## 2023-08-30 NOTE — PROGRESS NOTES
[facility]   Physical Therapy Treatment Note      Name: Modesta Camacho  Clinic Number: [id number]    Therapy Diagnoss:   Encounter Diagnoses   Name Primary?    S/P total right hip arthroplasty Yes    Weakness of right hip      Physician: Kitty García PA    Visit Date: 8/30/2023    Physician Orders: PT Eval and Treat   Medical Diagnosis from Referral: S/P total right hip arthroplasty   Evaluation Date: 7/6/2023  Authorization Period Expiration: 12/31/2023  Plan of Care Expiration: 9/10/2023  Visit # / Visits authorized: 10/12     Time In: 0630  Time Out:  0710  Total Billable Time: 40 minutes     Precautions: Standard, Fall, Weightbearing, and hip precautions    PTA Visit #: 1/5      Subjective     Pt reports: She is doing better.  She reports minimal hip pain and located in groin  She was compliant with home exercise program.  Response to previous treatment: bridges were painful  Functional change: N/A    Pain: 3/10  Location: right anterior thigh    Objective      Objective Measures updated at progress report unless specified.     Treatment     Modesta received the treatments listed below:      Therapeutic exercises to develop strength, ROM, posture, and core stabilization for 8 minutes including:    Activity   Details    Matrix knee extension  10# 2x10 (unable to fully complete reps)                                       neuromuscular re-education activities to improve: Coordination, Kinesthetic, Sense, and Proprioception for 23 minutes. The following activities were included:   Activity   Details    Standing hip abduction  x (B) 2x10 RTB    Standing hip extension  x (B) 2x10 RTB    Lateral stepping      Clamshells x 2x15    Hip extension over bench x 2x15    Bridges x 2x15                   Therapeutic activities to improve functional performance for 9  minutes, including:       Activity   Details    [place] carry   10# x2 laps bilaterally     Sit to stand  x 18 inches 2x15                                therapeutic exercises to develop strength, ROM, and flexibility for 00 minutes including:  Nustep  minutes:   Activity   Details     x 5 minutes                                           cold pack for 0 minutes to right knee.    Patient Education and Home Exercises       Education provided:   - Home program  - Instruction in total hip precautions    Written Home Exercises Provided: Patient instructed to cont prior HEP. Exercises were reviewed and Modesta was able to demonstrate them prior to the end of the session.  Modesta demonstrated good  understanding of the education provided. See EMR under Patient Instructions for exercises provided during therapy sessions    Assessment     The patient is progressing more toward standing and dynamic activity.  She has left knee pain with interferes with mobility.  She was instructed in and performed strengthening and stability exercises.    Modesta Is progressing well towards her goals.   Pt prognosis is Good.     Pt will continue to benefit from skilled outpatient physical therapy to address the deficits listed in the problem list box on initial evaluation, provide pt/family education and to maximize pt's level of independence in the home and community environment.     Pt's spiritual, cultural and educational needs considered and pt agreeable to plan of care and goals.     Anticipated barriers to physical therapy: None    Goals:   Short Term Goals: In 6 weeks   1.I with HEP  2.Pt to improve efficiency with rolling and home transfers   3.Pt to improve FTSTS test from over a minute to less than 40 seconds   4.Pt to increase BLE strength by 1/2 grade.   5.Pt to have pain less than 4 at all times.  6.Pt to improve score on the FOTO by 10%.  7. Pt to be educated on postural/body mechanics awareness.     Long Term Goals: In 10 weeks  1.Patient to improve score on the FOTO to 30% or less..  2. Patient to demo increase in LE strength to 4+/5  3. Patient to have decreased  pain to 2 at worst.  4. Patient to ambulate short distance of 500ft without deviations to improve home ambulation  5. Patient to increase hip AROM to WFL.  5. Patient to perform daily activities including ADLs, home chores, caring for grandchild, and driving without limitation.    Plan     Plan of care Certification: 7/6/2023 to 9/10/2023.     Outpatient Physical Therapy 2 times weekly for 10 weeks to include the following interventions: Electrical Stimulation prn, Gait Training, Manual Therapy, Moist Heat/ Ice, Neuromuscular Re-ed, Orthotic Management and Training, Patient Education, Self Care, Therapeutic Activities, Therapeutic Exercise, and dry needling .     Titus Rosario, PT

## 2023-09-01 ENCOUNTER — OFFICE VISIT (OUTPATIENT)
Dept: CARDIOLOGY | Facility: CLINIC | Age: 58
End: 2023-09-01
Payer: COMMERCIAL

## 2023-09-01 VITALS
DIASTOLIC BLOOD PRESSURE: 110 MMHG | BODY MASS INDEX: 29.53 KG/M2 | HEIGHT: 62 IN | OXYGEN SATURATION: 99 % | HEART RATE: 91 BPM | WEIGHT: 160.5 LBS | SYSTOLIC BLOOD PRESSURE: 172 MMHG

## 2023-09-01 DIAGNOSIS — Z01.810 PREOP CARDIOVASCULAR EXAM: ICD-10-CM

## 2023-09-01 DIAGNOSIS — R94.31 ABNORMAL EKG: ICD-10-CM

## 2023-09-01 DIAGNOSIS — I10 ESSENTIAL HYPERTENSION: Primary | ICD-10-CM

## 2023-09-01 PROCEDURE — 3080F PR MOST RECENT DIASTOLIC BLOOD PRESSURE >= 90 MM HG: ICD-10-PCS | Mod: CPTII,S$GLB,, | Performed by: INTERNAL MEDICINE

## 2023-09-01 PROCEDURE — 3008F BODY MASS INDEX DOCD: CPT | Mod: CPTII,S$GLB,, | Performed by: INTERNAL MEDICINE

## 2023-09-01 PROCEDURE — 3008F PR BODY MASS INDEX (BMI) DOCUMENTED: ICD-10-PCS | Mod: CPTII,S$GLB,, | Performed by: INTERNAL MEDICINE

## 2023-09-01 PROCEDURE — 1159F PR MEDICATION LIST DOCUMENTED IN MEDICAL RECORD: ICD-10-PCS | Mod: CPTII,S$GLB,, | Performed by: INTERNAL MEDICINE

## 2023-09-01 PROCEDURE — 3080F DIAST BP >= 90 MM HG: CPT | Mod: CPTII,S$GLB,, | Performed by: INTERNAL MEDICINE

## 2023-09-01 PROCEDURE — 4010F ACE/ARB THERAPY RXD/TAKEN: CPT | Mod: CPTII,S$GLB,, | Performed by: INTERNAL MEDICINE

## 2023-09-01 PROCEDURE — 3077F PR MOST RECENT SYSTOLIC BLOOD PRESSURE >= 140 MM HG: ICD-10-PCS | Mod: CPTII,S$GLB,, | Performed by: INTERNAL MEDICINE

## 2023-09-01 PROCEDURE — 3077F SYST BP >= 140 MM HG: CPT | Mod: CPTII,S$GLB,, | Performed by: INTERNAL MEDICINE

## 2023-09-01 PROCEDURE — 99213 PR OFFICE/OUTPT VISIT, EST, LEVL III, 20-29 MIN: ICD-10-PCS | Mod: S$GLB,,, | Performed by: INTERNAL MEDICINE

## 2023-09-01 PROCEDURE — 99213 OFFICE O/P EST LOW 20 MIN: CPT | Mod: S$GLB,,, | Performed by: INTERNAL MEDICINE

## 2023-09-01 PROCEDURE — 99999 PR PBB SHADOW E&M-EST. PATIENT-LVL IV: ICD-10-PCS | Mod: PBBFAC,,, | Performed by: INTERNAL MEDICINE

## 2023-09-01 PROCEDURE — 99999 PR PBB SHADOW E&M-EST. PATIENT-LVL IV: CPT | Mod: PBBFAC,,, | Performed by: INTERNAL MEDICINE

## 2023-09-01 PROCEDURE — 1159F MED LIST DOCD IN RCRD: CPT | Mod: CPTII,S$GLB,, | Performed by: INTERNAL MEDICINE

## 2023-09-01 PROCEDURE — 4010F PR ACE/ARB THEARPY RXD/TAKEN: ICD-10-PCS | Mod: CPTII,S$GLB,, | Performed by: INTERNAL MEDICINE

## 2023-09-01 RX ORDER — HYDROCHLOROTHIAZIDE 12.5 MG/1
12.5 TABLET ORAL DAILY
Qty: 30 TABLET | Refills: 6 | Status: SHIPPED | OUTPATIENT
Start: 2023-09-01 | End: 2023-10-16

## 2023-09-01 NOTE — PROGRESS NOTES
Subjective:   Patient ID:  Modesta Camacho is a 57 y.o. female who presents for follow up of No chief complaint on file.      HPI  2/5/2021  A 54 yo female with htn uncontrolled anxiety h/o ckd who is sedentary due to hip pain and knee pain. She has multiple issues with anxiety has h/o heart disease chf in her family she is very concerned she is very anxious she thinks she is dying she has had a cardiologist who was seeing her for tachycardia and used b blockers. She has been taken off lisinopril was placed on micardis . Her bp is not well controlled had an er visit yesterday had bp 203 systolic had chest pain her keg is abnormal t wave changes that has been threre since 200 at least her ekg was abnormal since 2015. She felt bad yesterday. She claims compliance with slat he r weight is increased despite having weight loss surgery in 2012 . She gets upset she gets blood pressure.   Stress echo in 2019 was normal. Done at [facility] by DR RUELAS.     2/24/2021  HERE FOR F/U BP IMPROVED  HAD ECHO THAT WAS  NORMAL LVF HAS TRICUSPID REGURGITATION WITH MILD PULMONARY HTN HER CARDIOLITE WAS NEGATIVE SHE SNORES LOUD AT NITE HER  WAKES HER UP SHE HAS DAYTIME FATIGUE AND SLEEPINESS. SHE HAS AN ABNORMAL EKG THAT HAS BEEN LIKE THAT SINCE 2015. I THINK ALTHOUGH IT IS ISCHEMIC IOT IS HTN RELATED .  SHE IS COMPLIANT WITH MEDS SHE NEEDS TO DO BETETR WITH SALT INTAKE . HAS TAKEN CLONIDINE ONMCE SINCE LAST VIST CHEST PAIBN REMARKABLY IMPROVED.      10/8/2021   Here for  f /u she is still in pain in her leg . Has sleep eval in 2012 has no sleep apnea still snores a lot . Her bp is not controlled . She has used clonidine she thinks pain has something to do with it . Ha sno leg swelling. She is trying to control her slat intake.      12/29/2021   Had steroid injection for hip pain her bp was elevated yesterday had clonidine. Has been taking meds regularily has muscle spasm . Has not been eating banana.no leg swelling goes to physical  therapy no orthopnea pnd chest pain had home sleep study has snoring some apnea episodes will get sleep team to see. She ahs referral.compliant with salt .     8/26/2022  Here fro f/u. She needs rt knee surgery her prosthesis is lose. Her bp meds adjusted her amlodipine dose up and her metoprolol 2 pills at nite. Her bp is elevated when she has pain. Has no new complaints she uses clonidine prn for elevated bp. She stays active physically has no chest pain or shortness of breath      3/1/2023  Had rt knee surgery in September no complication. Has rt hip pain needs hip replacement. Still active works has  a cold had steroid injection yesterday. Her chest feels tight. Has a cough has no sputum production.she is wheezing had fever yesterday.     9/1/2023  Rt hip replacement in June. Continues to have htn . Had nerve block in august. Her bp has been elevated. Has knee pain  not on any meds. She is not very complaint with diet . Her bp is elevated she took morning meds.   Past Medical History:   Diagnosis Date    Abnormal EKG 2/5/2021    Anxiety     Hypertension     Osteoarthritis     Stage 2 chronic kidney disease 2/5/2021       Past Surgical History:   Procedure Laterality Date    ARTHROPLASTY OF HIP BY ANTERIOR APPROACH Left 7/31/2020    Procedure: ARTHROPLASTY, HIP, ANTERIOR APPROACH;  Surgeon: Xavi Gandara MD;  Location: [facility] OR;  Service: Orthopedics;  Laterality: Left;    gastric sleeve      HIP ARTHROPLASTY Right 6/7/2023    Procedure: ARTHROPLASTY, HIP;  Surgeon: Trye Clifford MD;  Location: [facility] OR;  Service: Orthopedics;  Laterality: Right;    INJECTION OF ANESTHETIC AGENT AROUND NERVE Bilateral 8/14/2023    Procedure: left genicular nerve block RN IV Sedation;  Surgeon: Shaun Iyer MD;  Location: [facility] PAIN T;  Service: Pain Management;  Laterality: Bilateral;    JOINT REPLACEMENT Right 06/14/2016    knee    JOINT REPLACEMENT Left 05/20/2021    KNEE    JOINT REPLACEMENT Left 07/30/2020     HIP    KNEE ARTHROPLASTY Left 5/6/2021    Procedure: ARTHROPLASTY, KNEE;  Surgeon: Xavi Gandara MD;  Location: [facility] OR;  Service: Orthopedics;  Laterality: Left;    RESURFACING OF PATELLA Right 9/27/2022    Procedure: RESURFACING, PATELLA;  Surgeon: Trey Clifford MD;  Location: [facility] OR;  Service: General;  Laterality: Right;    REVISION OF KNEE ARTHROPLASTY Right 9/27/2022    Procedure: REVISION, ARTHROPLASTY, KNEE;  Surgeon: Trey Clifford MD;  Location: [facility] OR;  Service: General;  Laterality: Right;    TUBAL LIGATION      UTERINE FIBROID EMBOLIZATION         Social History     Tobacco Use    Smoking status: Never     Passive exposure: Never    Smokeless tobacco: Never   Substance Use Topics    Alcohol use: Yes     Comment: occasional: hold 72hrs. prior to surgery    Drug use: No       Family History   Problem Relation Age of Onset    Hypertension Mother     Arthritis Mother     Diabetes Father     Heart disease Father     Depression Sister     Hypertension Sister     Arthritis Brother     Thyroid disease Son     Hypertension Son     Arthritis Maternal Grandmother     Heart disease Maternal Grandmother     Kidney disease Maternal Grandmother     Heart attack Maternal Grandfather     Stroke Paternal Grandmother     No Known Problems Paternal Grandfather     Eczema Son     Breast cancer Sister 47       Current Outpatient Medications   Medication Sig    ALPRAZolam (XANAX) 0.5 MG tablet Take 1 tablet (0.5 mg total) by mouth nightly as needed for Anxiety.    amLODIPine (NORVASC) 5 MG tablet Take 1 tablet (5 mg total) by mouth 2 (two) times daily.    cloNIDine (CATAPRES) 0.1 MG tablet TAKE 1 TABLET BY MOUTH ONCE DAILY USE FOR SBP GREATER THAN 160 MMHG.    docusate sodium (COLACE) 100 MG capsule Take 100 mg by mouth 2 (two) times daily as needed.    DULoxetine (CYMBALTA) 60 MG capsule Take 1 capsule (60 mg total) by mouth 2 (two) times daily.    gabapentin (NEURONTIN) 300 MG capsule Take 1 capsule (300 mg  total) by mouth 3 (three) times daily.    metoprolol succinate (TOPROL-XL) 50 MG 24 hr tablet Take 2 tablets (100 mg total) by mouth nightly.    multivit-min/ferrous fumarate (MULTI VITAMIN ORAL) Take 2 tablets by mouth Daily.    ondansetron (ZOFRAN-ODT) 4 MG TbDL dissolve 2 tablets (8 mg total) by mouth every 8 (eight) hours as needed (nausea).    oxyCODONE-acetaminophen (ENDOCET)  mg per tablet Take 1 tablet by mouth every 8 (eight) hours as needed for Pain.    telmisartan (MICARDIS) 80 MG Tab Take 1 tablet (80 mg total) by mouth once daily.    tiZANidine (ZANAFLEX) 4 MG tablet Take 0.5-1 tablets (2-4 mg total) by mouth 2 (two) times daily as needed (pain).    vitamin D (VITAMIN D3) 1000 units Tab Take 1,000 Units by mouth once daily.     No current facility-administered medications for this visit.     Facility-Administered Medications Ordered in Other Visits   Medication    0.9%  NaCl infusion    acetaminophen tablet 650 mg    chlorhexidine 0.12 % solution 10 mL    dexAMETHasone injection 8 mg    gabapentin capsule 300 mg     Current Outpatient Medications on File Prior to Visit   Medication Sig    ALPRAZolam (XANAX) 0.5 MG tablet Take 1 tablet (0.5 mg total) by mouth nightly as needed for Anxiety.    amLODIPine (NORVASC) 5 MG tablet Take 1 tablet (5 mg total) by mouth 2 (two) times daily.    cloNIDine (CATAPRES) 0.1 MG tablet TAKE 1 TABLET BY MOUTH ONCE DAILY USE FOR SBP GREATER THAN 160 MMHG.    docusate sodium (COLACE) 100 MG capsule Take 100 mg by mouth 2 (two) times daily as needed.    DULoxetine (CYMBALTA) 60 MG capsule Take 1 capsule (60 mg total) by mouth 2 (two) times daily.    gabapentin (NEURONTIN) 300 MG capsule Take 1 capsule (300 mg total) by mouth 3 (three) times daily.    metoprolol succinate (TOPROL-XL) 50 MG 24 hr tablet Take 2 tablets (100 mg total) by mouth nightly.    multivit-min/ferrous fumarate (MULTI VITAMIN ORAL) Take 2 tablets by mouth Daily.    ondansetron (ZOFRAN-ODT) 4 MG TbDL  dissolve 2 tablets (8 mg total) by mouth every 8 (eight) hours as needed (nausea).    oxyCODONE-acetaminophen (ENDOCET)  mg per tablet Take 1 tablet by mouth every 8 (eight) hours as needed for Pain.    telmisartan (MICARDIS) 80 MG Tab Take 1 tablet (80 mg total) by mouth once daily.    tiZANidine (ZANAFLEX) 4 MG tablet Take 0.5-1 tablets (2-4 mg total) by mouth 2 (two) times daily as needed (pain).    vitamin D (VITAMIN D3) 1000 units Tab Take 1,000 Units by mouth once daily.     Current Facility-Administered Medications on File Prior to Visit   Medication    0.9%  NaCl infusion    acetaminophen tablet 650 mg    chlorhexidine 0.12 % solution 10 mL    dexAMETHasone injection 8 mg    gabapentin capsule 300 mg       Review of Systems   Constitutional: Negative for diaphoresis, malaise/fatigue and weight gain.   HENT:  Negative for hoarse voice.    Eyes:  Negative for double vision and visual disturbance.   Cardiovascular:  Negative for chest pain, claudication, cyanosis, dyspnea on exertion, irregular heartbeat, leg swelling, near-syncope, orthopnea, palpitations, paroxysmal nocturnal dyspnea and syncope.   Respiratory:  Negative for cough, hemoptysis, shortness of breath and snoring.    Hematologic/Lymphatic: Negative for bleeding problem. Does not bruise/bleed easily.   Skin:  Negative for color change and poor wound healing.   Musculoskeletal:  Positive for arthritis, joint pain and stiffness. Negative for muscle cramps, muscle weakness and myalgias.   Gastrointestinal:  Negative for bloating, abdominal pain, change in bowel habit, diarrhea, heartburn, hematemesis, hematochezia, melena and nausea.   Neurological:  Negative for excessive daytime sleepiness, dizziness, headaches, light-headedness, loss of balance, numbness and weakness.   Psychiatric/Behavioral:  Negative for memory loss. The patient does not have insomnia.    Allergic/Immunologic: Negative for hives.       Objective:   Physical Exam  Vitals  "and nursing note reviewed.   Constitutional:       General: She is not in acute distress.     Appearance: Normal appearance. She is well-developed. She is not ill-appearing.   HENT:      Head: Normocephalic and atraumatic.   Eyes:      General: No scleral icterus.     Pupils: Pupils are equal, round, and reactive to light.   Neck:      Thyroid: No thyromegaly.      Vascular: Normal carotid pulses. No carotid bruit, hepatojugular reflux or JVD.      Trachea: No tracheal deviation.   Cardiovascular:      Rate and Rhythm: Normal rate and regular rhythm.      Pulses: Normal pulses.      Heart sounds: Normal heart sounds. No murmur heard.     No friction rub. No gallop.   Pulmonary:      Effort: Pulmonary effort is normal. No respiratory distress.      Breath sounds: Normal breath sounds. No wheezing, rhonchi or rales.   Chest:      Chest wall: No tenderness.   Abdominal:      General: Bowel sounds are normal. There is no abdominal bruit.      Palpations: Abdomen is soft. There is no hepatomegaly or pulsatile mass.      Tenderness: There is no abdominal tenderness.   Musculoskeletal:      Right shoulder: No deformity.      Cervical back: Normal range of motion and neck supple.   Skin:     General: Skin is warm and dry.      Findings: No erythema or rash.      Nails: There is no clubbing.   Neurological:      Mental Status: She is alert and oriented to person, place, and time.      Cranial Nerves: No cranial nerve deficit.      Coordination: Coordination normal.   Psychiatric:         Speech: Speech normal.         Behavior: Behavior normal.       Vitals:    09/01/23 0917 09/01/23 0920   BP: (!) 168/110 (!) 172/110   BP Location: Right arm Left arm   Patient Position: Sitting Sitting   BP Method: Medium (Manual) Medium (Manual)   Pulse: 91    SpO2: 99%    Weight: 72.8 kg (160 lb 7.9 oz)    Height: 5' 2" (1.575 m)      No results found for: "CHOL"   Body mass index is 29.35 kg/m².   Lab Results   Component Value Date    " "HGBA1C 5.3 05/29/2020      BMP  Lab Results   Component Value Date     05/03/2023    K 4.7 05/03/2023     05/03/2023    CO2 23 05/03/2023    BUN 24 (H) 05/03/2023    CREATININE 1.1 05/03/2023    CALCIUM 10.6 (H) 05/03/2023    ANIONGAP 11 05/03/2023    EGFRNORACEVR 59 (A) 05/03/2023      Lab Results   Component Value Date    HDL 43 10/01/2019     Lab Results   Component Value Date    LDLCALC 89 10/01/2019     Lab Results   Component Value Date    TRIG 114 10/01/2019     No results found for: "CHOLHDL"    Chemistry        Component Value Date/Time     05/03/2023 0747    K 4.7 05/03/2023 0747     05/03/2023 0747    CO2 23 05/03/2023 0747    BUN 24 (H) 05/03/2023 0747    CREATININE 1.1 05/03/2023 0747    GLU 81 05/03/2023 0747        Component Value Date/Time    CALCIUM 10.6 (H) 05/03/2023 0747    ALKPHOS 75 05/03/2023 0747    AST 25 05/03/2023 0747    ALT 24 05/03/2023 0747    BILITOT 1.0 05/03/2023 0747    ESTGFRAFRICA 41 (A) 01/14/2022 1429    EGFRNONAA 36 (A) 01/14/2022 1429          No results found for: "TSH"  Lab Results   Component Value Date    INR 1.0 04/27/2021    INR 1.0 07/08/2020    INR 1.0 03/13/2020     Lab Results   Component Value Date    WBC 5.15 05/03/2023    HGB 13.9 06/07/2023    HCT 43.2 06/07/2023    MCV 96 05/03/2023     05/03/2023     BMP  Sodium   Date Value Ref Range Status   05/03/2023 140 136 - 145 mmol/L Final     Potassium   Date Value Ref Range Status   05/03/2023 4.7 3.5 - 5.1 mmol/L Final     Chloride   Date Value Ref Range Status   05/03/2023 106 95 - 110 mmol/L Final     CO2   Date Value Ref Range Status   05/03/2023 23 23 - 29 mmol/L Final     BUN   Date Value Ref Range Status   05/03/2023 24 (H) 6 - 20 mg/dL Final     Creatinine   Date Value Ref Range Status   05/03/2023 1.1 0.5 - 1.4 mg/dL Final     Calcium   Date Value Ref Range Status   05/03/2023 10.6 (H) 8.7 - 10.5 mg/dL Final     Anion Gap   Date Value Ref Range Status   05/03/2023 11 8 - 16 " mmol/L Final     eGFR if    Date Value Ref Range Status   01/14/2022 41 (A) >60 mL/min/1.73 m^2 Final     eGFR if non    Date Value Ref Range Status   01/14/2022 36 (A) >60 mL/min/1.73 m^2 Final     Comment:     Calculation used to obtain the estimated glomerular filtration  rate (eGFR) is the CKD-EPI equation.        CrCl cannot be calculated (Patient's most recent lab result is older than the maximum 7 days allowed.).    Assessment:     1. Essential hypertension    2. Abnormal EKG    3. Preop cardiovascular exam      Uncontrolled labile htn partly from some dietary indiscretion counseled about salt intake and low salt diet will add hctz to therapy and will follow soon to assess response.   Plan:   Hctz 12.5 mg po daily f/u in 2-3 weeks with mid level with bmp  Low salt diet emphasized.

## 2023-09-07 ENCOUNTER — HOSPITAL ENCOUNTER (OUTPATIENT)
Dept: RADIOLOGY | Facility: HOSPITAL | Age: 58
Discharge: HOME | End: 2023-09-07
Attending: ORTHOPAEDIC SURGERY
Payer: COMMERCIAL

## 2023-09-07 ENCOUNTER — OFFICE VISIT (OUTPATIENT)
Dept: ORTHOPEDICS | Facility: CLINIC | Age: 58
End: 2023-09-07
Payer: COMMERCIAL

## 2023-09-07 VITALS — WEIGHT: 160 LBS | HEIGHT: 62 IN | BODY MASS INDEX: 29.44 KG/M2

## 2023-09-07 DIAGNOSIS — M16.11 ARTHRITIS OF RIGHT HIP: ICD-10-CM

## 2023-09-07 DIAGNOSIS — Z96.652 HISTORY OF TOTAL LEFT KNEE REPLACEMENT: ICD-10-CM

## 2023-09-07 DIAGNOSIS — R20.0 NUMBNESS AND TINGLING: Primary | ICD-10-CM

## 2023-09-07 DIAGNOSIS — Z96.652 HISTORY OF TOTAL LEFT KNEE REPLACEMENT: Primary | ICD-10-CM

## 2023-09-07 DIAGNOSIS — Z96.651 S/P REVISION OF TOTAL KNEE, RIGHT: ICD-10-CM

## 2023-09-07 DIAGNOSIS — Z96.641 S/P TOTAL RIGHT HIP ARTHROPLASTY: Primary | ICD-10-CM

## 2023-09-07 DIAGNOSIS — M54.9 BACK PAIN, UNSPECIFIED BACK LOCATION, UNSPECIFIED BACK PAIN LATERALITY, UNSPECIFIED CHRONICITY: ICD-10-CM

## 2023-09-07 DIAGNOSIS — M54.16 LEFT LUMBAR RADICULOPATHY: ICD-10-CM

## 2023-09-07 DIAGNOSIS — Z96.642 HX OF TOTAL HIP ARTHROPLASTY, LEFT: ICD-10-CM

## 2023-09-07 DIAGNOSIS — R20.2 NUMBNESS AND TINGLING: Primary | ICD-10-CM

## 2023-09-07 PROCEDURE — 73503 XR HIP WITH PELVIS WHEN PERFORMED, 4 OR MORE VIEWS RIGHT: ICD-10-PCS | Mod: 26,RT,, | Performed by: RADIOLOGY

## 2023-09-07 PROCEDURE — 73503 X-RAY EXAM HIP UNI 4/> VIEWS: CPT | Mod: 26,RT,, | Performed by: RADIOLOGY

## 2023-09-07 PROCEDURE — 3008F PR BODY MASS INDEX (BMI) DOCUMENTED: ICD-10-PCS | Mod: CPTII,S$GLB,, | Performed by: ORTHOPAEDIC SURGERY

## 2023-09-07 PROCEDURE — 99999 PR PBB SHADOW E&M-EST. PATIENT-LVL III: CPT | Mod: PBBFAC,,, | Performed by: ORTHOPAEDIC SURGERY

## 2023-09-07 PROCEDURE — 99999 PR PBB SHADOW E&M-EST. PATIENT-LVL III: ICD-10-PCS | Mod: PBBFAC,,, | Performed by: ORTHOPAEDIC SURGERY

## 2023-09-07 PROCEDURE — 4010F ACE/ARB THERAPY RXD/TAKEN: CPT | Mod: CPTII,S$GLB,, | Performed by: ORTHOPAEDIC SURGERY

## 2023-09-07 PROCEDURE — 1159F MED LIST DOCD IN RCRD: CPT | Mod: CPTII,S$GLB,, | Performed by: ORTHOPAEDIC SURGERY

## 2023-09-07 PROCEDURE — 73503 X-RAY EXAM HIP UNI 4/> VIEWS: CPT | Mod: TC,RT

## 2023-09-07 PROCEDURE — 99024 PR POST-OP FOLLOW-UP VISIT: ICD-10-PCS | Mod: S$GLB,,, | Performed by: ORTHOPAEDIC SURGERY

## 2023-09-07 PROCEDURE — 3008F BODY MASS INDEX DOCD: CPT | Mod: CPTII,S$GLB,, | Performed by: ORTHOPAEDIC SURGERY

## 2023-09-07 PROCEDURE — 99024 POSTOP FOLLOW-UP VISIT: CPT | Mod: S$GLB,,, | Performed by: ORTHOPAEDIC SURGERY

## 2023-09-07 PROCEDURE — 1159F PR MEDICATION LIST DOCUMENTED IN MEDICAL RECORD: ICD-10-PCS | Mod: CPTII,S$GLB,, | Performed by: ORTHOPAEDIC SURGERY

## 2023-09-07 PROCEDURE — 4010F PR ACE/ARB THEARPY RXD/TAKEN: ICD-10-PCS | Mod: CPTII,S$GLB,, | Performed by: ORTHOPAEDIC SURGERY

## 2023-09-07 NOTE — PATIENT INSTRUCTIONS
You still having pain in the left knee performed 05/06/2021 by  Has  You also having pain going down to the outside of her foot on that left leg   As far as the hips are concerned you having some mild discomfort in both hips doing well   The right total knee revision to a thicker plastic that is doing well  We need to confirm to make sure that you do not having any pinched nerve since the pain is radiating down on the left leg to the outside of her foot which is in an L5 distribution  We need to repeat the x-rays on the left knee next time you come after the nerve test   You may return to work with limitation.  No kneeling if possible.  No heavy lifting more than 20 lb, avoid excessive stair climbing and ladders  I will see you back after we get that nerve test done   We briefly discussed maybe changing the poly insert on that left knee in the future

## 2023-09-07 NOTE — PROGRESS NOTES
Subjective:     Patient ID: Modesta Camacho is a 57 y.o. female.    Chief Complaint: Pain and Post-op Evaluation of the Right Hip  06/27/2022  HPI:  Left TKA 05/07/2021 by Dr. Gandara, left KRZYSZTOF a by Dr. Gandara  Right TKA by Dr. Lan 2016  Patient is complaining that the left knee is tight unable to fully extend compared to the right side.  She is not having any pain with the left hip.  She has very mild discomfort in the left knee.  As far as the right hip is concerned she is having severe pain in the groin and she knows she has arthritis.  She stated the right TKA done by Dr. Lan is not painful but it gives out with difficulty doing stairs.  Overall pain is 4/10.  She still working at the [facility].  She ambulates without any assistive devices.  She feels her left knee is not doing well and cannot straighten it out as much as she does in the right knee.  For some reason she was upset with Dr. Atkinson.  No fever no chills no shortness of breath or difficulty with chewing or swallowing.  She does have low back pain and she sees Dr. Weaver    07/14/2022  Left KRZYSZTOF by Dr. Gandara doing extremely well  Left TKA 05/07/2021 by Dr. Gandara and feels tight unable to hyperextend.  She has around maybe 2-3 degrees of contracture but she flexes really well.  At this time this is a very stable knee    Right TKA 2016 by Dr. Lan.  She is extremely loosen hyperextends and medial collateral ligaments seem loose.  We had asked her to get us the operative report from the [facility] and she brought it with her.  We went over the operative note and it was vanguard knee by [employer].  The insert is 14 mm.  For 67 base plate  We did call the [employer] rep and he said they go up to 18 mm for that size and prosthesis.  Femoral component 57.5  Right hip arthritis I did tell her we will deal with that later on because is not bother her as much.  Pain is 5/10    12/15/2022     Right total knee revision 09/27/2022 where we change the  poly liner to a very thick 1 to achieve stability.  This total knee had been done by Dr. Lan prior to that and she was ligamentously loose.  She did fall after things given.  She feels that the right total knee poly exchange seems to have helped significantly with the instability.  She had severe pain in her hip on the right side she has arthritis by x-ray and diagnosis she seen Dr. Acevedo who gave her injection in the office in November2,2022 2nd 2022    She wants to have her total knee replacement done and she knows she needs to wait 3 months.    She would like to return to work to make some money and she is looking at having her right total hip replacement in March 2023     Patient stated the arthritis run in her family everybody in the family had a joint replacement including her brother her mother clarita and she was diagnosed with osteoarthritis rather than rheumatoid arthritis.    Requesting tramadol  Pain is 6/10    We discussed the left knee which she stated still feels tight that if she waits a year year and half in might loosen up with time you can always have that poly exchange to a smaller 1 if needed in the future      02/13/2023    Severe right hip arthritis.  Received intra-articular injection by Dr. Acevedo 11/02/2022 with good relief.  You want a proceed with the right total hip replacement sometime to words the end of May early June 2023 because you want a work to make some money.  You having hard time walking distances and we gave you a temporary disability parking sticker.  As far as the right total knee revision 09/27/2022 you said you not having any pain in that or neither the left KRZYSZTOF that was done by Dr. Gandara through the anterior approach.  As far as the left TKA done by Dr. Gandara you feel that you have pain still unable to fully extended and it is tender and painful.  I did tell her let us give it some more time roughly 18 months to see if things improve if not then she would need to  have poly exchange to a smaller 1.  No fever no chills no shortness of breath difficulty with chewing swallowing loss of bowel bladder control blurry vision double vision loss sense smell or taste     04/21/2023   Right hip arthritis.  She is ready to proceed with total hip replacement.  I did tell her I go posterior approach not anterior.  We had some questions and answered.  I did tell her there is slight risk of dislocation of 3% chance.  She will be going home the same day this is considered outpatient surgery by the government.  If there is any problem then we will do extended recovery.  She will receive home health for couple weeks.  She would need help at home.  We briefly went over the pros and cons and she does remember it.  She said she is active on MyChart and she can read it.  She did complain that the left knee still little tight and tape painful and I did tell her give it some time once we know we have done all the joints then we can arrange for her to go and change the poly insert to a smaller 1 to achieve a little bit more extension  Pain 5/10    09/07/2023  Right KRZYSZTOF 06/07/2023.  She is doing much better than before surgery and very happy with the results so far.  Overall pain is 4/10 and that includes the left knee that is killing her.  She did receive genicular nerve block recently by Dr. Malcolm which barely gave her some mild relief but he only used lidocaine without steroid.  States that the pain in the left leg going down to the outside of her foot.  There is no pain over the greater trochanter on the left hip which she is doing well with that performed by Dr. Atkinson.  We went over the operative report 05/06/2021 of the left TKA.  She said she got slightly better extension since last time however she still hurting she points over the outside of her knee and sometimes goes down to the outside of her foot  As far as the right total knee revision to a thicker poly liner she is doing really well  with that 09/27/2022  Left KRZYSZTOF performed by Dr. Gandara is doing really well is just a left knee that she is been complaining about for a long time.  The right KRZYSZTOF we just perform is doing much better.  Very mild discomfort if any   She is ambulating without assistive devices  Denies any fever or chills or shortness of breath difficulty with chewing swallowing loss of bowel bladder control   Left knee genicular nerve block using lidocaine and bupivacaine performed 07/24/2023 by Dr. iyer  Past Medical History:   Diagnosis Date    Abnormal EKG 2/5/2021    Anxiety     Hypertension     Osteoarthritis     Stage 2 chronic kidney disease 2/5/2021     Past Surgical History:   Procedure Laterality Date    ARTHROPLASTY OF HIP BY ANTERIOR APPROACH Left 7/31/2020    Procedure: ARTHROPLASTY, HIP, ANTERIOR APPROACH;  Surgeon: Xavi Gandara MD;  Location: [facility] OR;  Service: Orthopedics;  Laterality: Left;    gastric sleeve      HIP ARTHROPLASTY Right 6/7/2023    Procedure: ARTHROPLASTY, HIP;  Surgeon: Trey Clifford MD;  Location: [facility] OR;  Service: Orthopedics;  Laterality: Right;    INJECTION OF ANESTHETIC AGENT AROUND NERVE Bilateral 8/14/2023    Procedure: left genicular nerve block RN IV Sedation;  Surgeon: Shaun Iyer MD;  Location: [facility];  Service: Pain Management;  Laterality: Bilateral;    JOINT REPLACEMENT Right 06/14/2016    knee    JOINT REPLACEMENT Left 05/20/2021    KNEE    JOINT REPLACEMENT Left 07/30/2020    HIP    KNEE ARTHROPLASTY Left 5/6/2021    Procedure: ARTHROPLASTY, KNEE;  Surgeon: Xavi Gandara MD;  Location: [facility] OR;  Service: Orthopedics;  Laterality: Left;    RESURFACING OF PATELLA Right 9/27/2022    Procedure: RESURFACING, PATELLA;  Surgeon: Trey Clifford MD;  Location: [facility] OR;  Service: General;  Laterality: Right;    REVISION OF KNEE ARTHROPLASTY Right 9/27/2022    Procedure: REVISION, ARTHROPLASTY, KNEE;  Surgeon: Trey Clifford MD;  Location: [facility] OR;   Service: General;  Laterality: Right;    TUBAL LIGATION      UTERINE FIBROID EMBOLIZATION       Family History   Problem Relation Age of Onset    Hypertension Mother     Arthritis Mother     Diabetes Father     Heart disease Father     Depression Sister     Hypertension Sister     Arthritis Brother     Thyroid disease Son     Hypertension Son     Arthritis Maternal Grandmother     Heart disease Maternal Grandmother     Kidney disease Maternal Grandmother     Heart attack Maternal Grandfather     Stroke Paternal Grandmother     No Known Problems Paternal Grandfather     Eczema Son     Breast cancer Sister 47     Social History     Socioeconomic History    Marital status:      Spouse name: Pop Land    Number of children: 3   Occupational History    Occupation: patient access   Tobacco Use    Smoking status: Never     Passive exposure: Never    Smokeless tobacco: Never   Substance and Sexual Activity    Alcohol use: Yes     Comment: occasional: hold 72hrs. prior to surgery    Drug use: No    Sexual activity: Yes     Partners: Male     Birth control/protection: See Surgical Hx     Social Determinants of Health     Stress: Stress Concern Present (3/16/2020)    [facility] Occupational [facility] - Occupational Stress Questionnaire     Feeling of Stress : Rather much     Medication List with Changes/Refills   Current Medications    ALPRAZOLAM (XANAX) 0.5 MG TABLET    Take 1 tablet (0.5 mg total) by mouth nightly as needed for Anxiety.    AMLODIPINE (NORVASC) 5 MG TABLET    Take 1 tablet (5 mg total) by mouth 2 (two) times daily.    CLONIDINE (CATAPRES) 0.1 MG TABLET    TAKE 1 TABLET BY MOUTH ONCE DAILY USE FOR SBP GREATER THAN 160 MMHG.    DOCUSATE SODIUM (COLACE) 100 MG CAPSULE    Take 100 mg by mouth 2 (two) times daily as needed.    DULOXETINE (CYMBALTA) 60 MG CAPSULE    Take 1 capsule (60 mg total) by mouth 2 (two) times daily.    GABAPENTIN (NEURONTIN) 300 MG CAPSULE    Take 1 capsule (300 mg total) by  mouth 3 (three) times daily.    HYDROCHLOROTHIAZIDE (HYDRODIURIL) 12.5 MG TAB    Take 1 tablet (12.5 mg total) by mouth once daily.    METOPROLOL SUCCINATE (TOPROL-XL) 50 MG 24 HR TABLET    Take 2 tablets (100 mg total) by mouth nightly.    MULTIVIT-MIN/FERROUS FUMARATE (MULTI VITAMIN ORAL)    Take 2 tablets by mouth Daily.    ONDANSETRON (ZOFRAN-ODT) 4 MG TBDL    dissolve 2 tablets (8 mg total) by mouth every 8 (eight) hours as needed (nausea).    OXYCODONE-ACETAMINOPHEN (ENDOCET)  MG PER TABLET    Take 1 tablet by mouth every 8 (eight) hours as needed for Pain.    TELMISARTAN (MICARDIS) 80 MG TAB    Take 1 tablet (80 mg total) by mouth once daily.    TIZANIDINE (ZANAFLEX) 4 MG TABLET    Take 0.5-1 tablets (2-4 mg total) by mouth 2 (two) times daily as needed (pain).    VITAMIN D (VITAMIN D3) 1000 UNITS TAB    Take 1,000 Units by mouth once daily.     Review of patient's allergies indicates:   Allergen Reactions    Codeine Hives and Rash     Takes Tramadol and has never had an issue with SOB/swelling  Also tolerated hydromorphone 7/2020      Penicillin     Penicillins Hives     Pt tolerated cefazolin 7/2020     Review of Systems   Constitutional: Negative for decreased appetite.   HENT:  Negative for tinnitus.    Eyes:  Negative for double vision.   Cardiovascular:  Negative for chest pain.   Respiratory:  Negative for wheezing.    Hematologic/Lymphatic: Negative for bleeding problem.   Skin:  Negative for dry skin.   Musculoskeletal:  Positive for arthritis, back pain, joint pain and stiffness. Negative for gout and neck pain.   Gastrointestinal:  Negative for abdominal pain.   Genitourinary:  Negative for bladder incontinence.   Neurological:  Negative for numbness, paresthesias and sensory change.   Psychiatric/Behavioral:  Negative for altered mental status.        Objective:   Body mass index is 29.26 kg/m².  There were no vitals filed for this visit.       General    Constitutional: She is oriented to  person, place, and time. She appears well-developed.   HENT:   Head: Atraumatic.   Eyes: EOM are normal.   Pulmonary/Chest: Effort normal.   Neurological: She is alert and oriented to person, place, and time.   Psychiatric: Judgment normal.           Ambulating without any assistive devices  Right hip surgical scar healed well.  Passive range of motion without pain in the groin.  There is slight weakness to hip flexors and abductors   The left total hip journal external rotation without pain in the groin.  There is no pain to palpation over the greater trochanter Excellent range of motion  Pelvis is level  The sitting position  Hip flexors, abductors adductors quads and hamstrings ankle extensors and flexors were 5/5  Left TKA has around 3° of contracture and she flexes to 120°.  Slightly tight.  No defect in the patella or quadriceps tendon.  No erythema, not warm to touch.  Collaterals stable to varus and valgus stressing in extension and in flexion at 90°.  There is tenderness and pain over the lateral joint into AP compression on the patella.  There is radiation to the outside of the tibia down to the outside of the foot.  Right knee surgical incision healed well.  She has 0-130 degrees of flexion.  She is not hyperextending anymore when preoperatively she was hyperextending approximately 10°.  She is stable in extension with slight opening if any approximately 2 mm on the medial side with the knee in extension.  She is stable in flexion and 90°.  No swelling no effusion no erythema  Ankle motion intact  Skin is warm to touch    Relevant imaging results reviewed and interpreted by me, discussed with the patient and / or family today   X-ray 09/07/2023 right hip without evidence of failure.  The acetabular is slightly more anteverted than the other side.  No evidence of failure  X-ray 11/28/2022 of the right knee showing the thick poly insert with the right total knee ingrowth prosthesis performed by Dr. Lan  prior no evidence of fracture  X-ray bilateral knees 06/27/2022 showing left TKA with very thick poly insert and the patella was not resurfaced with excellent alignment/posterior sacrificing knee system..  Right TKA/looks like [employer] with slightly oversized base plate on the tibia but overall alignment is intact and looks like it is ingrowth prosthesis with patella not resurfaced and posterior cruciate sparing knee  X-ray 11/24/2021 and 06/27/2022 left KRZYSZTOF in excellent alignment.  Right hip complete loss of joint space with marginal osteophyte and cystic changes consistent with severe arthritis of the right hip    X-ray 06/27/2022 of the lumbar spine showing spondylolisthesis L4 on 5 grade 1 and facet arthropathy.  No fracture seen  Assessment:     Encounter Diagnoses   Name Primary?    S/P total right hip arthroplasty Yes    S/P revision of total knee, right     Hx of total hip arthroplasty, left     History of total left knee replacement     Back pain, unspecified back location, unspecified back pain laterality, unspecified chronicity     Left lumbar radiculopathy         Plan:   S/P total right hip arthroplasty    S/P revision of total knee, right    Hx of total hip arthroplasty, left    History of total left knee replacement    Back pain, unspecified back location, unspecified back pain laterality, unspecified chronicity    Left lumbar radiculopathy         Patient Instructions   You still having pain in the left knee performed 05/06/2021 by  Has  You also having pain going down to the outside of her foot on that left leg   As far as the hips are concerned you having some mild discomfort in both hips doing well   The right total knee revision to a thicker plastic that is doing well  We need to confirm to make sure that you do not having any pinched nerve since the pain is radiating down on the left leg to the outside of her foot which is in an L5 distribution  We need to repeat the x-rays on the left knee  next time you come after the nerve test   You may return to work with limitation.  No kneeling if possible.  No heavy lifting more than 20 lb, avoid excessive stair climbing and ladders  I will see you back after we get that nerve test done   We briefly discussed maybe changing the poly insert on that left knee in the future      Operative report 06/09/2016 Dr. Lan implant [employer] vanguard cementless components size 57.5 femur, 14 mm polyethylene insert.  Tibial modular tray size 67, modular finned stem with screw system 80 x 10 mm, self taping screws 6.5 x 25  Left knee replaced by Dr. Gandara using DJO.  Hardware present in epic under surgery  I did discuss with the patient that we would do not know what is going to happen until we doing the surgery.  If the poly exchange give her enough stability than things will be okay otherwise if we not happy with the stability then we might have to take everything out and do a complete revision.  She expressed understanding that we going in to make sure that the knee becomes stable.  She apparently had mild relief from the genicular nerve block using lidocaine on the left knee  Disclaimer: This note was prepared using a voice recognition system and is likely to have sound alike errors within the text.

## 2023-09-14 ENCOUNTER — TELEPHONE (OUTPATIENT)
Dept: ORTHOPEDICS | Facility: CLINIC | Age: 58
End: 2023-09-14
Payer: COMMERCIAL

## 2023-09-14 NOTE — TELEPHONE ENCOUNTER
Called the patient in regards to their paperwork. Informed the patient that the paperwork that they dropped off last week is for them to feel out. Patient states to disregard this paperwork. Verbalized understanding.

## 2023-09-21 NOTE — TELEPHONE ENCOUNTER
Left vm to notify of prescriptions sent to requested pharmacy.    UTD with following:  Colon cancer screen: colonoscopy 12/2022  Osteoporosis screen: Dexa 4/2022 + osteoporosis

## 2023-09-22 ENCOUNTER — OFFICE VISIT (OUTPATIENT)
Dept: PHYSICAL MEDICINE AND REHAB | Facility: CLINIC | Age: 58
End: 2023-09-22
Payer: COMMERCIAL

## 2023-09-22 ENCOUNTER — PATIENT OUTREACH (OUTPATIENT)
Dept: ADMINISTRATIVE | Facility: HOSPITAL | Age: 58
End: 2023-09-22
Payer: COMMERCIAL

## 2023-09-22 ENCOUNTER — OFFICE VISIT (OUTPATIENT)
Dept: CARDIOLOGY | Facility: CLINIC | Age: 58
End: 2023-09-22
Payer: COMMERCIAL

## 2023-09-22 ENCOUNTER — TELEPHONE (OUTPATIENT)
Dept: CARDIOLOGY | Facility: CLINIC | Age: 58
End: 2023-09-22

## 2023-09-22 ENCOUNTER — HOSPITAL ENCOUNTER (OUTPATIENT)
Dept: CARDIOLOGY | Facility: HOSPITAL | Age: 58
Discharge: HOME | End: 2023-09-22
Payer: COMMERCIAL

## 2023-09-22 VITALS
WEIGHT: 164 LBS | SYSTOLIC BLOOD PRESSURE: 170 MMHG | RESPIRATION RATE: 14 BRPM | DIASTOLIC BLOOD PRESSURE: 107 MMHG | HEIGHT: 62 IN | HEART RATE: 67 BPM | BODY MASS INDEX: 30.18 KG/M2

## 2023-09-22 VITALS
DIASTOLIC BLOOD PRESSURE: 88 MMHG | HEART RATE: 47 BPM | BODY MASS INDEX: 30.22 KG/M2 | HEIGHT: 62 IN | OXYGEN SATURATION: 98 % | SYSTOLIC BLOOD PRESSURE: 128 MMHG | WEIGHT: 164.25 LBS

## 2023-09-22 DIAGNOSIS — M54.16 LUMBAR RADICULOPATHY: ICD-10-CM

## 2023-09-22 DIAGNOSIS — I10 ESSENTIAL HYPERTENSION: Primary | ICD-10-CM

## 2023-09-22 DIAGNOSIS — R00.1 BRADYCARDIA: ICD-10-CM

## 2023-09-22 DIAGNOSIS — R94.31 ABNORMAL EKG: ICD-10-CM

## 2023-09-22 DIAGNOSIS — I27.20 PULMONARY HTN: ICD-10-CM

## 2023-09-22 PROCEDURE — 93010 ELECTROCARDIOGRAM REPORT: CPT | Mod: ,,, | Performed by: INTERNAL MEDICINE

## 2023-09-22 PROCEDURE — 99999 PR PBB SHADOW E&M-EST. PATIENT-LVL III: ICD-10-PCS | Mod: PBBFAC,,, | Performed by: PHYSICAL MEDICINE & REHABILITATION

## 2023-09-22 PROCEDURE — 3008F PR BODY MASS INDEX (BMI) DOCUMENTED: ICD-10-PCS | Mod: CPTII,S$GLB,, | Performed by: PHYSICIAN ASSISTANT

## 2023-09-22 PROCEDURE — 99499 NO LOS: ICD-10-PCS | Mod: S$GLB,,, | Performed by: PHYSICAL MEDICINE & REHABILITATION

## 2023-09-22 PROCEDURE — 1160F PR REVIEW ALL MEDS BY PRESCRIBER/CLIN PHARMACIST DOCUMENTED: ICD-10-PCS | Mod: CPTII,S$GLB,, | Performed by: PHYSICIAN ASSISTANT

## 2023-09-22 PROCEDURE — 1160F RVW MEDS BY RX/DR IN RCRD: CPT | Mod: CPTII,S$GLB,, | Performed by: PHYSICIAN ASSISTANT

## 2023-09-22 PROCEDURE — 99999 PR PBB SHADOW E&M-EST. PATIENT-LVL IV: CPT | Mod: PBBFAC,,, | Performed by: PHYSICIAN ASSISTANT

## 2023-09-22 PROCEDURE — 3074F PR MOST RECENT SYSTOLIC BLOOD PRESSURE < 130 MM HG: ICD-10-PCS | Mod: CPTII,S$GLB,, | Performed by: PHYSICIAN ASSISTANT

## 2023-09-22 PROCEDURE — 95886 MUSC TEST DONE W/N TEST COMP: CPT | Mod: S$GLB,,, | Performed by: PHYSICAL MEDICINE & REHABILITATION

## 2023-09-22 PROCEDURE — 4010F ACE/ARB THERAPY RXD/TAKEN: CPT | Mod: CPTII,S$GLB,, | Performed by: PHYSICIAN ASSISTANT

## 2023-09-22 PROCEDURE — 1159F MED LIST DOCD IN RCRD: CPT | Mod: CPTII,S$GLB,, | Performed by: PHYSICIAN ASSISTANT

## 2023-09-22 PROCEDURE — 99999 PR PBB SHADOW E&M-EST. PATIENT-LVL IV: ICD-10-PCS | Mod: PBBFAC,,, | Performed by: PHYSICIAN ASSISTANT

## 2023-09-22 PROCEDURE — 95909 PR NERVE CONDUCTION STUDY; 5-6 STUDIES: ICD-10-PCS | Mod: S$GLB,,, | Performed by: PHYSICAL MEDICINE & REHABILITATION

## 2023-09-22 PROCEDURE — 93010 EKG 12-LEAD: ICD-10-PCS | Mod: ,,, | Performed by: INTERNAL MEDICINE

## 2023-09-22 PROCEDURE — 4010F PR ACE/ARB THEARPY RXD/TAKEN: ICD-10-PCS | Mod: CPTII,S$GLB,, | Performed by: PHYSICIAN ASSISTANT

## 2023-09-22 PROCEDURE — 3079F PR MOST RECENT DIASTOLIC BLOOD PRESSURE 80-89 MM HG: ICD-10-PCS | Mod: CPTII,S$GLB,, | Performed by: PHYSICIAN ASSISTANT

## 2023-09-22 PROCEDURE — 99999 PR PBB SHADOW E&M-EST. PATIENT-LVL III: CPT | Mod: PBBFAC,,, | Performed by: PHYSICAL MEDICINE & REHABILITATION

## 2023-09-22 PROCEDURE — 99214 PR OFFICE/OUTPT VISIT, EST, LEVL IV, 30-39 MIN: ICD-10-PCS | Mod: S$GLB,,, | Performed by: PHYSICIAN ASSISTANT

## 2023-09-22 PROCEDURE — 99214 OFFICE O/P EST MOD 30 MIN: CPT | Mod: S$GLB,,, | Performed by: PHYSICIAN ASSISTANT

## 2023-09-22 PROCEDURE — 93005 ELECTROCARDIOGRAM TRACING: CPT

## 2023-09-22 PROCEDURE — 95886 PR EMG COMPLETE, W/ NERVE CONDUCTION STUDIES, 5+ MUSCLES: ICD-10-PCS | Mod: S$GLB,,, | Performed by: PHYSICAL MEDICINE & REHABILITATION

## 2023-09-22 PROCEDURE — 3074F SYST BP LT 130 MM HG: CPT | Mod: CPTII,S$GLB,, | Performed by: PHYSICIAN ASSISTANT

## 2023-09-22 PROCEDURE — 1159F PR MEDICATION LIST DOCUMENTED IN MEDICAL RECORD: ICD-10-PCS | Mod: CPTII,S$GLB,, | Performed by: PHYSICIAN ASSISTANT

## 2023-09-22 PROCEDURE — 3008F BODY MASS INDEX DOCD: CPT | Mod: CPTII,S$GLB,, | Performed by: PHYSICIAN ASSISTANT

## 2023-09-22 PROCEDURE — 95909 NRV CNDJ TST 5-6 STUDIES: CPT | Mod: S$GLB,,, | Performed by: PHYSICAL MEDICINE & REHABILITATION

## 2023-09-22 PROCEDURE — 99499 UNLISTED E&M SERVICE: CPT | Mod: S$GLB,,, | Performed by: PHYSICAL MEDICINE & REHABILITATION

## 2023-09-22 PROCEDURE — 3079F DIAST BP 80-89 MM HG: CPT | Mod: CPTII,S$GLB,, | Performed by: PHYSICIAN ASSISTANT

## 2023-09-22 NOTE — PROGRESS NOTES
Subjective:   Patient ID:  Modesta Camacho is a 57 y.o. female who presents for follow-up of HTN/BP check      HPI  Ms. Camacho is a 57 year old female patient whose current medical conditions include anxiety, abnormal EKG, HTN, mild pulmonary HTN, an dhistory of gastric sleeve who presents today for follow-up. Patient previously seen by Dr. Rosario and had HCTZ added to med regimen due to elevated BP. She returns today and states she is doing well overall. No CV complaints. Denies any CP, heaviness, or tightness. No unusual SOB. No LH, dizziness, palpitations, near syncope, or syncope. Does have chronic joint pain/hand tingling from joint issues. No s/s of TIA/CVA. BP much improved today, diastolic BP is borderline. She is trying to watch her salt intake.  Requesting to see surgeon/GI given history of gastric sleeve. Previously had negative sleep study, does snore sometimes.  She is compliant with her medications. Uses clonidine occasionally.    EKG today shows sinus bradycardia, non-specific T wave abnormality. Echo 12/21 normal EF, pulmonary HTN. MPI stress test 12/21 negative for ischemia.    Review of Systems   Constitutional: Negative for chills, decreased appetite, fever and malaise/fatigue.   HENT:  Negative for congestion, hoarse voice and sore throat.    Eyes:  Negative for blurred vision and discharge.   Cardiovascular:  Negative for chest pain, claudication, cyanosis, dyspnea on exertion, irregular heartbeat, leg swelling, near-syncope, orthopnea, palpitations and paroxysmal nocturnal dyspnea.   Respiratory:  Negative for cough, hemoptysis, shortness of breath, snoring, sputum production and wheezing.    Endocrine: Negative for cold intolerance and heat intolerance.   Hematologic/Lymphatic: Negative for bleeding problem. Does not bruise/bleed easily.   Skin:  Negative for rash.   Musculoskeletal:  Negative for arthritis, back pain, joint pain, joint swelling, muscle cramps, muscle weakness and myalgias.  "  Gastrointestinal:  Negative for abdominal pain, constipation, diarrhea, heartburn, melena and nausea.   Genitourinary:  Negative for hematuria.   Neurological:  Negative for dizziness, focal weakness, headaches, light-headedness, loss of balance, numbness, paresthesias, seizures and weakness.   Psychiatric/Behavioral:  Negative for memory loss. The patient does not have insomnia.    Allergic/Immunologic: Negative for hives.     /88 (BP Location: Right arm, Patient Position: Sitting, BP Method: Large (Manual))   Pulse (!) 47   Ht 5' 2" (1.575 m)   Wt 74.5 kg (164 lb 3.9 oz)   SpO2 98%   BMI 30.04 kg/m²     Objective:   Physical Exam  Vitals and nursing note reviewed.   Constitutional:       General: She is not in acute distress.     Appearance: Normal appearance. She is well-developed. She is not diaphoretic.   HENT:      Head: Normocephalic and atraumatic.   Eyes:      General:         Right eye: No discharge.         Left eye: No discharge.      Pupils: Pupils are equal, round, and reactive to light.   Neck:      Thyroid: No thyromegaly.      Vascular: No JVD.      Trachea: No tracheal deviation.   Cardiovascular:      Rate and Rhythm: Normal rate and regular rhythm.      Pulses: Intact distal pulses.      Heart sounds: Normal heart sounds, S1 normal and S2 normal. No murmur heard.  Pulmonary:      Effort: Pulmonary effort is normal. No respiratory distress.      Breath sounds: Normal breath sounds. No wheezing or rales.   Abdominal:      General: There is no distension.      Palpations: Abdomen is soft.      Tenderness: There is no rebound.   Musculoskeletal:      Cervical back: Neck supple.      Right lower leg: No edema.      Left lower leg: No edema.   Skin:     General: Skin is warm and dry.      Findings: No erythema.   Neurological:      General: No focal deficit present.      Mental Status: She is alert and oriented to person, place, and time.   Psychiatric:         Mood and Affect: Mood " normal.         Behavior: Behavior normal.         Thought Content: Thought content normal.       Echo Results 2/21   Summary    The left ventricle is normal in size with concentric remodeling and normal systolic function. The estimated ejection fraction is 55%  Normal left ventricular diastolic function.  Normal right ventricular size with normal right ventricular systolic function.  Mild to moderate tricuspid regurgitation.  Normal central venous pressure (3 mmHg).  The estimated PA systolic pressure is 41 mmHg.  There is pulmonary hypertension.    Stress Test Results 2021  Conclusion         Normal myocardial perfusion scan. There is no evidence of myocardial ischemia or infarction.    The gated perfusion images showed an ejection fraction of 70% at rest. The gated perfusion images showed an ejection fraction of 70% post stress. Normal ejection fraction is greater than 53 %.    The EKG portion of this study is negative for ischemia.    There were no arrhythmias during stress.     Assessment:      1. Essential hypertension    2. Bradycardia    3. Abnormal EKG    4. Pulmonary HTN      Patient doing clinically well. BP improved. Diastolic BP still borderline. But greatly improved since last visit. Continue same mgmt for now. Monitor BP at home. Clonidine prn. Check BMP today. Low salt diet.  Plan:   -Continue current medical management and risk factor modification  -Cardiac, low salt diet  -Increase activity level  -Clonidine prn  -Monitor BP at home at least once daily, call clinic if BP readings trending up or using clonidine often  -Follow-up TBA pending review of lab

## 2023-09-22 NOTE — PROGRESS NOTES
[facility]   [address]  [place] [address]  Phone: [phone]        Full Name: Modesta Camacho YOB: 1965  Patient ID: [id number]      Visit Date: 9/22/2023 08:32  Age: 57 Years 10 Months Old  Examining Physician: Mona Rondon M.D.  Referring Physician: Dr Clifford  Reason for Referral: lower ext      Chief Complaint   Patient presents with    Back Pain     Into legs       HPI: This is a 57 y.o.  female being seen in clinic today for evaluation of chronic low back achy pain with radiation into her legs. She has a history of bilateral TKA and bilateral [id number] with residual left leg numbness/tingling at her knee, into her lower leg and the top of the foot.  Her symptoms are worse at rest and with increased activity. Position change only provides some relief.     History obtained from patient    Past family, medical, social, and surgical history reviewed in chart    Review of Systems:     General- denies lethargy, weight change, fever, chills  Head/neck- denies swallowing difficulties  ENT- denies hearing changes  Cardiovascular-denies chest pain  Pulmonary- denies shortness of breath  GI- denies constipation or bowel incontinence  - denies bladder incontinence  Skin- denies wounds or rashes  Musculoskeletal- denies weakness, + pain  Neurologic- + numbness and tingling  Psychiatric- denies depressive or psychotic features, + anxiety  Lymphatic-denies swelling  Endocrine- denies hypoglycemic symptoms/DM history  All other pertinent systems negative     Physical Examination:  General: Well developed, well nourished female, NAD  HEENT:NCAT EOMI bilaterally   Pulmonary:Normal respirations    Spinal Examination: CERVICAL  Active ROM is within normal limits.  Inspection: No deformity of spinal alignment.    Spinal Examination: LUMBAR or THORACIC  Active ROM is within normal limits.  Inspection: No deformity of spinal alignment.      Bilateral Upper and Lower Extremities:  Pulses are 2+  at radial bilaterally.  Shoulder/Elbow/Wrist/Hand ROM   Hip/Knee/Ankle ROM surgical scars at knees  Bilateral Extremities show normal capillary refill.  No signs of cyanosis, rubor, edema, skin changes, or dysvascular changes of appendages.  Nails appear intact.    Neurological Exam:  Cranial Nerves:  II-XII grossly intact    Manual Muscle Testing: (Motor 5=normal)  5/5 strength bilateral upper/lower extremities    No focal atrophy is noted of either upper or lower extremity.    Bilateral Reflexes:  No clonus at knee or ankle.    Sensation: tested to light touch  - intact in legs except dec at left lower leg in L5 and S1 dist    Gait: Narrow base and good arm swing.      Entire procedure explained to patient prior to proceeding.  Verbal consent obtained      SNC      Nerve / Sites Rec. Site Onset Lat Peak Lat Amp Segments Distance Velocity     ms ms µV  mm m/s   L Sural - Ankle (Calf)      Calf Ankle NR NR NR Calf - Ankle 140 NR   R Sural - Ankle (Calf)      Calf Ankle 4.4 5.0 10.0 Calf - Ankle 140 32   L Superficial peroneal - Ankle      Lat leg Ankle NR NR NR Lat leg - Ankle 140 NR   Superficial peroneal - Ankle      Lat leg Ankle 2.1 2.6 9.5 Lat leg - Ankle 140 66       MNC      Nerve / Sites Muscle Latency Amplitude Duration Rel Amp Segments Distance Lat Diff Velocity     ms mV ms %  mm ms m/s   L Peroneal - EDB      Ankle EDB 6.6 0.9 6.2 100 Ankle - EDB 80        Fibular head EDB 15.1 0.6 10.4 67.4 Fibular head - Ankle 290 8.4 34      Pop fossa EDB 16.9 0.5  83.6 Pop fossa - Fibular head 60 1.8 33   R Peroneal - EDB      Ankle EDB 5.0 1.0 6.2 100 Ankle - EDB 80        Fibular head EDB 11.0 0.9 8.1 93.3 Fibular head - Ankle 280 6.0 47      Pop fossa EDB 12.7 1.0  110 Pop fossa - Fibular head 70 1.7 42   L Tibial - AH      Ankle AH 4.7 2.4 4.6 100 Ankle - Ankle 80        Pop fossa AH 15.7 1.1  44.9 Pop fossa - Ankle 370 10.9 34   R Tibial - AH      Ankle AH 5.2 3.2 4.5 100 Ankle - Ankle 80        Pop fossa AH  18.0 1.0  32 Pop fossa - Ankle 380 12.9 30       EMG         EMG Summary Table     Spontaneous MUAP Recruitment   Muscle IA Fib PSW Fasc Other Dur. Dur Amp Dur Polys Pattern Effort   L. Rectus femoris N None None None . _NFT_ _NFT_ N N N N Max   L. Tibialis anterior N None None None . _NFT_ _NFT_ N N N N Max   L. Gastrocnemius (Medial head) N None None None . _NFT_ _NFT_ N N 1+ sl red Max   L. Extensor digitorum brevis Decr None None None . _NFT_ _NFT_ N N N Reduced Max   L. Abductor hallucis Incr 1+ 1+ None . _NFT_ _NFT_ N N 1+ Reduced Sub Max   R. Abductor hallucis Incr None None None . _NFT_ _NFT_ N N 1+ Reduced Sub Max   R. Extensor digitorum brevis Decr None None None . _NFT_ _NFT_ N Sl Incr 1+ Reduced Max                                    INTERPRETATION  -Right superficial peroneal sensory nerve conduction study showed normal peak latency and amplitude  -Left superficial peroneal showed no response  -Left sural showed no response  -Right sural sensory nerve conduction study showed prolonged peak latency and normal amplitude  -Bilateral peroneal motor nerve conduction study showed prolonged latency on the left, dec amplitude on the left, and dec conduction velocity on the left  -Bilateral tibial motor nerve conduction study showed normal latency,  dec amplitude, and dec conduction velocity  -Needle EMG examination performed to above mentioned muscles       IMPRESSION  ABNORMAL study  2.  There is electrodiagnostic evidence of an acute on chronic radiculopathy of the left S1 nerve root, a subacute on chronic radiculopathy of the right S1 nerve root, and a chronic radiculopathy of the bilateral L5 nerve roots    PLAN  Discussed in detail for greater than 30 minutes about diagnosis and treatment plan    1. Follow up with referring provider: Dr. Trey Clifford  2. Handouts on back care and lumbar radic provided. Rec fu with IPM  3. This study is good for one year. If symptoms worsen or do not improve, please  re-consult.    Mona Rondon M.D.  Physical Medicine and Rehab

## 2023-09-22 NOTE — PROGRESS NOTES
HTN Report: Called Pt to obtain home BP reading, No answer, unable to LVM. Pt had BP in normal range with Cardiologist today then went for testing & BP was out of range.

## 2023-09-22 NOTE — TELEPHONE ENCOUNTER
Attempted to contact pt and inform her BMP reviewed/stable. No answer, LVM      --- ZAHRA Morgan 09/22/23 12:43 PM ---  Please phone patient. BMP reviewed/stable.    Thanks

## 2023-09-27 ENCOUNTER — PATIENT MESSAGE (OUTPATIENT)
Dept: ADMINISTRATIVE | Facility: HOSPITAL | Age: 58
End: 2023-09-27
Payer: COMMERCIAL

## 2023-10-02 ENCOUNTER — TELEPHONE (OUTPATIENT)
Dept: PAIN MEDICINE | Facility: CLINIC | Age: 58
End: 2023-10-02
Payer: COMMERCIAL

## 2023-10-02 NOTE — TELEPHONE ENCOUNTER
Attempt to call patient to confirm appointment. Patent did not answer, Left message on patients voice mail to call back at earliest convenience to confirm or reschedule p.t apt.   Also switched pt providers   =

## 2023-10-05 ENCOUNTER — TELEPHONE (OUTPATIENT)
Dept: INTERNAL MEDICINE | Facility: CLINIC | Age: 58
End: 2023-10-05
Payer: COMMERCIAL

## 2023-10-05 ENCOUNTER — PATIENT MESSAGE (OUTPATIENT)
Dept: INTERNAL MEDICINE | Facility: CLINIC | Age: 58
End: 2023-10-05
Payer: COMMERCIAL

## 2023-10-05 DIAGNOSIS — Z12.39 ENCOUNTER FOR SCREENING FOR MALIGNANT NEOPLASM OF BREAST, UNSPECIFIED SCREENING MODALITY: Primary | ICD-10-CM

## 2023-10-11 ENCOUNTER — PATIENT OUTREACH (OUTPATIENT)
Dept: ADMINISTRATIVE | Facility: HOSPITAL | Age: 58
End: 2023-10-11
Payer: COMMERCIAL

## 2023-10-13 ENCOUNTER — HOSPITAL ENCOUNTER (OUTPATIENT)
Dept: RADIOLOGY | Facility: HOSPITAL | Age: 58
Discharge: HOME | End: 2023-10-13
Attending: NURSE PRACTITIONER
Payer: COMMERCIAL

## 2023-10-13 VITALS — WEIGHT: 163.13 LBS | BODY MASS INDEX: 30.02 KG/M2 | HEIGHT: 62 IN

## 2023-10-13 DIAGNOSIS — Z12.39 ENCOUNTER FOR SCREENING FOR MALIGNANT NEOPLASM OF BREAST, UNSPECIFIED SCREENING MODALITY: ICD-10-CM

## 2023-10-13 PROCEDURE — 77063 MAMMO DIGITAL SCREENING BILAT WITH TOMO: ICD-10-PCS | Mod: 26,,, | Performed by: RADIOLOGY

## 2023-10-13 PROCEDURE — 77067 SCR MAMMO BI INCL CAD: CPT | Mod: 26,,, | Performed by: RADIOLOGY

## 2023-10-13 PROCEDURE — 77067 SCR MAMMO BI INCL CAD: CPT | Mod: TC

## 2023-10-13 PROCEDURE — 77067 MAMMO DIGITAL SCREENING BILAT WITH TOMO: ICD-10-PCS | Mod: 26,,, | Performed by: RADIOLOGY

## 2023-10-13 PROCEDURE — 77063 BREAST TOMOSYNTHESIS BI: CPT | Mod: 26,,, | Performed by: RADIOLOGY

## 2023-10-16 ENCOUNTER — OFFICE VISIT (OUTPATIENT)
Dept: INTERNAL MEDICINE | Facility: CLINIC | Age: 58
End: 2023-10-16
Payer: COMMERCIAL

## 2023-10-16 VITALS
OXYGEN SATURATION: 98 % | TEMPERATURE: 97 F | BODY MASS INDEX: 30.95 KG/M2 | RESPIRATION RATE: 18 BRPM | DIASTOLIC BLOOD PRESSURE: 88 MMHG | SYSTOLIC BLOOD PRESSURE: 150 MMHG | WEIGHT: 168.19 LBS | HEART RATE: 68 BPM | HEIGHT: 62 IN

## 2023-10-16 DIAGNOSIS — Z00.00 ANNUAL PHYSICAL EXAM: Primary | ICD-10-CM

## 2023-10-16 DIAGNOSIS — Z98.84 HISTORY OF GASTRIC RESTRICTIVE SURGERY: ICD-10-CM

## 2023-10-16 DIAGNOSIS — N18.31 CHRONIC KIDNEY DISEASE, STAGE 3A: ICD-10-CM

## 2023-10-16 DIAGNOSIS — Z96.641 S/P TOTAL RIGHT HIP ARTHROPLASTY: ICD-10-CM

## 2023-10-16 DIAGNOSIS — Z12.11 COLON CANCER SCREENING: ICD-10-CM

## 2023-10-16 DIAGNOSIS — F33.1 MODERATE EPISODE OF RECURRENT MAJOR DEPRESSIVE DISORDER: ICD-10-CM

## 2023-10-16 DIAGNOSIS — E79.0 HYPERURICEMIA: ICD-10-CM

## 2023-10-16 DIAGNOSIS — I10 ESSENTIAL HYPERTENSION: ICD-10-CM

## 2023-10-16 PROCEDURE — 3077F SYST BP >= 140 MM HG: CPT | Mod: CPTII,S$GLB,, | Performed by: NURSE PRACTITIONER

## 2023-10-16 PROCEDURE — 1160F PR REVIEW ALL MEDS BY PRESCRIBER/CLIN PHARMACIST DOCUMENTED: ICD-10-PCS | Mod: CPTII,S$GLB,, | Performed by: NURSE PRACTITIONER

## 2023-10-16 PROCEDURE — 3008F BODY MASS INDEX DOCD: CPT | Mod: CPTII,S$GLB,, | Performed by: NURSE PRACTITIONER

## 2023-10-16 PROCEDURE — 99999 PR PBB SHADOW E&M-EST. PATIENT-LVL V: ICD-10-PCS | Mod: PBBFAC,,, | Performed by: NURSE PRACTITIONER

## 2023-10-16 PROCEDURE — 99999 PR PBB SHADOW E&M-EST. PATIENT-LVL V: CPT | Mod: PBBFAC,,, | Performed by: NURSE PRACTITIONER

## 2023-10-16 PROCEDURE — 99213 PR OFFICE/OUTPT VISIT, EST, LEVL III, 20-29 MIN: ICD-10-PCS | Mod: S$GLB,,, | Performed by: NURSE PRACTITIONER

## 2023-10-16 PROCEDURE — 3077F PR MOST RECENT SYSTOLIC BLOOD PRESSURE >= 140 MM HG: ICD-10-PCS | Mod: CPTII,S$GLB,, | Performed by: NURSE PRACTITIONER

## 2023-10-16 PROCEDURE — 3044F HG A1C LEVEL LT 7.0%: CPT | Mod: CPTII,S$GLB,, | Performed by: NURSE PRACTITIONER

## 2023-10-16 PROCEDURE — 3008F PR BODY MASS INDEX (BMI) DOCUMENTED: ICD-10-PCS | Mod: CPTII,S$GLB,, | Performed by: NURSE PRACTITIONER

## 2023-10-16 PROCEDURE — 3044F PR MOST RECENT HEMOGLOBIN A1C LEVEL <7.0%: ICD-10-PCS | Mod: CPTII,S$GLB,, | Performed by: NURSE PRACTITIONER

## 2023-10-16 PROCEDURE — 4010F PR ACE/ARB THEARPY RXD/TAKEN: ICD-10-PCS | Mod: CPTII,S$GLB,, | Performed by: NURSE PRACTITIONER

## 2023-10-16 PROCEDURE — 1160F RVW MEDS BY RX/DR IN RCRD: CPT | Mod: CPTII,S$GLB,, | Performed by: NURSE PRACTITIONER

## 2023-10-16 PROCEDURE — 4010F ACE/ARB THERAPY RXD/TAKEN: CPT | Mod: CPTII,S$GLB,, | Performed by: NURSE PRACTITIONER

## 2023-10-16 PROCEDURE — 3079F DIAST BP 80-89 MM HG: CPT | Mod: CPTII,S$GLB,, | Performed by: NURSE PRACTITIONER

## 2023-10-16 PROCEDURE — 1159F PR MEDICATION LIST DOCUMENTED IN MEDICAL RECORD: ICD-10-PCS | Mod: CPTII,S$GLB,, | Performed by: NURSE PRACTITIONER

## 2023-10-16 PROCEDURE — 1159F MED LIST DOCD IN RCRD: CPT | Mod: CPTII,S$GLB,, | Performed by: NURSE PRACTITIONER

## 2023-10-16 PROCEDURE — 3079F PR MOST RECENT DIASTOLIC BLOOD PRESSURE 80-89 MM HG: ICD-10-PCS | Mod: CPTII,S$GLB,, | Performed by: NURSE PRACTITIONER

## 2023-10-16 PROCEDURE — 99213 OFFICE O/P EST LOW 20 MIN: CPT | Mod: S$GLB,,, | Performed by: NURSE PRACTITIONER

## 2023-10-16 NOTE — PROGRESS NOTES
Subjective     Patient ID: Modesta Camacho is a 57 y.o. female.    Chief Complaint: Annual Exam    Patient presents for annual exam.      Medical history: HTN, Anxiety, Hyperuricemia, DDD Cervical, Pulmonary HTN, Lumbar Radiculopathy, Depression, CKD    Has a few concerns:   Wanting to have revision to gastric sleeve--2011 [facility]  Requesting consult.  Reports some weight gain and elevated blood pressure.   Before her gastric sleeve, she had uncontrolled HTN-multiple hospital visit.     Depression is stable.  Taking her Cymbalta.      Continues to have hip and knee pain.      Due to PAP in Nov, 2023  Due for colon cancer screening         Review of Systems   Constitutional:  Negative for chills, fatigue and fever.   Respiratory:  Negative for cough and shortness of breath.    Cardiovascular:  Negative for chest pain and leg swelling.   Gastrointestinal:  Negative for abdominal pain, constipation and diarrhea.   Musculoskeletal:  Positive for arthralgias and leg pain. Negative for joint swelling.   Psychiatric/Behavioral:  Negative for agitation and confusion.           Objective     Physical Exam  Vitals reviewed.   Constitutional:       Appearance: She is obese.   HENT:      Head: Normocephalic.      Right Ear: Tympanic membrane normal.      Left Ear: Tympanic membrane normal.      Nose: Nose normal.   Cardiovascular:      Rate and Rhythm: Normal rate and regular rhythm.   Pulmonary:      Effort: Pulmonary effort is normal.      Breath sounds: Normal breath sounds.   Abdominal:      General: Bowel sounds are normal. There is no distension.      Tenderness: There is no abdominal tenderness.   Musculoskeletal:         General: No swelling. Normal range of motion.   Skin:     General: Skin is warm.   Neurological:      General: No focal deficit present.      Mental Status: She is alert.   Psychiatric:         Mood and Affect: Mood normal.         Behavior: Behavior normal. Behavior is cooperative.             Assessment and Plan     1. Annual physical exam  -     Ambulatory referral/consult to Gynecology; Future; Expected date: 10/23/2023    2. Colon cancer screening  -     Cologuard Screening (Multitarget Stool DNA); Future; Expected date: 10/16/2023    3. History of gastric restrictive surgery  Comments:  Consult to general surgery.   Orders:  -     Ambulatory referral/consult to General Surgery; Future; Expected date: 10/23/2023    4. S/P total right hip arthroplasty    5. Moderate episode of recurrent major depressive disorder  Comments:  Stable.  Continue Cymbalta.     6. Chronic kidney disease, stage 3a  Comments:  Stable.  Very important for blood pressure control and increase water intake.     7. Essential hypertension  Comments:  Continue low sodium diet and medication.      Schedule GYN-Nov in [place] (end of Nov)    Gen Surgery-[facility]     6 month follow up    Recheck blood pressure         Follow up in about 6 months (around 4/16/2024).

## 2023-10-18 ENCOUNTER — OFFICE VISIT (OUTPATIENT)
Dept: PAIN MEDICINE | Facility: CLINIC | Age: 58
End: 2023-10-18
Payer: COMMERCIAL

## 2023-10-18 DIAGNOSIS — M16.11 PRIMARY OSTEOARTHRITIS OF RIGHT HIP: ICD-10-CM

## 2023-10-18 DIAGNOSIS — M51.36 DDD (DEGENERATIVE DISC DISEASE), LUMBAR: ICD-10-CM

## 2023-10-18 DIAGNOSIS — M25.569 KNEE PAIN, UNSPECIFIED CHRONICITY, UNSPECIFIED LATERALITY: ICD-10-CM

## 2023-10-18 PROCEDURE — 4010F ACE/ARB THERAPY RXD/TAKEN: CPT | Mod: CPTII,95,, | Performed by: ANESTHESIOLOGY

## 2023-10-18 PROCEDURE — 3044F HG A1C LEVEL LT 7.0%: CPT | Mod: CPTII,95,, | Performed by: ANESTHESIOLOGY

## 2023-10-18 PROCEDURE — 99213 PR OFFICE/OUTPT VISIT, EST, LEVL III, 20-29 MIN: ICD-10-PCS | Mod: 95,,, | Performed by: ANESTHESIOLOGY

## 2023-10-18 PROCEDURE — 4010F PR ACE/ARB THEARPY RXD/TAKEN: ICD-10-PCS | Mod: CPTII,95,, | Performed by: ANESTHESIOLOGY

## 2023-10-18 PROCEDURE — 3044F PR MOST RECENT HEMOGLOBIN A1C LEVEL <7.0%: ICD-10-PCS | Mod: CPTII,95,, | Performed by: ANESTHESIOLOGY

## 2023-10-18 PROCEDURE — 99213 OFFICE O/P EST LOW 20 MIN: CPT | Mod: 95,,, | Performed by: ANESTHESIOLOGY

## 2023-10-18 RX ORDER — TIZANIDINE 4 MG/1
2-4 TABLET ORAL 2 TIMES DAILY PRN
Qty: 90 TABLET | Refills: 2 | Status: SHIPPED | OUTPATIENT
Start: 2023-10-18 | End: 2024-01-11 | Stop reason: SDUPTHER

## 2023-10-18 NOTE — PROGRESS NOTES
Interventional Pain Progress Note     The patient location is: home  The chief complaint leading to consultation is: chronic pain      Visit type: audiovisual     Face to Face time with patient: 10-15 minutes  20 minutes of total time spent on the encounter, which includes face to face time and non-face to face time preparing to see the patient (eg, review of tests), Obtaining and/or reviewing separately obtained history, Documenting clinical information in the electronic or other health record, Independently interpreting results (not separately reported) and communicating results to the patient/family/caregiver, or Care coordination (not separately reported).      Each patient to whom he or she provides medical services by telemedicine is:  (1) informed of the relationship between the physician and patient and the respective role of any other health care provider with respect to management of the patient; and (2) notified that he or she may decline to receive medical services by telemedicine and may withdraw from such care at any time.      Chief Pain Complaint:  Low back pain + RLE radiculopathy     Interval History (10/18/2023):  Patient returns to clinic after procedure.  Patient reports 80% relief in left knee pain after left genicular nerve block on 08/14/2023.  Patient reports occasional catching sensations in her left knee as well as aching pain along the medial aspect.  She denies any significant radiation of this pain.  She denies any significant numbness or tingling associated with this pain.  Pain is worse with prolonged standing and walking, better with rest and heat.  Pain is currently rated a 4/10. Denies any fevers, chills, changes in gait, saddle anesthesia, or bowel and bladder incontinence    Interval History (7/24/2023):  Modesta Camacho presents today for follow-up visit.  Patient was last seen on 05/23/2023. At last visit, she was to follow up with Dr. Clifford. She underwent right hip  arthroplasty with Dr. Clifford on 06/07/2023. Currently enrolled in physical therapy, has 6 weeks remaining. So far she has had her first post op after hip arthroplasty on 06/30/2023, next post op in August. Currently prescribed Endocet and Gabapentin for post op pain.     Patient reports pain as 8/10 today. She continues to report left knee pain. She previously underwent left TKA with Dr. Gandara. She has spoken to Dr. Clifford about this knee before, but it was previously replaced recently and Dr. Clifford is hesitant to go in again so soon.       Interval History (5/23/2023):  Modesta Camacho presents today via telemed for follow-up visit.  Patient was last seen on 2/21/2023. She reports worsening left knee pain. She reports it keeps feeling as though it is popping out of place. She reports it began swelling last Friday and since then she can hardly bear weight. She has been resting, icing, elevating, and wearing her brace which has reduced the swelling and inflammation somewhat. She has spoken to Dr. Clifford about this knee before, but it was previously replaced recently and Dr. Clifford is hesitant to go in again so soon. Patient reports pain as 8/10 today.  She is scheduled for right hip arthroplasty with Dr. Clifford on 06/07/2023.    Interval HPI 02/21/2023    Modesta Camacho presents today for follow-up visit.  Patient was last seen on 11/8/2022. Patient reports pain as 6/10 today. She reports persistent left knee pain, previously underwent left TKA with Dr. Gandara, states she has not seen him in over 2 years as he was dismissive of her continued pain after surgery. Sees Dr. Clifford for hip, but may also consider revision of left knee. Hx of right knee TKA with revision 9/22. Right knee is doing well. Last visit with Dr. Clifford on 02/13/2023. Considering right hip replacement in May/June. She also reports continued lower back pain, axial in nature, deep achy and without radiation into her lower  extremities. Requesting refills of her Gabapentin, Nabumetone, and Tizanidine.    Interval History (11/08/2022):  Modesta Camacho presents today for follow-up visit.  Patient was last seen on 01/05/2022. Patient reports pain as 6/10 today. Underwent revision of her right knee with Dr. Clifford 09/27/2022. Referred back to [facility] for low back pain by Dr. Clifford. Patient reports pain as axial in nature across her lower lumbar spine without radiation into her lower extremities. Pain is constant and interferes with daily activities. Taking cymbalta and Gabapentin with minimal relief. Tizanidine helpful in the past, she is out of this medication. Not interested in injections at this time.    X-ray lumbar spine  FINDINGS:  AP, lateral and coned down radiographs of the lumbar spine demonstrate no evidence for acute fracture or dislocation.  Persistent grade 1 anterolisthesis of L4 with respect L5 is again identified.  Moderate to severe posterior facet hypertrophic changes are identified at L5/S1.  Remaining intervertebral disk spaces and vertebral body heights are well-preserved.  Visualized SI joints are intact.  Patient is status post right hip arthroplasty.  Multiple calcified phleboliths are identified.  Multiple clips are identified in the epigastric region.     Impression:     Degenerative disease, most prominent at the lower lumbar spine.  Overall, no significant interval change.       Interval History (1/5/2022):  Modesta Camacho presents today for follow-up visit.  She is here today to review lumbar MRI.  She continues to have RLE pain.  She saw Dr. Rodriguez on 12/21/2021, who referred her to have an ultrasound-guided right hip injection by Dr. Acevedo.  She recently had this procedure with short-term pain relief, but it did not help the sciatica pain into the foot.  She was also referred to Dr. Clifford (Orthopedics) for evaluation for right hip replacement.  To note, patient reports history of left hip  replacement and bilateral knee replacement in the past.  She has been doing physical therapy twice a week, but she does not feel this has been overly helpful.      Initial HPI (11/09/2021):  Modesta Camacho is a 56 y.o. female  who is presenting with a chief complaint of low back pain. The patient began experiencing this problem insidiously, and the pain has been gradually worsening over the past 6 month(s). The pain is described as throbbing, shooting, burning and electrical and is located in the right lumbar spine . Pain is intermittent and lasts hours. The pain radiates to right leg L4 distribution. The patient rates her pain a 7 out of ten and interferes with activities of daily living a 8 out of ten. Pain is exacerbated by flexion of the lumbar spine, and is improved by rest. Patient reports no prior trauma, prior hip surgery Left [facility]. Right Knee Replacement 5/2021.     - pertinent negatives: No fever, No chills, No weight loss, No bladder dysfunction, No bowel dysfunction, No saddle anesthesia  - pertinent positives: right leg weakness    - medications, other therapies tried (physical therapy, injections):     >> NSAIDs, Tylenol, Tramadol, gabapentin, zanaflex and robaxin    >> Has previously undergone Physical Therapy    >>  Injections:    -08/14/2023:  Left genicular nerve block, 80% relief   - ultrasound-guided right hip injection by Dr. Acevedo on 12/21/2021 with short-term pain relief      Imaging / Labs / Studies (reviewed on 10/18/2023):    11/16/2021 MRI Lumbar Spine Without Contrast  FINDINGS:  Image quality is degraded by motion, lowering exam sensitivity and specificity.  Interpretation is offered within these confines.    Alignment: There is mild grade 1 anterolisthesis of L4 on L5.  There is slight leftward convexity of the lumbar spine.    Vertebrae: Diffuse loss normal T1 hyperintense marrow signal may be related to chronic anemia or other causes of red  marrow hyperplasia, correlate clinically.  No evidence of fracture.    Discs: Normal height and signal.    Cord: Normal.  Conus terminates at T12-L1    Degenerative findings:    T12-L1:  No spinal canal or neuroforaminal stenosis.    L1-L2:  No spinal canal or neuroforaminal stenosis.    L2-L3: Mild generalized disc bulge. No spinal canal or neuroforaminal stenosis.    L3-L4: Mild generalized disc bulge.  Mild bilateral facet arthropathy. No spinal canal or neuroforaminal stenosis.    L4-L5: Severe bilateral facet arthropathy with some uncovering of the intervertebral disc and thickening of the ligamentum flavum.  Moderate bilateral neural foraminal narrowing.  No spinal canal stenosis.    L5-S1: Moderate bilateral facet arthropathy. No spinal canal or neuroforaminal stenosis..    Paraspinal muscles & soft tissues: Unremarkable.    Impression  1. Grade 1 anterolisthesis of L4 on L5 secondary to facet arthropathy with associated moderate bilateral neural foraminal narrowing at this level.  2. Other multilevel degenerative findings as above      7/31/20 X-Ray Hip 2 or 3 views Left  COMPARISON:  Prior radiograph from May 20, 2020.  FINDINGS:  Interval total left hip arthroplasty with soft tissue air in the area.  There is normal alignment of the joint.  Densely calcified uterine leiomyomata are unchanged.  There also calcified phleboliths within the pelvis.  Impression  Normal alignment of the total left hip arthroplasty that has been placed in the interval.      11/24/2021 X-Ray Hip 2 or 3 views Right (with Pelvis when performed)  COMPARISON:  02/11/2021  FINDINGS:  There are multiple calcified fibroid seen projecting over the uterus.  There also multiple calcified pelvic phleboliths.  There is a single surgical clips seen to the right of the midline.  A left total hip arthroplasty is noted.  There is moderate to severe superolateral joint space narrowing involving the right hip with osteophyte formation noted.   Minimal subchondral cystic changes seen on either side of the right hip joint.      5/01/15 X-Ray Cervical Spine AP And Lateral  Findings:  The reversal of the lordotic curvature of the cervical spine secondary to moderate degenerative disk disease at C4-5 and C5-6.  Chronic anterior wedging of the C4 and C5 vertebral bodies.  Associated endplate sclerosis and uncovertebral joint  hypertrophy. The posterior elements are intact.  IMPRESSION:  No radiographic evidence of fracture or subluxation.  Moderate degenerative disk disease at C4-5 and C5-6.        Review of Systems:  CONSTITUTIONAL: patient denies any fever, chills, or weight loss  SKIN: patient denies any rash or itching  RESPIRATORY: patient denies having any shortness of breath  GASTROINTESTINAL: patient denies having any diarrhea, constipation, or bowel incontinence  GENITOURINARY: patient denies having any abnormal bladder function    MUSCULOSKELETAL:  - patient complains of the above noted pain/s (see chief pain complaint)    NEUROLOGICAL:   - pain as above  - strength in Lower extremities is intact, BILATERALLY  - sensation in Lower extremities is intact, BILATERALLY  - patient denies any loss of bowel or bladder control      PSYCHIATRIC: patient denies any change in mood    Other:  All other systems reviewed and are negative      Physical Exam:  Telemedicine Exam, physical exam from previous in office visit  There were no vitals filed for this visit.  There is no height or weight on file to calculate BMI.   (reviewed on 10/18/2023)     There were no vitals filed for this visit.      There is no height or weight on file to calculate BMI.   (reviewed on 10/18/2023)     GENERAL: Well appearing, in no acute distress, alert and oriented x3.  Cooperative.  PSYCH:  Mood and affect appropriate.  SKIN: Skin color & texture with no obvious abnormalities.    HEAD/FACE:  Normocephalic, atraumatic.    PULM:  No difficulty breathing. No nasal flaring. No obvious  wheezing.  NECK: ROM fairly preserved.  Supple.   BACK: Palpation over the lumbar paraspinous muscles causes pain. Pain with palpation over lumbar facets. Some pain with flexion. Some pain with extension.  Limited ROM secondary to pain reproduction.Pain with facet loading.   EXTREMITIES: No obvious deformities. Moving all extremities well, appears to have symmetric strength throughout. TTP along bilateral joint line of left knee, TTP along left pes bursa, decreased ROM secondary to pain and stiffness, no erythema, warmth or swelling. Right knee with very mild TTP along joint line and mild limitation to ROM  MUSCULOSKELETAL: No obvious atrophy abnormalities are noted.   NEURO: No obvious neurologic deficit.   GAIT: sitting.       Musculoskeletal:    Lumbar Spine    - Pain on flexion of lumbar spine Absent  - Straight Leg Raise:  Absent    - Pain on extension of lumbar spine Present  - TTP over the lumbar facet joints Present  - Lumbar facet loading Present    -Pain on palpation over the SI joint  Negative  - GALLO: Equivocal    Knee: left  - Scars: Present   - TTP: Present over medial/ lateral joint line  - ROM: Decreased secondary to pain  - Pain with extension: Present  - Pain with flexion: Present  - Crepitus: Present        Neuro:    Strength:  UE R/L: D: 5/5; B: 5/5; T: 5/5; WF: 5/5; WE: 5/5; IO: 5/5;  LE R/L: HF: 5/5, HE: 5/5, KF: 5/5; KE: 5/5; FE: 5/5; FF: 5/5    Extremity Reflexes: Brisk and symmetric throughout.      Extremity Sensory: Sensation to pinprick and temperature symmetric. Proprioception intact.      Psych:  Mood and affect is appropriate      Assessment:    Modesta Camacho is a 57 y.o. year old female who is presenting with       ICD-10-CM ICD-9-CM    1. Knee pain, unspecified chronicity, unspecified laterality  M25.569 719.46 tiZANidine (ZANAFLEX) 4 MG tablet      2. DDD (degenerative disc disease), lumbar  M51.36 722.52 tiZANidine (ZANAFLEX) 4 MG tablet      3. Primary osteoarthritis of right  hip  M16.11 715.15 tiZANidine (ZANAFLEX) 4 MG tablet              Plan:  1. Interventional:    None at this time.  Can repeat genicular nerve block as needed.  -08/14/2023:  Left genicular nerve block, 80% relief  - S/p ultrasound-guided right hip injection by Dr. Acevedo on 12/21/2021 with short-term pain relief.      2. Pharmacologic:   - D/c Topamax- dry mouth  -Continue Gabapentin 300 mg TID  - Continue Tramadol 50 mg Po BID PRN (60 tabs) - from Rheumatology.   - Continue Cymbalta 60 mg PO BID.   -Refill Zanaflex 4mg to take 1/2 to 1 tab BID PRN (60 tablets). Patient understands this may cause drowsiness.      3. Rehabilitative:   Encouraged ambulation. Continue physical therapy to help with strengthening, although patient reports not helping with pain.   Patient previously informed that we will fill out [facility] paperwork for physical therapy and procedures, but we will not fill out paperwork for disability.  Our goal is to improve pain to continue job responsibility.     4. Diagnostic:   Lumbar MRI and x-ray reviewed with patient.    5. Follow up:  Return to clinic as needed  - This condition does not require this patient to take time off of work, and the primary goal of our Pain Management services is to improve the patient's functional capacity.   - I discussed the risks, benefits, and alternatives to potential treatment options. All questions and concerns were fully addressed today in clinic.       Shaun Iyer MD  Interventional Pain Medicine  [facility]      Disclaimer:  This note was prepared using voice recognition system and is likely to have sound alike errors that may have been overlooked even after proof reading.  Please call me with any questions.

## 2023-10-24 ENCOUNTER — TELEPHONE (OUTPATIENT)
Dept: INTERNAL MEDICINE | Facility: CLINIC | Age: 58
End: 2023-10-24
Payer: COMMERCIAL

## 2023-10-24 DIAGNOSIS — Z98.84 HISTORY OF GASTRIC RESTRICTIVE SURGERY: Primary | ICD-10-CM

## 2023-10-24 NOTE — TELEPHONE ENCOUNTER
----- Message from Mary Rivera NP sent at 10/24/2023  1:45 PM CDT -----  Regarding: FW: Referral  Please offer patient a bariatric appointment in [place] to consult for revision.  Our surgeon do not see revision consults.     Mary   ----- Message -----  From: Modesta Reid MA  Sent: 10/24/2023   1:41 PM CDT  To: Mary Rivera NP  Subject: RE: Referral                                     No ma'am the [facility] location does   ----- Message -----  From: Mary Rivera NP  Sent: 10/24/2023   1:38 PM CDT  To: Barby Reid LPN; Modesta Reid MA  Subject: RE: Referral                                     Marlo Byers,     Patient is wanting a consult regarding a gastric sleeve revision due to weight gain.  Is this something your department see?    Mary   ----- Message -----  From: Barby Reid LPN  Sent: 10/16/2023   3:43 PM CDT  To: Mary Rivera NP  Subject: FW: Referral                                     Patient needing a consult with gen surg?  ----- Message -----  From: Modesta Reid MA  Sent: 10/16/2023   2:50 PM CDT  To: Barby Reid LPN  Subject: RE: Referral                                     What is the referral for I see where she has had previous gastric surgery   ----- Message -----  From: Barby Reid LPN  Sent: 10/16/2023   2:20 PM CDT  To: [facility] General Surgery Clinical Support  Subject: Referral                                         Good afternoon,     Could someone assist with scheduling for patient from referral?               Pt is asking if she can have a few tablets as she only has 5 tablets left. Pt said she uses this every night and has for yrs.  Virtual Appt Scheduled 5/23/20   [facility]

## 2023-10-24 NOTE — TELEPHONE ENCOUNTER
I called the pt but, there was no answer regarding her seeing Bariatric in [place] per Mary NP. //kah

## 2023-11-07 ENCOUNTER — PATIENT OUTREACH (OUTPATIENT)
Dept: ADMINISTRATIVE | Facility: HOSPITAL | Age: 58
End: 2023-11-07
Payer: COMMERCIAL

## 2023-11-13 LAB — NONINV COLON CA DNA+OCC BLD SCRN STL QL: NEGATIVE

## 2023-12-12 ENCOUNTER — TELEPHONE (OUTPATIENT)
Dept: ORTHOPEDICS | Facility: CLINIC | Age: 58
End: 2023-12-12
Payer: COMMERCIAL

## 2023-12-12 NOTE — TELEPHONE ENCOUNTER
Called the patient in regards to their appointment on 12/29/23. Called to reschedule their appointment due to the fact that Dr. Clifford will be out of the office that day. Called to move their appointment to 01/05/24 at the same time 7:40. No answer voice  mail full.

## 2023-12-15 ENCOUNTER — TELEPHONE (OUTPATIENT)
Dept: INTERNAL MEDICINE | Facility: CLINIC | Age: 58
End: 2023-12-15
Payer: COMMERCIAL

## 2023-12-15 ENCOUNTER — PATIENT MESSAGE (OUTPATIENT)
Dept: INTERNAL MEDICINE | Facility: CLINIC | Age: 58
End: 2023-12-15
Payer: COMMERCIAL

## 2023-12-18 ENCOUNTER — OFFICE VISIT (OUTPATIENT)
Dept: INTERNAL MEDICINE | Facility: CLINIC | Age: 58
End: 2023-12-18
Payer: COMMERCIAL

## 2023-12-18 VITALS
DIASTOLIC BLOOD PRESSURE: 88 MMHG | TEMPERATURE: 99 F | OXYGEN SATURATION: 99 % | SYSTOLIC BLOOD PRESSURE: 134 MMHG | WEIGHT: 165 LBS | HEART RATE: 80 BPM | BODY MASS INDEX: 30.18 KG/M2 | RESPIRATION RATE: 16 BRPM

## 2023-12-18 DIAGNOSIS — J06.9 VIRAL URI WITH COUGH: Primary | ICD-10-CM

## 2023-12-18 DIAGNOSIS — J02.9 SORE THROAT: ICD-10-CM

## 2023-12-18 LAB
CTP QC/QA: YES
CTP QC/QA: YES
POC MOLECULAR INFLUENZA A AGN: NEGATIVE
POC MOLECULAR INFLUENZA B AGN: NEGATIVE
SARS-COV-2 RDRP RESP QL NAA+PROBE: NEGATIVE

## 2023-12-18 PROCEDURE — 87635 SARS-COV-2 COVID-19 AMP PRB: CPT | Mod: QW,S$GLB,, | Performed by: FAMILY MEDICINE

## 2023-12-18 PROCEDURE — 87635: ICD-10-PCS | Mod: QW,S$GLB,, | Performed by: FAMILY MEDICINE

## 2023-12-18 PROCEDURE — 1159F MED LIST DOCD IN RCRD: CPT | Mod: CPTII,S$GLB,, | Performed by: FAMILY MEDICINE

## 2023-12-18 PROCEDURE — 99213 OFFICE O/P EST LOW 20 MIN: CPT | Mod: S$GLB,,, | Performed by: FAMILY MEDICINE

## 2023-12-18 PROCEDURE — 3075F PR MOST RECENT SYSTOLIC BLOOD PRESS GE 130-139MM HG: ICD-10-PCS | Mod: CPTII,S$GLB,, | Performed by: FAMILY MEDICINE

## 2023-12-18 PROCEDURE — 99999 PR PBB SHADOW E&M-EST. PATIENT-LVL IV: CPT | Mod: PBBFAC,,, | Performed by: FAMILY MEDICINE

## 2023-12-18 PROCEDURE — 87502 POCT INFLUENZA A/B MOLECULAR: ICD-10-PCS | Mod: QW,S$GLB,, | Performed by: FAMILY MEDICINE

## 2023-12-18 PROCEDURE — 3008F BODY MASS INDEX DOCD: CPT | Mod: CPTII,S$GLB,, | Performed by: FAMILY MEDICINE

## 2023-12-18 PROCEDURE — 87502 INFLUENZA DNA AMP PROBE: CPT | Mod: QW,S$GLB,, | Performed by: FAMILY MEDICINE

## 2023-12-18 PROCEDURE — 99999 PR PBB SHADOW E&M-EST. PATIENT-LVL IV: ICD-10-PCS | Mod: PBBFAC,,, | Performed by: FAMILY MEDICINE

## 2023-12-18 PROCEDURE — 3008F PR BODY MASS INDEX (BMI) DOCUMENTED: ICD-10-PCS | Mod: CPTII,S$GLB,, | Performed by: FAMILY MEDICINE

## 2023-12-18 PROCEDURE — 3079F DIAST BP 80-89 MM HG: CPT | Mod: CPTII,S$GLB,, | Performed by: FAMILY MEDICINE

## 2023-12-18 PROCEDURE — 99213 PR OFFICE/OUTPT VISIT, EST, LEVL III, 20-29 MIN: ICD-10-PCS | Mod: S$GLB,,, | Performed by: FAMILY MEDICINE

## 2023-12-18 PROCEDURE — 3079F PR MOST RECENT DIASTOLIC BLOOD PRESSURE 80-89 MM HG: ICD-10-PCS | Mod: CPTII,S$GLB,, | Performed by: FAMILY MEDICINE

## 2023-12-18 PROCEDURE — 1159F PR MEDICATION LIST DOCUMENTED IN MEDICAL RECORD: ICD-10-PCS | Mod: CPTII,S$GLB,, | Performed by: FAMILY MEDICINE

## 2023-12-18 PROCEDURE — 4010F PR ACE/ARB THEARPY RXD/TAKEN: ICD-10-PCS | Mod: CPTII,S$GLB,, | Performed by: FAMILY MEDICINE

## 2023-12-18 PROCEDURE — 3044F PR MOST RECENT HEMOGLOBIN A1C LEVEL <7.0%: ICD-10-PCS | Mod: CPTII,S$GLB,, | Performed by: FAMILY MEDICINE

## 2023-12-18 PROCEDURE — 3075F SYST BP GE 130 - 139MM HG: CPT | Mod: CPTII,S$GLB,, | Performed by: FAMILY MEDICINE

## 2023-12-18 PROCEDURE — 3044F HG A1C LEVEL LT 7.0%: CPT | Mod: CPTII,S$GLB,, | Performed by: FAMILY MEDICINE

## 2023-12-18 PROCEDURE — 4010F ACE/ARB THERAPY RXD/TAKEN: CPT | Mod: CPTII,S$GLB,, | Performed by: FAMILY MEDICINE

## 2023-12-18 RX ORDER — ALBUTEROL SULFATE 90 UG/1
2 AEROSOL, METERED RESPIRATORY (INHALATION) EVERY 6 HOURS PRN
Qty: 18 G | Refills: 0 | Status: SHIPPED | OUTPATIENT
Start: 2023-12-18 | End: 2024-12-17

## 2023-12-18 NOTE — LETTER
December 18, 2023    Modesta Camacho  [address] [place] [address]             [facility] Internal Medicine  Internal Medicine  [address] [place] [address]  [place] [address]  Phone: [phone]  Fax: [phone]   December 18, 2023     Patient: Modesta Camacho   YOB: 1965   Date of Visit: 12/18/2023       To Whom it May Concern:    Modesta Camacho was seen in my clinic on 12/18/2023. She may return to work on 12/19/2023 .    Please excuse her from any work missed.    If you have any questions or concerns, please don't hesitate to call.    Sincerely,         Yulia Galloway MD

## 2023-12-18 NOTE — PROGRESS NOTES
Subjective:       Patient ID: Modesta Camacho is a 58 y.o. female.    Chief Complaint: Nasal Congestion (Headaches, dizziness x few days )    Modesta Camacho is 58 y.o. female who presents to clinic with complaints of nasal congestion, headache, dizziness for the past few days.     Review of Systems   Constitutional:  Negative for chills and fever.   HENT:  Positive for congestion and sore throat.    Respiratory:  Positive for cough and wheezing. Negative for shortness of breath.    Cardiovascular:  Negative for chest pain.   Gastrointestinal:  Positive for nausea and vomiting. Negative for abdominal pain, constipation and diarrhea.       Objective:      Physical Exam  Vitals reviewed.   HENT:      Head: Normocephalic and atraumatic.      Nose: No congestion or rhinorrhea.   Eyes:      General: No scleral icterus.        Right eye: No discharge.         Left eye: No discharge.      Extraocular Movements: Extraocular movements intact.      Conjunctiva/sclera: Conjunctivae normal.      Pupils: Pupils are equal, round, and reactive to light.   Cardiovascular:      Rate and Rhythm: Normal rate and regular rhythm.      Heart sounds: No murmur heard.     No friction rub. No gallop.   Pulmonary:      Effort: Pulmonary effort is normal. No respiratory distress.      Breath sounds: Normal breath sounds. No stridor. No wheezing or rhonchi.   Abdominal:      General: Bowel sounds are normal.      Palpations: Abdomen is soft.   Musculoskeletal:      Right lower leg: No edema.      Left lower leg: No edema.   Skin:     General: Skin is warm.   Neurological:      General: No focal deficit present.      Mental Status: She is alert.   Psychiatric:         Mood and Affect: Mood normal.         Behavior: Behavior normal.         Assessment:       1. Viral URI with cough    2. Sore throat        Plan:   1. Viral URI with cough  -     dextromethorphan-guaiFENesin  mg (MUCINEX DM)  mg per 12 hr tablet; Take 1 tablet by  mouth 2 (two) times daily. for 10 days  Dispense: 20 tablet; Refill: 0  -     albuterol (VENTOLIN HFA) 90 mcg/actuation inhaler; Inhale 2 puffs into the lungs every 6 (six) hours as needed for Wheezing. Rescue  Dispense: 18 g; Refill: 0  -     diphenhydrAMINE-aluminum-magnesium hydroxide-simethicone-LIDOcaine viscous HCl 2%; Swish and spit 15 mLs every 4 (four) hours as needed (sore throat).  Dispense: 240 each; Refill: 0    2. Sore throat  -     POCT COVID-19 Rapid Screening  -     POCT Influenza A/B Molecular  -     dextromethorphan-guaiFENesin  mg (MUCINEX DM)  mg per 12 hr tablet; Take 1 tablet by mouth 2 (two) times daily. for 10 days  Dispense: 20 tablet; Refill: 0  -     albuterol (VENTOLIN HFA) 90 mcg/actuation inhaler; Inhale 2 puffs into the lungs every 6 (six) hours as needed for Wheezing. Rescue  Dispense: 18 g; Refill: 0  -     diphenhydrAMINE-aluminum-magnesium hydroxide-simethicone-LIDOcaine viscous HCl 2%; Swish and spit 15 mLs every 4 (four) hours as needed (sore throat).  Dispense: 240 each; Refill: 0         Follow up if symptoms worsen or fail to improve.    Yulia Galloway MD  Family Medicine

## 2023-12-19 ENCOUNTER — TELEPHONE (OUTPATIENT)
Dept: OBSTETRICS AND GYNECOLOGY | Facility: CLINIC | Age: 58
End: 2023-12-19
Payer: COMMERCIAL

## 2023-12-19 NOTE — TELEPHONE ENCOUNTER
Attempted to contact patient. No answer, unable to leave voice mail.     Regarding 12/28/23 appointment, provider will not be in office, pt will need to rs appointment.

## 2024-01-04 ENCOUNTER — HOSPITAL ENCOUNTER (OUTPATIENT)
Dept: RADIOLOGY | Facility: HOSPITAL | Age: 59
Discharge: HOME | End: 2024-01-04
Attending: ORTHOPAEDIC SURGERY
Payer: COMMERCIAL

## 2024-01-04 DIAGNOSIS — Z96.652 HISTORY OF TOTAL LEFT KNEE REPLACEMENT: ICD-10-CM

## 2024-01-04 PROCEDURE — 73564 X-RAY EXAM KNEE 4 OR MORE: CPT | Mod: 26,LT,, | Performed by: RADIOLOGY

## 2024-01-04 PROCEDURE — 73562 X-RAY EXAM OF KNEE 3: CPT | Mod: 59,TC,RT

## 2024-01-04 PROCEDURE — 73562 X-RAY EXAM OF KNEE 3: CPT | Mod: 26,59,RT, | Performed by: RADIOLOGY

## 2024-01-05 ENCOUNTER — OFFICE VISIT (OUTPATIENT)
Dept: ORTHOPEDICS | Facility: CLINIC | Age: 59
End: 2024-01-05
Attending: ORTHOPAEDIC SURGERY
Payer: COMMERCIAL

## 2024-01-05 ENCOUNTER — TELEPHONE (OUTPATIENT)
Dept: ORTHOPEDICS | Facility: CLINIC | Age: 59
End: 2024-01-05

## 2024-01-05 VITALS — BODY MASS INDEX: 30.36 KG/M2 | HEIGHT: 62 IN | WEIGHT: 165 LBS

## 2024-01-05 DIAGNOSIS — M54.16 LEFT LUMBAR RADICULOPATHY: ICD-10-CM

## 2024-01-05 DIAGNOSIS — Z96.642 HX OF TOTAL HIP ARTHROPLASTY, LEFT: ICD-10-CM

## 2024-01-05 DIAGNOSIS — Z96.652 HISTORY OF TOTAL LEFT KNEE REPLACEMENT: ICD-10-CM

## 2024-01-05 DIAGNOSIS — R20.2 NUMBNESS AND TINGLING: Primary | ICD-10-CM

## 2024-01-05 DIAGNOSIS — Z96.641 H/O TOTAL HIP ARTHROPLASTY, RIGHT: ICD-10-CM

## 2024-01-05 DIAGNOSIS — M47.816 FACET ARTHROPATHY, LUMBAR: ICD-10-CM

## 2024-01-05 DIAGNOSIS — R20.0 NUMBNESS AND TINGLING: Primary | ICD-10-CM

## 2024-01-05 DIAGNOSIS — M43.16 SPONDYLOLISTHESIS, LUMBAR REGION: ICD-10-CM

## 2024-01-05 DIAGNOSIS — Z96.651 HISTORY OF REVISION OF TOTAL REPLACEMENT OF RIGHT KNEE JOINT: ICD-10-CM

## 2024-01-05 DIAGNOSIS — M24.662 ARTHROFIBROSIS OF KNEE JOINT, LEFT: Primary | ICD-10-CM

## 2024-01-05 PROCEDURE — 1159F MED LIST DOCD IN RCRD: CPT | Mod: CPTII,S$GLB,, | Performed by: ORTHOPAEDIC SURGERY

## 2024-01-05 PROCEDURE — 99999 PR PBB SHADOW E&M-EST. PATIENT-LVL III: CPT | Mod: PBBFAC,,, | Performed by: ORTHOPAEDIC SURGERY

## 2024-01-05 PROCEDURE — 3008F BODY MASS INDEX DOCD: CPT | Mod: CPTII,S$GLB,, | Performed by: ORTHOPAEDIC SURGERY

## 2024-01-05 PROCEDURE — 99213 OFFICE O/P EST LOW 20 MIN: CPT | Mod: S$GLB,,, | Performed by: ORTHOPAEDIC SURGERY

## 2024-01-05 NOTE — TELEPHONE ENCOUNTER
----- Message from Eitan Gould sent at 1/5/2024  1:01 PM CST -----  Name Of Caller: Nevaeh        Provider Name: Trey Clifford        Does patient feel the need to be seen today? no        Relationship to the Pt?: patient        Contact Preference?: [phone] ext [phone]        What is the nature of the call?: Patient states that she needs to speak with someone in the office to her there surgery date changed and to request a temporary handicap sticker.

## 2024-01-05 NOTE — TELEPHONE ENCOUNTER
Called and left message for patient to call back or send Wakie/Budisthart message in regardsd to her surgery date and that we will reach back out to her and let her know when the form is ready to

## 2024-01-05 NOTE — PATIENT INSTRUCTIONS
You have been falling with numbness and tingling going down the left foot with also mild pain on the hip   You also having feeling of tightness in the left knee  You are taking gabapentin it makes you sleepy you can not take it 3 times a day   Your nerve testing showed that you have pinched nerve in the back   You had MRI from 2021 in the lumbar spine that showed severe arthritis at L4-L5 with foraminal stenosis  You received genicular nerve block in August of 2020 we on the left knee with mild relief of the pain  Plan  Will proceed with repeating the MRI on the lumbar spine with contrast   Your creatinine is normal  You do have history of gout however you stop eating fried food and you doing well  We also need to revise her left total knee with a smaller plastic insert.  We do not want to go to much smaller otherwise you will be unstable doing stairs  Other option would be to inject her left hip bursa where she is tender and that could give you some pain radiating down to the knee  Will proceed to schedule you with revision of the left total knee most likely will be stay in overnight.  The company is [employer] and we will look up the sizes that you have so we can have the appropriate components available  You will go to preop they will evaluate make sure everything is okay preop    Procedure, common risks and benefits,alternatives discussed in details.All questions answered.Patient expressed understanding.Patient instructed to call for any questions that could arise in the future.    Most common Risks:  Infection  Leg-length discrepancy  Dislocation  Neuro-vascular injury ( resulting in loss of motor and sensory functions)  Pain  Blood clot  Fat clot  Loss of motion  Fracture of bone  Failure of procedure to achieve its intended purpose  Failure of hardware  Non-union or mal-union of bone  Malalignment  Death

## 2024-01-05 NOTE — PROGRESS NOTES
Subjective:     Patient ID: Modesta Camacho is a 58 y.o. female.    Chief Complaint: Pain of the Left Hip and Pain of the Right Hip  06/27/2022  HPI:  Left TKA 05/07/2021 by Dr. Gandara, left KRZYSZTOF a by Dr. Gandara  Right TKA by Dr. Lan 2016  Patient is complaining that the left knee is tight unable to fully extend compared to the right side.  She is not having any pain with the left hip.  She has very mild discomfort in the left knee.  As far as the right hip is concerned she is having severe pain in the groin and she knows she has arthritis.  She stated the right TKA done by Dr. Lan is not painful but it gives out with difficulty doing stairs.  Overall pain is 4/10.  She still working at the [facility].  She ambulates without any assistive devices.  She feels her left knee is not doing well and cannot straighten it out as much as she does in the right knee.  For some reason she was upset with Dr. Atkinson.  No fever no chills no shortness of breath or difficulty with chewing or swallowing.  She does have low back pain and she sees Dr. Weaver    07/14/2022  Left KRZYSZTOF by Dr. Gandara doing extremely well  Left TKA 05/07/2021 by Dr. Gandara and feels tight unable to hyperextend.  She has around maybe 2-3 degrees of contracture but she flexes really well.  At this time this is a very stable knee    Right TKA 2016 by Dr. Lan.  She is extremely loosen hyperextends and medial collateral ligaments seem loose.  We had asked her to get us the operative report from the [facility] and she brought it with her.  We went over the operative note and it was vanguard knee by [employer].  The insert is 14 mm.  For 67 base plate  We did call the [employer] rep and he said they go up to 18 mm for that size and prosthesis.  Femoral component 57.5  Right hip arthritis I did tell her we will deal with that later on because is not bother her as much.  Pain is 5/10    12/15/2022     Right total knee revision 09/27/2022 where we change the  poly liner to a very thick 1 to achieve stability.  This total knee had been done by Dr. Lan prior to that and she was ligamentously loose.  She did fall after things given.  She feels that the right total knee poly exchange seems to have helped significantly with the instability.  She had severe pain in her hip on the right side she has arthritis by x-ray and diagnosis she seen Dr. Acevedo who gave her injection in the office in November2,2022 2nd 2022    She wants to have her total knee replacement done and she knows she needs to wait 3 months.    She would like to return to work to make some money and she is looking at having her right total hip replacement in March 2023     Patient stated the arthritis run in her family everybody in the family had a joint replacement including her brother her mother clarita and she was diagnosed with osteoarthritis rather than rheumatoid arthritis.    Requesting tramadol  Pain is 6/10    We discussed the left knee which she stated still feels tight that if she waits a year year and half in might loosen up with time you can always have that poly exchange to a smaller 1 if needed in the future      02/13/2023    Severe right hip arthritis.  Received intra-articular injection by Dr. Acevedo 11/02/2022 with good relief.  You want a proceed with the right total hip replacement sometime to words the end of May early June 2023 because you want a work to make some money.  You having hard time walking distances and we gave you a temporary disability parking sticker.  As far as the right total knee revision 09/27/2022 you said you not having any pain in that or neither the left KRZYSZTOF that was done by Dr. Gandara through the anterior approach.  As far as the left TKA done by Dr. Gandara you feel that you have pain still unable to fully extended and it is tender and painful.  I did tell her let us give it some more time roughly 18 months to see if things improve if not then she would need to  have poly exchange to a smaller 1.  No fever no chills no shortness of breath difficulty with chewing swallowing loss of bowel bladder control blurry vision double vision loss sense smell or taste     04/21/2023   Right hip arthritis.  She is ready to proceed with total hip replacement.  I did tell her I go posterior approach not anterior.  We had some questions and answered.  I did tell her there is slight risk of dislocation of 3% chance.  She will be going home the same day this is considered outpatient surgery by the government.  If there is any problem then we will do extended recovery.  She will receive home health for couple weeks.  She would need help at home.  We briefly went over the pros and cons and she does remember it.  She said she is active on MyChart and she can read it.  She did complain that the left knee still little tight and tape painful and I did tell her give it some time once we know we have done all the joints then we can arrange for her to go and change the poly insert to a smaller 1 to achieve a little bit more extension  Pain 5/10    09/07/2023  Right KRZYSZTOF 06/07/2023.  She is doing much better than before surgery and very happy with the results so far.  Overall pain is 4/10 and that includes the left knee that is killing her.  She did receive genicular nerve block recently by Dr. Malcolm which barely gave her some mild relief but he only used lidocaine without steroid.  States that the pain in the left leg going down to the outside of her foot.  There is no pain over the greater trochanter on the left hip which she is doing well with that performed by Dr. Atkinson.  We went over the operative report 05/06/2021 of the left TKA.  She said she got slightly better extension since last time however she still hurting she points over the outside of her knee and sometimes goes down to the outside of her foot  As far as the right total knee revision to a thicker poly liner she is doing really well  with that 09/27/2022  Left KRZYSZTOF performed by Dr. Gandara is doing really well is just a left knee that she is been complaining about for a long time.  The right KRZYSZTOF we just perform is doing much better.  Very mild discomfort if any   She is ambulating without assistive devices  Denies any fever or chills or shortness of breath difficulty with chewing swallowing loss of bowel bladder control   Left knee genicular nerve block using lidocaine and bupivacaine performed 07/24/2023 by Dr. Iyer      01/05/2023   Left knee tightness and stiffness and pain.  DJO left TKA done 05/06/2021 by Dr. Gandara  it stretched a little bit since surgery.  Now it is painful she points over the lateral aspect and anteriorly.  She wants it revised.  We discussed that probably we can place a smaller plastic insert to make it a little bit looser.  However we can not make it to loose otherwise she will buckle during stairs and getting up from sitting positions.  She is having also left hip pain and specially over the greater trochanter.  She is having numbness and tingling that is radiating down to her foot.  Nerve conduction studies have shown she has radiculopathy which could explain the numbness and tingling in the leg but now it is getting worst and she is falling she had x-rays done yesterday.  She had not received any injections in her spine.  Previous MRI from 2021 showing L4-L5 severe facet arthropathy and neuroforaminal stenosis and above and below facet arthropathy.  This is getting worst.  She is taking gabapentin can not take it 3 times a day makes her too sleepy can not work.  As far as the right TKA revision to a thicker poly as well as right total hip which I performed this she is doing extremely well with does.    She wants left total knee replacement revised.  The pros and cons discussed   Also she has bursitis of the left hip I offered an injection which she declined right now she wants to see if the revision will help   As  far as numbness and tingling leg which gotten worse we need to get repeat MRI with contrast on the lumbar spine  Past Medical History:   Diagnosis Date    Abnormal EKG 2/5/2021    Anxiety     Hypertension     Osteoarthritis     Stage 2 chronic kidney disease 2/5/2021     Past Surgical History:   Procedure Laterality Date    ARTHROPLASTY OF HIP BY ANTERIOR APPROACH Left 7/31/2020    Procedure: ARTHROPLASTY, HIP, ANTERIOR APPROACH;  Surgeon: Xavi Gandara MD;  Location: [facility] OR;  Service: Orthopedics;  Laterality: Left;    gastric sleeve      HIP ARTHROPLASTY Right 6/7/2023    Procedure: ARTHROPLASTY, HIP;  Surgeon: Trey Clifford MD;  Location: [facility] OR;  Service: Orthopedics;  Laterality: Right;    INJECTION OF ANESTHETIC AGENT AROUND NERVE Bilateral 8/14/2023    Procedure: left genicular nerve block RN IV Sedation;  Surgeon: Shaun Iyer MD;  Location: [facility];  Service: Pain Management;  Laterality: Bilateral;    JOINT REPLACEMENT Right 06/14/2016    knee    JOINT REPLACEMENT Left 05/20/2021    KNEE    JOINT REPLACEMENT Left 07/30/2020    HIP    KNEE ARTHROPLASTY Left 5/6/2021    Procedure: ARTHROPLASTY, KNEE;  Surgeon: Xavi Gandara MD;  Location: [facility] OR;  Service: Orthopedics;  Laterality: Left;    RESURFACING OF PATELLA Right 9/27/2022    Procedure: RESURFACING, PATELLA;  Surgeon: Trey Clifford MD;  Location: [facility];  Service: General;  Laterality: Right;    REVISION OF KNEE ARTHROPLASTY Right 9/27/2022    Procedure: REVISION, ARTHROPLASTY, KNEE;  Surgeon: Trey Clifford MD;  Location: [facility];  Service: General;  Laterality: Right;    TUBAL LIGATION      UTERINE FIBROID EMBOLIZATION       Family History   Problem Relation Age of Onset    Hypertension Mother     Arthritis Mother     Diabetes Father     Heart disease Father     Depression Sister     Hypertension Sister     Arthritis Brother     Thyroid disease Son     Hypertension Son     Arthritis Maternal Grandmother      Heart disease Maternal Grandmother     Kidney disease Maternal Grandmother     Heart attack Maternal Grandfather     Stroke Paternal Grandmother     No Known Problems Paternal Grandfather     Eczema Son     Breast cancer Sister 47     Social History     Socioeconomic History    Marital status:      Spouse name: Pop Land    Number of children: 3   Occupational History    Occupation: patient access   Tobacco Use    Smoking status: Never     Passive exposure: Never    Smokeless tobacco: Never   Substance and Sexual Activity    Alcohol use: Yes     Comment: occasional: hold 72hrs. prior to surgery    Drug use: No    Sexual activity: Yes     Partners: Male     Birth control/protection: See Surgical Hx     Social Determinants of Health     Stress: Stress Concern Present (3/16/2020)    [employer] - Occupational Stress Questionnaire     Feeling of Stress : Rather much     Medication List with Changes/Refills   Current Medications    ALBUTEROL (VENTOLIN HFA) 90 MCG/ACTUATION INHALER    Inhale 2 puffs into the lungs every 6 (six) hours as needed for Wheezing. Rescue    ALPRAZOLAM (XANAX) 0.5 MG TABLET    Take 1 tablet (0.5 mg total) by mouth nightly as needed for Anxiety.    AMLODIPINE (NORVASC) 5 MG TABLET    Take 1 tablet (5 mg total) by mouth 2 (two) times daily.    CLONIDINE (CATAPRES) 0.1 MG TABLET    TAKE 1 TABLET BY MOUTH ONCE DAILY USE FOR SBP GREATER THAN 160 MMHG.    DIPHENHYDRAMINE-ALUMINUM-MAGNESIUM HYDROXIDE-SIMETHICONE-LIDOCAINE VISCOUS HCL 2%    Swish and spit 15 mLs every 4 (four) hours as needed (sore throat).    DOCUSATE SODIUM (COLACE) 100 MG CAPSULE    Take 100 mg by mouth 2 (two) times daily as needed.    DULOXETINE (CYMBALTA) 60 MG CAPSULE    Take 1 capsule (60 mg total) by mouth 2 (two) times daily.    GABAPENTIN (NEURONTIN) 300 MG CAPSULE    Take 1 capsule (300 mg total) by mouth 3 (three) times daily.    METOPROLOL SUCCINATE (TOPROL-XL) 50 MG 24 HR TABLET    Take  2 tablets (100 mg total) by mouth nightly.    MULTIVIT-MIN/FERROUS FUMARATE (MULTI VITAMIN ORAL)    Take 2 tablets by mouth Daily.    ONDANSETRON (ZOFRAN-ODT) 4 MG TBDL    dissolve 2 tablets (8 mg total) by mouth every 8 (eight) hours as needed (nausea).    TELMISARTAN (MICARDIS) 80 MG TAB    Take 1 tablet (80 mg total) by mouth once daily.    TIZANIDINE (ZANAFLEX) 4 MG TABLET    Take 0.5-1 tablets (2-4 mg total) by mouth 2 (two) times daily as needed (pain).    VITAMIN D (VITAMIN D3) 1000 UNITS TAB    Take 1,000 Units by mouth once daily.     Review of patient's allergies indicates:   Allergen Reactions    Codeine Hives and Rash     Takes Tramadol and has never had an issue with SOB/swelling  Also tolerated hydromorphone 7/2020      Penicillin     Penicillins Hives     Pt tolerated cefazolin 7/2020     Review of Systems   Constitutional: Negative for decreased appetite.   HENT:  Negative for tinnitus.    Eyes:  Negative for double vision.   Cardiovascular:  Negative for chest pain.   Respiratory:  Negative for wheezing.    Hematologic/Lymphatic: Negative for bleeding problem.   Skin:  Negative for dry skin.   Musculoskeletal:  Positive for arthritis, back pain, joint pain and stiffness. Negative for gout and neck pain.   Gastrointestinal:  Negative for abdominal pain.   Genitourinary:  Negative for bladder incontinence.   Neurological:  Negative for numbness, paresthesias and sensory change.   Psychiatric/Behavioral:  Negative for altered mental status.        Objective:   Body mass index is 30.18 kg/m².  There were no vitals filed for this visit.       General    Constitutional: She is oriented to person, place, and time. She appears well-developed.   HENT:   Head: Atraumatic.   Eyes: EOM are normal.   Pulmonary/Chest: Effort normal.   Neurological: She is alert and oriented to person, place, and time.   Psychiatric: Judgment normal.           Ambulating without any assistive devices  Right hip surgical scar  healed well.  Passive range of motion without pain in the groin.  There is slight weakness to hip flexors and abductors   The left total hip journal external rotation without pain in the groin.  There is no pain to palpation over the greater trochanter Excellent range of motion  Pelvis is level  The sitting position  Hip flexors, abductors adductors quads and hamstrings ankle extensors and flexors were 5/5  Left TKA has around 3° of contracture and she flexes to 120°.  Slightly tight.  No defect in the patella or quadriceps tendon.  No erythema, not warm to touch.  Collaterals stable to varus and valgus stressing in extension and in flexion at 90°.  There is tenderness and pain over the lateral joint into AP compression on the patella.  There is radiation to the outside of the tibia down to the outside of the foot.  Right knee surgical incision healed well.  She has 0-130 degrees of flexion.  She is not hyperextending anymore when preoperatively she was hyperextending approximately 10°.  She is stable in extension with slight opening if any approximately 2 mm on the medial side with the knee in extension.  She is stable in flexion and 90°.  No swelling no effusion no erythema  Ankle motion intact  Skin is warm to touch    Relevant imaging results reviewed and interpreted by me, discussed with the patient and / or family today       X-ray 01/04/2023 left TKA in excellent alignment no evidence of fracture.  X-ray 09/07/2023 right hip without evidence of failure.  The acetabular is slightly more anteverted than the other side.  No evidence of failure  X-ray 11/28/2022 of the right knee showing the thick poly insert with the right total knee ingrowth prosthesis performed by Dr. Lan prior no evidence of fracture  X-ray bilateral knees 06/27/2022 showing left TKA with very thick poly insert and the patella was not resurfaced with excellent alignment/posterior sacrificing knee system..  Right TKA/looks like [employer] with  slightly oversized base plate on the tibia but overall alignment is intact and looks like it is ingrowth prosthesis with patella not resurfaced and posterior cruciate sparing knee  X-ray 11/24/2021 and 06/27/2022 left KRZYSZTOF in excellent alignment.  Right hip complete loss of joint space with marginal osteophyte and cystic changes consistent with severe arthritis of the right hip    X-ray 06/27/2022 of the lumbar spine showing spondylolisthesis L4 on 5 grade 1 and facet arthropathy.  No fracture seen  Assessment:     Encounter Diagnoses   Name Primary?    History of total left knee replacement     Hx of total hip arthroplasty, left     H/O total hip arthroplasty, right     History of revision of total replacement of right knee joint     Arthrofibrosis of knee joint, left Yes        Plan:   Arthrofibrosis of knee joint, left    History of total left knee replacement    Hx of total hip arthroplasty, left    H/O total hip arthroplasty, right    History of revision of total replacement of right knee joint         Patient Instructions   You have been falling with numbness and tingling going down the left foot with also mild pain on the hip   You also having feeling of tightness in the left knee  You are taking gabapentin it makes you sleepy you can not take it 3 times a day   Your nerve testing showed that you have pinched nerve in the back   You had MRI from 2021 in the lumbar spine that showed severe arthritis at L4-L5 with foraminal stenosis  You received genicular nerve block in August of 2020 we on the left knee with mild relief of the pain  Plan  Will proceed with repeating the MRI on the lumbar spine with contrast   Your creatinine is normal  You do have history of gout however you stop eating fried food and you doing well  We also need to revise her left total knee with a smaller plastic insert.  We do not want to go to much smaller otherwise you will be unstable doing stairs  Other option would be to inject her  left hip bursa where she is tender and that could give you some pain radiating down to the knee  Will proceed to schedule you with revision of the left total knee most likely will be stay in overnight.  The company is [employer] and we will look up the sizes that you have so we can have the appropriate components available  You will go to preop they will evaluate make sure everything is okay preop    Procedure, common risks and benefits,alternatives discussed in details.All questions answered.Patient expressed understanding.Patient instructed to call for any questions that could arise in the future.    Most common Risks:  Infection  Leg-length discrepancy  Dislocation  Neuro-vascular injury ( resulting in loss of motor and sensory functions)  Pain  Blood clot  Fat clot  Loss of motion  Fracture of bone  Failure of procedure to achieve its intended purpose  Failure of hardware  Non-union or mal-union of bone  Malalignment  Death        Operative report 06/09/2016 Dr. Lan implant [employer] vanguard cementless components size 57.5 femur, 14 mm polyethylene insert.  Tibial modular tray size 67, modular finned stem with screw system 80 x 10 mm, self taping screws 6.5 x 25  Left knee replaced by Dr. Gandara using DJO.  Hardware present in epic under surgery  I did discuss with the patient that we would do not know what is going to happen until we doing the surgery.  If the poly exchange give her enough stability than things will be okay otherwise if we not happy with the stability then we might have to take everything out and do a complete revision.  She expressed understanding that we going in to make sure that the knee becomes stable.  She apparently had mild relief from the genicular nerve block using lidocaine on the left knee  Disclaimer: This note was prepared using a voice recognition system and is likely to have sound alike errors within the text.

## 2024-01-09 ENCOUNTER — PATIENT MESSAGE (OUTPATIENT)
Dept: INTERNAL MEDICINE | Facility: CLINIC | Age: 59
End: 2024-01-09
Payer: COMMERCIAL

## 2024-01-09 ENCOUNTER — PATIENT MESSAGE (OUTPATIENT)
Dept: ORTHOPEDICS | Facility: CLINIC | Age: 59
End: 2024-01-09
Payer: COMMERCIAL

## 2024-01-09 ENCOUNTER — PATIENT MESSAGE (OUTPATIENT)
Dept: CARDIOLOGY | Facility: CLINIC | Age: 59
End: 2024-01-09
Payer: COMMERCIAL

## 2024-01-09 DIAGNOSIS — F41.1 GAD (GENERALIZED ANXIETY DISORDER): ICD-10-CM

## 2024-01-10 ENCOUNTER — TELEPHONE (OUTPATIENT)
Dept: PAIN MEDICINE | Facility: CLINIC | Age: 59
End: 2024-01-10
Payer: COMMERCIAL

## 2024-01-10 NOTE — PROGRESS NOTES
Interventional Pain Progress Note       Chief Pain Complaint:  Low back pain + RLE radiculopathy   Interval History (1/11/2024):  Patient Modesta Camacho presents today for follow-up visit.  Patient's chief complaint today is left knee pain which has been chronic.  She had a left genicular block on 08/14/2023 which provided 80% relief for 4 months.  She is interested in repeating the block.  She rates her pain today a 7/10.  She also has chronic lower back pain which will radiate down the posterior aspect of the left lower extremity down to the foot.  She recently had a nerve conduction study which showed some irritation of the L5-S1 nerve root.  Dr. Clifford and then ordered a lumbar MRI and she is scheduled to get that next week.  Patient denies night fever/night sweats, urinary incontinence, bowel incontinence, significant weight loss and significant motor weakness.   Patient denies any other complaints or concerns at this time.    Interval History (01/11/2024):  Patient returns to clinic after procedure.  Patient reports 80% relief in left knee pain after left genicular nerve block on 08/14/2023.  Patient reports occasional catching sensations in her left knee as well as aching pain along the medial aspect.  She denies any significant radiation of this pain.  She denies any significant numbness or tingling associated with this pain.  Pain is worse with prolonged standing and walking, better with rest and heat.  Pain is currently rated a 4/10. Denies any fevers, chills, changes in gait, saddle anesthesia, or bowel and bladder incontinence    Interval History (7/24/2023):  Modesta Camacho presents today for follow-up visit.  Patient was last seen on 05/23/2023. At last visit, she was to follow up with Dr. Clifford. She underwent right hip arthroplasty with Dr. Clifford on 06/07/2023. Currently enrolled in physical therapy, has 6 weeks remaining. So far she has had her first post op after hip arthroplasty on  06/30/2023, next post op in August. Currently prescribed Endocet and Gabapentin for post op pain.     Patient reports pain as 8/10 today. She continues to report left knee pain. She previously underwent left TKA with Dr. Gandara. She has spoken to Dr. Clifford about this knee before, but it was previously replaced recently and Dr. Clifford is hesitant to go in again so soon.       Interval History (5/23/2023):  Modesta Camacho presents today via telemed for follow-up visit.  Patient was last seen on 2/21/2023. She reports worsening left knee pain. She reports it keeps feeling as though it is popping out of place. She reports it began swelling last Friday and since then she can hardly bear weight. She has been resting, icing, elevating, and wearing her brace which has reduced the swelling and inflammation somewhat. She has spoken to Dr. Clifford about this knee before, but it was previously replaced recently and Dr. Clifford is hesitant to go in again so soon. Patient reports pain as 8/10 today.  She is scheduled for right hip arthroplasty with Dr. Clifford on 06/07/2023.    Interval HPI 02/21/2023    Modesta Camacho presents today for follow-up visit.  Patient was last seen on 11/8/2022. Patient reports pain as 6/10 today. She reports persistent left knee pain, previously underwent left TKA with Dr. Gandara, states she has not seen him in over 2 years as he was dismissive of her continued pain after surgery. Sees Dr. Clifford for hip, but may also consider revision of left knee. Hx of right knee TKA with revision 9/22. Right knee is doing well. Last visit with Dr. Clifford on 02/13/2023. Considering right hip replacement in May/June. She also reports continued lower back pain, axial in nature, deep achy and without radiation into her lower extremities. Requesting refills of her Gabapentin, Nabumetone, and Tizanidine.    Interval History (11/08/2022):  Modesta Camacho presents today for follow-up visit.  Patient was  last seen on 01/05/2022. Patient reports pain as 6/10 today. Underwent revision of her right knee with Dr. Clifford 09/27/2022. Referred back to [facility] for low back pain by Dr. Clifford. Patient reports pain as axial in nature across her lower lumbar spine without radiation into her lower extremities. Pain is constant and interferes with daily activities. Taking cymbalta and Gabapentin with minimal relief. Tizanidine helpful in the past, she is out of this medication. Not interested in injections at this time.    X-ray lumbar spine  FINDINGS:  AP, lateral and coned down radiographs of the lumbar spine demonstrate no evidence for acute fracture or dislocation.  Persistent grade 1 anterolisthesis of L4 with respect L5 is again identified.  Moderate to severe posterior facet hypertrophic changes are identified at L5/S1.  Remaining intervertebral disk spaces and vertebral body heights are well-preserved.  Visualized SI joints are intact.  Patient is status post right hip arthroplasty.  Multiple calcified phleboliths are identified.  Multiple clips are identified in the epigastric region.     Impression:     Degenerative disease, most prominent at the lower lumbar spine.  Overall, no significant interval change.       Interval History (1/5/2022):  Modesta Camacho presents today for follow-up visit.  She is here today to review lumbar MRI.  She continues to have RLE pain.  She saw Dr. Rodriguez on 12/21/2021, who referred her to have an ultrasound-guided right hip injection by Dr. Acevedo.  She recently had this procedure with short-term pain relief, but it did not help the sciatica pain into the foot.  She was also referred to Dr. Clifford (Orthopedics) for evaluation for right hip replacement.  To note, patient reports history of left hip replacement and bilateral knee replacement in the past.  She has been doing physical therapy twice a week, but she does not feel this has been overly helpful.      Initial HPI  (11/09/2021):  Modesta Camacho is a 56 y.o. female  who is presenting with a chief complaint of low back pain. The patient began experiencing this problem insidiously, and the pain has been gradually worsening over the past 6 month(s). The pain is described as throbbing, shooting, burning and electrical and is located in the right lumbar spine . Pain is intermittent and lasts hours. The pain radiates to right leg L4 distribution. The patient rates her pain a 7 out of ten and interferes with activities of daily living a 8 out of ten. Pain is exacerbated by flexion of the lumbar spine, and is improved by rest. Patient reports no prior trauma, prior hip surgery Left [facility] 6/[facility]. Right Knee Replacement 5/2021.     - pertinent negatives: No fever, No chills, No weight loss, No bladder dysfunction, No bowel dysfunction, No saddle anesthesia  - pertinent positives: right leg weakness    - medications, other therapies tried (physical therapy, injections):     >> NSAIDs, Tylenol, Tramadol, gabapentin, zanaflex and robaxin    >> Has previously undergone Physical Therapy    >>  Injections:    -08/14/2023:  Left genicular nerve block, 80% relief   - ultrasound-guided right hip injection by Dr. Acevedo on 12/21/2021 with short-term pain relief      Imaging / Labs / Studies (reviewed on 1/11/2024):    11/16/2021 MRI Lumbar Spine Without Contrast  FINDINGS:  Image quality is degraded by motion, lowering exam sensitivity and specificity.  Interpretation is offered within these confines.    Alignment: There is mild grade 1 anterolisthesis of L4 on L5.  There is slight leftward convexity of the lumbar spine.    Vertebrae: Diffuse loss normal T1 hyperintense marrow signal may be related to chronic anemia or other causes of red marrow hyperplasia, correlate clinically.  No evidence of fracture.    Discs: Normal height and signal.    Cord: Normal.  Conus terminates at T12-L1    Degenerative  findings:    T12-L1:  No spinal canal or neuroforaminal stenosis.    L1-L2:  No spinal canal or neuroforaminal stenosis.    L2-L3: Mild generalized disc bulge. No spinal canal or neuroforaminal stenosis.    L3-L4: Mild generalized disc bulge.  Mild bilateral facet arthropathy. No spinal canal or neuroforaminal stenosis.    L4-L5: Severe bilateral facet arthropathy with some uncovering of the intervertebral disc and thickening of the ligamentum flavum.  Moderate bilateral neural foraminal narrowing.  No spinal canal stenosis.    L5-S1: Moderate bilateral facet arthropathy. No spinal canal or neuroforaminal stenosis..    Paraspinal muscles & soft tissues: Unremarkable.    Impression  1. Grade 1 anterolisthesis of L4 on L5 secondary to facet arthropathy with associated moderate bilateral neural foraminal narrowing at this level.  2. Other multilevel degenerative findings as above      7/31/20 X-Ray Hip 2 or 3 views Left  COMPARISON:  Prior radiograph from May 20, 2020.  FINDINGS:  Interval total left hip arthroplasty with soft tissue air in the area.  There is normal alignment of the joint.  Densely calcified uterine leiomyomata are unchanged.  There also calcified phleboliths within the pelvis.  Impression  Normal alignment of the total left hip arthroplasty that has been placed in the interval.      11/24/2021 X-Ray Hip 2 or 3 views Right (with Pelvis when performed)  COMPARISON:  02/11/2021  FINDINGS:  There are multiple calcified fibroid seen projecting over the uterus.  There also multiple calcified pelvic phleboliths.  There is a single surgical clips seen to the right of the midline.  A left total hip arthroplasty is noted.  There is moderate to severe superolateral joint space narrowing involving the right hip with osteophyte formation noted.  Minimal subchondral cystic changes seen on either side of the right hip joint.      5/01/15 X-Ray Cervical Spine AP And Lateral  Findings:  The reversal of the lordotic  "curvature of the cervical spine secondary to moderate degenerative disk disease at C4-5 and C5-6.  Chronic anterior wedging of the C4 and C5 vertebral bodies.  Associated endplate sclerosis and uncovertebral joint  hypertrophy. The posterior elements are intact.  IMPRESSION:  No radiographic evidence of fracture or subluxation.  Moderate degenerative disk disease at C4-5 and C5-6.        Review of Systems:  CONSTITUTIONAL: patient denies any fever, chills, or weight loss  SKIN: patient denies any rash or itching  RESPIRATORY: patient denies having any shortness of breath  GASTROINTESTINAL: patient denies having any diarrhea, constipation, or bowel incontinence  GENITOURINARY: patient denies having any abnormal bladder function    MUSCULOSKELETAL:  - patient complains of the above noted pain/s (see chief pain complaint)    NEUROLOGICAL:   - pain as above  - strength in Lower extremities is intact, BILATERALLY  - sensation in Lower extremities is intact, BILATERALLY  - patient denies any loss of bowel or bladder control      PSYCHIATRIC: patient denies any change in mood    Other:  All other systems reviewed and are negative      Physical Exam:  Telemedicine Exam, physical exam from previous in office visit  Vitals:    01/11/24 1207   BP: (!) 175/96   Pulse: 72   Resp: 18   Weight: 74.8 kg (164 lb 14.5 oz)   Height: 5' 2" (1.575 m)     Body mass index is 30.16 kg/m².   (reviewed on 1/11/2024)     Vitals:    01/11/24 1207   BP: (!) 175/96   Pulse: 72   Resp: 18   Weight: 74.8 kg (164 lb 14.5 oz)   Height: 5' 2" (1.575 m)         Body mass index is 30.16 kg/m².   (reviewed on 1/11/2024)     GENERAL: Well appearing, in no acute distress, alert and oriented x3.  Cooperative.  PSYCH:  Mood and affect appropriate.  SKIN: Skin color & texture with no obvious abnormalities.    HEAD/FACE:  Normocephalic, atraumatic.    PULM:  No difficulty breathing. No nasal flaring. No obvious wheezing.  NECK: ROM fairly preserved.  Supple. "   BACK: Palpation over the lumbar paraspinous muscles causes pain. Pain with palpation over lumbar facets. Some pain with flexion. Some pain with extension.  Limited ROM secondary to pain reproduction.Pain with facet loading.   EXTREMITIES: No obvious deformities. Moving all extremities well, appears to have symmetric strength throughout. TTP along bilateral joint line of left knee, TTP along left pes bursa, decreased ROM secondary to pain and stiffness, no erythema, warmth or swelling. Right knee with very mild TTP along joint line and mild limitation to ROM  MUSCULOSKELETAL: No obvious atrophy abnormalities are noted.   NEURO: No obvious neurologic deficit.   GAIT: sitting.       Musculoskeletal:    Lumbar Spine    - Pain on flexion of lumbar spine Absent  - Straight Leg Raise:  Absent    - Pain on extension of lumbar spine Present  - TTP over the lumbar facet joints Present  - Lumbar facet loading Present    -Pain on palpation over the SI joint  Negative  - GALLO: Equivocal    Knee: left  - Scars: Present   - TTP: Present over medial/ lateral joint line  - ROM: Decreased secondary to pain  - Pain with extension: Present  - Pain with flexion: Present  - Crepitus: Present    Neuro:    Strength:  UE R/L: D: 5/5; B: 5/5; T: 5/5; WF: 5/5; WE: 5/5; IO: 5/5;  LE R/L: HF: 5/5, HE: 5/5, KF: 5/5; KE: 5/5; FE: 5/5; FF: 5/5    Extremity Reflexes: Brisk and symmetric throughout.      Extremity Sensory: Sensation to pinprick and temperature symmetric. Proprioception intact.      Psych:  Mood and affect is appropriate      Assessment:    Modesta Camacho is a 58 y.o. year old female who is presenting with       ICD-10-CM ICD-9-CM    1. Osteoarthritis of left knee, unspecified osteoarthritis type  M17.12 715.96 Case Request-RAD/Other Procedure Area: Left genicular nerve block with RN IV sedation      2. Status post total right knee replacement  Z96.651 V43.65       3. Lumbar radiculopathy  M54.16 724.4       4. Arthritis of right  hip  M16.11 716.95 gabapentin (NEURONTIN) 300 MG capsule      5. Knee pain, unspecified chronicity, unspecified laterality  M25.569 719.46 tiZANidine (ZANAFLEX) 4 MG tablet      6. DDD (degenerative disc disease), lumbar  M51.36 722.52 tiZANidine (ZANAFLEX) 4 MG tablet      7. Primary osteoarthritis of right hip  M16.11 715.15 tiZANidine (ZANAFLEX) 4 MG tablet                Plan:  1. Interventional:    Schedule repeat Left genicular nerve block  -08/14/2023:  Left genicular nerve block, 80% relief x 4 months  - S/p ultrasound-guided right hip injection by Dr. Acevedo on 12/21/2021 with short-term pain relief.      2. Pharmacologic:   - D/c Topamax- dry mouth  -Refill Gabapentin 300 mg TID  We have reviewed potential side effects of this medication including daytime somnolence, weight gain and peripheral edema  - Continue Tramadol 50 mg Po BID PRN (60 tabs) - from Rheumatology.   - Continue Cymbalta 60 mg PO BID.   -Refill given for tizanidine  We have discussed potential deleterious side effects associated with this medication including  dizziness, drowsiness, dry mouth or tingling sensation in the upper or lower extremities.         3. Rehabilitative:   Encouraged ambulation. Continue physical therapy to help with strengthening, although patient reports not helping with pain.   Patient previously informed that we will fill out [facility] paperwork for physical therapy and procedures, but we will not fill out paperwork for disability.  Our goal is to improve pain to continue job responsibility.     4. Diagnostic:   Pt has Mri lumbar spine scheduled for next week with Dr Clifford    5. Follow up:  RTC 4 weeks after genicular nerve block  - This condition does not require this patient to take time off of work, and the primary goal of our Pain Management services is to improve the patient's functional capacity.   - I discussed the risks, benefits, and alternatives to potential treatment options. All questions and concerns  were fully addressed today in clinic.     Maria Teresa Bazan NP  Interventional Pain Medicine  [facility]      Disclaimer:  This note was prepared using voice recognition system and is likely to have sound alike errors that may have been overlooked even after proof reading.  Please call me with any questions.

## 2024-01-11 ENCOUNTER — OFFICE VISIT (OUTPATIENT)
Dept: PAIN MEDICINE | Facility: CLINIC | Age: 59
End: 2024-01-11
Payer: COMMERCIAL

## 2024-01-11 VITALS
BODY MASS INDEX: 30.34 KG/M2 | SYSTOLIC BLOOD PRESSURE: 175 MMHG | RESPIRATION RATE: 18 BRPM | WEIGHT: 164.88 LBS | HEART RATE: 72 BPM | HEIGHT: 62 IN | DIASTOLIC BLOOD PRESSURE: 96 MMHG

## 2024-01-11 DIAGNOSIS — M16.11 PRIMARY OSTEOARTHRITIS OF RIGHT HIP: ICD-10-CM

## 2024-01-11 DIAGNOSIS — M51.36 DDD (DEGENERATIVE DISC DISEASE), LUMBAR: ICD-10-CM

## 2024-01-11 DIAGNOSIS — M25.569 KNEE PAIN, UNSPECIFIED CHRONICITY, UNSPECIFIED LATERALITY: ICD-10-CM

## 2024-01-11 DIAGNOSIS — M17.12 OSTEOARTHRITIS OF LEFT KNEE, UNSPECIFIED OSTEOARTHRITIS TYPE: Primary | ICD-10-CM

## 2024-01-11 DIAGNOSIS — M54.16 LUMBAR RADICULOPATHY: ICD-10-CM

## 2024-01-11 DIAGNOSIS — Z96.651 STATUS POST TOTAL RIGHT KNEE REPLACEMENT: ICD-10-CM

## 2024-01-11 DIAGNOSIS — M16.11 ARTHRITIS OF RIGHT HIP: ICD-10-CM

## 2024-01-11 PROCEDURE — 3077F SYST BP >= 140 MM HG: CPT | Mod: CPTII,S$GLB,, | Performed by: NURSE PRACTITIONER

## 2024-01-11 PROCEDURE — 3080F DIAST BP >= 90 MM HG: CPT | Mod: CPTII,S$GLB,, | Performed by: NURSE PRACTITIONER

## 2024-01-11 PROCEDURE — 1159F MED LIST DOCD IN RCRD: CPT | Mod: CPTII,S$GLB,, | Performed by: NURSE PRACTITIONER

## 2024-01-11 PROCEDURE — 99999 PR PBB SHADOW E&M-EST. PATIENT-LVL IV: CPT | Mod: PBBFAC,,, | Performed by: NURSE PRACTITIONER

## 2024-01-11 PROCEDURE — 3008F BODY MASS INDEX DOCD: CPT | Mod: CPTII,S$GLB,, | Performed by: NURSE PRACTITIONER

## 2024-01-11 PROCEDURE — 4010F ACE/ARB THERAPY RXD/TAKEN: CPT | Mod: CPTII,S$GLB,, | Performed by: NURSE PRACTITIONER

## 2024-01-11 PROCEDURE — 1160F RVW MEDS BY RX/DR IN RCRD: CPT | Mod: CPTII,S$GLB,, | Performed by: NURSE PRACTITIONER

## 2024-01-11 PROCEDURE — 99214 OFFICE O/P EST MOD 30 MIN: CPT | Mod: S$GLB,,, | Performed by: NURSE PRACTITIONER

## 2024-01-11 RX ORDER — GABAPENTIN 300 MG/1
300 CAPSULE ORAL 3 TIMES DAILY
Qty: 90 CAPSULE | Refills: 2 | Status: SHIPPED | OUTPATIENT
Start: 2024-01-11 | End: 2025-01-10

## 2024-01-11 RX ORDER — TIZANIDINE 4 MG/1
2-4 TABLET ORAL 2 TIMES DAILY PRN
Qty: 90 TABLET | Refills: 2 | Status: SHIPPED | OUTPATIENT
Start: 2024-01-11

## 2024-01-12 ENCOUNTER — PATIENT MESSAGE (OUTPATIENT)
Dept: ORTHOPEDICS | Facility: CLINIC | Age: 59
End: 2024-01-12
Payer: COMMERCIAL

## 2024-01-12 ENCOUNTER — TELEPHONE (OUTPATIENT)
Dept: ORTHOPEDICS | Facility: CLINIC | Age: 59
End: 2024-01-12
Payer: COMMERCIAL

## 2024-01-12 RX ORDER — DULOXETIN HYDROCHLORIDE 60 MG/1
60 CAPSULE, DELAYED RELEASE ORAL 2 TIMES DAILY
Qty: 60 CAPSULE | Refills: 5 | Status: SHIPPED | OUTPATIENT
Start: 2024-01-12

## 2024-01-19 DIAGNOSIS — Z01.818 PREOPERATIVE EXAMINATION: ICD-10-CM

## 2024-01-19 DIAGNOSIS — Z01.818 PREOP TESTING: ICD-10-CM

## 2024-01-19 DIAGNOSIS — Z96.652 HISTORY OF TOTAL LEFT KNEE REPLACEMENT: Primary | ICD-10-CM

## 2024-01-19 DIAGNOSIS — M24.662 ARTHROFIBROSIS OF KNEE JOINT, LEFT: ICD-10-CM

## 2024-01-22 ENCOUNTER — TELEPHONE (OUTPATIENT)
Dept: ORTHOPEDICS | Facility: CLINIC | Age: 59
End: 2024-01-22
Payer: COMMERCIAL

## 2024-01-22 DIAGNOSIS — M24.662 ARTHROFIBROSIS OF KNEE JOINT, LEFT: ICD-10-CM

## 2024-01-22 DIAGNOSIS — Z96.652 HISTORY OF TOTAL LEFT KNEE REPLACEMENT: Primary | ICD-10-CM

## 2024-01-22 DIAGNOSIS — M54.16 LEFT LUMBAR RADICULOPATHY: ICD-10-CM

## 2024-01-22 NOTE — TELEPHONE ENCOUNTER
----- Message from Niraj Mcmanus sent at 1/22/2024  8:12 AM CST -----  Good Morning,    Ms. Camacho will need to do labs before her 10am MRI today in [place]. Can you please put in a stat creatinine for this patient?    Thank you,  Niraj  Radiology Dept.

## 2024-01-23 DIAGNOSIS — Z01.818 PREOP TESTING: Primary | ICD-10-CM

## 2024-01-24 ENCOUNTER — HOSPITAL ENCOUNTER (OUTPATIENT)
Dept: RADIOLOGY | Facility: HOSPITAL | Age: 59
Discharge: HOME | End: 2024-01-24
Attending: ORTHOPAEDIC SURGERY
Payer: COMMERCIAL

## 2024-01-24 DIAGNOSIS — M43.16 SPONDYLOLISTHESIS, LUMBAR REGION: ICD-10-CM

## 2024-01-24 DIAGNOSIS — M54.16 LEFT LUMBAR RADICULOPATHY: ICD-10-CM

## 2024-01-24 DIAGNOSIS — R20.0 NUMBNESS AND TINGLING: ICD-10-CM

## 2024-01-24 DIAGNOSIS — R20.2 NUMBNESS AND TINGLING: ICD-10-CM

## 2024-01-24 DIAGNOSIS — M47.816 FACET ARTHROPATHY, LUMBAR: ICD-10-CM

## 2024-01-24 PROCEDURE — 72148 MRI LUMBAR SPINE W/O DYE: CPT | Mod: TC,PO

## 2024-01-24 PROCEDURE — 72148 MRI LUMBAR SPINE W/O DYE: CPT | Mod: 26,,, | Performed by: RADIOLOGY

## 2024-01-25 ENCOUNTER — OFFICE VISIT (OUTPATIENT)
Dept: INTERNAL MEDICINE | Facility: CLINIC | Age: 59
End: 2024-01-25
Payer: COMMERCIAL

## 2024-01-25 ENCOUNTER — LAB VISIT (OUTPATIENT)
Dept: LAB | Facility: HOSPITAL | Age: 59
End: 2024-01-25
Attending: NURSE PRACTITIONER
Payer: COMMERCIAL

## 2024-01-25 VITALS
DIASTOLIC BLOOD PRESSURE: 102 MMHG | HEIGHT: 62 IN | RESPIRATION RATE: 18 BRPM | BODY MASS INDEX: 31.48 KG/M2 | OXYGEN SATURATION: 98 % | TEMPERATURE: 98 F | HEART RATE: 84 BPM | SYSTOLIC BLOOD PRESSURE: 146 MMHG | WEIGHT: 171.06 LBS

## 2024-01-25 DIAGNOSIS — N18.31 CHRONIC KIDNEY DISEASE, STAGE 3A: ICD-10-CM

## 2024-01-25 DIAGNOSIS — N83.202 LEFT OVARIAN CYST: ICD-10-CM

## 2024-01-25 DIAGNOSIS — M17.12 PRIMARY OSTEOARTHRITIS OF LEFT KNEE: ICD-10-CM

## 2024-01-25 DIAGNOSIS — D70.9 NEUTROPENIA, UNSPECIFIED TYPE: ICD-10-CM

## 2024-01-25 DIAGNOSIS — E79.0 ELEVATED URIC ACID IN BLOOD: ICD-10-CM

## 2024-01-25 DIAGNOSIS — M54.16 LUMBAR RADICULOPATHY: ICD-10-CM

## 2024-01-25 DIAGNOSIS — Z00.00 ROUTINE MEDICAL EXAM: Primary | ICD-10-CM

## 2024-01-25 DIAGNOSIS — F33.1 MODERATE EPISODE OF RECURRENT MAJOR DEPRESSIVE DISORDER: ICD-10-CM

## 2024-01-25 DIAGNOSIS — Z00.00 ROUTINE MEDICAL EXAM: ICD-10-CM

## 2024-01-25 DIAGNOSIS — M25.551 ACUTE RIGHT HIP PAIN: ICD-10-CM

## 2024-01-25 PROCEDURE — 99999 PR PBB SHADOW E&M-EST. PATIENT-LVL V: CPT | Mod: PBBFAC,,, | Performed by: NURSE PRACTITIONER

## 2024-01-25 PROCEDURE — 84550 ASSAY OF BLOOD/URIC ACID: CPT | Performed by: NURSE PRACTITIONER

## 2024-01-25 PROCEDURE — 1159F MED LIST DOCD IN RCRD: CPT | Mod: CPTII,S$GLB,, | Performed by: NURSE PRACTITIONER

## 2024-01-25 PROCEDURE — 3077F SYST BP >= 140 MM HG: CPT | Mod: CPTII,S$GLB,, | Performed by: NURSE PRACTITIONER

## 2024-01-25 PROCEDURE — 85025 COMPLETE CBC W/AUTO DIFF WBC: CPT | Performed by: NURSE PRACTITIONER

## 2024-01-25 PROCEDURE — 3008F BODY MASS INDEX DOCD: CPT | Mod: CPTII,S$GLB,, | Performed by: NURSE PRACTITIONER

## 2024-01-25 PROCEDURE — 1160F RVW MEDS BY RX/DR IN RCRD: CPT | Mod: CPTII,S$GLB,, | Performed by: NURSE PRACTITIONER

## 2024-01-25 PROCEDURE — 36415 COLL VENOUS BLD VENIPUNCTURE: CPT | Mod: PN | Performed by: NURSE PRACTITIONER

## 2024-01-25 PROCEDURE — 4010F ACE/ARB THERAPY RXD/TAKEN: CPT | Mod: CPTII,S$GLB,, | Performed by: NURSE PRACTITIONER

## 2024-01-25 PROCEDURE — 3080F DIAST BP >= 90 MM HG: CPT | Mod: CPTII,S$GLB,, | Performed by: NURSE PRACTITIONER

## 2024-01-25 PROCEDURE — 99214 OFFICE O/P EST MOD 30 MIN: CPT | Mod: S$GLB,,, | Performed by: NURSE PRACTITIONER

## 2024-01-25 PROCEDURE — 80048 BASIC METABOLIC PNL TOTAL CA: CPT | Performed by: NURSE PRACTITIONER

## 2024-01-25 NOTE — PROGRESS NOTES
Subjective     Patient ID: Modesta Camacho is a 58 y.o. female.    Chief Complaint: Back Pain    Patient presents for back, knee, and hip pain.  Reports recent fall on 1/22/2024.  Reports getting in bed and her knee (left) gave away and she fell out of bed.  Reports falling to the right and hit the back of her head.  Lightheaded and nausea. Trying to schedule nerve block but unable to get off work.      Reports she's in a lot of pain.  Taking gabapentin, tizanidine, and Cymbalta.      Depression symptoms-started to flare due to her health.     Uric acid-elevated.  Discussed diet.  Will adjust to low purine.     Small left ovarian cyst noted on recent MRI.  Ultrasound follow up recommended.       Back Pain  Pertinent negatives include no abdominal pain, chest pain or fever.     Review of Systems   Constitutional:  Negative for chills, fatigue and fever.   Respiratory:  Negative for cough and shortness of breath.    Cardiovascular:  Negative for chest pain and leg swelling.   Gastrointestinal:  Negative for abdominal pain.   Musculoskeletal:  Positive for arthralgias and back pain.   Psychiatric/Behavioral:  Negative for agitation and confusion.           Objective     Physical Exam  Vitals reviewed.   Cardiovascular:      Rate and Rhythm: Normal rate and regular rhythm.   Pulmonary:      Effort: Pulmonary effort is normal.      Breath sounds: Normal breath sounds.   Musculoskeletal:         General: Tenderness present.      Right hip: Tenderness present.   Skin:     General: Skin is warm.   Neurological:      General: No focal deficit present.      Mental Status: She is alert.   Psychiatric:         Mood and Affect: Mood normal.         Behavior: Behavior is cooperative.            Assessment and Plan     1. Routine medical exam  -     CBC Auto Differential; Future; Expected date: 01/25/2024  -     Uric Acid; Future; Expected date: 01/25/2024  -     Basic Metabolic Panel; Future; Expected date: 01/25/2024    2. Left  ovarian cyst  Comments:  Schedule follow up ultrasound.  Orders:  -     US Pelvis Complete Non OB; Future; Expected date: 01/25/2024    3. Lumbar radiculopathy  Comments:  Stable with symptoms.  Continue current treatment plan.  Overview:  acute on chronic at left S1, subacute on chronic radiculopathy at right S1, chronic at bilateral L5      4. Primary osteoarthritis of left knee  Comments:  Stable with symptoms. Continue current treatment plan.    5. Neutropenia, unspecified type  Comments:  Stable. Continue current treatment plan.    6. Moderate episode of recurrent major depressive disorder  Comments:  Stable. Continue current treatment plan.    7. Chronic kidney disease, stage 3a  Comments:  Stable. Continue current treatment plan.  Orders:  -     CBC Auto Differential; Future; Expected date: 01/25/2024  -     Uric Acid; Future; Expected date: 01/25/2024  -     Basic Metabolic Panel; Future; Expected date: 01/25/2024    8. Elevated uric acid in blood  Comments:  Repeat labs  Orders:  -     Uric Acid; Future; Expected date: 01/25/2024    9. Acute right hip pain  -     X-Ray Hip 2 or 3 views Right (with Pelvis when performed); Future; Expected date: 01/25/2024        Needs annual with Susannah Carrillo     Schedule ultrasound     Labs in 3 months--shahid     Schedule BP check on OB appointment day        Follow up in about 3 months (around 4/25/2024).

## 2024-01-25 NOTE — LETTER
January 25, 2024    Modesta Camacho  [address]  [place] [address]             [facility]  Internal Medicine  [address] [place] [address]  [place] [address]  Phone: [phone]  Fax: [phone]   January 25, 2024     Patient: Modesta Camacho   YOB: 1965   Date of Visit: 1/25/2024       To Whom it May Concern:    Modesta Camacho was seen in my clinic on 1/25/2024. She may return to work on 01/29/2024 .    Please excuse her from any work missed.    If you have any questions or concerns, please don't hesitate to call.    Sincerely,          Mary Rivera, NP

## 2024-01-26 ENCOUNTER — PATIENT MESSAGE (OUTPATIENT)
Dept: INTERNAL MEDICINE | Facility: CLINIC | Age: 59
End: 2024-01-26
Payer: COMMERCIAL

## 2024-01-26 LAB
ANION GAP SERPL CALC-SCNC: 7 MMOL/L (ref 8–16)
BASOPHILS # BLD AUTO: 0.05 K/UL (ref 0–0.2)
BASOPHILS NFR BLD: 0.9 % (ref 0–1.9)
BUN SERPL-MCNC: 16 MG/DL (ref 6–20)
CALCIUM SERPL-MCNC: 10.6 MG/DL (ref 8.7–10.5)
CHLORIDE SERPL-SCNC: 104 MMOL/L (ref 95–110)
CO2 SERPL-SCNC: 28 MMOL/L (ref 23–29)
CREAT SERPL-MCNC: 1.1 MG/DL (ref 0.5–1.4)
DIFFERENTIAL METHOD BLD: ABNORMAL
EOSINOPHIL # BLD AUTO: 0.6 K/UL (ref 0–0.5)
EOSINOPHIL NFR BLD: 10.9 % (ref 0–8)
ERYTHROCYTE [DISTWIDTH] IN BLOOD BY AUTOMATED COUNT: 13.8 % (ref 11.5–14.5)
EST. GFR  (NO RACE VARIABLE): 58.2 ML/MIN/1.73 M^2
GLUCOSE SERPL-MCNC: 89 MG/DL (ref 70–110)
HCT VFR BLD AUTO: 44.1 % (ref 37–48.5)
HGB BLD-MCNC: 14.3 G/DL (ref 12–16)
IMM GRANULOCYTES # BLD AUTO: 0.05 K/UL (ref 0–0.04)
IMM GRANULOCYTES NFR BLD AUTO: 0.9 % (ref 0–0.5)
LYMPHOCYTES # BLD AUTO: 2.6 K/UL (ref 1–4.8)
LYMPHOCYTES NFR BLD: 48.5 % (ref 18–48)
MCH RBC QN AUTO: 30.2 PG (ref 27–31)
MCHC RBC AUTO-ENTMCNC: 32.4 G/DL (ref 32–36)
MCV RBC AUTO: 93 FL (ref 82–98)
MONOCYTES # BLD AUTO: 0.3 K/UL (ref 0.3–1)
MONOCYTES NFR BLD: 5.8 % (ref 4–15)
NEUTROPHILS # BLD AUTO: 1.8 K/UL (ref 1.8–7.7)
NEUTROPHILS NFR BLD: 33 % (ref 38–73)
NRBC BLD-RTO: 0 /100 WBC
PLATELET # BLD AUTO: 292 K/UL (ref 150–450)
PMV BLD AUTO: 11 FL (ref 9.2–12.9)
POTASSIUM SERPL-SCNC: 4.1 MMOL/L (ref 3.5–5.1)
RBC # BLD AUTO: 4.74 M/UL (ref 4–5.4)
SODIUM SERPL-SCNC: 139 MMOL/L (ref 136–145)
URATE SERPL-MCNC: 6.9 MG/DL (ref 2.4–5.7)
WBC # BLD AUTO: 5.32 K/UL (ref 3.9–12.7)

## 2024-02-09 ENCOUNTER — PATIENT MESSAGE (OUTPATIENT)
Dept: PAIN MEDICINE | Facility: HOSPITAL | Age: 59
End: 2024-02-09
Payer: COMMERCIAL

## 2024-02-13 RX ORDER — ONDANSETRON HYDROCHLORIDE 2 MG/ML
4 INJECTION, SOLUTION INTRAVENOUS ONCE AS NEEDED
Status: CANCELLED | OUTPATIENT
Start: 2024-02-13 | End: 2035-07-12

## 2024-02-13 NOTE — PRE-PROCEDURE INSTRUCTIONS
Spoke with patient regarding procedure scheduled on 2.14     Arrival time 0700     Has patient been sick with fever or on antibiotics within the last 7 days? No     Does the patient have any open wounds, sores or rashes? No     Does the patient have any recent fractures? no     Has patient received a vaccination within the last 7 days? No     Received the COVID vaccination?      Has the patient stopped all medications as directed? na     Does patient have a pacemaker, defibrillator, or implantable stimulator? No     Does the patient have a ride to and from procedure and someone reliable to remain with patient?      Is the patient diabetic? no     Does the patient have sleep apnea? Or use O2 at home? no     Is the patient receiving sedation?      Is the patient instructed to remain NPO beginning at midnight the night before their procedure? yes     Procedure location confirmed with patient? Yes     Covid- Denies signs/symptoms. Instructed to notify PAT/MD if any changes.

## 2024-02-14 ENCOUNTER — HOSPITAL ENCOUNTER (OUTPATIENT)
Facility: HOSPITAL | Age: 59
Discharge: HOME | End: 2024-02-14
Attending: ANESTHESIOLOGY | Admitting: ANESTHESIOLOGY
Payer: COMMERCIAL

## 2024-02-14 VITALS
HEART RATE: 84 BPM | OXYGEN SATURATION: 100 % | SYSTOLIC BLOOD PRESSURE: 169 MMHG | RESPIRATION RATE: 18 BRPM | HEIGHT: 62 IN | TEMPERATURE: 97 F | WEIGHT: 170.75 LBS | DIASTOLIC BLOOD PRESSURE: 96 MMHG | BODY MASS INDEX: 31.42 KG/M2

## 2024-02-14 DIAGNOSIS — M17.12 LOCALIZED OSTEOARTHRITIS OF LEFT KNEE: ICD-10-CM

## 2024-02-14 PROCEDURE — 63600175 PHARM REV CODE 636 W HCPCS: Mod: JZ,JG | Performed by: ANESTHESIOLOGY

## 2024-02-14 PROCEDURE — 64454 NJX AA&/STRD GNCLR NRV BRNCH: CPT | Mod: LT | Performed by: ANESTHESIOLOGY

## 2024-02-14 PROCEDURE — 64454 NJX AA&/STRD GNCLR NRV BRNCH: CPT | Mod: LT,,, | Performed by: ANESTHESIOLOGY

## 2024-02-14 RX ORDER — DEXAMETHASONE SODIUM PHOSPHATE 10 MG/ML
INJECTION INTRAMUSCULAR; INTRAVENOUS
Status: DISCONTINUED | OUTPATIENT
Start: 2024-02-14 | End: 2024-02-14 | Stop reason: HOSPADM

## 2024-02-14 RX ORDER — BUPIVACAINE HYDROCHLORIDE 5 MG/ML
INJECTION, SOLUTION EPIDURAL; INTRACAUDAL
Status: DISCONTINUED | OUTPATIENT
Start: 2024-02-14 | End: 2024-02-14 | Stop reason: HOSPADM

## 2024-02-14 RX ORDER — MIDAZOLAM HYDROCHLORIDE 1 MG/ML
INJECTION, SOLUTION INTRAMUSCULAR; INTRAVENOUS
Status: DISCONTINUED | OUTPATIENT
Start: 2024-02-14 | End: 2024-02-14 | Stop reason: HOSPADM

## 2024-02-14 RX ORDER — FENTANYL CITRATE 50 UG/ML
INJECTION, SOLUTION INTRAMUSCULAR; INTRAVENOUS
Status: DISCONTINUED | OUTPATIENT
Start: 2024-02-14 | End: 2024-02-14 | Stop reason: HOSPADM

## 2024-02-14 NOTE — H&P
HPI  Patient presenting for Procedure(s) (LRB):  Left genicular nerve block with RN IV sedation (Left)     Patient on Anti-coagulation No    No health changes since previous encounter    Past Medical History:   Diagnosis Date    Abnormal EKG 2/5/2021    Anxiety     Hypertension     Osteoarthritis     Stage 2 chronic kidney disease 2/5/2021     Past Surgical History:   Procedure Laterality Date    ARTHROPLASTY OF HIP BY ANTERIOR APPROACH Left 7/31/2020    Procedure: ARTHROPLASTY, HIP, ANTERIOR APPROACH;  Surgeon: Xavi Gandara MD;  Location: [facility] OR;  Service: Orthopedics;  Laterality: Left;    gastric sleeve      HIP ARTHROPLASTY Right 6/7/2023    Procedure: ARTHROPLASTY, HIP;  Surgeon: Trey Clifford MD;  Location: [facility] OR;  Service: Orthopedics;  Laterality: Right;    INJECTION OF ANESTHETIC AGENT AROUND NERVE Bilateral 8/14/2023    Procedure: left genicular nerve block RN IV Sedation;  Surgeon: Shaun Iyer MD;  Location: [facility] PAIN [facility]GT;  Service: Pain Management;  Laterality: Bilateral;    JOINT REPLACEMENT Right 06/14/2016    knee    JOINT REPLACEMENT Left 05/20/2021    KNEE    JOINT REPLACEMENT Left 07/30/2020    HIP    KNEE ARTHROPLASTY Left 5/6/2021    Procedure: ARTHROPLASTY, KNEE;  Surgeon: Xavi Gandara MD;  Location: [facility] OR;  Service: Orthopedics;  Laterality: Left;    RESURFACING OF PATELLA Right 9/27/2022    Procedure: RESURFACING, PATELLA;  Surgeon: Trey Clifford MD;  Location: [facility] OR;  Service: General;  Laterality: Right;    REVISION OF KNEE ARTHROPLASTY Right 9/27/2022    Procedure: REVISION, ARTHROPLASTY, KNEE;  Surgeon: Trey Clifford MD;  Location: [facility] OR;  Service: General;  Laterality: Right;    TUBAL LIGATION      UTERINE FIBROID EMBOLIZATION       Review of patient's allergies indicates:   Allergen Reactions    Codeine Hives and Rash     Takes Tramadol and has never had an issue with SOB/swelling  Also tolerated hydromorphone 7/2020      Penicillin      Penicillins Hives     Pt tolerated cefazolin 7/2020        Current Facility-Administered Medications on File Prior to Encounter   Medication Dose Route Frequency Provider Last Rate Last Admin    0.9%  NaCl infusion   Intravenous Continuous Trey Clifford MD        acetaminophen tablet 650 mg  650 mg Oral Q8H PRN Trey Clifford MD   650 mg at 06/07/23 0653    chlorhexidine 0.12 % solution 10 mL  10 mL Mouth/Throat On Call Procedure Trey Clifford MD   10 mL at 06/07/23 0655    dexAMETHasone injection 8 mg  8 mg Intravenous On Call Procedure Trey Clifford MD   8 mg at 06/07/23 0715    gabapentin capsule 300 mg  300 mg Oral Daily Trey Clifford MD   300 mg at 06/07/23 1013     Current Outpatient Medications on File Prior to Encounter   Medication Sig Dispense Refill    DULoxetine (CYMBALTA) 60 MG capsule Take 1 capsule (60 mg total) by mouth 2 (two) times daily. 60 capsule 5    gabapentin (NEURONTIN) 300 MG capsule Take 1 capsule (300 mg total) by mouth 3 (three) times daily. 90 capsule 2    metoprolol succinate (TOPROL-XL) 50 MG 24 hr tablet Take 2 tablets (100 mg total) by mouth nightly. 180 tablet 0    telmisartan (MICARDIS) 80 MG Tab Take 1 tablet (80 mg total) by mouth once daily. 90 tablet 3    albuterol (VENTOLIN HFA) 90 mcg/actuation inhaler Inhale 2 puffs into the lungs every 6 (six) hours as needed for Wheezing. Rescue 18 g 0    ALPRAZolam (XANAX) 0.5 MG tablet Take 1 tablet (0.5 mg total) by mouth nightly as needed for Anxiety. 30 tablet 1    amLODIPine (NORVASC) 5 MG tablet Take 1 tablet (5 mg total) by mouth 2 (two) times daily. 60 tablet 11    cloNIDine (CATAPRES) 0.1 MG tablet TAKE 1 TABLET BY MOUTH ONCE DAILY USE FOR SBP GREATER THAN 160 MMHG. 90 tablet 1    docusate sodium (COLACE) 100 MG capsule Take 100 mg by mouth 2 (two) times daily as needed.      multivit-min/ferrous fumarate (MULTI VITAMIN ORAL) Take 2 tablets by mouth Daily.      ondansetron (ZOFRAN-ODT) 4 MG  "TbDL dissolve 2 tablets (8 mg total) by mouth every 8 (eight) hours as needed (nausea). 20 tablet 0    tiZANidine (ZANAFLEX) 4 MG tablet Take 0.5-1 tablets (2-4 mg total) by mouth 2 (two) times daily as needed (pain). 90 tablet 2    vitamin D (VITAMIN D3) 1000 units Tab Take 1,000 Units by mouth once daily.          PMHx, PSHx, Allergies, Medications reviewed in epic    ROS negative except pain complaints in HPI    OBJECTIVE:    BP (!) 192/98 (BP Location: Right arm, Patient Position: Sitting)   Pulse 74   Temp 97 °F (36.1 °C) (Temporal)   Resp 16   Ht 5' 2" (1.575 m)   Wt 77.4 kg (170 lb 11.9 oz)   SpO2 100%   Breastfeeding No   BMI 31.23 kg/m²     PHYSICAL EXAMINATION:    GENERAL: Well appearing, in no acute distress, alert and oriented x3.  PSYCH:  Mood and affect appropriate.  SKIN: Skin color, texture, turgor normal, no rashes or lesions which will impact the procedure.  CV: RRR with palpation of the radial artery.  PULM: No evidence of respiratory difficulty, symmetric chest rise. Clear to auscultation.  NEURO: Cranial nerves grossly intact.    Plan:    Proceed with procedure as planned Procedure(s) (LRB):  Left genicular nerve block with RN IV sedation (Left)    Shaun Iyer MD  02/14/2024            "

## 2024-02-14 NOTE — DISCHARGE INSTRUCTIONS

## 2024-02-14 NOTE — LETTER
February 14, 2024    Modesta Caamcho  [address]  [place] [address]                   [address] [facility] [place] [address]  Phone: [phone]  Fax: [phone]   February 14, 2024     Patient: Modesta Camacho   YOB: 1965   Date of Visit: 02/14/2024       To Whom it May Concern:    Modesta Camacho was seen on 02/14/2024. She may{Return to school/sport/work:84607} 02/19/2024.    Please excuse her from any classes or work missed.    If you have any questions or concerns, please don't hesitate to call.    Sincerely,         Tasia Ponce, RN

## 2024-02-14 NOTE — DISCHARGE SUMMARY
Discharge Note  Short Stay      SUMMARY     Admit Date: 2/14/2024    Attending Physician: Shaun Iyer MD        Discharge Physician: Shaun Iyer MD        Discharge Date: 2/14/2024 8:10 AM    Procedure(s) (LRB):  Left genicular nerve block with RN IV sedation (Left)    Final Diagnosis: Osteoarthritis of left knee, unspecified osteoarthritis type [M17.12]    Disposition: Home or self care    Patient Instructions:   Current Discharge Medication List        CONTINUE these medications which have NOT CHANGED    Details   DULoxetine (CYMBALTA) 60 MG capsule Take 1 capsule (60 mg total) by mouth 2 (two) times daily.  Qty: 60 capsule, Refills: 5    Associated Diagnoses: LANE (generalized anxiety disorder)      gabapentin (NEURONTIN) 300 MG capsule Take 1 capsule (300 mg total) by mouth 3 (three) times daily.  Qty: 90 capsule, Refills: 2    Associated Diagnoses: Arthritis of right hip      metoprolol succinate (TOPROL-XL) 50 MG 24 hr tablet Take 2 tablets (100 mg total) by mouth nightly.  Qty: 180 tablet, Refills: 0    Comments: .  Associated Diagnoses: Essential hypertension      telmisartan (MICARDIS) 80 MG Tab Take 1 tablet (80 mg total) by mouth once daily.  Qty: 90 tablet, Refills: 3    Associated Diagnoses: Essential hypertension      albuterol (VENTOLIN HFA) 90 mcg/actuation inhaler Inhale 2 puffs into the lungs every 6 (six) hours as needed for Wheezing. Rescue  Qty: 18 g, Refills: 0    Associated Diagnoses: Sore throat; Viral URI with cough      ALPRAZolam (XANAX) 0.5 MG tablet Take 1 tablet (0.5 mg total) by mouth nightly as needed for Anxiety.  Qty: 30 tablet, Refills: 1    Associated Diagnoses: LANE (generalized anxiety disorder)      amLODIPine (NORVASC) 5 MG tablet Take 1 tablet (5 mg total) by mouth 2 (two) times daily.  Qty: 60 tablet, Refills: 11    Comments: .  Associated Diagnoses: Abnormal EKG; Essential hypertension; Pulmonary HTN; DDD (degenerative disc disease), cervical; Hyperuricemia;  Anxiety      cloNIDine (CATAPRES) 0.1 MG tablet TAKE 1 TABLET BY MOUTH ONCE DAILY USE FOR SBP GREATER THAN 160 MMHG.  Qty: 90 tablet, Refills: 1    Comments: .  Associated Diagnoses: Essential hypertension      docusate sodium (COLACE) 100 MG capsule Take 100 mg by mouth 2 (two) times daily as needed.      multivit-min/ferrous fumarate (MULTI VITAMIN ORAL) Take 2 tablets by mouth Daily.      ondansetron (ZOFRAN-ODT) 4 MG TbDL dissolve 2 tablets (8 mg total) by mouth every 8 (eight) hours as needed (nausea).  Qty: 20 tablet, Refills: 0      tiZANidine (ZANAFLEX) 4 MG tablet Take 0.5-1 tablets (2-4 mg total) by mouth 2 (two) times daily as needed (pain).  Qty: 90 tablet, Refills: 2    Associated Diagnoses: Knee pain, unspecified chronicity, unspecified laterality; DDD (degenerative disc disease), lumbar; Primary osteoarthritis of right hip      vitamin D (VITAMIN D3) 1000 units Tab Take 1,000 Units by mouth once daily.                 Discharge Diagnosis: Osteoarthritis of left knee, unspecified osteoarthritis type [M17.12]  Condition on Discharge: Stable with no complications to procedure   Diet on Discharge: Same as before.  Activity: as per instruction sheet.  Discharge to: Home with a responsible adult.  Follow up: 2-4 weeks       Please call the office at [phone] if you experience any weakness or loss of sensation, fever > 101.5, pain uncontrolled with oral medications, persistent nausea/vomiting/or diarrhea, redness or drainage from the incisions, or any other worrisome concerns. If physician on call was not reached or could not communicate with our office for any reason please go to the nearest emergency department

## 2024-02-14 NOTE — OP NOTE
Genicular Nerve Block    INFORMED CONSENT: The procedure, risks, benefits and options were discussed with patient. There are no contraindications to the procedure. The patient expressed understanding and agreed to proceed. The personnel performing the procedure was discussed.    Date of procedure 02/14/2024    Time-out taken to identify patient and procedure side prior to starting the procedure.                   Procedure Note:    Left  Geniculate nerve block under fluoroscopy                               Pre-Op Diagnosis:  Left  Osteoarthritis of left knee, unspecified osteoarthritis type [M17.12]    Post-Op Diagnosis: Osteoarthritis of left knee, unspecified osteoarthritis type [M17.12]    Surgeon: Shaun Iyer MD    MEDICATION: 6ml of Bupivacaine PF 0.25%    LOCAL: 8ml Lidocaine PF 1%    Assistant: None    EBL: None    Complications: None    Specimens: None    Sedation: Conscious sedation provided by M.D    SEDATION MEDICATIONS: local/IV sedation: Versed 2 mg and fentanyl 100 mcg IV.  Conscious sedation ordered by MD.  Patient reevaluated and sedation administered by MD and monitored by RN.  Total sedation time was less than 15 min.      TECHNIQUE: After written consent was obtained, patient placed in supine position.  The area over the medial and lateral aspect of the superior epi-condyle of the femur and the medial tibial metaphysis were prepped with chlorhexidine.  The area was draped in the usual sterile fashion.  Approximately 8 mL total 1% lidocaine was infiltrated into the skin overlying the 3 predetermined entry points. A 22 gauge spinal needle was then advanced under fluoroscopy in the AP and lateral views into the positions of the geniculate nerves at these levels. After negative aspiration and no paresthesias there was injection of 2 mL of 0.25% bupivacaine into each of these 3 areas for a total volume of 6 mL of 0.25% bupivacaine. Needle was withdrawn and a sterile band-aid applied to the  skin.      The patient was monitored for approximately 30 minutes after the procedure.  Patient was given post procedure and discharge instructions to follow at home.  The patient was discharged in a stable condition

## 2024-02-15 ENCOUNTER — HOSPITAL ENCOUNTER (OUTPATIENT)
Dept: RADIOLOGY | Facility: HOSPITAL | Age: 59
Discharge: HOME | End: 2024-02-15
Attending: NURSE PRACTITIONER
Payer: COMMERCIAL

## 2024-02-15 DIAGNOSIS — N83.202 LEFT OVARIAN CYST: ICD-10-CM

## 2024-03-06 DIAGNOSIS — I10 ESSENTIAL HYPERTENSION: ICD-10-CM

## 2024-03-06 RX ORDER — CLONIDINE HYDROCHLORIDE 0.1 MG/1
TABLET ORAL
Qty: 90 TABLET | Refills: 1 | Status: SHIPPED | OUTPATIENT
Start: 2024-03-06

## 2024-03-11 RX ORDER — METOPROLOL SUCCINATE 50 MG/1
100 TABLET, EXTENDED RELEASE ORAL NIGHTLY
Qty: 180 TABLET | Refills: 3 | Status: SHIPPED | OUTPATIENT
Start: 2024-03-11

## 2024-03-13 ENCOUNTER — TELEPHONE (OUTPATIENT)
Dept: PAIN MEDICINE | Facility: CLINIC | Age: 59
End: 2024-03-13
Payer: COMMERCIAL

## 2024-03-14 ENCOUNTER — OFFICE VISIT (OUTPATIENT)
Dept: PAIN MEDICINE | Facility: CLINIC | Age: 59
End: 2024-03-14
Payer: COMMERCIAL

## 2024-03-14 VITALS
WEIGHT: 171.06 LBS | SYSTOLIC BLOOD PRESSURE: 158 MMHG | DIASTOLIC BLOOD PRESSURE: 94 MMHG | HEIGHT: 62 IN | HEART RATE: 96 BPM | BODY MASS INDEX: 31.48 KG/M2

## 2024-03-14 DIAGNOSIS — M54.16 LUMBAR RADICULOPATHY: Primary | ICD-10-CM

## 2024-03-14 DIAGNOSIS — M16.11 ARTHRITIS OF RIGHT HIP: ICD-10-CM

## 2024-03-14 DIAGNOSIS — M51.36 DDD (DEGENERATIVE DISC DISEASE), LUMBAR: ICD-10-CM

## 2024-03-14 PROCEDURE — 1159F MED LIST DOCD IN RCRD: CPT | Mod: CPTII,S$GLB,, | Performed by: ANESTHESIOLOGY

## 2024-03-14 PROCEDURE — 99999 PR PBB SHADOW E&M-EST. PATIENT-LVL III: CPT | Mod: PBBFAC,,, | Performed by: ANESTHESIOLOGY

## 2024-03-14 PROCEDURE — 3077F SYST BP >= 140 MM HG: CPT | Mod: CPTII,S$GLB,, | Performed by: ANESTHESIOLOGY

## 2024-03-14 PROCEDURE — 3008F BODY MASS INDEX DOCD: CPT | Mod: CPTII,S$GLB,, | Performed by: ANESTHESIOLOGY

## 2024-03-14 PROCEDURE — 3080F DIAST BP >= 90 MM HG: CPT | Mod: CPTII,S$GLB,, | Performed by: ANESTHESIOLOGY

## 2024-03-14 PROCEDURE — 99214 OFFICE O/P EST MOD 30 MIN: CPT | Mod: S$GLB,,, | Performed by: ANESTHESIOLOGY

## 2024-03-14 PROCEDURE — 4010F ACE/ARB THERAPY RXD/TAKEN: CPT | Mod: CPTII,S$GLB,, | Performed by: ANESTHESIOLOGY

## 2024-03-14 RX ORDER — GABAPENTIN 300 MG/1
300 CAPSULE ORAL 3 TIMES DAILY
Qty: 90 CAPSULE | Refills: 2 | Status: SHIPPED | OUTPATIENT
Start: 2024-03-14

## 2024-03-14 NOTE — PROGRESS NOTES
Interventional Pain Progress Note       Chief Pain Complaint:  Low back pain     Interval History (03/14/2024):  Patient returns to clinic after procedure.  Patient reports 90% relief after left genicular nerve block with phenol on 02/14/2024.  Patient reports that left knee pain is well-controlled currently.  Primary pain today is lower back pain.  Pain is described as a aching stabbing pain that starts in the band across her lower back.  This pain radiates up to the lower thoracic area into her bilateral posterior thighs.  Pain is worse with prolonged sitting and standing, better with lying supine and ice and heat.  Pain is currently rated a 5/10, but can increase to an 8/10 with exacerbating activities. Denies any fevers, chills, changes in gait, saddle anesthesia, or bowel and bladder incontinence        Interval History (01/11/2024):  Patient Modesta Camacho presents today for follow-up visit.  Patient's chief complaint today is left knee pain which has been chronic.  She had a left genicular block on 08/14/2023 which provided 80% relief for 4 months.  She is interested in repeating the block.  She rates her pain today a 7/10.  She also has chronic lower back pain which will radiate down the posterior aspect of the left lower extremity down to the foot.  She recently had a nerve conduction study which showed some irritation of the L5-S1 nerve root.  Dr. Clifford and then ordered a lumbar MRI and she is scheduled to get that next week.  Patient denies night fever/night sweats, urinary incontinence, bowel incontinence, significant weight loss and significant motor weakness.   Patient denies any other complaints or concerns at this time.    Interval History (1/11/2024):  Patient returns to clinic after procedure.  Patient reports 80% relief in left knee pain after left genicular nerve block on 08/14/2023.  Patient reports occasional catching sensations in her left knee as well as aching pain along the medial  aspect.  She denies any significant radiation of this pain.  She denies any significant numbness or tingling associated with this pain.  Pain is worse with prolonged standing and walking, better with rest and heat.  Pain is currently rated a 4/10. Denies any fevers, chills, changes in gait, saddle anesthesia, or bowel and bladder incontinence    Interval History (7/24/2023):  Modesta Camacho presents today for follow-up visit.  Patient was last seen on 05/23/2023. At last visit, she was to follow up with Dr. Clifford. She underwent right hip arthroplasty with Dr. Clifford on 06/07/2023. Currently enrolled in physical therapy, has 6 weeks remaining. So far she has had her first post op after hip arthroplasty on 06/30/2023, next post op in August. Currently prescribed Endocet and Gabapentin for post op pain.     Patient reports pain as 8/10 today. She continues to report left knee pain. She previously underwent left TKA with Dr. Gandara. She has spoken to Dr. Clifford about this knee before, but it was previously replaced recently and Dr. Clifford is hesitant to go in again so soon.       Interval History (5/23/2023):  Modesta Camacho presents today via telemed for follow-up visit.  Patient was last seen on 2/21/2023. She reports worsening left knee pain. She reports it keeps feeling as though it is popping out of place. She reports it began swelling last Friday and since then she can hardly bear weight. She has been resting, icing, elevating, and wearing her brace which has reduced the swelling and inflammation somewhat. She has spoken to Dr. Clifford about this knee before, but it was previously replaced recently and Dr. Clifford is hesitant to go in again so soon. Patient reports pain as 8/10 today.  She is scheduled for right hip arthroplasty with Dr. Clifford on 06/07/2023.    Interval HPI 02/21/2023    Modesta Camacho presents today for follow-up visit.  Patient was last seen on 11/8/2022. Patient reports  pain as 6/10 today. She reports persistent left knee pain, previously underwent left TKA with Dr. Gandara, states she has not seen him in over 2 years as he was dismissive of her continued pain after surgery. Sees Dr. Clifford for hip, but may also consider revision of left knee. Hx of right knee TKA with revision 9/22. Right knee is doing well. Last visit with Dr. Clifford on 02/13/2023. Considering right hip replacement in [place]. She also reports continued lower back pain, axial in nature, deep achy and without radiation into her lower extremities. Requesting refills of her Gabapentin, Nabumetone, and Tizanidine.    Interval History (11/08/2022):  Modesta Camacho presents today for follow-up visit.  Patient was last seen on 01/05/2022. Patient reports pain as 6/10 today. Underwent revision of her right knee with Dr. Clifford 09/27/2022. Referred back to [facility] for low back pain by Dr. Clifford. Patient reports pain as axial in nature across her lower lumbar spine without radiation into her lower extremities. Pain is constant and interferes with daily activities. Taking cymbalta and Gabapentin with minimal relief. Tizanidine helpful in the past, she is out of this medication. Not interested in injections at this time.    X-ray lumbar spine  FINDINGS:  AP, lateral and coned down radiographs of the lumbar spine demonstrate no evidence for acute fracture or dislocation.  Persistent grade 1 anterolisthesis of L4 with respect L5 is again identified.  Moderate to severe posterior facet hypertrophic changes are identified at L5/S1.  Remaining intervertebral disk spaces and vertebral body heights are well-preserved.  Visualized SI joints are intact.  Patient is status post right hip arthroplasty.  Multiple calcified phleboliths are identified.  Multiple clips are identified in the epigastric region.     Impression:     Degenerative disease, most prominent at the lower lumbar spine.  Overall, no significant interval  change.       Interval History (1/5/2022):  Modesta Camacho presents today for follow-up visit.  She is here today to review lumbar MRI.  She continues to have RLE pain.  She saw Dr. Rordiguez on 12/21/2021, who referred her to have an ultrasound-guided right hip injection by Dr. Acevedo.  She recently had this procedure with short-term pain relief, but it did not help the sciatica pain into the foot.  She was also referred to Dr. Clifford (Orthopedics) for evaluation for right hip replacement.  To note, patient reports history of left hip replacement and bilateral knee replacement in the past.  She has been doing physical therapy twice a week, but she does not feel this has been overly helpful.      Initial HPI (11/09/2021):  Modesta Camacho is a 56 y.o. female  who is presenting with a chief complaint of low back pain. The patient began experiencing this problem insidiously, and the pain has been gradually worsening over the past 6 month(s). The pain is described as throbbing, shooting, burning and electrical and is located in the right lumbar spine . Pain is intermittent and lasts hours. The pain radiates to right leg L4 distribution. The patient rates her pain a 7 out of ten and interferes with activities of daily living a 8 out of ten. Pain is exacerbated by flexion of the lumbar spine, and is improved by rest. Patient reports no prior trauma, prior hip surgery Left Hip [facility] 6/[facility]. Right Knee Replacement 5/2021.     - pertinent negatives: No fever, No chills, No weight loss, No bladder dysfunction, No bowel dysfunction, No saddle anesthesia  - pertinent positives: right leg weakness    - medications, other therapies tried (physical therapy, injections):     >> NSAIDs, Tylenol, Tramadol, gabapentin, zanaflex and robaxin    >> Has previously undergone Physical Therapy    >>  Injections:    -02/14/2024: Left genicular nerve block with phenol, 90% relief  -08/14/2023:  Left genicular  nerve block, 80% relief   - ultrasound-guided right hip injection by Dr. Acevedo on 12/21/2021 with short-term pain relief      Imaging / Labs / Studies (reviewed on 3/14/2024):      Results for orders placed during the hospital encounter of 01/24/24    MRI Lumbar Spine Without Contrast    Narrative  EXAMINATION:  MRI LUMBAR SPINE WITHOUT CONTRAST    CLINICAL HISTORY:  lumbar radiculopathy; Anesthesia of skin    TECHNIQUE:  Multiplanar, multisequence MR images were acquired from the thoracolumbar junction to the sacrum without contrast.    COMPARISON:  MRI lumbar spine 11/16/2021    FINDINGS:  Alignment: Progressed grade 1 L4-L5 anterolisthesis.    Vertebrae: Lumbar vertebral body heights are maintained.  No abnormal osseous edema or evidence of an acute fracture.  Marrow signal is within normal limits.    Discs: L4-L5 disc desiccation.  Disc heights appear maintained.    Cord: Within normal limits.  Conus terminates at L1.    Degenerative findings:    T12-L1: Minor asymmetric left disc bulge.  No significant spinal canal stenosis or neural foraminal stenosis.    L1-L2: No significant posterior disc bulge, spinal canal stenosis or neural foraminal stenosis.    L2-L3: Mild broad-based posterior disc bulge and mild bilateral facet arthropathy.  No significant spinal canal stenosis.  Unchanged mild left-sided neural foraminal stenosis.    L3-L4: Mild broad-based posterior disc bulge and mild bilateral facet arthropathy.  No significant spinal canal stenosis.  Unchanged mild left-sided neural foraminal stenosis.    L4-L5: Progressed grade 1 degenerative appearing anterolisthesis with roughly 6 mm of posterior disc space uncovering.  Minimal broad-based posterior disc bulge and bilateral facet arthropathy with ligamentum flavum hypertrophy contributes to similar mild spinal canal stenosis with narrowing of the lateral recesses.  There is similar moderate to severe right and mild-to-moderate left-sided neural foraminal  stenosis.    L5-S1: No significant posterior disc bulge.  Mild bilateral facet arthropathy.  No significant spinal canal stenosis or neural foraminal stenosis.    Paraspinal muscles & soft tissues: Within normal limits.  1.6 cm left adnexal cyst.    Impression  1. Progressed grade 1 L4-L5 anterolisthesis with similar mild spinal canal stenosis, moderate to severe right and mild-to-moderate left-sided neural foraminal stenosis.  2. Similar mild left-sided L2-L3 and L3-L4 neural foraminal stenosis.  3. Small left adnexal cyst.  Pelvic ultrasound could be utilized for additional assessment as clinically indicated.      Electronically signed by: Kade Caruso  Date:    01/24/2024  Time:    16:56         11/16/2021 MRI Lumbar Spine Without Contrast  FINDINGS:  Image quality is degraded by motion, lowering exam sensitivity and specificity.  Interpretation is offered within these confines.    Alignment: There is mild grade 1 anterolisthesis of L4 on L5.  There is slight leftward convexity of the lumbar spine.    Vertebrae: Diffuse loss normal T1 hyperintense marrow signal may be related to chronic anemia or other causes of red marrow hyperplasia, correlate clinically.  No evidence of fracture.    Discs: Normal height and signal.    Cord: Normal.  Conus terminates at T12-L1    Degenerative findings:    T12-L1:  No spinal canal or neuroforaminal stenosis.    L1-L2:  No spinal canal or neuroforaminal stenosis.    L2-L3: Mild generalized disc bulge. No spinal canal or neuroforaminal stenosis.    L3-L4: Mild generalized disc bulge.  Mild bilateral facet arthropathy. No spinal canal or neuroforaminal stenosis.    L4-L5: Severe bilateral facet arthropathy with some uncovering of the intervertebral disc and thickening of the ligamentum flavum.  Moderate bilateral neural foraminal narrowing.  No spinal canal stenosis.    L5-S1: Moderate bilateral facet arthropathy. No spinal canal or neuroforaminal stenosis..    Paraspinal  muscles & soft tissues: Unremarkable.    Impression  1. Grade 1 anterolisthesis of L4 on L5 secondary to facet arthropathy with associated moderate bilateral neural foraminal narrowing at this level.  2. Other multilevel degenerative findings as above      7/31/20 X-Ray Hip 2 or 3 views Left  COMPARISON:  Prior radiograph from May 20, 2020.  FINDINGS:  Interval total left hip arthroplasty with soft tissue air in the area.  There is normal alignment of the joint.  Densely calcified uterine leiomyomata are unchanged.  There also calcified phleboliths within the pelvis.  Impression  Normal alignment of the total left hip arthroplasty that has been placed in the interval.      11/24/2021 X-Ray Hip 2 or 3 views Right (with Pelvis when performed)  COMPARISON:  02/11/2021  FINDINGS:  There are multiple calcified fibroid seen projecting over the uterus.  There also multiple calcified pelvic phleboliths.  There is a single surgical clips seen to the right of the midline.  A left total hip arthroplasty is noted.  There is moderate to severe superolateral joint space narrowing involving the right hip with osteophyte formation noted.  Minimal subchondral cystic changes seen on either side of the right hip joint.      5/01/15 X-Ray Cervical Spine AP And Lateral  Findings:  The reversal of the lordotic curvature of the cervical spine secondary to moderate degenerative disk disease at C4-5 and C5-6.  Chronic anterior wedging of the C4 and C5 vertebral bodies.  Associated endplate sclerosis and uncovertebral joint  hypertrophy. The posterior elements are intact.  IMPRESSION:  No radiographic evidence of fracture or subluxation.  Moderate degenerative disk disease at C4-5 and C5-6.        Review of Systems:  CONSTITUTIONAL: patient denies any fever, chills, or weight loss  SKIN: patient denies any rash or itching  RESPIRATORY: patient denies having any shortness of breath  GASTROINTESTINAL: patient denies having any diarrhea,  "constipation, or bowel incontinence  GENITOURINARY: patient denies having any abnormal bladder function    MUSCULOSKELETAL:  - patient complains of the above noted pain/s (see chief pain complaint)    NEUROLOGICAL:   - pain as above  - strength in Lower extremities is intact, BILATERALLY  - sensation in Lower extremities is intact, BILATERALLY  - patient denies any loss of bowel or bladder control      PSYCHIATRIC: patient denies any change in mood    Other:  All other systems reviewed and are negative      Physical Exam:  Telemedicine Exam, physical exam from previous in office visit  Vitals:    03/14/24 0842   BP: (!) 158/94   Pulse: 96   Weight: 77.6 kg (171 lb 1.2 oz)   Height: 5' 2" (1.575 m)     Body mass index is 31.29 kg/m².   (reviewed on 3/14/2024)     Vitals:    03/14/24 0842   BP: (!) 158/94   Pulse: 96   Weight: 77.6 kg (171 lb 1.2 oz)   Height: 5' 2" (1.575 m)         Body mass index is 31.29 kg/m².   (reviewed on 3/14/2024)     Physical Exam  Constitutional:       Appearance: Normal appearance.   HENT:      Head: Normocephalic and atraumatic.   Eyes:      Extraocular Movements: Extraocular movements intact.      Pupils: Pupils are equal, round, and reactive to light.   Cardiovascular:      Pulses: Normal pulses.   Pulmonary:      Effort: Pulmonary effort is normal.   Skin:     General: Skin is warm.   Neurological:      Mental Status: She is alert and oriented to person, place, and time.      Sensory: No sensory deficit.      Motor: Weakness present. No abnormal muscle tone.      Gait: Gait abnormal.      Deep Tendon Reflexes:      Reflex Scores:       Patellar reflexes are 2+ on the right side and 2+ on the left side.       Achilles reflexes are 1+ on the right side and 2+ on the left side.     Comments: 4/5 strength in right knee extension and right knee flexion   Psychiatric:         Mood and Affect: Mood normal.         Behavior: Behavior normal.         Musculoskeletal:        Lumbar " Exam  Incision: no  Pain with Flexion: yes  Pain with Extension: yes  ROM:  Decreased  Paraspinous TTP:  Positive bilaterally  Facet TTP:  L4-5  Facet Loading:  Positive bilaterally  SLR:  Positive on the right at 75°  SIJ TTP:  Negative bilaterally  GALLO:  Negative bilaterally      Assessment:    Modesta Camacho is a 58 y.o. year old female who is presenting with       ICD-10-CM ICD-9-CM    1. Lumbar radiculopathy  M54.16 724.4 gabapentin (NEURONTIN) 300 MG capsule      IR Epidural Transfor 1st Vert Lumbar Jack      Case Request-RAD/Other Procedure Area: Bilateral L4/5 TF CELE      2. Arthritis of right hip  M16.11 716.95       3. DDD (degenerative disc disease), lumbar  M51.36 722.52 gabapentin (NEURONTIN) 300 MG capsule          Lumbar radiculopathy  -     gabapentin (NEURONTIN) 300 MG capsule; Take 1 capsule (300 mg total) by mouth 3 (three) times daily.  Dispense: 90 capsule; Refill: 2  -     IR Epidural Transfor 1st Vert Lumbar Jack; Future; Expected date: 03/14/2024  -     Case Request-RAD/Other Procedure Area: Bilateral L4/5 TF CELE    Arthritis of right hip    DDD (degenerative disc disease), lumbar  -     gabapentin (NEURONTIN) 300 MG capsule; Take 1 capsule (300 mg total) by mouth 3 (three) times daily.  Dispense: 90 capsule; Refill: 2            Plan:  1. Interventional:    Schedule patient for bilateral L4-5 transforaminal epidural steroid injection for lumbar radiculopathy  -02/14/2024: Left genicular nerve block with phenol, 90% relief  -08/14/2023:  Left genicular nerve block, 80% relief x 4 months  - S/p ultrasound-guided right hip injection by Dr. Acevedo on 12/21/2021 with short-term pain relief.      2. Pharmacologic:   - D/c Topamax- dry mouth  -Refill Gabapentin 300 mg TID  - Continue Tramadol 50 mg Po BID PRN (60 tabs) - from Rheumatology.   - Continue Cymbalta 60 mg PO BID.   -continue given for tizanidine  We have discussed potential deleterious side effects associated with this medication  including  dizziness, drowsiness, dry mouth or tingling sensation in the upper or lower extremities.         3. Rehabilitative:   Encouraged ambulation. Continue physical therapy to help with strengthening, although patient reports not helping with pain.   Patient previously informed that we will fill out [facility] paperwork for physical therapy and procedures, but we will not fill out paperwork for disability.  Our goal is to improve pain to continue job responsibility.     4. Diagnostic:   -MRI lumbar spine reviewed and findings discussed with patient.  Significant for grade 1 anterolisthesis of L4 upon L5.  There is no diffuse facet arthropathy with posterior disc bulge at L4-5 resulting in mild canal stenosis in right-greater-than-left foraminal stenosis.  Additionally there was broad-based disc bulge at L3-L4    5. Follow up:  Return to clinic 4 weeks after procedure        - This condition does not require this patient to take time off of work, and the primary goal of our Pain Management services is to improve the patient's functional capacity.   - I discussed the risks, benefits, and alternatives to potential treatment options. All questions and concerns were fully addressed today in clinic.     Shaun Iyer MD  Interventional Pain Medicine  [facility]      Disclaimer:  This note was prepared using voice recognition system and is likely to have sound alike errors that may have been overlooked even after proof reading.  Please call me with any questions.

## 2024-04-18 ENCOUNTER — OFFICE VISIT (OUTPATIENT)
Dept: OBSTETRICS AND GYNECOLOGY | Facility: CLINIC | Age: 59
End: 2024-04-18
Payer: COMMERCIAL

## 2024-04-18 VITALS
WEIGHT: 166 LBS | SYSTOLIC BLOOD PRESSURE: 142 MMHG | DIASTOLIC BLOOD PRESSURE: 84 MMHG | BODY MASS INDEX: 30.55 KG/M2 | HEIGHT: 62 IN

## 2024-04-18 DIAGNOSIS — Z12.31 SCREENING MAMMOGRAM, ENCOUNTER FOR: ICD-10-CM

## 2024-04-18 DIAGNOSIS — Z01.419 ENCOUNTER FOR ANNUAL ROUTINE GYNECOLOGICAL EXAMINATION: Primary | ICD-10-CM

## 2024-04-18 DIAGNOSIS — Z12.4 SCREENING FOR CERVICAL CANCER: ICD-10-CM

## 2024-04-18 DIAGNOSIS — N95.0 PMB (POSTMENOPAUSAL BLEEDING): ICD-10-CM

## 2024-04-18 DIAGNOSIS — N83.202 CYST OF LEFT OVARY: ICD-10-CM

## 2024-04-18 PROCEDURE — 1159F MED LIST DOCD IN RCRD: CPT | Mod: CPTII,S$GLB,, | Performed by: OBSTETRICS & GYNECOLOGY

## 2024-04-18 PROCEDURE — 3008F BODY MASS INDEX DOCD: CPT | Mod: CPTII,S$GLB,, | Performed by: OBSTETRICS & GYNECOLOGY

## 2024-04-18 PROCEDURE — 4010F ACE/ARB THERAPY RXD/TAKEN: CPT | Mod: CPTII,S$GLB,, | Performed by: OBSTETRICS & GYNECOLOGY

## 2024-04-18 PROCEDURE — 99999 PR PBB SHADOW E&M-EST. PATIENT-LVL III: CPT | Mod: PBBFAC,,, | Performed by: OBSTETRICS & GYNECOLOGY

## 2024-04-18 PROCEDURE — 99396 PREV VISIT EST AGE 40-64: CPT | Mod: S$GLB,,, | Performed by: OBSTETRICS & GYNECOLOGY

## 2024-04-18 PROCEDURE — 3079F DIAST BP 80-89 MM HG: CPT | Mod: CPTII,S$GLB,, | Performed by: OBSTETRICS & GYNECOLOGY

## 2024-04-18 PROCEDURE — 87624 HPV HI-RISK TYP POOLED RSLT: CPT | Performed by: OBSTETRICS & GYNECOLOGY

## 2024-04-18 PROCEDURE — 88175 CYTOPATH C/V AUTO FLUID REDO: CPT | Performed by: OBSTETRICS & GYNECOLOGY

## 2024-04-18 PROCEDURE — 3077F SYST BP >= 140 MM HG: CPT | Mod: CPTII,S$GLB,, | Performed by: OBSTETRICS & GYNECOLOGY

## 2024-04-18 RX ORDER — LIRAGLUTIDE 6 MG/ML
INJECTION SUBCUTANEOUS
COMMUNITY
Start: 2024-04-01

## 2024-04-18 NOTE — PROGRESS NOTES
Subjective:       Patient ID: Modesta Camacho is a 58 y.o. female.    Chief Complaint:  No chief complaint on file.      History of Present Illness  HPI  Annual Exam-Postmenopausal  Patient presents for annual exam. The patient has no complaints today--but concerned about findings on mri--lt adnexal cyst--alos reports hx of fibroids. The patient is sexually active. -c/o vaginal dryness;lubricant  GYN screening history: last pap: approximate date  and was normal and last mammogram: approximate date 10/2023 and was normal. The patient has never been taking hormone replacement therapy. Patient denies post-menopausal vaginal bleeding. The patient wears seatbelts: yes. The patient participates in regular exercise: no.--limited by osteoarthritis   Has the patient ever been transfused or tattooed?: no. The patient reports that there is not domestic violence in her life.  Tolerable hot flushes  Pt feels she has not had a cycle since 2023--but vague memory    Problems sleeping--due to pain  Cologuard   GYN & OB History  No LMP recorded. Patient is postmenopausal. 2023  Date of Last Pap: 2024    OB History    Para Term  AB Living   3 3 3     3   SAB IAB Ectopic Multiple Live Births                  # Outcome Date GA Lbr Geoffrey/2nd Weight Sex Type Anes PTL Lv   3 Term            2 Term            1 Term                Review of Systems  Review of Systems   Musculoskeletal:  Positive for arthralgias, back pain and myalgias.   All other systems reviewed and are negative.          Objective:      Physical Exam:   Constitutional: She is oriented to person, place, and time. She appears well-developed and well-nourished.     Eyes: Pupils are equal, round, and reactive to light. Conjunctivae and EOM are normal.      Pulmonary/Chest: Effort normal. Right breast exhibits no mass, no nipple discharge, no skin change and no tenderness. Left breast exhibits no mass, no nipple discharge, no skin change and no  tenderness. Breasts are symmetrical.        Abdominal: Soft.     Genitourinary:    Inguinal canal, urethra, bladder, vagina, uterus, right adnexa, left adnexa and rectum normal.      Pelvic exam was performed with patient supine.   The external female genitalia was normal.     Labial bartholins normal.Cervix is normal. Right adnexum displays no mass and no tenderness. Left adnexum displays no mass and no tenderness. No erythema, vaginal discharge, bleeding, rectocele, cystocele or prolapse of vaginal walls in the vagina. Vaginal atrophy noted.    pap smear completedUerus contour normal  Normal urethral meatus.Urethra findings: no urethral massBladder findings: no bladder distention and no bladder tenderness          Musculoskeletal: Normal range of motion and moves all extremeties.       Neurological: She is alert and oriented to person, place, and time.    Skin: Skin is warm.    Psychiatric: She has a normal mood and affect. Her behavior is normal.           Assessment:        1. Encounter for annual routine gynecological examination    2. Screening mammogram, encounter for    3. Cyst of left ovary    4. PMB (postmenopausal bleeding)    5. Screening for cervical cancer               Plan:      Continue annual well woman exam.  Pap today; Reviewed updated recommendations for pap smears (every 3 years) in low risk patients.   Recommend annual pelvic exams.  Reviewed recommendations for annual CBE.  Pelvic sono to eval lt adnexa and endomerial lining  encuoraged diet, exercise, weight loss  Osteoporosis prevention; 1200mg calcium/d with source of vitamin d  Colonoscopy due 2026

## 2024-04-25 ENCOUNTER — TELEPHONE (OUTPATIENT)
Dept: PAIN MEDICINE | Facility: CLINIC | Age: 59
End: 2024-04-25
Payer: COMMERCIAL

## 2024-04-25 ENCOUNTER — PATIENT MESSAGE (OUTPATIENT)
Dept: PAIN MEDICINE | Facility: HOSPITAL | Age: 59
End: 2024-04-25
Payer: COMMERCIAL

## 2024-04-25 LAB
FINAL PATHOLOGIC DIAGNOSIS: NORMAL
Lab: NORMAL

## 2024-04-25 NOTE — TELEPHONE ENCOUNTER
----- Message from Tia Walker sent at 4/25/2024  3:58 PM CDT -----  Contact: Modesta Byers is needing call back needing to speak to management for pain management and about her procedure. Please call back at [phone].     Thank you   Tia

## 2024-04-25 NOTE — TELEPHONE ENCOUNTER
Spoke with patient she wanted to know if procedure was approved and arrival time.  All questions were answered for the patient.

## 2024-04-25 NOTE — PRE-PROCEDURE INSTRUCTIONS
Spoke with patient regarding procedure scheduled on 5.2     Arrival time 1115     Has patient been sick with fever or on antibiotics within the last 7 days? No     Does the patient have any open wounds, sores or rashes? No     Does the patient have any recent fractures? no     Has patient received a vaccination within the last 7 days? No     Received the COVID vaccination? yes     Has the patient stopped all medications as directed? na     Does patient have a pacemaker, defibrillator, or implantable stimulator? No     Does the patient have a ride to and from procedure and someone reliable to remain with patient?       Is the patient diabetic? no     Does the patient have sleep apnea? Or use O2 at home? no     Is the patient receiving sedation? yes     Is the patient instructed to remain NPO beginning at midnight the night before their procedure? yes     Procedure location confirmed with patient? Yes     Covid- Denies signs/symptoms. Instructed to notify PAT/MD if any changes.

## 2024-04-26 ENCOUNTER — HOSPITAL ENCOUNTER (OUTPATIENT)
Dept: CARDIOLOGY | Facility: HOSPITAL | Age: 59
Discharge: HOME | End: 2024-04-26
Attending: INTERNAL MEDICINE
Payer: COMMERCIAL

## 2024-04-26 ENCOUNTER — OFFICE VISIT (OUTPATIENT)
Dept: CARDIOLOGY | Facility: CLINIC | Age: 59
End: 2024-04-26
Payer: COMMERCIAL

## 2024-04-26 VITALS
DIASTOLIC BLOOD PRESSURE: 84 MMHG | HEART RATE: 72 BPM | WEIGHT: 168 LBS | BODY MASS INDEX: 30.91 KG/M2 | OXYGEN SATURATION: 100 % | HEIGHT: 62 IN | SYSTOLIC BLOOD PRESSURE: 141 MMHG

## 2024-04-26 DIAGNOSIS — R07.2 PRECORDIAL PAIN: ICD-10-CM

## 2024-04-26 DIAGNOSIS — E79.0 HYPERURICEMIA: ICD-10-CM

## 2024-04-26 DIAGNOSIS — R94.31 ABNORMAL EKG: ICD-10-CM

## 2024-04-26 DIAGNOSIS — R06.02 SHORTNESS OF BREATH: ICD-10-CM

## 2024-04-26 DIAGNOSIS — R07.1 CHEST PAIN ON BREATHING: Primary | ICD-10-CM

## 2024-04-26 DIAGNOSIS — N18.31 CHRONIC KIDNEY DISEASE, STAGE 3A: ICD-10-CM

## 2024-04-26 DIAGNOSIS — Z96.641 S/P TOTAL RIGHT HIP ARTHROPLASTY: ICD-10-CM

## 2024-04-26 DIAGNOSIS — I10 ESSENTIAL HYPERTENSION: Primary | ICD-10-CM

## 2024-04-26 DIAGNOSIS — I27.20 PULMONARY HTN: ICD-10-CM

## 2024-04-26 DIAGNOSIS — R07.1 CHEST PAIN ON BREATHING: ICD-10-CM

## 2024-04-26 DIAGNOSIS — Z98.84 HISTORY OF GASTRIC RESTRICTIVE SURGERY: ICD-10-CM

## 2024-04-26 LAB
HPV HR 12 DNA SPEC QL NAA+PROBE: NEGATIVE
HPV16 AG SPEC QL: NEGATIVE
HPV18 DNA SPEC QL NAA+PROBE: NEGATIVE
OHS QRS DURATION: 84 MS
OHS QTC CALCULATION: 424 MS

## 2024-04-26 PROCEDURE — 3079F DIAST BP 80-89 MM HG: CPT | Mod: CPTII,S$GLB,, | Performed by: INTERNAL MEDICINE

## 2024-04-26 PROCEDURE — 1160F RVW MEDS BY RX/DR IN RCRD: CPT | Mod: CPTII,S$GLB,, | Performed by: INTERNAL MEDICINE

## 2024-04-26 PROCEDURE — 99214 OFFICE O/P EST MOD 30 MIN: CPT | Mod: S$GLB,,, | Performed by: INTERNAL MEDICINE

## 2024-04-26 PROCEDURE — 99999 PR PBB SHADOW E&M-EST. PATIENT-LVL III: CPT | Mod: PBBFAC,,, | Performed by: INTERNAL MEDICINE

## 2024-04-26 PROCEDURE — 93010 ELECTROCARDIOGRAM REPORT: CPT | Mod: ,,, | Performed by: INTERNAL MEDICINE

## 2024-04-26 PROCEDURE — 93005 ELECTROCARDIOGRAM TRACING: CPT

## 2024-04-26 PROCEDURE — 1159F MED LIST DOCD IN RCRD: CPT | Mod: CPTII,S$GLB,, | Performed by: INTERNAL MEDICINE

## 2024-04-26 PROCEDURE — 3008F BODY MASS INDEX DOCD: CPT | Mod: CPTII,S$GLB,, | Performed by: INTERNAL MEDICINE

## 2024-04-26 PROCEDURE — 4010F ACE/ARB THERAPY RXD/TAKEN: CPT | Mod: CPTII,S$GLB,, | Performed by: INTERNAL MEDICINE

## 2024-04-26 PROCEDURE — 3077F SYST BP >= 140 MM HG: CPT | Mod: CPTII,S$GLB,, | Performed by: INTERNAL MEDICINE

## 2024-04-26 NOTE — PROGRESS NOTES
Subjective:   Patient ID:  Modesta Camacho is a 58 y.o. female who presents for follow up of No chief complaint on file.      HPI  2/5/2021  A 54 yo female with htn uncontrolled anxiety h/o ckd who is sedentary due to hip pain and knee pain. She has multiple issues with anxiety has h/o heart disease chf in her family she is very concerned she is very anxious she thinks she is dying she has had a cardiologist who was seeing her for tachycardia and used b blockers. She has been taken off lisinopril was placed on micardis . Her bp is not well controlled had an er visit yesterday had bp 203 systolic had chest pain her keg is abnormal t wave changes that has been threre since 200 at least her ekg was abnormal since 2015. She felt bad yesterday. She claims compliance with slat he r weight is increased despite having weight loss surgery in 2012 . She gets upset she gets blood pressure.   Stress echo in 2019 was normal. Done at [facility] by DR RUELAS.     2/24/2021  HERE FOR F/U BP IMPROVED  HAD ECHO THAT WAS  NORMAL LVF HAS TRICUSPID REGURGITATION WITH MILD PULMONARY HTN HER CARDIOLITE WAS NEGATIVE SHE SNORES LOUD AT NITE HER  WAKES HER UP SHE HAS DAYTIME FATIGUE AND SLEEPINESS. SHE HAS AN ABNORMAL EKG THAT HAS BEEN LIKE THAT SINCE 2015. I THINK ALTHOUGH IT IS ISCHEMIC IOT IS HTN RELATED .  SHE IS COMPLIANT WITH MEDS SHE NEEDS TO DO BETETR WITH SALT INTAKE . HAS TAKEN CLONIDINE ONMCE SINCE LAST VIST CHEST PAIBN REMARKABLY IMPROVED.      10/8/2021   Here for  f /u she is still in pain in her leg . Has sleep eval in 2012 has no sleep apnea still snores a lot . Her bp is not controlled . She has used clonidine she thinks pain has something to do with it . Ha sno leg swelling. She is trying to control her slat intake.      12/29/2021   Had steroid injection for hip pain her bp was elevated yesterday had clonidine. Has been taking meds regularily has muscle spasm . Has not been eating banana.no leg swelling goes to physical  therapy no orthopnea pnd chest pain had home sleep study has snoring some apnea episodes will get sleep team to see. She ahs referral.compliant with salt .     8/26/2022  Here fro f/u. She needs rt knee surgery her prosthesis is lose. Her bp meds adjusted her amlodipine dose up and her metoprolol 2 pills at nite. Her bp is elevated when she has pain. Has no new complaints she uses clonidine prn for elevated bp. She stays active physically has no chest pain or shortness of breath      3/1/2023  Had rt knee surgery in September no complication. Has rt hip pain needs hip replacement. Still active works has  a cold had steroid injection yesterday. Her chest feels tight. Has a cough has no sputum production.she is wheezing had fever yesterday.      9/1/2023  Rt hip replacement in June. Continues to have htn . Had nerve block in august. Her bp has been elevated. Has knee pain  not on any meds. She is not very complaint with diet . Her bp is elevated she took morning meds.     4/26/2024  Her hctz was adjusted 2 days ago she is having hctz 25 mg po daily . She ahs been feeling short of breath tired has chest pain. She feels tired. Has no orthopnea pnd. Has intermittent ankle swelling improving with fluid pill. Her ekg is abnormal.needs knee surgery   Past Medical History:   Diagnosis Date    Abnormal EKG 2/5/2021    Anxiety     Hypertension     Osteoarthritis     Stage 2 chronic kidney disease 2/5/2021       Past Surgical History:   Procedure Laterality Date    ARTHROPLASTY OF HIP BY ANTERIOR APPROACH Left 7/31/2020    Procedure: ARTHROPLASTY, HIP, ANTERIOR APPROACH;  Surgeon: Xavi Gandara MD;  Location: [facility] OR;  Service: Orthopedics;  Laterality: Left;    BLOCK, NERVE, GENICULAR Left 2/14/2024    Procedure: Left genicular nerve block with RN IV sedation;  Surgeon: Shaun Iyer MD;  Location: [facility];  Service: Pain Management;  Laterality: Left;    gastric sleeve      HIP ARTHROPLASTY Right 6/7/2023     Procedure: ARTHROPLASTY, HIP;  Surgeon: Trey Clifford MD;  Location: [facility] OR;  Service: Orthopedics;  Laterality: Right;    INJECTION OF ANESTHETIC AGENT AROUND NERVE Bilateral 8/14/2023    Procedure: left genicular nerve block RN IV Sedation;  Surgeon: Shaun Iyer MD;  Location: [facility] PAIN MGT;  Service: Pain Management;  Laterality: Bilateral;    JOINT REPLACEMENT Right 06/14/2016    knee    JOINT REPLACEMENT Left 05/20/2021    KNEE    JOINT REPLACEMENT Left 07/30/2020    HIP    KNEE ARTHROPLASTY Left 5/6/2021    Procedure: ARTHROPLASTY, KNEE;  Surgeon: Xavi Gandara MD;  Location: [facility];  Service: Orthopedics;  Laterality: Left;    RESURFACING OF PATELLA Right 9/27/2022    Procedure: RESURFACING, PATELLA;  Surgeon: Trey Clifford MD;  Location: [facility];  Service: General;  Laterality: Right;    REVISION OF KNEE ARTHROPLASTY Right 9/27/2022    Procedure: REVISION, ARTHROPLASTY, KNEE;  Surgeon: Trey Clifford MD;  Location: [facility];  Service: General;  Laterality: Right;    TUBAL LIGATION      UTERINE FIBROID EMBOLIZATION         Social History     Tobacco Use    Smoking status: Never     Passive exposure: Never    Smokeless tobacco: Never   Substance Use Topics    Alcohol use: Yes     Comment: occasional: hold 72hrs. prior to surgery    Drug use: No       Family History   Problem Relation Name Age of Onset    Hypertension Mother      Arthritis Mother      Diabetes Father      Heart disease Father      Depression Sister      Hypertension Sister      Arthritis Brother      Thyroid disease Son      Hypertension Son      Arthritis Maternal Grandmother      Heart disease Maternal Grandmother      Kidney disease Maternal Grandmother      Heart attack Maternal Grandfather      Stroke Paternal Grandmother      No Known Problems Paternal Grandfather      Eczema Son      Breast cancer Sister  47       Current Outpatient Medications   Medication Sig Dispense Refill    amLODIPine (NORVASC) 5 MG  tablet Take 1 tablet (5 mg total) by mouth 2 (two) times daily. 60 tablet 11    cloNIDine (CATAPRES) 0.1 MG tablet TAKE 1 TABLET BY MOUTH ONCE DAILY USE FOR SBP GREATER THAN 160 MMHG. 90 tablet 1    DULoxetine (CYMBALTA) 60 MG capsule Take 1 capsule (60 mg total) by mouth 2 (two) times daily. 60 capsule 5    gabapentin (NEURONTIN) 300 MG capsule Take 1 capsule (300 mg total) by mouth 3 (three) times daily. 90 capsule 2    metoprolol succinate (TOPROL-XL) 50 MG 24 hr tablet Take 2 tablets (100 mg total) by mouth nightly. 180 tablet 3    multivit-min/ferrous fumarate (MULTI VITAMIN ORAL) Take 2 tablets by mouth Daily.      ondansetron (ZOFRAN-ODT) 4 MG TbDL dissolve 2 tablets (8 mg total) by mouth every 8 (eight) hours as needed (nausea). 20 tablet 0    telmisartan (MICARDIS) 80 MG Tab Take 1 tablet (80 mg total) by mouth once daily. 90 tablet 3    tiZANidine (ZANAFLEX) 4 MG tablet Take 0.5-1 tablets (2-4 mg total) by mouth 2 (two) times daily as needed (pain). 90 tablet 2    VICTOZA 3-DANIEL 0.6 mg/0.1 mL (18 mg/3 mL) PnIj pen SMARTSI.6 Milligram(s) SUB-Q Once      vitamin D (VITAMIN D3) 1000 units Tab Take 1,000 Units by mouth once daily.       No current facility-administered medications for this visit.     Current Outpatient Medications   Medication Sig Dispense Refill    amLODIPine (NORVASC) 5 MG tablet Take 1 tablet (5 mg total) by mouth 2 (two) times daily. 60 tablet 11    cloNIDine (CATAPRES) 0.1 MG tablet TAKE 1 TABLET BY MOUTH ONCE DAILY USE FOR SBP GREATER THAN 160 MMHG. 90 tablet 1    DULoxetine (CYMBALTA) 60 MG capsule Take 1 capsule (60 mg total) by mouth 2 (two) times daily. 60 capsule 5    gabapentin (NEURONTIN) 300 MG capsule Take 1 capsule (300 mg total) by mouth 3 (three) times daily. 90 capsule 2    metoprolol succinate (TOPROL-XL) 50 MG 24 hr tablet Take 2 tablets (100 mg total) by mouth nightly. 180 tablet 3    multivit-min/ferrous fumarate (MULTI VITAMIN ORAL) Take 2 tablets by mouth Daily.       ondansetron (ZOFRAN-ODT) 4 MG TbDL dissolve 2 tablets (8 mg total) by mouth every 8 (eight) hours as needed (nausea). 20 tablet 0    telmisartan (MICARDIS) 80 MG Tab Take 1 tablet (80 mg total) by mouth once daily. 90 tablet 3    tiZANidine (ZANAFLEX) 4 MG tablet Take 0.5-1 tablets (2-4 mg total) by mouth 2 (two) times daily as needed (pain). 90 tablet 2    VICTOZA 3-DANIEL 0.6 mg/0.1 mL (18 mg/3 mL) PnIj pen SMARTSI.6 Milligram(s) SUB-Q Once      vitamin D (VITAMIN D3) 1000 units Tab Take 1,000 Units by mouth once daily.       No current facility-administered medications for this visit.     Review of patient's allergies indicates:   Allergen Reactions    Codeine Hives and Rash     Takes Tramadol and has never had an issue with SOB/swelling  Also tolerated hydromorphone 2020      Penicillin     Penicillins Hives     Pt tolerated cefazolin 2020      Review of Systems   Constitutional: Negative for diaphoresis, malaise/fatigue and weight gain.   HENT:  Negative for hoarse voice.    Eyes:  Negative for double vision and visual disturbance.   Cardiovascular:  Positive for chest pain and dyspnea on exertion. Negative for claudication, cyanosis, irregular heartbeat, leg swelling, near-syncope, orthopnea, palpitations, paroxysmal nocturnal dyspnea and syncope.   Respiratory:  Positive for shortness of breath. Negative for cough, hemoptysis and snoring.    Hematologic/Lymphatic: Negative for bleeding problem. Does not bruise/bleed easily.   Skin:  Negative for color change and poor wound healing.   Musculoskeletal:  Positive for arthritis and joint pain. Negative for muscle cramps, muscle weakness and myalgias.   Gastrointestinal:  Negative for bloating, abdominal pain, change in bowel habit, diarrhea, heartburn, hematemesis, hematochezia, melena and nausea.   Neurological:  Negative for excessive daytime sleepiness, dizziness, headaches, light-headedness, loss of balance, numbness and weakness.    Psychiatric/Behavioral:  Negative for memory loss. The patient does not have insomnia.    Allergic/Immunologic: Negative for hives.       Objective:   Physical Exam  Vitals and nursing note reviewed.   Constitutional:       General: She is not in acute distress.     Appearance: Normal appearance. She is well-developed. She is not ill-appearing.   HENT:      Head: Normocephalic and atraumatic.   Eyes:      General: No scleral icterus.     Pupils: Pupils are equal, round, and reactive to light.   Neck:      Thyroid: No thyromegaly.      Vascular: Normal carotid pulses. No carotid bruit, hepatojugular reflux or JVD.      Trachea: No tracheal deviation.   Cardiovascular:      Rate and Rhythm: Normal rate and regular rhythm.      Pulses: Normal pulses.      Heart sounds: Normal heart sounds. No murmur heard.     No friction rub. No gallop.   Pulmonary:      Effort: Pulmonary effort is normal. No respiratory distress.      Breath sounds: Normal breath sounds. No wheezing, rhonchi or rales.   Chest:      Chest wall: No tenderness.   Abdominal:      General: Bowel sounds are normal. There is no abdominal bruit.      Palpations: Abdomen is soft. There is no hepatomegaly or pulsatile mass.      Tenderness: There is no abdominal tenderness.   Musculoskeletal:      Right shoulder: No deformity.      Cervical back: Normal range of motion and neck supple.      Right lower leg: No edema.      Left lower leg: No edema.   Skin:     General: Skin is warm and dry.      Findings: No erythema or rash.      Nails: There is no clubbing.   Neurological:      General: No focal deficit present.      Mental Status: She is alert and oriented to person, place, and time.      Cranial Nerves: No cranial nerve deficit.      Coordination: Coordination normal.   Psychiatric:         Mood and Affect: Mood normal.         Speech: Speech normal.         Behavior: Behavior normal.       Vitals:    04/26/24 0937   BP: (!) 141/84   BP Location: Right  "arm   Patient Position: Sitting   BP Method: Small (Manual)   Pulse: 72   SpO2: 100%   Weight: 76.2 kg (167 lb 15.9 oz)   Height: 5' 2" (1.575 m)     Lab Results   Component Value Date    CHOL 176 09/22/2023      Body mass index is 30.73 kg/m².   Lab Results   Component Value Date    HGBA1C 5.5 09/22/2023      BMP  Lab Results   Component Value Date     01/25/2024    K 4.1 01/25/2024     01/25/2024    CO2 28 01/25/2024    BUN 16 01/25/2024    CREATININE 1.1 01/25/2024    CALCIUM 10.6 (H) 01/25/2024    ANIONGAP 7 (L) 01/25/2024    EGFRNORACEVR 58.2 (A) 01/25/2024      Lab Results   Component Value Date    HDL 55 09/22/2023    HDL 43 10/01/2019     Lab Results   Component Value Date    LDLCALC 102.4 09/22/2023    LDLCALC 89 10/01/2019     Lab Results   Component Value Date    TRIG 93 09/22/2023    TRIG 114 10/01/2019     Lab Results   Component Value Date    CHOLHDL 31.3 09/22/2023       Chemistry        Component Value Date/Time     01/25/2024 1013    K 4.1 01/25/2024 1013     01/25/2024 1013    CO2 28 01/25/2024 1013    BUN 16 01/25/2024 1013    CREATININE 1.1 01/25/2024 1013    GLU 89 01/25/2024 1013        Component Value Date/Time    CALCIUM 10.6 (H) 01/25/2024 1013    ALKPHOS 75 05/03/2023 0747    AST 25 05/03/2023 0747    ALT 24 05/03/2023 0747    BILITOT 1.0 05/03/2023 0747    ESTGFRAFRICA 41 (A) 01/14/2022 1429    EGFRNONAA 36 (A) 01/14/2022 1429          Lab Results   Component Value Date    TSH 1.545 09/22/2023     Lab Results   Component Value Date    INR 1.0 04/27/2021    INR 1.0 07/08/2020    INR 1.0 03/13/2020     Lab Results   Component Value Date    WBC 5.32 01/25/2024    HGB 14.3 01/25/2024    HCT 44.1 01/25/2024    MCV 93 01/25/2024     01/25/2024     BMP  Sodium   Date Value Ref Range Status   01/25/2024 139 136 - 145 mmol/L Final     Potassium   Date Value Ref Range Status   01/25/2024 4.1 3.5 - 5.1 mmol/L Final     Chloride   Date Value Ref Range Status   01/25/2024 " 104 95 - 110 mmol/L Final     CO2   Date Value Ref Range Status   01/25/2024 28 23 - 29 mmol/L Final     BUN   Date Value Ref Range Status   01/25/2024 16 6 - 20 mg/dL Final     Creatinine   Date Value Ref Range Status   01/25/2024 1.1 0.5 - 1.4 mg/dL Final     Calcium   Date Value Ref Range Status   01/25/2024 10.6 (H) 8.7 - 10.5 mg/dL Final     Anion Gap   Date Value Ref Range Status   01/25/2024 7 (L) 8 - 16 mmol/L Final     eGFR if    Date Value Ref Range Status   01/14/2022 41 (A) >60 mL/min/1.73 m^2 Final     eGFR if non    Date Value Ref Range Status   01/14/2022 36 (A) >60 mL/min/1.73 m^2 Final     Comment:     Calculation used to obtain the estimated glomerular filtration  rate (eGFR) is the CKD-EPI equation.        CrCl cannot be calculated (Patient's most recent lab result is older than the maximum 7 days allowed.).    Assessment:     1. Essential hypertension    2. Pulmonary HTN    3. Abnormal EKG    4. History of gastric restrictive surgery    5. Hyperuricemia    6. S/P total right hip arthroplasty    7. Chronic kidney disease, stage 3a      Htn uncontrolled hctz dose adjusted will observe low salt diet emphasized.  Chest pain abnormal EKG needing surgery will get echo cardiolite   Ckd will monitor creatinine.   Plan:   Echo   Cardiolite   Phone review  Continue current therapy  Cardiac low salt diet.  Risk factor modification and excercise program.  F/u in 6 months with lipid cmp

## 2024-04-29 NOTE — PROGRESS NOTES
Good News! Your pap smear came back and it was normal.  The HPV is also negative.   I still recommend doing a pelvic exam and annual visit every year, but you only need the pap test every 3 years. Please call me if you have any further questions.   Sincerely,   Dr. Carrillo    34 yo female in room 2 presents to the ER for evaluation of bilateral knee pain and left 2nd toe injury. Pt states "I I went roller skating on Friday night and I fell three times. The last time I fell I landed directly on my knees and since then I have had trouble walking and it hurts more when I change positions. I also hurt my toe on my left foot.  Pt reports 5/10  pain.  No obvious deformity noted with skin intact and no effusions to bilateral knees.  Pt's left 2nd toe tender to touch with dec ROM. Denies hitting head. 36 yo female in room 2 presents to the ER for evaluation of bilateral knee pain and left 2nd toe injury. Pt states "I I went roller skating on Friday night and I fell three times. The last time I fell I landed directly on my knees and since then I have had trouble walking and it hurts more when I change positions. I also hurt my toe on my left foot.  Pt reports 5/10  pain.  No obvious deformity noted with skin intact and no effusions to bilateral knees.  Pt's left 2nd toe tender to touch with dec ROM. Denies hitting head.        Attending note. Patient was seen in the fast track room #2. Agree with the above. Patient was rollerskating this past Friday and fell on her knees. Patient continues to complain of bilateral medial knee pain. Pain is exacerbated by movement. Patient denies any swelling or effusions of the knee. Patient has no prior knee injury. Patient also complaining of painful left second toe which he injured in early April he continues to have pain. Denies any numbness or paresthesia. Patient has no hip or ankle pain.

## 2024-04-30 ENCOUNTER — TELEPHONE (OUTPATIENT)
Dept: INTERNAL MEDICINE | Facility: CLINIC | Age: 59
End: 2024-04-30
Payer: COMMERCIAL

## 2024-05-02 ENCOUNTER — HOSPITAL ENCOUNTER (OUTPATIENT)
Facility: HOSPITAL | Age: 59
Discharge: HOME | End: 2024-05-02
Attending: ANESTHESIOLOGY | Admitting: ANESTHESIOLOGY
Payer: COMMERCIAL

## 2024-05-02 VITALS
HEIGHT: 62 IN | BODY MASS INDEX: 30.67 KG/M2 | OXYGEN SATURATION: 100 % | WEIGHT: 166.69 LBS | TEMPERATURE: 97 F | HEART RATE: 79 BPM | RESPIRATION RATE: 18 BRPM | DIASTOLIC BLOOD PRESSURE: 62 MMHG | SYSTOLIC BLOOD PRESSURE: 110 MMHG

## 2024-05-02 DIAGNOSIS — M54.16 LUMBAR RADICULOPATHY: Primary | ICD-10-CM

## 2024-05-02 PROCEDURE — 64483 NJX AA&/STRD TFRM EPI L/S 1: CPT | Mod: 50 | Performed by: ANESTHESIOLOGY

## 2024-05-02 PROCEDURE — 25000003 PHARM REV CODE 250: Performed by: ANESTHESIOLOGY

## 2024-05-02 PROCEDURE — 64483 NJX AA&/STRD TFRM EPI L/S 1: CPT | Mod: 50,,, | Performed by: ANESTHESIOLOGY

## 2024-05-02 PROCEDURE — 63600175 PHARM REV CODE 636 W HCPCS: Performed by: ANESTHESIOLOGY

## 2024-05-02 PROCEDURE — 25500020 PHARM REV CODE 255: Performed by: ANESTHESIOLOGY

## 2024-05-02 RX ORDER — LIDOCAINE HYDROCHLORIDE 10 MG/ML
INJECTION, SOLUTION EPIDURAL; INFILTRATION; INTRACAUDAL; PERINEURAL
Status: DISCONTINUED | OUTPATIENT
Start: 2024-05-02 | End: 2024-05-02 | Stop reason: HOSPADM

## 2024-05-02 RX ORDER — MIDAZOLAM HYDROCHLORIDE 1 MG/ML
INJECTION, SOLUTION INTRAMUSCULAR; INTRAVENOUS
Status: DISCONTINUED | OUTPATIENT
Start: 2024-05-02 | End: 2024-05-02 | Stop reason: HOSPADM

## 2024-05-02 RX ORDER — FENTANYL CITRATE 50 UG/ML
INJECTION, SOLUTION INTRAMUSCULAR; INTRAVENOUS
Status: DISCONTINUED | OUTPATIENT
Start: 2024-05-02 | End: 2024-05-02 | Stop reason: HOSPADM

## 2024-05-02 RX ORDER — BETAMETHASONE SODIUM PHOSPHATE AND BETAMETHASONE ACETATE 3; 3 MG/ML; MG/ML
INJECTION, SUSPENSION INTRA-ARTICULAR; INTRALESIONAL; INTRAMUSCULAR; SOFT TISSUE
Status: DISCONTINUED | OUTPATIENT
Start: 2024-05-02 | End: 2024-05-02 | Stop reason: HOSPADM

## 2024-05-02 RX ORDER — ONDANSETRON HYDROCHLORIDE 2 MG/ML
4 INJECTION, SOLUTION INTRAVENOUS ONCE AS NEEDED
Status: ACTIVE | OUTPATIENT
Start: 2024-05-02 | End: 2035-09-29

## 2024-05-02 NOTE — H&P
HPI  Patient presenting for Procedure(s) (LRB):  Bilateral L4/5 TF CELE (Bilateral)     Patient on Anti-coagulation No    No health changes since previous encounter    Past Medical History:   Diagnosis Date    Abnormal EKG 2/5/2021    Anxiety     Hypertension     Osteoarthritis     Stage 2 chronic kidney disease 2/5/2021     Past Surgical History:   Procedure Laterality Date    ARTHROPLASTY OF HIP BY ANTERIOR APPROACH Left 7/31/2020    Procedure: ARTHROPLASTY, HIP, ANTERIOR APPROACH;  Surgeon: Xavi Gandara MD;  Location: [facility] OR;  Service: Orthopedics;  Laterality: Left;    BLOCK, NERVE, GENICULAR Left 2/14/2024    Procedure: Left genicular nerve block with RN IV sedation;  Surgeon: Shaun yIer MD;  Location: [facility]IN [facility]GT;  Service: Pain Management;  Laterality: Left;    gastric sleeve      HIP ARTHROPLASTY Right 6/7/2023    Procedure: ARTHROPLASTY, HIP;  Surgeon: Trey Clifford MD;  Location: [facility] OR;  Service: Orthopedics;  Laterality: Right;    INJECTION OF ANESTHETIC AGENT AROUND NERVE Bilateral 8/14/2023    Procedure: left genicular nerve block RN IV Sedation;  Surgeon: Shaun Iyer MD;  Location: [facility] PAIN [facility]GT;  Service: Pain Management;  Laterality: Bilateral;    JOINT REPLACEMENT Right 06/14/2016    knee    JOINT REPLACEMENT Left 05/20/2021    KNEE    JOINT REPLACEMENT Left 07/30/2020    HIP    KNEE ARTHROPLASTY Left 5/6/2021    Procedure: ARTHROPLASTY, KNEE;  Surgeon: Xavi Gandara MD;  Location: [facility] OR;  Service: Orthopedics;  Laterality: Left;    RESURFACING OF PATELLA Right 9/27/2022    Procedure: RESURFACING, PATELLA;  Surgeon: Trey Clifford MD;  Location: [facility] OR;  Service: General;  Laterality: Right;    REVISION OF KNEE ARTHROPLASTY Right 9/27/2022    Procedure: REVISION, ARTHROPLASTY, KNEE;  Surgeon: Trey Clifford MD;  Location: [facility] OR;  Service: General;  Laterality: Right;    TUBAL LIGATION      UTERINE FIBROID EMBOLIZATION       Review of patient's  "allergies indicates:   Allergen Reactions    Codeine Hives and Rash     Takes Tramadol and has never had an issue with SOB/swelling  Also tolerated hydromorphone 7/2020      Penicillin     Penicillins Hives     Pt tolerated cefazolin 7/2020        No current facility-administered medications on file prior to encounter.     Current Outpatient Medications on File Prior to Encounter   Medication Sig Dispense Refill    cloNIDine (CATAPRES) 0.1 MG tablet TAKE 1 TABLET BY MOUTH ONCE DAILY USE FOR SBP GREATER THAN 160 MMHG. 90 tablet 1    metoprolol succinate (TOPROL-XL) 50 MG 24 hr tablet Take 2 tablets (100 mg total) by mouth nightly. 180 tablet 3    amLODIPine (NORVASC) 5 MG tablet Take 1 tablet (5 mg total) by mouth 2 (two) times daily. 60 tablet 11    DULoxetine (CYMBALTA) 60 MG capsule Take 1 capsule (60 mg total) by mouth 2 (two) times daily. 60 capsule 5    gabapentin (NEURONTIN) 300 MG capsule Take 1 capsule (300 mg total) by mouth 3 (three) times daily. 90 capsule 2    multivit-min/ferrous fumarate (MULTI VITAMIN ORAL) Take 2 tablets by mouth Daily.      ondansetron (ZOFRAN-ODT) 4 MG TbDL dissolve 2 tablets (8 mg total) by mouth every 8 (eight) hours as needed (nausea). 20 tablet 0    telmisartan (MICARDIS) 80 MG Tab Take 1 tablet (80 mg total) by mouth once daily. 90 tablet 3    tiZANidine (ZANAFLEX) 4 MG tablet Take 0.5-1 tablets (2-4 mg total) by mouth 2 (two) times daily as needed (pain). 90 tablet 2    vitamin D (VITAMIN D3) 1000 units Tab Take 1,000 Units by mouth once daily.          PMHx, PSHx, Allergies, Medications reviewed in epic    ROS negative except pain complaints in HPI    OBJECTIVE:    /75 (BP Location: Right arm, Patient Position: Sitting)   Pulse 75   Temp 97.3 °F (36.3 °C) (Temporal)   Resp 16   Ht 5' 2" (1.575 m)   Wt 75.6 kg (166 lb 10.7 oz)   SpO2 97%   Breastfeeding No   BMI 30.48 kg/m²     PHYSICAL EXAMINATION:    GENERAL: Well appearing, in no acute distress, alert and " oriented x3.  PSYCH:  Mood and affect appropriate.  SKIN: Skin color, texture, turgor normal, no rashes or lesions which will impact the procedure.  CV: RRR with palpation of the radial artery.  PULM: No evidence of respiratory difficulty, symmetric chest rise. Clear to auscultation.  NEURO: Cranial nerves grossly intact.    Plan:    Proceed with procedure as planned Procedure(s) (LRB):  Bilateral L4/5 TF CELE (Bilateral)    Shaun Iyer MD  05/02/2024

## 2024-05-02 NOTE — DISCHARGE INSTRUCTIONS

## 2024-05-02 NOTE — OP NOTE
Transforaminal Lumbar Epidural Steroid Injection    INFORMED CONSENT: The procedure, risks, benefits and options were discussed with patient. There are no contraindications to the procedure. The patient expressed understanding and agreed to proceed. The personnel performing the procedure was discussed.    Date of procedure 05/02/2024    Time-out taken to identify patient and procedure side prior to starting the procedure.                     PROCEDURE:    1)  Bilateral  L4/L5 TRANSFORAMINAL EPIDURAL STEROID INJECTION      Pre Procedure diagnosis:    Lumbar radiculopathy [M54.16]  1. Lumbar radiculopathy        Post-Procedure diagnosis:   same    Surgeon: Shaun Iyer MD    Assistants: None    Medication: 2ml Betamethasone PF 6mg/ml and 2ml Lidocaine PF 1%    Local: 3ml Lidocaine PF 1%    Sedation: Conscious sedation provided by M.D    SEDATION MEDICATIONS: local/IV sedation: Versed 2 mg and fentanyl 75 mcg IV.  Conscious sedation ordered by MD.  Patient reevaluated and sedation administered by MD and monitored by RN.  Total sedation time was less than 10 min.    Total sedation time was <10 min    Complications: None    Specimens: None        TECHNIQUE: The patient was brought to the procedure room. IV access was obtained prior to the procedure. The patient was positioned prone on the fluoroscopy table. Continuous hemodynamic monitoring was initiated including blood pressure, EKG, and pulse oximetry. . The skin was prepped with chlorhexidine and draped in a sterile fashion.   Local Xylocaine was injected by raising a wheel and going down to the periosteum using a 27-gauge hypodermic needle.      The bilateral L4/L5 transforaminal spaces were identified with fluoroscopy in the  AP, oblique, and lateral views.  A 22 gauge spinal quinke needle was then advanced into the area of the trans foraminal spaces at the above levels with confirmation of proper needle position using AP, oblique, and lateral fluoroscopic  views. Once the needle tip was in the area of the transforaminal space, and there was no blood, CSF or paraesthesias,  2 mL of Omnipaque 300mg/ml was injected at each level for a total of 4 mL.  Fluoroscopic imaging in the AP and lateral views revealed a clear outline of the spinal nerve with proximal spread of agent through the neural foramen into the epidural space. A total combination of 1 mL of Lidocaine PF 1% and 1ml of Betamethasone PF 6mg/ml was injected into each level for a total of 4mL of injected medications with displacement of the contrast dye confirming that the medication went into the area of the transforaminal spaces at each level. A sterile dressing was applied. Patient tolerated procedure well.        The patient was monitored for approximately 30 minutes after the procedure.  Patient was given post procedure and discharge instructions to follow at home.  The patient was discharged in a stable condition

## 2024-05-02 NOTE — LETTER
May 2, 2024         [address] [facility]  [place] [address]  Phone: [phone]  Fax: [phone]       Patient: Modesta Camacho   YOB: 1965  Date of Visit: 05/02/2024    To Whom It May Concern:    Lillian Camacho  was at [facility] on 05/02/2024. The patient may return to work/school on Monday 6, 2024 with no restrictions. If you have any questions or concerns, or if I can be of further assistance, please do not hesitate to contact me.    Sincerely,    Tasia Ponce RN

## 2024-05-02 NOTE — DISCHARGE SUMMARY
Discharge Note  Short Stay      SUMMARY     Admit Date: 2024    Attending Physician: Shaun Iyer MD        Discharge Physician: Shaun Iyer MD        Discharge Date: 2024 12:40 PM    Procedure(s) (LRB):  Bilateral L4/5 TF CELE (Bilateral)    Final Diagnosis: Lumbar radiculopathy [M54.16]    Disposition: Home or self care    Patient Instructions:   Current Discharge Medication List        CONTINUE these medications which have NOT CHANGED    Details   cloNIDine (CATAPRES) 0.1 MG tablet TAKE 1 TABLET BY MOUTH ONCE DAILY USE FOR SBP GREATER THAN 160 MMHG.  Qty: 90 tablet, Refills: 1    Comments: .  Associated Diagnoses: Essential hypertension      metoprolol succinate (TOPROL-XL) 50 MG 24 hr tablet Take 2 tablets (100 mg total) by mouth nightly.  Qty: 180 tablet, Refills: 3    Comments: .  Associated Diagnoses: Essential hypertension      VICTOZA 3-DANIEL 0.6 mg/0.1 mL (18 mg/3 mL) PnIj pen SMARTSI.6 Milligram(s) SUB-Q Once      amLODIPine (NORVASC) 5 MG tablet Take 1 tablet (5 mg total) by mouth 2 (two) times daily.  Qty: 60 tablet, Refills: 11    Comments: .  Associated Diagnoses: Abnormal EKG; Essential hypertension; Pulmonary HTN; DDD (degenerative disc disease), cervical; Hyperuricemia; Anxiety      DULoxetine (CYMBALTA) 60 MG capsule Take 1 capsule (60 mg total) by mouth 2 (two) times daily.  Qty: 60 capsule, Refills: 5    Associated Diagnoses: LANE (generalized anxiety disorder)      gabapentin (NEURONTIN) 300 MG capsule Take 1 capsule (300 mg total) by mouth 3 (three) times daily.  Qty: 90 capsule, Refills: 2    Associated Diagnoses: Lumbar radiculopathy; DDD (degenerative disc disease), lumbar      multivit-min/ferrous fumarate (MULTI VITAMIN ORAL) Take 2 tablets by mouth Daily.      ondansetron (ZOFRAN-ODT) 4 MG TbDL dissolve 2 tablets (8 mg total) by mouth every 8 (eight) hours as needed (nausea).  Qty: 20 tablet, Refills: 0      telmisartan (MICARDIS) 80 MG Tab Take 1 tablet (80 mg total)  by mouth once daily.  Qty: 90 tablet, Refills: 3    Associated Diagnoses: Essential hypertension      tiZANidine (ZANAFLEX) 4 MG tablet Take 0.5-1 tablets (2-4 mg total) by mouth 2 (two) times daily as needed (pain).  Qty: 90 tablet, Refills: 2    Associated Diagnoses: Knee pain, unspecified chronicity, unspecified laterality; DDD (degenerative disc disease), lumbar; Primary osteoarthritis of right hip      vitamin D (VITAMIN D3) 1000 units Tab Take 1,000 Units by mouth once daily.                 Discharge Diagnosis: Lumbar radiculopathy [M54.16]  Condition on Discharge: Stable with no complications to procedure   Diet on Discharge: Same as before.  Activity: as per instruction sheet.  Discharge to: Home with a responsible adult.  Follow up: 2-4 weeks       Please call the office at [phone] if you experience any weakness or loss of sensation, fever > 101.5, pain uncontrolled with oral medications, persistent nausea/vomiting/or diarrhea, redness or drainage from the incisions, or any other worrisome concerns. If physician on call was not reached or could not communicate with our office for any reason please go to the nearest emergency department

## 2024-05-15 ENCOUNTER — HOSPITAL ENCOUNTER (OUTPATIENT)
Dept: CARDIOLOGY | Facility: HOSPITAL | Age: 59
Discharge: HOME | End: 2024-05-15
Attending: INTERNAL MEDICINE
Payer: COMMERCIAL

## 2024-05-15 ENCOUNTER — HOSPITAL ENCOUNTER (OUTPATIENT)
Dept: RADIOLOGY | Facility: HOSPITAL | Age: 59
Discharge: HOME | End: 2024-05-15
Attending: INTERNAL MEDICINE
Payer: COMMERCIAL

## 2024-05-15 VITALS
DIASTOLIC BLOOD PRESSURE: 62 MMHG | WEIGHT: 166 LBS | BODY MASS INDEX: 30.55 KG/M2 | HEIGHT: 62 IN | SYSTOLIC BLOOD PRESSURE: 110 MMHG

## 2024-05-15 DIAGNOSIS — R06.02 SHORTNESS OF BREATH: ICD-10-CM

## 2024-05-15 DIAGNOSIS — R07.2 PRECORDIAL PAIN: ICD-10-CM

## 2024-05-15 DIAGNOSIS — R94.31 ABNORMAL EKG: ICD-10-CM

## 2024-05-15 LAB
AORTIC ROOT ANNULUS: 2.61 CM
ASCENDING AORTA: 2.91 CM
AV INDEX (PROSTH): 0.66
AV MEAN GRADIENT: 3 MMHG
AV PEAK GRADIENT: 6 MMHG
AV VALVE AREA BY VELOCITY RATIO: 2.2 CM²
AV VALVE AREA: 2.02 CM²
AV VELOCITY RATIO: 0.72
BSA FOR ECHO PROCEDURE: 1.81 M2
CV ECHO LV RWT: 0.44 CM
CV STRESS BASE HR: 82 BPM
DIASTOLIC BLOOD PRESSURE: 96 MMHG
DOP CALC AO PEAK VEL: 1.26 M/S
DOP CALC AO VTI: 29.7 CM
DOP CALC LVOT AREA: 3 CM2
DOP CALC LVOT DIAMETER: 1.97 CM
DOP CALC LVOT PEAK VEL: 0.91 M/S
DOP CALC LVOT STROKE VOLUME: 60.02 CM3
DOP CALC RVOT PEAK VEL: 0.64 M/S
DOP CALC RVOT VTI: 15.1 CM
DOP CALCLVOT PEAK VEL VTI: 19.7 CM
E WAVE DECELERATION TIME: 270.51 MSEC
E/A RATIO: 0.85
E/E' RATIO: 8.6 M/S
ECHO LV POSTERIOR WALL: 0.81 CM (ref 0.6–1.1)
EJECTION FRACTION: 60 %
FRACTIONAL SHORTENING: 36 % (ref 28–44)
INTERVENTRICULAR SEPTUM: 1.02 CM (ref 0.6–1.1)
IVC DIAMETER: 1.51 CM
IVRT: 83.73 MSEC
LA MAJOR: 3.94 CM
LA MINOR: 4.25 CM
LA WIDTH: 3.2 CM
LEFT ATRIUM SIZE: 3.1 CM
LEFT ATRIUM VOLUME INDEX MOD: 20.5 ML/M2
LEFT ATRIUM VOLUME INDEX: 19.5 ML/M2
LEFT ATRIUM VOLUME MOD: 36.31 CM3
LEFT ATRIUM VOLUME: 34.48 CM3
LEFT INTERNAL DIMENSION IN SYSTOLE: 2.37 CM (ref 2.1–4)
LEFT VENTRICLE DIASTOLIC VOLUME INDEX: 32.98 ML/M2
LEFT VENTRICLE DIASTOLIC VOLUME: 58.38 ML
LEFT VENTRICLE MASS INDEX: 56 G/M2
LEFT VENTRICLE SYSTOLIC VOLUME INDEX: 11.1 ML/M2
LEFT VENTRICLE SYSTOLIC VOLUME: 19.58 ML
LEFT VENTRICULAR INTERNAL DIMENSION IN DIASTOLE: 3.71 CM (ref 3.5–6)
LEFT VENTRICULAR MASS: 99.58 G
LV LATERAL E/E' RATIO: 7.82 M/S
LV SEPTAL E/E' RATIO: 9.56 M/S
LVOT MG: 1.75 MMHG
LVOT MV: 0.62 CM/S
MV PEAK A VEL: 1.01 M/S
MV PEAK E VEL: 0.86 M/S
NUC REST EJECTION FRACTION: 72
NUC STRESS EJECTION FRACTION: 82 %
OHS CV CPX 85 PERCENT MAX PREDICTED HEART RATE MALE: 138
OHS CV CPX MAX PREDICTED HEART RATE: 162
OHS CV CPX PATIENT IS FEMALE: 1
OHS CV CPX PATIENT IS MALE: 0
OHS CV CPX PEAK DIASTOLIC BLOOD PRESSURE: 89 MMHG
OHS CV CPX PEAK HEAR RATE: 148 BPM
OHS CV CPX PEAK RATE PRESSURE PRODUCT: NORMAL
OHS CV CPX PEAK SYSTOLIC BLOOD PRESSURE: 193 MMHG
OHS CV CPX PERCENT MAX PREDICTED HEART RATE ACHIEVED: 96
OHS CV CPX RATE PRESSURE PRODUCT PRESENTING: NORMAL
OHS CV RV/LV RATIO: 0.74 CM
PISA TR MAX VEL: 2.37 M/S
PV MEAN GRADIENT: 1 MMHG
PV PEAK GRADIENT: 2 MMHG
PV PEAK VELOCITY: 0.93 M/S
RA MAJOR: 3.71 CM
RA PRESSURE ESTIMATED: 3 MMHG
RA WIDTH: 3.01 CM
RIGHT VENTRICULAR END-DIASTOLIC DIMENSION: 2.74 CM
RV TB RVSP: 5 MMHG
SINUS: 2.43 CM
STJ: 2.03 CM
SYSTOLIC BLOOD PRESSURE: 157 MMHG
TDI LATERAL: 0.11 M/S
TDI SEPTAL: 0.09 M/S
TDI: 0.1 M/S
TR MAX PG: 22 MMHG
TRICUSPID ANNULAR PLANE SYSTOLIC EXCURSION: 1.81 CM
TV REST PULMONARY ARTERY PRESSURE: 25 MMHG
Z-SCORE OF LEFT VENTRICULAR DIMENSION IN END DIASTOLE: -2.77
Z-SCORE OF LEFT VENTRICULAR DIMENSION IN END SYSTOLE: -1.93

## 2024-05-15 PROCEDURE — A9502 TC99M TETROFOSMIN: HCPCS | Performed by: INTERNAL MEDICINE

## 2024-05-15 PROCEDURE — 78452 HT MUSCLE IMAGE SPECT MULT: CPT | Mod: 26,,, | Performed by: INTERNAL MEDICINE

## 2024-05-15 PROCEDURE — 93018 CV STRESS TEST I&R ONLY: CPT | Mod: ,,, | Performed by: INTERNAL MEDICINE

## 2024-05-15 PROCEDURE — 63600175 PHARM REV CODE 636 W HCPCS: Performed by: INTERNAL MEDICINE

## 2024-05-15 PROCEDURE — 93306 TTE W/DOPPLER COMPLETE: CPT

## 2024-05-15 PROCEDURE — 93306 TTE W/DOPPLER COMPLETE: CPT | Mod: 26,,, | Performed by: INTERNAL MEDICINE

## 2024-05-15 PROCEDURE — 93017 CV STRESS TEST TRACING ONLY: CPT

## 2024-05-15 PROCEDURE — 78452 HT MUSCLE IMAGE SPECT MULT: CPT

## 2024-05-15 PROCEDURE — 93016 CV STRESS TEST SUPVJ ONLY: CPT | Mod: ,,, | Performed by: INTERNAL MEDICINE

## 2024-05-15 RX ORDER — REGADENOSON 0.08 MG/ML
0.4 INJECTION, SOLUTION INTRAVENOUS ONCE
Status: COMPLETED | OUTPATIENT
Start: 2024-05-15 | End: 2024-05-15

## 2024-05-15 RX ADMIN — TETROFOSMIN 30 MILLICURIE: 1.38 INJECTION, POWDER, LYOPHILIZED, FOR SOLUTION INTRAVENOUS at 10:05

## 2024-05-15 RX ADMIN — REGADENOSON 0.4 MG: 0.08 INJECTION, SOLUTION INTRAVENOUS at 09:05

## 2024-05-15 RX ADMIN — TETROFOSMIN 9 MILLICURIE: 1.38 INJECTION, POWDER, LYOPHILIZED, FOR SOLUTION INTRAVENOUS at 08:05

## 2024-05-16 ENCOUNTER — PATIENT MESSAGE (OUTPATIENT)
Dept: CARDIOLOGY | Facility: CLINIC | Age: 59
End: 2024-05-16
Payer: COMMERCIAL

## 2024-05-20 ENCOUNTER — TELEPHONE (OUTPATIENT)
Dept: ORTHOPEDICS | Facility: CLINIC | Age: 59
End: 2024-05-20
Payer: COMMERCIAL

## 2024-05-20 ENCOUNTER — PATIENT MESSAGE (OUTPATIENT)
Dept: ORTHOPEDICS | Facility: CLINIC | Age: 59
End: 2024-05-20
Payer: COMMERCIAL

## 2024-05-20 NOTE — TELEPHONE ENCOUNTER
----- Message from Ebony Carpio LPN sent at 5/20/2024  8:46 AM CDT -----  Contact: Modesta    ----- Message -----  From: Kat Moore  Sent: 5/20/2024   7:39 AM CDT  To: Venessa Yang Staff    Pt is calling in regards getting a call back from Ebony to discuss rescheduling the procedure on 05/27 due to she has not gotten cardiac clearance. Please call back at  [phone] or [phone] ext [phone]         Thanks

## 2024-05-21 ENCOUNTER — PATIENT MESSAGE (OUTPATIENT)
Dept: CARDIOLOGY | Facility: CLINIC | Age: 59
End: 2024-05-21
Payer: COMMERCIAL

## 2024-05-23 ENCOUNTER — TELEPHONE (OUTPATIENT)
Dept: ORTHOPEDICS | Facility: CLINIC | Age: 59
End: 2024-05-23
Payer: COMMERCIAL

## 2024-05-23 NOTE — TELEPHONE ENCOUNTER
----- Message from Ebony Carpio LPN sent at 5/23/2024  9:26 AM CDT -----  Regarding: FW: Nurse  Contact: Pt    ----- Message -----  From: Flroy Rosales  Sent: 5/23/2024   9:03 AM CDT  To: Venessa Yang Staff  Subject: Nurse                                            Type: Requesting to speak with nurse    Who Called: PT  Regarding: Letter for upcoming Surgery  Date, Time , and How Long  Recovery, fax Number to your office for insurance  Would the patient rather a call back or a response via [facility]? Call back  Best Call Back Number:  [phone] ext [phone]  Additional Information: Pt needs Letter For Work Purposes, Pt  would like to Pick Letter up around 10am Please call , Thank You

## 2024-05-23 NOTE — TELEPHONE ENCOUNTER
Returned the patient's phone call in regards to their message. Informed the patient that their letter is ready for . Patient verbalized understanding.

## 2024-05-27 NOTE — PRE ADMISSION SCREENING
Patient was a No Show for her [facility] appointment. Voice message left on Patient's phone to call us for rescheduling.

## 2024-05-28 ENCOUNTER — TELEPHONE (OUTPATIENT)
Dept: PREADMISSION TESTING | Facility: HOSPITAL | Age: 59
End: 2024-05-28
Payer: COMMERCIAL

## 2024-05-30 ENCOUNTER — LAB VISIT (OUTPATIENT)
Dept: LAB | Facility: HOSPITAL | Age: 59
End: 2024-05-30
Attending: NURSE PRACTITIONER
Payer: COMMERCIAL

## 2024-05-30 ENCOUNTER — OFFICE VISIT (OUTPATIENT)
Dept: PAIN MEDICINE | Facility: CLINIC | Age: 59
End: 2024-05-30
Payer: COMMERCIAL

## 2024-05-30 ENCOUNTER — OFFICE VISIT (OUTPATIENT)
Dept: INTERNAL MEDICINE | Facility: CLINIC | Age: 59
End: 2024-05-30
Payer: COMMERCIAL

## 2024-05-30 VITALS
RESPIRATION RATE: 16 BRPM | SYSTOLIC BLOOD PRESSURE: 131 MMHG | DIASTOLIC BLOOD PRESSURE: 83 MMHG | BODY MASS INDEX: 30.67 KG/M2 | HEART RATE: 67 BPM | WEIGHT: 166.69 LBS | HEIGHT: 62 IN

## 2024-05-30 DIAGNOSIS — Z01.818 PREOPERATIVE EXAMINATION: ICD-10-CM

## 2024-05-30 DIAGNOSIS — N30.00 ACUTE CYSTITIS WITHOUT HEMATURIA: ICD-10-CM

## 2024-05-30 DIAGNOSIS — M25.662 DECREASED ROM OF LEFT KNEE: Primary | ICD-10-CM

## 2024-05-30 DIAGNOSIS — M54.16 LUMBAR RADICULOPATHY: Primary | ICD-10-CM

## 2024-05-30 DIAGNOSIS — R94.31 ABNORMAL EKG: ICD-10-CM

## 2024-05-30 DIAGNOSIS — Z01.818 PREOP TESTING: ICD-10-CM

## 2024-05-30 DIAGNOSIS — I10 ESSENTIAL HYPERTENSION: ICD-10-CM

## 2024-05-30 LAB
ALBUMIN SERPL BCP-MCNC: 4.2 G/DL (ref 3.5–5.2)
ALP SERPL-CCNC: 81 U/L (ref 55–135)
ALT SERPL W/O P-5'-P-CCNC: 23 U/L (ref 10–44)
ANION GAP SERPL CALC-SCNC: 9 MMOL/L (ref 8–16)
AST SERPL-CCNC: 22 U/L (ref 10–40)
BACTERIA #/AREA URNS AUTO: ABNORMAL /HPF
BASOPHILS # BLD AUTO: 0.04 K/UL (ref 0–0.2)
BASOPHILS NFR BLD: 0.9 % (ref 0–1.9)
BILIRUB SERPL-MCNC: 0.7 MG/DL (ref 0.1–1)
BILIRUB UR QL STRIP: NEGATIVE
BUN SERPL-MCNC: 21 MG/DL (ref 6–20)
CALCIUM SERPL-MCNC: 10.7 MG/DL (ref 8.7–10.5)
CHLORIDE SERPL-SCNC: 102 MMOL/L (ref 95–110)
CLARITY UR REFRACT.AUTO: CLEAR
CO2 SERPL-SCNC: 28 MMOL/L (ref 23–29)
COLOR UR AUTO: YELLOW
CREAT SERPL-MCNC: 1.3 MG/DL (ref 0.5–1.4)
DIFFERENTIAL METHOD BLD: ABNORMAL
EOSINOPHIL # BLD AUTO: 0.4 K/UL (ref 0–0.5)
EOSINOPHIL NFR BLD: 8.2 % (ref 0–8)
ERYTHROCYTE [DISTWIDTH] IN BLOOD BY AUTOMATED COUNT: 13.4 % (ref 11.5–14.5)
EST. GFR  (NO RACE VARIABLE): 48 ML/MIN/1.73 M^2
GLUCOSE SERPL-MCNC: 82 MG/DL (ref 70–110)
GLUCOSE UR QL STRIP: NEGATIVE
HCT VFR BLD AUTO: 38.7 % (ref 37–48.5)
HGB BLD-MCNC: 12.9 G/DL (ref 12–16)
HGB UR QL STRIP: NEGATIVE
IMM GRANULOCYTES # BLD AUTO: 0 K/UL (ref 0–0.04)
IMM GRANULOCYTES NFR BLD AUTO: 0 % (ref 0–0.5)
KETONES UR QL STRIP: NEGATIVE
LEUKOCYTE ESTERASE UR QL STRIP: ABNORMAL
LYMPHOCYTES # BLD AUTO: 2.6 K/UL (ref 1–4.8)
LYMPHOCYTES NFR BLD: 56.7 % (ref 18–48)
MCH RBC QN AUTO: 31 PG (ref 27–31)
MCHC RBC AUTO-ENTMCNC: 33.3 G/DL (ref 32–36)
MCV RBC AUTO: 93 FL (ref 82–98)
MICROSCOPIC COMMENT: ABNORMAL
MONOCYTES # BLD AUTO: 0.3 K/UL (ref 0.3–1)
MONOCYTES NFR BLD: 6.7 % (ref 4–15)
NEUTROPHILS # BLD AUTO: 1.3 K/UL (ref 1.8–7.7)
NEUTROPHILS NFR BLD: 27.5 % (ref 38–73)
NITRITE UR QL STRIP: NEGATIVE
NRBC BLD-RTO: 0 /100 WBC
PH UR STRIP: 6 [PH] (ref 5–8)
PLATELET # BLD AUTO: 305 K/UL (ref 150–450)
PMV BLD AUTO: 9.5 FL (ref 9.2–12.9)
POTASSIUM SERPL-SCNC: 4.1 MMOL/L (ref 3.5–5.1)
PROT SERPL-MCNC: 7.7 G/DL (ref 6–8.4)
PROT UR QL STRIP: ABNORMAL
RBC # BLD AUTO: 4.16 M/UL (ref 4–5.4)
RBC #/AREA URNS AUTO: 7 /HPF (ref 0–4)
SODIUM SERPL-SCNC: 139 MMOL/L (ref 136–145)
SP GR UR STRIP: 1.03 (ref 1–1.03)
SQUAMOUS #/AREA URNS AUTO: >100 /HPF
URN SPEC COLLECT METH UR: ABNORMAL
WBC # BLD AUTO: 4.62 K/UL (ref 3.9–12.7)
WBC #/AREA URNS AUTO: 46 /HPF (ref 0–5)
YEAST UR QL AUTO: ABNORMAL

## 2024-05-30 PROCEDURE — 85025 COMPLETE CBC W/AUTO DIFF WBC: CPT | Performed by: NURSE PRACTITIONER

## 2024-05-30 PROCEDURE — 99999 PR PBB SHADOW E&M-EST. PATIENT-LVL IV: CPT | Mod: PBBFAC,,, | Performed by: ANESTHESIOLOGY

## 2024-05-30 PROCEDURE — 99999 PR PBB SHADOW E&M-EST. PATIENT-LVL III: CPT | Mod: PBBFAC,,,

## 2024-05-30 PROCEDURE — 81001 URINALYSIS AUTO W/SCOPE: CPT | Performed by: NURSE PRACTITIONER

## 2024-05-30 PROCEDURE — 36415 COLL VENOUS BLD VENIPUNCTURE: CPT | Performed by: NURSE PRACTITIONER

## 2024-05-30 PROCEDURE — 80053 COMPREHEN METABOLIC PANEL: CPT | Performed by: NURSE PRACTITIONER

## 2024-05-30 PROCEDURE — 99499 UNLISTED E&M SERVICE: CPT | Mod: S$GLB,,, | Performed by: ANESTHESIOLOGY

## 2024-05-30 RX ORDER — HYDROCHLOROTHIAZIDE 25 MG/1
25 TABLET ORAL
COMMUNITY
Start: 2024-05-28 | End: 2025-05-28

## 2024-05-30 RX ORDER — HYDROCHLOROTHIAZIDE 12.5 MG/1
12.5 TABLET ORAL
COMMUNITY
Start: 2024-03-19 | End: 2024-05-30

## 2024-05-30 NOTE — PROGRESS NOTES
Preoperative History and Physical                                                              Hospital Medicine                                                                      Chief Complaint: Preoperative evaluation     History of Present Illness:      Modesta Camacho is a 58 y.o. female who presents to the office today for a preoperative consultation at the request of Dr. Trey Clifford who plans on performing REVISION, ARTHROPLASTY, KNEE, Left on June 12.     Functional Status:      The patient is able to climb a flight of stairs. The patient is able to ambulate without difficulty. The patient's functional status is affected by the surgical problem. The patient's functional status is not affected by shortness of breath, chest pain, dyspnea on exertion and fatigue.    MET score greater than 4    Past Medical History:      Past Medical History:   Diagnosis Date    Abnormal EKG 02/05/2021    Anxiety     Hypertension     Osteoarthritis     Stage 2 chronic kidney disease 02/05/2021        Past Surgical History:      Past Surgical History:   Procedure Laterality Date    ARTHROPLASTY OF HIP BY ANTERIOR APPROACH Left 7/31/2020    Procedure: ARTHROPLASTY, HIP, ANTERIOR APPROACH;  Surgeon: Xavi Gandara MD;  Location: [facility] OR;  Service: Orthopedics;  Laterality: Left;    BLOCK, NERVE, GENICULAR Left 2/14/2024    Procedure: Left genicular nerve block with RN IV sedation;  Surgeon: Shaun Iyer MD;  Location: [facility] PAIN T;  Service: Pain Management;  Laterality: Left;    gastric sleeve      HIP ARTHROPLASTY Right 6/7/2023    Procedure: ARTHROPLASTY, HIP;  Surgeon: Trey Clifford MD;  Location: [facility] OR;  Service: Orthopedics;  Laterality: Right;    INJECTION OF ANESTHETIC AGENT AROUND NERVE Bilateral 8/14/2023    Procedure: left genicular nerve block RN IV Sedation;  Surgeon: Shaun Iyer MD;  Location: [facility] PAIN MGT;  Service: Pain Management;   Laterality: Bilateral;    JOINT REPLACEMENT Right 06/14/2016    knee    JOINT REPLACEMENT Left 05/20/2021    KNEE    JOINT REPLACEMENT Left 07/30/2020    HIP    KNEE ARTHROPLASTY Left 5/6/2021    Procedure: ARTHROPLASTY, KNEE;  Surgeon: Xavi Gandara MD;  Location: [facility] OR;  Service: Orthopedics;  Laterality: Left;    RESURFACING OF PATELLA Right 9/27/2022    Procedure: RESURFACING, PATELLA;  Surgeon: Trey Clifford MD;  Location: [facility] OR;  Service: General;  Laterality: Right;    REVISION OF KNEE ARTHROPLASTY Right 9/27/2022    Procedure: REVISION, ARTHROPLASTY, KNEE;  Surgeon: Trey Clifford MD;  Location: [facility] OR;  Service: General;  Laterality: Right;    SELECTIVE INJECTION OF ANESTHETIC AGENT AROUND LUMBAR SPINAL NERVE ROOT BY TRANSFORAMINAL APPROACH Bilateral 5/2/2024    Procedure: Bilateral L4/5 TF CELE;  Surgeon: Shaun Iyer MD;  Location: [facility] PAIN MGT;  Service: Pain Management;  Laterality: Bilateral;    TUBAL LIGATION      UTERINE FIBROID EMBOLIZATION          Social History:      Social History     Socioeconomic History    Marital status:      Spouse name: Pop Land    Number of children: 3   Occupational History    Occupation: patient access   Tobacco Use    Smoking status: Never     Passive exposure: Never    Smokeless tobacco: Never   Substance and Sexual Activity    Alcohol use: Yes     Comment: occasional: hold 72hrs. prior to surgery    Drug use: No    Sexual activity: Yes     Partners: Male     Birth control/protection: See Surgical Hx     Social Determinants of Health     Stress: Stress Concern Present (3/16/2020)    [employer] - Occupational Stress Questionnaire     Feeling of Stress : Rather much        Family History:      Family History   Problem Relation Name Age of Onset    Hypertension Mother      Arthritis Mother      Diabetes Father      Heart disease Father      Depression Sister      Hypertension Sister      Arthritis Brother       Thyroid disease Son      Hypertension Son      Arthritis Maternal Grandmother      Heart disease Maternal Grandmother      Kidney disease Maternal Grandmother      Heart attack Maternal Grandfather      Stroke Paternal Grandmother      No Known Problems Paternal Grandfather      Eczema Son      Breast cancer Sister  47       Allergies:      Review of patient's allergies indicates:   Allergen Reactions    Codeine Hives and Rash     Takes Tramadol and has never had an issue with SOB/swelling  Also tolerated hydromorphone 2020      Penicillin     Penicillins Hives     Pt tolerated cefazolin 2020       Medications:      Current Outpatient Medications   Medication Sig    amLODIPine (NORVASC) 5 MG tablet Take 1 tablet (5 mg total) by mouth 2 (two) times daily.    cloNIDine (CATAPRES) 0.1 MG tablet TAKE 1 TABLET BY MOUTH ONCE DAILY USE FOR SBP GREATER THAN 160 MMHG.    DULoxetine (CYMBALTA) 60 MG capsule Take 1 capsule (60 mg total) by mouth 2 (two) times daily.    gabapentin (NEURONTIN) 300 MG capsule Take 1 capsule (300 mg total) by mouth 3 (three) times daily.    hydroCHLOROthiazide (HYDRODIURIL) 25 MG tablet Take 25 mg by mouth.    metoprolol succinate (TOPROL-XL) 50 MG 24 hr tablet Take 2 tablets (100 mg total) by mouth nightly.    ondansetron (ZOFRAN-ODT) 4 MG TbDL dissolve 2 tablets (8 mg total) by mouth every 8 (eight) hours as needed (nausea).    telmisartan (MICARDIS) 80 MG Tab Take 1 tablet (80 mg total) by mouth once daily.    tiZANidine (ZANAFLEX) 4 MG tablet Take 0.5-1 tablets (2-4 mg total) by mouth 2 (two) times daily as needed (pain).    VICTOZA 3-DANIEL 0.6 mg/0.1 mL (18 mg/3 mL) PnIj pen SMARTSI.6 Milligram(s) SUB-Q Once    vitamin D (VITAMIN D3) 1000 units Tab Take 1,000 Units by mouth once daily.    multivit-min/ferrous fumarate (MULTI VITAMIN ORAL) Take 2 tablets by mouth Daily.    nitrofurantoin, macrocrystal-monohydrate, (MACROBID) 100 MG capsule Take 1 capsule (100 mg total) by mouth 2 (two)  times daily. for 5 days     No current facility-administered medications for this visit.     Facility-Administered Medications Ordered in Other Visits   Medication    ondansetron injection 4 mg       Vitals:      There were no vitals filed for this visit.    Review of Systems:        Constitutional: Negative for fever, chills, weight loss, malaise/fatigue and diaphoresis.   HENT: Negative for hearing loss, ear pain, nosebleeds, congestion, sore throat, neck pain, tinnitus and ear discharge.    Eyes: Negative for blurred vision, double vision, photophobia, pain, discharge and redness.   Respiratory: Negative for cough, hemoptysis, sputum production, shortness of breath, wheezing and stridor.    Cardiovascular: Negative for chest pain, palpitations, orthopnea, claudication, leg swelling and PND.   Gastrointestinal: Negative for heartburn, nausea, vomiting, abdominal pain, diarrhea, constipation, blood in stool and melena.   Genitourinary: Negative for dysuria, urgency, frequency, hematuria and flank pain.   Musculoskeletal: Negative for myalgias, back pain, joint pain and falls.   Skin: Negative for itching and rash.   Neurological: Negative for dizziness, tingling, tremors, sensory change, speech change, focal weakness, seizures, loss of consciousness, weakness and headaches.   Endo/Heme/Allergies: Negative for environmental allergies and polydipsia. Does not bruise/bleed easily.   Psychiatric/Behavioral: Negative for depression, suicidal ideas, hallucinations, memory loss and substance abuse. The patient is not nervous/anxious and does not have insomnia.    All 14 systems reviewed and negative except as noted above.    Physical Exam:      Constitutional: Appears well-developed, well-nourished and in no acute distress.  Patient is oriented to person, place, and time.   Head: Normocephalic and atraumatic. Mucous membranes moist.  Neck: Neck supple no mass.   Cardiovascular: Normal rate and regular rhythm.  S1 S2  appreciated by ascultation.  Pulmonary/Chest: Effort normal and clear to auscultation bilaterally. No respiratory distress.   Abdomen: Soft. Non-tender and non-distended. Bowel sounds are normal.   Neurological: Patient is alert and oriented to person, place and time. Moves all extremities.  Skin: Warm and dry. No lesions.  Extremities: No clubbing, cyanosis or edema.    Laboratory data:      Reviewed and noted in plan where applicable. Please see chart for full laboratory data.      Lab Results   Component Value Date    WBC 4.62 05/30/2024    HGB 12.9 05/30/2024    HCT 38.7 05/30/2024    MCV 93 05/30/2024     05/30/2024           Predictors of intubation difficulty:       Morbid obesity? no   Anatomically abnormal facies? no   Prominent incisors? no   Receding mandible? no   Short, thick neck? no   Neck range of motion: normal   Dentition: No chipped, loose, or missing teeth.  Based on the Modified Mallampati, patient is a mallampati score: I (soft palate, uvula, fauces, and tonsillar pillars visible)    Cardiographics:      ECG: no change since previous ECG dated 9/22/23, NSR  Echocardiogram:  EF: 60%, Normal Diastolic Function    Nuclear Stress Test: 5/15/24    Normal myocardial perfusion scan. There is no evidence of myocardial ischemia or infarction.    The gated perfusion images showed an ejection fraction of 72% at rest. The gated perfusion images showed an ejection fraction of 82% post stress.    There is normal wall motion at rest and post stress.    The ECG portion of the study is negative for ischemia.    The patient reported no chest pain during the stress test.  Imaging:      Chest x-ray: not indicated    Assessment and Plan:      Decreased ROM of left knee  Planned for REVISION, ARTHROPLASTY, KNEE, Left with Dr. Trey Clifford    Known risk factors for perioperative complications: Renal dysfunction    Difficulty with intubation is not anticipated.    Cardiac Risk Estimation: Based on the  Revised Cardiac Risk index, patient is a Class 2 risk with a 6.0% risk of a major cardiac event in a low risk procedure.    1.) Preoperative workup as follows: ECG, hemoglobin, hematocrit, electrolytes, creatinine, glucose, liver function studies, urinalysis (urinary tract instrumentation planned).  2.) Change in medication regimen before surgery: discontinue ASA, NSAIDs 7 days before surgery, hold Metformin 24 hours prior to surgery.  3.) Prophylaxis for cardiac events with perioperative beta-blockers: Continue BB as prescribed ( Takes BB @ HS)  4.) Invasive hemodynamic monitoring perioperatively: at the discretion of anesthesiologist.  5.) Deep vein thrombosis prophylaxis postoperatively: intermittent pneumatic compression boots and regimen to be chosen by surgical team.  6.) Surveillance for postoperative MI with ECG immediately postoperatively and on postoperati ve days 1 and 2 AND troponin levels 24 hours postoperatively and on day 4 or hospital discharge (whichever comes first): not indicated.  7.) Current medications which may produce withdrawal symptoms if withheld perioperatively: None  8.) Other measures: Careful attention to perioperative glycemic control (POCT Glucose in Pre-Op).     --Morning of Procedure medications: amlodipine  --Hold all other medications morning of surgery   --Resume all medications post-operatively  --Hold ASA, NSAIDs and all vitamins/supplements 7 days prior to procedure    UA Reviewed: Mild UTI, WBC: 46, +yeast. Treated with Macrobid and Diflucan.    Per Dr. Clifford:      --Drink 1 bottle of gatorade prior to midnight the night before surgery      --Take Tylenol 650mg PO the night before surgery       Essential hypertension  Chronic, followed by PCP, Cardiology    --See medication recommendations as above   --Recommend management of Hypertension with IV Hydralazine until able to resume home medications    Abnormal EKG  Evaluated by Cardiology, completed Nuclear Stress Test  5/15/24    Results of stress test:     Normal myocardial perfusion scan. There is no evidence of myocardial ischemia or infarction.    The gated perfusion images showed an ejection fraction of 72% at rest. The gated perfusion images showed an ejection fraction of 82% post stress.    There is normal wall motion at rest and post stress.    The ECG portion of the study is negative for ischemia.    The patient reported no chest pain during the stress test.    Cleared for surgery per Dr. Rosario, per patient message dated 5/16/24        Electronically signed by Dot Tony NP on 6/2/2024 at 8:20 AM.    Time spent seeing patient( greater than 1/2 spent in direct contact) : 45 Minutes

## 2024-05-30 NOTE — ASSESSMENT & PLAN NOTE
Chronic, followed by PCP, Cardiology    --See medication recommendations as above   --Recommend management of Hypertension with IV Hydralazine until able to resume home medications

## 2024-05-30 NOTE — PRE ADMISSION SCREENING
Pre op instructions reviewed with patient during Clinic Visit with Provider, verbalized understanding.    To confirm, Surgery is scheduled on 6/12/24. We will call you late afternoon the business day prior to surgery with your arrival time.    *Please report to the [facility] Lobby (1st Floor) located off of [facility] (2nd Entrance/Building on the left, in front of the flag pole).  Address: [address] [place] [address] [place]. [address]    Your Type & Screen appointment is scheduled on 6/11/24, between the hours of 7:00am-5:00pm, @ [facility] ([facility]). Please DO NOT remove the red arm band applied.      INSTRUCTIONS IMPORTANT!!!  DO NOT Eat, Drink, or Smoke after 12 midnight unless instructed otherwise by your Surgeon. OK to brush teeth, no gum, candy or mints!    MORNING OF SURGERY, drink small sip of water with the following medications instructed by Pre-Admit Provider:  Amlodipine    *Additional Medication Instructions: STOP TAKING THE FOLLOWING MEDICATIONS 7 DAYS BEFORE SURGERY:  VITAMIN D  MULTI-VITAMIN    Diabetic Patients: If you take diabetic or weight loss medication, Do NOT take morning of surgery unless instructed by Doctor. Metformin to be stopped 24 hrs prior to surgery.     DO NOT take long-acting insulin the evening before surgery. Blood sugars will be checked in pre-op by Nurse.    If you take Ozempic/ Mounjaro / Wegovy / Trulicity / Semaglutide or any weight loss injections PLEASE LET US KNOW IMMEDIATELY, as this medication needs to be stopped 7 days prior to surgery.    *Patients should HOLD all vitamins, herbal supplements, weight loss medication, aspirin products & NSAIDS 7 days prior to surgery, as these can thin the blood. Ok to take Tylenol.    ____  Avoid Alcoholic beverages 3 days prior to surgery, as it can thin the blood.  ____  NO Acrylic/fake nails or nail polish worn day of surgery (specifically hand/arm & foot surgeries).  ____  NO powder, lotions, deodorants, oils  or cream on body.  ____  Remove all jewelry, piercings, & foreign objects prior to arrival and leave at home.  ____  Remove Dentures, Hearing Aids & Contact Lens prior to surgery.  ____  Bring photo ID and insurance information to hospital (Leave Valuables at Home).  ____  If going home the same day, arrange for a ride home. You will not be able to drive for 24 hrs if Anesthesia was used.   ____  Females (ages 11-60): may need to give a urine sample the morning of surgery; please see Pre op Nurse prior to using the restroom.  ____  Wear clean, loose fitting clothing to allow for dressings/ bandages.    Bathing Instructions:    -Shower with anti-bacterial Soap (Hibiclens or Dial) the night before surgery and the morning of.   -Do not use Hibiclens on your face or genitals.   -Apply clean clothes after shower.  -Do not shave your face or body 3 days prior to surgery unless instructed otherwise by your Surgeon.    [facility]Ride Policy:  Only 2 adults allowed in pre op/recovery area during your procedure. You MUST HAVE A RIDE HOME from a responsible adult that you know and trust. Medical Transport, Uber or Lyft can ONLY be used if patient has a responsible adult to accompany them during ride home.     *Signs and symptoms of Infection Before or After Surgery:               !!!If you experience any fever, chills, nausea/ vomiting, foul odor/ excessive drainage from surgical site, flu-like symptoms, new wounds or cuts, PLEASE CALL THE SURGEON OFFICE at [phone] or SEND MESSAGE THROUGH [employer] PORTAL!!!     *If you are running late day of surgery, please call the Surgery Dept @ [phone].    *Billing question, please call  [phone]     Thank you,  -[facility] Pre Admit Dept.  [phone] or [phone]  M-F 7:30 am-4:00 pm (Closed Major Holidays)

## 2024-05-30 NOTE — ASSESSMENT & PLAN NOTE
Planned for REVISION, ARTHROPLASTY, KNEE, Left with Dr. Trey Clifford    Known risk factors for perioperative complications: Renal dysfunction    Difficulty with intubation is not anticipated.    Cardiac Risk Estimation: Based on the Revised Cardiac Risk index, patient is a Class 2 risk with a 6.0% risk of a major cardiac event in a low risk procedure.    1.) Preoperative workup as follows: ECG, hemoglobin, hematocrit, electrolytes, creatinine, glucose, liver function studies, urinalysis (urinary tract instrumentation planned).  2.) Change in medication regimen before surgery: discontinue ASA, NSAIDs 7 days before surgery, hold Metformin 24 hours prior to surgery.  3.) Prophylaxis for cardiac events with perioperative beta-blockers: Continue BB as prescribed ( Takes BB @ HS)  4.) Invasive hemodynamic monitoring perioperatively: at the discretion of anesthesiologist.  5.) Deep vein thrombosis prophylaxis postoperatively: intermittent pneumatic compression boots and regimen to be chosen by surgical team.  6.) Surveillance for postoperative MI with ECG immediately postoperatively and on postoperati ve days 1 and 2 AND troponin levels 24 hours postoperatively and on day 4 or hospital discharge (whichever comes first): not indicated.  7.) Current medications which may produce withdrawal symptoms if withheld perioperatively: None  8.) Other measures: Careful attention to perioperative glycemic control (POCT Glucose in Pre-Op).     --Morning of Procedure medications: amlodipine  --Hold all other medications morning of surgery   --Resume all medications post-operatively  --Hold ASA, NSAIDs and all vitamins/supplements 7 days prior to procedure    UA Reviewed: Mild UTI, WBC: 46, +yeast. Treated with Macrobid and Diflucan.    Per Dr. Clifford:      --Drink 1 bottle of gatorade prior to midnight the night before surgery      --Take Tylenol 650mg PO the night before surgery

## 2024-05-30 NOTE — ASSESSMENT & PLAN NOTE
Evaluated by Cardiology, completed Nuclear Stress Test 5/15/24    Results of stress test:     Normal myocardial perfusion scan. There is no evidence of myocardial ischemia or infarction.    The gated perfusion images showed an ejection fraction of 72% at rest. The gated perfusion images showed an ejection fraction of 82% post stress.    There is normal wall motion at rest and post stress.    The ECG portion of the study is negative for ischemia.    The patient reported no chest pain during the stress test.    Cleared for surgery per Dr. Rosario, per patient message dated 5/16/24

## 2024-05-30 NOTE — DISCHARGE INSTRUCTIONS
Pre op instructions reviewed with patient during Clinic Visit with Provider, verbalized understanding.    To confirm, Surgery is scheduled on 6/12/24. We will call you late afternoon the business day prior to surgery with your arrival time.    *Please report to the [facility] (1st Floor) located off of [facility] (2nd Entrance/Building on the left, in front of the flag pole).  Address: [address] [place] [address] [place]. [address]    Your Type & Screen appointment is scheduled on 6/11/24, between the hours of 7:00am-5:00pm, @ [facility] ([facility]). Please DO NOT remove the red arm band applied.      INSTRUCTIONS IMPORTANT!!!  DO NOT Eat, Drink, or Smoke after 12 midnight unless instructed otherwise by your Surgeon. OK to brush teeth, no gum, candy or mints!    MORNING OF SURGERY, drink small sip of water with the following medications instructed by Pre-Admit Provider:  Amlodipine    *Additional Medication Instructions: STOP TAKING THE FOLLOWING MEDICATIONS 7 DAYS BEFORE SURGERY:  VITAMIN D  MULTI-VITAMIN    Diabetic Patients: If you take diabetic or weight loss medication, Do NOT take morning of surgery unless instructed by Doctor. Metformin to be stopped 24 hrs prior to surgery.     DO NOT take long-acting insulin the evening before surgery. Blood sugars will be checked in pre-op by Nurse.    If you take Ozempic/ Mounjaro / Wegovy / Trulicity / Semaglutide or any weight loss injections PLEASE LET US KNOW IMMEDIATELY, as this medication needs to be stopped 7 days prior to surgery.    *Patients should HOLD all vitamins, herbal supplements, weight loss medication, aspirin products & NSAIDS 7 days prior to surgery, as these can thin the blood. Ok to take Tylenol.    ____  Avoid Alcoholic beverages 3 days prior to surgery, as it can thin the blood.  ____  NO Acrylic/fake nails or nail polish worn day of surgery (specifically hand/arm & foot surgeries).  ____  NO powder, lotions, deodorants, oils  or cream on body.  ____  Remove all jewelry, piercings, & foreign objects prior to arrival and leave at home.  ____  Remove Dentures, Hearing Aids & Contact Lens prior to surgery.  ____  Bring photo ID and insurance information to hospital (Leave Valuables at Home).  ____  If going home the same day, arrange for a ride home. You will not be able to drive for 24 hrs if Anesthesia was used.   ____  Females (ages 11-60): may need to give a urine sample the morning of surgery; please see Pre op Nurse prior to using the restroom.  ____  Wear clean, loose fitting clothing to allow for dressings/ bandages.    Bathing Instructions:    -Shower with anti-bacterial Soap (Hibiclens or Dial) the night before surgery and the morning of.   -Do not use Hibiclens on your face or genitals.   -Apply clean clothes after shower.  -Do not shave your face or body 3 days prior to surgery unless instructed otherwise by your Surgeon.    [facility] Policy:  Only 2 adults allowed in pre op/recovery area during your procedure. You MUST HAVE A RIDE HOME from a responsible adult that you know and trust. Medical Transport, Uber or Lyft can ONLY be used if patient has a responsible adult to accompany them during ride home.     *Signs and symptoms of Infection Before or After Surgery:               !!!If you experience any fever, chills, nausea/ vomiting, foul odor/ excessive drainage from surgical site, flu-like symptoms, new wounds or cuts, PLEASE CALL THE SURGEON OFFICE at [phone] or SEND MESSAGE THROUGH [employer] PORTAL!!!     *If you are running late day of surgery, please call the Surgery Dept @ [phone].    *Billing question, please call  [phone]     Thank you,  -[facility] Pre [facility].  [phone] or [phone]  M-F 7:30 am-4:00 pm (Closed Major Holidays)

## 2024-05-31 ENCOUNTER — PATIENT MESSAGE (OUTPATIENT)
Dept: INTERNAL MEDICINE | Facility: CLINIC | Age: 59
End: 2024-05-31
Payer: COMMERCIAL

## 2024-05-31 RX ORDER — FLUCONAZOLE 150 MG/1
150 TABLET ORAL DAILY
Qty: 1 TABLET | Refills: 0 | Status: SHIPPED | OUTPATIENT
Start: 2024-05-31 | End: 2024-06-01

## 2024-05-31 RX ORDER — NITROFURANTOIN 25; 75 MG/1; MG/1
100 CAPSULE ORAL 2 TIMES DAILY
Qty: 10 CAPSULE | Refills: 0 | Status: SHIPPED | OUTPATIENT
Start: 2024-05-31 | End: 2024-06-05

## 2024-06-11 ENCOUNTER — TELEPHONE (OUTPATIENT)
Dept: PREADMISSION TESTING | Facility: HOSPITAL | Age: 59
End: 2024-06-11
Payer: COMMERCIAL

## 2024-06-11 ENCOUNTER — LAB VISIT (OUTPATIENT)
Dept: LAB | Facility: HOSPITAL | Age: 59
End: 2024-06-11
Attending: ORTHOPAEDIC SURGERY
Payer: COMMERCIAL

## 2024-06-11 DIAGNOSIS — Z01.818 PREOP TESTING: ICD-10-CM

## 2024-06-11 LAB
ABO + RH BLD: NORMAL
BLD GP AB SCN CELLS X3 SERPL QL: NORMAL
SPECIMEN OUTDATE: NORMAL

## 2024-06-11 PROCEDURE — 36415 COLL VENOUS BLD VENIPUNCTURE: CPT | Performed by: ORTHOPAEDIC SURGERY

## 2024-06-11 PROCEDURE — 86900 BLOOD TYPING SEROLOGIC ABO: CPT | Performed by: ORTHOPAEDIC SURGERY

## 2024-06-11 PROCEDURE — 86901 BLOOD TYPING SEROLOGIC RH(D): CPT | Performed by: ORTHOPAEDIC SURGERY

## 2024-06-11 NOTE — TELEPHONE ENCOUNTER
Called and spoke with patient's  about the following:     Please arrive to [facility] ([facility]) at 7:15am on 6/12/2024 for your scheduled procedure.  Address: [address] [place] [address] [place]. [address] (2nd Building on left, 1st Floor Lobby)  >>>NO eating or drinking after midnight unless instructed otherwise by your Surgeon<<<    Thank you,  -[facility] Dept.  Mon-Fri 7:30 am - 4 pm [phone]

## 2024-06-12 ENCOUNTER — ANESTHESIA EVENT (OUTPATIENT)
Dept: SURGERY | Facility: HOSPITAL | Age: 59
End: 2024-06-12
Payer: COMMERCIAL

## 2024-06-12 ENCOUNTER — HOSPITAL ENCOUNTER (OUTPATIENT)
Facility: HOSPITAL | Age: 59
Discharge: HOME | End: 2024-06-12
Attending: ORTHOPAEDIC SURGERY | Admitting: ORTHOPAEDIC SURGERY
Payer: COMMERCIAL

## 2024-06-12 ENCOUNTER — ANESTHESIA (OUTPATIENT)
Dept: SURGERY | Facility: HOSPITAL | Age: 59
End: 2024-06-12
Payer: COMMERCIAL

## 2024-06-12 VITALS
HEIGHT: 62 IN | RESPIRATION RATE: 16 BRPM | OXYGEN SATURATION: 94 % | DIASTOLIC BLOOD PRESSURE: 50 MMHG | BODY MASS INDEX: 30.76 KG/M2 | SYSTOLIC BLOOD PRESSURE: 151 MMHG | TEMPERATURE: 98 F | WEIGHT: 167.13 LBS | HEART RATE: 78 BPM

## 2024-06-12 DIAGNOSIS — M54.16 LUMBAR RADICULOPATHY: ICD-10-CM

## 2024-06-12 DIAGNOSIS — T84.093D FAILED TOTAL KNEE, LEFT, SUBSEQUENT ENCOUNTER: Primary | ICD-10-CM

## 2024-06-12 DIAGNOSIS — Z01.818 PREOP TESTING: ICD-10-CM

## 2024-06-12 DIAGNOSIS — T84.093D FAILED TOTAL LEFT KNEE REPLACEMENT, SUBSEQUENT ENCOUNTER: ICD-10-CM

## 2024-06-12 DIAGNOSIS — M51.36 DDD (DEGENERATIVE DISC DISEASE), LUMBAR: ICD-10-CM

## 2024-06-12 LAB
GRAM STN SPEC: NORMAL
GRAM STN SPEC: NORMAL
HCT VFR BLD AUTO: 40.1 % (ref 37–48.5)
HGB BLD-MCNC: 13.3 G/DL (ref 12–16)

## 2024-06-12 PROCEDURE — 87075 CULTR BACTERIA EXCEPT BLOOD: CPT | Performed by: ORTHOPAEDIC SURGERY

## 2024-06-12 PROCEDURE — 85014 HEMATOCRIT: CPT | Performed by: ORTHOPAEDIC SURGERY

## 2024-06-12 PROCEDURE — 63600175 PHARM REV CODE 636 W HCPCS

## 2024-06-12 PROCEDURE — 25000003 PHARM REV CODE 250

## 2024-06-12 PROCEDURE — 63600175 PHARM REV CODE 636 W HCPCS: Performed by: ORTHOPAEDIC SURGERY

## 2024-06-12 PROCEDURE — 87070 CULTURE OTHR SPECIMN AEROBIC: CPT | Performed by: ORTHOPAEDIC SURGERY

## 2024-06-12 PROCEDURE — 71000039 HC RECOVERY, EACH ADD'L HOUR: Performed by: ORTHOPAEDIC SURGERY

## 2024-06-12 PROCEDURE — 71000033 HC RECOVERY, INTIAL HOUR: Performed by: ORTHOPAEDIC SURGERY

## 2024-06-12 PROCEDURE — 37000008 HC ANESTHESIA 1ST 15 MINUTES: Performed by: ORTHOPAEDIC SURGERY

## 2024-06-12 PROCEDURE — 36000711: Performed by: ORTHOPAEDIC SURGERY

## 2024-06-12 PROCEDURE — 27201423 OPTIME MED/SURG SUP & DEVICES STERILE SUPPLY: Performed by: ORTHOPAEDIC SURGERY

## 2024-06-12 PROCEDURE — 25000003 PHARM REV CODE 250: Performed by: ORTHOPAEDIC SURGERY

## 2024-06-12 PROCEDURE — 97162 PT EVAL MOD COMPLEX 30 MIN: CPT

## 2024-06-12 PROCEDURE — 87206 SMEAR FLUORESCENT/ACID STAI: CPT | Performed by: ORTHOPAEDIC SURGERY

## 2024-06-12 PROCEDURE — 27486 REVISE/REPLACE KNEE JOINT: CPT | Mod: AS,52,LT, | Performed by: PHYSICIAN ASSISTANT

## 2024-06-12 PROCEDURE — 87205 SMEAR GRAM STAIN: CPT | Performed by: ORTHOPAEDIC SURGERY

## 2024-06-12 PROCEDURE — 85018 HEMOGLOBIN: CPT | Performed by: ORTHOPAEDIC SURGERY

## 2024-06-12 PROCEDURE — 87102 FUNGUS ISOLATION CULTURE: CPT | Performed by: ORTHOPAEDIC SURGERY

## 2024-06-12 PROCEDURE — 27486 REVISE/REPLACE KNEE JOINT: CPT | Mod: 52,LT,, | Performed by: ORTHOPAEDIC SURGERY

## 2024-06-12 PROCEDURE — C1776 JOINT DEVICE (IMPLANTABLE): HCPCS | Performed by: ORTHOPAEDIC SURGERY

## 2024-06-12 PROCEDURE — 37000009 HC ANESTHESIA EA ADD 15 MINS: Performed by: ORTHOPAEDIC SURGERY

## 2024-06-12 PROCEDURE — 71000015 HC POSTOP RECOV 1ST HR: Performed by: ORTHOPAEDIC SURGERY

## 2024-06-12 PROCEDURE — 36000710: Performed by: ORTHOPAEDIC SURGERY

## 2024-06-12 PROCEDURE — 71000016 HC POSTOP RECOV ADDL HR: Performed by: ORTHOPAEDIC SURGERY

## 2024-06-12 PROCEDURE — 97530 THERAPEUTIC ACTIVITIES: CPT

## 2024-06-12 PROCEDURE — 87116 MYCOBACTERIA CULTURE: CPT | Performed by: ORTHOPAEDIC SURGERY

## 2024-06-12 RX ORDER — CELECOXIB 100 MG/1
200 CAPSULE ORAL 2 TIMES DAILY
Status: CANCELLED | OUTPATIENT
Start: 2024-06-13

## 2024-06-12 RX ORDER — TRANEXAMIC ACID 10 MG/ML
1000 INJECTION, SOLUTION INTRAVENOUS
Status: COMPLETED | OUTPATIENT
Start: 2024-06-12 | End: 2024-06-12

## 2024-06-12 RX ORDER — BISACODYL 10 MG/1
10 SUPPOSITORY RECTAL DAILY PRN
Status: CANCELLED | OUTPATIENT
Start: 2024-06-12

## 2024-06-12 RX ORDER — DEXAMETHASONE SODIUM PHOSPHATE 4 MG/ML
INJECTION, SOLUTION INTRA-ARTICULAR; INTRALESIONAL; INTRAMUSCULAR; INTRAVENOUS; SOFT TISSUE
Status: DISCONTINUED | OUTPATIENT
Start: 2024-06-12 | End: 2024-06-12

## 2024-06-12 RX ORDER — ACETAMINOPHEN 325 MG/1
650 TABLET ORAL EVERY 8 HOURS PRN
Status: DISCONTINUED | OUTPATIENT
Start: 2024-06-12 | End: 2024-06-12 | Stop reason: HOSPADM

## 2024-06-12 RX ORDER — CELECOXIB 100 MG/1
400 CAPSULE ORAL ONCE
Status: CANCELLED | OUTPATIENT
Start: 2024-06-12 | End: 2024-06-12

## 2024-06-12 RX ORDER — SODIUM CHLORIDE, SODIUM LACTATE, POTASSIUM CHLORIDE, CALCIUM CHLORIDE 600; 310; 30; 20 MG/100ML; MG/100ML; MG/100ML; MG/100ML
INJECTION, SOLUTION INTRAVENOUS CONTINUOUS PRN
Status: DISCONTINUED | OUTPATIENT
Start: 2024-06-12 | End: 2024-06-12

## 2024-06-12 RX ORDER — OXYCODONE HYDROCHLORIDE 5 MG/1
10 TABLET ORAL
Status: CANCELLED | OUTPATIENT
Start: 2024-06-12

## 2024-06-12 RX ORDER — ACETAMINOPHEN 325 MG/1
650 TABLET ORAL EVERY 8 HOURS PRN
Status: CANCELLED | OUTPATIENT
Start: 2024-06-12

## 2024-06-12 RX ORDER — LIDOCAINE HYDROCHLORIDE 10 MG/ML
INJECTION, SOLUTION EPIDURAL; INFILTRATION; INTRACAUDAL; PERINEURAL
Status: DISCONTINUED | OUTPATIENT
Start: 2024-06-12 | End: 2024-06-12

## 2024-06-12 RX ORDER — SODIUM CHLORIDE 9 MG/ML
INJECTION, SOLUTION INTRAVENOUS CONTINUOUS
Status: DISCONTINUED | OUTPATIENT
Start: 2024-06-12 | End: 2024-06-12 | Stop reason: HOSPADM

## 2024-06-12 RX ORDER — ROCURONIUM BROMIDE 10 MG/ML
INJECTION, SOLUTION INTRAVENOUS
Status: DISCONTINUED | OUTPATIENT
Start: 2024-06-12 | End: 2024-06-12

## 2024-06-12 RX ORDER — HYDROMORPHONE HYDROCHLORIDE 2 MG/ML
0.2 INJECTION, SOLUTION INTRAMUSCULAR; INTRAVENOUS; SUBCUTANEOUS EVERY 5 MIN PRN
Status: DISCONTINUED | OUTPATIENT
Start: 2024-06-12 | End: 2024-06-12 | Stop reason: HOSPADM

## 2024-06-12 RX ORDER — GABAPENTIN 300 MG/1
300 CAPSULE ORAL DAILY
Status: DISCONTINUED | OUTPATIENT
Start: 2024-06-12 | End: 2024-06-12 | Stop reason: HOSPADM

## 2024-06-12 RX ORDER — ONDANSETRON 8 MG/1
8 TABLET, ORALLY DISINTEGRATING ORAL EVERY 8 HOURS PRN
Status: CANCELLED | OUTPATIENT
Start: 2024-06-12

## 2024-06-12 RX ORDER — MORPHINE SULFATE 4 MG/ML
2 INJECTION, SOLUTION INTRAMUSCULAR; INTRAVENOUS EVERY 4 HOURS PRN
Status: CANCELLED | OUTPATIENT
Start: 2024-06-12

## 2024-06-12 RX ORDER — ONDANSETRON HYDROCHLORIDE 2 MG/ML
4 INJECTION, SOLUTION INTRAVENOUS EVERY 12 HOURS PRN
Status: CANCELLED | OUTPATIENT
Start: 2024-06-12

## 2024-06-12 RX ORDER — PROPOFOL 10 MG/ML
VIAL (ML) INTRAVENOUS
Status: DISCONTINUED | OUTPATIENT
Start: 2024-06-12 | End: 2024-06-12

## 2024-06-12 RX ORDER — CHLORHEXIDINE GLUCONATE ORAL RINSE 1.2 MG/ML
10 SOLUTION DENTAL 2 TIMES DAILY
Status: CANCELLED | OUTPATIENT
Start: 2024-06-12 | End: 2024-06-17

## 2024-06-12 RX ORDER — CHLORHEXIDINE GLUCONATE ORAL RINSE 1.2 MG/ML
10 SOLUTION DENTAL
Status: DISCONTINUED | OUTPATIENT
Start: 2024-06-12 | End: 2024-06-12 | Stop reason: HOSPADM

## 2024-06-12 RX ORDER — SODIUM CHLORIDE 9 MG/ML
INJECTION, SOLUTION INTRAVENOUS CONTINUOUS
Status: CANCELLED | OUTPATIENT
Start: 2024-06-12

## 2024-06-12 RX ORDER — ROPIVACAINE/EPI/CLONIDINE/KET 2.46-0.005
SYRINGE (ML) INJECTION
Status: DISCONTINUED | OUTPATIENT
Start: 2024-06-12 | End: 2024-06-12 | Stop reason: HOSPADM

## 2024-06-12 RX ORDER — FENTANYL CITRATE 50 UG/ML
INJECTION, SOLUTION INTRAMUSCULAR; INTRAVENOUS
Status: DISCONTINUED | OUTPATIENT
Start: 2024-06-12 | End: 2024-06-12

## 2024-06-12 RX ORDER — OXYCODONE AND ACETAMINOPHEN 10; 325 MG/1; MG/1
1 TABLET ORAL EVERY 6 HOURS PRN
Qty: 30 TABLET | Refills: 0 | Status: SHIPPED | OUTPATIENT
Start: 2024-06-12

## 2024-06-12 RX ORDER — ONDANSETRON HYDROCHLORIDE 2 MG/ML
4 INJECTION, SOLUTION INTRAVENOUS DAILY PRN
Status: DISCONTINUED | OUTPATIENT
Start: 2024-06-12 | End: 2024-06-12 | Stop reason: HOSPADM

## 2024-06-12 RX ORDER — MIDAZOLAM HYDROCHLORIDE 1 MG/ML
INJECTION INTRAMUSCULAR; INTRAVENOUS
Status: DISCONTINUED | OUTPATIENT
Start: 2024-06-12 | End: 2024-06-12

## 2024-06-12 RX ORDER — ONDANSETRON HYDROCHLORIDE 2 MG/ML
INJECTION, SOLUTION INTRAVENOUS
Status: DISCONTINUED | OUTPATIENT
Start: 2024-06-12 | End: 2024-06-12

## 2024-06-12 RX ORDER — DEXAMETHASONE SODIUM PHOSPHATE 4 MG/ML
8 INJECTION, SOLUTION INTRA-ARTICULAR; INTRALESIONAL; INTRAMUSCULAR; INTRAVENOUS; SOFT TISSUE
Status: DISCONTINUED | OUTPATIENT
Start: 2024-06-12 | End: 2024-06-12 | Stop reason: HOSPADM

## 2024-06-12 RX ORDER — ONDANSETRON 4 MG/1
8 TABLET, ORALLY DISINTEGRATING ORAL EVERY 8 HOURS PRN
Qty: 21 TABLET | Refills: 0 | Status: SHIPPED | OUTPATIENT
Start: 2024-06-12

## 2024-06-12 RX ORDER — OXYCODONE HYDROCHLORIDE 5 MG/1
5 TABLET ORAL
Status: CANCELLED | OUTPATIENT
Start: 2024-06-12

## 2024-06-12 RX ORDER — DOCUSATE SODIUM 100 MG/1
100 CAPSULE, LIQUID FILLED ORAL 2 TIMES DAILY
Status: CANCELLED | OUTPATIENT
Start: 2024-06-12

## 2024-06-12 RX ADMIN — FENTANYL CITRATE 50 MCG: 50 INJECTION, SOLUTION INTRAMUSCULAR; INTRAVENOUS at 10:06

## 2024-06-12 RX ADMIN — MIDAZOLAM HYDROCHLORIDE 2 MG: 1 INJECTION, SOLUTION INTRAMUSCULAR; INTRAVENOUS at 08:06

## 2024-06-12 RX ADMIN — DEXAMETHASONE SODIUM PHOSPHATE 8 MG: 4 INJECTION, SOLUTION INTRA-ARTICULAR; INTRALESIONAL; INTRAMUSCULAR; INTRAVENOUS; SOFT TISSUE at 09:06

## 2024-06-12 RX ADMIN — PROPOFOL 50 MG: 10 INJECTION, EMULSION INTRAVENOUS at 10:06

## 2024-06-12 RX ADMIN — TRANEXAMIC ACID 1000 MG: 10 INJECTION, SOLUTION INTRAVENOUS at 09:06

## 2024-06-12 RX ADMIN — FENTANYL CITRATE 50 MCG: 50 INJECTION, SOLUTION INTRAMUSCULAR; INTRAVENOUS at 08:06

## 2024-06-12 RX ADMIN — GABAPENTIN 300 MG: 300 CAPSULE ORAL at 08:06

## 2024-06-12 RX ADMIN — ONDANSETRON 4 MG: 2 INJECTION INTRAMUSCULAR; INTRAVENOUS at 10:06

## 2024-06-12 RX ADMIN — ROCURONIUM BROMIDE 50 MG: 10 INJECTION, SOLUTION INTRAVENOUS at 09:06

## 2024-06-12 RX ADMIN — PROPOFOL 150 MG: 10 INJECTION, EMULSION INTRAVENOUS at 09:06

## 2024-06-12 RX ADMIN — ONDANSETRON 4 MG: 2 INJECTION INTRAMUSCULAR; INTRAVENOUS at 09:06

## 2024-06-12 RX ADMIN — PROPOFOL 20 MG: 10 INJECTION, EMULSION INTRAVENOUS at 10:06

## 2024-06-12 RX ADMIN — LIDOCAINE HYDROCHLORIDE 100 MG: 10 SOLUTION INTRAVENOUS at 09:06

## 2024-06-12 RX ADMIN — SODIUM CHLORIDE, SODIUM LACTATE, POTASSIUM CHLORIDE, AND CALCIUM CHLORIDE: 600; 310; 30; 20 INJECTION, SOLUTION INTRAVENOUS at 08:06

## 2024-06-12 RX ADMIN — VANCOMYCIN HYDROCHLORIDE 1500 MG: 1.5 INJECTION, POWDER, LYOPHILIZED, FOR SOLUTION INTRAVENOUS at 08:06

## 2024-06-12 RX ADMIN — SUGAMMADEX 200 MG: 100 INJECTION, SOLUTION INTRAVENOUS at 10:06

## 2024-06-12 RX ADMIN — FENTANYL CITRATE 50 MCG: 50 INJECTION, SOLUTION INTRAMUSCULAR; INTRAVENOUS at 09:06

## 2024-06-12 RX ADMIN — ACETAMINOPHEN 650 MG: 325 TABLET ORAL at 08:06

## 2024-06-12 RX ADMIN — CHLORHEXIDINE GLUCONATE 0.12% ORAL RINSE 10 ML: 1.2 LIQUID ORAL at 08:06

## 2024-06-12 RX ADMIN — PROPOFOL 50 MG: 10 INJECTION, EMULSION INTRAVENOUS at 09:06

## 2024-06-12 NOTE — ANESTHESIA PREPROCEDURE EVALUATION
06/12/2024  Modesta Camacho is a 58 y.o., female.    Patient Active Problem List   Diagnosis    Essential hypertension    Anxiety    Abnormal EKG    History of gastric restrictive surgery    Status post total right knee replacement    Hyperuricemia    DDD (degenerative disc disease), cervical    Degenerative arthritis of left knee    Hip pain, bilateral    Knee pain, left    Decreased ROM of left knee    Decreased ROM of ankle    Preop cardiovascular exam    Right leg weakness    S/P total right hip arthroplasty    Weakness of right hip    Pulmonary HTN    Lumbar radiculopathy    Moderate episode of recurrent major depressive disorder    Chronic kidney disease, stage 3a    Neutropenia, unspecified type     Past Surgical History:   Procedure Laterality Date    ARTHROPLASTY OF HIP BY ANTERIOR APPROACH Left 7/31/2020    Procedure: ARTHROPLASTY, HIP, ANTERIOR APPROACH;  Surgeon: Xavi Gandara MD;  Location: [facility] OR;  Service: Orthopedics;  Laterality: Left;    BLOCK, NERVE, GENICULAR Left 2/14/2024    Procedure: Left genicular nerve block with RN IV sedation;  Surgeon: Shaun Iyer MD;  Location: [facility] PAIN MGT;  Service: Pain Management;  Laterality: Left;    gastric sleeve      HIP ARTHROPLASTY Right 6/7/2023    Procedure: ARTHROPLASTY, HIP;  Surgeon: Trey Clifford MD;  Location: [facility] OR;  Service: Orthopedics;  Laterality: Right;    INJECTION OF ANESTHETIC AGENT AROUND NERVE Bilateral 8/14/2023    Procedure: left genicular nerve block RN IV Sedation;  Surgeon: Shaun Iyer MD;  Location: [facility] PAIN MGT;  Service: Pain Management;  Laterality: Bilateral;    JOINT REPLACEMENT Right 06/14/2016    knee    JOINT REPLACEMENT Left 05/20/2021    KNEE    JOINT REPLACEMENT Left 07/30/2020    HIP    KNEE ARTHROPLASTY Left 5/6/2021    Procedure: ARTHROPLASTY, KNEE;  Surgeon: Xavi Gandara MD;   Location: [facility] OR;  Service: Orthopedics;  Laterality: Left;    RESURFACING OF PATELLA Right 9/27/2022    Procedure: RESURFACING, PATELLA;  Surgeon: Trey Clifford MD;  Location: [facility] OR;  Service: General;  Laterality: Right;    REVISION OF KNEE ARTHROPLASTY Right 9/27/2022    Procedure: REVISION, ARTHROPLASTY, KNEE;  Surgeon: Trey Clifford MD;  Location: [facility] OR;  Service: General;  Laterality: Right;    SELECTIVE INJECTION OF ANESTHETIC AGENT AROUND LUMBAR SPINAL NERVE ROOT BY TRANSFORAMINAL APPROACH Bilateral 5/2/2024    Procedure: Bilateral L4/5 TF CELE;  Surgeon: Shaun Iyer MD;  Location: [facility];  Service: Pain Management;  Laterality: Bilateral;    TUBAL LIGATION      UTERINE FIBROID EMBOLIZATION         Pre-op Assessment    I have reviewed the Patient Summary Reports.    I have reviewed the NPO Status.   I have reviewed the Medications.     Review of Systems  Anesthesia Hx:  No problems with previous Anesthesia                Social:  Non-Smoker       Hematology/Oncology:  Hematology Normal                                     Cardiovascular:     Hypertension                                        Pulmonary:  Pulmonary Normal                       Renal/:  Chronic Renal Disease, CKD                Hepatic/GI:  Hepatic/GI Normal                 Musculoskeletal:  Arthritis               Neurological:  Neurology Normal          Lumbar radiculopathy                            Endocrine:  Endocrine Normal            Psych:   anxiety                 Physical Exam  General: Well nourished    Airway:  Mallampati: II   Mouth Opening: Normal  TM Distance: Normal  Neck ROM: Normal ROM    Dental:  Intact        Anesthesia Plan  Type of Anesthesia, risks & benefits discussed:    Anesthesia Type: Gen ETT  Intra-op Monitoring Plan: Standard ASA Monitors  Post Op Pain Control Plan: multimodal analgesia  Induction:  IV  Airway Plan: , Post-Induction  Informed Consent: Informed consent signed with  the Patient and all parties understand the risks and agree with anesthesia plan.  All questions answered.   ASA Score: 2    Ready For Surgery From Anesthesia Perspective.     .      Chemistry        Component Value Date/Time     05/30/2024 0806    K 4.1 05/30/2024 0806     05/30/2024 0806    CO2 28 05/30/2024 0806    BUN 21 (H) 05/30/2024 0806    CREATININE 1.3 05/30/2024 0806    GLU 82 05/30/2024 0806        Component Value Date/Time    CALCIUM 10.7 (H) 05/30/2024 0806    ALKPHOS 81 05/30/2024 0806    AST 22 05/30/2024 0806    ALT 23 05/30/2024 0806    BILITOT 0.7 05/30/2024 0806    ESTGFRAFRICA 41 (A) 01/14/2022 1429    EGFRNONAA 36 (A) 01/14/2022 1429        Lab Results   Component Value Date    WBC 4.62 05/30/2024    HGB 12.9 05/30/2024    HCT 38.7 05/30/2024    MCV 93 05/30/2024     05/30/2024     Nuc stress 5/15/24:      Normal myocardial perfusion scan. There is no evidence of myocardial ischemia or infarction.    The gated perfusion images showed an ejection fraction of 72% at rest. The gated perfusion images showed an ejection fraction of 82% post stress.    There is normal wall motion at rest and post stress.    The ECG portion of the study is negative for ischemia.    The patient reported no chest pain during the stress test.

## 2024-06-12 NOTE — PLAN OF CARE
[facility] - Surgery (Hospital)  Discharge Final Note    Primary Care Provider: Mary Rivera NP    Expected Discharge Date: 6/12/2024    Final Discharge Note (most recent)       Final Note - 06/12/24 1159          Final Note    Assessment Type Final Discharge Note     Anticipated Discharge Disposition Home-Health Care Svc        Post-Acute Status    Post-Acute Authorization Home Health     Home Health Status Set-up Complete/Auth obtained                              Contact Info       Trey Clifford MD   Specialty: Orthopedic Surgery    [address] [place] [address] [address]EDDY LAZ[place]O [address]   Phone: [phone]       Next Steps: Follow up in 2 week(s)

## 2024-06-12 NOTE — PT/OT/SLP EVAL
Physical Therapy Evaluation and Treatment    Patient Name: Modesta Camacho   MRN: [id number]  Recent Surgery: Procedure(s) (LRB):  REVISION, ARTHROPLASTY, KNEE (Left)  REPLACEMENT, POLYETHYLENE LINER (Left)  SYNOVECTOMY, KNEE (Left) Day of Surgery    Recommendations:     Discharge Recommendations: Low Intensity Therapy   Discharge Equipment Recommendations: shower chair   Barriers to discharge: None    Assessment:     Modesta Camacho is a 58 y.o. female admitted with a medical diagnosis of <principal problem not specified>. She presents with the following impairments/functional limitations: weakness, impaired endurance, impaired functional mobility, impaired balance, pain, decreased safety awareness, gait instability, decreased lower extremity function, decreased ROM, orthopedic precautions.    Rehab Prognosis: Good; patient would benefit from acute PT services to address these deficits and reach maximum level of function.    Plan:     During this hospitalization, patient to be seen 3 x/week to address the above listed problems via gait training, therapeutic activities, therapeutic exercises    Plan of Care Expires: 06/26/24    Subjective     Chief Complaint: Pt is motivated to participate  Patient Comments/Goals: none stated  Pain/Comfort:  Pain Rating 1: 6/10  Location - Side 1: Left  Location - Orientation 1: generalized  Location 1: knee  Pain Addressed 1: Reposition, Distraction  Pain Rating Post-Intervention 1: 6/10    Social History:  Living Environment: Patient lives with their spouse in a single story home with number of outside stair(s): 0  Prior Level of Function: Prior to admission, patient was independent, driving and working, and ambulated household and community distances using no AD  Equipment Used at Home: walker, rolling, bedside commode, cane, straight  DME owned (not currently used): none  Assistance Upon Discharge: family    Objective:     Communicated with nurse and epic chart review prior to  "session. Patient found sitting edge of bed with peripheral IV, wound vac upon PT entry to room.    General Precautions: Standard, fall   Orthopedic Precautions: LLE weight bearing as tolerated   Braces: N/A    Respiratory Status: Room air    Exams:  Cognition: Patient is oriented to Person, Place, Time, Situation  RLE ROM: WFL  RLE Strength:  Grossly 4/5  LLE ROM: WFL  LLE Strength:  NT due to surgery  Sensation:    -       Intact  Skin Integrity/Edema:     -       Skin integrity: Visible skin intact    Functional Mobility:  Gait belt applied - Yes  Bed Mobility  Seated EOB at start of session and returned to chair  Transfers  Sit to Stand: contact guard assistance with rolling walker and with cues for weight bearing precautions  Bed to Chair: stand by assistance with rolling walker using Step Transfer  Gait  Patient ambulated 100ft with rolling walker and stand by assistance. Patient demonstrates occasional unsteady gait and antalgic gait. No c/o dizziness or SOB, no gross LOB. All lines remained intact throughout ambulation trail.  Balance  Sitting: stand by assistance  Standing: stand by assistance    Therapeutic Activities and Exercises:   Pt educated on role of PT in acute care and POC. Educated on L WBAT, use of ice, proper positioning of LE. Educated on importance of OOB activities, activity pacing, and HEP (marching/hip flex, hip abd, heel slides/LAQ, quad sets, ankle pumps) in order to maintain/regain strength. Encouraged to sit up in chair for all meals. Educated on proper use of RW for safety and to reduce risk of falling. Educated on "call don't fall" policy and increased risk of falling due to weakness, instructed to utilize call bell for assistance with all transfers. Pt agreeable to all requests.      AM-PAC 6 CLICK MOBILITY  Total Score:12    Patient left up in chair with all lines intact, call button in reach, and RN and family present.    GOALS:   Multidisciplinary Problems       Physical Therapy " Goals          Problem: Physical Therapy    Goal Priority Disciplines Outcome Goal Variances Interventions   Physical Therapy Goal     PT, PT/OT      Description: Goals to be met by 6/26/24.  1. Pt will complete bed mobility MOD I.  2. Pt will complete sit to stand MOD I.  3. Pt will ambulate 200ft MOD I using RW.  4. Pt will increase AMPAC score by 2 points to progress functional mobility.                       History:     Past Medical History:   Diagnosis Date    Abnormal EKG 02/05/2021    Anxiety     Hypertension     Osteoarthritis     Stage 2 chronic kidney disease 02/05/2021       Past Surgical History:   Procedure Laterality Date    ARTHROPLASTY OF HIP BY ANTERIOR APPROACH Left 7/31/2020    Procedure: ARTHROPLASTY, HIP, ANTERIOR APPROACH;  Surgeon: Xavi Gandara MD;  Location: [facility] OR;  Service: Orthopedics;  Laterality: Left;    BLOCK, NERVE, GENICULAR Left 2/14/2024    Procedure: Left genicular nerve block with RN IV sedation;  Surgeon: Shaun Iyer MD;  Location: [facility] PAIN MGT;  Service: Pain Management;  Laterality: Left;    gastric sleeve      HIP ARTHROPLASTY Right 6/7/2023    Procedure: ARTHROPLASTY, HIP;  Surgeon: Trey Clifford MD;  Location: [facility] OR;  Service: Orthopedics;  Laterality: Right;    INJECTION OF ANESTHETIC AGENT AROUND NERVE Bilateral 8/14/2023    Procedure: left genicular nerve block RN IV Sedation;  Surgeon: Shaun Iyer MD;  Location: [facility] PAIN MGT;  Service: Pain Management;  Laterality: Bilateral;    JOINT REPLACEMENT Right 06/14/2016    knee    JOINT REPLACEMENT Left 05/20/2021    KNEE    JOINT REPLACEMENT Left 07/30/2020    HIP    KNEE ARTHROPLASTY Left 5/6/2021    Procedure: ARTHROPLASTY, KNEE;  Surgeon: Xavi Gandara MD;  Location: [facility] OR;  Service: Orthopedics;  Laterality: Left;    RESURFACING OF PATELLA Right 9/27/2022    Procedure: RESURFACING, PATELLA;  Surgeon: Trey Clifford MD;  Location: [facility] OR;  Service: General;  Laterality: Right;     REVISION OF KNEE ARTHROPLASTY Right 9/27/2022    Procedure: REVISION, ARTHROPLASTY, KNEE;  Surgeon: Trey Clifford MD;  Location: [facility] OR;  Service: General;  Laterality: Right;    SELECTIVE INJECTION OF ANESTHETIC AGENT AROUND LUMBAR SPINAL NERVE ROOT BY TRANSFORAMINAL APPROACH Bilateral 5/2/2024    Procedure: Bilateral L4/5 TF CELE;  Surgeon: Shaun Iyer MD;  Location: [facility];  Service: Pain Management;  Laterality: Bilateral;    TUBAL LIGATION      UTERINE FIBROID EMBOLIZATION         Time Tracking:     PT Received On: 06/12/24  PT Start Time: 1214  PT Stop Time: 1237  PT Total Time (min): 23 min     Billable Minutes: Evaluation 15min and Therapeutic Activity 8min    6/12/2024

## 2024-06-12 NOTE — ANESTHESIA PROCEDURE NOTES
Intubation    Date/Time: 6/12/2024 9:04 AM    Performed by: Alexandro Avendaño CRNA  Authorized by: David Osborne II, MD    Intubation:     Induction:  Intravenous    Intubated:  Postinduction    Mask Ventilation:  Easy with oral airway    Attempts:  1    Attempted By:  CRNA    Method of Intubation:  Direct    Blade:  Risa 3    Laryngeal View Grade: Grade I - full view of cords      Difficult Airway Encountered?: No      Complications:  None    Airway Device:  Oral endotracheal tube    Airway Device Size:  7.5    Style/Cuff Inflation:  Cuffed (inflated to minimal occlusive pressure)    Tube secured:  20    Secured at:  The lips    Placement Verified By:  Capnometry    Complicating Factors:  None    Findings Post-Intubation:  BS equal bilateral

## 2024-06-12 NOTE — DISCHARGE SUMMARY
"[facility] - Surgery (Hospital)  Discharge Note  Short Stay    Procedure(s) (LRB):  REVISION, ARTHROPLASTY, KNEE (Left)  REPLACEMENT, POLYETHYLENE LINER (Left)  SYNOVECTOMY, KNEE (Left)      OUTCOME: Patient tolerated treatment/procedure well without complication and is now ready for discharge.    DISPOSITION: Home-Health Care [facility]    FINAL DIAGNOSIS:  <principal problem not specified>    Failed left total knee replacement/arthrofibrosis  FOLLOWUP: In clinic    DISCHARGE INSTRUCTIONS:    Discharge Procedure Orders   WALKER FOR HOME USE     Order Specific Question Answer Comments   Type of Walker: Sundar (4'4"-5'6")    With wheels? Yes    Height: 5' 2" (1.575 m)    Weight: 75.8 kg (167 lb 1.7 oz)    Length of need (1-99 months): 3    Does patient have medical equipment at home? walker, rolling    Does patient have medical equipment at home? bedside commode    Please check all that apply: Patient is unable to safely ambulate without equipment.      Basic Metabolic Panel   Standing Status: Future Standing Exp. Date: 07/26/25     CBC Auto Differential   Standing Status: Future Number of Occurrences: 1 Standing Exp. Date: 07/26/25     Urinalysis   Standing Status: Future Number of Occurrences: 1 Standing Exp. Date: 07/26/25     Order Specific Question Answer Comments   Collection Type Urine, Clean Catch      Comprehensive Metabolic Panel   Standing Status: Future Number of Occurrences: 1 Standing Exp. Date: 07/26/25     Referral to Home health   Referral Priority: Routine Referral Type: Home Health Care   Referral Reason: Specialty Services Required   Requested Specialty: Home Health Services   Number of Visits Requested: 1     Diet general     Sponge bath only until clinic visit     Ice to affected area   Order Comments: using barrier between ice and skin (specify duration&frequency)     No driving, operating heavy equipment or signing legal documents while taking pain medication     Remove dressing in 72 hours   Order " Comments: Clean with betadine, apply 4x4 and tape  Home health to perform     Call MD for:  redness, tenderness, or signs of infection (pain, swelling, redness, odor or green/yellow discharge around incision site)     EKG 12-lead   Standing Status: Future Standing Exp. Date: 05/27/25     Activity as tolerated     Weight bearing as tolerated        TIME SPENT ON DISCHARGE:   30 minutes

## 2024-06-12 NOTE — H&P
Subjective:     Patient ID: Modesta Camacho is a 58 y.o. female.    Chief Complaint: No chief complaint on file.  06/27/2022  HPI:  Left TKA 05/07/2021 by Dr. Gandara, left KRZYSZTOF a by Dr. Gandara  Right TKA by Dr. Lan 2016  Patient is complaining that the left knee is tight unable to fully extend compared to the right side.  She is not having any pain with the left hip.  She has very mild discomfort in the left knee.  As far as the right hip is concerned she is having severe pain in the groin and she knows she has arthritis.  She stated the right TKA done by Dr. Lan is not painful but it gives out with difficulty doing stairs.  Overall pain is 4/10.  She still working at the [employer].  She ambulates without any assistive devices.  She feels her left knee is not doing well and cannot straighten it out as much as she does in the right knee.  For some reason she was upset with Dr. Atkinson.  No fever no chills no shortness of breath or difficulty with chewing or swallowing.  She does have low back pain and she sees Dr. Weaver    07/14/2022  Left KRZYSZTOF by Dr. Gandara doing extremely well  Left TKA 05/07/2021 by Dr. Gandara and feels tight unable to hyperextend.  She has around maybe 2-3 degrees of contracture but she flexes really well.  At this time this is a very stable knee    Right TKA 2016 by Dr. Lan.  She is extremely loosen hyperextends and medial collateral ligaments seem loose.  We had asked her to get us the operative report from the [employer] [facility] [employer] and she brought it with her.  We went over the operative note and it was vanguard knee by [employer].  The insert is 14 mm.  For 67 base plate  We did call the [employer] rep and he said they go up to 18 mm for that size and prosthesis.  Femoral component 57.5  Right hip arthritis I did tell her we will deal with that later on because is not bother her as much.  Pain is 5/10    12/15/2022     Right total knee revision 09/27/2022 where we change the poly liner to a  very thick 1 to achieve stability.  This total knee had been done by Dr. Lan prior to that and she was ligamentously loose.  She did fall after things given.  She feels that the right total knee poly exchange seems to have helped significantly with the instability.  She had severe pain in her hip on the right side she has arthritis by x-ray and diagnosis she seen Dr. Acevedo who gave her injection in the office in November2,2022 2nd 2022    She wants to have her total knee replacement done and she knows she needs to wait 3 months.    She would like to return to work to make some money and she is looking at having her right total hip replacement in March 2023     Patient stated the arthritis run in her family everybody in the family had a joint replacement including her brother her mother clarita and she was diagnosed with osteoarthritis rather than rheumatoid arthritis.    Requesting tramadol  Pain is 6/10    We discussed the left knee which she stated still feels tight that if she waits a year year and half in might loosen up with time you can always have that poly exchange to a smaller 1 if needed in the future      02/13/2023    Severe right hip arthritis.  Received intra-articular injection by Dr. Acevedo 11/02/2022 with good relief.  You want a proceed with the right total hip replacement sometime to words the end of May early June 2023 because you want a work to make some money.  You having hard time walking distances and we gave you a temporary disability parking sticker.  As far as the right total knee revision 09/27/2022 you said you not having any pain in that or neither the left KRZYSZTOF that was done by Dr. Gandara through the anterior approach.  As far as the left TKA done by Dr. Gandara you feel that you have pain still unable to fully extended and it is tender and painful.  I did tell her let us give it some more time roughly 18 months to see if things improve if not then she would need to have poly  exchange to a smaller 1.  No fever no chills no shortness of breath difficulty with chewing swallowing loss of bowel bladder control blurry vision double vision loss sense smell or taste     04/21/2023   Right hip arthritis.  She is ready to proceed with total hip replacement.  I did tell her I go posterior approach not anterior.  We had some questions and answered.  I did tell her there is slight risk of dislocation of 3% chance.  She will be going home the same day this is considered outpatient surgery by the government.  If there is any problem then we will do extended recovery.  She will receive home health for couple weeks.  She would need help at home.  We briefly went over the pros and cons and she does remember it.  She said she is active on MyChart and she can read it.  She did complain that the left knee still little tight and tape painful and I did tell her give it some time once we know we have done all the joints then we can arrange for her to go and change the poly insert to a smaller 1 to achieve a little bit more extension  Pain 5/10    09/07/2023  Right KRZYSZTOF 06/07/2023.  She is doing much better than before surgery and very happy with the results so far.  Overall pain is 4/10 and that includes the left knee that is killing her.  She did receive genicular nerve block recently by Dr. Malcolm which barely gave her some mild relief but he only used lidocaine without steroid.  States that the pain in the left leg going down to the outside of her foot.  There is no pain over the greater trochanter on the left hip which she is doing well with that performed by Dr. Atkinson.  We went over the operative report 05/06/2021 of the left TKA.  She said she got slightly better extension since last time however she still hurting she points over the outside of her knee and sometimes goes down to the outside of her foot  As far as the right total knee revision to a thicker poly liner she is doing really well with that  09/27/2022  Left KRZYSZTOF performed by Dr. Gandara is doing really well is just a left knee that she is been complaining about for a long time.  The right KRZYSZTOF we just perform is doing much better.  Very mild discomfort if any   She is ambulating without assistive devices  Denies any fever or chills or shortness of breath difficulty with chewing swallowing loss of bowel bladder control   Left knee genicular nerve block using lidocaine and bupivacaine performed 07/24/2023 by Dr. Iyer      01/05/2023   Left knee tightness and stiffness and pain.  DJO left TKA done 05/06/2021 by Dr. Gandara  it stretched a little bit since surgery.  Now it is painful she points over the lateral aspect and anteriorly.  She wants it revised.  We discussed that probably we can place a smaller plastic insert to make it a little bit looser.  However we can not make it to loose otherwise she will buckle during stairs and getting up from sitting positions.  She is having also left hip pain and specially over the greater trochanter.  She is having numbness and tingling that is radiating down to her foot.  Nerve conduction studies have shown she has radiculopathy which could explain the numbness and tingling in the leg but now it is getting worst and she is falling she had x-rays done yesterday.  She had not received any injections in her spine.  Previous MRI from 2021 showing L4-L5 severe facet arthropathy and neuroforaminal stenosis and above and below facet arthropathy.  This is getting worst.  She is taking gabapentin can not take it 3 times a day makes her too sleepy can not work.  As far as the right TKA revision to a thicker poly as well as right total hip which I performed this she is doing extremely well with them    She wants left total knee replacement revised.  The pros and cons discussed   Also she has bursitis of the left hip I offered an injection which she declined right now she wants to see if the revision will help   As far as  numbness and tingling leg which gotten worse we need to get repeat MRI with contrast on the lumbar spine      06/12/2024   MRI lumbar spine showing progression of the anterolisthesis roughly 6 mm of posterior disc space uncovering.  There is multiple levels neuroforaminal stenosis and mild canal stenosis at that L4-L5 level has neuroforaminal stenosis L2-3 L3-4.  All of these could contribute to the numbness and tingling down the legs.  The left total knee where she complains of stiffness we are going to change the plastic to give her a little bit more extension however that risks possibility a small loosening in flexion.  Past Medical History:   Diagnosis Date    Abnormal EKG 02/05/2021    Anxiety     Hypertension     Osteoarthritis     Stage 2 chronic kidney disease 02/05/2021     Past Surgical History:   Procedure Laterality Date    ARTHROPLASTY OF HIP BY ANTERIOR APPROACH Left 7/31/2020    Procedure: ARTHROPLASTY, HIP, ANTERIOR APPROACH;  Surgeon: Xavi Gandara MD;  Location: [facility];  Service: Orthopedics;  Laterality: Left;    BLOCK, NERVE, GENICULAR Left 2/14/2024    Procedure: Left genicular nerve block with RN IV sedation;  Surgeon: Shaun Iyer MD;  Location: [facility] PAIN MGT;  Service: Pain Management;  Laterality: Left;    gastric sleeve      HIP ARTHROPLASTY Right 6/7/2023    Procedure: ARTHROPLASTY, HIP;  Surgeon: Trey Clifford MD;  Location: [facility] OR;  Service: Orthopedics;  Laterality: Right;    INJECTION OF ANESTHETIC AGENT AROUND NERVE Bilateral 8/14/2023    Procedure: left genicular nerve block RN IV Sedation;  Surgeon: Shaun Iyer MD;  Location: [facility] PAIN MGT;  Service: Pain Management;  Laterality: Bilateral;    JOINT REPLACEMENT Right 06/14/2016    knee    JOINT REPLACEMENT Left 05/20/2021    KNEE    JOINT REPLACEMENT Left 07/30/2020    HIP    KNEE ARTHROPLASTY Left 5/6/2021    Procedure: ARTHROPLASTY, KNEE;  Surgeon: Xavi Gandara MD;  Location: [facility] OR;  Service:  Orthopedics;  Laterality: Left;    RESURFACING OF PATELLA Right 9/27/2022    Procedure: RESURFACING, PATELLA;  Surgeon: Trey Clifford MD;  Location: [facility] OR;  Service: General;  Laterality: Right;    REVISION OF KNEE ARTHROPLASTY Right 9/27/2022    Procedure: REVISION, ARTHROPLASTY, KNEE;  Surgeon: Trey Clifford MD;  Location: [facility] OR;  Service: General;  Laterality: Right;    SELECTIVE INJECTION OF ANESTHETIC AGENT AROUND LUMBAR SPINAL NERVE ROOT BY TRANSFORAMINAL APPROACH Bilateral 5/2/2024    Procedure: Bilateral L4/5 TF CELE;  Surgeon: Shaun Iyer MD;  Location: [facility] PAIN MGT;  Service: Pain Management;  Laterality: Bilateral;    TUBAL LIGATION      UTERINE FIBROID EMBOLIZATION       Family History   Problem Relation Name Age of Onset    Hypertension Mother      Arthritis Mother      Diabetes Father      Heart disease Father      Depression Sister      Hypertension Sister      Arthritis Brother      Thyroid disease Son      Hypertension Son      Arthritis Maternal Grandmother      Heart disease Maternal Grandmother      Kidney disease Maternal Grandmother      Heart attack Maternal Grandfather      Stroke Paternal Grandmother      No Known Problems Paternal Grandfather      Eczema Son      Breast cancer Sister  47     Social History     Socioeconomic History    Marital status:      Spouse name: Pop Land    Number of children: 3   Occupational History    Occupation: patient access   Tobacco Use    Smoking status: Never     Passive exposure: Never    Smokeless tobacco: Never   Substance and Sexual Activity    Alcohol use: Yes     Comment: occasional: hold 72hrs. prior to surgery    Drug use: No    Sexual activity: Yes     Partners: Male     Birth control/protection: See Surgical Hx     Social Determinants of Health     Stress: Stress Concern Present (3/16/2020)    [place] [employer] - Occupational Stress Questionnaire     Feeling of Stress : Rather much      Medication List with Changes/Refills   Current Medications    AMLODIPINE (NORVASC) 5 MG TABLET    Take 1 tablet (5 mg total) by mouth 2 (two) times daily.    CLONIDINE (CATAPRES) 0.1 MG TABLET    TAKE 1 TABLET BY MOUTH ONCE DAILY USE FOR SBP GREATER THAN 160 MMHG.    DULOXETINE (CYMBALTA) 60 MG CAPSULE    Take 1 capsule (60 mg total) by mouth 2 (two) times daily.    GABAPENTIN (NEURONTIN) 300 MG CAPSULE    Take 1 capsule (300 mg total) by mouth 3 (three) times daily.    HYDROCHLOROTHIAZIDE (HYDRODIURIL) 25 MG TABLET    Take 25 mg by mouth.    METOPROLOL SUCCINATE (TOPROL-XL) 50 MG 24 HR TABLET    Take 2 tablets (100 mg total) by mouth nightly.    MULTIVIT-MIN/FERROUS FUMARATE (MULTI VITAMIN ORAL)    Take 2 tablets by mouth Daily.    ONDANSETRON (ZOFRAN-ODT) 4 MG TBDL    dissolve 2 tablets (8 mg total) by mouth every 8 (eight) hours as needed (nausea).    TELMISARTAN (MICARDIS) 80 MG TAB    Take 1 tablet (80 mg total) by mouth once daily.    TIZANIDINE (ZANAFLEX) 4 MG TABLET    Take 0.5-1 tablets (2-4 mg total) by mouth 2 (two) times daily as needed (pain).    VICTOZA 3-DANIEL 0.6 MG/0.1 ML (18 MG/3 ML) PNIJ PEN    SMARTSI.6 Milligram(s) SUB-Q Once    VITAMIN D (VITAMIN D3) 1000 UNITS TAB    Take 1,000 Units by mouth once daily.     Review of patient's allergies indicates:   Allergen Reactions    Codeine Hives and Rash     Takes Tramadol and has never had an issue with SOB/swelling  Also tolerated hydromorphone 2020      Penicillin     Penicillins Hives     Pt tolerated cefazolin 2020     Review of Systems   Constitutional: Negative for decreased appetite.   HENT:  Negative for tinnitus.    Eyes:  Negative for double vision.   Cardiovascular:  Negative for chest pain.   Respiratory:  Negative for wheezing.    Hematologic/Lymphatic: Negative for bleeding problem.   Skin:  Negative for dry skin.   Musculoskeletal:  Positive for arthritis, back pain, joint pain and stiffness. Negative for gout and neck pain.    Gastrointestinal:  Negative for abdominal pain.   Genitourinary:  Negative for bladder incontinence.   Neurological:  Negative for numbness, paresthesias and sensory change.   Psychiatric/Behavioral:  Negative for altered mental status.        Objective:   There is no height or weight on file to calculate BMI.  There were no vitals filed for this visit.       General    Constitutional: She is oriented to person, place, and time. She appears well-developed.   HENT:   Head: Atraumatic.   Eyes: EOM are normal.   Pulmonary/Chest: Effort normal.   Neurological: She is alert and oriented to person, place, and time.   Psychiatric: Judgment normal.           Ambulating without any assistive devices  Right hip surgical scar healed well.  Passive range of motion without pain in the groin.  There is slight weakness to hip flexors and abductors   The left total hip journal external rotation without pain in the groin.  There is no pain to palpation over the greater trochanter Excellent range of motion  Pelvis is level  The sitting position  Hip flexors, abductors adductors quads and hamstrings ankle extensors and flexors were 5/5  Left TKA has around 3° of contracture and she flexes to 120°.  Slightly tight.  No defect in the patella or quadriceps tendon.  No erythema, not warm to touch.  Collaterals stable to varus and valgus stressing in extension and in flexion at 90°.  There is tenderness and pain over the lateral joint into AP compression on the patella.  There is radiation to the outside of the tibia down to the outside of the foot.  Right knee surgical incision healed well.  She has 0-130 degrees of flexion.  She is not hyperextending anymore when preoperatively she was hyperextending approximately 10°.  She is stable in extension with slight opening if any approximately 2 mm on the medial side with the knee in extension.  She is stable in flexion and 90°.  No swelling no effusion no erythema  Ankle motion intact  Skin  is warm to touch    Relevant imaging results reviewed and interpreted by me, discussed with the patient and / or family today     MRI LS spine 01/24/2024     1. Progressed grade 1 L4-L5 anterolisthesis with similar mild spinal canal stenosis, moderate to severe right and mild-to-moderate left-sided neural foraminal stenosis.  2. Similar mild left-sided L2-L3 and L3-L4 neural foraminal stenosis.  3. Small left adnexal cyst.  Pelvic ultrasound could be utilized for additional assessment as clinically indicated.      X-ray 01/04/2023 left TKA in excellent alignment no evidence of fracture.  X-ray 09/07/2023 right hip without evidence of failure.  The acetabular is slightly more anteverted than the other side.  No evidence of failure  X-ray 11/28/2022 of the right knee showing the thick poly insert with the right total knee ingrowth prosthesis performed by Dr. Lan prior no evidence of fracture  X-ray bilateral knees 06/27/2022 showing left TKA with very thick poly insert and the patella was not resurfaced with excellent alignment/posterior sacrificing knee system..  Right TKA/looks like [employer] with slightly oversized base plate on the tibia but overall alignment is intact and looks like it is ingrowth prosthesis with patella not resurfaced and posterior cruciate sparing knee  X-ray 11/24/2021 and 06/27/2022 left KRZYSZTOF in excellent alignment.  Right hip complete loss of joint space with marginal osteophyte and cystic changes consistent with severe arthritis of the right hip    X-ray 06/27/2022 of the lumbar spine showing spondylolisthesis L4 on 5 grade 1 and facet arthropathy.  No fracture seen  Assessment:     Encounter Diagnoses   Name Primary?    History of total left knee replacement     Hx of total hip arthroplasty, left     H/O total hip arthroplasty, right     History of revision of total replacement of right knee joint     Arthrofibrosis of knee joint, left Yes        Plan:   Failed total knee, left, subsequent  encounter  -     Place in Outpatient; Standing  -     Vital signs; Standing  -     Insert peripheral IV; Standing  -     Surgeon requests no aspirin,enoxaparin,warfarin,clopidogrel,prasugrel or dabigatran; Standing  -     Clip and Prep Left Anterior Knee Mid Thigh to Mid Calf; Standing  -     Foot pump; send to OR with patient; Standing  -     Cleanse with Chlorhexidine (CHG); Standing  -     Apply Nozin; Standing  -     Diet NPO; Standing  -     Place CAROLA hose; Standing  -     Place sequential compression device; Standing  -     Chlorohexidine Gluconate Bath; Standing  -     Nasal ; Standing    Preop testing  -     Basic Metabolic Panel; Future; Expected date: 05/27/2024  -     CBC Auto Differential; Future; Expected date: 05/27/2024  -     Urinalysis; Future; Expected date: 05/27/2024  -     EKG 12-lead; Future  -     Comprehensive Metabolic Panel; Future; Expected date: 05/27/2024    Failed total left knee replacement, subsequent encounter    Other orders  -     0.9%  NaCl infusion  -     IP VTE LOW RISK PATIENT; Standing  -     chlorhexidine 0.12 % solution 10 mL  -     dexAMETHasone injection 8 mg  -     tranexamic acid in NaCl,iso-os IVPB 1,000 mg  -     vancomycin 1,500 mg in dextrose 5 % (D5W) 250 mL IVPB (Vial-Mate)  -     gabapentin capsule 300 mg  -     acetaminophen tablet 650 mg         There are no outpatient Patient Instructions on file for this admission.      Operative report 06/09/2016 Dr. Lan implant [employer] vanguard cementless components size 57.5 femur, 14 mm polyethylene insert.  Tibial modular tray size 67, modular finned stem with screw system 80 x 10 mm, self taping screws 6.5 x 25  Left knee replaced by Dr. Gandara using DJO.  Hardware present in epic under surgery  I did discuss with the patient that we would do not know what is going to happen until we doing the surgery.  If the poly exchange give her enough stability than things will be okay otherwise if we not happy with the  stability then we might have to take everything out and do a complete revision.  She expressed understanding that we going in to make sure that the knee becomes stable.  She apparently had mild relief from the genicular nerve block using lidocaine on the left knee  Procedure, common risks and benefits,alternatives discussed in details.All questions answered.Patient expressed understanding.Patient instructed to call for any questions that could arise in the future.    Most common Risks:  Infection  Leg-length discrepancy  Dislocation  Neuro-vascular injury ( resulting in loss of motor and sensory functions)  Pain  Blood clot  Fat clot  Loss of motion  Fracture of bone  Failure of procedure to achieve its intended purpose  Failure of hardware  Non-union or mal-union of bone  Malalignment  Death  Left total knee revision/poly exchange set  Disclaimer: This note was prepared using a voice recognition system and is likely to have sound alike errors within the text.

## 2024-06-12 NOTE — TRANSFER OF CARE
"Anesthesia Transfer of Care Note    Patient: Modesta Camacho    Procedure(s) Performed: Procedure(s) (LRB):  REVISION, ARTHROPLASTY, KNEE (Left)  REPLACEMENT, POLYETHYLENE LINER (Left)  SYNOVECTOMY, KNEE (Left)    Patient location: PACU    Anesthesia Type: general    Transport from OR: Transported from OR on room air with adequate spontaneous ventilation    Post pain: adequate analgesia    Post assessment: no apparent anesthetic complications and tolerated procedure well    Post vital signs: stable    Level of consciousness: awake    Nausea/Vomiting: no nausea/vomiting    Complications: none    Transfer of care protocol was followed      Last vitals: Visit Vitals  BP (!) 174/90 (BP Location: Right arm, Patient Position: Sitting)   Pulse 71   Temp 36.5 °C (97.7 °F) (Temporal)   Resp 18   Ht 5' 2" (1.575 m)   Wt 75.8 kg (167 lb 1.7 oz)   SpO2 100%   Breastfeeding No   BMI 30.56 kg/m²     "

## 2024-06-12 NOTE — PLAN OF CARE
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]    Admission Date: 6/12/2024  Expected Discharge Date: per attending         Payor: [employer] / Plan: [employer] (UMR) / Product Type: Commercial /     Extended Emergency Contact Information  Primary Emergency Contact: Gil Camachode  Address: [address] [place] [address] [facility]  Home Phone: [phone]  Mobile Phone: [phone]  Relation: Spouse  Secondary Emergency Contact: LaraRuthie  Address: [address] [place] [address] [facility]  Home Phone: [phone]  Relation: Daughter    Discharge Plan A: Home Health         [facility] - [facility] [place] - [address]  [address]  [place] [address]  Phone: [phone] Fax: [phone]    [facility]  [place] - [address] [place] [address]  [address]  [place] [address]  Phone: [phone] Fax: [phone]    [facility] [address]  [place] [address]  Phone: [phone] Fax: [phone]    [facility] - [place] - [address]  [address] [place] [address]  Phone: [phone] Fax: [phone]      Initial Assessment (most recent)       Adult Discharge Assessment - 06/12/24 0903          Discharge Assessment    Assessment Type Discharge Planning Assessment     Confirmed/corrected address, phone number and insurance Yes     Confirmed Demographics Correct on Facesheet     Source of Information --   spouse    Communicated LUIS with patient/caregiver Date not available/Unable to determine     Reason For Admission surgery     People in Home spouse     Do you expect to return to your  current living situation? Yes     Do you have help at home or someone to help you manage your care at home? Yes     Who are your caregiver(s) and their phone number(s)? [facility]     Prior to hospitilization cognitive status: Alert/Oriented     Current cognitive status: Alert/Oriented     Walking or Climbing Stairs Difficulty no     Dressing/Bathing Difficulty no     Equipment Currently Used at Home walker, rolling;bedside commode     Readmission within 30 days? No     Patient currently being followed by outpatient case management? No     Do you currently have service(s) that help you manage your care at home? No     Do you take prescription medications? Yes     Do you have prescription coverage? Yes     Coverage umr     Do you have any problems affording any of your prescribed medications? No     Is the patient taking medications as prescribed? yes     Who is going to help you get home at discharge? spouse     How do you get to doctors appointments? car, drives self     Are you on dialysis? No     Do you take coumadin? No     Discharge Plan A Home Health                   Pt will have OH.

## 2024-06-12 NOTE — PLAN OF CARE
PT EVAL complete. Required CGA for STS, ambulated 100ft SBA using RW. Recommending low intensity therapy upon d/c.

## 2024-06-13 LAB
ACID FAST MOD KINY STN SPEC: NORMAL
MYCOBACTERIUM SPEC QL CULT: NORMAL

## 2024-06-13 PROCEDURE — G0180 MD CERTIFICATION HHA PATIENT: HCPCS | Mod: ,,, | Performed by: ORTHOPAEDIC SURGERY

## 2024-06-13 NOTE — ANESTHESIA POSTPROCEDURE EVALUATION
Anesthesia Post Evaluation    Patient: Modesta Camacho    Procedure(s) Performed: Procedure(s) (LRB):  REVISION, ARTHROPLASTY, KNEE (Left)  REPLACEMENT, POLYETHYLENE LINER (Left)  SYNOVECTOMY, KNEE (Left)    Final Anesthesia Type: general      Patient location during evaluation: PACU  Patient participation: Yes- Able to Participate  Level of consciousness: awake and alert  Post-procedure vital signs: reviewed and stable  Pain management: adequate  Airway patency: patent  ALIN mitigation strategies: Verification of full reversal of neuromuscular block  PONV status at discharge: No PONV  Anesthetic complications: no      Cardiovascular status: hemodynamically stable  Respiratory status: spontaneous ventilation  Hydration status: euvolemic  Follow-up not needed.              Vitals Value Taken Time   /50 06/12/24 1200   Temp 36.7 °C (98 °F) 06/12/24 1200   Pulse 78 06/12/24 1200   Resp 16 06/12/24 1200   SpO2 94 % 06/12/24 1200         Event Time   Out of Recovery 11:37:09         Pain/Juvenal Score: Pain Rating Prior to Med Admin: 0 (6/12/2024  8:17 AM)  Juvenal Score: 10 (6/12/2024 12:15 PM)

## 2024-06-15 LAB — BACTERIA SPEC AEROBE CULT: NO GROWTH

## 2024-06-16 NOTE — TELEPHONE ENCOUNTER
EMERGENCY MEDICINE PROVIDER NOTE    Patient Identification  Pt Name: Gissel Morales  MRN: [id number]  Birthdate 1949  Date of evaluation: 2024  Provider: MARY ANN COLEMAN DO  PCP: aMckenzie Gilbert MD    Chief Complaint  Fall (Pt fell this am after tripping over shoes this am and hitting ground. EMS called and helped pt up but declined transport at this time. Pt stated increased pain to left shoulder with movement so she came in to get checked out. )      HPI  (History provided by patient)  This is a 74 y.o. female who was brought in by self for left shoulder pain status post fall.  Patient states she was walking when she tripped over her shoes and fell.  She did not lose consciousness.  Patient denies hip, back, neck or head pain.  She denies hitting her head.  Patient denies numbness or tingling.  She has pain when she flexes her left arm and also has pain when she tries to raise her left shoulder.  The pain is severe and she is more comfortable sitting in a wheelchair and not lying on the gurney.  Patient denies any numbness or tingling of her left arm.  Patient does state her left arm is weak.    I have reviewed the following nursing documentation:  Allergies: Patient has no known allergies.    Past medical history:   Past Medical History:   Diagnosis Date    Hyperlipidemia     Hypertension     Thyroid disease     hypothyroid     Past surgical history:   Past Surgical History:   Procedure Laterality Date     SECTION      CHOLECYSTECTOMY      COLONOSCOPY  3/6/12    polyps X 2    COLONOSCOPY  2016    normal    DILATION AND CURETTAGE OF UTERUS  2008    SHOULDER SURGERY  2008       Home medications:   Previous Medications    ASPIRIN 81 MG TABLET    Take 81 mg by mouth daily    LEVOTHYROXINE (SYNTHROID) 25 MCG TABLET    Take 25 mcg by mouth Daily.      LOVASTATIN (MEVACOR) 20 MG TABLET    Take 20 mg by mouth nightly.      MELOXICAM (MOBIC) 15 MG TABLET    Take 1 tablet by mouth daily     I called the pt to try and reschedule her appt due to Mary HARRIS no longer practicing at [facility] location and there was no answer so , I left a vm stating that her appt has been rescheduled with her PCP  on the same date but, for 8:40 am. I also apologized for any inconvenience. //kah

## 2024-06-17 LAB — FUNGUS SPEC CULT: NORMAL

## 2024-06-19 LAB — BACTERIA SPEC ANAEROBE CULT: NORMAL

## 2024-06-25 DIAGNOSIS — I10 ESSENTIAL HYPERTENSION: ICD-10-CM

## 2024-06-26 ENCOUNTER — PATIENT MESSAGE (OUTPATIENT)
Dept: ORTHOPEDICS | Facility: CLINIC | Age: 59
End: 2024-06-26
Payer: COMMERCIAL

## 2024-06-26 RX ORDER — TELMISARTAN 80 MG/1
80 TABLET ORAL DAILY
Qty: 90 TABLET | Refills: 3 | Status: SHIPPED | OUTPATIENT
Start: 2024-06-26

## 2024-06-27 ENCOUNTER — HOSPITAL ENCOUNTER (OUTPATIENT)
Dept: RADIOLOGY | Facility: HOSPITAL | Age: 59
Discharge: HOME | End: 2024-06-27
Attending: ORTHOPAEDIC SURGERY
Payer: COMMERCIAL

## 2024-06-27 ENCOUNTER — OFFICE VISIT (OUTPATIENT)
Dept: ORTHOPEDICS | Facility: CLINIC | Age: 59
End: 2024-06-27
Payer: COMMERCIAL

## 2024-06-27 VITALS — BODY MASS INDEX: 30.76 KG/M2 | HEIGHT: 62 IN | WEIGHT: 167.13 LBS

## 2024-06-27 DIAGNOSIS — M47.816 FACET ARTHROPATHY, LUMBAR: ICD-10-CM

## 2024-06-27 DIAGNOSIS — M24.662 ARTHROFIBROSIS OF KNEE JOINT, LEFT: ICD-10-CM

## 2024-06-27 DIAGNOSIS — Z96.652 HISTORY OF TOTAL LEFT KNEE REPLACEMENT: ICD-10-CM

## 2024-06-27 DIAGNOSIS — Z96.652 STATUS POST TOTAL LEFT KNEE REPLACEMENT USING CEMENT: Primary | ICD-10-CM

## 2024-06-27 DIAGNOSIS — M54.16 LEFT LUMBAR RADICULOPATHY: ICD-10-CM

## 2024-06-27 PROCEDURE — 73564 X-RAY EXAM KNEE 4 OR MORE: CPT | Mod: TC,LT

## 2024-06-27 PROCEDURE — 73564 X-RAY EXAM KNEE 4 OR MORE: CPT | Mod: 26,LT,, | Performed by: RADIOLOGY

## 2024-06-27 PROCEDURE — 99999 PR PBB SHADOW E&M-EST. PATIENT-LVL IV: CPT | Mod: PBBFAC,,, | Performed by: PHYSICIAN ASSISTANT

## 2024-06-27 PROCEDURE — 73562 X-RAY EXAM OF KNEE 3: CPT | Mod: 26,59,RT, | Performed by: RADIOLOGY

## 2024-06-27 PROCEDURE — 99024 POSTOP FOLLOW-UP VISIT: CPT | Mod: S$GLB,,, | Performed by: PHYSICIAN ASSISTANT

## 2024-06-27 PROCEDURE — 1159F MED LIST DOCD IN RCRD: CPT | Mod: CPTII,S$GLB,, | Performed by: PHYSICIAN ASSISTANT

## 2024-06-27 PROCEDURE — 4010F ACE/ARB THERAPY RXD/TAKEN: CPT | Mod: CPTII,S$GLB,, | Performed by: PHYSICIAN ASSISTANT

## 2024-06-27 PROCEDURE — 1160F RVW MEDS BY RX/DR IN RCRD: CPT | Mod: CPTII,S$GLB,, | Performed by: PHYSICIAN ASSISTANT

## 2024-06-27 RX ORDER — METHOCARBAMOL 500 MG/1
500 TABLET, FILM COATED ORAL NIGHTLY
Qty: 30 TABLET | Refills: 0 | Status: SHIPPED | OUTPATIENT
Start: 2024-06-27

## 2024-06-27 RX ORDER — OXYCODONE AND ACETAMINOPHEN 10; 325 MG/1; MG/1
1 TABLET ORAL EVERY 6 HOURS PRN
Qty: 28 TABLET | Refills: 0 | Status: SHIPPED | OUTPATIENT
Start: 2024-06-27

## 2024-06-27 NOTE — PROGRESS NOTES
Patient ID: Modesta Camacho is a 58 y.o. female.    Chief Complaint: Post-op Evaluation (L TKR 6/12/24/)      HPI: Modesta Camacho  is a 58 y.o. female who c/o Post-op Evaluation (L TKR 6/12/24/)     Post op visit 1   Patient notes pain is 7/10   The patient is doing well since surgery she has pleased with the results   She has been able to advance activity of daily living and thus her quality of life   She has using a walker to assist with ambulation   She notes pain with use and activity as well as therapy she has still taking the whole Percocet and inquires about a refill today   The patient requests an extension of home health until she is able to drive in 2 weeks and then would like to go to outside PT at [facility]   She has been compliant with all postop instructions     Aside patient notes she had recent lumbar MRI with progression of disease   She inquires if there is any outside of [facility] referrals for 2nd opinion that can be sent on her behalf    Patient is presently denying any shortness of breath, chest pain, fever/chills, nausea/vomiting, loss of taste or smell, numbness/tingling or sensation changes, loss of bladder or bowel function, loss of taste/smell.     Surgery: Left Total Knee    Surgery Date:  06/12/2024    Past Medical History:   Diagnosis Date    Abnormal EKG 02/05/2021    Anxiety     Hypertension     Osteoarthritis     Stage 2 chronic kidney disease 02/05/2021     Past Surgical History:   Procedure Laterality Date    ARTHROPLASTY OF HIP BY ANTERIOR APPROACH Left 7/31/2020    Procedure: ARTHROPLASTY, HIP, ANTERIOR APPROACH;  Surgeon: Xavi Gandara MD;  Location: [facility];  Service: Orthopedics;  Laterality: Left;    BLOCK, NERVE, GENICULAR Left 2/14/2024    Procedure: Left genicular nerve block with RN IV sedation;  Surgeon: Shaun Iyer MD;  [facility];  Service: Pain Management;  Laterality: Left;    gastric sleeve      HIP ARTHROPLASTY Right 6/7/2023    Procedure:  ARTHROPLASTY, HIP;  Surgeon: Trey Clifford MD;  Location: [facility] OR;  Service: Orthopedics;  Laterality: Right;    INJECTION OF ANESTHETIC AGENT AROUND NERVE Bilateral 8/14/2023    Procedure: left genicular nerve block RN IV Sedation;  Surgeon: Shaun Iyer MD;  Location: [facility];  Service: Pain Management;  Laterality: Bilateral;    JOINT REPLACEMENT Right 06/14/2016    knee    JOINT REPLACEMENT Left 05/20/2021    KNEE    JOINT REPLACEMENT Left 07/30/2020    HIP    KNEE ARTHROPLASTY Left 5/6/2021    Procedure: ARTHROPLASTY, KNEE;  Surgeon: Xavi Gandara MD;  Location: [facility] OR;  Service: Orthopedics;  Laterality: Left;    REPLACEMENT, POLYETHYLENE LINER Left 6/12/2024    Procedure: REPLACEMENT, POLYETHYLENE LINER;  Surgeon: Trey Clifford MD;  Location: [facility] OR;  Service: General;  Laterality: Left;    RESURFACING OF PATELLA Right 9/27/2022    Procedure: RESURFACING, PATELLA;  Surgeon: Trey Clifford MD;  Location: [facility] OR;  Service: General;  Laterality: Right;    REVISION OF KNEE ARTHROPLASTY Right 9/27/2022    Procedure: REVISION, ARTHROPLASTY, KNEE;  Surgeon: Trey Clifford MD;  Location: [facility] OR;  Service: General;  Laterality: Right;    REVISION OF KNEE ARTHROPLASTY Left 6/12/2024    Procedure: REVISION, ARTHROPLASTY, KNEE;  Surgeon: Trey Clifford MD;  Location: [facility] OR;  Service: General;  Laterality: Left;  POLY EXCHANGE    SELECTIVE INJECTION OF ANESTHETIC AGENT AROUND LUMBAR SPINAL NERVE ROOT BY TRANSFORAMINAL APPROACH Bilateral 5/2/2024    Procedure: Bilateral L4/5 TF CELE;  Surgeon: Shaun Iyer MD;  Location: [facility];  Service: Pain Management;  Laterality: Bilateral;    SYNOVECTOMY OF KNEE Left 6/12/2024    Procedure: SYNOVECTOMY, KNEE;  Surgeon: Trey Clifford MD;  Location: [facility] OR;  Service: General;  Laterality: Left;    TUBAL LIGATION      UTERINE FIBROID EMBOLIZATION       Family History   Problem Relation Name Age of Onset     Hypertension Mother      Arthritis Mother      Diabetes Father      Heart disease Father      Depression Sister      Hypertension Sister      Arthritis Brother      Thyroid disease Son      Hypertension Son      Arthritis Maternal Grandmother      Heart disease Maternal Grandmother      Kidney disease Maternal Grandmother      Heart attack Maternal Grandfather      Stroke Paternal Grandmother      No Known Problems Paternal Grandfather      Eczema Son      Breast cancer Sister  47     Social History     Socioeconomic History    Marital status:      Spouse name: Pop Land    Number of children: 3   Occupational History    Occupation: patient access   Tobacco Use    Smoking status: Never     Passive exposure: Never    Smokeless tobacco: Never   Substance and Sexual Activity    Alcohol use: Yes     Comment: occasional: hold 72hrs. prior to surgery    Drug use: No    Sexual activity: Yes     Partners: Male     Birth control/protection: See Surgical Hx     Social Determinants of Health     Stress: Stress Concern Present (3/16/2020)    [employer] - Occupational Stress Questionnaire     Feeling of Stress : Rather much     Medication List with Changes/Refills   Current Medications    AMLODIPINE (NORVASC) 5 MG TABLET    Take 1 tablet (5 mg total) by mouth 2 (two) times daily.    CLONIDINE (CATAPRES) 0.1 MG TABLET    TAKE 1 TABLET BY MOUTH ONCE DAILY USE FOR SBP GREATER THAN 160 MMHG.    DULOXETINE (CYMBALTA) 60 MG CAPSULE    Take 1 capsule (60 mg total) by mouth 2 (two) times daily.    GABAPENTIN (NEURONTIN) 300 MG CAPSULE    Take 1 capsule (300 mg total) by mouth 3 (three) times daily.    HYDROCHLOROTHIAZIDE (HYDRODIURIL) 25 MG TABLET    Take 25 mg by mouth.    METOPROLOL SUCCINATE (TOPROL-XL) 50 MG 24 HR TABLET    Take 2 tablets (100 mg total) by mouth nightly.    MULTIVIT-MIN/FERROUS FUMARATE (MULTI VITAMIN ORAL)    Take 2 tablets by mouth Daily.    ONDANSETRON (ZOFRAN-ODT) 4 MG TBDL     dissolve 2 tablets (8 mg total) by mouth every 8 (eight) hours as needed (nausea).    ONDANSETRON (ZOFRAN-ODT) 4 MG TBDL    Dissolve 2 tablets (8 mg total) by mouth every 8 (eight) hours as needed (nausea).    OXYCODONE-ACETAMINOPHEN (PERCOCET)  MG PER TABLET    Take 1 tablet by mouth every 6 (six) hours as needed for Pain.    TELMISARTAN (MICARDIS) 80 MG TAB    Take 1 tablet (80 mg total) by mouth once daily.    TIZANIDINE (ZANAFLEX) 4 MG TABLET    Take 0.5-1 tablets (2-4 mg total) by mouth 2 (two) times daily as needed (pain).    VICTOZA 3-DANIEL 0.6 MG/0.1 ML (18 MG/3 ML) PNIJ PEN    SMARTSI.6 Milligram(s) SUB-Q Once    VITAMIN D (VITAMIN D3) 1000 UNITS TAB    Take 1,000 Units by mouth once daily.     Review of patient's allergies indicates:   Allergen Reactions    Codeine Hives and Rash     Takes Tramadol and has never had an issue with SOB/swelling  Also tolerated hydromorphone 2020      Penicillin     Penicillins Hives     Pt tolerated cefazolin 2020       Objective:     Left Lower Extremity  NVI  WWP foot  Comp soft  Cap refill < 2 sec  Calf NT, soft  (-) Gwen sign  ELY  ROM : Patient is able to easily exhibit full flexion and extension on passive range of motion.   Wiggles toes  DF/PF intact  Sensation intact  Inc C/D/I; subQ closure noted  No SOI    IMAGING  FINDINGS:  Bilateral total knee arthroplasty findings noted.  No evidence of loosening.  Alignment anatomic.  No acute osseous abnormality.  No large joint effusion.     Impression:     As above    Assessment:       No diagnosis found.       Plan:       There are no diagnoses linked to this encounter.    Modesta Camacho is an established pt here for postop follow-up after left total knee replacement by Dr. Clifford.  Pain medication was refilled appropriately. The incision was cleaned with hydrogen peroxide.  No staples were removed today as subQ closure noted.  The patient was instructed not to soak the incision in standing water but may  clean the incision with clean running water and antibacterial soap.  Patient should notify the office of any signs or symptoms of infection including fevers, erythema, purulent drainage, increasing pain.  Patient will continue with DVT prophylaxis.  Patient will start outpatient physical therapy.  Will follow-up in 4-6 weeks.  Patient verbalized understanding of all instructions and agreed with the above plan.    No follow-ups on file.    The patient understands, chooses and consents to this plan and accepts all   the risks which include but are not limited to the risks mentioned above.     Disclaimer: This note was prepared using a voice recognition system and is likely to have sound alike errors within the text.

## 2024-07-01 NOTE — PROGRESS NOTES
You are scheduled for an Outpatient Surgical Procedure on 7/17/2024 at [facility] with Dr. Cindy Cole    [address] [address]      Pre Testing   [x] It may be medically necessary to complete Pre-Admission Testing prior to your procedure.  If needed, you will be notified with the appointment details.      Pre Admission Testing Appointments will be scheduled at [facility] or [facility].     Preparing for your Surgery    [] Stop Taking Blood Thinners such as Aspirin, Coumadin, Plavix, Ibuprofen 5 Days Prior to Procedure   [] Stop Taking Blood Thinners such as Pradaxa, Xarelto, Eliquis, Savaysa 2 Days Prior to Procedure   [] You May take Morning Heart Medications with few sips of water      [x] You Will need someone to drive you home after your procedure  [x] You May NOT Eat or Drink Anything after Midnight     Arrival Time    THE DAY BEFORE your procedure call the hospital [phone] after 2 pm for your official arrival time    Check In    Enter through the Main Lobby and take the visitor elevator to the Second Floor (Same Day Surgery Floor)    Please arrive promptly at your scheduled time and the staff at the surgery desk will assist you.      Follow Up    [x] A Post Operative Visit with Dr. Cole is scheduled on 8/1/2024 at 1:15pm       ? Questions / Concerns   Please feel free to Contact Dr. Cole's office at [phone]                       LM in regard to Fit Kit mail out.  Portal message sent.

## 2024-07-05 ENCOUNTER — EXTERNAL HOME HEALTH (OUTPATIENT)
Dept: HOME HEALTH SERVICES | Facility: HOSPITAL | Age: 59
End: 2024-07-05
Payer: COMMERCIAL

## 2024-07-09 ENCOUNTER — PATIENT MESSAGE (OUTPATIENT)
Dept: ORTHOPEDICS | Facility: CLINIC | Age: 59
End: 2024-07-09
Payer: COMMERCIAL

## 2024-07-09 RX ORDER — METHOCARBAMOL 500 MG/1
500 TABLET, FILM COATED ORAL NIGHTLY
Qty: 30 TABLET | Refills: 0 | Status: SHIPPED | OUTPATIENT
Start: 2024-07-09

## 2024-07-09 RX ORDER — OXYCODONE AND ACETAMINOPHEN 10; 325 MG/1; MG/1
1 TABLET ORAL EVERY 6 HOURS PRN
Qty: 28 TABLET | Refills: 0 | Status: SHIPPED | OUTPATIENT
Start: 2024-07-09

## 2024-07-14 ENCOUNTER — PATIENT MESSAGE (OUTPATIENT)
Dept: ORTHOPEDICS | Facility: CLINIC | Age: 59
End: 2024-07-14
Payer: COMMERCIAL

## 2024-07-15 ENCOUNTER — PATIENT MESSAGE (OUTPATIENT)
Dept: ADMINISTRATIVE | Facility: HOSPITAL | Age: 59
End: 2024-07-15
Payer: COMMERCIAL

## 2024-07-16 ENCOUNTER — PATIENT MESSAGE (OUTPATIENT)
Dept: PAIN MEDICINE | Facility: CLINIC | Age: 59
End: 2024-07-16
Payer: COMMERCIAL

## 2024-07-16 ENCOUNTER — TELEPHONE (OUTPATIENT)
Dept: DERMATOLOGY | Facility: CLINIC | Age: 59
End: 2024-07-16
Payer: COMMERCIAL

## 2024-07-16 NOTE — PROGRESS NOTES
Interventional Pain Progress Note       Chief Pain Complaint:  Low back pain     Interval History (7/18/2024):  Patient Modesta Camacho presents today for follow-up visit.  Patient was last seen on  5/2/2024 Bilateral L4/5 TF CELE with 50% relief x 2 weeks.  Status left knee replacement with Dr. Clifford. Since her knee replacement she is not having pain into the legs. She rates her pain today 5.5/10. pain is primarily axial in the lower back and does not radiate into the legs. Pain is worse with prolonged standing and walking.   Patient denies night fever/night sweats, urinary incontinence, bowel incontinence, significant weight loss and significant motor weakness.   Patient denies any other complaints or concerns at this time.        Interval History (03/14/2024):  Patient returns to clinic after procedure.  Patient reports 90% relief after left genicular nerve block with phenol on 02/14/2024.  Patient reports that left knee pain is well-controlled currently.  Primary pain today is lower back pain.  Pain is described as a aching stabbing pain that starts in the band across her lower back.  This pain radiates up to the lower thoracic area into her bilateral posterior thighs.  Pain is worse with prolonged sitting and standing, better with lying supine and ice and heat.  Pain is currently rated a 5/10, but can increase to an 8/10 with exacerbating activities. Denies any fevers, chills, changes in gait, saddle anesthesia, or bowel and bladder incontinence        Interval History (01/11/2024):  Patient Modesta Camacho presents today for follow-up visit.  Patient's chief complaint today is left knee pain which has been chronic.  She had a left genicular block on 08/14/2023 which provided 80% relief for 4 months.  She is interested in repeating the block.  She rates her pain today a 7/10.  She also has chronic lower back pain which will radiate down the posterior aspect of the left lower extremity down to the foot.  She  recently had a nerve conduction study which showed some irritation of the L5-S1 nerve root.  Dr. Clifford and then ordered a lumbar MRI and she is scheduled to get that next week.  Patient denies night fever/night sweats, urinary incontinence, bowel incontinence, significant weight loss and significant motor weakness.   Patient denies any other complaints or concerns at this time.    Interval History (1/11/2024):  Patient returns to clinic after procedure.  Patient reports 80% relief in left knee pain after left genicular nerve block on 08/14/2023.  Patient reports occasional catching sensations in her left knee as well as aching pain along the medial aspect.  She denies any significant radiation of this pain.  She denies any significant numbness or tingling associated with this pain.  Pain is worse with prolonged standing and walking, better with rest and heat.  Pain is currently rated a 4/10. Denies any fevers, chills, changes in gait, saddle anesthesia, or bowel and bladder incontinence    Interval History (7/24/2023):  Modesta Camacho presents today for follow-up visit.  Patient was last seen on 05/23/2023. At last visit, she was to follow up with Dr. Clifford. She underwent right hip arthroplasty with Dr. Clifford on 06/07/2023. Currently enrolled in physical therapy, has 6 weeks remaining. So far she has had her first post op after hip arthroplasty on 06/30/2023, next post op in August. Currently prescribed Endocet and Gabapentin for post op pain.     Patient reports pain as 8/10 today. She continues to report left knee pain. She previously underwent left TKA with Dr. Gandara. She has spoken to Dr. Clifford about this knee before, but it was previously replaced recently and Dr. Clifford is hesitant to go in again so soon.       Interval History (5/23/2023):  Modesta Camacho presents today via telemed for follow-up visit.  Patient was last seen on 2/21/2023. She reports worsening left knee pain. She reports  it keeps feeling as though it is popping out of place. She reports it began swelling last Friday and since then she can hardly bear weight. She has been resting, icing, elevating, and wearing her brace which has reduced the swelling and inflammation somewhat. She has spoken to Dr. Clifford about this knee before, but it was previously replaced recently and Dr. Clifford is hesitant to go in again so soon. Patient reports pain as 8/10 today.  She is scheduled for right hip arthroplasty with Dr. Clifford on 06/07/2023.    Interval HPI 02/21/2023    Modesta Camacho presents today for follow-up visit.  Patient was last seen on 11/8/2022. Patient reports pain as 6/10 today. She reports persistent left knee pain, previously underwent left TKA with Dr. Gandara, states she has not seen him in over 2 years as he was dismissive of her continued pain after surgery. Sees Dr. Clifford for hip, but may also consider revision of left knee. Hx of right knee TKA with revision 9/22. Right knee is doing well. Last visit with Dr. Clifford on 02/13/2023. Considering right hip replacement in May/June. She also reports continued lower back pain, axial in nature, deep achy and without radiation into her lower extremities. Requesting refills of her Gabapentin, Nabumetone, and Tizanidine.    Interval History (11/08/2022):  Modesta Camacho presents today for follow-up visit.  Patient was last seen on 01/05/2022. Patient reports pain as 6/10 today. Underwent revision of her right knee with Dr. Clifford 09/27/2022. Referred back to [facility] for low back pain by Dr. Clifford. Patient reports pain as axial in nature across her lower lumbar spine without radiation into her lower extremities. Pain is constant and interferes with daily activities. Taking cymbalta and Gabapentin with minimal relief. Tizanidine helpful in the past, she is out of this medication. Not interested in injections at this time.    X-ray lumbar spine  FINDINGS:  AP, lateral and  coned down radiographs of the lumbar spine demonstrate no evidence for acute fracture or dislocation.  Persistent grade 1 anterolisthesis of L4 with respect L5 is again identified.  Moderate to severe posterior facet hypertrophic changes are identified at L5/S1.  Remaining intervertebral disk spaces and vertebral body heights are well-preserved.  Visualized SI joints are intact.  Patient is status post right hip arthroplasty.  Multiple calcified phleboliths are identified.  Multiple clips are identified in the epigastric region.     Impression:     Degenerative disease, most prominent at the lower lumbar spine.  Overall, no significant interval change.       Interval History (1/5/2022):  Modesta Camacho presents today for follow-up visit.  She is here today to review lumbar MRI.  She continues to have RLE pain.  She saw Dr. Rodriguez on 12/21/2021, who referred her to have an ultrasound-guided right hip injection by Dr. Acevedo.  She recently had this procedure with short-term pain relief, but it did not help the sciatica pain into the foot.  She was also referred to Dr. Clifford (Orthopedics) for evaluation for right hip replacement.  To note, patient reports history of left hip replacement and bilateral knee replacement in the past.  She has been doing physical therapy twice a week, but she does not feel this has been overly helpful.      Initial HPI (11/09/2021):  Modesta Camacho is a 56 y.o. female  who is presenting with a chief complaint of low back pain. The patient began experiencing this problem insidiously, and the pain has been gradually worsening over the past 6 month(s). The pain is described as throbbing, shooting, burning and electrical and is located in the right lumbar spine . Pain is intermittent and lasts hours. The pain radiates to right leg L4 distribution. The patient rates her pain a 7 out of ten and interferes with activities of daily living a 8 out of ten. Pain is exacerbated by flexion of  the lumbar spine, and is improved by rest. Patient reports no prior trauma, prior hip surgery Left Hip replacement 6/2020 [facility]. Right Knee Replacement 5/2021.     - pertinent negatives: No fever, No chills, No weight loss, No bladder dysfunction, No bowel dysfunction, No saddle anesthesia  - pertinent positives: right leg weakness    - medications, other therapies tried (physical therapy, injections):     >> NSAIDs, Tylenol, Tramadol, gabapentin, zanaflex and robaxin    >> Has previously undergone Physical Therapy    >>  Injections:    -02/14/2024: Left genicular nerve block with phenol, 90% relief  -08/14/2023:  Left genicular nerve block, 80% relief   - ultrasound-guided right hip injection by Dr. Acevedo on 12/21/2021 with short-term pain relief   5/2/2024 Bilateral L4/5 TF CELE with 50% relief x 2 weeks.    Imaging / Labs / Studies (reviewed on 7/18/2024):      Results for orders placed during the hospital encounter of 01/24/24    MRI Lumbar Spine Without Contrast    Narrative  EXAMINATION:  MRI LUMBAR SPINE WITHOUT CONTRAST    CLINICAL HISTORY:  lumbar radiculopathy; Anesthesia of skin    TECHNIQUE:  Multiplanar, multisequence MR images were acquired from the thoracolumbar junction to the sacrum without contrast.    COMPARISON:  MRI lumbar spine 11/16/2021    FINDINGS:  Alignment: Progressed grade 1 L4-L5 anterolisthesis.    Vertebrae: Lumbar vertebral body heights are maintained.  No abnormal osseous edema or evidence of an acute fracture.  Marrow signal is within normal limits.    Discs: L4-L5 disc desiccation.  Disc heights appear maintained.    Cord: Within normal limits.  Conus terminates at L1.    Degenerative findings:    T12-L1: Minor asymmetric left disc bulge.  No significant spinal canal stenosis or neural foraminal stenosis.    L1-L2: No significant posterior disc bulge, spinal canal stenosis or neural foraminal stenosis.    L2-L3: Mild broad-based posterior disc bulge and mild  bilateral facet arthropathy.  No significant spinal canal stenosis.  Unchanged mild left-sided neural foraminal stenosis.    L3-L4: Mild broad-based posterior disc bulge and mild bilateral facet arthropathy.  No significant spinal canal stenosis.  Unchanged mild left-sided neural foraminal stenosis.    L4-L5: Progressed grade 1 degenerative appearing anterolisthesis with roughly 6 mm of posterior disc space uncovering.  Minimal broad-based posterior disc bulge and bilateral facet arthropathy with ligamentum flavum hypertrophy contributes to similar mild spinal canal stenosis with narrowing of the lateral recesses.  There is similar moderate to severe right and mild-to-moderate left-sided neural foraminal stenosis.    L5-S1: No significant posterior disc bulge.  Mild bilateral facet arthropathy.  No significant spinal canal stenosis or neural foraminal stenosis.    Paraspinal muscles & soft tissues: Within normal limits.  1.6 cm left adnexal cyst.    Impression  1. Progressed grade 1 L4-L5 anterolisthesis with similar mild spinal canal stenosis, moderate to severe right and mild-to-moderate left-sided neural foraminal stenosis.  2. Similar mild left-sided L2-L3 and L3-L4 neural foraminal stenosis.  3. Small left adnexal cyst.  Pelvic ultrasound could be utilized for additional assessment as clinically indicated.      Electronically signed by: Kade Caruso  Date:    01/24/2024  Time:    16:56         11/16/2021 MRI Lumbar Spine Without Contrast  FINDINGS:  Image quality is degraded by motion, lowering exam sensitivity and specificity.  Interpretation is offered within these confines.    Alignment: There is mild grade 1 anterolisthesis of L4 on L5.  There is slight leftward convexity of the lumbar spine.    Vertebrae: Diffuse loss normal T1 hyperintense marrow signal may be related to chronic anemia or other causes of red marrow hyperplasia, correlate clinically.  No evidence of fracture.    Discs: Normal height and  signal.    Cord: Normal.  Conus terminates at T12-L1    Degenerative findings:    T12-L1:  No spinal canal or neuroforaminal stenosis.    L1-L2:  No spinal canal or neuroforaminal stenosis.    L2-L3: Mild generalized disc bulge. No spinal canal or neuroforaminal stenosis.    L3-L4: Mild generalized disc bulge.  Mild bilateral facet arthropathy. No spinal canal or neuroforaminal stenosis.    L4-L5: Severe bilateral facet arthropathy with some uncovering of the intervertebral disc and thickening of the ligamentum flavum.  Moderate bilateral neural foraminal narrowing.  No spinal canal stenosis.    L5-S1: Moderate bilateral facet arthropathy. No spinal canal or neuroforaminal stenosis..    Paraspinal muscles & soft tissues: Unremarkable.    Impression  1. Grade 1 anterolisthesis of L4 on L5 secondary to facet arthropathy with associated moderate bilateral neural foraminal narrowing at this level.  2. Other multilevel degenerative findings as above      7/31/20 X-Ray Hip 2 or 3 views Left  COMPARISON:  Prior radiograph from May 20, 2020.  FINDINGS:  Interval total left hip arthroplasty with soft tissue air in the area.  There is normal alignment of the joint.  Densely calcified uterine leiomyomata are unchanged.  There also calcified phleboliths within the pelvis.  Impression  Normal alignment of the total left hip arthroplasty that has been placed in the interval.      11/24/2021 X-Ray Hip 2 or 3 views Right (with Pelvis when performed)  COMPARISON:  02/11/2021  FINDINGS:  There are multiple calcified fibroid seen projecting over the uterus.  There also multiple calcified pelvic phleboliths.  There is a single surgical clips seen to the right of the midline.  A left total hip arthroplasty is noted.  There is moderate to severe superolateral joint space narrowing involving the right hip with osteophyte formation noted.  Minimal subchondral cystic changes seen on either side of the right hip joint.      5/01/15 X-Ray  "Cervical Spine AP And Lateral  Findings:  The reversal of the lordotic curvature of the cervical spine secondary to moderate degenerative disk disease at C4-5 and C5-6.  Chronic anterior wedging of the C4 and C5 vertebral bodies.  Associated endplate sclerosis and uncovertebral joint  hypertrophy. The posterior elements are intact.  IMPRESSION:  No radiographic evidence of fracture or subluxation.  Moderate degenerative disk disease at C4-5 and C5-6.        Review of Systems:  CONSTITUTIONAL: patient denies any fever, chills, or weight loss  SKIN: patient denies any rash or itching  RESPIRATORY: patient denies having any shortness of breath  GASTROINTESTINAL: patient denies having any diarrhea, constipation, or bowel incontinence  GENITOURINARY: patient denies having any abnormal bladder function    MUSCULOSKELETAL:  - patient complains of the above noted pain/s (see chief pain complaint)    NEUROLOGICAL:   - pain as above  - strength in Lower extremities is intact, BILATERALLY  - sensation in Lower extremities is intact, BILATERALLY  - patient denies any loss of bowel or bladder control      PSYCHIATRIC: patient denies any change in mood    Other:  All other systems reviewed and are negative      Physical Exam:  Telemedicine Exam, physical exam from previous in office visit  Vitals:    07/18/24 1326   BP: 121/82   BP Location: Right arm   Patient Position: Sitting   Pulse: 86   Weight: 73.8 kg (162 lb 11.2 oz)   Height: 5' 2" (1.575 m)       Body mass index is 29.76 kg/m².   (reviewed on 7/18/2024)     Vitals:    07/18/24 1326   BP: 121/82   BP Location: Right arm   Patient Position: Sitting   Pulse: 86   Weight: 73.8 kg (162 lb 11.2 oz)   Height: 5' 2" (1.575 m)           Body mass index is 29.76 kg/m².   (reviewed on 7/18/2024)     Physical Exam  Constitutional:       Appearance: Normal appearance.   HENT:      Head: Normocephalic and atraumatic.   Eyes:      Extraocular Movements: Extraocular movements intact. "      Pupils: Pupils are equal, round, and reactive to light.   Cardiovascular:      Pulses: Normal pulses.   Pulmonary:      Effort: Pulmonary effort is normal.   Skin:     General: Skin is warm.   Neurological:      Mental Status: She is alert and oriented to person, place, and time.      Sensory: No sensory deficit.      Motor: Weakness present. No abnormal muscle tone.      Gait: Gait abnormal.      Deep Tendon Reflexes:      Reflex Scores:       Patellar reflexes are 2+ on the right side and 2+ on the left side.       Achilles reflexes are 1+ on the right side and 2+ on the left side.     Comments: 4/5 strength in right knee extension and right knee flexion   Psychiatric:         Mood and Affect: Mood normal.         Behavior: Behavior normal.         Musculoskeletal:        Lumbar Exam  Incision: no  Pain with Flexion: yes  Pain with Extension: yes  ROM:  Decreased  Paraspinous TTP:  Positive bilaterally  Facet TTP:  L4-5  Facet Loading:  Positive bilaterally  SLR:  Positive on the right at 75°  SIJ TTP:  Negative bilaterally  GALLO:  Negative bilaterally      Assessment:    Modesta Camacho is a 58 y.o. year old female who is presenting with       ICD-10-CM ICD-9-CM    1. Status post left knee replacement  Z96.652 V43.65       2. Lumbar radiculopathy  M54.16 724.4 gabapentin (NEURONTIN) 300 MG capsule      3. DDD (degenerative disc disease), lumbar  M51.36 722.52 gabapentin (NEURONTIN) 300 MG capsule      tiZANidine (ZANAFLEX) 4 MG tablet      4. LANE (generalized anxiety disorder)  F41.1 300.02 DULoxetine (CYMBALTA) 60 MG capsule      5. Knee pain, unspecified chronicity, unspecified laterality  M25.569 719.46 tiZANidine (ZANAFLEX) 4 MG tablet      6. Primary osteoarthritis of right hip  M16.11 715.15 tiZANidine (ZANAFLEX) 4 MG tablet      7. Lumbar spondylosis  M47.816 721.3 Case Request-RAD/Other Procedure Area: Bilateral L3-5  MBB #1 with RN IV sedation            Status post left knee replacement    Lumbar  radiculopathy  -     gabapentin (NEURONTIN) 300 MG capsule; Take 1 capsule (300 mg total) by mouth 3 (three) times daily.  Dispense: 90 capsule; Refill: 2    DDD (degenerative disc disease), lumbar  -     gabapentin (NEURONTIN) 300 MG capsule; Take 1 capsule (300 mg total) by mouth 3 (three) times daily.  Dispense: 90 capsule; Refill: 2  -     tiZANidine (ZANAFLEX) 4 MG tablet; Take 0.5-1 tablets (2-4 mg total) by mouth 2 (two) times daily as needed (pain).  Dispense: 90 tablet; Refill: 2    LANE (generalized anxiety disorder)  -     DULoxetine (CYMBALTA) 60 MG capsule; Take 1 capsule (60 mg total) by mouth 2 (two) times daily.  Dispense: 60 capsule; Refill: 2    Knee pain, unspecified chronicity, unspecified laterality  -     tiZANidine (ZANAFLEX) 4 MG tablet; Take 0.5-1 tablets (2-4 mg total) by mouth 2 (two) times daily as needed (pain).  Dispense: 90 tablet; Refill: 2    Primary osteoarthritis of right hip  -     tiZANidine (ZANAFLEX) 4 MG tablet; Take 0.5-1 tablets (2-4 mg total) by mouth 2 (two) times daily as needed (pain).  Dispense: 90 tablet; Refill: 2    Lumbar spondylosis  -     Case Request-RAD/Other Procedure Area: Bilateral L3-5  MBB #1 with RN IV sedation              Plan:  1. Interventional: discussed case with Dr. Iyer. Will schedule for bilateral L3-5 MBB #1 for axial low back pain  Explained the risks and benefits of the procedure in detail with the patient today in clinic along with alternative treatment options, and the patient elected to pursue the intervention.         -02/14/2024: Left genicular nerve block with phenol, 90% relief  -08/14/2023:  Left genicular nerve block, 80% relief x 4 months  - S/p ultrasound-guided right hip injection by Dr. Acevedo on 12/21/2021 with short-term pain relief.      2. Pharmacologic:   - D/c Topamax- dry mouth  -Refill Gabapentin 300 mg TID We have reviewed potential side effects of this medication including daytime somnolence, weight gain and  peripheral edema    - Continue Tramadol 50 mg Po BID PRN (60 tabs) - from Rheumatology.   - Refill  Cymbalta 60 mg PO BID. -We have discussed potential common side effects of duloxetine including nausea, diarrhea/constipation, fatigue, drowsiness or dry mouth.  Patient expresses understanding.    -Refill given for tizanidine  We have discussed potential deleterious side effects associated with this medication including  dizziness, drowsiness, dry mouth or tingling sensation in the upper or lower extremities.         3. Rehabilitative:   Encouraged ambulation. Continue physical therapy to help with strengthening, although patient reports not helping with pain.   Patient previously informed that we will fill out [facility] paperwork for physical therapy and procedures, but we will not fill out paperwork for disability.  Our goal is to improve pain to continue job responsibility.     4. Diagnostic:   -MRI lumbar spine reviewed and findings discussed with patient.  Significant for grade 1 anterolisthesis of L4 upon L5.  There is no diffuse facet arthropathy with posterior disc bulge at L4-5 resulting in mild canal stenosis in right-greater-than-left foraminal stenosis.  Additionally there was broad-based disc bulge at L3-L4    5. Follow up:  will call day after procedure        - This condition does not require this patient to take time off of work, and the primary goal of our Pain Management services is to improve the patient's functional capacity.   - I discussed the risks, benefits, and alternatives to potential treatment options. All questions and concerns were fully addressed today in clinic.       Maria Teresa Bazan NP   Interventional Pain Medicine  [facility]      Disclaimer:  This note was prepared using voice recognition system and is likely to have sound alike errors that may have been overlooked even after proof reading.  Please call me with any questions.

## 2024-07-18 ENCOUNTER — OFFICE VISIT (OUTPATIENT)
Dept: PAIN MEDICINE | Facility: CLINIC | Age: 59
End: 2024-07-18
Payer: COMMERCIAL

## 2024-07-18 VITALS
SYSTOLIC BLOOD PRESSURE: 121 MMHG | WEIGHT: 162.69 LBS | HEART RATE: 86 BPM | HEIGHT: 62 IN | BODY MASS INDEX: 29.94 KG/M2 | DIASTOLIC BLOOD PRESSURE: 82 MMHG

## 2024-07-18 DIAGNOSIS — M25.569 KNEE PAIN, UNSPECIFIED CHRONICITY, UNSPECIFIED LATERALITY: ICD-10-CM

## 2024-07-18 DIAGNOSIS — M47.816 LUMBAR SPONDYLOSIS: ICD-10-CM

## 2024-07-18 DIAGNOSIS — M16.11 PRIMARY OSTEOARTHRITIS OF RIGHT HIP: ICD-10-CM

## 2024-07-18 DIAGNOSIS — M51.36 DDD (DEGENERATIVE DISC DISEASE), LUMBAR: ICD-10-CM

## 2024-07-18 DIAGNOSIS — M54.16 LUMBAR RADICULOPATHY: ICD-10-CM

## 2024-07-18 DIAGNOSIS — Z96.652 STATUS POST LEFT KNEE REPLACEMENT: Primary | ICD-10-CM

## 2024-07-18 DIAGNOSIS — F41.1 GAD (GENERALIZED ANXIETY DISORDER): ICD-10-CM

## 2024-07-18 PROCEDURE — 3079F DIAST BP 80-89 MM HG: CPT | Mod: CPTII,S$GLB,, | Performed by: NURSE PRACTITIONER

## 2024-07-18 PROCEDURE — 3008F BODY MASS INDEX DOCD: CPT | Mod: CPTII,S$GLB,, | Performed by: NURSE PRACTITIONER

## 2024-07-18 PROCEDURE — 3074F SYST BP LT 130 MM HG: CPT | Mod: CPTII,S$GLB,, | Performed by: NURSE PRACTITIONER

## 2024-07-18 PROCEDURE — 99214 OFFICE O/P EST MOD 30 MIN: CPT | Mod: S$GLB,,, | Performed by: NURSE PRACTITIONER

## 2024-07-18 PROCEDURE — 4010F ACE/ARB THERAPY RXD/TAKEN: CPT | Mod: CPTII,S$GLB,, | Performed by: NURSE PRACTITIONER

## 2024-07-18 PROCEDURE — 99999 PR PBB SHADOW E&M-EST. PATIENT-LVL III: CPT | Mod: PBBFAC,,, | Performed by: NURSE PRACTITIONER

## 2024-07-18 RX ORDER — GABAPENTIN 300 MG/1
300 CAPSULE ORAL 3 TIMES DAILY
Qty: 90 CAPSULE | Refills: 2 | Status: SHIPPED | OUTPATIENT
Start: 2024-07-18

## 2024-07-18 RX ORDER — DULOXETIN HYDROCHLORIDE 60 MG/1
60 CAPSULE, DELAYED RELEASE ORAL 2 TIMES DAILY
Qty: 60 CAPSULE | Refills: 2 | Status: SHIPPED | OUTPATIENT
Start: 2024-07-18

## 2024-07-18 RX ORDER — TIZANIDINE 4 MG/1
2-4 TABLET ORAL 2 TIMES DAILY PRN
Qty: 90 TABLET | Refills: 2 | Status: SHIPPED | OUTPATIENT
Start: 2024-07-18

## 2024-07-19 ENCOUNTER — TELEPHONE (OUTPATIENT)
Dept: PAIN MEDICINE | Facility: CLINIC | Age: 59
End: 2024-07-19
Payer: COMMERCIAL

## 2024-07-19 ENCOUNTER — DOCUMENT SCAN (OUTPATIENT)
Dept: HOME HEALTH SERVICES | Facility: HOSPITAL | Age: 59
End: 2024-07-19
Payer: COMMERCIAL

## 2024-07-19 NOTE — TELEPHONE ENCOUNTER
Call to schedule pt injection with Dr Castillo, no answer left vm for pt to call office back to schedule injection.    .Aidan RANKIN

## 2024-07-19 NOTE — TELEPHONE ENCOUNTER
----- Message from Maria Teresa Bazan NP sent at 7/18/2024  2:01 PM CDT -----  Pain Provider:Jaylon pt- waiting to hear back from Anneliese on if patient can switch providers  Patient Name: Modesta Camacho  MRN: [id number]  Case:bilateral L3-5 MBB  Level:L3-5  Laterality:bilateral  Anticoagulation:none      Call day after procedure for %

## 2024-07-22 ENCOUNTER — CLINICAL SUPPORT (OUTPATIENT)
Dept: REHABILITATION | Facility: HOSPITAL | Age: 59
End: 2024-07-22
Payer: COMMERCIAL

## 2024-07-22 DIAGNOSIS — Z96.652 STATUS POST TOTAL LEFT KNEE REPLACEMENT USING CEMENT: ICD-10-CM

## 2024-07-22 PROCEDURE — 97112 NEUROMUSCULAR REEDUCATION: CPT | Mod: PN

## 2024-07-22 PROCEDURE — 97161 PT EVAL LOW COMPLEX 20 MIN: CPT | Mod: PN

## 2024-07-23 PROBLEM — R29.898 WEAKNESS OF RIGHT HIP: Status: RESOLVED | Noted: 2023-07-06 | Resolved: 2024-07-23

## 2024-07-23 PROBLEM — Z96.641 S/P TOTAL RIGHT HIP ARTHROPLASTY: Status: RESOLVED | Noted: 2023-07-06 | Resolved: 2024-07-23

## 2024-07-23 NOTE — PROGRESS NOTES
[facility] Plan of [facility]   Physical Therapy Initial Evaluation    Date: 7/22/2024     Name: Modesta Camacho  Clinic Number: [id number]  Therapy Diagnosis:   Encounter Diagnosis   Name Primary?    Status post total left knee replacement using cement       Physician: Kitty García PA      Physician Orders: PT Eval and Treat  Medical Diagnosis from Referral: S/p right total knee replacement  Evaluation Date: 7/22/2024  Authorization Period Expiration: 6/27/2025  Plan of Care Expiration: 9/20/2024   Progress Update: 08/21/2024   Visit # / Visits authorized: 1 / 1   FOTO: 7/22/2024 - Scored: 1 / 3       Time In: 0630  Time Out: 0725  Total Appointment Time (timed & untimed codes): 55 minutes    SUBJECTIVE     Date of onset: 06/12/2024  Chief Complaint: left knee pain    History of current condition - Modesta is a 58 y.o. female who presents to physical therapy reporting left knee replacement and doing well with it.      Falls: [x] No  [] Yes:     Imaging: [x] Xray [] MRI [] CT: Reviewed    Prior Therapy:  [] No  [x] Yes:   Social History: Pt lives with their family [] House [] Apartment/Condo []other  Stairs: [x] No  [] Yes:   Occupation:   Prior Level of Function: Independent with all activities of daily living  Current Level of Function: Difficulty with sit to stand and decreased leg strength    Pain:  Current 3/10  Location: [] Right [x] Left [] Bilateral: knee  Description: ache   Aggravating Factors: bending and standing  Easing Factors: activity avoidance, rest, medication    Pts goals: Pt reported goals are to improve pain and function    _______________________________________________________  Medical History:   Past Medical History:   Diagnosis Date    Abnormal EKG 02/05/2021    Anxiety     Hypertension     Osteoarthritis     Stage 2 chronic kidney disease 02/05/2021       Surgical History:   Modesta Camacho  has a past surgical history that  includes gastric sleeve; Uterine fibroid embolization; Tubal ligation; Arthroplasty of hip by anterior approach (Left, 7/31/2020); Knee Arthroplasty (Left, 5/6/2021); Joint replacement (Right, 06/14/2016); Joint replacement (Left, 05/20/2021); Joint replacement (Left, 07/30/2020); Revision of knee arthroplasty (Right, 9/27/2022); Resurfacing of patella (Right, 9/27/2022); Hip Arthroplasty (Right, 6/7/2023); Injection of anesthetic agent around nerve (Bilateral, 8/14/2023); block, nerve, genicular (Left, 2/14/2024); Selective injection of anesthetic agent around lumbar spinal nerve root by transforaminal approach (Bilateral, 5/2/2024); Revision of knee arthroplasty (Left, 6/12/2024); replacement, polyethylene liner (Left, 6/12/2024); and Synovectomy of knee (Left, 6/12/2024).    Medications:   Modesta has a current medication list which includes the following prescription(s): amlodipine, clonidine, duloxetine, gabapentin, hydrochlorothiazide, methocarbamol, metoprolol succinate, multivit-min/ferrous fumarate, ondansetron, ondansetron, oxycodone-acetaminophen, telmisartan, tizanidine, victoza 3-jun, and vitamin d, and the following Facility-Administered Medications: ondansetron.    Allergies:   Review of patient's allergies indicates:   Allergen Reactions    Codeine Hives and Rash     Takes Tramadol and has never had an issue with SOB/swelling  Also tolerated hydromorphone 7/2020      Penicillin     Penicillins Hives     Pt tolerated cefazolin 7/2020          OBJECTIVE     Sensation:  Sensation is [x] Intact [] Impaired-- to light touch    KNEE-   Knee   Range of Motion Right   Left   Goal   Hyper - Zero - Flexion (cold)   0-0-125 0-(-5)-100 0-0-130º   (*) pain and/        LE STRENGTH -   Lower Extremity  Strength RIGHT   Goal   Hip Flexion  []1  []2  []3  [x]4  []5                []+ []- 5/5 B    Hip Extension  []1  []2  []3  [x]4  []5                []+ []- 5/5 B   Hip Abduction  []1  []2  []3  [x]4  []5                 []+ []- 5/5 B   Knee Flexion []1  []2  []3  []4  [x]5                []+ []- 5/5 B   Knee Extension []1  []2  []3  []4  [x]5                []+ []- 5/5 B   Ankle Dorsiflexion []1  []2  []3  []4  [x]5                []+ []- 5/5 B   Ankle Plantarflexion []1  []2  []3  []4  [x]5                []+ []- 5/5 B     Lower Extremity  Strength LEFT   Goal   Hip Flexion  []1  []2  []3  [x]4  []5                []+ []- 5/5 B    Hip Extension  []1  []2  []3  [x]4  []5                []+ []- 5/5 B   Hip Abduction  []1  []2  []3  [x]4  []5                []+ []- 5/5 B   Knee Flexion []1  []2  [x]3  []4  []5                [x]+ []- 5/5 B   Knee Extension []1  []2  []3  [x]4  []5                []+ [x]- 5/5 B   Ankle Dorsiflexion []1  []2  []3  [x]4  []5                []+ []- 5/5 B   Ankle Plantarflexion []1  []2  []3  [x]4  []5                []+ []- 5/5 B        FUNCTION:     Intake Outcome Measure for FOTO Knee Survey    Therapist reviewed FOTO scores for Modesta Camacho on 7/22/2024.   FOTO documents entered into [employer] - see Media section.    Intake Score: 40%         TREATMENT     Total Treatment time separate from Evaluation: (24) minutes    Modesta received the following interventions:     CPT Intervention  Joint:   Focus Duration / Intensity         NuStep       Shuttle squat  5 bands 2 minutes     Lunge on steps   x 10    NMR  Heel slides with strap assist X30     NMR Quad sets 3x10    NMR SLR  2x10    NMR Bridges  3x10    LTR  x20    NMR  Clamshells  3x10 RTB      Prone hip extension  2x10 NP      Prone TKE  3x10 NP   CPT Codes available for Billing:   (00) minutes of Manual therapy (MT) to improve pain and ROM.  (00) minutes of Therapeutic Exercise (TE) to develop strength, endurance, range of motion, and flexibility.  (24) minutes of Neuromuscular Re-Education (NMR)  to improve: Balance, Coordination, Kinesthetic, Sense, Proprioception, and Posture.  (00) minutes of Therapeutic Activities (TA) to improve functional  performance.  Unattended Electrical Stimulation (ES) for muscle performance or pain modulation.  Vasopneumatic Device Therapy () for management of swelling/edema. (72638)    PATIENT EDUCATION AND HOME EXERCISES     Education/Self-Care provided:  (included in treatment) minutes   Patient educated on the impairments noted above and the effects of physical therapy intervention to improve overall condition and Quality of Life  Patient was educated on all the above exercise prior/during/after for proper posture, positioning, and execution for safe performance with home exercise program.     Written Home Exercises Provided: yes. Prefers: [x] Printed [] Electronic  Exercises were reviewed and Modesta was able to demonstrate them prior to the end of the session.  Modesta demonstrated good understanding of the education provided. See EMR under Patient Instructions for exercises provided during therapy sessions.      ASSESSMENT     Modesta is a 58 y.o. female referred to outpatient Physical Therapy with a medical diagnosis of   Encounter Diagnosis   Name Primary?    Status post total left knee replacement using cement        Physical exam supports LEFT KNEE impairments including: decreased range of motion, decreased muscular strength, decreased endurance, decreased muscular length/flexibility, impaired joint mobility, and impaired functional mobility.     The above impairments will be addressed through manual therapy techniques, therapeutic exercises, functional training, and modalities as necessary. Patient was treated and educated on exercises for home program, progression of therapy, and benefits of therapy to achieve full functional mobility.       Pt prognosis is Good.   Pt will benefit from skilled outpatient Physical Therapy to address the deficits stated above and in the chart below, provide pt/family education, and to maximize pt's level of independence.     Plan of care discussed with patient: Yes  Pt's spiritual, cultural  and educational needs considered and patient is agreeable to the plan of care and goals as stated below:     Anticipated Barriers for therapy: none    Medical Necessity is demonstrated by the following:     History  Co-morbidities and personal factors that may impact the plan of care [x] LOW: no personal factors / co-morbidities  [] MODERATE: 1-2 personal factors / co-morbidities  [] HIGH: 3+ personal factors / co-morbidities    Moderate / High Support Documentation:   Co-morbidities affecting plan of care:   Past Medical History:   Diagnosis Date    Abnormal EKG 02/05/2021    Anxiety     Hypertension     Osteoarthritis     Stage 2 chronic kidney disease 02/05/2021       Personal Factors:   no deficits     Examination  Body Structures and Functions, activity limitations and participation restrictions that may impact the plan of care [x] LOW: addressing 1-2 elements  [] MODERATE: 3+ elements  [] HIGH: 4+ elements (please support below)    Moderate / High Support Documentation:   [] Head / Neck  [] Spine  [] Upper Quarter  [x] Lower Quarter  [] Range of motion Deficits  [] Gross Symmetry Deficits  [] Strength Deficits  [] Balance Deficits  [] Gait Deficits  [] Unable to participate in daily activities  [] Unable to perform functional tasks  [] Unable to Care for Self or others  [] Community/ Social Life changes due to impairments     Clinical Presentation [x] LOW: stable  [] MODERATE: Evolving  [] HIGH: Unstable     Decision Making/ Complexity Score: low         SHORT TERM GOALS:  4 weeks Progress Date Met   Recent signs and systems trend is improving in order to progress towards Long term goals.  [] Met  [] Not Met  [] Progressing    Patient will be independent with Home Exercise Program  in order to further progress and return to maximal function. [] Met  [] Not Met  [] Progressing    Pain rating at Worst: 5 /10 in order to progress towards increased independence with activity. [] Met  [] Not Met  [] Progressing     Patient will be able to correct postural deviations in sitting and standing, to decrease pain and promote postural awareness for injury prevention.  [] Met  [] Not Met  [] Progressing    Patient will improve functional outcome (FOTO) score: by 5% to increase self-worth & perceived functional ability towards long term goals [] Met  [] Not Met  [] Progressing      LONG TERM GOALS: 12 weeks Progress Date Met   Patient will return to normal activites of daily living, recreational, and work related activities with less pain and limitation.  [] Met  [] Not Met  [] Progressing    Patient will improve range of motion  to stated goals in order to return to maximal functional potential.  [] Met  [] Not Met  [] Progressing    Patient will improve Strength to stated goals of appropriate musculature in order to improve functional independence.  [] Met  [] Not Met  [] Progressing    Pain Rating at Best: 1/10 to improve Quality of Life.  [] Met  [] Not Met  [] Progressing    Patient will meet predicted functional outcome (FOTO) score: 62% to increase self-worth & perceived functional ability. [] Met  [] Not Met  [] Progressing    Patient will have met/partially met personal goal of: improve pain and function  [] Met  [] Not Met  [] Progressing          PLAN   Plan of care Certification: 7/22/2024 to 9/20/2024    Outpatient Physical Therapy 3 times weekly for 12 weeks to include any combination of the following interventions: virtual visits, dry needling, modalities, electrical stimulation (IFC, Pre-Mod, Attended with Functional Dry Needling), Gait Training, Manual Therapy, Moist Heat/ Ice, Neuromuscular Re-ed, Patient Education, Self Care, Therapeutic Exercise, Functional Training, and Therapeutic Activites     Thank you for this referral.    Titus Rosario, PT

## 2024-07-24 RX ORDER — OXYCODONE AND ACETAMINOPHEN 10; 325 MG/1; MG/1
1 TABLET ORAL EVERY 8 HOURS PRN
Qty: 21 TABLET | Refills: 0 | Status: SHIPPED | OUTPATIENT
Start: 2024-07-24

## 2024-07-25 ENCOUNTER — CLINICAL SUPPORT (OUTPATIENT)
Dept: REHABILITATION | Facility: HOSPITAL | Age: 59
End: 2024-07-25
Payer: COMMERCIAL

## 2024-07-25 DIAGNOSIS — M25.662 DECREASED ROM OF LEFT KNEE: Primary | ICD-10-CM

## 2024-07-25 DIAGNOSIS — M25.562 CHRONIC PAIN OF LEFT KNEE: ICD-10-CM

## 2024-07-25 DIAGNOSIS — G89.29 CHRONIC PAIN OF LEFT KNEE: ICD-10-CM

## 2024-07-25 PROCEDURE — 97112 NEUROMUSCULAR REEDUCATION: CPT | Mod: PN

## 2024-07-25 PROCEDURE — 97110 THERAPEUTIC EXERCISES: CPT | Mod: PN

## 2024-07-27 NOTE — PLAN OF CARE
[facility]   Physical Therapy Initial Evaluation    Date: 7/22/2024     Name: Modesta Camacho  Clinic Number: [id number]  Therapy Diagnosis:   Encounter Diagnosis   Name Primary?    Status post total left knee replacement using cement       Physician: Kitty García PA      Physician Orders: PT Eval and Treat  Medical Diagnosis from Referral: S/p right total knee replacement  Evaluation Date: 7/22/2024  Authorization Period Expiration: 6/27/2025  Plan of Care Expiration: 9/20/2024   Progress Update: 08/21/2024   Visit # / Visits authorized: 1 / 1   FOTO: 7/22/2024 - Scored: 1 / 3       Time In: 0630  Time Out: 0725  Total Appointment Time (timed & untimed codes): 55 minutes    SUBJECTIVE     Date of onset: 06/12/2024  Chief Complaint: left knee pain    History of current condition - Modesta is a 58 y.o. female who presents to physical therapy reporting left knee replacement and doing well with it.      Falls: [x] No  [] Yes:     Imaging: [x] Xray [] MRI [] CT: Reviewed    Prior Therapy:  [] No  [x] Yes:   Social History: Pt lives with their family [] House [] Apartment/Condo []other  Stairs: [x] No  [] Yes:   Occupation:   Prior Level of Function: Independent with all activities of daily living  Current Level of Function: Difficulty with sit to stand and decreased leg strength    Pain:  Current 3/10  Location: [] Right [x] Left [] Bilateral: knee  Description: ache   Aggravating Factors: bending and standing  Easing Factors: activity avoidance, rest, medication    Pts goals: Pt reported goals are to improve pain and function    _______________________________________________________  Medical History:   Past Medical History:   Diagnosis Date    Abnormal EKG 02/05/2021    Anxiety     Hypertension     Osteoarthritis     Stage 2 chronic kidney disease 02/05/2021       Surgical History:   Modesta Camacho  has a past surgical history that includes gastric sleeve; Uterine fibroid embolization;  Tubal ligation; Arthroplasty of hip by anterior approach (Left, 7/31/2020); Knee Arthroplasty (Left, 5/6/2021); Joint replacement (Right, 06/14/2016); Joint replacement (Left, 05/20/2021); Joint replacement (Left, 07/30/2020); Revision of knee arthroplasty (Right, 9/27/2022); Resurfacing of patella (Right, 9/27/2022); Hip Arthroplasty (Right, 6/7/2023); Injection of anesthetic agent around nerve (Bilateral, 8/14/2023); block, nerve, genicular (Left, 2/14/2024); Selective injection of anesthetic agent around lumbar spinal nerve root by transforaminal approach (Bilateral, 5/2/2024); Revision of knee arthroplasty (Left, 6/12/2024); replacement, polyethylene liner (Left, 6/12/2024); and Synovectomy of knee (Left, 6/12/2024).    Medications:   Modesta has a current medication list which includes the following prescription(s): amlodipine, clonidine, duloxetine, gabapentin, hydrochlorothiazide, methocarbamol, metoprolol succinate, multivit-min/ferrous fumarate, ondansetron, ondansetron, oxycodone-acetaminophen, telmisartan, tizanidine, victoza 3-jun, and vitamin d, and the following Facility-Administered Medications: ondansetron.    Allergies:   Review of patient's allergies indicates:   Allergen Reactions    Codeine Hives and Rash     Takes Tramadol and has never had an issue with SOB/swelling  Also tolerated hydromorphone 7/2020      Penicillin     Penicillins Hives     Pt tolerated cefazolin 7/2020          OBJECTIVE     Sensation:  Sensation is [x] Intact [] Impaired-- to light touch    KNEE-   Knee   Range of Motion Right   Left   Goal   Hyper - Zero - Flexion (cold)   0-0-125 0-(-5)-100 0-0-130º   (*) pain and/        LE STRENGTH -   Lower Extremity  Strength RIGHT   Goal   Hip Flexion  []1  []2  []3  [x]4  []5                []+ []- 5/5 B    Hip Extension  []1  []2  []3  [x]4  []5                []+ []- 5/5 B   Hip Abduction  []1  []2  []3  [x]4  []5                []+ []- 5/5 B   Knee Flexion []1  []2  []3  []4  [x]5                 []+ []- 5/5 B   Knee Extension []1  []2  []3  []4  [x]5                []+ []- 5/5 B   Ankle Dorsiflexion []1  []2  []3  []4  [x]5                []+ []- 5/5 B   Ankle Plantarflexion []1  []2  []3  []4  [x]5                []+ []- 5/5 B     Lower Extremity  Strength LEFT   Goal   Hip Flexion  []1  []2  []3  [x]4  []5                []+ []- 5/5 B    Hip Extension  []1  []2  []3  [x]4  []5                []+ []- 5/5 B   Hip Abduction  []1  []2  []3  [x]4  []5                []+ []- 5/5 B   Knee Flexion []1  []2  [x]3  []4  []5                [x]+ []- 5/5 B   Knee Extension []1  []2  []3  [x]4  []5                []+ [x]- 5/5 B   Ankle Dorsiflexion []1  []2  []3  [x]4  []5                []+ []- 5/5 B   Ankle Plantarflexion []1  []2  []3  [x]4  []5                []+ []- 5/5 B        FUNCTION:     Intake Outcome Measure for FOTO Knee Survey    Therapist reviewed FOTO scores for Modesta Camacho on 7/22/2024.   FOTO documents entered into [employer] - see Media section.    Intake Score: 40%         TREATMENT     Total Treatment time separate from Evaluation: (24) minutes    Modesta received the following interventions:     CPT Intervention  Joint:   Focus Duration / Intensity         NuStep       Shuttle squat  5 bands 2 minutes     Lunge on steps   x 10    NMR  Heel slides with strap assist X30     NMR Quad sets 3x10    NMR SLR  2x10    NMR Bridges  3x10    LTR  x20    NMR  Clamshells  3x10 RTB      Prone hip extension  2x10 NP      Prone TKE  3x10 NP   CPT Codes available for Billing:   (00) minutes of Manual therapy (MT) to improve pain and ROM.  (00) minutes of Therapeutic Exercise (TE) to develop strength, endurance, range of motion, and flexibility.  (24) minutes of Neuromuscular Re-Education (NMR)  to improve: Balance, Coordination, Kinesthetic, Sense, Proprioception, and Posture.  (00) minutes of Therapeutic Activities (TA) to improve functional performance.  Unattended Electrical Stimulation (ES) for muscle  performance or pain modulation.  Vasopneumatic Device Therapy () for management of swelling/edema. (52143)    PATIENT EDUCATION AND HOME EXERCISES     Education/Self-Care provided:  (included in treatment) minutes   Patient educated on the impairments noted above and the effects of physical therapy intervention to improve overall condition and Quality of Life  Patient was educated on all the above exercise prior/during/after for proper posture, positioning, and execution for safe performance with home exercise program.     Written Home Exercises Provided: yes. Prefers: [x] Printed [] Electronic  Exercises were reviewed and Modesta was able to demonstrate them prior to the end of the session.  Modesta demonstrated good understanding of the education provided. See EMR under Patient Instructions for exercises provided during therapy sessions.      ASSESSMENT     Modesta is a 58 y.o. female referred to outpatient Physical Therapy with a medical diagnosis of   Encounter Diagnosis   Name Primary?    Status post total left knee replacement using cement        Physical exam supports LEFT KNEE impairments including: decreased range of motion, decreased muscular strength, decreased endurance, decreased muscular length/flexibility, impaired joint mobility, and impaired functional mobility.     The above impairments will be addressed through manual therapy techniques, therapeutic exercises, functional training, and modalities as necessary. Patient was treated and educated on exercises for home program, progression of therapy, and benefits of therapy to achieve full functional mobility.       Pt prognosis is Good.   Pt will benefit from skilled outpatient Physical Therapy to address the deficits stated above and in the chart below, provide pt/family education, and to maximize pt's level of independence.     Plan of care discussed with patient: Yes  Pt's spiritual, cultural and educational needs considered and patient is agreeable to  the plan of care and goals as stated below:     Anticipated Barriers for therapy: none    Medical Necessity is demonstrated by the following:     History  Co-morbidities and personal factors that may impact the plan of care [x] LOW: no personal factors / co-morbidities  [] MODERATE: 1-2 personal factors / co-morbidities  [] HIGH: 3+ personal factors / co-morbidities    Moderate / High Support Documentation:   Co-morbidities affecting plan of care:   Past Medical History:   Diagnosis Date    Abnormal EKG 02/05/2021    Anxiety     Hypertension     Osteoarthritis     Stage 2 chronic kidney disease 02/05/2021       Personal Factors:   no deficits     Examination  Body Structures and Functions, activity limitations and participation restrictions that may impact the plan of care [x] LOW: addressing 1-2 elements  [] MODERATE: 3+ elements  [] HIGH: 4+ elements (please support below)    Moderate / High Support Documentation:   [] Head / Neck  [] Spine  [] Upper Quarter  [x] Lower Quarter  [] Range of motion Deficits  [] Gross Symmetry Deficits  [] Strength Deficits  [] Balance Deficits  [] Gait Deficits  [] Unable to participate in daily activities  [] Unable to perform functional tasks  [] Unable to Care for Self or others  [] Community/ Social Life changes due to impairments     Clinical Presentation [x] LOW: stable  [] MODERATE: Evolving  [] HIGH: Unstable     Decision Making/ Complexity Score: low         SHORT TERM GOALS:  4 weeks Progress Date Met   Recent signs and systems trend is improving in order to progress towards Long term goals.  [] Met  [] Not Met  [] Progressing    Patient will be independent with Home Exercise Program  in order to further progress and return to maximal function. [] Met  [] Not Met  [] Progressing    Pain rating at Worst: 5 /10 in order to progress towards increased independence with activity. [] Met  [] Not Met  [] Progressing    Patient will be able to correct postural deviations in  sitting and standing, to decrease pain and promote postural awareness for injury prevention.  [] Met  [] Not Met  [] Progressing    Patient will improve functional outcome (FOTO) score: by 5% to increase self-worth & perceived functional ability towards long term goals [] Met  [] Not Met  [] Progressing      LONG TERM GOALS: 12 weeks Progress Date Met   Patient will return to normal activites of daily living, recreational, and work related activities with less pain and limitation.  [] Met  [] Not Met  [] Progressing    Patient will improve range of motion  to stated goals in order to return to maximal functional potential.  [] Met  [] Not Met  [] Progressing    Patient will improve Strength to stated goals of appropriate musculature in order to improve functional independence.  [] Met  [] Not Met  [] Progressing    Pain Rating at Best: 1/10 to improve Quality of Life.  [] Met  [] Not Met  [] Progressing    Patient will meet predicted functional outcome (FOTO) score: 62% to increase self-worth & perceived functional ability. [] Met  [] Not Met  [] Progressing    Patient will have met/partially met personal goal of: improve pain and function  [] Met  [] Not Met  [] Progressing          PLAN   Plan of care Certification: 7/22/2024 to 9/20/2024    Outpatient Physical Therapy 3 times weekly for 12 weeks to include any combination of the following interventions: virtual visits, dry needling, modalities, electrical stimulation (IFC, Pre-Mod, Attended with Functional Dry Needling), Gait Training, Manual Therapy, Moist Heat/ Ice, Neuromuscular Re-ed, Patient Education, Self Care, Therapeutic Exercise, Functional Training, and Therapeutic Activites     Thank you for this referral.    Titus Rosario, PT

## 2024-07-29 NOTE — PROGRESS NOTES
[facility]   Physical Therapy Treatment Note      Name: Modesta Camacho  Clinic Number: [id number]    Therapy Diagnosis:   Encounter Diagnoses   Name Primary?    Decreased ROM of left knee Yes    Chronic pain of left knee      Physician: Kitty García PA    Visit Date: 7/25/2024      Physician Orders: PT Eval and Treat  Medical Diagnosis from Referral: S/p right total knee replacement  Evaluation Date: 7/22/2024  Authorization Period Expiration: 6/27/2025  Plan of Care Expiration: 9/20/2024    Progress Update: 08/21/2024            Visit # / Visits authorized: 1 / 1          FOTO: 7/22/2024 - Scored: 1 / 3      Time In: 0630  Time Out: 0715  Total Appointment Time (timed & untimed codes): 55 minutes    PTA Visit #: 0/5       Subjective     Patient reports: not feeling well today.  She was compliant with home exercise program.  Response to previous treatment: N/A  Functional change: N/A    Pain: 3/10  Location: left knee      Objective      Objective Measures updated at progress report unless specified.     Treatment     Modesta received the treatments listed below:      CPT Intervention  Joint:   Focus Duration / Intensity         TE NuStep  5 minutes     TE Calf raises 3 x 20 seconds      NMR Shuttle squat  5 bands 2 minutes      NMR Lunge on steps   x 10      NMR Heel slides with strap assist X30       NMR Quad sets  3x10       SLR  2x10      NMR Bridges  3x10      NMR LTR  x20      NMR Clamshells  3x10 RTB       Prone hip extension  2x10 NP       Prone TKE  3x10 NP                                                                           PLAN             CPT Codes available for Billing:   (00)minutes of Manual therapy (MT) to improve pain and ROM.  (15)minutes of Therapeutic Exercise (TE) to develop strength, endurance, range of motion, and flexibility.  (30)minutes of Neuromuscular Re-Education (NMR)  to improve: Balance, Coordination, Kinesthetic, Sense, Proprioception, and  Posture.  (00)minutes of Therapeutic Activities (TA) to improve functional performance.  Unattended Electrical Stimulation (ES) for muscle performance or pain modulation.  Vasopneumatic Device Therapy () for management of swelling/edema. (76569)  BFR: Blood flow restriction applied during exercise  NP or (-): Not Performed    Patient Education and Home Exercises       Education provided:   - The patient was instructed in home program     Written Home Exercises Provided: improving. Exercises were reviewed and Modesta was able to demonstrate them prior to the end of the session.  Modesta demonstrated good  understanding of the education provided. See Electronic Medical Record under Patient Instructions for exercises provided during therapy sessions    Assessment     Mrs Camacho did not feel well.  She is progressing well but unable to lay on stomach.  She will benefit from progression of range of motion and strengthening    Modesta Is progressing well towards her goals.   Patient prognosis is Good.     Patient will continue to benefit from skilled outpatient physical therapy to address the deficits listed in the problem list box on initial evaluation, provide pt/family education and to maximize pt's level of independence in the home and community environment.     Patient's spiritual, cultural and educational needs considered and pt agreeable to plan of care and goals.     Anticipated barriers to physical therapy: None    SHORT TERM GOALS:  4 weeks Progress Date Met   Recent signs and systems trend is improving in order to progress towards Long term goals.  [] Met  [] Not Met  [] Progressing     Patient will be independent with Home Exercise Program  in order to further progress and return to maximal function. [] Met  [] Not Met  [] Progressing     Pain rating at Worst: 5 /10 in order to progress towards increased independence with activity. [] Met  [] Not Met  [] Progressing     Patient will be able to correct postural  deviations in sitting and standing, to decrease pain and promote postural awareness for injury prevention.  [] Met  [] Not Met  [] Progressing     Patient will improve functional outcome (FOTO) score: by 5% to increase self-worth & perceived functional ability towards long term goals [] Met  [] Not Met  [] Progressing        LONG TERM GOALS: 12 weeks Progress Date Met   Patient will return to normal activites of daily living, recreational, and work related activities with less pain and limitation.  [] Met  [] Not Met  [] Progressing     Patient will improve range of motion  to stated goals in order to return to maximal functional potential.  [] Met  [] Not Met  [] Progressing     Patient will improve Strength to stated goals of appropriate musculature in order to improve functional independence.  [] Met  [] Not Met  [] Progressing     Pain Rating at Best: 1/10 to improve Quality of Life.  [] Met  [] Not Met  [] Progressing     Patient will meet predicted functional outcome (FOTO) score: 62% to increase self-worth & perceived functional ability. [] Met  [] Not Met  [] Progressing     Patient will have met/partially met personal goal of: improve pain and function  [] Met  [] Not Met  [] Progressing              PLAN   Plan of care Certification: 7/22/2024 to 9/20/2024     Outpatient Physical Therapy 3 times weekly for 12 weeks to include any combination of the following interventions: virtual visits, dry needling, modalities, electrical stimulation (IFC, Pre-Mod, Attended with Functional Dry Needling), Gait Training, Manual Therapy, Moist Heat/ Ice, Neuromuscular Re-ed, Patient Education, Self Care, Therapeutic Exercise, Functional Training, and Therapeutic Activites      Thank you for this referral.     Titus Rosario, PT       Titus Rosario, PT

## 2024-07-31 ENCOUNTER — TELEPHONE (OUTPATIENT)
Dept: REHABILITATION | Facility: HOSPITAL | Age: 59
End: 2024-07-31
Payer: COMMERCIAL

## 2024-07-31 ENCOUNTER — TELEPHONE (OUTPATIENT)
Dept: PAIN MEDICINE | Facility: CLINIC | Age: 59
End: 2024-07-31
Payer: COMMERCIAL

## 2024-07-31 ENCOUNTER — PATIENT MESSAGE (OUTPATIENT)
Dept: PAIN MEDICINE | Facility: CLINIC | Age: 59
End: 2024-07-31
Payer: COMMERCIAL

## 2024-07-31 NOTE — TELEPHONE ENCOUNTER
----- Message from Amanda Martinez sent at 7/31/2024  3:18 PM CDT -----  Contact: [phone]  Type:  Patient Returning Call    Who Called:Modesta   Who Left Message for Patient: FLEXKAREN  Does the patient know what this is regarding?:PA back nerve block   Would the patient rather a call back or a response via [facility]? Call back  Best Call Back Number:[phone]  Additional Information: needing to know when procedure will be scheduled

## 2024-07-31 NOTE — TELEPHONE ENCOUNTER
Called patient in regards to message in the mychart but patient didn't answer the phone LVM to call back at earliest convenience.    Melvin RANKIN

## 2024-07-31 NOTE — TELEPHONE ENCOUNTER
----- Message from Lidya Barrios RN sent at 7/31/2024 12:02 PM CDT -----    ----- Message -----  From: Petty Pozo  Sent: 7/31/2024  12:00 PM CDT  To: [facility] Staff    Type:  Patient Returning Call    Who Called: Pt  Who Left Message for Patient:  Does the patient know what this is regarding?: authorization for procedure  Would the patient rather a call back or a response via [facility]? Call  Best Call Back Number:  [phone]  Additional Information: Pt would like to speak to nurse related to receipt of authorization for back nerve block procedure.

## 2024-07-31 NOTE — TELEPHONE ENCOUNTER
Called patient to schedule her procedure and reminder to call her on the 08/16/2024 for percentage of relief. Explained the procedure instruction. All questions asked. Pt verbalized understanding.    Melvin RANKIN

## 2024-08-01 ENCOUNTER — CLINICAL SUPPORT (OUTPATIENT)
Dept: REHABILITATION | Facility: HOSPITAL | Age: 59
End: 2024-08-01
Payer: COMMERCIAL

## 2024-08-01 DIAGNOSIS — M25.662 DECREASED ROM OF LEFT KNEE: ICD-10-CM

## 2024-08-01 DIAGNOSIS — G89.29 CHRONIC PAIN OF LEFT KNEE: Primary | ICD-10-CM

## 2024-08-01 DIAGNOSIS — M25.562 CHRONIC PAIN OF LEFT KNEE: Primary | ICD-10-CM

## 2024-08-01 PROCEDURE — 97112 NEUROMUSCULAR REEDUCATION: CPT | Mod: PN

## 2024-08-01 PROCEDURE — 97110 THERAPEUTIC EXERCISES: CPT | Mod: PN

## 2024-08-02 ENCOUNTER — CLINICAL SUPPORT (OUTPATIENT)
Dept: REHABILITATION | Facility: HOSPITAL | Age: 59
End: 2024-08-02
Payer: COMMERCIAL

## 2024-08-02 DIAGNOSIS — M25.662 DECREASED ROM OF LEFT KNEE: ICD-10-CM

## 2024-08-02 DIAGNOSIS — M25.562 CHRONIC PAIN OF LEFT KNEE: Primary | ICD-10-CM

## 2024-08-02 DIAGNOSIS — G89.29 CHRONIC PAIN OF LEFT KNEE: Primary | ICD-10-CM

## 2024-08-02 PROCEDURE — 97112 NEUROMUSCULAR REEDUCATION: CPT | Mod: PN

## 2024-08-02 PROCEDURE — 97110 THERAPEUTIC EXERCISES: CPT | Mod: PN

## 2024-08-03 NOTE — PROGRESS NOTES
[facility]   Physical Therapy Treatment Note      Name: Modesta Camacho  Clinic Number: [id number]    Therapy Diagnosis:   Encounter Diagnoses   Name Primary?    Chronic pain of left knee Yes    Decreased ROM of left knee      Physician: Kitty García PA    Visit Date: 8/2/2024      Physician Orders: PT Eval and Treat  Medical Diagnosis from Referral: S/p right total knee replacement  Evaluation Date: 7/22/2024  Authorization Period Expiration: 6/27/2025  Plan of Care Expiration: 9/20/2024    Progress Update: 08/21/2024            Visit # / Visits authorized: 1 / 1        3  /20  FOTO: 7/22/2024 - Scored: 1 / 3      Time In: 0625  Time Out: 0710  Total Appointment Time (timed & untimed codes): 45 minutes    PTA Visit #: 0/5       Subjective     Patient reports: she is doing ok today.   She was compliant with home exercise program.  Response to previous treatment: N/A  Functional change: N/A    Pain: 3/10  Location: left knee      Objective      Objective Measures updated at progress report unless specified.     Treatment     Modesta received the treatments listed below:      CPT Intervention  Joint:   Focus Duration / Intensity         TE NuStep  5 minutes     TE Calf raises 3 x 20 seconds    NMR Matrix knee extension     NMR Matrix knee flexion       NMR Shuttle squat  5 bands 2 minutes      NMR Lunge on steps   x 10      NMR Heel slides with strap assist X30       NMR Quad sets  3x10       SLR  2x10      NMR Bridges  3x10      NMR LTR  x20      NMR Clamshells  3x10 RTB       Prone hip extension  2x10 NP       Prone TKE  3x10 NP                                                                           PLAN             CPT Codes available for Billing:   (00)minutes of Manual therapy (MT) to improve pain and ROM.  (15)minutes of Therapeutic Exercise (TE) to develop strength, endurance, range of motion, and flexibility.  (30)minutes of Neuromuscular Re-Education (NMR)  to improve: Balance,  Coordination, Kinesthetic, Sense, Proprioception, and Posture.  (00)minutes of Therapeutic Activities (TA) to improve functional performance.  Unattended Electrical Stimulation (ES) for muscle performance or pain modulation.  Vasopneumatic Device Therapy () for management of swelling/edema. (37436)  BFR: Blood flow restriction applied during exercise  NP or (-): Not Performed    Patient Education and Home Exercises       Education provided:   - The patient was instructed in home program     Written Home Exercises Provided: improving. Exercises were reviewed and Modesta was able to demonstrate them prior to the end of the session.  Modesta demonstrated good  understanding of the education provided. See Electronic Medical Record under Patient Instructions for exercises provided during therapy sessions    Assessment     Mrs Camacho ambulates without device.  She has last few degrees of extension and flexion.  The patient was instructed that she needs to continue stretching and strengthening at home.    Modesta Is progressing well towards her goals.   Patient prognosis is Good.     Patient will continue to benefit from skilled outpatient physical therapy to address the deficits listed in the problem list box on initial evaluation, provide pt/family education and to maximize pt's level of independence in the home and community environment.     Patient's spiritual, cultural and educational needs considered and pt agreeable to plan of care and goals.     Anticipated barriers to physical therapy: None    SHORT TERM GOALS:  4 weeks Progress Date Met   Recent signs and systems trend is improving in order to progress towards Long term goals.  [] Met  [] Not Met  [x] Progressing     Patient will be independent with Home Exercise Program  in order to further progress and return to maximal function. [] Met  [] Not Met  [x] Progressing     Pain rating at Worst: 5 /10 in order to progress towards increased independence with activity. []  Met  [] Not Met  [x] Progressing     Patient will be able to correct postural deviations in sitting and standing, to decrease pain and promote postural awareness for injury prevention.  [] Met  [] Not Met  [x] Progressing     Patient will improve functional outcome (FOTO) score: by 5% to increase self-worth & perceived functional ability towards long term goals [] Met  [] Not Met  [x] Progressing        LONG TERM GOALS: 12 weeks Progress Date Met   Patient will return to normal activites of daily living, recreational, and work related activities with less pain and limitation.  [] Met  [] Not Met  [x] Progressing     Patient will improve range of motion  to stated goals in order to return to maximal functional potential.  [] Met  [] Not Met  [x] Progressing     Patient will improve Strength to stated goals of appropriate musculature in order to improve functional independence.  [] Met  [] Not Met  [x] Progressing     Pain Rating at Best: 1/10 to improve Quality of Life.  [] Met  [] Not Met  [x] Progressing     Patient will meet predicted functional outcome (FOTO) score: 62% to increase self-worth & perceived functional ability. [] Met  [] Not Met  [x] Progressing     Patient will have met/partially met personal goal of: improve pain and function  [] Met  [] Not Met  [x] Progressing              PLAN   Plan of care Certification: 7/22/2024 to 9/20/2024     Outpatient Physical Therapy 3 times weekly for 12 weeks to include any combination of the following interventions: virtual visits, dry needling, modalities, electrical stimulation (IFC, Pre-Mod, Attended with Functional Dry Needling), Gait Training, Manual Therapy, Moist Heat/ Ice, Neuromuscular Re-ed, Patient Education, Self Care, Therapeutic Exercise, Functional Training, and Therapeutic Activites      Thank you for this referral.     Titus Rosario, PT       Titus Rosario, PT

## 2024-08-03 NOTE — PROGRESS NOTES
[facility]   Physical Therapy Treatment Note      Name: Modesta Camacho  Clinic Number: [id number]    Therapy Diagnosis:   Encounter Diagnoses   Name Primary?    Chronic pain of left knee Yes    Decreased ROM of left knee      Physician: Kitty García PA    Visit Date: 8/1/2024      Physician Orders: PT Eval and Treat  Medical Diagnosis from Referral: S/p right total knee replacement  Evaluation Date: 7/22/2024  Authorization Period Expiration: 6/27/2025  Plan of Care Expiration: 9/20/2024    Progress Update: 08/21/2024            Visit # / Visits authorized: 1 / 1        3  /20  FOTO: 7/22/2024 - Scored: 1 / 3      Time In: 0625  Time Out: 0710  Total Appointment Time (timed & untimed codes): 45 minutes    PTA Visit #: 0/5       Subjective     Patient reports: she is doing ok today.  She has to leave early for her  to go to work.  She was compliant with home exercise program.  Response to previous treatment: N/A  Functional change: N/A    Pain: 3/10  Location: left knee      Objective      Objective Measures updated at progress report unless specified.     Treatment     Modesta received the treatments listed below:      CPT Intervention  Joint:   Focus Duration / Intensity         TE NuStep  5 minutes     TE Calf raises 3 x 20 seconds    NMR Matrix knee extension     NMR Matrix knee flexion       NMR Shuttle squat  5 bands 2 minutes      NMR Lunge on steps   x 10      NMR Heel slides with strap assist X30       NMR Quad sets  3x10       SLR  2x10      NMR Bridges  3x10      NMR LTR  x20      NMR Clamshells  3x10 RTB       Prone hip extension  2x10 NP       Prone TKE  3x10 NP                                                                           PLAN             CPT Codes available for Billing:   (00)minutes of Manual therapy (MT) to improve pain and ROM.  (15)minutes of Therapeutic Exercise (TE) to develop strength, endurance, range of motion, and flexibility.  (30)minutes of  Neuromuscular Re-Education (NMR)  to improve: Balance, Coordination, Kinesthetic, Sense, Proprioception, and Posture.  (00)minutes of Therapeutic Activities (TA) to improve functional performance.  Unattended Electrical Stimulation (ES) for muscle performance or pain modulation.  Vasopneumatic Device Therapy () for management of swelling/edema. (63716)  BFR: Blood flow restriction applied during exercise  NP or (-): Not Performed    Patient Education and Home Exercises       Education provided:   - The patient was instructed in home program     Written Home Exercises Provided: improving. Exercises were reviewed and Modesta was able to demonstrate them prior to the end of the session.  Modesta demonstrated good  understanding of the education provided. See Electronic Medical Record under Patient Instructions for exercises provided during therapy sessions    Assessment     Mrs Camacho ambulates without device.  She has last few degrees of extension and flexion.  The patient was instructed that she needs to continue stretching and strengthening at home.    Modesta Is progressing well towards her goals.   Patient prognosis is Good.     Patient will continue to benefit from skilled outpatient physical therapy to address the deficits listed in the problem list box on initial evaluation, provide pt/family education and to maximize pt's level of independence in the home and community environment.     Patient's spiritual, cultural and educational needs considered and pt agreeable to plan of care and goals.     Anticipated barriers to physical therapy: None    SHORT TERM GOALS:  4 weeks Progress Date Met   Recent signs and systems trend is improving in order to progress towards Long term goals.  [] Met  [] Not Met  [x] Progressing     Patient will be independent with Home Exercise Program  in order to further progress and return to maximal function. [] Met  [] Not Met  [x] Progressing     Pain rating at Worst: 5 /10 in order to  progress towards increased independence with activity. [] Met  [] Not Met  [x] Progressing     Patient will be able to correct postural deviations in sitting and standing, to decrease pain and promote postural awareness for injury prevention.  [] Met  [] Not Met  [x] Progressing     Patient will improve functional outcome (FOTO) score: by 5% to increase self-worth & perceived functional ability towards long term goals [] Met  [] Not Met  [x] Progressing        LONG TERM GOALS: 12 weeks Progress Date Met   Patient will return to normal activites of daily living, recreational, and work related activities with less pain and limitation.  [] Met  [] Not Met  [x] Progressing     Patient will improve range of motion  to stated goals in order to return to maximal functional potential.  [] Met  [] Not Met  [x] Progressing     Patient will improve Strength to stated goals of appropriate musculature in order to improve functional independence.  [] Met  [] Not Met  [x] Progressing     Pain Rating at Best: 1/10 to improve Quality of Life.  [] Met  [] Not Met  [x] Progressing     Patient will meet predicted functional outcome (FOTO) score: 62% to increase self-worth & perceived functional ability. [] Met  [] Not Met  [x] Progressing     Patient will have met/partially met personal goal of: improve pain and function  [] Met  [] Not Met  [x] Progressing              PLAN   Plan of care Certification: 7/22/2024 to 9/20/2024     Outpatient Physical Therapy 3 times weekly for 12 weeks to include any combination of the following interventions: virtual visits, dry needling, modalities, electrical stimulation (IFC, Pre-Mod, Attended with Functional Dry Needling), Gait Training, Manual Therapy, Moist Heat/ Ice, Neuromuscular Re-ed, Patient Education, Self Care, Therapeutic Exercise, Functional Training, and Therapeutic Activites      Thank you for this referral.     Titus Rosario, PT       Titus Rosario, PT

## 2024-08-05 ENCOUNTER — TELEPHONE (OUTPATIENT)
Dept: REHABILITATION | Facility: HOSPITAL | Age: 59
End: 2024-08-05
Payer: COMMERCIAL

## 2024-08-07 ENCOUNTER — CLINICAL SUPPORT (OUTPATIENT)
Dept: REHABILITATION | Facility: HOSPITAL | Age: 59
End: 2024-08-07
Payer: COMMERCIAL

## 2024-08-07 DIAGNOSIS — G89.29 CHRONIC PAIN OF LEFT KNEE: Primary | ICD-10-CM

## 2024-08-07 DIAGNOSIS — M25.662 DECREASED ROM OF LEFT KNEE: ICD-10-CM

## 2024-08-07 DIAGNOSIS — M25.562 CHRONIC PAIN OF LEFT KNEE: Primary | ICD-10-CM

## 2024-08-07 PROCEDURE — 97110 THERAPEUTIC EXERCISES: CPT | Mod: PN

## 2024-08-07 PROCEDURE — 97112 NEUROMUSCULAR REEDUCATION: CPT | Mod: PN

## 2024-08-07 NOTE — PRE-PROCEDURE INSTRUCTIONS
Spoke with patient regarding procedure scheduled on 8.15    Arrival time 0845     Has patient been sick with fever or on antibiotics within the last 7 days? No     Does the patient have any open wounds, sores or rashes? No     Does the patient have any recent fractures? no     Has patient received a vaccination within the last 7 days? No     Received the COVID vaccination? yes     Has the patient stopped all medications as directed? na     Does patient have a pacemaker, defibrillator, or implantable stimulator? No     Does the patient have a ride to and from procedure and someone reliable to remain with patient?        Is the patient diabetic? no     Does the patient have sleep apnea? Or use O2 at home? no     Is the patient receiving sedation? Yes      Is the patient instructed to remain NPO beginning at midnight the night before their procedure? yes     Procedure location confirmed with patient? Yes     Covid- Denies signs/symptoms. Instructed to notify PAT/MD if any changes.

## 2024-08-11 NOTE — PROGRESS NOTES
[facility]   Physical Therapy Treatment Note      Name: Modesta Camacho  Clinic Number: [id number]    Therapy Diagnosis:   Encounter Diagnoses   Name Primary?    Chronic pain of left knee Yes    Decreased ROM of left knee      Physician: Kitty García PA    Visit Date: 8/7/2024      Physician Orders: PT Eval and Treat  Medical Diagnosis from Referral: S/p right total knee replacement  Evaluation Date: 7/22/2024  Authorization Period Expiration: 6/27/2025  Plan of Care Expiration: 9/20/2024    Progress Update: 08/21/2024            Visit # / Visits authorized: 1 / 1 4 /20  FOTO: 7/22/2024 - Scored: 1 / 3      Time In: 0625  Time Out: 0710  Total Appointment Time (timed & untimed codes): 45 minutes    PTA Visit #: 0/5       Subjective     Patient reports: feels better   She was compliant with home exercise program.  Response to previous treatment: N/A  Functional change: N/A    Pain: 3/10  Location: left knee      Objective      Objective Measures updated at progress report unless specified.     Treatment     Modesta received the treatments listed below:      CPT Intervention  Joint:   Focus Duration / Intensity       x  TE NuStep  5 minutes   x  TE Calf raises 3 x 20 seconds   x NMR Matrix knee extension    x NMR Matrix knee flexion     x  NMR Shuttle squat  5 bands 2 minutes    x  NMR Lunge on steps   x 10    x  NMR Heel slides with strap assist X30     x  NMR Quad sets  3x10    x   SLR  2x10    x  NMR Bridges  3x10    x  NMR LTR  x20    x  NMR Clamshells  3x10 RTB       Prone hip extension  2x10 NP       Prone TKE  3x10 NP                                                                           PLAN             CPT Codes available for Billing:   (00)minutes of Manual therapy (MT) to improve pain and ROM.  (15)minutes of Therapeutic Exercise (TE) to develop strength, endurance, range of motion, and flexibility.  (30)minutes of Neuromuscular Re-Education (NMR)  to improve:  Balance, Coordination, Kinesthetic, Sense, Proprioception, and Posture.  (00)minutes of Therapeutic Activities (TA) to improve functional performance.  Unattended Electrical Stimulation (ES) for muscle performance or pain modulation.  Vasopneumatic Device Therapy () for management of swelling/edema. (79252)  BFR: Blood flow restriction applied during exercise  NP or (-): Not Performed    Patient Education and Home Exercises       Education provided:   - The patient was instructed in home program     Written Home Exercises Provided: improving. Exercises were reviewed and Modesta was able to demonstrate them prior to the end of the session.  Modesta demonstrated good  understanding of the education provided. See Electronic Medical Record under Patient Instructions for exercises provided during therapy sessions    Assessment     Mrs Camacho ambulates without device.  She has last few degrees of extension and flexion.  The patient was instructed that she needs to continue stretching and strengthening at home.    Modesta Is progressing well towards her goals.   Patient prognosis is Good.     Patient will continue to benefit from skilled outpatient physical therapy to address the deficits listed in the problem list box on initial evaluation, provide pt/family education and to maximize pt's level of independence in the home and community environment.     Patient's spiritual, cultural and educational needs considered and pt agreeable to plan of care and goals.     Anticipated barriers to physical therapy: None    SHORT TERM GOALS:  4 weeks Progress Date Met   Recent signs and systems trend is improving in order to progress towards Long term goals.  [] Met  [] Not Met  [x] Progressing     Patient will be independent with Home Exercise Program  in order to further progress and return to maximal function. [] Met  [] Not Met  [x] Progressing     Pain rating at Worst: 5 /10 in order to progress towards increased independence with  activity. [] Met  [] Not Met  [x] Progressing     Patient will be able to correct postural deviations in sitting and standing, to decrease pain and promote postural awareness for injury prevention.  [] Met  [] Not Met  [x] Progressing     Patient will improve functional outcome (FOTO) score: by 5% to increase self-worth & perceived functional ability towards long term goals [] Met  [] Not Met  [x] Progressing        LONG TERM GOALS: 12 weeks Progress Date Met   Patient will return to normal activites of daily living, recreational, and work related activities with less pain and limitation.  [] Met  [] Not Met  [x] Progressing     Patient will improve range of motion  to stated goals in order to return to maximal functional potential.  [] Met  [] Not Met  [x] Progressing     Patient will improve Strength to stated goals of appropriate musculature in order to improve functional independence.  [] Met  [] Not Met  [x] Progressing     Pain Rating at Best: 1/10 to improve Quality of Life.  [] Met  [] Not Met  [x] Progressing     Patient will meet predicted functional outcome (FOTO) score: 62% to increase self-worth & perceived functional ability. [] Met  [] Not Met  [x] Progressing     Patient will have met/partially met personal goal of: improve pain and function  [] Met  [] Not Met  [x] Progressing              PLAN   Plan of care Certification: 7/22/2024 to 9/20/2024     Outpatient Physical Therapy 3 times weekly for 12 weeks to include any combination of the following interventions: virtual visits, dry needling, modalities, electrical stimulation (IFC, Pre-Mod, Attended with Functional Dry Needling), Gait Training, Manual Therapy, Moist Heat/ Ice, Neuromuscular Re-ed, Patient Education, Self Care, Therapeutic Exercise, Functional Training, and Therapeutic Activites      Thank you for this referral.     Titus Rosario, PT       Titus Rosario, PT

## 2024-08-12 ENCOUNTER — CLINICAL SUPPORT (OUTPATIENT)
Dept: REHABILITATION | Facility: HOSPITAL | Age: 59
End: 2024-08-12
Payer: COMMERCIAL

## 2024-08-12 ENCOUNTER — OFFICE VISIT (OUTPATIENT)
Dept: ORTHOPEDICS | Facility: CLINIC | Age: 59
End: 2024-08-12
Payer: COMMERCIAL

## 2024-08-12 VITALS — BODY MASS INDEX: 29.94 KG/M2 | HEIGHT: 62 IN | WEIGHT: 162.69 LBS

## 2024-08-12 DIAGNOSIS — M25.562 CHRONIC PAIN OF LEFT KNEE: Primary | ICD-10-CM

## 2024-08-12 DIAGNOSIS — G89.29 CHRONIC PAIN OF LEFT KNEE: Primary | ICD-10-CM

## 2024-08-12 DIAGNOSIS — Z96.652 STATUS POST TOTAL LEFT KNEE REPLACEMENT USING CEMENT: Primary | ICD-10-CM

## 2024-08-12 DIAGNOSIS — M25.662 DECREASED ROM OF LEFT KNEE: ICD-10-CM

## 2024-08-12 PROCEDURE — 99024 POSTOP FOLLOW-UP VISIT: CPT | Mod: S$GLB,,, | Performed by: PHYSICIAN ASSISTANT

## 2024-08-12 PROCEDURE — 99999 PR PBB SHADOW E&M-EST. PATIENT-LVL III: CPT | Mod: PBBFAC,,, | Performed by: PHYSICIAN ASSISTANT

## 2024-08-12 PROCEDURE — 4010F ACE/ARB THERAPY RXD/TAKEN: CPT | Mod: CPTII,S$GLB,, | Performed by: PHYSICIAN ASSISTANT

## 2024-08-12 PROCEDURE — 1160F RVW MEDS BY RX/DR IN RCRD: CPT | Mod: CPTII,S$GLB,, | Performed by: PHYSICIAN ASSISTANT

## 2024-08-12 PROCEDURE — 97530 THERAPEUTIC ACTIVITIES: CPT | Mod: PN

## 2024-08-12 PROCEDURE — 1159F MED LIST DOCD IN RCRD: CPT | Mod: CPTII,S$GLB,, | Performed by: PHYSICIAN ASSISTANT

## 2024-08-12 PROCEDURE — 97112 NEUROMUSCULAR REEDUCATION: CPT | Mod: PN

## 2024-08-12 PROCEDURE — 97110 THERAPEUTIC EXERCISES: CPT | Mod: PN

## 2024-08-12 NOTE — PROGRESS NOTES
[facility]   Physical Therapy Treatment Note      Name: Modesta Camacho  Clinic Number: [id number]    Therapy Diagnosis:   Encounter Diagnoses   Name Primary?    Chronic pain of left knee Yes    Decreased ROM of left knee      Physician: Kitty García PA    Visit Date: 8/12/2024      Physician Orders: PT Eval and Treat  Medical Diagnosis from Referral: S/p right total knee replacement  Evaluation Date: 7/22/2024  Authorization Period Expiration: 6/27/2025  Plan of Care Expiration: 9/20/2024    Progress Update: 08/21/2024            Visit # / Visits authorized: 1 / 1 5 /20  FOTO: 7/22/2024 - Scored: 1 / 3      Time In: 0625  Time Out: 0710  Total Appointment Time (timed & untimed codes): 45 minutes    PTA Visit #: 0/5       Subjective     Patient reports: she wants to be able to return to work.  States she is having BP problems now.   She was compliant with home exercise program.  Response to previous treatment: N/A  Functional change: N/A    Pain: 3/10  Location: left knee      Objective      Objective Measures updated at progress report unless specified.     Treatment     Modesta received the treatments listed below:      CPT Intervention  Joint:   Focus Duration / Intensity        TE NuStep  5 minutes     TE Calf raises 3 x 20 seconds    NMR Matrix knee extension     NMR Matrix knee flexion      NMR Shuttle squat  5 bands 2 minutes     NMR Lunge on steps   x 10     NMR Heel slides with strap assist X30      NMR Quad sets  3x10      SLR  2x10     NMR Bridges  3x10     NMR LTR  x20     NMR Clamshells  3x10 RTB      Prone hip extension  2x10 NP       Prone TKE  3x10 NP                                                                           PLAN             CPT Codes available for Billing:   (00)minutes of Manual therapy (MT) to improve pain and ROM.  (15)minutes of Therapeutic Exercise (TE) to develop strength, endurance, range of motion, and flexibility.  (30)minutes of  Neuromuscular Re-Education (NMR)  to improve: Balance, Coordination, Kinesthetic, Sense, Proprioception, and Posture.  (00)minutes of Therapeutic Activities (TA) to improve functional performance.  Unattended Electrical Stimulation (ES) for muscle performance or pain modulation.  Vasopneumatic Device Therapy () for management of swelling/edema. (48164)  BFR: Blood flow restriction applied during exercise  NP or (-): Not Performed    Patient Education and Home Exercises       Education provided:   - The patient was instructed in home program     Written Home Exercises Provided: improving. Exercises were reviewed and Modesta was able to demonstrate them prior to the end of the session.  Modesta demonstrated good  understanding of the education provided. See Electronic Medical Record under Patient Instructions for exercises provided during therapy sessions    Assessment     Mrs Camacho was instructed on and performed AROM, stretching, and strengthening activity with min cues.  She will benefit from further strengthening  and range of motion activity    Modesta Is progressing well towards her goals.   Patient prognosis is Good.     Patient will continue to benefit from skilled outpatient physical therapy to address the deficits listed in the problem list box on initial evaluation, provide pt/family education and to maximize pt's level of independence in the home and community environment.     Patient's spiritual, cultural and educational needs considered and pt agreeable to plan of care and goals.     Anticipated barriers to physical therapy: None    SHORT TERM GOALS:  4 weeks Progress Date Met   Recent signs and systems trend is improving in order to progress towards Long term goals.  [] Met  [] Not Met  [x] Progressing     Patient will be independent with Home Exercise Program  in order to further progress and return to maximal function. [] Met  [] Not Met  [x] Progressing     Pain rating at Worst: 5 /10 in order to progress  towards increased independence with activity. [] Met  [] Not Met  [x] Progressing     Patient will be able to correct postural deviations in sitting and standing, to decrease pain and promote postural awareness for injury prevention.  [] Met  [] Not Met  [x] Progressing     Patient will improve functional outcome (FOTO) score: by 5% to increase self-worth & perceived functional ability towards long term goals [] Met  [] Not Met  [x] Progressing        LONG TERM GOALS: 12 weeks Progress Date Met   Patient will return to normal activites of daily living, recreational, and work related activities with less pain and limitation.  [] Met  [] Not Met  [x] Progressing     Patient will improve range of motion  to stated goals in order to return to maximal functional potential.  [] Met  [] Not Met  [x] Progressing     Patient will improve Strength to stated goals of appropriate musculature in order to improve functional independence.  [] Met  [] Not Met  [x] Progressing     Pain Rating at Best: 1/10 to improve Quality of Life.  [] Met  [] Not Met  [x] Progressing     Patient will meet predicted functional outcome (FOTO) score: 62% to increase self-worth & perceived functional ability. [] Met  [] Not Met  [x] Progressing     Patient will have met/partially met personal goal of: improve pain and function  [] Met  [] Not Met  [x] Progressing              PLAN   Plan of care Certification: 7/22/2024 to 9/20/2024     Outpatient Physical Therapy 3 times weekly for 12 weeks to include any combination of the following interventions: virtual visits, dry needling, modalities, electrical stimulation (IFC, Pre-Mod, Attended with Functional Dry Needling), Gait Training, Manual Therapy, Moist Heat/ Ice, Neuromuscular Re-ed, Patient Education, Self Care, Therapeutic Exercise, Functional Training, and Therapeutic Activites      Thank you for this referral.     Titus Rosario, PT       Titus Rosario, PT

## 2024-08-12 NOTE — PROGRESS NOTES
Patient ID: Modesta Camacho is a 58 y.o. female.    Chief Complaint: Post-op Evaluation (L TKR 6/12/24/)      HPI: Modesta Camacho  is a 58 y.o. female who c/o Post-op Evaluation (L TKR 6/12/24/)     Post op visit 2  Patient notes pain is 0/10   The patient is doing quite well since our last visit in his made vast improvement   I encouraged her to continue activity of daily living and thus her quality of life   She is still in PT and doing quite well   She drove herself to her appointment today   She is no longer taking any pain medication will take over-the-counter medication as needed    Patient is presently denying any shortness of breath, chest pain, fever/chills, nausea/vomiting, loss of taste or smell, numbness/tingling or sensation changes, loss of bladder or bowel function, loss of taste/smell.     Surgery: Left Total Knee     Surgery Date:  06/12/2024    Past Medical History:   Diagnosis Date    Abnormal EKG 02/05/2021    Anxiety     Hypertension     Osteoarthritis     Stage 2 chronic kidney disease 02/05/2021     Past Surgical History:   Procedure Laterality Date    ARTHROPLASTY OF HIP BY ANTERIOR APPROACH Left 7/31/2020    Procedure: ARTHROPLASTY, HIP, ANTERIOR APPROACH;  Surgeon: Xavi Gandara MD;  Location: [facility] OR;  Service: Orthopedics;  Laterality: Left;    BLOCK, NERVE, GENICULAR Left 2/14/2024    Procedure: Left genicular nerve block with RN IV sedation;  Surgeon: Shaun Iyer MD;  Location: [facility] PAIN MGT;  Service: Pain Management;  Laterality: Left;    gastric sleeve      HIP ARTHROPLASTY Right 6/7/2023    Procedure: ARTHROPLASTY, HIP;  Surgeon: Trey Clifford MD;  Location: [facility] OR;  Service: Orthopedics;  Laterality: Right;    INJECTION OF ANESTHETIC AGENT AROUND NERVE Bilateral 8/14/2023    Procedure: left genicular nerve block RN IV Sedation;  Surgeon: Shaun Iyer MD;  Location: [facility] PAIN MGT;  Service: Pain Management;  Laterality: Bilateral;    JOINT REPLACEMENT Right  06/14/2016    knee    JOINT REPLACEMENT Left 05/20/2021    KNEE    JOINT REPLACEMENT Left 07/30/2020    HIP    KNEE ARTHROPLASTY Left 5/6/2021    Procedure: ARTHROPLASTY, KNEE;  Surgeon: Xavi Gandara MD;  Location: [facility] OR;  Service: Orthopedics;  Laterality: Left;    REPLACEMENT, POLYETHYLENE LINER Left 6/12/2024    Procedure: REPLACEMENT, POLYETHYLENE LINER;  Surgeon: Trey Clifford MD;  Location: [facility] OR;  Service: General;  Laterality: Left;    RESURFACING OF PATELLA Right 9/27/2022    Procedure: RESURFACING, PATELLA;  Surgeon: Trey Clifford MD;  Location: [facility] OR;  Service: General;  Laterality: Right;    REVISION OF KNEE ARTHROPLASTY Right 9/27/2022    Procedure: REVISION, ARTHROPLASTY, KNEE;  Surgeon: Trey Clifford MD;  Location: [facility] OR;  Service: General;  Laterality: Right;    REVISION OF KNEE ARTHROPLASTY Left 6/12/2024    Procedure: REVISION, ARTHROPLASTY, KNEE;  Surgeon: Trey Clifford MD;  Location: [facility] OR;  Service: General;  Laterality: Left;  POLY EXCHANGE    SELECTIVE INJECTION OF ANESTHETIC AGENT AROUND LUMBAR SPINAL NERVE ROOT BY TRANSFORAMINAL APPROACH Bilateral 5/2/2024    Procedure: Bilateral L4/5 TF CELE;  Surgeon: Shaun Iyer MD;  Location: [facility] PAIN T;  Service: Pain Management;  Laterality: Bilateral;    SYNOVECTOMY OF KNEE Left 6/12/2024    Procedure: SYNOVECTOMY, KNEE;  Surgeon: Trey Clifford MD;  Location: [facility];  Service: General;  Laterality: Left;    TUBAL LIGATION      UTERINE FIBROID EMBOLIZATION       Family History   Problem Relation Name Age of Onset    Hypertension Mother      Arthritis Mother      Diabetes Father      Heart disease Father      Depression Sister      Hypertension Sister      Arthritis Brother      Thyroid disease Son      Hypertension Son      Arthritis Maternal Grandmother      Heart disease Maternal Grandmother      Kidney disease Maternal Grandmother      Heart attack Maternal Grandfather      Stroke Paternal  Grandmother      No Known Problems Paternal Grandfather      Eczema Son      Breast cancer Sister  47     Social History     Socioeconomic History    Marital status:      Spouse name: Pop Land    Number of children: 3   Occupational History    Occupation: patient access   Tobacco Use    Smoking status: Never     Passive exposure: Never    Smokeless tobacco: Never   Substance and Sexual Activity    Alcohol use: Yes     Comment: occasional: hold 72hrs. prior to surgery    Drug use: No    Sexual activity: Yes     Partners: Male     Birth control/protection: See Surgical Hx     Social Determinants of Health     Stress: Stress Concern Present (3/16/2020)    [employer] [place] [employer] - Occupational Stress Questionnaire     Feeling of Stress : Rather much     Medication List with Changes/Refills   Current Medications    AMLODIPINE (NORVASC) 5 MG TABLET    Take 1 tablet (5 mg total) by mouth 2 (two) times daily.    CLONIDINE (CATAPRES) 0.1 MG TABLET    TAKE 1 TABLET BY MOUTH ONCE DAILY USE FOR SBP GREATER THAN 160 MMHG.    DULOXETINE (CYMBALTA) 60 MG CAPSULE    Take 1 capsule (60 mg total) by mouth 2 (two) times daily.    GABAPENTIN (NEURONTIN) 300 MG CAPSULE    Take 1 capsule (300 mg total) by mouth 3 (three) times daily.    HYDROCHLOROTHIAZIDE (HYDRODIURIL) 25 MG TABLET    Take 25 mg by mouth.    METHOCARBAMOL (ROBAXIN) 500 MG TAB    Take 1 tablet (500 mg total) by mouth every evening.    METOPROLOL SUCCINATE (TOPROL-XL) 50 MG 24 HR TABLET    Take 2 tablets (100 mg total) by mouth nightly.    MULTIVIT-MIN/FERROUS FUMARATE (MULTI VITAMIN ORAL)    Take 2 tablets by mouth Daily.    ONDANSETRON (ZOFRAN-ODT) 4 MG TBDL    dissolve 2 tablets (8 mg total) by mouth every 8 (eight) hours as needed (nausea).    ONDANSETRON (ZOFRAN-ODT) 4 MG TBDL    Dissolve 2 tablets (8 mg total) by mouth every 8 (eight) hours as needed (nausea).    OXYCODONE-ACETAMINOPHEN (PERCOCET)  MG PER TABLET    Take 1 tablet by  mouth every 8 (eight) hours as needed for Pain.    TELMISARTAN (MICARDIS) 80 MG TAB    Take 1 tablet (80 mg total) by mouth once daily.    TIZANIDINE (ZANAFLEX) 4 MG TABLET    Take 0.5-1 tablets (2-4 mg total) by mouth 2 (two) times daily as needed (pain).    VICTOZA 3-DANIEL 0.6 MG/0.1 ML (18 MG/3 ML) PNIJ PEN    SMARTSI.6 Milligram(s) SUB-Q Once    VITAMIN D (VITAMIN D3) 1000 UNITS TAB    Take 1,000 Units by mouth once daily.     Review of patient's allergies indicates:   Allergen Reactions    Codeine Hives and Rash     Takes Tramadol and has never had an issue with SOB/swelling  Also tolerated hydromorphone 2020      Penicillin     Penicillins Hives     Pt tolerated cefazolin 2020       Objective:     Left Lower Extremity  NVI  WWP foot  Comp soft  Cap refill < 2 sec  Calf NT, soft  (-) Gwen sign  ELY  ROM : Patient is able to easily exhibit full flexion and extension on passive range of motion.   Wiggles toes  DF/PF intact  Sensation intact  Inc C/D/I; slight keloid formation noted  No SOI    Imaging:    No imaging obtained today    Assessment:       Encounter Diagnosis   Name Primary?    Status post total left knee replacement using cement Yes          Plan:       Modesta was seen today for post-op evaluation.    Diagnoses and all orders for this visit:    Status post total left knee replacement using cement        Modesta Camacho is an established pt here for postop follow-up after left total knee replacement by Dr. Clifford. The patient will continue with the current medication regimen and treatment plan.  Patient should notify the office of any signs or symptoms of infection including fevers, erythema, purulent drainage, increasing pain.  Patient will continue with DVT prophylaxis until at least 6 weeks postop.  Patient will continue outpatient physical therapy.  Will follow-up as scheduled. Patient verbalized understanding of all instructions and agreed with the above plan.    No follow-ups on  file.    The patient understands, chooses and consents to this plan and accepts all   the risks which include but are not limited to the risks mentioned above.     Disclaimer: This note was prepared using a voice recognition system and is likely to have sound alike errors within the text.

## 2024-08-14 ENCOUNTER — CLINICAL SUPPORT (OUTPATIENT)
Dept: REHABILITATION | Facility: HOSPITAL | Age: 59
End: 2024-08-14
Payer: COMMERCIAL

## 2024-08-14 DIAGNOSIS — M25.662 DECREASED ROM OF LEFT KNEE: ICD-10-CM

## 2024-08-14 DIAGNOSIS — M25.562 CHRONIC PAIN OF LEFT KNEE: Primary | ICD-10-CM

## 2024-08-14 DIAGNOSIS — G89.29 CHRONIC PAIN OF LEFT KNEE: Primary | ICD-10-CM

## 2024-08-14 PROCEDURE — 97112 NEUROMUSCULAR REEDUCATION: CPT | Mod: PN

## 2024-08-14 PROCEDURE — 97110 THERAPEUTIC EXERCISES: CPT | Mod: PN

## 2024-08-14 NOTE — PROGRESS NOTES
"[facility]   Physical Therapy Treatment Note      Name: Modesta Camacho  Clinic Number: [id number]    Therapy Diagnosis:   Encounter Diagnoses   Name Primary?    Chronic pain of left knee Yes    Decreased ROM of left knee      Physician: Kitty García PA    Visit Date: 8/14/2024      Physician Orders: PT Eval and Treat  Medical Diagnosis from Referral: S/p right total knee replacement  Evaluation Date: 7/22/2024  Authorization Period Expiration: 6/27/2025  Plan of Care Expiration: 9/20/2024    Progress Update: 08/21/2024            Visit # / Visits authorized: 1 / 1 6 /20  FOTO: 7/22/2024 - Scored: 1 / 3      Time In: 0630  Time Out: 0705  Total Appointment Time (timed & untimed codes): 35 minutes    PTA Visit #: 0/5       Subjective     Patient reports:she had a good report on knee.  She is having injection for lumbar pain tomorrow and will not return  She was compliant with home exercise program.  Response to previous treatment: N/A  Functional change: N/A    Pain: 3/10  Location: left knee      Objective      Objective Measures updated at progress report unless specified.     Treatment     Modesta received the treatments listed below:      CPT Intervention  Joint:   Focus Duration / Intensity       X  TE NuStep  5 minutes    X  NMR Matrix knee extension  15# 3x10    X  NMR Matrix knee flexion 25# 3x10    X  NMR Shuttle squat  5 bands 2 minutes    X  TE Lunge on steps   x 10    X  NMR Sit to stand from 18" 3x10    X  TE Calf stretches on wedge 3 x 20 seconds    X  TE Heel raises 2x20       Heel slides with strap assist X30        Quad sets  3x10       SLR  2x10    X  NMR Bridges  3x10       LTR  x20       Clamshells  3x10 RTB       Prone hip extension  2x10 NP       Prone TKE  3x10 NP                                                                           PLAN             CPT Codes available for Billing:   (00)minutes of Manual therapy (MT) to improve pain and " ROM.  (15)minutes of Therapeutic Exercise (TE) to develop strength, endurance, range of motion, and flexibility.  (20)minutes of Neuromuscular Re-Education (NMR)  to improve: Balance, Coordination, Kinesthetic, Sense, Proprioception, and Posture.  (00)minutes of Therapeutic Activities (TA) to improve functional performance.  Unattended Electrical Stimulation (ES) for muscle performance or pain modulation.  Vasopneumatic Device Therapy () for management of swelling/edema. (15032)  BFR: Blood flow restriction applied during exercise  NP or (-): Not Performed    Patient Education and Home Exercises       Education provided:   - The patient was instructed in home program     Written Home Exercises Provided: improving. Exercises were reviewed and Modesta was able to demonstrate them prior to the end of the session.  Modesta demonstrated good  understanding of the education provided. See Electronic Medical Record under Patient Instructions for exercises provided during therapy sessions    Assessment     Mrs Camacho has improved gait and range of motion.  She has minimal to no gait deviation without device.  She requires min cues for program performance.    Modesta Is progressing well towards her goals.   Patient prognosis is Good.     Patient will continue to benefit from skilled outpatient physical therapy to address the deficits listed in the problem list box on initial evaluation, provide pt/family education and to maximize pt's level of independence in the home and community environment.     Patient's spiritual, cultural and educational needs considered and pt agreeable to plan of care and goals.     Anticipated barriers to physical therapy: None    SHORT TERM GOALS:  4 weeks Progress Date Met   Recent signs and systems trend is improving in order to progress towards Long term goals.  [] Met  [] Not Met  [x] Progressing     Patient will be independent with Home Exercise Program  in order to further progress and return to  maximal function. [] Met  [] Not Met  [x] Progressing     Pain rating at Worst: 5 /10 in order to progress towards increased independence with activity. [] Met  [] Not Met  [x] Progressing     Patient will be able to correct postural deviations in sitting and standing, to decrease pain and promote postural awareness for injury prevention.  [] Met  [] Not Met  [x] Progressing     Patient will improve functional outcome (FOTO) score: by 5% to increase self-worth & perceived functional ability towards long term goals [] Met  [] Not Met  [x] Progressing        LONG TERM GOALS: 12 weeks Progress Date Met   Patient will return to normal activites of daily living, recreational, and work related activities with less pain and limitation.  [] Met  [] Not Met  [x] Progressing     Patient will improve range of motion  to stated goals in order to return to maximal functional potential.  [] Met  [] Not Met  [x] Progressing     Patient will improve Strength to stated goals of appropriate musculature in order to improve functional independence.  [] Met  [] Not Met  [x] Progressing     Pain Rating at Best: 1/10 to improve Quality of Life.  [] Met  [] Not Met  [x] Progressing     Patient will meet predicted functional outcome (FOTO) score: 62% to increase self-worth & perceived functional ability. [] Met  [] Not Met  [x] Progressing     Patient will have met/partially met personal goal of: improve pain and function  [] Met  [] Not Met  [x] Progressing              PLAN   Plan of care Certification: 7/22/2024 to 9/20/2024     Outpatient Physical Therapy 3 times weekly for 12 weeks to include any combination of the following interventions: virtual visits, dry needling, modalities, electrical stimulation (IFC, Pre-Mod, Attended with Functional Dry Needling), Gait Training, Manual Therapy, Moist Heat/ Ice, Neuromuscular Re-ed, Patient Education, Self Care, Therapeutic Exercise, Functional Training, and Therapeutic Activites      Thank  you for this referral.     Titus Rosario, PT       Titus Rosario, PT

## 2024-08-15 ENCOUNTER — HOSPITAL ENCOUNTER (OUTPATIENT)
Facility: HOSPITAL | Age: 59
Discharge: HOME | End: 2024-08-15
Attending: PHYSICAL MEDICINE & REHABILITATION | Admitting: PHYSICAL MEDICINE & REHABILITATION
Payer: COMMERCIAL

## 2024-08-15 VITALS
OXYGEN SATURATION: 100 % | RESPIRATION RATE: 16 BRPM | SYSTOLIC BLOOD PRESSURE: 155 MMHG | HEART RATE: 79 BPM | DIASTOLIC BLOOD PRESSURE: 84 MMHG | HEIGHT: 62 IN | TEMPERATURE: 98 F | BODY MASS INDEX: 29.83 KG/M2 | WEIGHT: 162.13 LBS

## 2024-08-15 DIAGNOSIS — M47.816 LUMBAR SPONDYLOSIS: Primary | ICD-10-CM

## 2024-08-15 PROCEDURE — 64493 INJ PARAVERT F JNT L/S 1 LEV: CPT | Mod: 50,,, | Performed by: PHYSICAL MEDICINE & REHABILITATION

## 2024-08-15 PROCEDURE — 64494 INJ PARAVERT F JNT L/S 2 LEV: CPT | Mod: 50,,, | Performed by: PHYSICAL MEDICINE & REHABILITATION

## 2024-08-15 PROCEDURE — 64493 INJ PARAVERT F JNT L/S 1 LEV: CPT | Mod: 50 | Performed by: PHYSICAL MEDICINE & REHABILITATION

## 2024-08-15 PROCEDURE — 63600175 PHARM REV CODE 636 W HCPCS: Mod: JZ,JG | Performed by: PHYSICAL MEDICINE & REHABILITATION

## 2024-08-15 PROCEDURE — 64494 INJ PARAVERT F JNT L/S 2 LEV: CPT | Mod: 50 | Performed by: PHYSICAL MEDICINE & REHABILITATION

## 2024-08-15 RX ORDER — BUPIVACAINE HYDROCHLORIDE 5 MG/ML
INJECTION, SOLUTION EPIDURAL; INTRACAUDAL
Status: DISCONTINUED | OUTPATIENT
Start: 2024-08-15 | End: 2024-08-15 | Stop reason: HOSPADM

## 2024-08-15 RX ORDER — MIDAZOLAM HYDROCHLORIDE 1 MG/ML
INJECTION, SOLUTION INTRAMUSCULAR; INTRAVENOUS
Status: DISCONTINUED | OUTPATIENT
Start: 2024-08-15 | End: 2024-08-15 | Stop reason: HOSPADM

## 2024-08-15 RX ORDER — FENTANYL CITRATE 50 UG/ML
INJECTION, SOLUTION INTRAMUSCULAR; INTRAVENOUS
Status: DISCONTINUED | OUTPATIENT
Start: 2024-08-15 | End: 2024-08-15 | Stop reason: HOSPADM

## 2024-08-15 RX ORDER — ONDANSETRON HYDROCHLORIDE 2 MG/ML
4 INJECTION, SOLUTION INTRAVENOUS ONCE AS NEEDED
Status: DISCONTINUED | OUTPATIENT
Start: 2024-08-15 | End: 2024-08-15 | Stop reason: HOSPADM

## 2024-08-15 NOTE — H&P
HPI  Patient presenting for Procedure(s) (LRB):  Bilateral L3-5  MBB #1 with RN IV sedation (Bilateral)     No health changes since previous encounter    Past Medical History:   Diagnosis Date    Abnormal EKG 02/05/2021    Anxiety     Hypertension     Osteoarthritis     Stage 2 chronic kidney disease 02/05/2021     Past Surgical History:   Procedure Laterality Date    ARTHROPLASTY OF HIP BY ANTERIOR APPROACH Left 7/31/2020    Procedure: ARTHROPLASTY, HIP, ANTERIOR APPROACH;  Surgeon: Xavi Gandara MD;  Location: [facility];  Service: Orthopedics;  Laterality: Left;    BLOCK, NERVE, GENICULAR Left 2/14/2024    Procedure: Left genicular nerve block with RN IV sedation;  Surgeon: Shaun Iyer MD;  Location: [facility];  Service: Pain Management;  Laterality: Left;    gastric sleeve      HIP ARTHROPLASTY Right 6/7/2023    Procedure: ARTHROPLASTY, HIP;  Surgeon: Trey Clifford MD;  Location: [facility];  Service: Orthopedics;  Laterality: Right;    INJECTION OF ANESTHETIC AGENT AROUND NERVE Bilateral 8/14/2023    Procedure: left genicular nerve block RN IV Sedation;  Surgeon: Shaun Iyer MD;  Location: [facility];  Service: Pain Management;  Laterality: Bilateral;    JOINT REPLACEMENT Right 06/14/2016    knee    JOINT REPLACEMENT Left 05/20/2021    KNEE    JOINT REPLACEMENT Left 07/30/2020    HIP    KNEE ARTHROPLASTY Left 5/6/2021    Procedure: ARTHROPLASTY, KNEE;  Surgeon: Xavi Gandara MD;  Location: [facility];  Service: Orthopedics;  Laterality: Left;    REPLACEMENT, POLYETHYLENE LINER Left 6/12/2024    Procedure: REPLACEMENT, POLYETHYLENE LINER;  Surgeon: Trey Clifford MD;  Location: [facility];  Service: General;  Laterality: Left;    RESURFACING OF PATELLA Right 9/27/2022    Procedure: RESURFACING, PATELLA;  Surgeon: Trey Clifford MD;  Location: [facility];  Service: General;  Laterality: Right;    REVISION OF KNEE ARTHROPLASTY Right 9/27/2022    Procedure: REVISION, ARTHROPLASTY,  KNEE;  Surgeon: Trey Clifford MD;  Location: [facility];  Service: General;  Laterality: Right;    REVISION OF KNEE ARTHROPLASTY Left 6/12/2024    Procedure: REVISION, ARTHROPLASTY, KNEE;  Surgeon: Trey Clifford MD;  Location: [facility] OR;  Service: General;  Laterality: Left;  POLY EXCHANGE    SELECTIVE INJECTION OF ANESTHETIC AGENT AROUND LUMBAR SPINAL NERVE ROOT BY TRANSFORAMINAL APPROACH Bilateral 5/2/2024    Procedure: Bilateral L4/5 TF CELE;  Surgeon: Shaun Iyer MD;  Location: [facility];  Service: Pain Management;  Laterality: Bilateral;    SYNOVECTOMY OF KNEE Left 6/12/2024    Procedure: SYNOVECTOMY, KNEE;  Surgeon: Trey Clifford MD;  Location: [facility] OR;  Service: General;  Laterality: Left;    TUBAL LIGATION      UTERINE FIBROID EMBOLIZATION       Review of patient's allergies indicates:   Allergen Reactions    Codeine Hives and Rash     Takes Tramadol and has never had an issue with SOB/swelling  Also tolerated hydromorphone 7/2020      Penicillin     Penicillins Hives     Pt tolerated cefazolin 7/2020        Current Facility-Administered Medications on File Prior to Encounter   Medication Dose Route Frequency Provider Last Rate Last Admin    ondansetron injection 4 mg  4 mg Intravenous Once PRN Shaun Iyer MD         Current Outpatient Medications on File Prior to Encounter   Medication Sig Dispense Refill    DULoxetine (CYMBALTA) 60 MG capsule Take 1 capsule (60 mg total) by mouth 2 (two) times daily. 60 capsule 2    gabapentin (NEURONTIN) 300 MG capsule Take 1 capsule (300 mg total) by mouth 3 (three) times daily. 90 capsule 2    methocarbamoL (ROBAXIN) 500 MG Tab Take 1 tablet (500 mg total) by mouth every evening. 30 tablet 0    metoprolol succinate (TOPROL-XL) 50 MG 24 hr tablet Take 2 tablets (100 mg total) by mouth nightly. 180 tablet 3    telmisartan (MICARDIS) 80 MG Tab Take 1 tablet (80 mg total) by mouth once daily. 90 tablet 3    VICTOZA 3-DANIEL 0.6 mg/0.1 mL (18  "mg/3 mL) PnIj pen SMARTSI.6 Milligram(s) SUB-Q Once      amLODIPine (NORVASC) 5 MG tablet Take 1 tablet (5 mg total) by mouth 2 (two) times daily. 60 tablet 11    cloNIDine (CATAPRES) 0.1 MG tablet TAKE 1 TABLET BY MOUTH ONCE DAILY USE FOR SBP GREATER THAN 160 MMHG. 90 tablet 1    hydroCHLOROthiazide (HYDRODIURIL) 25 MG tablet Take 25 mg by mouth.      multivit-min/ferrous fumarate (MULTI VITAMIN ORAL) Take 2 tablets by mouth Daily.      ondansetron (ZOFRAN-ODT) 4 MG TbDL dissolve 2 tablets (8 mg total) by mouth every 8 (eight) hours as needed (nausea). 20 tablet 0    ondansetron (ZOFRAN-ODT) 4 MG TbDL Dissolve 2 tablets (8 mg total) by mouth every 8 (eight) hours as needed (nausea). 21 tablet 0    tiZANidine (ZANAFLEX) 4 MG tablet Take 0.5-1 tablets (2-4 mg total) by mouth 2 (two) times daily as needed (pain). 90 tablet 2    vitamin D (VITAMIN D3) 1000 units Tab Take 1,000 Units by mouth once daily.          PMHx, PSHx, Allergies, Medications reviewed in epic    ROS negative except pain complaints in HPI    OBJECTIVE:    /77 (BP Location: Right arm, Patient Position: Sitting)   Pulse 77   Temp 97.5 °F (36.4 °C) (Temporal)   Resp 17   Ht 5' 2" (1.575 m)   Wt 73.5 kg (162 lb 2.4 oz)   SpO2 99%   Breastfeeding No   BMI 29.66 kg/m²     PHYSICAL EXAMINATION:    GENERAL: Well appearing, in no acute distress, alert and oriented x3.  PSYCH:  Mood and affect appropriate.  SKIN: Skin color, texture, turgor normal, no rashes or lesions which will impact the procedure.  CV: RRR with palpation of the radial artery.  PULM: No evidence of respiratory difficulty, symmetric chest rise. Clear to auscultation.  NEURO: Cranial nerves grossly intact.    Plan:    Proceed with procedure as planned Procedure(s) (LRB):  Bilateral L3-5  MBB #1 with RN IV sedation (Bilateral)    Hakeem Castillo MD  08/15/2024            "

## 2024-08-15 NOTE — DISCHARGE SUMMARY
Discharge Note  Short Stay      SUMMARY     Admit Date: 8/15/2024    Attending Physician: Hakeem Castillo MD        Discharge Physician: Hakeem Castillo MD        Discharge Date: 8/15/2024 10:02 AM    Procedure(s) (LRB):  Bilateral L3-5  MBB #1 with RN IV sedation (Bilateral)    Final Diagnosis: Lumbar spondylosis [M47.816]    Disposition: Home or self care    Patient Instructions:   Current Discharge Medication List        CONTINUE these medications which have NOT CHANGED    Details   DULoxetine (CYMBALTA) 60 MG capsule Take 1 capsule (60 mg total) by mouth 2 (two) times daily.  Qty: 60 capsule, Refills: 2    Associated Diagnoses: LANE (generalized anxiety disorder)      gabapentin (NEURONTIN) 300 MG capsule Take 1 capsule (300 mg total) by mouth 3 (three) times daily.  Qty: 90 capsule, Refills: 2    Associated Diagnoses: Lumbar radiculopathy; DDD (degenerative disc disease), lumbar      methocarbamoL (ROBAXIN) 500 MG Tab Take 1 tablet (500 mg total) by mouth every evening.  Qty: 30 tablet, Refills: 0      metoprolol succinate (TOPROL-XL) 50 MG 24 hr tablet Take 2 tablets (100 mg total) by mouth nightly.  Qty: 180 tablet, Refills: 3    Comments: .  Associated Diagnoses: Essential hypertension      oxyCODONE-acetaminophen (PERCOCET)  mg per tablet Take 1 tablet by mouth every 8 (eight) hours as needed for Pain.  Qty: 21 tablet, Refills: 0    Comments: Quantity prescribed more than 7 day supply? No      telmisartan (MICARDIS) 80 MG Tab Take 1 tablet (80 mg total) by mouth once daily.  Qty: 90 tablet, Refills: 3    Comments: .  Associated Diagnoses: Essential hypertension      VICTOZA 3-DANIEL 0.6 mg/0.1 mL (18 mg/3 mL) PnIj pen SMARTSI.6 Milligram(s) SUB-Q Once      amLODIPine (NORVASC) 5 MG tablet Take 1 tablet (5 mg total) by mouth 2 (two) times daily.  Qty: 60 tablet, Refills: 11    Comments: .  Associated Diagnoses: Abnormal EKG; Essential hypertension; Pulmonary HTN; DDD (degenerative disc disease),  cervical; Hyperuricemia; Anxiety      cloNIDine (CATAPRES) 0.1 MG tablet TAKE 1 TABLET BY MOUTH ONCE DAILY USE FOR SBP GREATER THAN 160 MMHG.  Qty: 90 tablet, Refills: 1    Comments: .  Associated Diagnoses: Essential hypertension      hydroCHLOROthiazide (HYDRODIURIL) 25 MG tablet Take 25 mg by mouth.      multivit-min/ferrous fumarate (MULTI VITAMIN ORAL) Take 2 tablets by mouth Daily.      !! ondansetron (ZOFRAN-ODT) 4 MG TbDL dissolve 2 tablets (8 mg total) by mouth every 8 (eight) hours as needed (nausea).  Qty: 20 tablet, Refills: 0      !! ondansetron (ZOFRAN-ODT) 4 MG TbDL Dissolve 2 tablets (8 mg total) by mouth every 8 (eight) hours as needed (nausea).  Qty: 21 tablet, Refills: 0      tiZANidine (ZANAFLEX) 4 MG tablet Take 0.5-1 tablets (2-4 mg total) by mouth 2 (two) times daily as needed (pain).  Qty: 90 tablet, Refills: 2    Associated Diagnoses: DDD (degenerative disc disease), lumbar; Knee pain, unspecified chronicity, unspecified laterality; Primary osteoarthritis of right hip      vitamin D (VITAMIN D3) 1000 units Tab Take 1,000 Units by mouth once daily.       !! - Potential duplicate medications found. Please discuss with provider.              Discharge Diagnosis: Lumbar spondylosis [M47.816]  Condition on Discharge: Stable with no complications to procedure   Diet on Discharge: Same as before.  Activity: as per instruction sheet.  Discharge to: Home with a responsible adult.  Follow up: 2-4 weeks       Please call the office at [phone] if you experience any weakness or loss of sensation, fever > 101.5, pain uncontrolled with oral medications, persistent nausea/vomiting/or diarrhea, redness or drainage from the incisions, or any other worrisome concerns. If physician on call was not reached or could not communicate with our office for any reason please go to the nearest emergency department

## 2024-08-15 NOTE — DISCHARGE INSTRUCTIONS

## 2024-08-15 NOTE — OP NOTE
LUMBAR Medial Branch Block (DIAGNOSTIC) Under Fluoroscopy  Time-out taken to identify patient and procedure side prior to starting the procedure.            08/15/2024                                                         Patient has clinical and imaging findings suggestive of facet mediated pain and has completed previous diagnostic medial branch blocks at specified levels with at least 80% relief for the expected duration of the local anesthetic utilized.    For supporting clinical documentation, please see most recent clinic and telephone notes.      PROCEDURE:  Bilateral medial branch block at the transverse processes at the level of L4, L5, Sacral ala    REASON FOR PROCEDURE: Lumbar spondylosis [M47.816]    PHYSICIAN: Hakeem Castillo MD    ASSISTANTS: None    MEDICATIONS INJECTED: 0.5% bupivicane, 1mL at each level    LOCAL ANESTHETIC USED: Xylocaine 1% 10ml    SEDATION MEDICATIONS: Conscious sedation provided by M.D    The patient was monitored with continuous pulse oximetry, EKG, and intermittent blood pressure monitors, immediately prior to administration of sedation.  The patient was hemodynamically stable throughout the entire process was responsive to voice, and breathing spontaneously.  Supplemental O2 was provided at 2L/min via nasal cannula.  Patient was comfortable for the duration of the procedure.     There was a total of 2mg IV Midazolam and 50mcg Fentanyl titrated for the procedure    Total sedation time was >10minutes and <20minutes      ESTIMATED BLOOD LOSS:  None.    COMPLICATIONS:  None.    TECHNIQUE: Laying in a prone position, the patient was prepped and draped in the usual sterile fashion using ChloraPrep and fenestrated drape.  The level was determined under fluoroscopic guidance.  Local anesthetic was given by going down to the hub of the 27-gauge 1.25in needle and raising a wheel.  A 22-gauge 3.5inch needle was introduced to the anatomic local of the medial branch at each of the  above levels using fluoroscopy in the AP, oblique, and lateral views.  After negative aspiration, medication was injected slowly. The patient tolerated the procedure well.       The patient was monitored after the procedure.  Patient was given post procedure and discharge instructions to follow at home.  We will see the patient back in two weeks or the patient may call to inform of status. The patient was discharged in a stable condition

## 2024-08-16 ENCOUNTER — TELEPHONE (OUTPATIENT)
Dept: PAIN MEDICINE | Facility: CLINIC | Age: 59
End: 2024-08-16
Payer: COMMERCIAL

## 2024-08-16 NOTE — TELEPHONE ENCOUNTER
----- Message from Melvin Butler MA sent at 7/31/2024  3:28 PM CDT -----    Called patient to get the diagnostic response from [facility] on 08/15/2024 with Dr. Castillo. Patient reports the follow information.     1. What percentage of pain relief did you receive following the block, from 0-100%? 100     2. What was pain score from 0-10? 0     3. How many hours did pain relief last following the block?  12+     4. During this time please describe in detail the activities you were able to do? House hold chores without stopping or sitting down.    Melvin RANKIN

## 2024-08-16 NOTE — TELEPHONE ENCOUNTER
Called patient to get the % of relief but patient didn't answer the phone LVM to call back at earliest convenience.    Melvin RANKIN

## 2024-08-16 NOTE — TELEPHONE ENCOUNTER
----- Message from Alejandro Nevarez MA sent at 8/14/2024 10:49 AM CDT -----  Regarding: [facility]  Get [facility] % relief

## 2024-08-23 ENCOUNTER — CLINICAL SUPPORT (OUTPATIENT)
Dept: REHABILITATION | Facility: HOSPITAL | Age: 59
End: 2024-08-23
Payer: COMMERCIAL

## 2024-08-23 DIAGNOSIS — M25.662 DECREASED ROM OF LEFT KNEE: ICD-10-CM

## 2024-08-23 DIAGNOSIS — M25.562 CHRONIC PAIN OF LEFT KNEE: Primary | ICD-10-CM

## 2024-08-23 DIAGNOSIS — G89.29 CHRONIC PAIN OF LEFT KNEE: Primary | ICD-10-CM

## 2024-08-23 PROCEDURE — 97110 THERAPEUTIC EXERCISES: CPT | Mod: PN

## 2024-08-23 PROCEDURE — 97112 NEUROMUSCULAR REEDUCATION: CPT | Mod: PN

## 2024-08-24 NOTE — PROGRESS NOTES
"[facility]   Physical Therapy Treatment Note      Name: Modesta Camacho  Clinic Number: [id number]    Therapy Diagnosis:   Encounter Diagnoses   Name Primary?    Chronic pain of left knee Yes    Decreased ROM of left knee      Physician: Kitty García PA    Visit Date: 8/23/2024      Physician Orders: PT Eval and Treat  Medical Diagnosis from Referral: S/p right total knee replacement  Evaluation Date: 7/22/2024  Authorization Period Expiration: 6/27/2025  Plan of Care Expiration: 9/20/2024    Progress Update: 08/21/2024            Visit # / Visits authorized: 1 / 1 7 /20  FOTO: 7/22/2024 - Scored: 1 / 3      Time In: 0625  Time Out: 0710  Total Appointment Time (timed & untimed codes): 45 minutes    PTA Visit #: 0/5       Subjective     Patient reports: she is doing better and wants to return to work.  She was compliant with home exercise program.  Response to previous treatment: N/A  Functional change: N/A    Pain: 3/10  Location: left knee      Objective      Objective Measures updated at progress report unless specified.     Treatment     Modesta received the treatments listed below:      CPT Intervention  Joint:   Focus Duration / Intensity       X  TE NuStep  5 minutes    X  NMR Matrix knee extension  15# 3x10    X  NMR Matrix knee flexion 25# 3x10    X  NMR Shuttle squat  5 bands 2 minutes    X  TE Lunge on steps   x 10    X  NMR Sit to stand from 18" 3x10    X  TE Calf stretches on wedge 3 x 20 seconds    X  TE Heel raises 2x20       Heel slides with strap assist X30        Quad sets  3x10       SLR  2x10    X  NMR Bridges  3x10       LTR  x20       Clamshells  3x10 RTB       Prone hip extension  2x10 NP       Prone TKE  3x10 NP                                                                           PLAN             CPT Codes available for Billing:   (00)minutes of Manual therapy (MT) to improve pain and ROM.  (20)minutes of Therapeutic Exercise (TE) to develop " strength, endurance, range of motion, and flexibility.  (25)minutes of Neuromuscular Re-Education (NMR)  to improve: Balance, Coordination, Kinesthetic, Sense, Proprioception, and Posture.  (00)minutes of Therapeutic Activities (TA) to improve functional performance.  Unattended Electrical Stimulation (ES) for muscle performance or pain modulation.  Vasopneumatic Device Therapy () for management of swelling/edema. (97860)  BFR: Blood flow restriction applied during exercise  NP or (-): Not Performed    Patient Education and Home Exercises       Education provided:   - The patient was instructed in home program     Written Home Exercises Provided: improving. Exercises were reviewed and Modesta was able to demonstrate them prior to the end of the session.  Modesta demonstrated good  understanding of the education provided. See Electronic Medical Record under Patient Instructions for exercises provided during therapy sessions    Assessment     Mrs Camacho has improved gait and range of motion.  She has minimal to no gait deviation without device.  She requires min cues for program performance.    Modesta Is progressing well towards her goals.   Patient prognosis is Good.     Patient will continue to benefit from skilled outpatient physical therapy to address the deficits listed in the problem list box on initial evaluation, provide pt/family education and to maximize pt's level of independence in the home and community environment.     Patient's spiritual, cultural and educational needs considered and pt agreeable to plan of care and goals.     Anticipated barriers to physical therapy: None    SHORT TERM GOALS:  4 weeks Progress Date Met   Recent signs and systems trend is improving in order to progress towards Long term goals.  [] Met  [] Not Met  [x] Progressing     Patient will be independent with Home Exercise Program  in order to further progress and return to maximal function. [] Met  [] Not Met  [x] Progressing     Pain  rating at Worst: 5 /10 in order to progress towards increased independence with activity. [] Met  [] Not Met  [x] Progressing     Patient will be able to correct postural deviations in sitting and standing, to decrease pain and promote postural awareness for injury prevention.  [] Met  [] Not Met  [x] Progressing     Patient will improve functional outcome (FOTO) score: by 5% to increase self-worth & perceived functional ability towards long term goals [] Met  [] Not Met  [x] Progressing        LONG TERM GOALS: 12 weeks Progress Date Met   Patient will return to normal activites of daily living, recreational, and work related activities with less pain and limitation.  [] Met  [] Not Met  [x] Progressing     Patient will improve range of motion  to stated goals in order to return to maximal functional potential.  [] Met  [] Not Met  [x] Progressing     Patient will improve Strength to stated goals of appropriate musculature in order to improve functional independence.  [] Met  [] Not Met  [x] Progressing     Pain Rating at Best: 1/10 to improve Quality of Life.  [] Met  [] Not Met  [x] Progressing     Patient will meet predicted functional outcome (FOTO) score: 62% to increase self-worth & perceived functional ability. [] Met  [] Not Met  [x] Progressing     Patient will have met/partially met personal goal of: improve pain and function  [] Met  [] Not Met  [x] Progressing              PLAN   Plan of care Certification: 7/22/2024 to 9/20/2024     Outpatient Physical Therapy 3 times weekly for 12 weeks to include any combination of the following interventions: virtual visits, dry needling, modalities, electrical stimulation (IFC, Pre-Mod, Attended with Functional Dry Needling), Gait Training, Manual Therapy, Moist Heat/ Ice, Neuromuscular Re-ed, Patient Education, Self Care, Therapeutic Exercise, Functional Training, and Therapeutic Activites      Thank you for this referral.     Titus Rosario, PT       Titus Rosario, PT

## 2024-08-26 ENCOUNTER — TELEPHONE (OUTPATIENT)
Dept: PAIN MEDICINE | Facility: CLINIC | Age: 59
End: 2024-08-26
Payer: COMMERCIAL

## 2024-08-26 NOTE — TELEPHONE ENCOUNTER
Called patient in regards to a call back. Patient stated that she is ready to be scheduled for second MBB procedure. Stated to patient that I will let the provider know of this. Patient verbalized understanding.    Eleanor Landa MA

## 2024-08-27 ENCOUNTER — TELEPHONE (OUTPATIENT)
Dept: ORTHOPEDICS | Facility: CLINIC | Age: 59
End: 2024-08-27
Payer: COMMERCIAL

## 2024-08-27 DIAGNOSIS — M47.816 LUMBAR SPONDYLOSIS: Primary | ICD-10-CM

## 2024-08-27 NOTE — TELEPHONE ENCOUNTER
----- Message from Ebony Carpio LPN sent at 8/26/2024  4:15 PM CDT -----    ----- Message -----  From: Sparkle Alejandro  Sent: 8/26/2024   4:13 PM CDT  To: Venessa Yang Staff    Type: General Call Back     Name of Caller:FELIX WILLIAMSON [[id number]]    Reason for call back?:work release    Best Call Back Number:[phone]    Additional Information: patient state she would like to get in touch with the providers office. Patient states she would like to be released to work if possible after labor day 09/03.. patient states she is not sure if this is possible, but would like to speak with someone in the providers office.

## 2024-08-27 NOTE — TELEPHONE ENCOUNTER
Returned the patient's phone call in regards to their message. Patient states that they will just wait until after their last post-op appointment with Dr. Clifford on 9/12/24 to discuss a return to work. Verbalized understanding.

## 2024-08-27 NOTE — TELEPHONE ENCOUNTER
----- Message from Ebony Carpio LPN sent at 8/27/2024 10:52 AM CDT -----  Contact: Modesta    ----- Message -----  From: Silvina Capone  Sent: 8/27/2024  10:43 AM CDT  To: Venessa Yang Staff    .Type:  Patient Returning Call    Who Called: Modesta   Who Left Message for Patient: Nicolle  Does the patient know what this is regarding?: clearing pt back to work   Would the patient rather a call back or a response via [facility]?  Call   Best Call Back Number: .[phone]   Additional Information:

## 2024-08-28 ENCOUNTER — CLINICAL SUPPORT (OUTPATIENT)
Dept: REHABILITATION | Facility: HOSPITAL | Age: 59
End: 2024-08-28
Payer: COMMERCIAL

## 2024-08-28 ENCOUNTER — TELEPHONE (OUTPATIENT)
Dept: PAIN MEDICINE | Facility: CLINIC | Age: 59
End: 2024-08-28
Payer: COMMERCIAL

## 2024-08-28 DIAGNOSIS — G89.29 CHRONIC PAIN OF LEFT KNEE: Primary | ICD-10-CM

## 2024-08-28 DIAGNOSIS — M25.562 CHRONIC PAIN OF LEFT KNEE: Primary | ICD-10-CM

## 2024-08-28 DIAGNOSIS — M25.662 DECREASED ROM OF LEFT KNEE: ICD-10-CM

## 2024-08-28 PROCEDURE — 97110 THERAPEUTIC EXERCISES: CPT | Mod: PN

## 2024-08-28 PROCEDURE — 97112 NEUROMUSCULAR REEDUCATION: CPT | Mod: PN

## 2024-08-28 NOTE — TELEPHONE ENCOUNTER
----- Message from Hakeem Castillo MD sent at 8/27/2024 11:22 AM CDT -----  Pain Provider: Anna  Patient Name: Modesta Williamson  MRN: [id number]  Case: LUMBAR MEDIAL BRANCH BLOCKS   Level: L3, L4, and L5  Laterality: bilateral  Anticoagulation: No need to hold if taking any    Contact within 48 hrs for diagnostic response  ----- Message -----  From: Eleanor Landa MA  Sent: 8/26/2024   4:31 PM CDT  To: Hakeem Castillo MD    Called patient in regards to a call back about moving forward with procedure. Can you please place orders for second MBB procedure as she is ready to move forward. Percentage of reliefe she gave over phone is at 85%.  ----- Message -----  From: Sparkle Alejandro  Sent: 8/26/2024   4:14 PM CDT  To: Anna Palacio Staff    Type:  Appointment Request    Name of Caller:MODESTA WILLIAMSON [[id number]]    When is the first available appointment?No access    Symptoms:injection in back     Would the patient rather a call back or a response via [facility]? Call back     Best Call Back Number: [phone]    Additional Information: patient states she would like to know when the provider will be scheduling her for her next back injection. Patient states it has been two weeks since her last injection and would like to have the next scheduled. Please call patient back with further assistance.

## 2024-08-28 NOTE — TELEPHONE ENCOUNTER
Called to scheduled pt injection with galilea Walden  for pt to call office back to schedule injection.    .Aidan RANKIN

## 2024-08-31 NOTE — PROGRESS NOTES
"[facility]   Physical Therapy Treatment Note      Name: Modesta Camacho  Clinic Number: [id number]    Therapy Diagnosis:   Encounter Diagnoses   Name Primary?    Chronic pain of left knee Yes    Decreased ROM of left knee      Physician: Kitty García PA    Visit Date: 8/28/2024    Physician Orders: PT Eval and Treat  Medical Diagnosis from Referral: S/p right total knee replacement  Evaluation Date: 7/22/2024  Authorization Period Expiration: 6/27/2025  Plan of Care Expiration: 9/20/2024    Progress Update: 08/21/2024            Visit # / Visits authorized: 1 / 1 8 /20  FOTO: 7/22/2024 - Scored: 1 / 3      Time In: 0630  Time Out: 0700  Total Appointment Time (timed & untimed codes): 30 minutes    PTA Visit #: 0/5       Subjective     Patient reports: she is doing better and wants to return to work.  She was compliant with home exercise program.  Response to previous treatment: N/A  Functional change: N/A    Pain: 3/10  Location: left knee      Objective      Objective Measures updated at progress report unless specified.     Treatment     Modesta received the treatments listed below:      CPT Intervention   Duration / Intensity       X  TE NuStep  5 minutes    X  NMR Matrix knee extension  15# 3x10    X  NMR Matrix knee flexion 25# 3x10    X  NMR Shuttle squat  5 bands 2 minutes    X  TE Lunge on steps   x 10    X  NMR Sit to stand from 18" 3x10    X  TE Calf stretches on wedge 3 x 20 seconds    X  TE Heel raises 2x20       Heel slides with strap assist X30        Quad sets  3x10       SLR  2x10    X  NMR Bridges  3x10       LTR  x20       Clamshells  3x10 RTB       Prone hip extension  2x10 NP       Prone TKE  3x10 NP                                                                           PLAN             CPT Codes available for Billing:   (00)minutes of Manual therapy (MT) to improve pain and ROM.  (10)minutes of Therapeutic Exercise (TE) to develop strength, endurance, " range of motion, and flexibility.  (20)minutes of Neuromuscular Re-Education (NMR)  to improve: Balance, Coordination, Kinesthetic, Sense, Proprioception, and Posture.  (00)minutes of Therapeutic Activities (TA) to improve functional performance.  Unattended Electrical Stimulation (ES) for muscle performance or pain modulation.  Vasopneumatic Device Therapy () for management of swelling/edema. (96330)  BFR: Blood flow restriction applied during exercise  NP or (-): Not Performed    Patient Education and Home Exercises       Education provided:   - The patient was instructed in home program     Written Home Exercises Provided: improving. Exercises were reviewed and Modesta was able to demonstrate them prior to the end of the session.  Modesta demonstrated good  understanding of the education provided. See Electronic Medical Record under Patient Instructions for exercises provided during therapy sessions    Assessment     Mrs Camacho has improved gait and range of motion.  She has minimal to no gait deviation without device.  She requires min cues for program performance.    Modesta Is progressing well towards her goals.   Patient prognosis is Good.     Patient will continue to benefit from skilled outpatient physical therapy to address the deficits listed in the problem list box on initial evaluation, provide pt/family education and to maximize pt's level of independence in the home and community environment.     Patient's spiritual, cultural and educational needs considered and pt agreeable to plan of care and goals.     Anticipated barriers to physical therapy: None    SHORT TERM GOALS:  4 weeks Progress Date Met   Recent signs and systems trend is improving in order to progress towards Long term goals.  [] Met  [] Not Met  [x] Progressing     Patient will be independent with Home Exercise Program  in order to further progress and return to maximal function. [] Met  [] Not Met  [x] Progressing     Pain rating at Worst: 5 /10  in order to progress towards increased independence with activity. [] Met  [] Not Met  [x] Progressing     Patient will be able to correct postural deviations in sitting and standing, to decrease pain and promote postural awareness for injury prevention.  [] Met  [] Not Met  [x] Progressing     Patient will improve functional outcome (FOTO) score: by 5% to increase self-worth & perceived functional ability towards long term goals [] Met  [] Not Met  [x] Progressing        LONG TERM GOALS: 12 weeks Progress Date Met   Patient will return to normal activites of daily living, recreational, and work related activities with less pain and limitation.  [] Met  [] Not Met  [x] Progressing     Patient will improve range of motion  to stated goals in order to return to maximal functional potential.  [] Met  [] Not Met  [x] Progressing     Patient will improve Strength to stated goals of appropriate musculature in order to improve functional independence.  [] Met  [] Not Met  [x] Progressing     Pain Rating at Best: 1/10 to improve Quality of Life.  [] Met  [] Not Met  [x] Progressing     Patient will meet predicted functional outcome (FOTO) score: 62% to increase self-worth & perceived functional ability. [] Met  [] Not Met  [x] Progressing     Patient will have met/partially met personal goal of: improve pain and function  [] Met  [] Not Met  [x] Progressing              PLAN   Plan of care Certification: 7/22/2024 to 9/20/2024     Outpatient Physical Therapy 3 times weekly for 12 weeks to include any combination of the following interventions: virtual visits, dry needling, modalities, electrical stimulation (IFC, Pre-Mod, Attended with Functional Dry Needling), Gait Training, Manual Therapy, Moist Heat/ Ice, Neuromuscular Re-ed, Patient Education, Self Care, Therapeutic Exercise, Functional Training, and Therapeutic Activites      Thank you for this referral.     Titus Rosario, PT       Titus Rosario, PT

## 2024-09-04 ENCOUNTER — PATIENT MESSAGE (OUTPATIENT)
Dept: PAIN MEDICINE | Facility: HOSPITAL | Age: 59
End: 2024-09-04
Payer: COMMERCIAL

## 2024-09-04 ENCOUNTER — CLINICAL SUPPORT (OUTPATIENT)
Dept: REHABILITATION | Facility: HOSPITAL | Age: 59
End: 2024-09-04
Payer: COMMERCIAL

## 2024-09-04 DIAGNOSIS — M25.662 DECREASED ROM OF LEFT KNEE: ICD-10-CM

## 2024-09-04 DIAGNOSIS — G89.29 CHRONIC PAIN OF LEFT KNEE: Primary | ICD-10-CM

## 2024-09-04 DIAGNOSIS — M25.562 CHRONIC PAIN OF LEFT KNEE: Primary | ICD-10-CM

## 2024-09-04 PROCEDURE — 97110 THERAPEUTIC EXERCISES: CPT | Mod: PN

## 2024-09-04 PROCEDURE — 97112 NEUROMUSCULAR REEDUCATION: CPT | Mod: PN

## 2024-09-04 NOTE — PRE-PROCEDURE INSTRUCTIONS
Unable to reach patient regarding procedure scheduled on 9.10. PAT instructions and arrival time sent via [employer]    Arrival time 1115     Has patient been sick with fever or on antibiotics within the last 7 days? No     Does the patient have any open wounds, sores or rashes? No     Does the patient have any recent fractures? no     Has patient received a vaccination within the last 7 days? No     Received the COVID vaccination? yes     Has the patient stopped all medications as directed? na     Does patient have a pacemaker, defibrillator, or implantable stimulator? No     Does the patient have a ride to and from procedure and someone reliable to remain with patient?       Is the patient diabetic? no     Does the patient have sleep apnea? Or use O2 at home? no     Is the patient receiving sedation? Yes      Is the patient instructed to remain NPO beginning at midnight the night before their procedure? yes     Procedure location confirmed with patient? Yes     Covid- Denies signs/symptoms. Instructed to notify PAT/MD if any changes.

## 2024-09-07 NOTE — PROGRESS NOTES
"[facility]   Physical Therapy Treatment Note      Name: Modesta Camacho  Clinic Number: [id number]    Therapy Diagnosis:   Encounter Diagnoses   Name Primary?    Chronic pain of left knee Yes    Decreased ROM of left knee      Physician: Kitty García PA    Visit Date: 9/4/2024    Physician Orders: PT Eval and Treat  Medical Diagnosis from Referral: S/p right total knee replacement  Evaluation Date: 7/22/2024  Authorization Period Expiration: 6/27/2025  Plan of Care Expiration: 9/20/2024    Progress Update: 08/21/2024            Visit # / Visits authorized: 1 / 1 8 /20  FOTO: 7/22/2024 - Scored: 1 / 3      Time In: 0630  Time Out: 0700  Total Appointment Time (timed & untimed codes): 30 minutes    PTA Visit #: 0/5       Subjective     Patient reports: she is doing better and wants to return to work.  She was compliant with home exercise program.  Response to previous treatment: N/A  Functional change: N/A    Pain: 3/10  Location: left knee      Objective      Objective Measures updated at progress report unless specified.     Treatment     Modesta received the treatments listed below:      CPT Intervention   Duration / Intensity       X  TE NuStep  5 minutes    X  NMR Matrix knee extension  15# 3x10    X  NMR Matrix knee flexion 25# 3x10    X  NMR Shuttle squat  5 bands 2 minutes    X  TE Lunge on steps   x 10    X  NMR Sit to stand from 18" 3x10    X  TE Calf stretches on wedge 3 x 20 seconds    X  TE Heel raises 2x20       Heel slides with strap assist X30        Quad sets  3x10       SLR  2x10    X  NMR Bridges  3x10       LTR  x20       Clamshells  3x10 RTB       Prone hip extension  2x10 NP       Prone TKE  3x10 NP                                                                           PLAN             CPT Codes available for Billing:   (00)minutes of Manual therapy (MT) to improve pain and ROM.  (10)minutes of Therapeutic Exercise (TE) to develop strength, endurance, " range of motion, and flexibility.  (20)minutes of Neuromuscular Re-Education (NMR)  to improve: Balance, Coordination, Kinesthetic, Sense, Proprioception, and Posture.  (00)minutes of Therapeutic Activities (TA) to improve functional performance.  Unattended Electrical Stimulation (ES) for muscle performance or pain modulation.  Vasopneumatic Device Therapy () for management of swelling/edema. (26703)  BFR: Blood flow restriction applied during exercise  NP or (-): Not Performed    Patient Education and Home Exercises       Education provided:   - The patient was instructed in home program     Written Home Exercises Provided: improving. Exercises were reviewed and Modesta was able to demonstrate them prior to the end of the session.  Modesta demonstrated good  understanding of the education provided. See Electronic Medical Record under Patient Instructions for exercises provided during therapy sessions    Assessment     Mrs Camacho has improved gait and range of motion.  She has minimal to no gait deviation without device.  She requires min cues for program performance.    Modesta Is progressing well towards her goals.   Patient prognosis is Good.     Patient will continue to benefit from skilled outpatient physical therapy to address the deficits listed in the problem list box on initial evaluation, provide pt/family education and to maximize pt's level of independence in the home and community environment.     Patient's spiritual, cultural and educational needs considered and pt agreeable to plan of care and goals.     Anticipated barriers to physical therapy: None    SHORT TERM GOALS:  4 weeks Progress Date Met   Recent signs and systems trend is improving in order to progress towards Long term goals.  [] Met  [] Not Met  [x] Progressing     Patient will be independent with Home Exercise Program  in order to further progress and return to maximal function. [] Met  [] Not Met  [x] Progressing     Pain rating at Worst: 5 /10  in order to progress towards increased independence with activity. [] Met  [] Not Met  [x] Progressing     Patient will be able to correct postural deviations in sitting and standing, to decrease pain and promote postural awareness for injury prevention.  [] Met  [] Not Met  [x] Progressing     Patient will improve functional outcome (FOTO) score: by 5% to increase self-worth & perceived functional ability towards long term goals [] Met  [] Not Met  [x] Progressing        LONG TERM GOALS: 12 weeks Progress Date Met   Patient will return to normal activites of daily living, recreational, and work related activities with less pain and limitation.  [] Met  [] Not Met  [x] Progressing     Patient will improve range of motion  to stated goals in order to return to maximal functional potential.  [] Met  [] Not Met  [x] Progressing     Patient will improve Strength to stated goals of appropriate musculature in order to improve functional independence.  [] Met  [] Not Met  [x] Progressing     Pain Rating at Best: 1/10 to improve Quality of Life.  [] Met  [] Not Met  [x] Progressing     Patient will meet predicted functional outcome (FOTO) score: 62% to increase self-worth & perceived functional ability. [] Met  [] Not Met  [x] Progressing     Patient will have met/partially met personal goal of: improve pain and function  [] Met  [] Not Met  [x] Progressing              PLAN   Plan of care Certification: 7/22/2024 to 9/20/2024     Outpatient Physical Therapy 3 times weekly for 12 weeks to include any combination of the following interventions: virtual visits, dry needling, modalities, electrical stimulation (IFC, Pre-Mod, Attended with Functional Dry Needling), Gait Training, Manual Therapy, Moist Heat/ Ice, Neuromuscular Re-ed, Patient Education, Self Care, Therapeutic Exercise, Functional Training, and Therapeutic Activites      Thank you for this referral.     Titus Rosario, PT       Titus Rosario, PT

## 2024-09-10 ENCOUNTER — HOSPITAL ENCOUNTER (OUTPATIENT)
Facility: HOSPITAL | Age: 59
Discharge: HOME | End: 2024-09-10
Attending: PHYSICAL MEDICINE & REHABILITATION | Admitting: PHYSICAL MEDICINE & REHABILITATION
Payer: COMMERCIAL

## 2024-09-10 VITALS
OXYGEN SATURATION: 99 % | WEIGHT: 160.19 LBS | HEIGHT: 62 IN | DIASTOLIC BLOOD PRESSURE: 72 MMHG | BODY MASS INDEX: 29.48 KG/M2 | TEMPERATURE: 98 F | SYSTOLIC BLOOD PRESSURE: 165 MMHG | RESPIRATION RATE: 19 BRPM | HEART RATE: 71 BPM

## 2024-09-10 DIAGNOSIS — M47.816 LUMBAR SPONDYLOSIS: Primary | ICD-10-CM

## 2024-09-10 PROCEDURE — 64493 INJ PARAVERT F JNT L/S 1 LEV: CPT | Mod: 50 | Performed by: PHYSICAL MEDICINE & REHABILITATION

## 2024-09-10 PROCEDURE — 63600175 PHARM REV CODE 636 W HCPCS: Performed by: PHYSICAL MEDICINE & REHABILITATION

## 2024-09-10 PROCEDURE — 64494 INJ PARAVERT F JNT L/S 2 LEV: CPT | Mod: 50 | Performed by: PHYSICAL MEDICINE & REHABILITATION

## 2024-09-10 PROCEDURE — 64493 INJ PARAVERT F JNT L/S 1 LEV: CPT | Mod: 50,,, | Performed by: PHYSICAL MEDICINE & REHABILITATION

## 2024-09-10 PROCEDURE — 64494 INJ PARAVERT F JNT L/S 2 LEV: CPT | Mod: 50,,, | Performed by: PHYSICAL MEDICINE & REHABILITATION

## 2024-09-10 RX ORDER — FENTANYL CITRATE 50 UG/ML
INJECTION, SOLUTION INTRAMUSCULAR; INTRAVENOUS
Status: DISCONTINUED | OUTPATIENT
Start: 2024-09-10 | End: 2024-09-10 | Stop reason: HOSPADM

## 2024-09-10 RX ORDER — MIDAZOLAM HYDROCHLORIDE 1 MG/ML
INJECTION, SOLUTION INTRAMUSCULAR; INTRAVENOUS
Status: DISCONTINUED | OUTPATIENT
Start: 2024-09-10 | End: 2024-09-10 | Stop reason: HOSPADM

## 2024-09-10 RX ORDER — ONDANSETRON HYDROCHLORIDE 2 MG/ML
4 INJECTION, SOLUTION INTRAVENOUS ONCE AS NEEDED
Status: COMPLETED | OUTPATIENT
Start: 2024-09-10 | End: 2024-09-10

## 2024-09-10 RX ORDER — BUPIVACAINE HYDROCHLORIDE 5 MG/ML
INJECTION, SOLUTION EPIDURAL; INTRACAUDAL
Status: DISCONTINUED | OUTPATIENT
Start: 2024-09-10 | End: 2024-09-10 | Stop reason: HOSPADM

## 2024-09-10 RX ADMIN — ONDANSETRON 4 MG: 2 INJECTION INTRAMUSCULAR; INTRAVENOUS at 11:09

## 2024-09-10 NOTE — H&P
HPI  Patient presenting for Procedure(s) (LRB):  Bilateral L3-5 MBB (diagnostic, #2) (Bilateral)     No health changes since previous encounter    Past Medical History:   Diagnosis Date    Abnormal EKG 02/05/2021    Anxiety     Hypertension     Osteoarthritis     Stage 2 chronic kidney disease 02/05/2021     Past Surgical History:   Procedure Laterality Date    ARTHROPLASTY OF HIP BY ANTERIOR APPROACH Left 7/31/2020    Procedure: ARTHROPLASTY, HIP, ANTERIOR APPROACH;  Surgeon: Xavi Gandara MD;  Location: [facility] OR;  Service: Orthopedics;  Laterality: Left;    BLOCK, NERVE, GENICULAR Left 2/14/2024    Procedure: Left genicular nerve block with RN IV sedation;  Surgeon: Shaun Iyer MD;  Location: [facility]GT;  Service: Pain Management;  Laterality: Left;    gastric sleeve      HIP ARTHROPLASTY Right 6/7/2023    Procedure: ARTHROPLASTY, HIP;  Surgeon: Trey Clifford MD;  Location: [facility] OR;  Service: Orthopedics;  Laterality: Right;    INJECTION OF ANESTHETIC AGENT AROUND MEDIAL BRANCH NERVES INNERVATING LUMBAR FACET JOINT Bilateral 8/15/2024    Procedure: Bilateral L3-5  MBB #1 with RN IV sedation;  Surgeon: Hakeem Castillo MD;  Location: [facility]GT;  Service: Pain Management;  Laterality: Bilateral;    INJECTION OF ANESTHETIC AGENT AROUND NERVE Bilateral 8/14/2023    Procedure: left genicular nerve block RN IV Sedation;  Surgeon: Shaun Iyer MD;  Location: [facility];  Service: Pain Management;  Laterality: Bilateral;    JOINT REPLACEMENT Right 06/14/2016    knee    JOINT REPLACEMENT Left 05/20/2021    KNEE    JOINT REPLACEMENT Left 07/30/2020    HIP    KNEE ARTHROPLASTY Left 5/6/2021    Procedure: ARTHROPLASTY, KNEE;  Surgeon: Xavi Gandara MD;  Location: [facility] OR;  Service: Orthopedics;  Laterality: Left;    REPLACEMENT, POLYETHYLENE LINER Left 6/12/2024    Procedure: REPLACEMENT, POLYETHYLENE LINER;  Surgeon: Trey Clifford MD;  Location: [facility] OR;  Service: General;   Laterality: Left;    RESURFACING OF PATELLA Right 9/27/2022    Procedure: RESURFACING, PATELLA;  Surgeon: Trey Clifford MD;  Location: [facility] OR;  Service: General;  Laterality: Right;    REVISION OF KNEE ARTHROPLASTY Right 9/27/2022    Procedure: REVISION, ARTHROPLASTY, KNEE;  Surgeon: Trey Clifford MD;  Location: [facility] OR;  Service: General;  Laterality: Right;    REVISION OF KNEE ARTHROPLASTY Left 6/12/2024    Procedure: REVISION, ARTHROPLASTY, KNEE;  Surgeon: Trey Clifford MD;  Location: [facility] OR;  Service: General;  Laterality: Left;  POLY EXCHANGE    SELECTIVE INJECTION OF ANESTHETIC AGENT AROUND LUMBAR SPINAL NERVE ROOT BY TRANSFORAMINAL APPROACH Bilateral 5/2/2024    Procedure: Bilateral L4/5 TF CELE;  Surgeon: Shaun Iyer MD;  Location: [facility] PAIN T;  Service: Pain Management;  Laterality: Bilateral;    SYNOVECTOMY OF KNEE Left 6/12/2024    Procedure: SYNOVECTOMY, KNEE;  Surgeon: Trey Clifford MD;  Location: [facility] OR;  Service: General;  Laterality: Left;    TUBAL LIGATION      UTERINE FIBROID EMBOLIZATION       Review of patient's allergies indicates:   Allergen Reactions    Codeine Hives and Rash     Takes Tramadol and has never had an issue with SOB/swelling  Also tolerated hydromorphone 7/2020      Penicillin     Penicillins Hives     Pt tolerated cefazolin 7/2020        Current Facility-Administered Medications on File Prior to Encounter   Medication Dose Route Frequency Provider Last Rate Last Admin    ondansetron injection 4 mg  4 mg Intravenous Once PRN Shaun Iyer MD         Current Outpatient Medications on File Prior to Encounter   Medication Sig Dispense Refill    DULoxetine (CYMBALTA) 60 MG capsule Take 1 capsule (60 mg total) by mouth 2 (two) times daily. 60 capsule 2    gabapentin (NEURONTIN) 300 MG capsule Take 1 capsule (300 mg total) by mouth 3 (three) times daily. 90 capsule 2    hydroCHLOROthiazide (HYDRODIURIL) 25 MG tablet Take 25 mg by mouth.    "   metoprolol succinate (TOPROL-XL) 50 MG 24 hr tablet Take 2 tablets (100 mg total) by mouth nightly. 180 tablet 3    telmisartan (MICARDIS) 80 MG Tab Take 1 tablet (80 mg total) by mouth once daily. 90 tablet 3    amLODIPine (NORVASC) 5 MG tablet Take 1 tablet (5 mg total) by mouth 2 (two) times daily. 60 tablet 11    cloNIDine (CATAPRES) 0.1 MG tablet TAKE 1 TABLET BY MOUTH ONCE DAILY USE FOR SBP GREATER THAN 160 MMHG. 90 tablet 1    methocarbamoL (ROBAXIN) 500 MG Tab Take 1 tablet (500 mg total) by mouth every evening. 30 tablet 0    multivit-min/ferrous fumarate (MULTI VITAMIN ORAL) Take 2 tablets by mouth Daily.      ondansetron (ZOFRAN-ODT) 4 MG TbDL dissolve 2 tablets (8 mg total) by mouth every 8 (eight) hours as needed (nausea). 20 tablet 0    ondansetron (ZOFRAN-ODT) 4 MG TbDL Dissolve 2 tablets (8 mg total) by mouth every 8 (eight) hours as needed (nausea). 21 tablet 0    oxyCODONE-acetaminophen (PERCOCET)  mg per tablet Take 1 tablet by mouth every 8 (eight) hours as needed for Pain. 21 tablet 0    tiZANidine (ZANAFLEX) 4 MG tablet Take 0.5-1 tablets (2-4 mg total) by mouth 2 (two) times daily as needed (pain). 90 tablet 2    VICTOZA 3-DANIEL 0.6 mg/0.1 mL (18 mg/3 mL) PnIj pen SMARTSI.6 Milligram(s) SUB-Q Once      vitamin D (VITAMIN D3) 1000 units Tab Take 1,000 Units by mouth once daily.          PMHx, PSHx, Allergies, Medications reviewed in epic    ROS negative except pain complaints in HPI    OBJECTIVE:    BP (!) 160/92 (BP Location: Right arm, Patient Position: Sitting)   Pulse 68   Temp 97.4 °F (36.3 °C) (Temporal)   Resp 16   Ht 5' 2" (1.575 m)   Wt 72.7 kg (160 lb 2.6 oz)   SpO2 99%   Breastfeeding No   BMI 29.29 kg/m²     PHYSICAL EXAMINATION:    GENERAL: Well appearing, in no acute distress, alert and oriented x3.  PSYCH:  Mood and affect appropriate.  SKIN: Skin color, texture, turgor normal, no rashes or lesions which will impact the procedure.  CV: RRR with palpation of the " radial artery.  PULM: No evidence of respiratory difficulty, symmetric chest rise. Clear to auscultation.  NEURO: Cranial nerves grossly intact.    Plan:    Proceed with procedure as planned Procedure(s) (LRB):  Bilateral L3-5 MBB (diagnostic, #2) (Bilateral)    Hakeem Castillo MD  09/10/2024

## 2024-09-10 NOTE — OP NOTE
LUMBAR Medial Branch Block (DIAGNOSTIC) Under Fluoroscopy  Time-out taken to identify patient and procedure side prior to starting the procedure.            09/10/2024                                                         Patient has clinical and imaging findings suggestive of facet mediated pain and has completed previous diagnostic medial branch blocks at specified levels with at least 80% relief for the expected duration of the local anesthetic utilized.    For supporting clinical documentation, please see most recent clinic and telephone notes.      PROCEDURE:  Bilateral medial branch block at the transverse processes at the level of L4, L5, Sacral ala    REASON FOR PROCEDURE: Lumbar spondylosis [M47.816]    PHYSICIAN: Hakeem Castillo MD  ASSISTANTS: None    MEDICATIONS INJECTED: 0.5% bupivicane, 1mL at each level    LOCAL ANESTHETIC USED: Xylocaine 1% 10ml    SEDATION MEDICATIONS: Conscious sedation provided by M.D    The patient was monitored with continuous pulse oximetry, EKG, and intermittent blood pressure monitors, immediately prior to administration of sedation.  The patient was hemodynamically stable throughout the entire process was responsive to voice, and breathing spontaneously.  Supplemental O2 was provided at 2L/min via nasal cannula.  Patient was comfortable for the duration of the procedure.     There was a total of 2mg IV Midazolam and 25mcg Fentanyl titrated for the procedure    Total sedation time was >10minutes and <20minutes      ESTIMATED BLOOD LOSS:  None.    COMPLICATIONS:  None.    TECHNIQUE: Laying in a prone position, the patient was prepped and draped in the usual sterile fashion using ChloraPrep and fenestrated drape.  The level was determined under fluoroscopic guidance.  Local anesthetic was given by going down to the hub of the 27-gauge 1.25in needle and raising a wheel.  A 22-gauge 3.5inch needle was introduced to the anatomic local of the medial branch at each of the above  [facility]   is calling requesting a new  updated medical diagnosis list  form.         Patient Name: Nakul Wright  Caller Name: [facility]   Callback Number:  Fax Number: [phone]  Additional Info: looking for a updated medical diagnosis list be faxed over to continue services     Patient was advised that they will receive a call back from our clinic within 24-48 hours.     levels using fluoroscopy in the AP, oblique, and lateral views.  After negative aspiration, medication was injected slowly. The patient tolerated the procedure well.       The patient was monitored after the procedure.  Patient was given post procedure and discharge instructions to follow at home.  We will see the patient back in two weeks or the patient may call to inform of status. The patient was discharged in a stable condition

## 2024-09-10 NOTE — DISCHARGE INSTRUCTIONS

## 2024-09-10 NOTE — DISCHARGE SUMMARY
Discharge Note  Short Stay      SUMMARY     Admit Date: 9/10/2024    Attending Physician: Hakeem Castillo MD        Discharge Physician: Hakeem Castillo MD        Discharge Date: 9/10/2024 1:43 PM    Procedure(s) (LRB):  Bilateral L3-5 MBB (diagnostic, #2) (Bilateral)    Final Diagnosis: Lumbar spondylosis [M47.816]    Disposition: Home or self care    Patient Instructions:   Discharge Medication List as of 9/10/2024 11:21 AM        CONTINUE these medications which have NOT CHANGED    Details   DULoxetine (CYMBALTA) 60 MG capsule Take 1 capsule (60 mg total) by mouth 2 (two) times daily., Starting Thu 7/18/2024, Normal      gabapentin (NEURONTIN) 300 MG capsule Take 1 capsule (300 mg total) by mouth 3 (three) times daily., Starting Thu 7/18/2024, Normal      hydroCHLOROthiazide (HYDRODIURIL) 25 MG tablet Take 25 mg by mouth., Starting Tue 5/28/2024, Until Wed 5/28/2025 at 2359, Historical Med      metoprolol succinate (TOPROL-XL) 50 MG 24 hr tablet Take 2 tablets (100 mg total) by mouth nightly., Starting Mon 3/11/2024, Normal      telmisartan (MICARDIS) 80 MG Tab Take 1 tablet (80 mg total) by mouth once daily., Starting Wed 6/26/2024, Normal      amLODIPine (NORVASC) 5 MG tablet Take 1 tablet (5 mg total) by mouth 2 (two) times daily., Starting Wed 6/29/2022, Until Mon 8/12/2024, Normal      cloNIDine (CATAPRES) 0.1 MG tablet TAKE 1 TABLET BY MOUTH ONCE DAILY USE FOR SBP GREATER THAN 160 MMHG., Normal      methocarbamoL (ROBAXIN) 500 MG Tab Take 1 tablet (500 mg total) by mouth every evening., Starting Tue 7/9/2024, Normal      multivit-min/ferrous fumarate (MULTI VITAMIN ORAL) Take 2 tablets by mouth Daily., Historical Med      !! ondansetron (ZOFRAN-ODT) 4 MG TbDL dissolve 2 tablets (8 mg total) by mouth every 8 (eight) hours as needed (nausea)., Starting Wed 6/7/2023, Normal      !! ondansetron (ZOFRAN-ODT) 4 MG TbDL Dissolve 2 tablets (8 mg total) by mouth every 8 (eight) hours as needed (nausea).,  Starting 2024, Normal      oxyCODONE-acetaminophen (PERCOCET)  mg per tablet Take 1 tablet by mouth every 8 (eight) hours as needed for Pain., Starting 2024, Normal      tiZANidine (ZANAFLEX) 4 MG tablet Take 0.5-1 tablets (2-4 mg total) by mouth 2 (two) times daily as needed (pain)., Starting Thu 2024, Normal      VICTOZA 3-DANIEL 0.6 mg/0.1 mL (18 mg/3 mL) PnIj pen SMARTSI.6 Milligram(s) SUB-Q Once, Historical Med      vitamin D (VITAMIN D3) 1000 units Tab Take 1,000 Units by mouth once daily., Historical Med       !! - Potential duplicate medications found. Please discuss with provider.              Discharge Diagnosis: Lumbar spondylosis [M47.816]  Condition on Discharge: Stable with no complications to procedure   Diet on Discharge: Same as before.  Activity: as per instruction sheet.  Discharge to: Home with a responsible adult.  Follow up: 2-4 weeks       Please call the office at [phone] if you experience any weakness or loss of sensation, fever > 101.5, pain uncontrolled with oral medications, persistent nausea/vomiting/or diarrhea, redness or drainage from the incisions, or any other worrisome concerns. If physician on call was not reached or could not communicate with our office for any reason please go to the nearest emergency department

## 2024-09-12 ENCOUNTER — TELEPHONE (OUTPATIENT)
Dept: PAIN MEDICINE | Facility: CLINIC | Age: 59
End: 2024-09-12
Payer: COMMERCIAL

## 2024-09-12 ENCOUNTER — TELEPHONE (OUTPATIENT)
Dept: ORTHOPEDICS | Facility: CLINIC | Age: 59
End: 2024-09-12
Payer: COMMERCIAL

## 2024-09-12 NOTE — TELEPHONE ENCOUNTER
----- Message from Aidan Weaver MA sent at 8/28/2024  4:04 PM CDT -----  Regarding: Lumbar MBB #2  Call and check on % of relief pt received from block.Orders are in for [facility]FA

## 2024-09-12 NOTE — TELEPHONE ENCOUNTER
----- Message from Jeanmarie Martinez sent at 9/12/2024  9:57 AM CDT -----  Contact: Modesta Byers is needing a call back in regards to r/s her procedure to 9/26 due to transportation. Please give her a call back at [phone]

## 2024-09-12 NOTE — TELEPHONE ENCOUNTER
Called the patient in regards to getting them reschedule for another day. I got the patient reschedule to see Dr. Clifford's PA-C . Kitty García on 9/18/24 at 9:30 with a 9:00 x-rays. Verbalized understanding.

## 2024-09-12 NOTE — TELEPHONE ENCOUNTER
Reached out to pt to get their percent relief from the injection that they had.        1. What percentage of pain relief did you receive following the block, from 0-100%?     80%    2. What was pain score the day before your procedure on a scale from 0-10?    4/10    3. What was pain score immediately after your procedure (up to 6 hours)  on a scale from 0-10?    1/10    4. When your pain returned, what was your pain score on a scale from 0-10?     4/10    5. How many hours did pain relief last following the block?       48 hours     6. During this time, please describe in detail the activities you were able to do?    Pt was able to clean longer       Pain Disability Index (PDI) Score Review:      5/30/2024     9:05 AM 3/14/2024     8:45 AM 7/24/2023     2:26 PM   Last 3 PDI Scores   Pain Disability Index (PDI) 35 36 51          Thank you,  Alejandro Nevarez   Medical Assistant

## 2024-09-13 ENCOUNTER — TELEPHONE (OUTPATIENT)
Dept: ORTHOPEDICS | Facility: CLINIC | Age: 59
End: 2024-09-13
Payer: COMMERCIAL

## 2024-09-13 ENCOUNTER — PATIENT MESSAGE (OUTPATIENT)
Dept: ORTHOPEDICS | Facility: CLINIC | Age: 59
End: 2024-09-13
Payer: COMMERCIAL

## 2024-09-13 NOTE — TELEPHONE ENCOUNTER
----- Message from Mar Noble sent at 9/13/2024  1:15 PM CDT -----  Contact: [facility]  Type:  Needs Medical Advice    Who Called: [facility]  Would the patient rather a call back or a response via [facility]? Call back   Best Call Back Number: [phone] fax number  [phone]  Additional Information:  Regarding work excuse with no restrictions  
Returned [facility]  phone call in regards to the patient. Informed them that the patient may return to work on 9/16/24 with no restrictions. Also informed them that the patient has an appointment on 9/19/24 at 9:00. Verbalized understanding.  
normal (ped)...

## 2024-09-18 ENCOUNTER — OFFICE VISIT (OUTPATIENT)
Dept: ORTHOPEDICS | Facility: CLINIC | Age: 59
End: 2024-09-18
Payer: COMMERCIAL

## 2024-09-18 ENCOUNTER — HOSPITAL ENCOUNTER (OUTPATIENT)
Dept: RADIOLOGY | Facility: HOSPITAL | Age: 59
Discharge: HOME | End: 2024-09-18
Attending: ORTHOPAEDIC SURGERY
Payer: COMMERCIAL

## 2024-09-18 VITALS — WEIGHT: 160.25 LBS | BODY MASS INDEX: 29.49 KG/M2 | HEIGHT: 62 IN

## 2024-09-18 DIAGNOSIS — Z96.652 HISTORY OF TOTAL LEFT KNEE REPLACEMENT: ICD-10-CM

## 2024-09-18 DIAGNOSIS — M24.662 ARTHROFIBROSIS OF KNEE JOINT, LEFT: ICD-10-CM

## 2024-09-18 DIAGNOSIS — M51.36 DDD (DEGENERATIVE DISC DISEASE), LUMBAR: ICD-10-CM

## 2024-09-18 DIAGNOSIS — M54.16 LUMBAR RADICULOPATHY: ICD-10-CM

## 2024-09-18 DIAGNOSIS — Z96.652 STATUS POST TOTAL LEFT KNEE REPLACEMENT USING CEMENT: Primary | ICD-10-CM

## 2024-09-18 PROCEDURE — 73562 X-RAY EXAM OF KNEE 3: CPT | Mod: TC,RT

## 2024-09-18 PROCEDURE — 4010F ACE/ARB THERAPY RXD/TAKEN: CPT | Mod: CPTII,S$GLB,, | Performed by: PHYSICIAN ASSISTANT

## 2024-09-18 PROCEDURE — 1159F MED LIST DOCD IN RCRD: CPT | Mod: CPTII,S$GLB,, | Performed by: PHYSICIAN ASSISTANT

## 2024-09-18 PROCEDURE — 99024 POSTOP FOLLOW-UP VISIT: CPT | Mod: S$GLB,,, | Performed by: PHYSICIAN ASSISTANT

## 2024-09-18 PROCEDURE — 73562 X-RAY EXAM OF KNEE 3: CPT | Mod: 26,59,RT, | Performed by: RADIOLOGY

## 2024-09-18 PROCEDURE — 73564 X-RAY EXAM KNEE 4 OR MORE: CPT | Mod: 26,LT,, | Performed by: RADIOLOGY

## 2024-09-18 PROCEDURE — 1160F RVW MEDS BY RX/DR IN RCRD: CPT | Mod: CPTII,S$GLB,, | Performed by: PHYSICIAN ASSISTANT

## 2024-09-18 PROCEDURE — 99999 PR PBB SHADOW E&M-EST. PATIENT-LVL III: CPT | Mod: PBBFAC,,, | Performed by: PHYSICIAN ASSISTANT

## 2024-09-18 RX ORDER — GABAPENTIN 300 MG/1
300 CAPSULE ORAL 3 TIMES DAILY
Qty: 90 CAPSULE | Refills: 2 | Status: SHIPPED | OUTPATIENT
Start: 2024-09-18

## 2024-09-18 NOTE — PROGRESS NOTES
Patient ID: Modesta Camacho is a 58 y.o. female.    Chief Complaint: Post-op Evaluation ( L TKR 6/12/24/)      HPI: Modesta Camacho  is a 58 y.o. female who c/o Post-op Evaluation ( L TKR 6/12/24/)     Post op visit 2  Patient notes pain is 0/10   The patient is doing quite well since our last visit in his made vast improvement   I encouraged her to continue activity of daily living and thus her quality of life   She is still in PT and doing quite well   She drove herself to her appointment today   She is no longer taking any pain medication will take over-the-counter medication as needed    Patient is presently denying any shortness of breath, chest pain, fever/chills, nausea/vomiting, loss of taste or smell, numbness/tingling or sensation changes, loss of bladder or bowel function, loss of taste/smell.     9/18/2024  Postop visit 3   Patient notes she is doing much better since our last visit   Pain presently as 4/10 affecting activity of daily living and thus her quality of life although she attributes most of this to her back  She is no longer taking the pain pills she will take over-the-counter medication on an as-needed basis  She is still with therapy although feels she has completed her advancements in asked to stop at this time as she has returned to work   She is not using any devices to assist with ambulation nor is she wearing any knee braces     Additionally the patient would like a referral to outpatient Neurosurgery once her insurance has changed in the next few months.  Ask that she call our office and we will place this for her.    Surgery: Left Total Knee     Surgery Date:  06/12/2024    Past Medical History:   Diagnosis Date    Abnormal EKG 02/05/2021    Anxiety     Hypertension     Osteoarthritis     Stage 2 chronic kidney disease 02/05/2021     Past Surgical History:   Procedure Laterality Date    ARTHROPLASTY OF HIP BY ANTERIOR APPROACH Left 7/31/2020    Procedure: ARTHROPLASTY, HIP, ANTERIOR  APPROACH;  Surgeon: Xavi Gandara MD;  Location: [facility] OR;  Service: Orthopedics;  Laterality: Left;    BLOCK, NERVE, GENICULAR Left 2/14/2024    Procedure: Left genicular nerve block with RN IV sedation;  Surgeon: Shaun Iyer MD;  Location: [facility] PAIN MGT;  Service: Pain Management;  Laterality: Left;    gastric sleeve      HIP ARTHROPLASTY Right 6/7/2023    Procedure: ARTHROPLASTY, HIP;  Surgeon: Trey Clifford MD;  Location: [facility] OR;  Service: Orthopedics;  Laterality: Right;    INJECTION OF ANESTHETIC AGENT AROUND MEDIAL BRANCH NERVES INNERVATING LUMBAR FACET JOINT Bilateral 8/15/2024    Procedure: Bilateral L3-5  MBB #1 with RN IV sedation;  Surgeon: Hakeem Castillo MD;  Location: [facility] PAIN MGT;  Service: Pain Management;  Laterality: Bilateral;    INJECTION OF ANESTHETIC AGENT AROUND MEDIAL BRANCH NERVES INNERVATING LUMBAR FACET JOINT Bilateral 9/10/2024    Procedure: Bilateral L3-5 MBB (diagnostic, #2);  Surgeon: Hakeem Castillo MD;  Location: [facility] PAIN [facility]GT;  Service: Pain Management;  Laterality: Bilateral;    INJECTION OF ANESTHETIC AGENT AROUND NERVE Bilateral 8/14/2023    Procedure: left genicular nerve block RN IV Sedation;  Surgeon: Shaun Iyer MD;  Location: [facility] PAIN [facility]GT;  Service: Pain Management;  Laterality: Bilateral;    JOINT REPLACEMENT Right 06/14/2016    knee    JOINT REPLACEMENT Left 05/20/2021    KNEE    JOINT REPLACEMENT Left 07/30/2020    HIP    KNEE ARTHROPLASTY Left 5/6/2021    Procedure: ARTHROPLASTY, KNEE;  Surgeon: Xavi Gandara MD;  Location: [facility] OR;  Service: Orthopedics;  Laterality: Left;    REPLACEMENT, POLYETHYLENE LINER Left 6/12/2024    Procedure: REPLACEMENT, POLYETHYLENE LINER;  Surgeon: Trey Clifford MD;  Location: [facility] OR;  Service: General;  Laterality: Left;    RESURFACING OF PATELLA Right 9/27/2022    Procedure: RESURFACING, PATELLA;  Surgeon: Trey Clifford MD;  Location: [facility] OR;  Service: General;  Laterality: Right;     REVISION OF KNEE ARTHROPLASTY Right 9/27/2022    Procedure: REVISION, ARTHROPLASTY, KNEE;  Surgeon: Trey Clifford MD;  Location: [facility] OR;  Service: General;  Laterality: Right;    REVISION OF KNEE ARTHROPLASTY Left 6/12/2024    Procedure: REVISION, ARTHROPLASTY, KNEE;  Surgeon: Trey Clifford MD;  Location: [facility] OR;  Service: General;  Laterality: Left;  POLY EXCHANGE    SELECTIVE INJECTION OF ANESTHETIC AGENT AROUND LUMBAR SPINAL NERVE ROOT BY TRANSFORAMINAL APPROACH Bilateral 5/2/2024    Procedure: Bilateral L4/5 TF CELE;  Surgeon: Shaun Iyer MD;  Location: [facility];  Service: Pain Management;  Laterality: Bilateral;    SYNOVECTOMY OF KNEE Left 6/12/2024    Procedure: SYNOVECTOMY, KNEE;  Surgeon: Tery Clifford MD;  Location: [facility] OR;  Service: General;  Laterality: Left;    TUBAL LIGATION      UTERINE FIBROID EMBOLIZATION       Family History   Problem Relation Name Age of Onset    Hypertension Mother      Arthritis Mother      Diabetes Father      Heart disease Father      Depression Sister      Hypertension Sister      Arthritis Brother      Thyroid disease Son      Hypertension Son      Arthritis Maternal Grandmother      Heart disease Maternal Grandmother      Kidney disease Maternal Grandmother      Heart attack Maternal Grandfather      Stroke Paternal Grandmother      No Known Problems Paternal Grandfather      Eczema Son      Breast cancer Sister  47     Social History     Socioeconomic History    Marital status:      Spouse name: Pop Land    Number of children: 3   Occupational History    Occupation: patient access   Tobacco Use    Smoking status: Never     Passive exposure: Never    Smokeless tobacco: Never   Substance and Sexual Activity    Alcohol use: Yes     Comment: occasional: hold 72hrs. prior to surgery    Drug use: No    Sexual activity: Yes     Partners: Male     Birth control/protection: See Surgical Hx     Social Determinants of Health     Stress:  Stress Concern Present (3/16/2020)    [facility] - Occupational Stress Questionnaire     Feeling of Stress : Rather much     Medication List with Changes/Refills   Current Medications    AMLODIPINE (NORVASC) 5 MG TABLET    Take 1 tablet (5 mg total) by mouth 2 (two) times daily.    CLONIDINE (CATAPRES) 0.1 MG TABLET    TAKE 1 TABLET BY MOUTH ONCE DAILY USE FOR SBP GREATER THAN 160 MMHG.    DULOXETINE (CYMBALTA) 60 MG CAPSULE    Take 1 capsule (60 mg total) by mouth 2 (two) times daily.    GABAPENTIN (NEURONTIN) 300 MG CAPSULE    Take 1 capsule (300 mg total) by mouth 3 (three) times daily.    HYDROCHLOROTHIAZIDE (HYDRODIURIL) 25 MG TABLET    Take 25 mg by mouth.    METHOCARBAMOL (ROBAXIN) 500 MG TAB    Take 1 tablet (500 mg total) by mouth every evening.    METOPROLOL SUCCINATE (TOPROL-XL) 50 MG 24 HR TABLET    Take 2 tablets (100 mg total) by mouth nightly.    MULTIVIT-MIN/FERROUS FUMARATE (MULTI VITAMIN ORAL)    Take 2 tablets by mouth Daily.    ONDANSETRON (ZOFRAN-ODT) 4 MG TBDL    dissolve 2 tablets (8 mg total) by mouth every 8 (eight) hours as needed (nausea).    ONDANSETRON (ZOFRAN-ODT) 4 MG TBDL    Dissolve 2 tablets (8 mg total) by mouth every 8 (eight) hours as needed (nausea).    OXYCODONE-ACETAMINOPHEN (PERCOCET)  MG PER TABLET    Take 1 tablet by mouth every 8 (eight) hours as needed for Pain.    TELMISARTAN (MICARDIS) 80 MG TAB    Take 1 tablet (80 mg total) by mouth once daily.    TIZANIDINE (ZANAFLEX) 4 MG TABLET    Take 0.5-1 tablets (2-4 mg total) by mouth 2 (two) times daily as needed (pain).    VICTOZA 3-DANIEL 0.6 MG/0.1 ML (18 MG/3 ML) PNIJ PEN    SMARTSI.6 Milligram(s) SUB-Q Once    VITAMIN D (VITAMIN D3) 1000 UNITS TAB    Take 1,000 Units by mouth once daily.     Review of patient's allergies indicates:   Allergen Reactions    Codeine Hives and Rash     Takes Tramadol and has never had an issue with SOB/swelling  Also tolerated hydromorphone 2020       Penicillin     Penicillins Hives     Pt tolerated cefazolin 7/2020       Objective:     Left Lower Extremity  NVI  WWP foot  Comp soft  Cap refill < 2 sec  Calf NT, soft  (-) Gwen sign  ELY  ROM : Patient is able to easily exhibit full flexion and extension on passive range of motion.   Wiggles toes  DF/PF intact  Sensation intact  Inc C/D/I; slight keloid formation noted  No SOI    Imaging:    FINDINGS:  Postsurgical changes of bilateral total knee replacements.  Postoperative hardware appears intact and unchanged.  No acute fracture.  Joint alignment is anatomic.  No significant joint effusion.  No radiopaque foreign body.        Impression:   As above.    Assessment:       Encounter Diagnosis   Name Primary?    Status post total left knee replacement using cement Yes          Plan:       Modesta was seen today for post-op evaluation.    Diagnoses and all orders for this visit:    Status post total left knee replacement using cement        Modesta Camacho is an established pt here for postop follow-up after left total knee replacement by Dr. Clifford. The patient will continue with the current medication regimen and treatment plan.  Patient should notify the office of any signs or symptoms of infection including fevers, erythema, purulent drainage, increasing pain.  Okay to stop physical therapy at this time.  Will follow-up as scheduled. Patient verbalized understanding of all instructions and agreed with the above plan.    No follow-ups on file.    The patient understands, chooses and consents to this plan and accepts all   the risks which include but are not limited to the risks mentioned above.     Disclaimer: This note was prepared using a voice recognition system and is likely to have sound alike errors within the text.

## 2024-09-24 NOTE — OP NOTE
[facility] - Surgery ([facility])  Orthopedic Surgery  Operative Note    SUMMARY     Date of Procedure: 6/12/2024     Procedure: Procedure(s) (LRB):  REVISION, ARTHROPLASTY, KNEE (Left)  REPLACEMENT, POLYETHYLENE LINER (Left)  SYNOVECTOMY, KNEE (Left)       Surgeons and Role:     * Trey Clifford MD - Primary     * Kitty García PA - Assisting   Vesta GARCIA      Pre-Operative Diagnosis: History of total left knee replacement [Z96.652]  Arthrofibrosis of knee joint, left [M24.662]    Post-Operative Diagnosis: Post-Op Diagnosis Codes:     * History of total left knee replacement [Z96.652]     * Arthrofibrosis of knee joint, left [M24.662]    Anesthesia: General    Significant Surgical Tasks Conducted by the Assistant(s), if Applicable:  needed multiple assistance to hold multiple retractors as well as the extremity and provide visualization noted performed surgery safely and efficiently    Complications: none    Injection CHELO  mixed with 40 cc of injectable saline  Use Surgipore  sterile Betadine solution for irrigation at the end of the case  Estimated Blood Loss (EBL):  20 mL           Implants:  DJO implanted tibial insert 16 mm V V C polyp insert .  Removed 19 mm insert for a 5. Tibial base plate    Specimens:  cultures sent of the fluid intra-articularly           Condition: Good    Disposition: PACU - hemodynamically stable.    Attestation: I performed the procedure.    Description:  patient had left total knee replacement by another provider.  Always complained of it being very tight can not hyperextend as much.  Over that time she has straightened out a little bit better but she is still complaining of it being tight and having some pain with it.  Workup for infection previously have been negative.  Discussed that we can change the plastic insert into the each point 1 at this might.  Create instability in the future.  If that happens we can always do a major revision at that point.  The pros and  [de-identified] : The patient has bilateral knee osteoarthritis. They are not an appropriate candidate for surgical intervention at this time. An extensive discussion was conducted on the natural history of the disease and the variety of surgical and non-surgical options available to the patient including, but not limited to non-steroidal anti-inflammatory medications, steroid injections, physical therapy, maintenance of ideal body weight, and reduction of activity. I recommended a prescription of Mobic but the patient would prefer to use OTC NSAIDs at this time. The patient will schedule an appointment as needed.  Recommendation: Trial of Visco supplementation. We'll obtain preauthorization prior to injection therapy.  **NB: Medication was Issued under circumstances where the provider (Dr. Gene Kruger) reasonably determined that it would be impractical for the patient to obtain substances prescribed by electronic prescription (e-prescribe) given need for pre-authorization, and such delay would adversely impact the patient's medical condition. An exception letter will be mailed to the [facility] during the authorization process. Followup: Once approved for injection therapy.  cons discussed.  Patient agreeable in preop holding that we just going to downsize on the poly insert to see if we can get a little bit more extension.  When the patient was asleep patient had 0 extension and full flexion.  However she wants it matching the other right total knee.  She which she needs around 2-5 degrees of hyperextension knee was washed with soaking alcohol twice and ChloraPrep twice and draped usual sterile fashion.  Incision was made over the old scar anterior knee for excision of the scar was performed.  Full-thickness skin flaps raised medially and laterally.  Medial parapatellar incision was made medial edge of the quadriceps down to medial tibial tubercle.  Fluid was inside the knee but not excessive maybe 5 cc that was cultured.  We subperiosteally elevate tissues off the medial tibial flare after maneuvers scar tissue and synovium from anterior to the patella and we cleaned around the patella components.  We did partial superior since that time.  Move the patella laterally and flexed the knee joint.  Patient had an overgrown medial osteophyte allowing the tibial base plate.  We let going toward the osteophytes which was approximately 2 cm in length.  We tested the knee in extension  was stable.  The poly insert was a constrained insert provided stability.  Flex to 45° she had 1+ anterior draw reflexes to 90° similar.  We extended the knee move the patella laterally after we cleaned around it hyperflex the knee joint but multiple retractors to protect neurovascular structures as well collateral ligaments.  Remove the poly insert cleaned the base blade.  There was some scar tissue posterior medially and posterior laterally that was resected with the Bovie.  Injected the posterior capsule with RE CK as well as the collateral ligaments and periosteum around the femur and the femoral component.    16 mm VVC constrained trial was inserted .  In extension with chief 5° of hyperextension which is  similar to the other knee.  Stable to varus valgus stressing in extension.  In flexion was still 1+ anterior draw.  Took the trial out with serial poly insert impacted in place.  Extended the knee joint.  Proceeded with injecting soft tissue for postop pain control with RE CK.  Irrigated with sterile Betadine solution which was left for 3 minutes and then irrigated out. Closure of the quadriceps mechanism performed with 1. Vicryl figure of 8.  We tested the knee in extension and flexion after closure of the quadriceps tendon was more stable in flexion than before.  Subcutaneous tissues were closed with 1. Vicryl inverted stitch and the skin in a running subcuticular fashion using strata fix.  Sterile dressing applied /peek

## 2024-09-30 ENCOUNTER — PATIENT MESSAGE (OUTPATIENT)
Dept: INTERNAL MEDICINE | Facility: CLINIC | Age: 59
End: 2024-09-30
Payer: COMMERCIAL

## 2024-10-09 ENCOUNTER — TELEPHONE (OUTPATIENT)
Dept: PAIN MEDICINE | Facility: CLINIC | Age: 59
End: 2024-10-09
Payer: COMMERCIAL

## 2024-10-10 ENCOUNTER — TELEPHONE (OUTPATIENT)
Dept: PAIN MEDICINE | Facility: CLINIC | Age: 59
End: 2024-10-10
Payer: COMMERCIAL

## 2024-10-14 ENCOUNTER — HOSPITAL ENCOUNTER (OUTPATIENT)
Dept: RADIOLOGY | Facility: HOSPITAL | Age: 59
Discharge: HOME | End: 2024-10-14
Attending: OBSTETRICS & GYNECOLOGY
Payer: COMMERCIAL

## 2024-10-14 VITALS — WEIGHT: 160.25 LBS | HEIGHT: 62 IN | BODY MASS INDEX: 29.49 KG/M2

## 2024-10-14 DIAGNOSIS — Z12.31 SCREENING MAMMOGRAM, ENCOUNTER FOR: ICD-10-CM

## 2024-10-14 PROCEDURE — 77067 SCR MAMMO BI INCL CAD: CPT | Mod: 26,,, | Performed by: RADIOLOGY

## 2024-10-14 PROCEDURE — 77063 BREAST TOMOSYNTHESIS BI: CPT | Mod: TC

## 2024-10-14 PROCEDURE — 77067 SCR MAMMO BI INCL CAD: CPT | Mod: TC

## 2024-10-14 PROCEDURE — 77063 BREAST TOMOSYNTHESIS BI: CPT | Mod: 26,,, | Performed by: RADIOLOGY

## 2024-10-21 ENCOUNTER — TELEPHONE (OUTPATIENT)
Dept: INTERNAL MEDICINE | Facility: CLINIC | Age: 59
End: 2024-10-21
Payer: COMMERCIAL

## 2024-10-21 NOTE — TELEPHONE ENCOUNTER
Called # listed, no answer. LMOM requesting a return call and [employer] message sent for BP reading.

## 2024-10-22 ENCOUNTER — TELEPHONE (OUTPATIENT)
Dept: ORTHOPEDICS | Facility: CLINIC | Age: 59
End: 2024-10-22
Payer: COMMERCIAL

## 2024-10-22 NOTE — TELEPHONE ENCOUNTER
Called the patient in regards to their appointment on 12/19/24 with Dr. Clifford. Called to reschedule the patient's appointment due to the fact that Dr. Clifford will be out of the office that day. Left a message for the patient to give the office a call back to reschedule.

## 2024-10-23 ENCOUNTER — TELEPHONE (OUTPATIENT)
Dept: ORTHOPEDICS | Facility: CLINIC | Age: 59
End: 2024-10-23
Payer: COMMERCIAL

## 2024-10-23 NOTE — TELEPHONE ENCOUNTER
Called the patient in regards to their appointment on 12/19/24 with Dr. Clifford. Called to reschedule their appointment due to the fact that Dr. Clifofrd will be out of the office. Left a message for the patient to give the office a call back to reschedule.

## 2024-10-29 ENCOUNTER — PATIENT MESSAGE (OUTPATIENT)
Dept: ORTHOPEDICS | Facility: CLINIC | Age: 59
End: 2024-10-29
Payer: COMMERCIAL

## 2024-10-29 ENCOUNTER — TELEPHONE (OUTPATIENT)
Dept: ORTHOPEDICS | Facility: CLINIC | Age: 59
End: 2024-10-29
Payer: COMMERCIAL

## 2024-10-30 ENCOUNTER — TELEPHONE (OUTPATIENT)
Dept: ORTHOPEDICS | Facility: CLINIC | Age: 59
End: 2024-10-30
Payer: COMMERCIAL

## 2024-10-30 ENCOUNTER — TELEPHONE (OUTPATIENT)
Dept: PAIN MEDICINE | Facility: CLINIC | Age: 59
End: 2024-10-30
Payer: COMMERCIAL

## 2024-12-11 DIAGNOSIS — N18.31 CHRONIC KIDNEY DISEASE, STAGE 3A: ICD-10-CM

## 2024-12-20 ENCOUNTER — TELEPHONE (OUTPATIENT)
Dept: SPORTS MEDICINE | Facility: CLINIC | Age: 59
End: 2024-12-20
Payer: COMMERCIAL

## 2024-12-20 NOTE — TELEPHONE ENCOUNTER
----- Message from Nurse Beck sent at 12/20/2024  8:25 AM CST -----  Regarding: FW: Appt  Contact: [phone]  Can have suzy next available  ----- Message -----  From: Mars Armando  Sent: 12/20/2024   8:15 AM CST  To: [facility] Staff  Subject: Appt                                             Type:  Same Day Appointment Request    Caller is requesting a same day appointment.  Caller declined first available appointment listed below.    Name of Caller:Patient  When is the first available appointment?February   Symptoms:fell on knee that she had surgery on  Best Call Back Number:[phone]   Additional Information: she is ok with seeing the nurse or PA

## 2024-12-20 NOTE — PROGRESS NOTES
Established Patient - TeleHealth Visit    The patient location is: [place]  The chief complaint leading to consultation is: chronic pain     Visit type: audiovisual    Face to Face time with patient: 10-15 minutes  20 minutes of total time spent on the encounter, which includes face to face time and non-face to face time preparing to see the patient (eg, review of tests), Obtaining and/or reviewing separately obtained history, Documenting clinical information in the electronic or other health record, Independently interpreting results (not separately reported) and communicating results to the patient/family/caregiver, or Care coordination (not separately reported).     Each patient to whom he or she provides medical services by telemedicine is:  (1) informed of the relationship between the physician and patient and the respective role of any other health care provider with respect to management of the patient; and (2) notified that he or she may decline to receive medical services by telemedicine and may withdraw from such care at any time.      Interventional Pain Progress Note       Chief Pain Complaint:  Low back pain     Interval History (12/27/2024):  Patient Modesta Camacho presents today for follow-up visit.  Patient is being seen for follow up of low back pain. Pain is primarily axial without radiculopathy. She rates her pain 3/10 today but it will go up to 6/10 with prolonged standing and walking. She fell on 12/18 when moving but denies any new injuries or pains. Did not hit head or have LOC.   She has had 2 lumbar MBB with at least 80% relief and is ready to move forward with RFA.  Patient denies night fever/night sweats, urinary incontinence, bowel incontinence, significant weight loss and significant motor weakness.   Patient denies any other complaints or concerns at this time.      Interval History (7/18/2024):  Patient Modesta Camacho presents today for follow-up visit.  Patient was last seen on  5/2/2024  Bilateral L4/5 TF CELE with 50% relief x 2 weeks.  Status left knee replacement with Dr. Clifford. Since her knee replacement she is not having pain into the legs. She rates her pain today 5.5/10. pain is primarily axial in the lower back and does not radiate into the legs. Pain is worse with prolonged standing and walking.   Patient denies night fever/night sweats, urinary incontinence, bowel incontinence, significant weight loss and significant motor weakness.   Patient denies any other complaints or concerns at this time.        Interval History (03/14/2024):  Patient returns to clinic after procedure.  Patient reports 90% relief after left genicular nerve block with phenol on 02/14/2024.  Patient reports that left knee pain is well-controlled currently.  Primary pain today is lower back pain.  Pain is described as a aching stabbing pain that starts in the band across her lower back.  This pain radiates up to the lower thoracic area into her bilateral posterior thighs.  Pain is worse with prolonged sitting and standing, better with lying supine and ice and heat.  Pain is currently rated a 5/10, but can increase to an 8/10 with exacerbating activities. Denies any fevers, chills, changes in gait, saddle anesthesia, or bowel and bladder incontinence        Interval History (01/11/2024):  Patient Modesta Camacho presents today for follow-up visit.  Patient's chief complaint today is left knee pain which has been chronic.  She had a left genicular block on 08/14/2023 which provided 80% relief for 4 months.  She is interested in repeating the block.  She rates her pain today a 7/10.  She also has chronic lower back pain which will radiate down the posterior aspect of the left lower extremity down to the foot.  She recently had a nerve conduction study which showed some irritation of the L5-S1 nerve root.  Dr. Clifford and then ordered a lumbar MRI and she is scheduled to get that next week.  Patient denies night  fever/night sweats, urinary incontinence, bowel incontinence, significant weight loss and significant motor weakness.   Patient denies any other complaints or concerns at this time.    Interval History (1/11/2024):  Patient returns to clinic after procedure.  Patient reports 80% relief in left knee pain after left genicular nerve block on 08/14/2023.  Patient reports occasional catching sensations in her left knee as well as aching pain along the medial aspect.  She denies any significant radiation of this pain.  She denies any significant numbness or tingling associated with this pain.  Pain is worse with prolonged standing and walking, better with rest and heat.  Pain is currently rated a 4/10. Denies any fevers, chills, changes in gait, saddle anesthesia, or bowel and bladder incontinence    Interval History (7/24/2023):  Modesta Camacho presents today for follow-up visit.  Patient was last seen on 05/23/2023. At last visit, she was to follow up with Dr. Clifford. She underwent right hip arthroplasty with Dr. Clifford on 06/07/2023. Currently enrolled in physical therapy, has 6 weeks remaining. So far she has had her first post op after hip arthroplasty on 06/30/2023, next post op in August. Currently prescribed Endocet and Gabapentin for post op pain.     Patient reports pain as 8/10 today. She continues to report left knee pain. She previously underwent left TKA with Dr. Gandara. She has spoken to Dr. Clifford about this knee before, but it was previously replaced recently and Dr. Clifford is hesitant to go in again so soon.       Interval History (5/23/2023):  Modesta Camacho presents today via telemed for follow-up visit.  Patient was last seen on 2/21/2023. She reports worsening left knee pain. She reports it keeps feeling as though it is popping out of place. She reports it began swelling last Friday and since then she can hardly bear weight. She has been resting, icing, elevating, and wearing her brace  which has reduced the swelling and inflammation somewhat. She has spoken to Dr. Clifford about this knee before, but it was previously replaced recently and Dr. Clifford is hesitant to go in again so soon. Patient reports pain as 8/10 today.  She is scheduled for right hip arthroplasty with Dr. Clifford on 06/07/2023.    Interval HPI 02/21/2023    Modesta Camacho presents today for follow-up visit.  Patient was last seen on 11/8/2022. Patient reports pain as 6/10 today. She reports persistent left knee pain, previously underwent left TKA with Dr. Gandara, states she has not seen him in over 2 years as he was dismissive of her continued pain after surgery. Sees Dr. Clifford for hip, but may also consider revision of left knee. Hx of right knee TKA with revision 9/22. Right knee is doing well. Last visit with Dr. Clifford on 02/13/2023. Considering right hip replacement in May/June. She also reports continued lower back pain, axial in nature, deep achy and without radiation into her lower extremities. Requesting refills of her Gabapentin, Nabumetone, and Tizanidine.    Interval History (11/08/2022):  Modesta Camacho presents today for follow-up visit.  Patient was last seen on 01/05/2022. Patient reports pain as 6/10 today. Underwent revision of her right knee with Dr. Clifford 09/27/2022. Referred back to [facility] for low back pain by Dr. Clifford. Patient reports pain as axial in nature across her lower lumbar spine without radiation into her lower extremities. Pain is constant and interferes with daily activities. Taking cymbalta and Gabapentin with minimal relief. Tizanidine helpful in the past, she is out of this medication. Not interested in injections at this time.    X-ray lumbar spine  FINDINGS:  AP, lateral and coned down radiographs of the lumbar spine demonstrate no evidence for acute fracture or dislocation.  Persistent grade 1 anterolisthesis of L4 with respect L5 is again identified.  Moderate to severe  posterior facet hypertrophic changes are identified at L5/S1.  Remaining intervertebral disk spaces and vertebral body heights are well-preserved.  Visualized SI joints are intact.  Patient is status post right hip arthroplasty.  Multiple calcified phleboliths are identified.  Multiple clips are identified in the epigastric region.     Impression:     Degenerative disease, most prominent at the lower lumbar spine.  Overall, no significant interval change.       Interval History (1/5/2022):  Modesta Camacho presents today for follow-up visit.  She is here today to review lumbar MRI.  She continues to have RLE pain.  She saw Dr. Rodriguez on 12/21/2021, who referred her to have an ultrasound-guided right hip injection by Dr. Acevedo.  She recently had this procedure with short-term pain relief, but it did not help the sciatica pain into the foot.  She was also referred to Dr. Clifford (Orthopedics) for evaluation for right hip replacement.  To note, patient reports history of left hip replacement and bilateral knee replacement in the past.  She has been doing physical therapy twice a week, but she does not feel this has been overly helpful.      Initial HPI (11/09/2021):  Modesta Camacho is a 56 y.o. female  who is presenting with a chief complaint of low back pain. The patient began experiencing this problem insidiously, and the pain has been gradually worsening over the past 6 month(s). The pain is described as throbbing, shooting, burning and electrical and is located in the right lumbar spine . Pain is intermittent and lasts hours. The pain radiates to right leg L4 distribution. The patient rates her pain a 7 out of ten and interferes with activities of daily living a 8 out of ten. Pain is exacerbated by flexion of the lumbar spine, and is improved by rest. Patient reports no prior trauma, prior hip surgery Left Hip [facility] 6/2020 [facility]. Right Knee Replacement 5/2021.     - pertinent negatives:  No fever, No chills, No weight loss, No bladder dysfunction, No bowel dysfunction, No saddle anesthesia  - pertinent positives: right leg weakness    - medications, other therapies tried (physical therapy, injections):     >> NSAIDs, Tylenol, Tramadol, gabapentin, zanaflex and robaxin    >> Has previously undergone Physical Therapy    >>  Injections:    -02/14/2024: Left genicular nerve block with phenol, 90% relief  -08/14/2023:  Left genicular nerve block, 80% relief   - ultrasound-guided right hip injection by Dr. Acevedo on 12/21/2021 with short-term pain relief   5/2/2024 Bilateral L4/5 TF CELE with 50% relief x 2 weeks.    Imaging / Labs / Studies (reviewed on 12/27/2024):      Results for orders placed during the hospital encounter of 01/24/24    MRI Lumbar Spine Without Contrast    Narrative  EXAMINATION:  MRI LUMBAR SPINE WITHOUT CONTRAST    CLINICAL HISTORY:  lumbar radiculopathy; Anesthesia of skin    TECHNIQUE:  Multiplanar, multisequence MR images were acquired from the thoracolumbar junction to the sacrum without contrast.    COMPARISON:  MRI lumbar spine 11/16/2021    FINDINGS:  Alignment: Progressed grade 1 L4-L5 anterolisthesis.    Vertebrae: Lumbar vertebral body heights are maintained.  No abnormal osseous edema or evidence of an acute fracture.  Marrow signal is within normal limits.    Discs: L4-L5 disc desiccation.  Disc heights appear maintained.    Cord: Within normal limits.  Conus terminates at L1.    Degenerative findings:    T12-L1: Minor asymmetric left disc bulge.  No significant spinal canal stenosis or neural foraminal stenosis.    L1-L2: No significant posterior disc bulge, spinal canal stenosis or neural foraminal stenosis.    L2-L3: Mild broad-based posterior disc bulge and mild bilateral facet arthropathy.  No significant spinal canal stenosis.  Unchanged mild left-sided neural foraminal stenosis.    L3-L4: Mild broad-based posterior disc bulge and mild bilateral facet arthropathy.   No significant spinal canal stenosis.  Unchanged mild left-sided neural foraminal stenosis.    L4-L5: Progressed grade 1 degenerative appearing anterolisthesis with roughly 6 mm of posterior disc space uncovering.  Minimal broad-based posterior disc bulge and bilateral facet arthropathy with ligamentum flavum hypertrophy contributes to similar mild spinal canal stenosis with narrowing of the lateral recesses.  There is similar moderate to severe right and mild-to-moderate left-sided neural foraminal stenosis.    L5-S1: No significant posterior disc bulge.  Mild bilateral facet arthropathy.  No significant spinal canal stenosis or neural foraminal stenosis.    Paraspinal muscles & soft tissues: Within normal limits.  1.6 cm left adnexal cyst.    Impression  1. Progressed grade 1 L4-L5 anterolisthesis with similar mild spinal canal stenosis, moderate to severe right and mild-to-moderate left-sided neural foraminal stenosis.  2. Similar mild left-sided L2-L3 and L3-L4 neural foraminal stenosis.  3. Small left adnexal cyst.  Pelvic ultrasound could be utilized for additional assessment as clinically indicated.      Electronically signed by: Kade Caruso  Date:    01/24/2024  Time:    16:56         11/16/2021 MRI Lumbar Spine Without Contrast  FINDINGS:  Image quality is degraded by motion, lowering exam sensitivity and specificity.  Interpretation is offered within these confines.    Alignment: There is mild grade 1 anterolisthesis of L4 on L5.  There is slight leftward convexity of the lumbar spine.    Vertebrae: Diffuse loss normal T1 hyperintense marrow signal may be related to chronic anemia or other causes of red marrow hyperplasia, correlate clinically.  No evidence of fracture.    Discs: Normal height and signal.    Cord: Normal.  Conus terminates at T12-L1    Degenerative findings:    T12-L1:  No spinal canal or neuroforaminal stenosis.    L1-L2:  No spinal canal or neuroforaminal stenosis.    L2-L3: Mild  generalized disc bulge. No spinal canal or neuroforaminal stenosis.    L3-L4: Mild generalized disc bulge.  Mild bilateral facet arthropathy. No spinal canal or neuroforaminal stenosis.    L4-L5: Severe bilateral facet arthropathy with some uncovering of the intervertebral disc and thickening of the ligamentum flavum.  Moderate bilateral neural foraminal narrowing.  No spinal canal stenosis.    L5-S1: Moderate bilateral facet arthropathy. No spinal canal or neuroforaminal stenosis..    Paraspinal muscles & soft tissues: Unremarkable.    Impression  1. Grade 1 anterolisthesis of L4 on L5 secondary to facet arthropathy with associated moderate bilateral neural foraminal narrowing at this level.  2. Other multilevel degenerative findings as above      7/31/20 X-Ray Hip 2 or 3 views Left  COMPARISON:  Prior radiograph from May 20, 2020.  FINDINGS:  Interval total left hip arthroplasty with soft tissue air in the area.  There is normal alignment of the joint.  Densely calcified uterine leiomyomata are unchanged.  There also calcified phleboliths within the pelvis.  Impression  Normal alignment of the total left hip arthroplasty that has been placed in the interval.      11/24/2021 X-Ray Hip 2 or 3 views Right (with Pelvis when performed)  COMPARISON:  02/11/2021  FINDINGS:  There are multiple calcified fibroid seen projecting over the uterus.  There also multiple calcified pelvic phleboliths.  There is a single surgical clips seen to the right of the midline.  A left total hip arthroplasty is noted.  There is moderate to severe superolateral joint space narrowing involving the right hip with osteophyte formation noted.  Minimal subchondral cystic changes seen on either side of the right hip joint.      5/01/15 X-Ray Cervical Spine AP And Lateral  Findings:  The reversal of the lordotic curvature of the cervical spine secondary to moderate degenerative disk disease at C4-5 and C5-6.  Chronic anterior wedging of the C4 and  C5 vertebral bodies.  Associated endplate sclerosis and uncovertebral joint  hypertrophy. The posterior elements are intact.  IMPRESSION:  No radiographic evidence of fracture or subluxation.  Moderate degenerative disk disease at C4-5 and C5-6.        Review of Systems:  CONSTITUTIONAL: patient denies any fever, chills, or weight loss  SKIN: patient denies any rash or itching  RESPIRATORY: patient denies having any shortness of breath  GASTROINTESTINAL: patient denies having any diarrhea, constipation, or bowel incontinence  GENITOURINARY: patient denies having any abnormal bladder function    MUSCULOSKELETAL:  - patient complains of the above noted pain/s (see chief pain complaint)    NEUROLOGICAL:   - pain as above  - strength in Lower extremities is intact, BILATERALLY  - sensation in Lower extremities is intact, BILATERALLY  - patient denies any loss of bowel or bladder control      PSYCHIATRIC: patient denies any change in mood    Other:  All other systems reviewed and are negative    Telemedicine Exam  There were no vitals filed for this visit.  There is no height or weight on file to calculate BMI.      Physical Exam: last in clinic visit:    Physical Exam:  Telemedicine Exam, physical exam from previous in office visit  There were no vitals filed for this visit.      There is no height or weight on file to calculate BMI.   (reviewed on 12/27/2024)     There were no vitals filed for this visit.          There is no height or weight on file to calculate BMI.   (reviewed on 12/27/2024)     Physical Exam  Constitutional:       Appearance: Normal appearance.   HENT:      Head: Normocephalic and atraumatic.   Eyes:      Extraocular Movements: Extraocular movements intact.      Pupils: Pupils are equal, round, and reactive to light.   Cardiovascular:      Pulses: Normal pulses.   Pulmonary:      Effort: Pulmonary effort is normal.   Skin:     General: Skin is warm.   Neurological:      Mental Status: She is alert  and oriented to person, place, and time.      Sensory: No sensory deficit.      Motor: Weakness present. No abnormal muscle tone.      Gait: Gait abnormal.      Deep Tendon Reflexes:      Reflex Scores:       Patellar reflexes are 2+ on the right side and 2+ on the left side.       Achilles reflexes are 1+ on the right side and 2+ on the left side.     Comments: 4/5 strength in right knee extension and right knee flexion   Psychiatric:         Mood and Affect: Mood normal.         Behavior: Behavior normal.         Musculoskeletal:        Lumbar Exam  Incision: no  Pain with Flexion: yes  Pain with Extension: yes  ROM:  Decreased  Paraspinous TTP:  Positive bilaterally  Facet TTP:  L4-5  Facet Loading:  Positive bilaterally  SLR:  Positive on the right at 75°  SIJ TTP:  Negative bilaterally  GALLO:  Negative bilaterally      Assessment:    Modesta Camacho is a 59 y.o. year old female who is presenting with       ICD-10-CM ICD-9-CM    1. Lumbar spondylosis  M47.816 721.3 Case Request-RAD/Other Procedure Area: Bilateral L4-5 and L5-S1 RFA      2. Status post left knee replacement  Z96.652 V43.65       3. Lumbar radiculopathy  M54.16 724.4 gabapentin (NEURONTIN) 300 MG capsule      4. DDD (degenerative disc disease), lumbar  M51.369 722.52 gabapentin (NEURONTIN) 300 MG capsule      tiZANidine (ZANAFLEX) 4 MG tablet      5. LANE (generalized anxiety disorder)  F41.1 300.02 DULoxetine (CYMBALTA) 60 MG capsule      6. Knee pain, unspecified chronicity, unspecified laterality  M25.569 719.46 tiZANidine (ZANAFLEX) 4 MG tablet      7. Primary osteoarthritis of right hip  M16.11 715.15 tiZANidine (ZANAFLEX) 4 MG tablet              Lumbar spondylosis  -     Case Request-RAD/Other Procedure Area: Bilateral L4-5 and L5-S1 RFA    Status post left knee replacement    Lumbar radiculopathy  -     gabapentin (NEURONTIN) 300 MG capsule; Take 1 capsule (300 mg total) by mouth 3 (three) times daily.  Dispense: 90 capsule; Refill:  2    DDD (degenerative disc disease), lumbar  -     gabapentin (NEURONTIN) 300 MG capsule; Take 1 capsule (300 mg total) by mouth 3 (three) times daily.  Dispense: 90 capsule; Refill: 2  -     tiZANidine (ZANAFLEX) 4 MG tablet; Take 0.5-1 tablets (2-4 mg total) by mouth 2 (two) times daily as needed (pain).  Dispense: 90 tablet; Refill: 2    LANE (generalized anxiety disorder)  -     DULoxetine (CYMBALTA) 60 MG capsule; Take 1 capsule (60 mg total) by mouth 2 (two) times daily.  Dispense: 60 capsule; Refill: 2    Knee pain, unspecified chronicity, unspecified laterality  -     tiZANidine (ZANAFLEX) 4 MG tablet; Take 0.5-1 tablets (2-4 mg total) by mouth 2 (two) times daily as needed (pain).  Dispense: 90 tablet; Refill: 2    Primary osteoarthritis of right hip  -     tiZANidine (ZANAFLEX) 4 MG tablet; Take 0.5-1 tablets (2-4 mg total) by mouth 2 (two) times daily as needed (pain).  Dispense: 90 tablet; Refill: 2                Plan:  1. Interventional: schedule bilateral L3-5 RFA- Dr. Castillo  Explained the risks and benefits of the procedure in detail with the patient today in clinic along with alternative treatment options, and the patient elected to pursue the intervention.         -02/14/2024: Left genicular nerve block with phenol, 90% relief  -08/14/2023:  Left genicular nerve block, 80% relief x 4 months  - S/p ultrasound-guided right hip injection by Dr. Acevedo on 12/21/2021 with short-term pain relief.      2. Pharmacologic:   - D/c Topamax- dry mouth  -Refill Gabapentin 300 mg TID We have reviewed potential side effects of this medication including daytime somnolence, weight gain and peripheral edema    - Continue Tramadol 50 mg Po BID PRN (60 tabs) - from Rheumatology.   - Refill  Cymbalta 60 mg PO BID. -We have discussed potential common side effects of duloxetine including nausea, diarrhea/constipation, fatigue, drowsiness or dry mouth.  Patient expresses understanding.    -Refill given for tizanidine  We  have discussed potential deleterious side effects associated with this medication including  dizziness, drowsiness, dry mouth or tingling sensation in the upper or lower extremities.         3. Rehabilitative:   Encouraged ambulation. Continue physical therapy to help with strengthening, although patient reports not helping with pain.   Patient previously informed that we will fill out [facility] paperwork for physical therapy and procedures, but we will not fill out paperwork for disability.  Our goal is to improve pain to continue job responsibility.     4. Diagnostic:   -MRI lumbar spine reviewed and findings discussed with patient.  Significant for grade 1 anterolisthesis of L4 upon L5.  There is no diffuse facet arthropathy with posterior disc bulge at L4-5 resulting in mild canal stenosis in right-greater-than-left foraminal stenosis.  Additionally there was broad-based disc bulge at L3-L4    5. Follow up:  5 weeks after procedure        - This condition does not require this patient to take time off of work, and the primary goal of our Pain Management services is to improve the patient's functional capacity.   - I discussed the risks, benefits, and alternatives to potential treatment options. All questions and concerns were fully addressed today in clinic.       Maria Teresa Bazan NP   Interventional Pain Medicine  [facility]      Disclaimer:  This note was prepared using voice recognition system and is likely to have sound alike errors that may have been overlooked even after proof reading.  Please call me with any questions.

## 2024-12-22 NOTE — ANESTHESIA POSTPROCEDURE EVALUATION
Anesthesia Post Evaluation    Patient: Modesta Camacho    Procedure(s) Performed: Procedure(s) (LRB):  ARTHROPLASTY, HIP, ANTERIOR APPROACH (Left)    Final Anesthesia Type: general    Patient location during evaluation: PACU  Patient participation: Yes- Able to Participate  Level of consciousness: awake and alert  Post-procedure vital signs: reviewed and stable  Pain management: adequate  Airway patency: patent  ALIN mitigation strategies: Extubation while patient is awake  PONV status at discharge: No PONV  Anesthetic complications: no      Cardiovascular status: hemodynamically stable  Respiratory status: spontaneous ventilation  Hydration status: euvolemic  Follow-up not needed.          Vitals Value Taken Time   /80 07/31/20 1100   Temp 36.7 °C (98.1 °F) 07/31/20 1020   Pulse 83 07/31/20 1104   Resp 8 07/31/20 1104   SpO2 100 % 07/31/20 1104   Vitals shown include unvalidated device data.      No case tracking events are documented in the log.      Pain/Juvenal Score: Pain Rating Prior to Med Admin: 8 (7/31/2020 11:00 AM)  Juvenal Score: 7 (7/31/2020 11:00 AM)         done

## 2024-12-27 ENCOUNTER — OFFICE VISIT (OUTPATIENT)
Dept: PAIN MEDICINE | Facility: CLINIC | Age: 59
End: 2024-12-27
Payer: COMMERCIAL

## 2024-12-27 DIAGNOSIS — M16.11 PRIMARY OSTEOARTHRITIS OF RIGHT HIP: ICD-10-CM

## 2024-12-27 DIAGNOSIS — M25.569 KNEE PAIN, UNSPECIFIED CHRONICITY, UNSPECIFIED LATERALITY: ICD-10-CM

## 2024-12-27 DIAGNOSIS — M51.369 DDD (DEGENERATIVE DISC DISEASE), LUMBAR: ICD-10-CM

## 2024-12-27 DIAGNOSIS — M54.16 LUMBAR RADICULOPATHY: ICD-10-CM

## 2024-12-27 DIAGNOSIS — F41.1 GAD (GENERALIZED ANXIETY DISORDER): ICD-10-CM

## 2024-12-27 DIAGNOSIS — Z96.652 STATUS POST LEFT KNEE REPLACEMENT: ICD-10-CM

## 2024-12-27 DIAGNOSIS — M47.816 LUMBAR SPONDYLOSIS: Primary | ICD-10-CM

## 2024-12-27 RX ORDER — GABAPENTIN 300 MG/1
300 CAPSULE ORAL 3 TIMES DAILY
Qty: 90 CAPSULE | Refills: 2 | Status: SHIPPED | OUTPATIENT
Start: 2024-12-27

## 2024-12-27 RX ORDER — DULOXETIN HYDROCHLORIDE 60 MG/1
60 CAPSULE, DELAYED RELEASE ORAL 2 TIMES DAILY
Qty: 60 CAPSULE | Refills: 2 | Status: SHIPPED | OUTPATIENT
Start: 2024-12-27

## 2024-12-27 RX ORDER — TIZANIDINE 4 MG/1
2-4 TABLET ORAL 2 TIMES DAILY PRN
Qty: 90 TABLET | Refills: 2 | Status: SHIPPED | OUTPATIENT
Start: 2024-12-27

## 2025-01-03 ENCOUNTER — PATIENT MESSAGE (OUTPATIENT)
Dept: PAIN MEDICINE | Facility: CLINIC | Age: 60
End: 2025-01-03
Payer: COMMERCIAL

## 2025-01-03 ENCOUNTER — TELEPHONE (OUTPATIENT)
Dept: PAIN MEDICINE | Facility: CLINIC | Age: 60
End: 2025-01-03
Payer: COMMERCIAL

## 2025-01-03 NOTE — TELEPHONE ENCOUNTER
----- Message from Maria Teresa Bazan NP sent at 12/27/2024  7:25 AM CST -----  Pain Provider: Anna  Patient Name: Modesta Camacho  MRN: [id number]  Case: LUMBAR RFA  Level: L4/5 and L5/S1  Laterality: bilateral  Anticoagulation: none    5 week follow up

## 2025-01-03 NOTE — TELEPHONE ENCOUNTER
Called to scheduled pt injection with Dr Castillo, pt was scheduled for 2/27/25.    .Aidan RANKIN

## 2025-01-24 ENCOUNTER — TELEPHONE (OUTPATIENT)
Dept: INTERNAL MEDICINE | Facility: CLINIC | Age: 60
End: 2025-01-24
Payer: COMMERCIAL

## 2025-01-24 DIAGNOSIS — N18.31 CHRONIC KIDNEY DISEASE, STAGE 3A: Primary | ICD-10-CM

## 2025-01-24 DIAGNOSIS — N18.2 STAGE 2 CHRONIC KIDNEY DISEASE: ICD-10-CM

## 2025-01-24 DIAGNOSIS — I10 ESSENTIAL HYPERTENSION: ICD-10-CM

## 2025-01-24 DIAGNOSIS — E79.0 HYPERURICEMIA: ICD-10-CM

## 2025-01-24 NOTE — TELEPHONE ENCOUNTER
I left a vm for the pt stating her lab orders  from Mary Rivera NP are in and she may get them completed on Saturday at [facility]. //kah

## 2025-01-24 NOTE — TELEPHONE ENCOUNTER
----- Message from [employer] sent at 1/23/2025  3:49 PM CST -----  Contact: self  ..Type: Orders Request    What orders/ testing are being requested? Labs     Is there a future appointment scheduled for the patient with PCP? Yes     When? 01/29    Would you prefer a response via [facility]? Call back, .[phone] (home)     Comments: pt would like orders put in prior to apt

## 2025-01-24 NOTE — TELEPHONE ENCOUNTER
I returned a call back to the pt and she was wondering if you would order her annual labs so , that she can complete them prior to coming in on 1/29/25. Please advise if you will order. Thanks //kah

## 2025-02-03 ENCOUNTER — TELEPHONE (OUTPATIENT)
Dept: PAIN MEDICINE | Facility: CLINIC | Age: 60
End: 2025-02-03
Payer: COMMERCIAL

## 2025-02-03 NOTE — TELEPHONE ENCOUNTER
I called the patient and told her that I can type up a letter up for her and we can put it at the  for her. Patient stated she will pick it up next week.    Fawn MICHEL ([facility])

## 2025-02-03 NOTE — TELEPHONE ENCOUNTER
----- Message from [employer] sent at 2/3/2025  8:02 AM CST -----  Regarding: Procedure Scheduled  Contact: Patient  Type:  Needs Medical Advice    Who Called: Patient   Symptoms (please be specific): n/a    How long has patient had these symptoms:  n/a   Pharmacy name and phone #:  n/a   Would the patient rather a call back or a response via [facility]? Call back   Best Call Back Number:  [phone]  Additional Information: Patient is requesting a call back in reference to procedure scheduled for 02/27/2025 Patient stated she needs a letter from physician to provide to employer Please Assist

## 2025-02-10 ENCOUNTER — OFFICE VISIT (OUTPATIENT)
Dept: ORTHOPEDICS | Facility: CLINIC | Age: 60
End: 2025-02-10
Payer: COMMERCIAL

## 2025-02-10 VITALS — HEIGHT: 62 IN | BODY MASS INDEX: 29.44 KG/M2 | WEIGHT: 160 LBS

## 2025-02-10 DIAGNOSIS — M54.16 LUMBAR RADICULOPATHY: ICD-10-CM

## 2025-02-10 DIAGNOSIS — Z96.642 HX OF TOTAL HIP ARTHROPLASTY, LEFT: ICD-10-CM

## 2025-02-10 DIAGNOSIS — Z96.641 H/O TOTAL HIP ARTHROPLASTY, RIGHT: ICD-10-CM

## 2025-02-10 DIAGNOSIS — M25.552 BILATERAL HIP PAIN: ICD-10-CM

## 2025-02-10 DIAGNOSIS — Z96.651 HISTORY OF REVISION OF TOTAL REPLACEMENT OF RIGHT KNEE JOINT: ICD-10-CM

## 2025-02-10 DIAGNOSIS — Z96.652 HISTORY OF REVISION OF TOTAL REPLACEMENT OF LEFT KNEE JOINT: Primary | ICD-10-CM

## 2025-02-10 DIAGNOSIS — M25.562 PAIN IN BOTH KNEES, UNSPECIFIED CHRONICITY: ICD-10-CM

## 2025-02-10 DIAGNOSIS — M47.816 FACET ARTHROPATHY, LUMBAR: ICD-10-CM

## 2025-02-10 DIAGNOSIS — M25.551 BILATERAL HIP PAIN: ICD-10-CM

## 2025-02-10 DIAGNOSIS — Z87.39 HISTORY OF GOUT: ICD-10-CM

## 2025-02-10 DIAGNOSIS — M25.561 PAIN IN BOTH KNEES, UNSPECIFIED CHRONICITY: ICD-10-CM

## 2025-02-10 DIAGNOSIS — M51.360 DEGENERATION OF INTERVERTEBRAL DISC OF LUMBAR REGION WITH DISCOGENIC BACK PAIN: ICD-10-CM

## 2025-02-10 PROCEDURE — 99999 PR PBB SHADOW E&M-EST. PATIENT-LVL III: CPT | Mod: PBBFAC,,, | Performed by: ORTHOPAEDIC SURGERY

## 2025-02-10 NOTE — PROGRESS NOTES
Subjective:     Patient ID: Modesta Camacho is a 59 y.o. female.    Chief Complaint: Pain of the Left Knee  06/27/2022  HPI:  Left TKA 05/07/2021 by Dr. Gandara, left KRZYSZTOF a by Dr. Gandara  Right TKA by Dr. Lan 2016  Patient is complaining that the left knee is tight unable to fully extend compared to the right side.  She is not having any pain with the left hip.  She has very mild discomfort in the left knee.  As far as the right hip is concerned she is having severe pain in the groin and she knows she has arthritis.  She stated the right TKA done by Dr. Lan is not painful but it gives out with difficulty doing stairs.  Overall pain is 4/10.  She still working at the [facility].  She ambulates without any assistive devices.  She feels her left knee is not doing well and cannot straighten it out as much as she does in the right knee.  For some reason she was upset with Dr. Atkinson.  No fever no chills no shortness of breath or difficulty with chewing or swallowing.  She does have low back pain and she sees Dr. Weaver    07/14/2022  Left KRZYSZTOF by Dr. Gandara doing extremely well  Left TKA 05/07/2021 by Dr. Gandara and feels tight unable to hyperextend.  She has around maybe 2-3 degrees of contracture but she flexes really well.  At this time this is a very stable knee    Right TKA 2016 by Dr. Lan.  She is extremely loosen hyperextends and medial collateral ligaments seem loose.  We had asked her to get us the operative report from the [facility] and she brought it with her.  We went over the operative note and it was vanguard knee by [employer].  The insert is 14 mm.  For 67 base plate  We did call the [employer] rep and he said they go up to 18 mm for that size and prosthesis.  Femoral component 57.5  Right hip arthritis I did tell her we will deal with that later on because is not bother her as much.  Pain is 5/10    12/15/2022     Right total knee revision 09/27/2022 where we change the poly liner to a very  thick 1 to achieve stability.  This total knee had been done by Dr. Lan prior to that and she was ligamentously loose.  She did fall after things given.  She feels that the right total knee poly exchange seems to have helped significantly with the instability.  She had severe pain in her hip on the right side she has arthritis by x-ray and diagnosis she seen Dr. Acevedo who gave her injection in the office in November2,2022 2nd 2022    She wants to have her total knee replacement done and she knows she needs to wait 3 months.    She would like to return to work to make some money and she is looking at having her right total hip replacement in March 2023     Patient stated the arthritis run in her family everybody in the family had a joint replacement including her brother her mother clarita and she was diagnosed with osteoarthritis rather than rheumatoid arthritis.    Requesting tramadol  Pain is 6/10    We discussed the left knee which she stated still feels tight that if she waits a year year and half in might loosen up with time you can always have that poly exchange to a smaller 1 if needed in the future      02/13/2023    Severe right hip arthritis.  Received intra-articular injection by Dr. Acevedo 11/02/2022 with good relief.  You want a proceed with the right total hip replacement sometime to words the end of May early June 2023 because you want a work to make some money.  You having hard time walking distances and we gave you a temporary disability parking sticker.  As far as the right total knee revision 09/27/2022 you said you not having any pain in that or neither the left KRZYSZTOF that was done by Dr. Gandara through the anterior approach.  As far as the left TKA done by Dr. Gandara you feel that you have pain still unable to fully extended and it is tender and painful.  I did tell her let us give it some more time roughly 18 months to see if things improve if not then she would need to have poly exchange to  a smaller 1.  No fever no chills no shortness of breath difficulty with chewing swallowing loss of bowel bladder control blurry vision double vision loss sense smell or taste     04/21/2023   Right hip arthritis.  She is ready to proceed with total hip replacement.  I did tell her I go posterior approach not anterior.  We had some questions and answered.  I did tell her there is slight risk of dislocation of 3% chance.  She will be going home the same day this is considered outpatient surgery by the government.  If there is any problem then we will do extended recovery.  She will receive home health for couple weeks.  She would need help at home.  We briefly went over the pros and cons and she does remember it.  She said she is active on [employer] and she can read it.  She did complain that the left knee still little tight and tape painful and I did tell her give it some time once we know we have done all the joints then we can arrange for her to go and change the poly insert to a smaller 1 to achieve a little bit more extension  Pain 5/10    09/07/2023  Right KRZYSZTOF 06/07/2023.  She is doing much better than before surgery and very happy with the results so far.  Overall pain is 4/10 and that includes the left knee that is killing her.  She did receive genicular nerve block recently by Dr. Malcolm which barely gave her some mild relief but he only used lidocaine without steroid.  States that the pain in the left leg going down to the outside of her foot.  There is no pain over the greater trochanter on the left hip which she is doing well with that performed by Dr. Atkinson.  We went over the operative report 05/06/2021 of the left TKA.  She said she got slightly better extension since last time however she still hurting she points over the outside of her knee and sometimes goes down to the outside of her foot  As far as the right total knee revision to a thicker poly liner she is doing really well with that  09/27/2022  Left KRZYSZTOF performed by Dr. Gandara is doing really well is just a left knee that she is been complaining about for a long time.  The right KRZYSZTOF we just perform is doing much better.  Very mild discomfort if any   She is ambulating without assistive devices  Denies any fever or chills or shortness of breath difficulty with chewing swallowing loss of bowel bladder control   Left knee genicular nerve block using lidocaine and bupivacaine performed 07/24/2023 by Dr. Iyer      01/05/2023   Left knee tightness and stiffness and pain.  DJO left TKA done 05/06/2021 by Dr. Gandara  it stretched a little bit since surgery.  Now it is painful she points over the lateral aspect and anteriorly.  She wants it revised.  We discussed that probably we can place a smaller plastic insert to make it a little bit looser.  However we can not make it to loose otherwise she will buckle during stairs and getting up from sitting positions.  She is having also left hip pain and specially over the greater trochanter.  She is having numbness and tingling that is radiating down to her foot.  Nerve conduction studies have shown she has radiculopathy which could explain the numbness and tingling in the leg but now it is getting worst and she is falling she had x-rays done yesterday.  She had not received any injections in her spine.  Previous MRI from 2021 showing L4-L5 severe facet arthropathy and neuroforaminal stenosis and above and below facet arthropathy.  This is getting worst.  She is taking gabapentin can not take it 3 times a day makes her too sleepy can not work.  As far as the right TKA revision to a thicker poly as well as right total hip which I performed this she is doing extremely well with does.    She wants left total knee replacement revised.  The pros and cons discussed   Also she has bursitis of the left hip I offered an injection which she declined right now she wants to see if the revision will help   As far as  numbness and tingling leg which gotten worse we need to get repeat MRI with contrast on the lumbar spine    02/10/2025  Left total knee revision 06/12/2024 where we took poly insert size 19 and replaced it with size 16 from [employer] for a 5 base plate.  She said the knee are doing extremely well much better than before it does not feel tight anymore.  She does still have some pain and discomfort at 4/10.  She already has bilateral hips replaced and bilateral knees replaced.  I did revise the right TKA 09/27/2022 with a thicker poly in insert and I performed a right KRZYSZTOF 06/07/2023 and  Has performed left KRZYSZTOF 07/31/2020 and left TKA 05/06/2021 which I revise to a smaller plastic   She has quite a bit of lumbar issues she has a radiofrequency ablation done she has 1 scheduled to have on 02/27/2025   Overall pain is 4/10   Patient is ambulating without any assistive devices.  No fever no chills no shortness breath or difficulty with chewing swallowing loss of bowel bladder control  Past Medical History:   Diagnosis Date    Abnormal EKG 02/05/2021    Anxiety     Hypertension     Osteoarthritis     Stage 2 chronic kidney disease 02/05/2021     Past Surgical History:   Procedure Laterality Date    ARTHROPLASTY OF HIP BY ANTERIOR APPROACH Left 7/31/2020    Procedure: ARTHROPLASTY, HIP, ANTERIOR APPROACH;  Surgeon: Xavi Gandara MD;  Location: [facility] OR;  Service: Orthopedics;  Laterality: Left;    BLOCK, NERVE, GENICULAR Left 2/14/2024    Procedure: Left genicular nerve block with RN IV sedation;  Surgeon: Shaun Iyer MD;  Location: [facility];  Service: Pain Management;  Laterality: Left;    gastric sleeve      HIP ARTHROPLASTY Right 6/7/2023    Procedure: ARTHROPLASTY, HIP;  Surgeon: Trey Clifford MD;  Location: [facility] OR;  Service: Orthopedics;  Laterality: Right;    INJECTION OF ANESTHETIC AGENT AROUND MEDIAL BRANCH NERVES INNERVATING LUMBAR FACET JOINT Bilateral 8/15/2024    Procedure: Bilateral L3-5  MBB  #1 with RN IV sedation;  Surgeon: Hakeem Castillo MD;  Location: [facility];  Service: Pain Management;  Laterality: Bilateral;    INJECTION OF ANESTHETIC AGENT AROUND MEDIAL BRANCH NERVES INNERVATING LUMBAR FACET JOINT Bilateral 9/10/2024    Procedure: Bilateral L3-5 MBB (diagnostic, #2);  Surgeon: Hakeem Castillo MD;  Location: [facility];  Service: Pain Management;  Laterality: Bilateral;    INJECTION OF ANESTHETIC AGENT AROUND NERVE Bilateral 8/14/2023    Procedure: left genicular nerve block RN IV Sedation;  Surgeon: Shaun Iyer MD;  Location: [facility];  Service: Pain Management;  Laterality: Bilateral;    JOINT REPLACEMENT Right 06/14/2016    knee    JOINT REPLACEMENT Left 05/20/2021    KNEE    JOINT REPLACEMENT Left 07/30/2020    HIP    KNEE ARTHROPLASTY Left 5/6/2021    Procedure: ARTHROPLASTY, KNEE;  Surgeon: Xavi Gandara MD;  Location: [facility];  Service: Orthopedics;  Laterality: Left;    REPLACEMENT, POLYETHYLENE LINER Left 6/12/2024    Procedure: REPLACEMENT, POLYETHYLENE LINER;  Surgeon: Trey Clifford MD;  Location: [facility] OR;  Service: General;  Laterality: Left;    RESURFACING OF PATELLA Right 9/27/2022    Procedure: RESURFACING, PATELLA;  Surgeon: Trey Clifford MD;  Location: [facility] OR;  Service: General;  Laterality: Right;    REVISION OF KNEE ARTHROPLASTY Right 9/27/2022    Procedure: REVISION, ARTHROPLASTY, KNEE;  Surgeon: Trey Clifford MD;  Location: [facility] OR;  Service: General;  Laterality: Right;    REVISION OF KNEE ARTHROPLASTY Left 6/12/2024    Procedure: REVISION, ARTHROPLASTY, KNEE;  Surgeon: Trey Clifford MD;  Location: [facility] OR;  Service: General;  Laterality: Left;  POLY EXCHANGE    SELECTIVE INJECTION OF ANESTHETIC AGENT AROUND LUMBAR SPINAL NERVE ROOT BY TRANSFORAMINAL APPROACH Bilateral 5/2/2024    Procedure: Bilateral L4/5 TF CELE;  Surgeon: Shaun Iyer MD;  Location: [facility];  Service: Pain Management;  Laterality:  Bilateral;    SYNOVECTOMY OF KNEE Left 6/12/2024    Procedure: SYNOVECTOMY, KNEE;  Surgeon: Trey Clifford MD;  Location: [facility];  Service: General;  Laterality: Left;    TUBAL LIGATION      UTERINE FIBROID EMBOLIZATION       Family History   Problem Relation Name Age of Onset    Hypertension Mother      Arthritis Mother      Diabetes Father      Heart disease Father      Depression Sister      Hypertension Sister      Arthritis Brother      Thyroid disease Son      Hypertension Son      Arthritis Maternal Grandmother      Heart disease Maternal Grandmother      Kidney disease Maternal Grandmother      Heart attack Maternal Grandfather      Stroke Paternal Grandmother      No Known Problems Paternal Grandfather      Eczema Son      Breast cancer Sister  47     Social History     Socioeconomic History    Marital status:      Spouse name: Pop Land    Number of children: 3   Occupational History    Occupation: patient access   Tobacco Use    Smoking status: Never     Passive exposure: Never    Smokeless tobacco: Never   Substance and Sexual Activity    Alcohol use: Yes     Comment: occasional: hold 72hrs. prior to surgery    Drug use: No    Sexual activity: Yes     Partners: Male     Birth control/protection: See Surgical Hx     Social Drivers of Health     Stress: Stress Concern Present (3/16/2020)    [facility] [employer] - Occupational Stress Questionnaire     Feeling of Stress : Rather much     Medication List with Changes/Refills   Current Medications    AMLODIPINE (NORVASC) 5 MG TABLET    Take 1 tablet (5 mg total) by mouth 2 (two) times daily.    CLONIDINE (CATAPRES) 0.1 MG TABLET    TAKE 1 TABLET BY MOUTH ONCE DAILY USE FOR SBP GREATER THAN 160 MMHG.    DULOXETINE (CYMBALTA) 60 MG CAPSULE    Take 1 capsule (60 mg total) by mouth 2 (two) times daily.    GABAPENTIN (NEURONTIN) 300 MG CAPSULE    Take 1 capsule (300 mg total) by mouth 3 (three) times daily.    HYDROCHLOROTHIAZIDE  (HYDRODIURIL) 25 MG TABLET    Take 25 mg by mouth.    METHOCARBAMOL (ROBAXIN) 500 MG TAB    Take 1 tablet (500 mg total) by mouth every evening.    METOPROLOL SUCCINATE (TOPROL-XL) 50 MG 24 HR TABLET    Take 2 tablets (100 mg total) by mouth nightly.    MULTIVIT-MIN/FERROUS FUMARATE (MULTI VITAMIN ORAL)    Take 2 tablets by mouth Daily.    ONDANSETRON (ZOFRAN-ODT) 4 MG TBDL    dissolve 2 tablets (8 mg total) by mouth every 8 (eight) hours as needed (nausea).    ONDANSETRON (ZOFRAN-ODT) 4 MG TBDL    Dissolve 2 tablets (8 mg total) by mouth every 8 (eight) hours as needed (nausea).    OXYCODONE-ACETAMINOPHEN (PERCOCET)  MG PER TABLET    Take 1 tablet by mouth every 8 (eight) hours as needed for Pain.    TELMISARTAN (MICARDIS) 80 MG TAB    Take 1 tablet (80 mg total) by mouth once daily.    TIZANIDINE (ZANAFLEX) 4 MG TABLET    Take 0.5-1 tablets (2-4 mg total) by mouth 2 (two) times daily as needed (pain).    VICTOZA 3-DANIEL 0.6 MG/0.1 ML (18 MG/3 ML) PNIJ PEN    SMARTSI.6 Milligram(s) SUB-Q Once    VITAMIN D (VITAMIN D3) 1000 UNITS TAB    Take 1,000 Units by mouth once daily.     Review of patient's allergies indicates:   Allergen Reactions    Codeine Hives and Rash     Takes Tramadol and has never had an issue with SOB/swelling  Also tolerated hydromorphone 2020      Penicillin     Penicillins Hives     Pt tolerated cefazolin 2020     Review of Systems   Constitutional: Negative for decreased appetite.   HENT:  Negative for tinnitus.    Eyes:  Negative for double vision.   Cardiovascular:  Negative for chest pain.   Respiratory:  Negative for wheezing.    Hematologic/Lymphatic: Negative for bleeding problem.   Skin:  Negative for dry skin.   Musculoskeletal:  Positive for arthritis, back pain, joint pain and stiffness. Negative for gout and neck pain.   Gastrointestinal:  Negative for abdominal pain.   Genitourinary:  Negative for bladder incontinence.   Neurological:  Negative for numbness, paresthesias  and sensory change.   Psychiatric/Behavioral:  Negative for altered mental status.        Objective:   Body mass index is 29.26 kg/m².  There were no vitals filed for this visit.       General    Constitutional: She is oriented to person, place, and time. She appears well-developed.   HENT:   Head: Atraumatic.   Eyes: EOM are normal.   Pulmonary/Chest: Effort normal.   Neurological: She is alert and oriented to person, place, and time.   Psychiatric: Judgment normal.           Ambulating without any assistive devices  Right hip surgical scar healed well.  Passive range of motion without pain in the groin.  There is slight weakness to hip flexors and abductors   The left total hip journal external rotation without pain in the groin.  There is no pain to palpation over the greater trochanter Excellent range of motion  Pelvis is level  The sitting position  Hip flexors, abductors adductors quads and hamstrings ankle extensors and flexors were 5/5  Left TKA preop has around 3° of contracture and she flexes to 120°.  Slightly tight.  No defect in the patella or quadriceps tendon.  No erythema, not warm to touch.  Collaterals stable to varus and valgus stressing in extension and in flexion at 90°.  There is tenderness and pain over the lateral joint into AP compression on the patella.  There is radiation to the outside of the tibia down to the outside of the foot.  Right TKA surgical incision postop healed very well she has 0 to 125° of flexion.  The knee does not feel tight anymore.  We no defect in the patella or quadriceps tendon     Right knee surgical incision healed well.  She has 0-130 degrees of flexion.  She is not hyperextending anymore when preoperatively she was hyperextending approximately 10°.  She is stable in extension with slight opening if any approximately 2 mm on the medial side with the knee in extension.  She is stable in flexion and 90°.  No swelling no effusion no erythema  Ankle motion  intact  Skin is warm to touch    Relevant imaging results reviewed and interpreted by me, discussed with the patient and / or family today       X-ray 01/04/2023 left TKA in excellent alignment no evidence of fracture.  X-ray 09/07/2023 right hip without evidence of failure.  The acetabular is slightly more anteverted than the other side.  No evidence of failure  X-ray 11/28/2022 of the right knee showing the thick poly insert with the right total knee ingrowth prosthesis performed by Dr. Lan prior no evidence of fracture  X-ray bilateral knees 06/27/2022 showing left TKA with very thick poly insert and the patella was not resurfaced with excellent alignment/posterior sacrificing knee system..  Right TKA/looks like [employer] with slightly oversized base plate on the tibia but overall alignment is intact and looks like it is ingrowth prosthesis with patella not resurfaced and posterior cruciate sparing knee  X-ray 11/24/2021 and 06/27/2022 left KRZYSZTOF in excellent alignment.  Right hip complete loss of joint space with marginal osteophyte and cystic changes consistent with severe arthritis of the right hip    X-ray 06/27/2022 of the lumbar spine showing spondylolisthesis L4 on 5 grade 1 and facet arthropathy.  No fracture seen  Assessment:     Encounter Diagnoses   Name Primary?    History of revision of total replacement of left knee joint Yes    History of revision of total replacement of right knee joint     Hx of total hip arthroplasty, left     H/O total hip arthroplasty, right     Facet arthropathy, lumbar     Degeneration of intervertebral disc of lumbar region with discogenic back pain     Lumbar radiculopathy     History of gout         Plan:   History of revision of total replacement of left knee joint    History of revision of total replacement of right knee joint    Hx of total hip arthroplasty, left    H/O total hip arthroplasty, right    Facet arthropathy, lumbar    Degeneration of intervertebral disc of  lumbar region with discogenic back pain    Lumbar radiculopathy    History of gout         Patient Instructions   Both of her hips are doing extremely well no problems   Your right knee revised and that with the plastic exchange as well as the left knee we also exchanged it to a smaller plastic   You are much better than before surgery able to move the knees much better and did not tight  You have back issues in you already had radiofrequency ablation at 1 level and you are expected to have a 2nd 1 on February 27   You doing very well overall   I will see you back in 6 months we need to obtain x-rays of both knees and both hips    Previous discussion  Operative report 06/09/2016 Dr. Lan implant [employer] vanguard cementless components size 57.5 femur, 14 mm polyethylene insert.  Tibial modular tray size 67, modular finned stem with screw system 80 x 10 mm, self taping screws 6.5 x 25  Left knee replaced by Dr. Gandara using [employer].  Hardware present in epic under surgery  I did discuss with the patient that we would do not know what is going to happen until we doing the surgery.  If the poly exchange give her enough stability than things will be okay otherwise if we not happy with the stability then we might have to take everything out and do a complete revision.  She expressed understanding that we going in to make sure that the knee becomes stable.  She apparently had mild relief from the genicular nerve block using lidocaine on the left knee  Disclaimer: This note was prepared using a voice recognition system and is likely to have sound alike errors within the text.

## 2025-02-10 NOTE — PATIENT INSTRUCTIONS
Both of her hips are doing extremely well no problems   Your right knee revised and that with the plastic exchange as well as the left knee we also exchanged it to a smaller plastic   You are much better than before surgery able to move the knees much better and did not tight  You have back issues in you already had radiofrequency ablation at 1 level and you are expected to have a 2nd 1 on February 27   You doing very well overall   I will see you back in 6 months we need to obtain x-rays of both knees and both hips

## 2025-02-19 NOTE — PRE-PROCEDURE INSTRUCTIONS
Spoke with patient regarding procedure scheduled on 2.27     Arrival time 1130     Has patient been sick with fever or on antibiotics within the last 7 days? No     Does the patient have any open wounds, sores or rashes? No     Does the patient have any recent fractures? no     Has patient received a vaccination within the last 7 days? No     Received the COVID vaccination?      Has the patient stopped all medications as directed? na     Does patient have a pacemaker, defibrillator, or implantable stimulator? no     Does the patient have a ride to and from procedure and someone reliable to remain with patient?       Is the patient diabetic? no     Does the patient have sleep apnea? Or use O2 at home? no     Is the patient receiving sedation?       Is the patient instructed to remain NPO beginning at midnight the night before their procedure? yes     Procedure location confirmed with patient? Yes     Covid- Denies signs/symptoms. Instructed to notify PAT/MD if any changes.

## 2025-02-27 ENCOUNTER — HOSPITAL ENCOUNTER (OUTPATIENT)
Facility: HOSPITAL | Age: 60
Discharge: HOME | End: 2025-02-27
Attending: PHYSICAL MEDICINE & REHABILITATION | Admitting: PHYSICAL MEDICINE & REHABILITATION
Payer: COMMERCIAL

## 2025-02-27 VITALS
WEIGHT: 165.81 LBS | BODY MASS INDEX: 30.51 KG/M2 | OXYGEN SATURATION: 100 % | SYSTOLIC BLOOD PRESSURE: 175 MMHG | TEMPERATURE: 97 F | HEIGHT: 62 IN | HEART RATE: 78 BPM | DIASTOLIC BLOOD PRESSURE: 99 MMHG | RESPIRATION RATE: 15 BRPM

## 2025-02-27 DIAGNOSIS — M47.816 LUMBAR SPONDYLOSIS: Primary | ICD-10-CM

## 2025-02-27 PROCEDURE — 64636 DESTROY L/S FACET JNT ADDL: CPT | Mod: 50,,, | Performed by: PHYSICAL MEDICINE & REHABILITATION

## 2025-02-27 PROCEDURE — 63600175 PHARM REV CODE 636 W HCPCS: Performed by: PHYSICAL MEDICINE & REHABILITATION

## 2025-02-27 PROCEDURE — 64635 DESTROY LUMB/SAC FACET JNT: CPT | Mod: 50 | Performed by: PHYSICAL MEDICINE & REHABILITATION

## 2025-02-27 PROCEDURE — 99152 MOD SED SAME PHYS/QHP 5/>YRS: CPT | Performed by: PHYSICAL MEDICINE & REHABILITATION

## 2025-02-27 PROCEDURE — 64636 DESTROY L/S FACET JNT ADDL: CPT | Mod: 50 | Performed by: PHYSICAL MEDICINE & REHABILITATION

## 2025-02-27 PROCEDURE — 64635 DESTROY LUMB/SAC FACET JNT: CPT | Mod: 50,,, | Performed by: PHYSICAL MEDICINE & REHABILITATION

## 2025-02-27 RX ORDER — ONDANSETRON HYDROCHLORIDE 2 MG/ML
4 INJECTION, SOLUTION INTRAVENOUS ONCE AS NEEDED
Status: DISCONTINUED | OUTPATIENT
Start: 2025-02-27 | End: 2025-02-27 | Stop reason: HOSPADM

## 2025-02-27 RX ORDER — BUPIVACAINE HYDROCHLORIDE 2.5 MG/ML
INJECTION, SOLUTION EPIDURAL; INFILTRATION; INTRACAUDAL
Status: DISCONTINUED | OUTPATIENT
Start: 2025-02-27 | End: 2025-02-27 | Stop reason: HOSPADM

## 2025-02-27 RX ORDER — FENTANYL CITRATE 50 UG/ML
INJECTION, SOLUTION INTRAMUSCULAR; INTRAVENOUS
Status: DISCONTINUED | OUTPATIENT
Start: 2025-02-27 | End: 2025-02-27 | Stop reason: HOSPADM

## 2025-02-27 RX ORDER — MIDAZOLAM HYDROCHLORIDE 1 MG/ML
INJECTION, SOLUTION INTRAMUSCULAR; INTRAVENOUS
Status: DISCONTINUED | OUTPATIENT
Start: 2025-02-27 | End: 2025-02-27 | Stop reason: HOSPADM

## 2025-02-27 NOTE — OP NOTE
Lumbar Medial nerve branch block radiofrequency ablation Under Fluoroscopy     Time-out taken to identify patient and procedure side prior to starting the procedure.     02/27/2025    Patient has clinical and imaging findings suggestive of recurrent facet mediated pain. Patient has undergone previous RFAs at specified levels with at least 50% relief for at least 6 months. Successful diagnostic medial branch blocks have been completed within the past 2 years.    For supporting clinical documentation, please see most recent clinic and telephone notes.     PROCEDURE: Bilateral radiofrequency ablation of the the medial branch nerves at the   transverse process of  L4, L5 and sacral ala    2)Conscious sedation provided by MD     REASON FOR PROCEDURE: Lumbar spondylosis [M47.816]     PHYSICIAN: Hakeem Castillo MD    ASSISTANTS: None     SEDATION: Conscious sedation provided by M.D    The patient was monitored with continuous pulse oximetry, EKG, and intermittent blood pressure monitors, immediately prior to administration of sedation.  The patient was hemodynamically stable throughout the entire process was responsive to voice, and breathing spontaneously.  Supplemental O2 was provided at 2L/min via nasal cannula.  Patient was comfortable for the duration of the procedure.     There was a total of 2mg IV Midazolam and 100mcg Fentanyl titrated for the procedure    Total sedation time was >10minutes and <20minutes      MEDICATIONS INJECTED: 0.25% Bupivicaine total  10 mL     LOCAL ANESTHETIC USED: Xylocaine 1% 1mL / site     ESTIMATED BLOOD LOSS: None.     COMPLICATIONS: None.     Interval history: Patient reports that he had complete relief of pain for the day of the procedure, we will proceed with the RFA     TECHNIQUE: Laying in a prone position, the patient was prepped and draped in the usual sterile fashion using ChloraPrep and fenestrated drape. The level was determined under fluoroscopic guidance. Local anesthetic  was given by going down to the hub of the 27-gauge 1.25in needle and raising a wheel. A 18-gauge 10mm curved active tip needle was introduced to the anatomic local of the medial branch at each of the above levels using fluoroscopy. Then sensory and motor testing was performed to confirm that the needle tips were in the correct location. Then after negative aspiration, 1 mL of 0.25% bupivacaine was injected into each level. This was followed by thermal lesioning at 80 degrees celsius for 90 seconds.     The patient tolerated the procedure well. Did not have any procedure related motor deficit at the conclusion of the procedure    The patient was monitored after the procedure. Patient was given post procedure and discharge instructions to follow at home. We will see the patient back in two weeks or the patient may call to inform of status. The patient was discharged in a stable condition

## 2025-02-27 NOTE — DISCHARGE SUMMARY
Discharge Note  Short Stay      SUMMARY     Admit Date: 2/27/2025    Attending Physician: Hakeem Castillo MD        Discharge Physician: Hakeem Castillo MD        Discharge Date: 2/27/2025 1:19 PM    Procedure(s) (LRB):  Bilateral L4-5 and L5-S1 RFA (Bilateral)    Final Diagnosis: Lumbar spondylosis [M47.816]    Disposition: Home or self care    Patient Instructions:   Current Discharge Medication List        CONTINUE these medications which have NOT CHANGED    Details   hydroCHLOROthiazide (HYDRODIURIL) 25 MG tablet Take 25 mg by mouth.      metoprolol succinate (TOPROL-XL) 50 MG 24 hr tablet Take 2 tablets (100 mg total) by mouth nightly.  Qty: 180 tablet, Refills: 3    Comments: .  Associated Diagnoses: Essential hypertension      telmisartan (MICARDIS) 80 MG Tab Take 1 tablet (80 mg total) by mouth once daily.  Qty: 90 tablet, Refills: 3    Comments: .  Associated Diagnoses: Essential hypertension      amLODIPine (NORVASC) 5 MG tablet Take 1 tablet (5 mg total) by mouth 2 (two) times daily.  Qty: 60 tablet, Refills: 11    Comments: .  Associated Diagnoses: Abnormal EKG; Essential hypertension; Pulmonary HTN; DDD (degenerative disc disease), cervical; Hyperuricemia; Anxiety      cloNIDine (CATAPRES) 0.1 MG tablet TAKE 1 TABLET BY MOUTH ONCE DAILY USE FOR SBP GREATER THAN 160 MMHG.  Qty: 90 tablet, Refills: 1    Comments: .  Associated Diagnoses: Essential hypertension      DULoxetine (CYMBALTA) 60 MG capsule Take 1 capsule (60 mg total) by mouth 2 (two) times daily.  Qty: 60 capsule, Refills: 2    Associated Diagnoses: LANE (generalized anxiety disorder)      gabapentin (NEURONTIN) 300 MG capsule Take 1 capsule (300 mg total) by mouth 3 (three) times daily.  Qty: 90 capsule, Refills: 2    Associated Diagnoses: Lumbar radiculopathy; DDD (degenerative disc disease), lumbar      !! ondansetron (ZOFRAN-ODT) 4 MG TbDL dissolve 2 tablets (8 mg total) by mouth every 8 (eight) hours as needed (nausea).  Qty: 20  tablet, Refills: 0      !! ondansetron (ZOFRAN-ODT) 4 MG TbDL Dissolve 2 tablets (8 mg total) by mouth every 8 (eight) hours as needed (nausea).  Qty: 21 tablet, Refills: 0      tiZANidine (ZANAFLEX) 4 MG tablet Take 0.5-1 tablets (2-4 mg total) by mouth 2 (two) times daily as needed (pain).  Qty: 90 tablet, Refills: 2    Associated Diagnoses: DDD (degenerative disc disease), lumbar; Knee pain, unspecified chronicity, unspecified laterality; Primary osteoarthritis of right hip      VICTOZA 3-DANIEL 0.6 mg/0.1 mL (18 mg/3 mL) PnIj pen SMARTSI.6 Milligram(s) SUB-Q Once      vitamin D (VITAMIN D3) 1000 units Tab Take 1,000 Units by mouth once daily.       !! - Potential duplicate medications found. Please discuss with provider.        STOP taking these medications       methocarbamoL (ROBAXIN) 500 MG Tab Comments:   Reason for Stopping:         multivit-min/ferrous fumarate (MULTI VITAMIN ORAL) Comments:   Reason for Stopping:         oxyCODONE-acetaminophen (PERCOCET)  mg per tablet Comments:   Reason for Stopping:                   Discharge Diagnosis: Lumbar spondylosis [M47.816]  Condition on Discharge: Stable with no complications to procedure   Diet on Discharge: Same as before.  Activity: as per instruction sheet.  Discharge to: Home with a responsible adult.  Follow up: 2-4 weeks       Please call the office at [phone] if you experience any weakness or loss of sensation, fever > 101.5, pain uncontrolled with oral medications, persistent nausea/vomiting/or diarrhea, redness or drainage from the incisions, or any other worrisome concerns. If physician on call was not reached or could not communicate with our office for any reason please go to the nearest emergency department

## 2025-02-27 NOTE — PLAN OF CARE
Pt instructed to take her blood pressure med when home. Pt verbalized understanding.   Pt discharged home, awake, alert, oriented x's 4,  denies any pain, no apparent distress noted. All questions and concerns addressed and answered, pt verbalizes understanding of discharge process, pt meets discharge criteria and is being discharged to car via wheelchair.

## 2025-02-27 NOTE — DISCHARGE INSTRUCTIONS

## 2025-02-27 NOTE — H&P
HPI  Patient presenting for Procedure(s) (LRB):  Bilateral L4-5 and L5-S1 RFA (Bilateral)       No health changes since previous encounter    Past Medical History:   Diagnosis Date    Abnormal EKG 02/05/2021    Anxiety     Hypertension     Osteoarthritis     Stage 2 chronic kidney disease 02/05/2021     Past Surgical History:   Procedure Laterality Date    ARTHROPLASTY OF HIP BY ANTERIOR APPROACH Left 7/31/2020    Procedure: ARTHROPLASTY, HIP, ANTERIOR APPROACH;  Surgeon: Xavi Gandara MD;  Location: [facility] OR;  Service: Orthopedics;  Laterality: Left;    BLOCK, NERVE, GENICULAR Left 2/14/2024    Procedure: Left genicular nerve block with RN IV sedation;  Surgeon: Shaun Iyer MD;  Location: [facility];  Service: Pain Management;  Laterality: Left;    gastric sleeve      HIP ARTHROPLASTY Right 6/7/2023    Procedure: ARTHROPLASTY, HIP;  Surgeon: Trey Clifford MD;  Location: [facility] OR;  Service: Orthopedics;  Laterality: Right;    INJECTION OF ANESTHETIC AGENT AROUND MEDIAL BRANCH NERVES INNERVATING LUMBAR FACET JOINT Bilateral 8/15/2024    Procedure: Bilateral L3-5  MBB #1 with RN IV sedation;  Surgeon: Hakeem Castillo MD;  Location: [facility];  Service: Pain Management;  Laterality: Bilateral;    INJECTION OF ANESTHETIC AGENT AROUND MEDIAL BRANCH NERVES INNERVATING LUMBAR FACET JOINT Bilateral 9/10/2024    Procedure: Bilateral L3-5 MBB (diagnostic, #2);  Surgeon: Hakeem Castillo MD;  Location: [facility];  Service: Pain Management;  Laterality: Bilateral;    INJECTION OF ANESTHETIC AGENT AROUND NERVE Bilateral 8/14/2023    Procedure: left genicular nerve block RN IV Sedation;  Surgeon: Shaun Iyer MD;  Location: [facility];  Service: Pain Management;  Laterality: Bilateral;    JOINT REPLACEMENT Right 06/14/2016    knee    JOINT REPLACEMENT Left 05/20/2021    KNEE    JOINT REPLACEMENT Left 07/30/2020    HIP    KNEE ARTHROPLASTY Left 5/6/2021    Procedure: ARTHROPLASTY, KNEE;   "Surgeon: Xavi Gandara MD;  Location: [facility];  Service: Orthopedics;  Laterality: Left;    REPLACEMENT, POLYETHYLENE LINER Left 6/12/2024    Procedure: REPLACEMENT, POLYETHYLENE LINER;  Surgeon: Trey Clifford MD;  Location: [facility] OR;  Service: General;  Laterality: Left;    RESURFACING OF PATELLA Right 9/27/2022    Procedure: RESURFACING, PATELLA;  Surgeon: Trey Clifford MD;  Location: [facility] OR;  Service: General;  Laterality: Right;    REVISION OF KNEE ARTHROPLASTY Right 9/27/2022    Procedure: REVISION, ARTHROPLASTY, KNEE;  Surgeon: Trey Clifford MD;  Location: [facility] OR;  Service: General;  Laterality: Right;    REVISION OF KNEE ARTHROPLASTY Left 6/12/2024    Procedure: REVISION, ARTHROPLASTY, KNEE;  Surgeon: Trey Clifford MD;  Location: [facility];  Service: General;  Laterality: Left;  POLY EXCHANGE    SELECTIVE INJECTION OF ANESTHETIC AGENT AROUND LUMBAR SPINAL NERVE ROOT BY TRANSFORAMINAL APPROACH Bilateral 5/2/2024    Procedure: Bilateral L4/5 TF CELE;  Surgeon: Shaun Iyer MD;  Location: [facility];  Service: Pain Management;  Laterality: Bilateral;    SYNOVECTOMY OF KNEE Left 6/12/2024    Procedure: SYNOVECTOMY, KNEE;  Surgeon: Trey Clifford MD;  Location: [facility];  Service: General;  Laterality: Left;    TUBAL LIGATION      UTERINE FIBROID EMBOLIZATION       Review of patient's allergies indicates:   Allergen Reactions    Codeine Hives and Rash     Takes Tramadol and has never had an issue with SOB/swelling  Also tolerated hydromorphone 7/2020      Penicillin     Penicillins Hives     Pt tolerated cefazolin 7/2020        Medications Ordered Prior to Encounter[1]     PMHx, PSHx, Allergies, Medications reviewed in epic    ROS negative except pain complaints in HPI    OBJECTIVE:    BP (!) 234/107   Pulse 65   Temp 97.3 °F (36.3 °C) (Tympanic)   Resp 18   Ht 5' 2" (1.575 m)   Wt 75.2 kg (165 lb 12.6 oz)   Breastfeeding No   BMI 30.32 kg/m²     PHYSICAL " EXAMINATION:    GENERAL: Well appearing, in no acute distress, alert and oriented x3.  PSYCH:  Mood and affect appropriate.  SKIN: Skin color, texture, turgor normal, no rashes or lesions which will impact the procedure.  CV: RRR with palpation of the radial artery.  PULM: No evidence of respiratory difficulty, symmetric chest rise. Clear to auscultation.  NEURO: Cranial nerves grossly intact.    Plan:    Proceed with procedure as planned Procedure(s) (LRB):  Bilateral L4-5 and L5-S1 RFA (Bilateral)    Hakeem Castillo MD  02/27/2025                 [1]   Current Facility-Administered Medications on File Prior to Encounter   Medication Dose Route Frequency Provider Last Rate Last Admin    ondansetron injection 4 mg  4 mg Intravenous Once PRN Shaun Iyer MD         Current Outpatient Medications on File Prior to Encounter   Medication Sig Dispense Refill    hydroCHLOROthiazide (HYDRODIURIL) 25 MG tablet Take 25 mg by mouth.      metoprolol succinate (TOPROL-XL) 50 MG 24 hr tablet Take 2 tablets (100 mg total) by mouth nightly. 180 tablet 3    telmisartan (MICARDIS) 80 MG Tab Take 1 tablet (80 mg total) by mouth once daily. 90 tablet 3    amLODIPine (NORVASC) 5 MG tablet Take 1 tablet (5 mg total) by mouth 2 (two) times daily. 60 tablet 11    cloNIDine (CATAPRES) 0.1 MG tablet TAKE 1 TABLET BY MOUTH ONCE DAILY USE FOR SBP GREATER THAN 160 MMHG. 90 tablet 1    DULoxetine (CYMBALTA) 60 MG capsule Take 1 capsule (60 mg total) by mouth 2 (two) times daily. 60 capsule 2    gabapentin (NEURONTIN) 300 MG capsule Take 1 capsule (300 mg total) by mouth 3 (three) times daily. 90 capsule 2    methocarbamoL (ROBAXIN) 500 MG Tab Take 1 tablet (500 mg total) by mouth every evening. 30 tablet 0    multivit-min/ferrous fumarate (MULTI VITAMIN ORAL) Take 2 tablets by mouth Daily.      ondansetron (ZOFRAN-ODT) 4 MG TbDL dissolve 2 tablets (8 mg total) by mouth every 8 (eight) hours as needed (nausea). 20 tablet 0    ondansetron  (ZOFRAN-ODT) 4 MG TbDL Dissolve 2 tablets (8 mg total) by mouth every 8 (eight) hours as needed (nausea). 21 tablet 0    oxyCODONE-acetaminophen (PERCOCET)  mg per tablet Take 1 tablet by mouth every 8 (eight) hours as needed for Pain. 21 tablet 0    tiZANidine (ZANAFLEX) 4 MG tablet Take 0.5-1 tablets (2-4 mg total) by mouth 2 (two) times daily as needed (pain). 90 tablet 2    VICTOZA 3-DANIEL 0.6 mg/0.1 mL (18 mg/3 mL) PnIj pen SMARTSI.6 Milligram(s) SUB-Q Once      vitamin D (VITAMIN D3) 1000 units Tab Take 1,000 Units by mouth once daily.

## 2025-02-28 ENCOUNTER — TELEPHONE (OUTPATIENT)
Dept: CARDIOLOGY | Facility: CLINIC | Age: 60
End: 2025-02-28
Payer: COMMERCIAL

## 2025-02-28 DIAGNOSIS — I10 ESSENTIAL HYPERTENSION: Primary | ICD-10-CM

## 2025-03-03 ENCOUNTER — HOSPITAL ENCOUNTER (OUTPATIENT)
Dept: CARDIOLOGY | Facility: HOSPITAL | Age: 60
Discharge: HOME | End: 2025-03-03
Payer: COMMERCIAL

## 2025-03-03 ENCOUNTER — OFFICE VISIT (OUTPATIENT)
Dept: CARDIOLOGY | Facility: CLINIC | Age: 60
End: 2025-03-03
Payer: COMMERCIAL

## 2025-03-03 VITALS
WEIGHT: 167.75 LBS | SYSTOLIC BLOOD PRESSURE: 180 MMHG | OXYGEN SATURATION: 98 % | BODY MASS INDEX: 31.67 KG/M2 | HEART RATE: 75 BPM | HEIGHT: 61 IN | DIASTOLIC BLOOD PRESSURE: 116 MMHG

## 2025-03-03 DIAGNOSIS — F41.9 ANXIETY: ICD-10-CM

## 2025-03-03 DIAGNOSIS — E79.0 HYPERURICEMIA: ICD-10-CM

## 2025-03-03 DIAGNOSIS — I27.20 PULMONARY HTN: ICD-10-CM

## 2025-03-03 DIAGNOSIS — N18.31 CHRONIC KIDNEY DISEASE, STAGE 3A: Primary | ICD-10-CM

## 2025-03-03 DIAGNOSIS — I10 ESSENTIAL HYPERTENSION: ICD-10-CM

## 2025-03-03 DIAGNOSIS — R94.31 ABNORMAL EKG: ICD-10-CM

## 2025-03-03 DIAGNOSIS — M50.30 DDD (DEGENERATIVE DISC DISEASE), CERVICAL: ICD-10-CM

## 2025-03-03 LAB
OHS QRS DURATION: 82 MS
OHS QTC CALCULATION: 445 MS

## 2025-03-03 PROCEDURE — 3077F SYST BP >= 140 MM HG: CPT | Mod: CPTII,S$GLB,, | Performed by: NURSE PRACTITIONER

## 2025-03-03 PROCEDURE — 99214 OFFICE O/P EST MOD 30 MIN: CPT | Mod: S$GLB,,, | Performed by: NURSE PRACTITIONER

## 2025-03-03 PROCEDURE — 99999 PR PBB SHADOW E&M-EST. PATIENT-LVL III: CPT | Mod: PBBFAC,,, | Performed by: NURSE PRACTITIONER

## 2025-03-03 PROCEDURE — 93005 ELECTROCARDIOGRAM TRACING: CPT

## 2025-03-03 PROCEDURE — 1159F MED LIST DOCD IN RCRD: CPT | Mod: CPTII,S$GLB,, | Performed by: NURSE PRACTITIONER

## 2025-03-03 PROCEDURE — 3080F DIAST BP >= 90 MM HG: CPT | Mod: CPTII,S$GLB,, | Performed by: NURSE PRACTITIONER

## 2025-03-03 PROCEDURE — 93010 ELECTROCARDIOGRAM REPORT: CPT | Mod: ,,, | Performed by: INTERNAL MEDICINE

## 2025-03-03 PROCEDURE — 4010F ACE/ARB THERAPY RXD/TAKEN: CPT | Mod: CPTII,S$GLB,, | Performed by: NURSE PRACTITIONER

## 2025-03-03 PROCEDURE — 3008F BODY MASS INDEX DOCD: CPT | Mod: CPTII,S$GLB,, | Performed by: NURSE PRACTITIONER

## 2025-03-03 RX ORDER — VALSARTAN 160 MG/1
160 TABLET ORAL DAILY
Qty: 90 TABLET | Refills: 3 | Status: SHIPPED | OUTPATIENT
Start: 2025-03-03 | End: 2026-03-03

## 2025-03-03 RX ORDER — AMLODIPINE BESYLATE 10 MG/1
10 TABLET ORAL DAILY
Qty: 30 TABLET | Refills: 11 | Status: SHIPPED | OUTPATIENT
Start: 2025-03-03 | End: 2026-03-03

## 2025-03-03 RX ORDER — CLONIDINE HYDROCHLORIDE 0.1 MG/1
TABLET ORAL
Qty: 90 TABLET | Refills: 1 | Status: SHIPPED | OUTPATIENT
Start: 2025-03-03

## 2025-03-03 NOTE — PROGRESS NOTES
Subjective:   Patient ID:  Modesta Camacho is a 59 y.o. female who presents for evaluation of Hypertension      HPI    Modesta Camacho is a 59 year old female who presents to clinic for BP follow up. Her current medical conditions include HTN, Anxiety, OA, CKD.    She returns today and states she is doing ok.     BP has been very elevated recently.     Needs to obtain BP machine for ambulatory monitoring.     Only symptom related to elevated BP is dull headache.     Denies chest pain or anginal equivalents. No shortness of breath, FERNÁNDEZ or palpitations. Denies orthopnea, PND or abdominal bloating. Reports regular walking without any issues lately. NO leg swelling or claudications. No recent falls, syncope or near syncopal events. Reports compliance with medications and dietary restrictions. NO CNS complaints to suggest TIA or CVA today. No signs of abnormal bleeding.             Past Medical History:   Diagnosis Date    Abnormal EKG 02/05/2021    Anxiety     Hypertension     Osteoarthritis     Stage 2 chronic kidney disease 02/05/2021       Past Surgical History:   Procedure Laterality Date    ARTHROPLASTY OF HIP BY ANTERIOR APPROACH Left 7/31/2020    Procedure: ARTHROPLASTY, HIP, ANTERIOR APPROACH;  Surgeon: Xavi Gandara MD;  Location: [facility];  Service: Orthopedics;  Laterality: Left;    BLOCK, NERVE, GENICULAR Left 2/14/2024    Procedure: Left genicular nerve block with RN IV sedation;  Surgeon: Shaun Iyer MD;  Location: [facility] PAIN [facility]GT;  Service: Pain Management;  Laterality: Left;    gastric sleeve      HIP ARTHROPLASTY Right 6/7/2023    Procedure: ARTHROPLASTY, HIP;  Surgeon: Trey Clifford MD;  Location: [facility] OR;  Service: Orthopedics;  Laterality: Right;    INJECTION OF ANESTHETIC AGENT AROUND MEDIAL BRANCH NERVES INNERVATING LUMBAR FACET JOINT Bilateral 8/15/2024    Procedure: Bilateral L3-5  MBB #1 with RN IV sedation;  Surgeon: Hakeem Castillo MD;  Location: [facility] PAIN [facility]GT;  Service: Pain  Management;  Laterality: Bilateral;    INJECTION OF ANESTHETIC AGENT AROUND MEDIAL BRANCH NERVES INNERVATING LUMBAR FACET JOINT Bilateral 9/10/2024    Procedure: Bilateral L3-5 MBB (diagnostic, #2);  Surgeon: Hakeem Castillo MD;  Location: [facility] PAIN MGT;  Service: Pain Management;  Laterality: Bilateral;    INJECTION OF ANESTHETIC AGENT AROUND NERVE Bilateral 8/14/2023    Procedure: left genicular nerve block RN IV Sedation;  Surgeon: Shaun Iyer MD;  Location: [facility] PAIN MGT;  Service: Pain Management;  Laterality: Bilateral;    JOINT REPLACEMENT Right 06/14/2016    knee    JOINT REPLACEMENT Left 05/20/2021    KNEE    JOINT REPLACEMENT Left 07/30/2020    HIP    KNEE ARTHROPLASTY Left 5/6/2021    Procedure: ARTHROPLASTY, KNEE;  Surgeon: Xavi Gandara MD;  Location: [facility] OR;  Service: Orthopedics;  Laterality: Left;    RADIOFREQUENCY ABLATION Bilateral 2/27/2025    Procedure: Bilateral L4-5 and L5-S1 RFA;  Surgeon: Hakeem Castillo MD;  Location: [facility] PAIN MGT;  Service: Pain Management;  Laterality: Bilateral;    REPLACEMENT, POLYETHYLENE LINER Left 6/12/2024    Procedure: REPLACEMENT, POLYETHYLENE LINER;  Surgeon: Trey Clifford MD;  Location: [facility] OR;  Service: General;  Laterality: Left;    RESURFACING OF PATELLA Right 9/27/2022    Procedure: RESURFACING, PATELLA;  Surgeon: Trey Clifford MD;  Location: [facility] OR;  Service: General;  Laterality: Right;    REVISION OF KNEE ARTHROPLASTY Right 9/27/2022    Procedure: REVISION, ARTHROPLASTY, KNEE;  Surgeon: Trey Clifford MD;  Location: [facility] OR;  Service: General;  Laterality: Right;    REVISION OF KNEE ARTHROPLASTY Left 6/12/2024    Procedure: REVISION, ARTHROPLASTY, KNEE;  Surgeon: Trey Clifford MD;  Location: [facility] OR;  Service: General;  Laterality: Left;  POLY EXCHANGE    SELECTIVE INJECTION OF ANESTHETIC AGENT AROUND LUMBAR SPINAL NERVE ROOT BY TRANSFORAMINAL APPROACH Bilateral 5/2/2024    Procedure: Bilateral L4/5 TF CELE;   Surgeon: Shaun Iyer MD;  Location: [facility];  Service: Pain Management;  Laterality: Bilateral;    SYNOVECTOMY OF KNEE Left 6/12/2024    Procedure: SYNOVECTOMY, KNEE;  Surgeon: Trey Clifford MD;  Location: [facility] OR;  Service: General;  Laterality: Left;    TUBAL LIGATION      UTERINE FIBROID EMBOLIZATION         Social History[1]    Family History   Problem Relation Name Age of Onset    Hypertension Mother      Arthritis Mother      Diabetes Father      Heart disease Father      Depression Sister      Hypertension Sister      Arthritis Brother      Thyroid disease Son      Hypertension Son      Arthritis Maternal Grandmother      Heart disease Maternal Grandmother      Kidney disease Maternal Grandmother      Heart attack Maternal Grandfather      Stroke Paternal Grandmother      No Known Problems Paternal Grandfather      Eczema Son      Breast cancer Sister  47       Wt Readings from Last 3 Encounters:   03/03/25 76.1 kg (167 lb 12.3 oz)   02/27/25 75.2 kg (165 lb 12.6 oz)   02/10/25 72.6 kg (160 lb)     Temp Readings from Last 3 Encounters:   02/27/25 97.3 °F (36.3 °C) (Tympanic)   09/10/24 98.1 °F (36.7 °C) (Temporal)   08/15/24 97.9 °F (36.6 °C) (Temporal)     BP Readings from Last 3 Encounters:   03/03/25 (!) 180/116   02/27/25 (!) 175/99   09/10/24 (!) 165/72     Pulse Readings from Last 3 Encounters:   03/03/25 75   02/27/25 78   09/10/24 71       Medications Ordered Prior to Encounter[2]    No cardiac monitor results found for the past 12 months    Results for orders placed during the hospital encounter of 05/15/24    Echo    Interpretation Summary    Left Ventricle: The left ventricle is normal in size. Normal wall thickness. There is concentric remodeling. There is normal systolic function with a visually estimated ejection fraction of 55 - 70%. Ejection fraction by visual approximation is 60%. There is normal diastolic function.    Right Ventricle: Normal right ventricular cavity  size. Wall thickness is normal. Systolic function is normal.    IVC/SVC: Normal venous pressure at 3 mmHg.    Results for orders placed during the hospital encounter of 05/15/24    Nuclear Stress - Cardiology Interpreted    Interpretation Summary    Normal myocardial perfusion scan. There is no evidence of myocardial ischemia or infarction.    The gated perfusion images showed an ejection fraction of 72% at rest. The gated perfusion images showed an ejection fraction of 82% post stress.    There is normal wall motion at rest and post stress.    The ECG portion of the study is negative for ischemia.    The patient reported no chest pain during the stress test.        Results for orders placed or performed during the hospital encounter of 03/03/25   SCHEDULED EKG 12-LEAD (to Muse)    Collection Time: 03/03/25  2:48 PM   Result Value Ref Range    QRS Duration 82 ms    [facility] QTC Calculation 445 ms    Narrative    Test Reason : I10,    Vent. Rate :  89 BPM     Atrial Rate :  89 BPM     P-R Int : 152 ms          QRS Dur :  82 ms      QT Int : 366 ms       P-R-T Axes :  33  32  27 degrees    QTcB Int : 445 ms    Normal sinus rhythm  Normal ECG  When compared with ECG of 15-May-2024 09:55,  T wave inversion no longer evident in Inferior leads  T wave inversion no longer evident in Anterior-lateral leads  Confirmed by Garrett Kirkpatrick ([id number]) on 3/3/2025 3:08:24 PM    Referred By: CY MERLOS           Confirmed By: Garrett Kirkpatrick         Review of Systems   Constitutional: Positive for malaise/fatigue.   HENT:  Negative for hearing loss and hoarse voice.    Eyes:  Negative for blurred vision and visual disturbance.   Cardiovascular:  Negative for chest pain, claudication, dyspnea on exertion, irregular heartbeat, leg swelling, near-syncope, orthopnea, palpitations, paroxysmal nocturnal dyspnea and syncope.   Respiratory:  Negative for cough, hemoptysis, shortness of breath, sleep disturbances due to breathing, snoring and  "wheezing.    Endocrine: Negative for cold intolerance and heat intolerance.   Hematologic/Lymphatic: Does not bruise/bleed easily.   Skin:  Negative for color change, dry skin and nail changes.   Musculoskeletal:  Positive for arthritis and back pain. Negative for joint pain and myalgias.   Gastrointestinal:  Negative for bloating, abdominal pain, constipation, nausea and vomiting.   Genitourinary:  Negative for dysuria, flank pain, hematuria and hesitancy.   Neurological:  Positive for dizziness. Negative for headaches, light-headedness, loss of balance, numbness, paresthesias and weakness.   Psychiatric/Behavioral:  Negative for altered mental status.    Allergic/Immunologic: Negative for environmental allergies.         Objective:BP (!) 180/116 (BP Location: Right arm, Patient Position: Sitting)   Pulse 75   Ht 5' 1" (1.549 m)   Wt 76.1 kg (167 lb 12.3 oz)   SpO2 98%   BMI 31.70 kg/m²      Physical Exam  Vitals and nursing note reviewed.   Constitutional:       General: She is not in acute distress.     Appearance: Normal appearance. She is well-developed. She is not ill-appearing.   HENT:      Head: Normocephalic and atraumatic.      Nose: Nose normal.      Mouth/Throat:      Mouth: Mucous membranes are moist.   Eyes:      Pupils: Pupils are equal, round, and reactive to light.   Neck:      Thyroid: No thyromegaly.      Vascular: No JVD.      Trachea: No tracheal deviation.   Cardiovascular:      Rate and Rhythm: Normal rate and regular rhythm.      Chest Wall: PMI is not displaced.      Pulses: Intact distal pulses.           Radial pulses are 2+ on the right side and 2+ on the left side.        Dorsalis pedis pulses are 2+ on the right side and 2+ on the left side.      Heart sounds: S1 normal and S2 normal. No murmur heard.  Pulmonary:      Effort: Pulmonary effort is normal. No respiratory distress.      Breath sounds: Normal breath sounds. No wheezing.   Abdominal:      General: Bowel sounds are " normal.      Palpations: Abdomen is soft.   Musculoskeletal:         General: No swelling. Normal range of motion.      Cervical back: Full passive range of motion without pain, normal range of motion and neck supple.      Right ankle: No swelling.      Left ankle: No swelling.   Skin:     General: Skin is warm and dry.      Capillary Refill: Capillary refill takes less than 2 seconds.      Nails: There is no clubbing.   Neurological:      General: No focal deficit present.      Mental Status: She is alert and oriented to person, place, and time.   Psychiatric:         Speech: Speech normal.         Behavior: Behavior normal.         Thought Content: Thought content normal.         Judgment: Judgment normal.         Lab Results   Component Value Date    CHOL 176 09/22/2023     Lab Results   Component Value Date    HDL 55 09/22/2023    HDL 43 10/01/2019     Lab Results   Component Value Date    LDLCALC 102.4 09/22/2023    LDLCALC 89 10/01/2019     Lab Results   Component Value Date    TRIG 93 09/22/2023    TRIG 114 10/01/2019     Lab Results   Component Value Date    CHOLHDL 31.3 09/22/2023       Chemistry        Component Value Date/Time     05/30/2024 0806    K 4.1 05/30/2024 0806     05/30/2024 0806    CO2 28 05/30/2024 0806    BUN 21 (H) 05/30/2024 0806    CREATININE 1.3 05/30/2024 0806    GLU 82 05/30/2024 0806        Component Value Date/Time    CALCIUM 10.7 (H) 05/30/2024 0806    ALKPHOS 81 05/30/2024 0806    AST 22 05/30/2024 0806    ALT 23 05/30/2024 0806    BILITOT 0.7 05/30/2024 0806    ESTGFRAFRICA 41 (A) 01/14/2022 1429    EGFRNONAA 36 (A) 01/14/2022 1429          Lab Results   Component Value Date    TSH 1.545 09/22/2023     Lab Results   Component Value Date    INR 1.0 04/27/2021    INR 1.0 07/08/2020    INR 1.0 03/13/2020     Lab Results   Component Value Date    WBC 4.62 05/30/2024    HGB 13.3 06/12/2024    HCT 40.1 06/12/2024    MCV 93 05/30/2024     05/30/2024        Assessment:       1. Chronic kidney disease, stage 3a    2. Abnormal EKG    3. Essential hypertension    4. Pulmonary HTN    5. DDD (degenerative disc disease), cervical    6. Hyperuricemia    7. Anxiety        Plan:     Resume Norvasc 10 mg daily  Change ARB to Valsartan 160 mg daily  Continue BB  Use Clonidine PRN for SBP >160  RTC in 2 weeks with BP log    Nicole May, FNP-C [facility]         [1]   Social History  Tobacco Use    Smoking status: Never     Passive exposure: Never    Smokeless tobacco: Never   Substance Use Topics    Alcohol use: Yes     Comment: occasional: hold 72hrs. prior to surgery    Drug use: No   [2]   Current Outpatient Medications on File Prior to Visit   Medication Sig Dispense Refill    DULoxetine (CYMBALTA) 60 MG capsule Take 1 capsule (60 mg total) by mouth 2 (two) times daily. 60 capsule 2    gabapentin (NEURONTIN) 300 MG capsule Take 1 capsule (300 mg total) by mouth 3 (three) times daily. 90 capsule 2    metoprolol succinate (TOPROL-XL) 50 MG 24 hr tablet Take 2 tablets (100 mg total) by mouth nightly. 180 tablet 3    ondansetron (ZOFRAN-ODT) 4 MG TbDL dissolve 2 tablets (8 mg total) by mouth every 8 (eight) hours as needed (nausea). 20 tablet 0    ondansetron (ZOFRAN-ODT) 4 MG TbDL Dissolve 2 tablets (8 mg total) by mouth every 8 (eight) hours as needed (nausea). 21 tablet 0    tiZANidine (ZANAFLEX) 4 MG tablet Take 0.5-1 tablets (2-4 mg total) by mouth 2 (two) times daily as needed (pain). 90 tablet 2    vitamin D (VITAMIN D3) 1000 units Tab Take 1,000 Units by mouth once daily.      [DISCONTINUED] cloNIDine (CATAPRES) 0.1 MG tablet TAKE 1 TABLET BY MOUTH ONCE DAILY USE FOR SBP GREATER THAN 160 MMHG. 90 tablet 1    [DISCONTINUED] telmisartan (MICARDIS) 80 MG Tab Take 1 tablet (80 mg total) by mouth once daily. 90 tablet 3    [DISCONTINUED] VICTOZA 3-DANIEL 0.6 mg/0.1 mL (18 mg/3 mL) PnIj pen SMARTSI.6 Milligram(s) SUB-Q Once      hydroCHLOROthiazide (HYDRODIURIL) 25 MG tablet Take 25  mg by mouth. (Patient not taking: Reported on 3/3/2025)      [DISCONTINUED] amLODIPine (NORVASC) 5 MG tablet Take 1 tablet (5 mg total) by mouth 2 (two) times daily. (Patient not taking: Reported on 3/3/2025) 60 tablet 11     Current Facility-Administered Medications on File Prior to Visit   Medication Dose Route Frequency Provider Last Rate Last Admin    ondansetron injection 4 mg  4 mg Intravenous Once PRN Shaun Iyer MD

## 2025-03-05 ENCOUNTER — OFFICE VISIT (OUTPATIENT)
Dept: INTERNAL MEDICINE | Facility: CLINIC | Age: 60
End: 2025-03-05
Payer: COMMERCIAL

## 2025-03-05 VITALS
DIASTOLIC BLOOD PRESSURE: 86 MMHG | WEIGHT: 164.88 LBS | HEIGHT: 61 IN | SYSTOLIC BLOOD PRESSURE: 156 MMHG | BODY MASS INDEX: 31.13 KG/M2 | HEART RATE: 90 BPM | TEMPERATURE: 97 F | OXYGEN SATURATION: 97 %

## 2025-03-05 DIAGNOSIS — R53.83 FATIGUE, UNSPECIFIED TYPE: ICD-10-CM

## 2025-03-05 DIAGNOSIS — I10 ESSENTIAL HYPERTENSION: Primary | ICD-10-CM

## 2025-03-05 PROCEDURE — 3008F BODY MASS INDEX DOCD: CPT | Mod: CPTII,S$GLB,, | Performed by: NURSE PRACTITIONER

## 2025-03-05 PROCEDURE — 3079F DIAST BP 80-89 MM HG: CPT | Mod: CPTII,S$GLB,, | Performed by: NURSE PRACTITIONER

## 2025-03-05 PROCEDURE — 1159F MED LIST DOCD IN RCRD: CPT | Mod: CPTII,S$GLB,, | Performed by: NURSE PRACTITIONER

## 2025-03-05 PROCEDURE — 99214 OFFICE O/P EST MOD 30 MIN: CPT | Mod: S$GLB,,, | Performed by: NURSE PRACTITIONER

## 2025-03-05 PROCEDURE — 4010F ACE/ARB THERAPY RXD/TAKEN: CPT | Mod: CPTII,S$GLB,, | Performed by: NURSE PRACTITIONER

## 2025-03-05 PROCEDURE — 99999 PR PBB SHADOW E&M-EST. PATIENT-LVL IV: CPT | Mod: PBBFAC,,, | Performed by: NURSE PRACTITIONER

## 2025-03-05 PROCEDURE — 3077F SYST BP >= 140 MM HG: CPT | Mod: CPTII,S$GLB,, | Performed by: NURSE PRACTITIONER

## 2025-03-05 PROCEDURE — 3044F HG A1C LEVEL LT 7.0%: CPT | Mod: CPTII,S$GLB,, | Performed by: NURSE PRACTITIONER

## 2025-03-05 NOTE — PROGRESS NOTES
Subjective:       Patient ID: Modesta Camacho is a 59 y.o. female.    Chief Complaint: Follow-up and Headache    HPI    Here to  review recent labs ordered by prev provider  Recent cards visit due to BP. BP has improved significantly in just a few days and has caused intermittent HA and feeling tired. Bp was 180s/100s now 140s/80s  No other complaints    Past Medical History:   Diagnosis Date    Abnormal EKG 02/05/2021    Anxiety     Hypertension     Osteoarthritis     Stage 2 chronic kidney disease 02/05/2021     Past Surgical History:   Procedure Laterality Date    ARTHROPLASTY OF HIP BY ANTERIOR APPROACH Left 7/31/2020    Procedure: ARTHROPLASTY, HIP, ANTERIOR APPROACH;  Surgeon: Xavi Gandara MD;  Location: [facility] OR;  Service: Orthopedics;  Laterality: Left;    BLOCK, NERVE, GENICULAR Left 2/14/2024    Procedure: Left genicular nerve block with RN IV sedation;  Surgeon: Shaun Iyer MD;  Location: [facility] PAIN [facility]GT;  Service: Pain Management;  Laterality: Left;    gastric sleeve      HIP ARTHROPLASTY Right 6/7/2023    Procedure: ARTHROPLASTY, HIP;  Surgeon: Trey Clifford MD;  Location: [facility] OR;  Service: Orthopedics;  Laterality: Right;    INJECTION OF ANESTHETIC AGENT AROUND MEDIAL BRANCH NERVES INNERVATING LUMBAR FACET JOINT Bilateral 8/15/2024    Procedure: Bilateral L3-5  MBB #1 with RN IV sedation;  Surgeon: Hakeem Castillo MD;  Location: [facility] PAIN [facility]GT;  Service: Pain Management;  Laterality: Bilateral;    INJECTION OF ANESTHETIC AGENT AROUND MEDIAL BRANCH NERVES INNERVATING LUMBAR FACET JOINT Bilateral 9/10/2024    Procedure: Bilateral L3-5 MBB (diagnostic, #2);  Surgeon: Hakeem Castillo MD;  Location: [facility] PAIN [facility]GT;  Service: Pain Management;  Laterality: Bilateral;    INJECTION OF ANESTHETIC AGENT AROUND NERVE Bilateral 8/14/2023    Procedure: left genicular nerve block RN IV Sedation;  Surgeon: Shaun Iyer MD;  Location: [facility] PAIN [facility]GT;  Service: Pain Management;  Laterality:  Status post incision and drainage, this was a result of a recent trigger finger release surgery.  Await cultures, continue antibiotics.  7/1:  s/p I&D on 6/30 by Dr. Ta.  OR cultures negative thus far.  Consulted Dr. Kline per ortho request.    Continue unasyn, vanco IV   Sed rate 50.  7/2:  OR Wound cultures no growth thus far.   Bilateral;    JOINT REPLACEMENT Right 06/14/2016    knee    JOINT REPLACEMENT Left 05/20/2021    KNEE    JOINT REPLACEMENT Left 07/30/2020    HIP    KNEE ARTHROPLASTY Left 5/6/2021    Procedure: ARTHROPLASTY, KNEE;  Surgeon: Xavi Gandara MD;  Location: [facility] OR;  Service: Orthopedics;  Laterality: Left;    RADIOFREQUENCY ABLATION Bilateral 2/27/2025    Procedure: Bilateral L4-5 and L5-S1 RFA;  Surgeon: Hakeem Castillo MD;  Location: [facility] PAIN MGT;  Service: Pain Management;  Laterality: Bilateral;    REPLACEMENT, POLYETHYLENE LINER Left 6/12/2024    Procedure: REPLACEMENT, POLYETHYLENE LINER;  Surgeon: Trey Clifford MD;  Location: [facility] OR;  Service: General;  Laterality: Left;    RESURFACING OF PATELLA Right 9/27/2022    Procedure: RESURFACING, PATELLA;  Surgeon: Trey Clifford MD;  Location: [facility] OR;  Service: General;  Laterality: Right;    REVISION OF KNEE ARTHROPLASTY Right 9/27/2022    Procedure: REVISION, ARTHROPLASTY, KNEE;  Surgeon: Trey Clifford MD;  Location: [facility] OR;  Service: General;  Laterality: Right;    REVISION OF KNEE ARTHROPLASTY Left 6/12/2024    Procedure: REVISION, ARTHROPLASTY, KNEE;  Surgeon: Trey Clifford MD;  Location: [facility] OR;  Service: General;  Laterality: Left;  POLY EXCHANGE    SELECTIVE INJECTION OF ANESTHETIC AGENT AROUND LUMBAR SPINAL NERVE ROOT BY TRANSFORAMINAL APPROACH Bilateral 5/2/2024    Procedure: Bilateral L4/5 TF CELE;  Surgeon: Shaun Iyer MD;  Location: [facility]IN [facility]GT;  Service: Pain Management;  Laterality: Bilateral;    SYNOVECTOMY OF KNEE Left 6/12/2024    Procedure: SYNOVECTOMY, KNEE;  Surgeon: Trey Clifford MD;  Location: [facility] OR;  Service: General;  Laterality: Left;    TUBAL LIGATION      UTERINE FIBROID EMBOLIZATION       Social History[1]  Review of patient's allergies indicates:   Allergen Reactions    Codeine Hives and Rash     Takes Tramadol and has never had an issue with SOB/swelling  Also tolerated  hydromorphone 7/2020      Penicillin     Penicillins Hives     Pt tolerated cefazolin 7/2020     Current Outpatient Medications   Medication Sig    amLODIPine (NORVASC) 10 MG tablet Take 1 tablet (10 mg total) by mouth once daily.    cloNIDine (CATAPRES) 0.1 MG tablet TAKE 1 TABLET BY MOUTH ONCE DAILY USE FOR SBP GREATER THAN 160 MMHG.    DULoxetine (CYMBALTA) 60 MG capsule Take 1 capsule (60 mg total) by mouth 2 (two) times daily.    gabapentin (NEURONTIN) 300 MG capsule Take 1 capsule (300 mg total) by mouth 3 (three) times daily.    metoprolol succinate (TOPROL-XL) 50 MG 24 hr tablet Take 2 tablets (100 mg total) by mouth nightly.    ondansetron (ZOFRAN-ODT) 4 MG TbDL dissolve 2 tablets (8 mg total) by mouth every 8 (eight) hours as needed (nausea).    ondansetron (ZOFRAN-ODT) 4 MG TbDL Dissolve 2 tablets (8 mg total) by mouth every 8 (eight) hours as needed (nausea).    tiZANidine (ZANAFLEX) 4 MG tablet Take 0.5-1 tablets (2-4 mg total) by mouth 2 (two) times daily as needed (pain).    valsartan (DIOVAN) 160 MG tablet Take 1 tablet (160 mg total) by mouth once daily.    hydroCHLOROthiazide (HYDRODIURIL) 25 MG tablet Take 25 mg by mouth. (Patient not taking: Reported on 3/5/2025)    vitamin D (VITAMIN D3) 1000 units Tab Take 1,000 Units by mouth once daily. (Patient not taking: Reported on 3/5/2025)     No current facility-administered medications for this visit.     Facility-Administered Medications Ordered in Other Visits   Medication    ondansetron injection 4 mg           Review of Systems   Constitutional:  Positive for fatigue. Negative for activity change, appetite change, chills, diaphoresis, fever and unexpected weight change.   HENT:  Negative for congestion, ear pain, postnasal drip, rhinorrhea, sinus pressure, sinus pain, sneezing, sore throat, tinnitus, trouble swallowing and voice change.    Eyes:  Negative for photophobia, pain and visual disturbance.   Respiratory:  Negative for cough, chest  tightness, shortness of breath and wheezing.    Cardiovascular:  Negative for chest pain, palpitations and leg swelling.   Gastrointestinal:  Negative for abdominal distention, abdominal pain, constipation, diarrhea, nausea and vomiting.   Genitourinary:  Negative for decreased urine volume, difficulty urinating, dysuria, flank pain, frequency, hematuria and urgency.   Musculoskeletal:  Negative for arthralgias, back pain, joint swelling, neck pain and neck stiffness.   Allergic/Immunologic: Negative for immunocompromised state.   Neurological:  Positive for headaches. Negative for dizziness, tremors, seizures, syncope, facial asymmetry, speech difficulty, weakness, light-headedness and numbness.   Hematological:  Negative for adenopathy. Does not bruise/bleed easily.   Psychiatric/Behavioral:  Negative for confusion and sleep disturbance.        Objective:      Physical Exam  Vitals reviewed.   Cardiovascular:      Rate and Rhythm: Normal rate and regular rhythm.      Heart sounds: Normal heart sounds.   Pulmonary:      Effort: Pulmonary effort is normal.      Breath sounds: Normal breath sounds.   Neurological:      Mental Status: She is alert and oriented to person, place, and time.         Assessment:     Vitals:    03/05/25 1620   BP: (!) 156/86   Pulse:    Temp:          1. Essential hypertension    2. Fatigue, unspecified type        Plan:   Essential hypertension    Fatigue, unspecified type  -     POCT COVID-19 Rapid Screening; Future; Expected date: 03/05/2025  -     POCT Influenza A/B Molecular            All labs wnl except uric acid level   We discussed dietary changes  We discussed that the abrupt drop in her BP could be causing the HA/fatigue  Hydrate, monitor BP, follow up with specialist. Could take a few weeks for her body to get accustomed to the new BP/meds       [1]   Social History  Socioeconomic History    Marital status:      Spouse name: Pop Land    Number of children: 3    Occupational History    Occupation: patient access   Tobacco Use    Smoking status: Never     Passive exposure: Never    Smokeless tobacco: Never   Substance and Sexual Activity    Alcohol use: Yes     Comment: occasional: hold 72hrs. prior to surgery    Drug use: No    Sexual activity: Yes     Partners: Male     Birth control/protection: See Surgical Hx     Social Drivers of Health     Stress: Stress Concern Present (3/16/2020)    [employer] [place] [employer] - Occupational Stress Questionnaire     Feeling of Stress : Rather much

## 2025-03-05 NOTE — LETTER
March 5, 2025      The [address]  [address] [facility]  [place] [address]  Phone: [phone]  Fax: [phone]       Patient: Modesta Camacho   YOB: 1965  Date of Visit: 03/05/2025    To Whom It May Concern:    Lillian Camacho  was at [facility] on 03/05/2025. The patient may return to work/school on 3/6/25 with no restrictions. If you have any questions or concerns, or if I can be of further assistance, please do not hesitate to contact me.    Sincerely,    KIMBERLY Peterson

## 2025-03-06 ENCOUNTER — RESULTS FOLLOW-UP (OUTPATIENT)
Dept: INTERNAL MEDICINE | Facility: CLINIC | Age: 60
End: 2025-03-06

## 2025-03-14 ENCOUNTER — TELEPHONE (OUTPATIENT)
Dept: INTERNAL MEDICINE | Facility: CLINIC | Age: 60
End: 2025-03-14
Payer: COMMERCIAL

## 2025-03-14 NOTE — TELEPHONE ENCOUNTER
I left a vm for the pt as I was calling from Mary Rivera NP office to see if she was coming to her 3:40 appt today it is 3:40 pm. //kah

## 2025-03-25 ENCOUNTER — PATIENT MESSAGE (OUTPATIENT)
Dept: PAIN MEDICINE | Facility: CLINIC | Age: 60
End: 2025-03-25
Payer: COMMERCIAL

## 2025-04-23 ENCOUNTER — OFFICE VISIT (OUTPATIENT)
Dept: PAIN MEDICINE | Facility: CLINIC | Age: 60
End: 2025-04-23
Payer: COMMERCIAL

## 2025-04-23 DIAGNOSIS — M54.9 UPPER BACK PAIN: Primary | ICD-10-CM

## 2025-04-23 DIAGNOSIS — M51.369 DDD (DEGENERATIVE DISC DISEASE), LUMBAR: ICD-10-CM

## 2025-04-23 DIAGNOSIS — M47.816 LUMBAR SPONDYLOSIS: ICD-10-CM

## 2025-04-23 DIAGNOSIS — F41.1 GAD (GENERALIZED ANXIETY DISORDER): ICD-10-CM

## 2025-04-23 PROCEDURE — 98006 SYNCH AUDIO-VIDEO EST MOD 30: CPT | Mod: 95,,, | Performed by: NURSE PRACTITIONER

## 2025-04-23 PROCEDURE — 4010F ACE/ARB THERAPY RXD/TAKEN: CPT | Mod: CPTII,95,, | Performed by: NURSE PRACTITIONER

## 2025-04-23 PROCEDURE — 3044F HG A1C LEVEL LT 7.0%: CPT | Mod: CPTII,95,, | Performed by: NURSE PRACTITIONER

## 2025-04-23 RX ORDER — TIZANIDINE 4 MG/1
2-4 TABLET ORAL 2 TIMES DAILY PRN
Qty: 60 TABLET | Refills: 2 | Status: SHIPPED | OUTPATIENT
Start: 2025-04-23

## 2025-04-23 RX ORDER — GABAPENTIN 300 MG/1
300 CAPSULE ORAL 3 TIMES DAILY
Qty: 90 CAPSULE | Refills: 2 | Status: SHIPPED | OUTPATIENT
Start: 2025-04-23

## 2025-04-23 RX ORDER — DULOXETIN HYDROCHLORIDE 60 MG/1
60 CAPSULE, DELAYED RELEASE ORAL 2 TIMES DAILY
Qty: 60 CAPSULE | Refills: 2 | Status: SHIPPED | OUTPATIENT
Start: 2025-04-23

## 2025-04-23 NOTE — PROGRESS NOTES
Established Patient - TeleHealth Visit    The patient location is: [place]  The chief complaint leading to consultation is: chronic pain     Visit type: audiovisual    Encounter includes face to face time and non-face to face time preparing to see the patient (eg, review of tests), Obtaining and/or reviewing separately obtained history, Documenting clinical information in the electronic or other health record, Independently interpreting results (not separately reported) and communicating results to the patient/family/caregiver, or Care coordination (not separately reported).     Each patient to whom he or she provides medical services by telemedicine is:  (1) informed of the relationship between the physician and patient and the respective role of any other health care provider with respect to management of the patient; and (2) notified that he or she may decline to receive medical services by telemedicine and may withdraw from such care at any time.      Interventional Pain Progress Note       Chief Pain Complaint:  Low back pain     Interval History (4/23/2025):  Patient Modesta Camacho presents today for follow-up visit.  Patient was last seen on 2/27/2025 bilateral L4/5 + L5/S1 RFA with 70% relief.   She states the lower back is doing much better.  She denies any radiculopathy into the lower extremities.  She states that lately her upper back right below the bra strap has been bothering her.  This has been going on for years intermittently.  She has never done physical therapy for the back.  She denies any radiating symptoms from the upper back.  She rates his pain between a 3 to 6/10 at times depending on her activity.  She continues to take gabapentin, Cymbalta, tizanidine with good relief and requests refills on these medications.  Patient denies night fever/night sweats, urinary incontinence, bowel incontinence, significant weight loss and significant motor weakness.   Patient denies any other complaints or  concerns at this time.      Interval History (12/27/2024):  Patient Modesta Camacho presents today for follow-up visit.  Patient is being seen for follow up of low back pain. Pain is primarily axial without radiculopathy. She rates her pain 3/10 today but it will go up to 6/10 with prolonged standing and walking. She fell on 12/18 when moving but denies any new injuries or pains. Did not hit head or have LOC.   She has had 2 lumbar MBB with at least 80% relief and is ready to move forward with RFA.  Patient denies night fever/night sweats, urinary incontinence, bowel incontinence, significant weight loss and significant motor weakness.   Patient denies any other complaints or concerns at this time.      Interval History (7/18/2024):  Patient Modesta Camacho presents today for follow-up visit.  Patient was last seen on  5/2/2024 Bilateral L4/5 TF CELE with 50% relief x 2 weeks.  Status left knee replacement with Dr. Clifford. Since her knee replacement she is not having pain into the legs. She rates her pain today 5.5/10. pain is primarily axial in the lower back and does not radiate into the legs. Pain is worse with prolonged standing and walking.   Patient denies night fever/night sweats, urinary incontinence, bowel incontinence, significant weight loss and significant motor weakness.   Patient denies any other complaints or concerns at this time.        Interval History (03/14/2024):  Patient returns to clinic after procedure.  Patient reports 90% relief after left genicular nerve block with phenol on 02/14/2024.  Patient reports that left knee pain is well-controlled currently.  Primary pain today is lower back pain.  Pain is described as a aching stabbing pain that starts in the band across her lower back.  This pain radiates up to the lower thoracic area into her bilateral posterior thighs.  Pain is worse with prolonged sitting and standing, better with lying supine and ice and heat.  Pain is currently rated a  5/10, but can increase to an 8/10 with exacerbating activities. Denies any fevers, chills, changes in gait, saddle anesthesia, or bowel and bladder incontinence        Interval History (01/11/2024):  Patient Modesta Camacho presents today for follow-up visit.  Patient's chief complaint today is left knee pain which has been chronic.  She had a left genicular block on 08/14/2023 which provided 80% relief for 4 months.  She is interested in repeating the block.  She rates her pain today a 7/10.  She also has chronic lower back pain which will radiate down the posterior aspect of the left lower extremity down to the foot.  She recently had a nerve conduction study which showed some irritation of the L5-S1 nerve root.  Dr. Clifford and then ordered a lumbar MRI and she is scheduled to get that next week.  Patient denies night fever/night sweats, urinary incontinence, bowel incontinence, significant weight loss and significant motor weakness.   Patient denies any other complaints or concerns at this time.    Interval History (1/11/2024):  Patient returns to clinic after procedure.  Patient reports 80% relief in left knee pain after left genicular nerve block on 08/14/2023.  Patient reports occasional catching sensations in her left knee as well as aching pain along the medial aspect.  She denies any significant radiation of this pain.  She denies any significant numbness or tingling associated with this pain.  Pain is worse with prolonged standing and walking, better with rest and heat.  Pain is currently rated a 4/10. Denies any fevers, chills, changes in gait, saddle anesthesia, or bowel and bladder incontinence    Interval History (7/24/2023):  Modesta Camacho presents today for follow-up visit.  Patient was last seen on 05/23/2023. At last visit, she was to follow up with Dr. Clifford. She underwent right hip arthroplasty with Dr. Clifford on 06/07/2023. Currently enrolled in physical therapy, has 6 weeks  remaining. So far she has had her first post op after hip arthroplasty on 06/30/2023, next post op in August. Currently prescribed Endocet and Gabapentin for post op pain.     Patient reports pain as 8/10 today. She continues to report left knee pain. She previously underwent left TKA with Dr. Gandara. She has spoken to Dr. Clifford about this knee before, but it was previously replaced recently and Dr. Clifford is hesitant to go in again so soon.       Interval History (5/23/2023):  Modesta Camacho presents today via telemed for follow-up visit.  Patient was last seen on 2/21/2023. She reports worsening left knee pain. She reports it keeps feeling as though it is popping out of place. She reports it began swelling last Friday and since then she can hardly bear weight. She has been resting, icing, elevating, and wearing her brace which has reduced the swelling and inflammation somewhat. She has spoken to Dr. Clifford about this knee before, but it was previously replaced recently and Dr. Clifford is hesitant to go in again so soon. Patient reports pain as 8/10 today.  She is scheduled for right hip arthroplasty with Dr. Clifford on 06/07/2023.    Interval HPI 02/21/2023    Modesta Camacho presents today for follow-up visit.  Patient was last seen on 11/8/2022. Patient reports pain as 6/10 today. She reports persistent left knee pain, previously underwent left TKA with Dr. Gandara, states she has not seen him in over 2 years as he was dismissive of her continued pain after surgery. Sees Dr. Clifford for hip, but may also consider revision of left knee. Hx of right knee TKA with revision 9/22. Right knee is doing well. Last visit with Dr. Clifford on 02/13/2023. Considering right hip replacement in May/[place]. She also reports continued lower back pain, axial in nature, deep achy and without radiation into her lower extremities. Requesting refills of her Gabapentin, Nabumetone, and Tizanidine.    Interval History  (11/08/2022):  Modesta Camacho presents today for follow-up visit.  Patient was last seen on 01/05/2022. Patient reports pain as 6/10 today. Underwent revision of her right knee with Dr. Clifford 09/27/2022. Referred back to [facility] for low back pain by Dr. Clifford. Patient reports pain as axial in nature across her lower lumbar spine without radiation into her lower extremities. Pain is constant and interferes with daily activities. Taking cymbalta and Gabapentin with minimal relief. Tizanidine helpful in the past, she is out of this medication. Not interested in injections at this time.    X-ray lumbar spine  FINDINGS:  AP, lateral and coned down radiographs of the lumbar spine demonstrate no evidence for acute fracture or dislocation.  Persistent grade 1 anterolisthesis of L4 with respect L5 is again identified.  Moderate to severe posterior facet hypertrophic changes are identified at L5/S1.  Remaining intervertebral disk spaces and vertebral body heights are well-preserved.  Visualized SI joints are intact.  Patient is status post right hip arthroplasty.  Multiple calcified phleboliths are identified.  Multiple clips are identified in the epigastric region.     Impression:     Degenerative disease, most prominent at the lower lumbar spine.  Overall, no significant interval change.       Interval History (1/5/2022):  Modesta Camacho presents today for follow-up visit.  She is here today to review lumbar MRI.  She continues to have RLE pain.  She saw Dr. Rodriguez on 12/21/2021, who referred her to have an ultrasound-guided right hip injection by Dr. Acevedo.  She recently had this procedure with short-term pain relief, but it did not help the sciatica pain into the foot.  She was also referred to Dr. Clifford (Orthopedics) for evaluation for right hip replacement.  To note, patient reports history of left hip replacement and bilateral knee replacement in the past.  She has been doing physical therapy twice a week,  but she does not feel this has been overly helpful.      Initial HPI (11/09/2021):  Modesta Camacho is a 56 y.o. female  who is presenting with a chief complaint of low back pain. The patient began experiencing this problem insidiously, and the pain has been gradually worsening over the past 6 month(s). The pain is described as throbbing, shooting, burning and electrical and is located in the right lumbar spine . Pain is intermittent and lasts hours. The pain radiates to right leg L4 distribution. The patient rates her pain a 7 out of ten and interferes with activities of daily living a 8 out of ten. Pain is exacerbated by flexion of the lumbar spine, and is improved by rest. Patient reports no prior trauma, prior hip surgery Left [facility] 6/[facility]. Right Knee Replacement 5/2021.     - pertinent negatives: No fever, No chills, No weight loss, No bladder dysfunction, No bowel dysfunction, No saddle anesthesia  - pertinent positives: right leg weakness    - medications, other therapies tried (physical therapy, injections):     >> NSAIDs, Tylenol, Tramadol, gabapentin, zanaflex and robaxin    >> Has previously undergone Physical Therapy    >>  Injections:    -02/14/2024: Left genicular nerve block with phenol, 90% relief  -08/14/2023:  Left genicular nerve block, 80% relief   - ultrasound-guided right hip injection by Dr. Acevedo on 12/21/2021 with short-term pain relief   5/2/2024 Bilateral L4/5 TF CELE with 50% relief x 2 weeks.  2/27/2025 bilateral L4/5 + L5/S1 RFA with 70% relief.    Imaging / Labs / Studies (reviewed on 4/23/2025):      Results for orders placed during the hospital encounter of 01/24/24    MRI Lumbar Spine Without Contrast    Narrative  EXAMINATION:  MRI LUMBAR SPINE WITHOUT CONTRAST    CLINICAL HISTORY:  lumbar radiculopathy; Anesthesia of skin    TECHNIQUE:  Multiplanar, multisequence MR images were acquired from the thoracolumbar junction to the sacrum without  contrast.    COMPARISON:  MRI lumbar spine 11/16/2021    FINDINGS:  Alignment: Progressed grade 1 L4-L5 anterolisthesis.    Vertebrae: Lumbar vertebral body heights are maintained.  No abnormal osseous edema or evidence of an acute fracture.  Marrow signal is within normal limits.    Discs: L4-L5 disc desiccation.  Disc heights appear maintained.    Cord: Within normal limits.  Conus terminates at L1.    Degenerative findings:    T12-L1: Minor asymmetric left disc bulge.  No significant spinal canal stenosis or neural foraminal stenosis.    L1-L2: No significant posterior disc bulge, spinal canal stenosis or neural foraminal stenosis.    L2-L3: Mild broad-based posterior disc bulge and mild bilateral facet arthropathy.  No significant spinal canal stenosis.  Unchanged mild left-sided neural foraminal stenosis.    L3-L4: Mild broad-based posterior disc bulge and mild bilateral facet arthropathy.  No significant spinal canal stenosis.  Unchanged mild left-sided neural foraminal stenosis.    L4-L5: Progressed grade 1 degenerative appearing anterolisthesis with roughly 6 mm of posterior disc space uncovering.  Minimal broad-based posterior disc bulge and bilateral facet arthropathy with ligamentum flavum hypertrophy contributes to similar mild spinal canal stenosis with narrowing of the lateral recesses.  There is similar moderate to severe right and mild-to-moderate left-sided neural foraminal stenosis.    L5-S1: No significant posterior disc bulge.  Mild bilateral facet arthropathy.  No significant spinal canal stenosis or neural foraminal stenosis.    Paraspinal muscles & soft tissues: Within normal limits.  1.6 cm left adnexal cyst.    Impression  1. Progressed grade 1 L4-L5 anterolisthesis with similar mild spinal canal stenosis, moderate to severe right and mild-to-moderate left-sided neural foraminal stenosis.  2. Similar mild left-sided L2-L3 and L3-L4 neural foraminal stenosis.  3. Small left adnexal cyst.   Pelvic ultrasound could be utilized for additional assessment as clinically indicated.      Electronically signed by: Kade Caruso  Date:    01/24/2024  Time:    16:56         11/16/2021 MRI Lumbar Spine Without Contrast  FINDINGS:  Image quality is degraded by motion, lowering exam sensitivity and specificity.  Interpretation is offered within these confines.    Alignment: There is mild grade 1 anterolisthesis of L4 on L5.  There is slight leftward convexity of the lumbar spine.    Vertebrae: Diffuse loss normal T1 hyperintense marrow signal may be related to chronic anemia or other causes of red marrow hyperplasia, correlate clinically.  No evidence of fracture.    Discs: Normal height and signal.    Cord: Normal.  Conus terminates at T12-L1    Degenerative findings:    T12-L1:  No spinal canal or neuroforaminal stenosis.    L1-L2:  No spinal canal or neuroforaminal stenosis.    L2-L3: Mild generalized disc bulge. No spinal canal or neuroforaminal stenosis.    L3-L4: Mild generalized disc bulge.  Mild bilateral facet arthropathy. No spinal canal or neuroforaminal stenosis.    L4-L5: Severe bilateral facet arthropathy with some uncovering of the intervertebral disc and thickening of the ligamentum flavum.  Moderate bilateral neural foraminal narrowing.  No spinal canal stenosis.    L5-S1: Moderate bilateral facet arthropathy. No spinal canal or neuroforaminal stenosis..    Paraspinal muscles & soft tissues: Unremarkable.    Impression  1. Grade 1 anterolisthesis of L4 on L5 secondary to facet arthropathy with associated moderate bilateral neural foraminal narrowing at this level.  2. Other multilevel degenerative findings as above      7/31/20 X-Ray Hip 2 or 3 views Left  COMPARISON:  Prior radiograph from May 20, 2020.  FINDINGS:  Interval total left hip arthroplasty with soft tissue air in the area.  There is normal alignment of the joint.  Densely calcified uterine leiomyomata are unchanged.  There also  calcified phleboliths within the pelvis.  Impression  Normal alignment of the total left hip arthroplasty that has been placed in the interval.      11/24/2021 X-Ray Hip 2 or 3 views Right (with Pelvis when performed)  COMPARISON:  02/11/2021  FINDINGS:  There are multiple calcified fibroid seen projecting over the uterus.  There also multiple calcified pelvic phleboliths.  There is a single surgical clips seen to the right of the midline.  A left total hip arthroplasty is noted.  There is moderate to severe superolateral joint space narrowing involving the right hip with osteophyte formation noted.  Minimal subchondral cystic changes seen on either side of the right hip joint.      5/01/15 X-Ray Cervical Spine AP And Lateral  Findings:  The reversal of the lordotic curvature of the cervical spine secondary to moderate degenerative disk disease at C4-5 and C5-6.  Chronic anterior wedging of the C4 and C5 vertebral bodies.  Associated endplate sclerosis and uncovertebral joint  hypertrophy. The posterior elements are intact.  IMPRESSION:  No radiographic evidence of fracture or subluxation.  Moderate degenerative disk disease at C4-5 and C5-6.        Review of Systems:  CONSTITUTIONAL: patient denies any fever, chills, or weight loss  SKIN: patient denies any rash or itching  RESPIRATORY: patient denies having any shortness of breath  GASTROINTESTINAL: patient denies having any diarrhea, constipation, or bowel incontinence  GENITOURINARY: patient denies having any abnormal bladder function    MUSCULOSKELETAL:  - patient complains of the above noted pain/s (see chief pain complaint)    NEUROLOGICAL:   - pain as above  - strength in Lower extremities is intact, BILATERALLY  - sensation in Lower extremities is intact, BILATERALLY  - patient denies any loss of bowel or bladder control      PSYCHIATRIC: patient denies any change in mood    Other:  All other systems reviewed and are negative    Telemedicine Exam  There  were no vitals filed for this visit.  There is no height or weight on file to calculate BMI.      Physical Exam: last in clinic visit:    Physical Exam:  Telemedicine Exam, physical exam from previous in office visit  There were no vitals filed for this visit.      There is no height or weight on file to calculate BMI.   (reviewed on 4/23/2025)     There were no vitals filed for this visit.          There is no height or weight on file to calculate BMI.   (reviewed on 4/23/2025)     Physical Exam  Constitutional:       Appearance: Normal appearance.   HENT:      Head: Normocephalic and atraumatic.   Eyes:      Extraocular Movements: Extraocular movements intact.      Pupils: Pupils are equal, round, and reactive to light.   Cardiovascular:      Pulses: Normal pulses.   Pulmonary:      Effort: Pulmonary effort is normal.   Skin:     General: Skin is warm.   Neurological:      Mental Status: She is alert and oriented to person, place, and time.      Sensory: No sensory deficit.      Motor: Weakness present. No abnormal muscle tone.      Gait: Gait abnormal.      Deep Tendon Reflexes:      Reflex Scores:       Patellar reflexes are 2+ on the right side and 2+ on the left side.       Achilles reflexes are 1+ on the right side and 2+ on the left side.     Comments: 4/5 strength in right knee extension and right knee flexion   Psychiatric:         Mood and Affect: Mood normal.         Behavior: Behavior normal.         Musculoskeletal:        Lumbar Exam  Incision: no  Pain with Flexion: yes  Pain with Extension: yes  ROM:  Decreased  Paraspinous TTP:  Positive bilaterally  Facet TTP:  L4-5  Facet Loading:  Positive bilaterally  SLR:  Positive on the right at 75°  SIJ TTP:  Negative bilaterally  GALLO:  Negative bilaterally      Assessment:    Modesta Camacho is a 59 y.o. year old female who is presenting with       ICD-10-CM ICD-9-CM    1. Upper back pain  M54.9 724.5 X-Ray Thoracic Spine 4 or more views      2. LANE  (generalized anxiety disorder)  F41.1 300.02 DULoxetine (CYMBALTA) 60 MG capsule      3. DDD (degenerative disc disease), lumbar  M51.369 722.52 gabapentin (NEURONTIN) 300 MG capsule      tiZANidine (ZANAFLEX) 4 MG tablet      4. Lumbar spondylosis  M47.816 721.3                 Upper back pain  -     X-Ray Thoracic Spine 4 or more views; Future; Expected date: 04/23/2025    LANE (generalized anxiety disorder)  -     DULoxetine (CYMBALTA) 60 MG capsule; Take 1 capsule (60 mg total) by mouth 2 (two) times daily.  Dispense: 60 capsule; Refill: 2    DDD (degenerative disc disease), lumbar  -     gabapentin (NEURONTIN) 300 MG capsule; Take 1 capsule (300 mg total) by mouth 3 (three) times daily.  Dispense: 90 capsule; Refill: 2  -     tiZANidine (ZANAFLEX) 4 MG tablet; Take 0.5-1 tablets (2-4 mg total) by mouth 2 (two) times daily as needed (pain).  Dispense: 60 tablet; Refill: 2    Lumbar spondylosis                  Plan:  1. Interventional: none at this time, will get xray thoracic, consider starting PT    2/27/2025 bilateral L4/5 + L5/S1 RFA with 70% relief.   -02/14/2024: Left genicular nerve block with phenol, 90% relief  -08/14/2023:  Left genicular nerve block, 80% relief x 4 months  - S/p ultrasound-guided right hip injection by Dr. Acevedo on 12/21/2021 with short-term pain relief.      2. Pharmacologic:   - D/c Topamax- dry mouth  -Continue Gabapentin 300 mg TID We have reviewed potential side effects of this medication including daytime somnolence, weight gain and peripheral edema    - Continue Tramadol 50 mg Po BID PRN (60 tabs) - from Rheumatology.     - continue Cymbalta 60 mg PO BID. -We have discussed potential common side effects of duloxetine including nausea, diarrhea/constipation, fatigue, drowsiness or dry mouth.  Patient expresses understanding.    -continue tizanidine 4 mg BID prn  We have discussed potential deleterious side effects associated with this medication including  dizziness,  drowsiness, dry mouth or tingling sensation in the upper or lower extremities.         3. Rehabilitative:   Encouraged ambulation. Continue physical therapy to help with strengthening, although patient reports not helping with pain.   Patient previously informed that we will fill out [facility] paperwork for physical therapy and procedures, but we will not fill out paperwork for disability.  Our goal is to improve pain to continue job responsibility.     4. Diagnostic:   -MRI lumbar spine reviewed and findings discussed with patient.  Significant for grade 1 anterolisthesis of L4 upon L5.  There is no diffuse facet arthropathy with posterior disc bulge at L4-5 resulting in mild canal stenosis in right-greater-than-left foraminal stenosis.  Additionally there was broad-based disc bulge at L3-L4  Will get xray thoracic    5. Follow up:  will contact with xray results        - This condition does not require this patient to take time off of work, and the primary goal of our Pain Management services is to improve the patient's functional capacity.   - I discussed the risks, benefits, and alternatives to potential treatment options. All questions and concerns were fully addressed today in clinic.       Maria Teresa Bazan NP   Interventional Pain Medicine  [facility]      Disclaimer:  This note was prepared using voice recognition system and is likely to have sound alike errors that may have been overlooked even after proof reading.  Please call me with any questions.

## 2025-04-29 ENCOUNTER — HOSPITAL ENCOUNTER (OUTPATIENT)
Dept: RADIOLOGY | Facility: HOSPITAL | Age: 60
Discharge: HOME | End: 2025-04-29
Attending: NURSE PRACTITIONER
Payer: COMMERCIAL

## 2025-04-29 DIAGNOSIS — M54.9 UPPER BACK PAIN: ICD-10-CM

## 2025-04-29 PROCEDURE — 72070 X-RAY EXAM THORAC SPINE 2VWS: CPT | Mod: TC

## 2025-04-29 PROCEDURE — 72070 X-RAY EXAM THORAC SPINE 2VWS: CPT | Mod: 26,,, | Performed by: RADIOLOGY

## 2025-04-30 ENCOUNTER — TELEPHONE (OUTPATIENT)
Dept: PAIN MEDICINE | Facility: CLINIC | Age: 60
End: 2025-04-30
Payer: COMMERCIAL

## 2025-04-30 ENCOUNTER — RESULTS FOLLOW-UP (OUTPATIENT)
Dept: PAIN MEDICINE | Facility: CLINIC | Age: 60
End: 2025-04-30

## 2025-04-30 NOTE — TELEPHONE ENCOUNTER
Reached out to pt to let her know her refill request is too soon to fill. No answer. SAMY LOVE MA

## 2025-05-01 ENCOUNTER — TELEPHONE (OUTPATIENT)
Dept: PAIN MEDICINE | Facility: CLINIC | Age: 60
End: 2025-05-01
Payer: COMMERCIAL

## 2025-05-01 NOTE — TELEPHONE ENCOUNTER
Reached out to pt to let her know the medication she is trying to get filled would be a refill too soon.    Lori LOVE MA

## 2025-05-06 ENCOUNTER — TELEPHONE (OUTPATIENT)
Dept: CARDIOLOGY | Facility: CLINIC | Age: 60
End: 2025-05-06
Payer: COMMERCIAL

## 2025-05-06 ENCOUNTER — OFFICE VISIT (OUTPATIENT)
Dept: CARDIOLOGY | Facility: CLINIC | Age: 60
End: 2025-05-06
Payer: COMMERCIAL

## 2025-05-06 DIAGNOSIS — N18.31 CHRONIC KIDNEY DISEASE, STAGE 3A: Primary | ICD-10-CM

## 2025-05-06 DIAGNOSIS — I10 ESSENTIAL HYPERTENSION: ICD-10-CM

## 2025-05-06 NOTE — TELEPHONE ENCOUNTER
Daphnie Delacruz, FNP-C  P [facility] Staff  Cmp in 2 weeks please.    Thanks  Daphnie     Called pt lab work scheduled

## 2025-05-06 NOTE — PROGRESS NOTES
Subjective:   Patient ID:  Modesta Camacho is a 59 y.o. female who presents for evaluation of No chief complaint on file.      HPI    Modesta Camacho is a 59 year old female who presents to clinic for BP follow up. Her current medical conditions include HTN, Anxiety, OA, CKD.    She returns today and states she is doing ok.     BP has been very elevated recently.     Needs to obtain BP machine for ambulatory monitoring.     Only symptom related to elevated BP is dull headache.     Denies chest pain or anginal equivalents. No shortness of breath, FERNÁNDEZ or palpitations. Denies orthopnea, PND or abdominal bloating. Reports regular walking without any issues lately. NO leg swelling or claudications. No recent falls, syncope or near syncopal events. Reports compliance with medications and dietary restrictions. NO CNS complaints to suggest TIA or CVA today. No signs of abnormal bleeding.       5/6/2025 update    The patient location is: [place]  The chief complaint leading to consultation is: abnormal lab follow up.     Visit type: audiovisual    Face to Face time with patient: 25   minutes of total time spent on the encounter, which includes face to face time and non-face to face time preparing to see the patient (eg, review of tests), Obtaining and/or reviewing separately obtained history, Documenting clinical information in the electronic or other health record, Independently interpreting results (not separately reported) and communicating results to the patient/family/caregiver, or Care coordination (not separately reported).         Each patient to whom he or she provides medical services by telemedicine is:  (1) informed of the relationship between the physician and patient and the respective role of any other health care provider with respect to management of the patient; and (2) notified that he or she may decline to receive medical services by telemedicine and may withdraw from such care at any time.    Notes:      She had abnormal lab at [facility] 4/22 and was asked to stop her Valsartan.     She returns today to discuss since her BP was severely uncontrolled prior to valsartan and she does not wish to stop ARB at this time. I have asked her to stop HCTZ at this time. We will repeat CMP in 2 weeks to discuss next steps.     Reports compliance with sodium restrictions.   Denies any hector leg swelling. None viewed over camera today.   Encouraged adequate oral hydration- she does limit fluids due to inability to keep water at her work station during the day.       Past Medical History:   Diagnosis Date    Abnormal EKG 02/05/2021    Anxiety     Hypertension     Osteoarthritis     Stage 2 chronic kidney disease 02/05/2021       Past Surgical History:   Procedure Laterality Date    ARTHROPLASTY OF HIP BY ANTERIOR APPROACH Left 7/31/2020    Procedure: ARTHROPLASTY, HIP, ANTERIOR APPROACH;  Surgeon: Xavi Gandara MD;  Location: [facility] OR;  Service: Orthopedics;  Laterality: Left;    BLOCK, NERVE, GENICULAR Left 2/14/2024    Procedure: Left genicular nerve block with RN IV sedation;  Surgeon: Shaun Iyer MD;  Location: [facility] PAIN MGT;  Service: Pain Management;  Laterality: Left;    gastric sleeve      HIP ARTHROPLASTY Right 6/7/2023    Procedure: ARTHROPLASTY, HIP;  Surgeon: Trey Clifford MD;  Location: [facility] OR;  Service: Orthopedics;  Laterality: Right;    INJECTION OF ANESTHETIC AGENT AROUND MEDIAL BRANCH NERVES INNERVATING LUMBAR FACET JOINT Bilateral 8/15/2024    Procedure: Bilateral L3-5  MBB #1 with RN IV sedation;  Surgeon: Hakeem Castillo MD;  Location: [facility] PAIN MGT;  Service: Pain Management;  Laterality: Bilateral;    INJECTION OF ANESTHETIC AGENT AROUND MEDIAL BRANCH NERVES INNERVATING LUMBAR FACET JOINT Bilateral 9/10/2024    Procedure: Bilateral L3-5 MBB (diagnostic, #2);  Surgeon: Hakeem Castillo MD;  Location: [facility] PAIN MGT;  Service: Pain Management;  Laterality: Bilateral;    INJECTION OF ANESTHETIC  AGENT AROUND NERVE Bilateral 8/14/2023    Procedure: left genicular nerve block RN IV Sedation;  Surgeon: Shaun Iyer MD;  Location: [facility] PAIN MGT;  Service: Pain Management;  Laterality: Bilateral;    JOINT REPLACEMENT Right 06/14/2016    knee    JOINT REPLACEMENT Left 05/20/2021    KNEE    JOINT REPLACEMENT Left 07/30/2020    HIP    KNEE ARTHROPLASTY Left 5/6/2021    Procedure: ARTHROPLASTY, KNEE;  Surgeon: Xavi Gandara MD;  Location: [facility] OR;  Service: Orthopedics;  Laterality: Left;    RADIOFREQUENCY ABLATION Bilateral 2/27/2025    Procedure: Bilateral L4-5 and L5-S1 RFA;  Surgeon: Hakeem Castillo MD;  Location: [facility] PAIN MGT;  Service: Pain Management;  Laterality: Bilateral;    REPLACEMENT, POLYETHYLENE LINER Left 6/12/2024    Procedure: REPLACEMENT, POLYETHYLENE LINER;  Surgeon: Trey Clifford MD;  Location: [facility] OR;  Service: General;  Laterality: Left;    RESURFACING OF PATELLA Right 9/27/2022    Procedure: RESURFACING, PATELLA;  Surgeon: Trey Clifford MD;  Location: [facility] OR;  Service: General;  Laterality: Right;    REVISION OF KNEE ARTHROPLASTY Right 9/27/2022    Procedure: REVISION, ARTHROPLASTY, KNEE;  Surgeon: Trey Clifford MD;  Location: [facility] OR;  Service: General;  Laterality: Right;    REVISION OF KNEE ARTHROPLASTY Left 6/12/2024    Procedure: REVISION, ARTHROPLASTY, KNEE;  Surgeon: Trey Clifford MD;  Location: [facility] OR;  Service: General;  Laterality: Left;  POLY EXCHANGE    SELECTIVE INJECTION OF ANESTHETIC AGENT AROUND LUMBAR SPINAL NERVE ROOT BY TRANSFORAMINAL APPROACH Bilateral 5/2/2024    Procedure: Bilateral L4/5 TF CELE;  Surgeon: Shaun Iyer MD;  Location: [facility] PAIN MGT;  Service: Pain Management;  Laterality: Bilateral;    SYNOVECTOMY OF KNEE Left 6/12/2024    Procedure: SYNOVECTOMY, KNEE;  Surgeon: Trey Clifford MD;  Location: [facility] OR;  Service: General;  Laterality: Left;    TUBAL LIGATION      UTERINE FIBROID EMBOLIZATION          Social History[1]    Family History   Problem Relation Name Age of Onset    Hypertension Mother      Arthritis Mother      Diabetes Father      Heart disease Father      Depression Sister      Hypertension Sister      Arthritis Brother      Thyroid disease Son      Hypertension Son      Arthritis Maternal Grandmother      Heart disease Maternal Grandmother      Kidney disease Maternal Grandmother      Heart attack Maternal Grandfather      Stroke Paternal Grandmother      No Known Problems Paternal Grandfather      Eczema Son      Breast cancer Sister  47       Wt Readings from Last 3 Encounters:   03/05/25 74.8 kg (164 lb 14.5 oz)   03/03/25 76.1 kg (167 lb 12.3 oz)   02/27/25 75.2 kg (165 lb 12.6 oz)     Temp Readings from Last 3 Encounters:   03/05/25 96.7 °F (35.9 °C) (Tympanic)   02/27/25 97.3 °F (36.3 °C) (Tympanic)   09/10/24 98.1 °F (36.7 °C) (Temporal)     BP Readings from Last 3 Encounters:   03/05/25 (!) 156/86   03/03/25 (!) 180/116   02/27/25 (!) 175/99     Pulse Readings from Last 3 Encounters:   03/05/25 90   03/03/25 75   02/27/25 78       Medications Ordered Prior to Encounter[2]    No cardiac monitor results found for the past 12 months    Results for orders placed during the hospital encounter of 05/15/24    Echo    Interpretation Summary    Left Ventricle: The left ventricle is normal in size. Normal wall thickness. There is concentric remodeling. There is normal systolic function with a visually estimated ejection fraction of 55 - 70%. Ejection fraction by visual approximation is 60%. There is normal diastolic function.    Right Ventricle: Normal right ventricular cavity size. Wall thickness is normal. Systolic function is normal.    IVC/SVC: Normal venous pressure at 3 mmHg.    Results for orders placed during the hospital encounter of 05/15/24    Nuclear Stress - Cardiology Interpreted    Interpretation Summary    Normal myocardial perfusion scan. There is no evidence of myocardial  ischemia or infarction.    The gated perfusion images showed an ejection fraction of 72% at rest. The gated perfusion images showed an ejection fraction of 82% post stress.    There is normal wall motion at rest and post stress.    The ECG portion of the study is negative for ischemia.    The patient reported no chest pain during the stress test.        Results for orders placed or performed during the hospital encounter of 03/03/25   SCHEDULED EKG 12-LEAD (to Muse)    Collection Time: 03/03/25  2:48 PM   Result Value Ref Range    QRS Duration 82 ms    [facility] QTC Calculation 445 ms    Narrative    Test Reason : I10,    Vent. Rate :  89 BPM     Atrial Rate :  89 BPM     P-R Int : 152 ms          QRS Dur :  82 ms      QT Int : 366 ms       P-R-T Axes :  33  32  27 degrees    QTcB Int : 445 ms    Normal sinus rhythm  Normal ECG  When compared with ECG of 15-May-2024 09:55,  T wave inversion no longer evident in Inferior leads  T wave inversion no longer evident in Anterior-lateral leads  Confirmed by Garrett Kirkpatrick ([id number]) on 3/3/2025 3:08:24 PM    Referred By: CY MERLOS           Confirmed By: Garrett Kirkpatrick         Review of Systems   Constitutional: Positive for malaise/fatigue.   HENT:  Negative for hearing loss and hoarse voice.    Eyes:  Negative for blurred vision and visual disturbance.   Cardiovascular:  Negative for chest pain, claudication, dyspnea on exertion, irregular heartbeat, leg swelling, near-syncope, orthopnea, palpitations, paroxysmal nocturnal dyspnea and syncope.   Respiratory:  Negative for cough, hemoptysis, shortness of breath, sleep disturbances due to breathing, snoring and wheezing.    Endocrine: Negative for cold intolerance and heat intolerance.   Hematologic/Lymphatic: Does not bruise/bleed easily.   Skin:  Negative for color change, dry skin and nail changes.   Musculoskeletal:  Positive for arthritis and back pain. Negative for joint pain and myalgias.   Gastrointestinal:  Negative for  bloating, abdominal pain, constipation, nausea and vomiting.   Genitourinary:  Negative for dysuria, flank pain, hematuria and hesitancy.   Neurological:  Positive for dizziness. Negative for headaches, light-headedness, loss of balance, numbness, paresthesias and weakness.   Psychiatric/Behavioral:  Negative for altered mental status.    Allergic/Immunologic: Negative for environmental allergies.         Objective:There were no vitals taken for this visit.     Physical Exam  Vitals and nursing note reviewed.   Constitutional:       General: She is not in acute distress.     Appearance: Normal appearance. She is well-developed. She is not ill-appearing.   HENT:      Head: Normocephalic and atraumatic.      Nose: Nose normal.      Mouth/Throat:      Mouth: Mucous membranes are moist.   Eyes:      Pupils: Pupils are equal, round, and reactive to light.   Neck:      Thyroid: No thyromegaly.      Vascular: No JVD.      Trachea: No tracheal deviation.   Cardiovascular:      Rate and Rhythm: Normal rate and regular rhythm.      Chest Wall: PMI is not displaced.      Pulses: Intact distal pulses.           Radial pulses are 2+ on the right side and 2+ on the left side.        Dorsalis pedis pulses are 2+ on the right side and 2+ on the left side.      Heart sounds: S1 normal and S2 normal. No murmur heard.  Pulmonary:      Effort: Pulmonary effort is normal. No respiratory distress.      Breath sounds: Normal breath sounds. No wheezing.   Abdominal:      General: Bowel sounds are normal.      Palpations: Abdomen is soft.   Musculoskeletal:         General: No swelling. Normal range of motion.      Cervical back: Full passive range of motion without pain, normal range of motion and neck supple.      Right ankle: No swelling.      Left ankle: No swelling.   Skin:     General: Skin is warm and dry.      Capillary Refill: Capillary refill takes less than 2 seconds.      Nails: There is no clubbing.   Neurological:       General: No focal deficit present.      Mental Status: She is alert and oriented to person, place, and time.   Psychiatric:         Speech: Speech normal.         Behavior: Behavior normal.         Thought Content: Thought content normal.         Judgment: Judgment normal.         Lab Results   Component Value Date    CHOL 195 03/03/2025    CHOL 176 09/22/2023     Lab Results   Component Value Date    HDL 70 03/03/2025    HDL 55 09/22/2023    HDL 43 10/01/2019     Lab Results   Component Value Date    LDLCALC 111.2 03/03/2025    LDLCALC 102.4 09/22/2023    LDLCALC 89 10/01/2019     Lab Results   Component Value Date    TRIG 69 03/03/2025    TRIG 93 09/22/2023    TRIG 114 10/01/2019     Lab Results   Component Value Date    CHOLHDL 35.9 03/03/2025    CHOLHDL 31.3 09/22/2023       Chemistry        Component Value Date/Time     03/03/2025 1533    K 3.9 03/03/2025 1533     03/03/2025 1533    CO2 23 03/03/2025 1533    BUN 20 03/03/2025 1533    CREATININE 0.9 03/03/2025 1533    GLU 81 03/03/2025 1533        Component Value Date/Time    CALCIUM 10.4 03/03/2025 1533    ALKPHOS 87 03/03/2025 1533    AST 34 03/03/2025 1533    ALT 19 03/03/2025 1533    BILITOT 0.5 03/03/2025 1533    ESTGFRAFRICA 41 (A) 01/14/2022 1429    EGFRNONAA 36 (A) 01/14/2022 1429          Lab Results   Component Value Date    TSH 2.095 03/03/2025     Lab Results   Component Value Date    INR 1.0 04/27/2021    INR 1.0 07/08/2020    INR 1.0 03/13/2020     Lab Results   Component Value Date    WBC 5.84 03/03/2025    HGB 14.5 03/03/2025    HCT 43.5 03/03/2025    MCV 95 03/03/2025     03/03/2025        Assessment:      1. Chronic kidney disease, stage 3a    2. Essential hypertension        Plan:     Resume Norvasc 10 mg daily  Change ARB to Valsartan 160 mg daily  Continue BB  Use Clonidine PRN for SBP >160  CMP in 2 weeks    Nicole May, FNP-C [facility]         [1]   Social History  Tobacco Use    Smoking status: Never      Passive exposure: Never    Smokeless tobacco: Never   Substance Use Topics    Alcohol use: Yes     Comment: occasional: hold 72hrs. prior to surgery    Drug use: No   [2]   Current Outpatient Medications on File Prior to Visit   Medication Sig Dispense Refill    amLODIPine (NORVASC) 10 MG tablet Take 1 tablet (10 mg total) by mouth once daily. 30 tablet 11    cloNIDine (CATAPRES) 0.1 MG tablet TAKE 1 TABLET BY MOUTH ONCE DAILY USE FOR SBP GREATER THAN 160 MMHG. 90 tablet 1    DULoxetine (CYMBALTA) 60 MG capsule Take 1 capsule (60 mg total) by mouth 2 (two) times daily. 60 capsule 2    gabapentin (NEURONTIN) 300 MG capsule Take 1 capsule (300 mg total) by mouth 3 (three) times daily. 90 capsule 2    hydroCHLOROthiazide (HYDRODIURIL) 25 MG tablet Take 25 mg by mouth. (Patient not taking: Reported on 3/5/2025)      metoprolol succinate (TOPROL-XL) 50 MG 24 hr tablet Take 2 tablets (100 mg total) by mouth nightly. 180 tablet 3    ondansetron (ZOFRAN-ODT) 4 MG TbDL dissolve 2 tablets (8 mg total) by mouth every 8 (eight) hours as needed (nausea). 20 tablet 0    ondansetron (ZOFRAN-ODT) 4 MG TbDL Dissolve 2 tablets (8 mg total) by mouth every 8 (eight) hours as needed (nausea). 21 tablet 0    tiZANidine (ZANAFLEX) 4 MG tablet Take 0.5-1 tablets (2-4 mg total) by mouth 2 (two) times daily as needed (pain). 60 tablet 2    valsartan (DIOVAN) 160 MG tablet Take 1 tablet (160 mg total) by mouth once daily. 90 tablet 3    vitamin D (VITAMIN D3) 1000 units Tab Take 1,000 Units by mouth once daily. (Patient not taking: Reported on 3/5/2025)       Current Facility-Administered Medications on File Prior to Visit   Medication Dose Route Frequency Provider Last Rate Last Admin    ondansetron injection 4 mg  4 mg Intravenous Once PRN Shaun Iyer MD

## 2025-05-13 ENCOUNTER — PATIENT OUTREACH (OUTPATIENT)
Dept: ADMINISTRATIVE | Facility: HOSPITAL | Age: 60
End: 2025-05-13
Payer: COMMERCIAL

## 2025-05-19 NOTE — TELEPHONE ENCOUNTER
----- Message from Ebony Carpio LPN sent at 1/11/2024 10:58 AM CST -----  Contact: pt    ----- Message -----  From: Love Morrison  Sent: 1/11/2024   7:40 AM CST  To: Venessa Yang Staff    Pt is calling rg her surgery and is requesting to speak with Ebony / rescheduling her surgery and can be reached at [phone] ext [phone]//[email or web address]      
Unable to reach patient at this number - I will call patient from number provided but the number will never ring --- [facility]  
General Sunscreen Counseling: I recommended a broad spectrum sunscreen with a SPF of 30 or higher.  I explained that SPF 30 sunscreens block approximately 97 percent of the sun's harmful rays.  Sunscreens should be applied at least 15 minutes prior to expected sun exposure and then every 2 hours after that as long as sun exposure continues. If swimming or exercising sunscreen should be reapplied every 45 minutes to an hour after getting wet or sweating.  One ounce, or the equivalent of a shot glass full of sunscreen, is adequate to protect the skin not covered by a bathing suit. I also recommended a lip balm with a sunscreen as well. Sun protective clothing can be used in lieu of sunscreen but must be worn the entire time you are exposed to the sun's rays.
Detail Level: Detailed

## 2025-05-21 ENCOUNTER — TELEPHONE (OUTPATIENT)
Dept: ORTHOPEDICS | Facility: CLINIC | Age: 60
End: 2025-05-21
Payer: COMMERCIAL

## 2025-05-21 NOTE — TELEPHONE ENCOUNTER
Called the patient in regards to their appointment on 8/11/25 with Dr. Clifford. Left a message letting the patient know that I need to reschedule their appointment due to the fact that Dr. Clifford will be out of the office. Asked the patient to give the office a call back to reschedule.

## 2025-07-25 DIAGNOSIS — M51.369 DDD (DEGENERATIVE DISC DISEASE), LUMBAR: ICD-10-CM

## 2025-07-25 RX ORDER — GABAPENTIN 300 MG/1
300 CAPSULE ORAL 3 TIMES DAILY
Qty: 90 CAPSULE | Refills: 2 | Status: CANCELLED | OUTPATIENT
Start: 2025-07-25

## 2025-07-25 RX ORDER — TIZANIDINE 4 MG/1
2-4 TABLET ORAL 2 TIMES DAILY PRN
Qty: 60 TABLET | Refills: 2 | Status: CANCELLED | OUTPATIENT
Start: 2025-07-25

## 2025-07-29 NOTE — PROGRESS NOTES
Established Patient - TeleHealth Visit    The patient location is: [place]  The chief complaint leading to consultation is: chronic pain     Visit type: audiovisual    Encounter includes face to face time and non-face to face time preparing to see the patient (eg, review of tests), Obtaining and/or reviewing separately obtained history, Documenting clinical information in the electronic or other health record, Independently interpreting results (not separately reported) and communicating results to the patient/family/caregiver, or Care coordination (not separately reported).     Each patient to whom he or she provides medical services by telemedicine is:  (1) informed of the relationship between the physician and patient and the respective role of any other health care provider with respect to management of the patient; and (2) notified that he or she may decline to receive medical services by telemedicine and may withdraw from such care at any time.        Interventional Pain Progress Note       Chief Pain Complaint:  Low back pain     Interval History (7/30/2025):  Patient Modesta Camacho presents today for follow-up visit.  Patient is being seen for follow up upper and lower back pain. Low back is doing well. Continues to have some upper back pain at times. She was unable to do formal PT due to her job. She has been doing more walking. She does feel pain right now is stable and tolerable with medications.   Patient denies night fever/night sweats, urinary incontinence, bowel incontinence, significant weight loss and significant motor weakness.   Patient denies any other complaints or concerns at this time.      Interval History (4/23/2025):  Patient Modesta Camacho presents today for follow-up visit.  Patient was last seen on 2/27/2025 bilateral L4/5 + L5/S1 RFA with 70% relief.   She states the lower back is doing much better.  She denies any radiculopathy into the lower extremities.  She states that lately her upper  back right below the bra strap has been bothering her.  This has been going on for years intermittently.  She has never done physical therapy for the back.  She denies any radiating symptoms from the upper back.  She rates his pain between a 3 to 6/10 at times depending on her activity.  She continues to take gabapentin, Cymbalta, tizanidine with good relief and requests refills on these medications.  Patient denies night fever/night sweats, urinary incontinence, bowel incontinence, significant weight loss and significant motor weakness.   Patient denies any other complaints or concerns at this time.      Interval History (12/27/2024):  Patient Modesta Camacho presents today for follow-up visit.  Patient is being seen for follow up of low back pain. Pain is primarily axial without radiculopathy. She rates her pain 3/10 today but it will go up to 6/10 with prolonged standing and walking. She fell on 12/18 when moving but denies any new injuries or pains. Did not hit head or have LOC.   She has had 2 lumbar MBB with at least 80% relief and is ready to move forward with RFA.  Patient denies night fever/night sweats, urinary incontinence, bowel incontinence, significant weight loss and significant motor weakness.   Patient denies any other complaints or concerns at this time.      Interval History (7/18/2024):  Patient Modesta Camacho presents today for follow-up visit.  Patient was last seen on  5/2/2024 Bilateral L4/5 TF CELE with 50% relief x 2 weeks.  Status left knee replacement with Dr. Clifford. Since her knee replacement she is not having pain into the legs. She rates her pain today 5.5/10. pain is primarily axial in the lower back and does not radiate into the legs. Pain is worse with prolonged standing and walking.   Patient denies night fever/night sweats, urinary incontinence, bowel incontinence, significant weight loss and significant motor weakness.   Patient denies any other complaints or concerns at this  time.        Interval History (03/14/2024):  Patient returns to clinic after procedure.  Patient reports 90% relief after left genicular nerve block with phenol on 02/14/2024.  Patient reports that left knee pain is well-controlled currently.  Primary pain today is lower back pain.  Pain is described as a aching stabbing pain that starts in the band across her lower back.  This pain radiates up to the lower thoracic area into her bilateral posterior thighs.  Pain is worse with prolonged sitting and standing, better with lying supine and ice and heat.  Pain is currently rated a 5/10, but can increase to an 8/10 with exacerbating activities. Denies any fevers, chills, changes in gait, saddle anesthesia, or bowel and bladder incontinence        Interval History (01/11/2024):  Patient Modesta Camacho presents today for follow-up visit.  Patient's chief complaint today is left knee pain which has been chronic.  She had a left genicular block on 08/14/2023 which provided 80% relief for 4 months.  She is interested in repeating the block.  She rates her pain today a 7/10.  She also has chronic lower back pain which will radiate down the posterior aspect of the left lower extremity down to the foot.  She recently had a nerve conduction study which showed some irritation of the L5-S1 nerve root.  Dr. Clifford and then ordered a lumbar MRI and she is scheduled to get that next week.  Patient denies night fever/night sweats, urinary incontinence, bowel incontinence, significant weight loss and significant motor weakness.   Patient denies any other complaints or concerns at this time.    Interval History (1/11/2024):  Patient returns to clinic after procedure.  Patient reports 80% relief in left knee pain after left genicular nerve block on 08/14/2023.  Patient reports occasional catching sensations in her left knee as well as aching pain along the medial aspect.  She denies any significant radiation of this pain.  She denies  any significant numbness or tingling associated with this pain.  Pain is worse with prolonged standing and walking, better with rest and heat.  Pain is currently rated a 4/10. Denies any fevers, chills, changes in gait, saddle anesthesia, or bowel and bladder incontinence    Interval History (7/24/2023):  Modesta Camacho presents today for follow-up visit.  Patient was last seen on 05/23/2023. At last visit, she was to follow up with Dr. Clifford. She underwent right hip arthroplasty with Dr. Clifford on 06/07/2023. Currently enrolled in physical therapy, has 6 weeks remaining. So far she has had her first post op after hip arthroplasty on 06/30/2023, next post op in August. Currently prescribed Endocet and Gabapentin for post op pain.     Patient reports pain as 8/10 today. She continues to report left knee pain. She previously underwent left TKA with Dr. Gandara. She has spoken to Dr. Clifford about this knee before, but it was previously replaced recently and Dr. Clifford is hesitant to go in again so soon.       Interval History (5/23/2023):  Modesta Camacho presents today via telemed for follow-up visit.  Patient was last seen on 2/21/2023. She reports worsening left knee pain. She reports it keeps feeling as though it is popping out of place. She reports it began swelling last Friday and since then she can hardly bear weight. She has been resting, icing, elevating, and wearing her brace which has reduced the swelling and inflammation somewhat. She has spoken to Dr. Clifford about this knee before, but it was previously replaced recently and Dr. Clifford is hesitant to go in again so soon. Patient reports pain as 8/10 today.  She is scheduled for right hip arthroplasty with Dr. Clifford on 06/07/2023.    Interval HPI 02/21/2023    Modesta Camacho presents today for follow-up visit.  Patient was last seen on 11/8/2022. Patient reports pain as 6/10 today. She reports persistent left knee pain, previously  underwent left TKA with Dr. Gandara, states she has not seen him in over 2 years as he was dismissive of her continued pain after surgery. Sees Dr. Clifford for hip, but may also consider revision of left knee. Hx of right knee TKA with revision 9/22. Right knee is doing well. Last visit with Dr. Clifford on 02/13/2023. Considering right hip replacement in May/June. She also reports continued lower back pain, axial in nature, deep achy and without radiation into her lower extremities. Requesting refills of her Gabapentin, Nabumetone, and Tizanidine.    Interval History (11/08/2022):  Modesta Camacho presents today for follow-up visit.  Patient was last seen on 01/05/2022. Patient reports pain as 6/10 today. Underwent revision of her right knee with Dr. Clifford 09/27/2022. Referred back to [facility] for low back pain by Dr. Clifford. Patient reports pain as axial in nature across her lower lumbar spine without radiation into her lower extremities. Pain is constant and interferes with daily activities. Taking cymbalta and Gabapentin with minimal relief. Tizanidine helpful in the past, she is out of this medication. Not interested in injections at this time.    X-ray lumbar spine  FINDINGS:  AP, lateral and coned down radiographs of the lumbar spine demonstrate no evidence for acute fracture or dislocation.  Persistent grade 1 anterolisthesis of L4 with respect L5 is again identified.  Moderate to severe posterior facet hypertrophic changes are identified at L5/S1.  Remaining intervertebral disk spaces and vertebral body heights are well-preserved.  Visualized SI joints are intact.  Patient is status post right hip arthroplasty.  Multiple calcified phleboliths are identified.  Multiple clips are identified in the epigastric region.     Impression:     Degenerative disease, most prominent at the lower lumbar spine.  Overall, no significant interval change.       Interval History (1/5/2022):  Modesta Camacho presents today  for follow-up visit.  She is here today to review lumbar MRI.  She continues to have RLE pain.  She saw Dr. Rodriguez on 12/21/2021, who referred her to have an ultrasound-guided right hip injection by Dr. Acevedo.  She recently had this procedure with short-term pain relief, but it did not help the sciatica pain into the foot.  She was also referred to Dr. Clifford (Orthopedics) for evaluation for right hip replacement.  To note, patient reports history of left hip replacement and bilateral knee replacement in the past.  She has been doing physical therapy twice a week, but she does not feel this has been overly helpful.      Initial HPI (11/09/2021):  Modesta Camacho is a 56 y.o. female  who is presenting with a chief complaint of low back pain. The patient began experiencing this problem insidiously, and the pain has been gradually worsening over the past 6 month(s). The pain is described as throbbing, shooting, burning and electrical and is located in the right lumbar spine. Pain is intermittent and lasts hours. The pain radiates to right leg L4 distribution. The patient rates her pain a 7 out of ten and interferes with activities of daily living a 8 out of ten. Pain is exacerbated by flexion of the lumbar spine, and is improved by rest. Patient reports no prior trauma, prior hip surgery Left Hip [facility] 6/2020 [facility]. Right Knee Replacement 5/2021.     - pertinent negatives: No fever, No chills, No weight loss, No bladder dysfunction, No bowel dysfunction, No saddle anesthesia  - pertinent positives: right leg weakness    - medications, other therapies tried (physical therapy, injections):     >> NSAIDs, Tylenol, Tramadol, gabapentin, zanaflex and robaxin    >> Has previously undergone Physical Therapy    >> Injections:   -02/14/2024: Left genicular nerve block with phenol, 90% relief  -08/14/2023:  Left genicular nerve block, 80% relief   - ultrasound-guided right hip injection by Dr. Acevedo  on 12/21/2021 with short-term pain relief   5/2/2024 Bilateral L4/5 TF CELE with 50% relief x 2 weeks.  2/27/2025 bilateral L4/5 + L5/S1 RFA with 70% relief.    Imaging / Labs / Studies (reviewed on 7/30/2025):      Results for orders placed during the hospital encounter of 01/24/24    MRI Lumbar Spine Without Contrast    Narrative  EXAMINATION:  MRI LUMBAR SPINE WITHOUT CONTRAST    CLINICAL HISTORY:  lumbar radiculopathy; Anesthesia of skin    TECHNIQUE:  Multiplanar, multisequence MR images were acquired from the thoracolumbar junction to the sacrum without contrast.    COMPARISON:  MRI lumbar spine 11/16/2021    FINDINGS:  Alignment: Progressed grade 1 L4-L5 anterolisthesis.    Vertebrae: Lumbar vertebral body heights are maintained.  No abnormal osseous edema or evidence of an acute fracture.  Marrow signal is within normal limits.    Discs: L4-L5 disc desiccation.  Disc heights appear maintained.    Cord: Within normal limits.  Conus terminates at L1.    Degenerative findings:    T12-L1: Minor asymmetric left disc bulge.  No significant spinal canal stenosis or neural foraminal stenosis.    L1-L2: No significant posterior disc bulge, spinal canal stenosis or neural foraminal stenosis.    L2-L3: Mild broad-based posterior disc bulge and mild bilateral facet arthropathy.  No significant spinal canal stenosis.  Unchanged mild left-sided neural foraminal stenosis.    L3-L4: Mild broad-based posterior disc bulge and mild bilateral facet arthropathy.  No significant spinal canal stenosis.  Unchanged mild left-sided neural foraminal stenosis.    L4-L5: Progressed grade 1 degenerative appearing anterolisthesis with roughly 6 mm of posterior disc space uncovering.  Minimal broad-based posterior disc bulge and bilateral facet arthropathy with ligamentum flavum hypertrophy contributes to similar mild spinal canal stenosis with narrowing of the lateral recesses.  There is similar moderate to severe right and  mild-to-moderate left-sided neural foraminal stenosis.    L5-S1: No significant posterior disc bulge.  Mild bilateral facet arthropathy.  No significant spinal canal stenosis or neural foraminal stenosis.    Paraspinal muscles & soft tissues: Within normal limits.  1.6 cm left adnexal cyst.    Impression  1. Progressed grade 1 L4-L5 anterolisthesis with similar mild spinal canal stenosis, moderate to severe right and mild-to-moderate left-sided neural foraminal stenosis.  2. Similar mild left-sided L2-L3 and L3-L4 neural foraminal stenosis.  3. Small left adnexal cyst.  Pelvic ultrasound could be utilized for additional assessment as clinically indicated.      Electronically signed by: Kade Caruso  Date:    01/24/2024  Time:    16:56         11/16/2021 MRI Lumbar Spine Without Contrast  FINDINGS:  Image quality is degraded by motion, lowering exam sensitivity and specificity.  Interpretation is offered within these confines.    Alignment: There is mild grade 1 anterolisthesis of L4 on L5.  There is slight leftward convexity of the lumbar spine.    Vertebrae: Diffuse loss normal T1 hyperintense marrow signal may be related to chronic anemia or other causes of red marrow hyperplasia, correlate clinically.  No evidence of fracture.    Discs: Normal height and signal.    Cord: Normal.  Conus terminates at T12-L1    Degenerative findings:    T12-L1:  No spinal canal or neuroforaminal stenosis.    L1-L2:  No spinal canal or neuroforaminal stenosis.    L2-L3: Mild generalized disc bulge. No spinal canal or neuroforaminal stenosis.    L3-L4: Mild generalized disc bulge.  Mild bilateral facet arthropathy. No spinal canal or neuroforaminal stenosis.    L4-L5: Severe bilateral facet arthropathy with some uncovering of the intervertebral disc and thickening of the ligamentum flavum.  Moderate bilateral neural foraminal narrowing.  No spinal canal stenosis.    L5-S1: Moderate bilateral facet arthropathy. No spinal canal or  neuroforaminal stenosis..    Paraspinal muscles & soft tissues: Unremarkable.    Impression  1. Grade 1 anterolisthesis of L4 on L5 secondary to facet arthropathy with associated moderate bilateral neural foraminal narrowing at this level.  2. Other multilevel degenerative findings as above      7/31/20 X-Ray Hip 2 or 3 views Left  COMPARISON:  Prior radiograph from May 20, 2020.  FINDINGS:  Interval total left hip arthroplasty with soft tissue air in the area.  There is normal alignment of the joint.  Densely calcified uterine leiomyomata are unchanged.  There also calcified phleboliths within the pelvis.  Impression  Normal alignment of the total left hip arthroplasty that has been placed in the interval.      11/24/2021 X-Ray Hip 2 or 3 views Right (with Pelvis when performed)  COMPARISON:  02/11/2021  FINDINGS:  There are multiple calcified fibroid seen projecting over the uterus.  There also multiple calcified pelvic phleboliths.  There is a single surgical clips seen to the right of the midline.  A left total hip arthroplasty is noted.  There is moderate to severe superolateral joint space narrowing involving the right hip with osteophyte formation noted.  Minimal subchondral cystic changes seen on either side of the right hip joint.      5/01/15 X-Ray Cervical Spine AP And Lateral  Findings:  The reversal of the lordotic curvature of the cervical spine secondary to moderate degenerative disk disease at C4-5 and C5-6.  Chronic anterior wedging of the C4 and C5 vertebral bodies.  Associated endplate sclerosis and uncovertebral joint  hypertrophy. The posterior elements are intact.  IMPRESSION:  No radiographic evidence of fracture or subluxation.  Moderate degenerative disk disease at C4-5 and C5-6.        Review of Systems:  CONSTITUTIONAL: patient denies any fever, chills, or weight loss  SKIN: patient denies any rash or itching  RESPIRATORY: patient denies having any shortness of breath  GASTROINTESTINAL:  patient denies having any diarrhea, constipation, or bowel incontinence  GENITOURINARY: patient denies having any abnormal bladder function    MUSCULOSKELETAL:  - patient complains of the above noted pain/s (see chief pain complaint)    NEUROLOGICAL:   - pain as above  - strength in Lower extremities is intact, BILATERALLY  - sensation in Lower extremities is intact, BILATERALLY  - patient denies any loss of bowel or bladder control      PSYCHIATRIC: patient denies any change in mood    Other:  All other systems reviewed and are negative    Telemedicine Exam  There were no vitals filed for this visit.  There is no height or weight on file to calculate BMI.      Physical Exam: last in clinic visit:    Physical Exam:  Telemedicine Exam, physical exam from previous in office visit  There were no vitals filed for this visit.      There is no height or weight on file to calculate BMI.   (reviewed on 7/30/2025)     There were no vitals filed for this visit.          There is no height or weight on file to calculate BMI.   (reviewed on 7/30/2025)     Physical Exam  Constitutional:       Appearance: Normal appearance.   HENT:      Head: Normocephalic and atraumatic.   Eyes:      Extraocular Movements: Extraocular movements intact.      Pupils: Pupils are equal, round, and reactive to light.   Cardiovascular:      Pulses: Normal pulses.   Pulmonary:      Effort: Pulmonary effort is normal.   Skin:     General: Skin is warm.   Neurological:      Mental Status: She is alert and oriented to person, place, and time.      Sensory: No sensory deficit.      Motor: Weakness present. No abnormal muscle tone.      Gait: Gait abnormal.      Deep Tendon Reflexes:      Reflex Scores:       Patellar reflexes are 2+ on the right side and 2+ on the left side.       Achilles reflexes are 1+ on the right side and 2+ on the left side.     Comments: 4/5 strength in right knee extension and right knee flexion   Psychiatric:         Mood and  Affect: Mood normal.         Behavior: Behavior normal.         Musculoskeletal:        Lumbar Exam  Incision: no  Pain with Flexion: yes  Pain with Extension: yes  ROM:  Decreased  Paraspinous TTP:  Positive bilaterally  Facet TTP:  L4-5  Facet Loading:  Positive bilaterally  SLR:  Positive on the right at 75°  SIJ TTP:  Negative bilaterally  GALLO:  Negative bilaterally      Assessment:    Modesta Camacho is a 59 y.o. year old female who is presenting with       ICD-10-CM ICD-9-CM    1. Lumbar spondylosis  M47.816 721.3       2. LANE (generalized anxiety disorder)  F41.1 300.02 DULoxetine (CYMBALTA) 60 MG capsule      3. DDD (degenerative disc disease), lumbar  M51.369 722.52 gabapentin (NEURONTIN) 300 MG capsule      tiZANidine (ZANAFLEX) 4 MG tablet      4. Upper back pain  M54.9 724.5                   Lumbar spondylosis    LANE (generalized anxiety disorder)  -     DULoxetine (CYMBALTA) 60 MG capsule; Take 1 capsule (60 mg total) by mouth 2 (two) times daily.  Dispense: 60 capsule; Refill: 5    DDD (degenerative disc disease), lumbar  -     gabapentin (NEURONTIN) 300 MG capsule; Take 1 capsule (300 mg total) by mouth 3 (three) times daily.  Dispense: 90 capsule; Refill: 5  -     tiZANidine (ZANAFLEX) 4 MG tablet; Take 0.5-1 tablets (2-4 mg total) by mouth 2 (two) times daily as needed (pain).  Dispense: 60 tablet; Refill: 5    Upper back pain                    Plan:  1. Interventional: none at this time, patient unable to attend formal PT, will attach exercises to this office visit for her    2/27/2025 bilateral L4/5 + L5/S1 RFA with 70% relief.   -02/14/2024: Left genicular nerve block with phenol, 90% relief  -08/14/2023:  Left genicular nerve block, 80% relief x 4 months  - S/p ultrasound-guided right hip injection by Dr. Acevedo on 12/21/2021 with short-term pain relief.      2. Pharmacologic:   - D/c Topamax- dry mouth  -Continue Gabapentin 300 mg TID We have reviewed potential side effects of this  medication including daytime somnolence, weight gain and peripheral edema    - Continue Tramadol 50 mg Po BID PRN (60 tabs) - from Rheumatology.     - continue Cymbalta 60 mg PO BID. -We have discussed potential common side effects of duloxetine including nausea, diarrhea/constipation, fatigue, drowsiness or dry mouth.  Patient expresses understanding.    -continue tizanidine 4 mg BID prn  We have discussed potential deleterious side effects associated with this medication including  dizziness, drowsiness, dry mouth or tingling sensation in the upper or lower extremities.         3. Rehabilitative:   Encouraged ambulation. Continue physical therapy to help with strengthening, although patient reports not helping with pain.   Patient previously informed that we will fill out [facility] paperwork for physical therapy and procedures, but we will not fill out paperwork for disability.  Our goal is to improve pain to continue job responsibility.     4. Diagnostic:   -MRI lumbar spine reviewed and findings discussed with patient.  Significant for grade 1 anterolisthesis of L4 upon L5.  There is no diffuse facet arthropathy with posterior disc bulge at L4-5 resulting in mild canal stenosis in right-greater-than-left foraminal stenosis.  Additionally there was broad-based disc bulge at L3-L4      5. Follow up:  will contact with xray results      Visit today included increased complexity associated with the care of the episodic problem of chronic pain which was addressed and continue to manage the longitudinal care of the patient due to the serious and/or complex managed problem(s) listed above.    - This condition does not require this patient to take time off of work, and the primary goal of our Pain Management services is to improve the patient's functional capacity.   - I discussed the risks, benefits, and alternatives to potential treatment options. All questions and concerns were fully addressed today in clinic.        Maria Teresa Bazan NP   Interventional Pain Medicine  [facility]      Disclaimer:  This note was prepared using voice recognition system and is likely to have sound alike errors that may have been overlooked even after proof reading.  Please call me with any questions.

## 2025-07-30 ENCOUNTER — TELEPHONE (OUTPATIENT)
Dept: PAIN MEDICINE | Facility: CLINIC | Age: 60
End: 2025-07-30

## 2025-07-30 ENCOUNTER — OFFICE VISIT (OUTPATIENT)
Dept: PAIN MEDICINE | Facility: CLINIC | Age: 60
End: 2025-07-30
Payer: COMMERCIAL

## 2025-07-30 DIAGNOSIS — F41.1 GAD (GENERALIZED ANXIETY DISORDER): ICD-10-CM

## 2025-07-30 DIAGNOSIS — M47.816 LUMBAR SPONDYLOSIS: Primary | ICD-10-CM

## 2025-07-30 DIAGNOSIS — M54.9 UPPER BACK PAIN: ICD-10-CM

## 2025-07-30 DIAGNOSIS — M51.369 DDD (DEGENERATIVE DISC DISEASE), LUMBAR: ICD-10-CM

## 2025-07-30 PROCEDURE — 3044F HG A1C LEVEL LT 7.0%: CPT | Mod: CPTII,95,, | Performed by: NURSE PRACTITIONER

## 2025-07-30 PROCEDURE — G2211 COMPLEX E/M VISIT ADD ON: HCPCS | Mod: 95,,, | Performed by: NURSE PRACTITIONER

## 2025-07-30 PROCEDURE — 4010F ACE/ARB THERAPY RXD/TAKEN: CPT | Mod: CPTII,95,, | Performed by: NURSE PRACTITIONER

## 2025-07-30 PROCEDURE — 98006 SYNCH AUDIO-VIDEO EST MOD 30: CPT | Mod: 95,,, | Performed by: NURSE PRACTITIONER

## 2025-07-30 RX ORDER — TIZANIDINE 4 MG/1
2-4 TABLET ORAL 2 TIMES DAILY PRN
Qty: 60 TABLET | Refills: 5 | Status: SHIPPED | OUTPATIENT
Start: 2025-07-30

## 2025-07-30 RX ORDER — GABAPENTIN 300 MG/1
300 CAPSULE ORAL 3 TIMES DAILY
Qty: 90 CAPSULE | Refills: 5 | Status: SHIPPED | OUTPATIENT
Start: 2025-07-30

## 2025-07-30 RX ORDER — DULOXETIN HYDROCHLORIDE 60 MG/1
60 CAPSULE, DELAYED RELEASE ORAL 2 TIMES DAILY
Qty: 60 CAPSULE | Refills: 5 | Status: SHIPPED | OUTPATIENT
Start: 2025-07-30

## 2025-07-30 NOTE — TELEPHONE ENCOUNTER
Reached out to pt to schedule her 6m follo up. Pt refused at this time and stated she would reach back out to us to schedule.    Lori LOVE MA

## 2025-08-29 ENCOUNTER — PATIENT MESSAGE (OUTPATIENT)
Dept: ADMINISTRATIVE | Facility: HOSPITAL | Age: 60
End: 2025-08-29
Payer: COMMERCIAL

## 2025-09-02 ENCOUNTER — APPOINTMENT (OUTPATIENT)
Dept: RADIOLOGY | Facility: HOSPITAL | Age: 60
End: 2025-09-02
Attending: NURSE PRACTITIONER
Payer: COMMERCIAL

## 2025-09-02 DIAGNOSIS — J22 LOWER RESPIRATORY INFECTION: ICD-10-CM

## 2025-09-02 DIAGNOSIS — R05.9 COUGH, UNSPECIFIED TYPE: ICD-10-CM

## 2025-09-02 PROCEDURE — 71046 X-RAY EXAM CHEST 2 VIEWS: CPT | Mod: TC,PN

## 2025-09-02 PROCEDURE — 71046 X-RAY EXAM CHEST 2 VIEWS: CPT | Mod: 26,,, | Performed by: RADIOLOGY

## 2025-09-04 ENCOUNTER — TELEPHONE (OUTPATIENT)
Dept: INTERNAL MEDICINE | Facility: CLINIC | Age: 60
End: 2025-09-04
Payer: COMMERCIAL

## (undated) DEVICE — GOWN SURG 2XL DISP TIE BACK

## (undated) DEVICE — SEE MEDLINE ITEM 152523

## (undated) DEVICE — PAD ABD 8X10 STERILE

## (undated) DEVICE — PAD ABDOMINAL STERILE 8X10IN

## (undated) DEVICE — GAUZE SPONGE 4X4 12PLY

## (undated) DEVICE — GOWN POLY REINF BRTH SLV XL

## (undated) DEVICE — TUBING MEDI-VAC 20FT .25IN

## (undated) DEVICE — IMMOBILIZER KNEE 22IN

## (undated) DEVICE — SYS CLSR DERMABOND PRINEO 22CM

## (undated) DEVICE — PACK BASIC SETUP SC BR

## (undated) DEVICE — DRAPE PLASTIC U 60X72

## (undated) DEVICE — COVER TABLE HVY DTY 60X90IN

## (undated) DEVICE — BLADE SAW SAGITTAL 11X.89X90MM

## (undated) DEVICE — BNDG COFLEX FOAM LF2 ST 4X5YD

## (undated) DEVICE — SEE MEDLINE ITEM 152622

## (undated) DEVICE — HOOD FLYTE PEELWY STERISHIELD

## (undated) DEVICE — COVER LIGHT HANDLE 80/CA

## (undated) DEVICE — APPLICATOR CHLORAPREP ORN 26ML

## (undated) DEVICE — KIT IRR SUCTION HND PIECE

## (undated) DEVICE — IMMOB KNEE UNIV TRI PANEL 19IN

## (undated) DEVICE — GLOVE BIOGEL PI ORTHO PRO SZ 8

## (undated) DEVICE — ELECTRODE BLADE INSULATED 1 IN

## (undated) DEVICE — ELECTRODE BLADE E-Z CLEAN 4IN

## (undated) DEVICE — SEE MEDLINE ITEM 157131

## (undated) DEVICE — SUT TI-CRON BLU 2 30 HOS-10

## (undated) DEVICE — SOL NACL IRR 3000ML

## (undated) DEVICE — GOWN NONREINF SET-IN SLV 2XL

## (undated) DEVICE — ALCOHOL 70% ISOP RUBBING 4OZ

## (undated) DEVICE — SUT CTD VICRYL 1 UND BR CP

## (undated) DEVICE — DRAPE IOBAN 2 STERI

## (undated) DEVICE — SEE MEDLINE ITEM 157216

## (undated) DEVICE — SET DECANTER MEDICHOICE

## (undated) DEVICE — SEE MEDLINE ITEM 157125

## (undated) DEVICE — ELECTRODE REM PLYHSV RETURN 9

## (undated) DEVICE — NDL SPINAL 18GX3.5 SPINOCAN

## (undated) DEVICE — DRESSING PICO 7 TWO 10X30CM

## (undated) DEVICE — DRAPE THREE-QTR REINF 53X77IN

## (undated) DEVICE — BLADE EZ CLEAN 2 1/2

## (undated) DEVICE — SHEET XLGE DRAPE

## (undated) DEVICE — INTERPULSE SET

## (undated) DEVICE — SYR 30CC LUER LOCK

## (undated) DEVICE — CHLORAPREP 10.5 ML APPLICATOR

## (undated) DEVICE — DRAPE INCISE IOBAN 2 23X33IN

## (undated) DEVICE — DRAPE STERI INSTRUMENT 1018

## (undated) DEVICE — MANIFOLD 4 PORT

## (undated) DEVICE — SOCKINETTE IMPERVIOUS 12X48IN

## (undated) DEVICE — BRUSH SCRUB STERILE W/O GERMIC

## (undated) DEVICE — SEE MEDLINE ITEM 146292

## (undated) DEVICE — TIP SUCTION YANKAUER

## (undated) DEVICE — GLOVE SURGICAL LATEX SZ 8

## (undated) DEVICE — COVER OVERHEAD SURG LT BLUE

## (undated) DEVICE — TOWEL OR DISP STRL BLUE 4/PK

## (undated) DEVICE — DRAPE ORTH SPLIT 77X108IN

## (undated) DEVICE — TAPE SILK 3IN

## (undated) DEVICE — SUT VICRYL 2-0 27 CT-1

## (undated) DEVICE — ALCOHOL 70% ANTISEPTIC ISO 4OZ

## (undated) DEVICE — STAPLER SKIN PROXIMATE WIDE

## (undated) DEVICE — TAPE SURGICAL MICROFOAM 3IN

## (undated) DEVICE — SOL IRR NACL .9% 3000ML

## (undated) DEVICE — DRAPE INCISE IOBAN 2 13X13IN

## (undated) DEVICE — KIT WING PAD POSITIONING

## (undated) DEVICE — BLADE SYSTEM 6 25MMX90MMX1.27M

## (undated) DEVICE — SUT STRATAFIX PGAPCL 3 FS-1

## (undated) DEVICE — SEE MEDLINE ITEM 157117

## (undated) DEVICE — SEE MEDLINE ITEM 152487

## (undated) DEVICE — SUPPORT ULNA NERVE PROTECTOR

## (undated) DEVICE — NDL SAFETY 22G X 1.5 ECLIPSE

## (undated) DEVICE — DRAPE STERI U-SHAPED 47X51IN

## (undated) DEVICE — BLADE SAG 18.0X1.27X100

## (undated) DEVICE — CONTAINER SPECIMEN OR STER 4OZ

## (undated) DEVICE — GLOVE SURGICAL LATEX SZ 7

## (undated) DEVICE — DRESSING TELFA STRL 4X3 LF

## (undated) DEVICE — SEE MEDLINE ITEM 152529

## (undated) DEVICE — BANDAGE ESMARK ELASTIC ST 6X9

## (undated) DEVICE — DRAPE U SPLIT SHEET 54X76IN

## (undated) DEVICE — TOURNIQUET SB QC DP 34X4IN

## (undated) DEVICE — POSITIONER HEAD DONUT 9IN FOAM

## (undated) DEVICE — DRAPE MOBILE C-ARM

## (undated) DEVICE — BANDAGE ELAS SOFTWRAP ST 6X5YD

## (undated) DEVICE — SPONGE LAP 18X18 PREWASHED

## (undated) DEVICE — SUT ETHIBOND XTRA 1 CTX

## (undated) DEVICE — COVER PROXIMA MAYO STAND

## (undated) DEVICE — SEE MEDLINE ITEM 146298

## (undated) DEVICE — SEE MEDLINE ITEM 157027

## (undated) DEVICE — SKIN MARKER DEVON 160

## (undated) DEVICE — DRAPE HIP PCH 112X137X89IN

## (undated) DEVICE — BANDAGE ACE DOUBLE STER 6IN

## (undated) DEVICE — SUT VICRYL 2-0 36 CT-1

## (undated) DEVICE — UNDERGLOVE BIOGEL PI SZ 6.5 LF

## (undated) DEVICE — KIT TOTAL HIP OPTIVAC

## (undated) DEVICE — SUT STRATAFIX PDS 2-0 CT-1 9IN

## (undated) DEVICE — BLADE SAG DUAL 18MMX1.27MMX90M

## (undated) DEVICE — DRAPE T EXTRM SURG 121X128X90

## (undated) DEVICE — SUT 3-0 MONOCRYL PLUS PS-2

## (undated) DEVICE — DRAPE FULL SHEET 70X100IN

## (undated) DEVICE — YANKAUER FLEX NO VENT REG CAP

## (undated) DEVICE — UNDERGLOVES BIOGEL PI SIZE 7.5

## (undated) DEVICE — SOL 9P NACL IRR PIC IL

## (undated) DEVICE — SUT VICRYL CP-1 VCP268H

## (undated) DEVICE — NDL HYPODERMIC BLUNT 18G 1.5IN

## (undated) DEVICE — BLADE SURG CARBON STEEL #10

## (undated) DEVICE — KIT PT CARE HANA PROFX SSXT

## (undated) DEVICE — Device

## (undated) DEVICE — SPONGE COTTON TRAY 4X4IN

## (undated) DEVICE — UNDERGLOVES BIOGEL PI SIZE 8

## (undated) DEVICE — SUT VICRYL 1 OB 36 CTX

## (undated) DEVICE — SEALER AQUAMANTYS 3.48MM

## (undated) DEVICE — TAPE CLOTH SOFT MEDIPORE 4IN

## (undated) DEVICE — SYS SURGIPHOR STRL IRRG

## (undated) DEVICE — SUT VICRYL BR 1 GEN 27 CT-1

## (undated) DEVICE — SEE MEDLINE ITEM 146347

## (undated) DEVICE — BLADE SAW SAGITTAL18X1.19X90MM

## (undated) DEVICE — CONTAINER 32OZ PATHOLOGY

## (undated) DEVICE — GLOVE SURG PLYSPHRN ORTH SZ7.5

## (undated) DEVICE — SEE MEDLINE ITEM 152739

## (undated) DEVICE — ELECTRODE BLD EXT 6.50 ST DISP

## (undated) DEVICE — STRAP FOOT STIRRUP 3 X 22

## (undated) DEVICE — KIT SURGIFLO HEMOSTATIC MATRIX

## (undated) DEVICE — HEADREST ROUND DISP FOAM 9IN

## (undated) DEVICE — POUCH INSTRUMENT 2 POCKET

## (undated) DEVICE — CONTAINER SPECIMEN STRL 4OZ

## (undated) DEVICE — PAD CAST SPECIALIST STRL 6

## (undated) DEVICE — ALCOHOL ISOPROPYL 4OZ

## (undated) DEVICE — GLOVE BIOGEL PI ORTHO PRO 7.5

## (undated) DEVICE — SUT 0 27IN COATED VICRYL CP

## (undated) DEVICE — SUT CTD VICRYL 2 UND BR TP